# Patient Record
Sex: FEMALE | Race: WHITE | NOT HISPANIC OR LATINO | Employment: FULL TIME | URBAN - METROPOLITAN AREA
[De-identification: names, ages, dates, MRNs, and addresses within clinical notes are randomized per-mention and may not be internally consistent; named-entity substitution may affect disease eponyms.]

---

## 2017-03-03 ENCOUNTER — MYC MEDICAL ADVICE (OUTPATIENT)
Dept: FAMILY MEDICINE | Facility: CLINIC | Age: 32
End: 2017-03-03

## 2017-03-03 ENCOUNTER — VIRTUAL VISIT (OUTPATIENT)
Dept: FAMILY MEDICINE | Facility: OTHER | Age: 32
End: 2017-03-03

## 2017-03-03 LAB
DEPRECATED S PYO AG THROAT QL EIA: NORMAL
MICRO REPORT STATUS: NORMAL
SPECIMEN SOURCE: NORMAL

## 2017-03-03 NOTE — PROGRESS NOTES
Date:   Clinician: Jorge Bryant  Clinician NPI: [id number]  Patient: Ángela Zurita  Patient : 1985  Patient Address: [address] [place] [address]  Patient Phone: [phone]  Visit Protocol: Eyelash lengthening  Patient Summary:  Ángela is a 32 year old ( : 1985 ) female who initiated a Zip for Eyelash Lengthening treatment.     Ángela is seeking eyelash enhancement because of hair loss due to a medical condition (i.e. Skin condition, thyroid disorder, drug reactions).   Ángela denies the following symptoms: redness, itching, discharge, and pain. Ángela states that she does not have glaucoma (increased eye pressure). She does not have irresistible urges to pull out her hair. Ángela has not had an eye injury within the last four weeks. She also has not had eye surgery or Lasik done in the past year. She is not planning on having eye surgery or Lasik in the next three months. Ángela has not had lens surgery on her eyes. She does not wear contact lenses. She has not received chemotherapy treatment in the past.    Previous treatment  Ángela has tried bimatoprost ophthalmic solution (Latisse) in the past. She did not experience changes to eye color while using Latisse. She did not use Latisse for at least two to four months. Ángela used Latisse occasionally and found it to be somewhat effective.   She states she is not pregnant and denies breastfeeding. She is currently menstruating.    Ángela smokes or uses smokeless tobacco.  MEDICATIONS:   Patient free text response: Charan  , ALLERGIES:  NKDA   Clinician Response:  Dear Ángela,  Based on the information you have provided, you are eligible for Latisse.   I am prescribing:   Bimatoprost (Latisse) 0.03% ophthalmic solution. Place one drop on the disposable sterile applicator and apply evenly along the skin of the upper eyelid margin at the base of the eyelashes once nightly for 6 months. Blot any excess solution beyond the eyelid margin.  Dispose of the applicator after one use. Repeat for the opposite eyelid margin using a new sterile applicator. Do not apply to the lower eyelash line.   Latisse is a prescription treatment that will help grow your eyelashes. Stop using Latisse and call your clinic right away if this medication causes severe eye irritation, eye pain, or changes to your vision.   Latisse may increase brown pigmentation of the colored part of the eye, which is likely to be permanent. You can still use Latisse despite of changes to your eye color. Latisse can also cause darkening of the eyelid skin, which may be reversible. There is the potential for hair growth to occur in skin areas where Latisse solution comes in repeated contact.   Instructions for use:     Apply this medication each night and follow all directions on your prescription label.    If you wear contact lenses, remove them before applying Latisse. You may reinsert contacts 15 minutes after applying Latisse.    Do not use this medication in larger amounts than recommended. Using more of the medication will not make it more effective.    Click the following link for more information on Latisse: [email or web address].[email or web address].[email or web address]       Please contact your healthcare clinician if you want more refills and would like to continue using Latisse to maintain the effect.   Finally, as your clinician, I need you to know that becoming tobacco-free is the most important thing you can do to protect your current and future health.   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 Place one drop on the disposable sterile applicator and apply evenly along the skin of the upper eyelid margin at the base of the eyelashes once nightly for 6 months. Refills: 5, Refill as needed: no, Allow substitutions: yes  Prescription Sent At: March 03 16:10:07, 2017  Pharmacy: [facility]  [place] - [phone] - [address] [place] [id number]

## 2017-03-04 ENCOUNTER — VIRTUAL VISIT (OUTPATIENT)
Dept: LAB | Facility: CLINIC | Age: 32
End: 2017-03-04

## 2017-03-04 DIAGNOSIS — R07.0 THROAT PAIN: Primary | ICD-10-CM

## 2017-03-04 NOTE — PROGRESS NOTES
Date:   Clinician: Rebel Dickerson  Clinician NPI: [id number]  Patient: Ángela Zurita  Patient : 1985  Patient Address: [address] [place] [address]  Patient Phone: [phone]  Visit Protocol: URI  Patient Summary:  Ángela is a 32 year old ( : 1985 ) female who initiated a Zip for suspected Strep throat.      Her symptoms started suddenly 48 hours ago and consist of dysphagia, myalgias, cough, sore throat, chills, loss of appetite, fever, and malaise.   She denies dyspnea, ear pain, hoarse voice, itchy eyes, vomiting, nausea, nasal congestion, chest pain, petechial or purpuric rash, facial pain or pressure, headache, rhinitis, and post-nasal drainage. She denies a history of facial surgery.    She has a moderately painful sore throat. When Ángela swallows liquids or saliva, she experiences moderate pain. The patient denies having white spots on the tonsils similar to a sample strep throat image provided. She has been exposed via a family member or close personal contact to Strep. Their Strep diagnosis was confirmed via throat swab. When asked to feel her neck she reported enlarged lymph nodes. Ángela noted that the enlarged neck lymph nodes were first noticed when prompted to check for lymphadenopathy. She cannot tell if axillary lymphadenopathy is present.   Her mild (a few coughs/hr) non-productive cough is NOT more bothersome at night.   She has passed urine in the past 12 hours. She denies rigors.   Ángela denies having COPD or other chronic lung disease.   Pulse: Not measured beats in 10 seconds.    Weight (in lbs): 160   She states she is not pregnant and denies breastfeeding. She is currently menstruating.   Ángela does not smoke or use smokeless tobacco.   Additional information as reported by the patient (free text): My son was diagnosed today with strep, hoping to get myself checked out before the weekend    MEDICATIONS:   Patient free text response:  Charan   , ALLERGIES:   NKDA   Clinician Response:  Dear Ángela,  Your [facility] lab test results show that fortunately you DO NOT have a Strep Throat infection. This means that your condition should resolve within a few days. Try the following to help with your pain and discomfort:     Use throat lozenges    Gargle with warm salt water (1/4 teaspoon of salt per 8 ounce glass of water)    Suck on frozen items such as Popsicles or ice cubes    Take ibuprofen (such as Advil or store brand) or acetaminophen (Tylenol or store brand) for discomfort or fever     Follow up with your primary care clinician if your symptoms are not improving in 3-4 days.   Diagnosis: ZIP TICKET STREP  Diagnosis ICD: J02.9  [facility]: SSCRNT: NEGATIVE: No Group A streptococcal antigen detected by immunoassay, await  culture report.  [facility] Secondary Results: CULT: No Beta Streptococcus isolated

## 2017-03-04 NOTE — MR AVS SNAPSHOT
After Visit Summary   3/4/2017    Ángela Zurita    MRN: [id number]           Patient Information     Date Of Birth          1985        Visit Information        Provider Department      3/4/2017 2:30 PM [facility]        Today's Diagnoses     Throat pain    -  1       Follow-ups after your visit        Your next 10 appointments already scheduled     Mar 04, 2017  2:30 PM CST   My Health Lab Visit with [facility]   [facility]    [address] [place] [id number] [phone]           You must check in at the  no later than the appointment time. Please check [facility] for updates.              Who to contact     If you have questions or need follow up information about today's clinic visit or your schedule please contact [facility] directly at [phone].  Normal or non-critical lab and imaging results will be communicated to you by MyChart, letter or phone within 4 business days after the clinic has received the results. If you do not hear from us within 7 days, please contact the clinic through [employer] or phone. If you have a critical or abnormal lab result, we will notify you by phone as soon as possible.  Submit refill requests through [employer] or call your pharmacy and they will forward the refill request to us. Please allow 3 business days for your refill to be completed.          Additional Information About Your Visit        MyChart Information     [employer] gives you secure access to your electronic health record. If you see a primary care provider, you can also send messages to your care team and make appointments. If you have questions, please call your primary care clinic.  If you do not have a primary care provider, please call [phone] and they will assist you.        Care EveryWhere ID     This is your Care EveryWhere ID. This could be used  by other organizations to access your [facility] medical records  [id number]         Blood Pressure from Last 3 Encounters:   12/02/16 120/78   11/25/16 119/66   09/01/16 121/82    Weight from Last 3 Encounters:   12/02/16 158 lb (71.7 kg)   11/25/16 160 lb (72.6 kg)   09/01/16 168 lb 9.6 oz (76.5 kg)              We Performed the Following     Beta strep group A culture     Rapid strep screen        Primary Care Provider Office Phone # Fax #    KATHLEEN Coffman [facility] [phone]       [facility] [address] [facility] [address]        Thank you!     Thank you for choosing [facility]  for your care. Our goal is always to provide you with excellent care. Hearing back from our patients is one way we can continue to improve our services. Please take a few minutes to complete the written survey that you may receive in the mail after your visit with us. Thank you!             Your Updated Medication List - Protect others around you: Learn how to safely use, store and throw away your medicines at www.disposemymeds.org.          This list is accurate as of: 3/3/17  6:30 PM.  Always use your most recent med list.                   Brand Name Dispense Instructions for use    acyclovir 400 MG tablet    ZOVIRAX         * amphetamine-dextroamphetamine 10 MG per tablet    ADDERALL    150 tablet    Take 2 pills (20mg) orally in the morning and two pills (20mg) at 11am and one pill (10mg) at 2pm       * amphetamine-dextroamphetamine 10 MG per tablet    ADDERALL    150 tablet    Take 2 pills (20mg) orally in the morning and two pills (20mg) at 11am and one pill (10mg) at 2pm       * amphetamine-dextroamphetamine 10 MG per tablet    ADDERALL    150 tablet    Take 2 pills (20mg) orally in the morning and two pills (20mg) at 11am and one pill (10mg) at 3pm       * Notice:  This list has 3 medication(s) that are the same as other medications prescribed for you. Read the  directions carefully, and ask your doctor or other care provider to review them with you.

## 2017-03-05 LAB
BACTERIA SPEC CULT: NORMAL
MICRO REPORT STATUS: NORMAL
SPECIMEN SOURCE: NORMAL

## 2017-03-07 ENCOUNTER — OFFICE VISIT (OUTPATIENT)
Dept: FAMILY MEDICINE | Facility: CLINIC | Age: 32
End: 2017-03-07
Payer: COMMERCIAL

## 2017-03-07 VITALS
DIASTOLIC BLOOD PRESSURE: 78 MMHG | SYSTOLIC BLOOD PRESSURE: 123 MMHG | TEMPERATURE: 97.6 F | BODY MASS INDEX: 25.79 KG/M2 | HEIGHT: 65 IN | OXYGEN SATURATION: 98 % | HEART RATE: 83 BPM | WEIGHT: 154.8 LBS

## 2017-03-07 DIAGNOSIS — F90.0 ATTENTION DEFICIT HYPERACTIVITY DISORDER (ADHD), PREDOMINANTLY INATTENTIVE TYPE: ICD-10-CM

## 2017-03-07 DIAGNOSIS — L70.0 ACNE VULGARIS: Primary | ICD-10-CM

## 2017-03-07 PROCEDURE — 99213 OFFICE O/P EST LOW 20 MIN: CPT | Performed by: FAMILY MEDICINE

## 2017-03-07 RX ORDER — DEXTROAMPHETAMINE SACCHARATE, AMPHETAMINE ASPARTATE, DEXTROAMPHETAMINE SULFATE AND AMPHETAMINE SULFATE 2.5; 2.5; 2.5; 2.5 MG/1; MG/1; MG/1; MG/1
TABLET ORAL
Qty: 150 TABLET | Refills: 0 | Status: SHIPPED | OUTPATIENT
Start: 2017-03-07 | End: 2017-06-06

## 2017-03-07 RX ORDER — DEXTROAMPHETAMINE SACCHARATE, AMPHETAMINE ASPARTATE, DEXTROAMPHETAMINE SULFATE AND AMPHETAMINE SULFATE 2.5; 2.5; 2.5; 2.5 MG/1; MG/1; MG/1; MG/1
TABLET ORAL
Qty: 150 TABLET | Refills: 0 | Status: SHIPPED | OUTPATIENT
Start: 2017-04-06 | End: 2017-06-06

## 2017-03-07 RX ORDER — CLINDAMYCIN PHOSPHATE 10 MG/G
GEL TOPICAL 2 TIMES DAILY
Qty: 60 G | Refills: 11 | Status: SHIPPED | OUTPATIENT
Start: 2017-03-07 | End: 2018-04-10

## 2017-03-07 RX ORDER — DEXTROAMPHETAMINE SACCHARATE, AMPHETAMINE ASPARTATE, DEXTROAMPHETAMINE SULFATE AND AMPHETAMINE SULFATE 2.5; 2.5; 2.5; 2.5 MG/1; MG/1; MG/1; MG/1
TABLET ORAL
Qty: 150 TABLET | Refills: 0 | Status: SHIPPED | OUTPATIENT
Start: 2017-05-06 | End: 2017-06-06

## 2017-03-07 NOTE — PROGRESS NOTES
"  SUBJECTIVE:                                                    Ángela Zurita is a 32 year old female who presents to clinic today for the following health issues:      Medication Followup of Adderall    Taking Medication as prescribed: yes    Side Effects:  None    Medication Helping Symptoms:  yes     Since starting Adderall feels like much improved focus at work in that able to focus on one task and allow others to wait.   is noticing difference in attention with conversation focus.  Has played around with dose and if takes 2 after 2pm then unable to sleep so taking 2 in the morning 2 at 11am then 1 pill at 3pm which is working well.  Feels nearly 90% improved    History of ADHD or other psych diagnoses: Diagnosed in 2011 and started medication Adderall which worked well to help with focus at work. [place] saw therapist and did the ADHD testing 2/2012 with Uriel Min, these records were reviewed.  Then pregnancy after that and mood was very stable and then kids and life was disorganized.  Currently having struggles with relationships due to focusing on conversations and forgets things during conversations with .  At work had review last month with difficulty focusing during conversations, projects started and trouble with completing tasks.     Acne: ongoing for years, has been on clinda gel in the past with good results would like it again.    Problem list and histories reviewed & adjusted, as indicated.  Additional history: as documented    Reviewed and updated as needed this visit by clinical staff  Tobacco  Allergies  Med Hx  Surg Hx  Fam Hx  Soc Hx      Reviewed and updated as needed this visit by Provider         ROS:  Constitutional, HEENT, cardiovascular, pulmonary, gi and gu systems are negative, except as otherwise noted.    OBJECTIVE:                                                    /78  Pulse 83  Temp 97.6  F (36.4  C) (Tympanic)  Ht 5' 5\" (1.651 m)  Wt 154 lb 12.8 oz " (70.2 kg)  [facility] 02/27/2017  SpO2 98%  BMI 25.76 kg/m2  Body mass index is 25.76 kg/(m^2).  GENERAL: healthy, alert and no distress  CV: regular rate and rhythm, normal S1 S2, no S3 or S4, no murmur, click or rub, no peripheral edema and peripheral pulses strong  SKIN: non inflammatory papules on chin  NEURO: Normal strength and tone, mentation intact and speech normal  PSYCH: mentation appears normal, affect normal/bright    Diagnostic Test Results:  none      ASSESSMENT/PLAN:                                                        Ángela was seen today for recheck medication.    Diagnoses and all orders for this visit:    Acne vulgaris: non-inflammatory papules on chin  -     clindamycin (CLINDAMAX) 1 % topical gel; Apply topically 2 times daily    Attention deficit hyperactivity disorder (ADHD), predominantly inattentive type: stable  -refill for 3 months  -     amphetamine-dextroamphetamine (ADDERALL) 10 MG per tablet; Take 2 pills (20mg) orally in the morning and two pills (20mg) at 11am and one pill (10mg) at 3pm  -     amphetamine-dextroamphetamine (ADDERALL) 10 MG per tablet; Take 2 pills (20mg) orally in the morning and two pills (20mg) at 11am and one pill (10mg) at 2pm  -     amphetamine-dextroamphetamine (ADDERALL) 10 MG per tablet; Take 2 pills (20mg) orally in the morning and two pills (20mg) at 11am and one pill (10mg) at 2pm        See Patient Instructions    Randy Graf MD  [facility]

## 2017-03-07 NOTE — MR AVS SNAPSHOT
After Visit Summary   3/7/2017    Ángela Zurita    MRN: [id number]           Patient Information     Date Of Birth          1985        Visit Information        Provider Department      3/7/2017 7:20 AM Randy Graf MD [facility]        Today's Diagnoses     Attention deficit hyperactivity disorder (ADHD), predominantly inattentive type          Care Instructions    Next clinic visit in 3 months    Thank you for choosing [facility].  You may be receiving a survey in the mail from [facility] regarding your visit today.  Please take a few minutes to complete and return the survey to let us know how we are doing.      If you have questions or concerns, please contact us via [employer] or you can contact your care team at [phone].    Our Clinic hours are:  Monday 6:40 am  to 7:00 pm  Tuesday -Friday 6:40 am to 5:00 pm    The [place] outpatient lab hours are:  Monday - Friday 6:10 am to 4:45 pm  Saturdays 7:00 am to 11:00 am  Appointments are required, call [phone]    If you have clinical questions after hours or would like to schedule an appointment,  call the clinic at [phone].        Follow-ups after your visit        Who to contact     If you have questions or need follow up information about today's clinic visit or your schedule please contact [facility] directly at [phone].  Normal or non-critical lab and imaging results will be communicated to you by [employer], letter or phone within 4 business days after the clinic has received the results. If you do not hear from us within 7 days, please contact the clinic through [employer] or phone. If you have a critical or abnormal lab result, we will notify you by phone as soon as possible.  Submit refill requests through [employer] or call your pharmacy and they will forward the refill request to us. Please allow 3 business days for your refill to be completed.          Additional Information About  "Your Visit        MyChart Information     [employer] gives you secure access to your electronic health record. If you see a primary care provider, you can also send messages to your care team and make appointments. If you have questions, please call your primary care clinic.  If you do not have a primary care provider, please call [phone] and they will assist you.        Care EveryWhere ID     This is your Care EveryWhere ID. This could be used by other organizations to access your [place] medical records  [id number]        Your Vitals Were     Pulse Temperature Height Last Period Pulse Oximetry BMI (Body Mass Index)    83 97.6  F (36.4  C) (Tympanic) 5' 5\" (1.651 m) 02/27/2017 98% 25.76 kg/m2       Blood Pressure from Last 3 Encounters:   03/07/17 123/78   12/02/16 120/78   11/25/16 119/66    Weight from Last 3 Encounters:   03/07/17 154 lb 12.8 oz (70.2 kg)   12/02/16 158 lb (71.7 kg)   11/25/16 160 lb (72.6 kg)              Today, you had the following     No orders found for display         Where to get your medicines      Some of these will need a paper prescription and others can be bought over the counter.  Ask your nurse if you have questions.     Bring a paper prescription for each of these medications     amphetamine-dextroamphetamine 10 MG per tablet    amphetamine-dextroamphetamine 10 MG per tablet    amphetamine-dextroamphetamine 10 MG per tablet          Primary Care Provider Office Phone # Fax #    KATHLEEN Coffman [facility] [phone]       [facility] [address] [facility] [address]        Thank you!     Thank you for choosing [facility]  for your care. Our goal is always to provide you with excellent care. Hearing back from our patients is one way we can continue to improve our services. Please take a few minutes to complete the written survey that you may receive in the mail after your visit with us. Thank you!             Your Updated " Medication List - Protect others around you: Learn how to safely use, store and throw away your medicines at www.disposemymeds.org.          This list is accurate as of: 3/7/17  7:48 AM.  Always use your most recent med list.                   Brand Name Dispense Instructions for use    acyclovir 400 MG tablet    ZOVIRAX     Reported on 3/7/2017       * amphetamine-dextroamphetamine 10 MG per tablet    ADDERALL    150 tablet    Take 2 pills (20mg) orally in the morning and two pills (20mg) at 11am and one pill (10mg) at 2pm       * amphetamine-dextroamphetamine 10 MG per tablet   Start taking on:  4/6/2017    ADDERALL    150 tablet    Take 2 pills (20mg) orally in the morning and two pills (20mg) at 11am and one pill (10mg) at 2pm       * amphetamine-dextroamphetamine 10 MG per tablet   Start taking on:  5/6/2017    ADDERALL    150 tablet    Take 2 pills (20mg) orally in the morning and two pills (20mg) at 11am and one pill (10mg) at 3pm       * Notice:  This list has 3 medication(s) that are the same as other medications prescribed for you. Read the directions carefully, and ask your doctor or other care provider to review them with you.

## 2017-03-07 NOTE — PATIENT INSTRUCTIONS
Next clinic visit in 3 months    Thank you for choosing [facility].  You may be receiving a survey in the mail from [facility] regarding your visit today.  Please take a few minutes to complete and return the survey to let us know how we are doing.      If you have questions or concerns, please contact us via [employer] or you can contact your care team at [phone].    Our Clinic hours are:  Monday 6:40 am  to 7:00 pm  Tuesday -Friday 6:40 am to 5:00 pm    The [place] outpatient lab hours are:  Monday - Friday 6:10 am to 4:45 pm  Saturdays 7:00 am to 11:00 am  Appointments are required, call [phone]    If you have clinical questions after hours or would like to schedule an appointment,  call the clinic at [phone].

## 2017-03-07 NOTE — NURSING NOTE
"Chief Complaint   Patient presents with     Recheck Medication     Adderall       Initial /78  Pulse 83  Temp 97.6  F (36.4  C) (Tympanic)  Ht 5' 5\" (1.651 m)  Wt 154 lb 12.8 oz (70.2 kg)  LMP 02/27/2017  SpO2 98%  BMI 25.76 kg/m2 Estimated body mass index is 25.76 kg/(m^2) as calculated from the following:    Height as of this encounter: 5' 5\" (1.651 m).    Weight as of this encounter: 154 lb 12.8 oz (70.2 kg).  Medication Reconciliation: complete  "

## 2017-05-03 ENCOUNTER — E-VISIT (OUTPATIENT)
Dept: FAMILY MEDICINE | Facility: CLINIC | Age: 32
End: 2017-05-03
Payer: COMMERCIAL

## 2017-05-03 DIAGNOSIS — B00.1 COLD SORE: Primary | ICD-10-CM

## 2017-05-03 PROCEDURE — 99444 ZZC PHYSICIAN ONLINE EVALUATION & MANAGEMENT SERVICE: CPT | Performed by: FAMILY MEDICINE

## 2017-05-04 RX ORDER — VALACYCLOVIR HYDROCHLORIDE 1 G/1
2000 TABLET, FILM COATED ORAL 2 TIMES DAILY
Qty: 4 TABLET | Refills: 0 | Status: SHIPPED | OUTPATIENT
Start: 2017-05-04 | End: 2017-08-14

## 2017-06-06 ENCOUNTER — OFFICE VISIT (OUTPATIENT)
Dept: FAMILY MEDICINE | Facility: CLINIC | Age: 32
End: 2017-06-06
Payer: COMMERCIAL

## 2017-06-06 VITALS
HEIGHT: 65 IN | HEART RATE: 68 BPM | WEIGHT: 150.2 LBS | BODY MASS INDEX: 25.02 KG/M2 | SYSTOLIC BLOOD PRESSURE: 102 MMHG | TEMPERATURE: 98.2 F | DIASTOLIC BLOOD PRESSURE: 63 MMHG | OXYGEN SATURATION: 100 %

## 2017-06-06 DIAGNOSIS — F90.0 ATTENTION DEFICIT HYPERACTIVITY DISORDER (ADHD), PREDOMINANTLY INATTENTIVE TYPE: ICD-10-CM

## 2017-06-06 PROCEDURE — 99213 OFFICE O/P EST LOW 20 MIN: CPT | Performed by: FAMILY MEDICINE

## 2017-06-06 RX ORDER — DEXTROAMPHETAMINE SACCHARATE, AMPHETAMINE ASPARTATE, DEXTROAMPHETAMINE SULFATE AND AMPHETAMINE SULFATE 2.5; 2.5; 2.5; 2.5 MG/1; MG/1; MG/1; MG/1
TABLET ORAL
Qty: 150 TABLET | Refills: 0 | Status: SHIPPED | OUTPATIENT
Start: 2017-06-06 | End: 2017-08-31

## 2017-06-06 RX ORDER — DEXTROAMPHETAMINE SACCHARATE, AMPHETAMINE ASPARTATE, DEXTROAMPHETAMINE SULFATE AND AMPHETAMINE SULFATE 2.5; 2.5; 2.5; 2.5 MG/1; MG/1; MG/1; MG/1
TABLET ORAL
Qty: 150 TABLET | Refills: 0 | Status: SHIPPED | OUTPATIENT
Start: 2017-07-06 | End: 2017-08-31

## 2017-06-06 RX ORDER — DEXTROAMPHETAMINE SACCHARATE, AMPHETAMINE ASPARTATE, DEXTROAMPHETAMINE SULFATE AND AMPHETAMINE SULFATE 2.5; 2.5; 2.5; 2.5 MG/1; MG/1; MG/1; MG/1
TABLET ORAL
Qty: 150 TABLET | Refills: 0 | Status: SHIPPED | OUTPATIENT
Start: 2017-08-03 | End: 2017-08-31

## 2017-06-06 NOTE — NURSING NOTE
"Chief Complaint   Patient presents with     Refill Request     Adderall       Initial /63  Pulse 68  Temp 98.2  F (36.8  C) (Tympanic)  Ht 5' 5\" (1.651 m)  Wt 150 lb 3.2 oz (68.1 kg)  LMP 05/22/2017 (Approximate)  SpO2 100%  BMI 24.99 kg/m2 Estimated body mass index is 24.99 kg/(m^2) as calculated from the following:    Height as of this encounter: 5' 5\" (1.651 m).    Weight as of this encounter: 150 lb 3.2 oz (68.1 kg).  Medication Reconciliation: complete  "

## 2017-06-06 NOTE — PATIENT INSTRUCTIONS
Thank you for choosing [facility].  You may be receiving a survey in the mail from Tre[facility] Strickland regarding your visit today.  Please take a few minutes to complete and return the survey to let us know how we are doing.      If you have questions or concerns, please contact us via [employer] or you can contact your care team at [phone].    Our Clinic hours are:  Monday 6:40 am  to 7:00 pm  Tuesday -Friday 6:40 am to 5:00 pm    The [place] outpatient lab hours are:  Monday - Friday 6:10 am to 4:45 pm  Saturdays 7:00 am to 11:00 am  Appointments are required, call [phone]    If you have clinical questions after hours or would like to schedule an appointment,  call the clinic at [phone].

## 2017-06-06 NOTE — PROGRESS NOTES
"  SUBJECTIVE:                                                    Ángela Zurita is a 32 year old female who presents to clinic today for the following health issues:    Medication Followup of Adderall    Taking Medication as prescribed: yes    Side Effects:  None    Medication Helping Symptoms:  yes   Getting  July 1st. Might decide to start trying for pregnancy in Winter.      Problem list and histories reviewed & adjusted, as indicated.  Additional history: as documented      Reviewed and updated as needed this visit by clinical staff  Tobacco  Allergies  Med Hx  Surg Hx  Fam Hx  Soc Hx      Reviewed and updated as needed this visit by Provider         ROS:  C: NEGATIVE for fever, chills, change in weight, no decreased appetite  E/M: NEGATIVE for ear, mouth and throat problems  R: NEGATIVE for significant cough or SOB  CV: NEGATIVE for chest pain, palpitations or peripheral edema  NEURO: no tremor or tics    OBJECTIVE:                                                    /63  Pulse 68  Temp 98.2  F (36.8  C) (Tympanic)  Ht 5' 5\" (1.651 m)  Wt 150 lb 3.2 oz (68.1 kg)  LMP 05/22/2017 (Approximate)  SpO2 100%  BMI 24.99 kg/m2  Body mass index is 24.99 kg/(m^2).  GENERAL: healthy, alert and no distress  PSYCH: mentation appears normal, affect normal/bright, no tremor    Diagnostic Test Results:  none      ASSESSMENT/PLAN:                                                        Ángela was seen today for refill request.    Diagnoses and all orders for this visit:    Attention deficit hyperactivity disorder (ADHD), predominantly inattentive type: well controlled  -     amphetamine-dextroamphetamine (ADDERALL) 10 MG per tablet; Take 2 pills (20mg) orally in the morning and two pills (20mg) at 11am and one pill (10mg) at 3pm #150 per month  -     amphetamine-dextroamphetamine (ADDERALL) 10 MG per tablet; Take 2 pills (20mg) orally in the morning and two pills (20mg) at 11am and one pill (10mg) at " 2pm  -     amphetamine-dextroamphetamine (ADDERALL) 10 MG per tablet; Take 2 pills (20mg) orally in the morning and two pills (20mg) at 11am and one pill (10mg) at 2pm        See Patient Instructions    Randy Graf MD  [facility]

## 2017-06-06 NOTE — MR AVS SNAPSHOT
After Visit Summary   6/6/2017    Ángela Zurita    MRN: [id number]           Patient Information     Date Of Birth          1985        Visit Information        Provider Department      6/6/2017 6:40 AM Randy Graf MD [facility]        Today's Diagnoses     Attention deficit hyperactivity disorder (ADHD), predominantly inattentive type          Care Instructions          Thank you for choosing [facility].  You may be receiving a survey in the mail from [facility] regarding your visit today.  Please take a few minutes to complete and return the survey to let us know how we are doing.      If you have questions or concerns, please contact us via [employer] or you can contact your care team at [phone].    Our Clinic hours are:  Monday 6:40 am  to 7:00 pm  Tuesday -Friday 6:40 am to 5:00 pm    The [place] outpatient lab hours are:  Monday - Friday 6:10 am to 4:45 pm  Saturdays 7:00 am to 11:00 am  Appointments are required, call [phone]    If you have clinical questions after hours or would like to schedule an appointment,  call the clinic at [phone].          Follow-ups after your visit        Who to contact     If you have questions or need follow up information about today's clinic visit or your schedule please contact [facility] directly at [phone].  Normal or non-critical lab and imaging results will be communicated to you by MyChart, letter or phone within 4 business days after the clinic has received the results. If you do not hear from us within 7 days, please contact the clinic through [employer] or phone. If you have a critical or abnormal lab result, we will notify you by phone as soon as possible.  Submit refill requests through [employer] or call your pharmacy and they will forward the refill request to us. Please allow 3 business days for your refill to be completed.          Additional Information About Your Visit        Roberts Chapelt  "Information     [employer] gives you secure access to your electronic health record. If you see a primary care provider, you can also send messages to your care team and make appointments. If you have questions, please call your primary care clinic.  If you do not have a primary care provider, please call [phone] and they will assist you.        Care EveryWhere ID     This is your Care EveryWhere ID. This could be used by other organizations to access your [facility] medical records  [id number]        Your Vitals Were     Pulse Temperature Height Last Period Pulse Oximetry BMI (Body Mass Index)    68 98.2  F (36.8  C) (Tympanic) 5' 5\" (1.651 m) 05/22/2017 (Approximate) 100% 24.99 kg/m2       Blood Pressure from Last 3 Encounters:   06/06/17 102/63   03/07/17 123/78   12/02/16 120/78    Weight from Last 3 Encounters:   06/06/17 150 lb 3.2 oz (68.1 kg)   03/07/17 154 lb 12.8 oz (70.2 kg)   12/02/16 158 lb (71.7 kg)              Today, you had the following     No orders found for display         Today's Medication Changes          These changes are accurate as of: 6/6/17  7:36 AM.  If you have any questions, ask your nurse or doctor.               Stop taking these medicines if you haven't already. Please contact your care team if you have questions.     acyclovir 400 MG tablet   Commonly known as:  ZOVIRAX   Stopped by:  Randy Graf MD                Where to get your medicines      Some of these will need a paper prescription and others can be bought over the counter.  Ask your nurse if you have questions.     Bring a paper prescription for each of these medications     amphetamine-dextroamphetamine 10 MG per tablet    amphetamine-dextroamphetamine 10 MG per tablet    amphetamine-dextroamphetamine 10 MG per tablet                Primary Care Provider Office Phone # Fax #    KATHLEEN Coffman [phone]       [facility] [address] [address] [address]      "   Thank you!     Thank you for choosing [facility]  for your care. Our goal is always to provide you with excellent care. Hearing back from our patients is one way we can continue to improve our services. Please take a few minutes to complete the written survey that you may receive in the mail after your visit with us. Thank you!             Your Updated Medication List - Protect others around you: Learn how to safely use, store and throw away your medicines at www.disposemymeds.org.          This list is accurate as of: 6/6/17  7:36 AM.  Always use your most recent med list.                   Brand Name Dispense Instructions for use    * amphetamine-dextroamphetamine 10 MG per tablet    ADDERALL    150 tablet    Take 2 pills (20mg) orally in the morning and two pills (20mg) at 11am and one pill (10mg) at 2pm       * amphetamine-dextroamphetamine 10 MG per tablet   Start taking on:  7/6/2017    ADDERALL    150 tablet    Take 2 pills (20mg) orally in the morning and two pills (20mg) at 11am and one pill (10mg) at 2pm       * amphetamine-dextroamphetamine 10 MG per tablet   Start taking on:  8/3/2017    ADDERALL    150 tablet    Take 2 pills (20mg) orally in the morning and two pills (20mg) at 11am and one pill (10mg) at 3pm       clindamycin 1 % topical gel    CLINDAMAX    60 g    Apply topically 2 times daily       valACYclovir 1000 mg tablet    VALTREX    4 tablet    Take 2 tablets (2,000 mg) by mouth 2 times daily for 1 day       * Notice:  This list has 3 medication(s) that are the same as other medications prescribed for you. Read the directions carefully, and ask your doctor or other care provider to review them with you.

## 2017-06-26 LAB
DEPRECATED S PYO AG THROAT QL EIA: NORMAL
MICRO REPORT STATUS: NORMAL
SPECIMEN SOURCE: NORMAL

## 2017-06-27 ENCOUNTER — VIRTUAL VISIT (OUTPATIENT)
Dept: LAB | Facility: CLINIC | Age: 32
End: 2017-06-27

## 2017-06-27 DIAGNOSIS — R07.0 THROAT PAIN: Primary | ICD-10-CM

## 2017-06-27 LAB
BACTERIA SPEC CULT: NORMAL
MICRO REPORT STATUS: NORMAL
SPECIMEN SOURCE: NORMAL

## 2017-06-27 NOTE — MR AVS SNAPSHOT
After Visit Summary   6/27/2017    Ángela Zurita    MRN: [id number]           Patient Information     Date Of Birth          1985        Visit Information        Provider Department      6/27/2017 1:30 PM [facility]        Today's Diagnoses     Throat pain    -  1       Follow-ups after your visit        Your next 10 appointments already scheduled     Jun 27, 2017  1:30 PM CDT   My Health Lab Visit with [facility]   [facility]    [address] [place] [address]   [phone]           You must check in at the  no later than the appointment time. Please check [employer] for updates.              Who to contact     If you have questions or need follow up information about today's clinic visit or your schedule please contact [facility] directly at [phone].  Normal or non-critical lab and imaging results will be communicated to you by MyChart, letter or phone within 4 business days after the clinic has received the results. If you do not hear from us within 7 days, please contact the clinic through [employer] or phone. If you have a critical or abnormal lab result, we will notify you by phone as soon as possible.  Submit refill requests through [employer] or call your pharmacy and they will forward the refill request to us. Please allow 3 business days for your refill to be completed.          Additional Information About Your Visit        MyChart Information     [employer] gives you secure access to your electronic health record. If you see a primary care provider, you can also send messages to your care team and make appointments. If you have questions, please call your primary care clinic.  If you do not have a primary care provider, please call [phone] and they will assist you.        Care EveryWhere ID     This is your Care EveryWhere ID. This could be used  by other organizations to access your [place] medical records  [id number]        Your Vitals Were     Last Period                   05/22/2017 (Approximate)            Blood Pressure from Last 3 Encounters:   06/06/17 102/63   03/07/17 123/78   12/02/16 120/78    Weight from Last 3 Encounters:   06/06/17 150 lb 3.2 oz (68.1 kg)   03/07/17 154 lb 12.8 oz (70.2 kg)   12/02/16 158 lb (71.7 kg)              We Performed the Following     Beta strep group A culture     Rapid strep screen        Primary Care Provider Office Phone # Fax #    Cierra Cifuentes, APRN [facility] [phone]       [address] [address]        Equal Access to Services     SKY BLANCHARD : Hadii aad ku hadasho Soomaali, waaxda luqadaha, qaybta kaalmada adeegyada, waxpaz scherer hayrissa hoskins . So [facility] [phone].    ATENCIÓN: Si habla español, tiene a reyez disposición servicios gratuitos de asistencia lingüística. Llame al [phone].    We comply with applicable federal civil rights laws and [place] laws. We do not discriminate on the basis of race, color, national origin, age, disability sex, sexual orientation or gender identity.            Thank you!     Thank you for choosing [facility]  for your care. Our goal is always to provide you with excellent care. Hearing back from our patients is one way we can continue to improve our services. Please take a few minutes to complete the written survey that you may receive in the mail after your visit with us. Thank you!             Your Updated Medication List - Protect others around you: Learn how to safely use, store and throw away your medicines at www.disposemymeds.org.          This list is accurate as of: 6/26/17  4:23 PM.  Always use your most recent med list.                   Brand Name Dispense Instructions for use Diagnosis    * amphetamine-dextroamphetamine 10 MG per tablet    ADDERALL    150 tablet     Take 2 pills (20mg) orally in the morning and two pills (20mg) at 11am and one pill (10mg) at 2pm    Attention deficit hyperactivity disorder (ADHD), predominantly inattentive type       * amphetamine-dextroamphetamine 10 MG per tablet   Start taking on:  7/6/2017    ADDERALL    150 tablet    Take 2 pills (20mg) orally in the morning and two pills (20mg) at 11am and one pill (10mg) at 2pm    Attention deficit hyperactivity disorder (ADHD), predominantly inattentive type       * amphetamine-dextroamphetamine 10 MG per tablet   Start taking on:  8/3/2017    ADDERALL    150 tablet    Take 2 pills (20mg) orally in the morning and two pills (20mg) at 11am and one pill (10mg) at 3pm    Attention deficit hyperactivity disorder (ADHD), predominantly inattentive type       clindamycin 1 % topical gel    CLINDAMAX    60 g    Apply topically 2 times daily    Acne vulgaris       valACYclovir 1000 mg tablet    VALTREX    4 tablet    Take 2 tablets (2,000 mg) by mouth 2 times daily for 1 day    Cold sore       * Notice:  This list has 3 medication(s) that are the same as other medications prescribed for you. Read the directions carefully, and ask your doctor or other care provider to review them with you.

## 2017-08-14 ENCOUNTER — HOSPITAL ENCOUNTER (EMERGENCY)
Facility: CLINIC | Age: 32
Discharge: HOME | End: 2017-08-14
Attending: PHYSICIAN ASSISTANT | Admitting: PHYSICIAN ASSISTANT
Payer: COMMERCIAL

## 2017-08-14 VITALS
DIASTOLIC BLOOD PRESSURE: 84 MMHG | HEIGHT: 65 IN | OXYGEN SATURATION: 98 % | TEMPERATURE: 98.6 F | SYSTOLIC BLOOD PRESSURE: 129 MMHG | RESPIRATION RATE: 16 BRPM

## 2017-08-14 DIAGNOSIS — B96.89 BACTERIAL VAGINOSIS: ICD-10-CM

## 2017-08-14 DIAGNOSIS — N64.52 NIPPLE DISCHARGE IN FEMALE: ICD-10-CM

## 2017-08-14 DIAGNOSIS — N76.0 BACTERIAL VAGINOSIS: ICD-10-CM

## 2017-08-14 LAB
ALBUMIN SERPL-MCNC: 4 G/DL (ref 3.4–5)
ALBUMIN UR-MCNC: NEGATIVE MG/DL
ALP SERPL-CCNC: 75 U/L (ref 40–150)
ALT SERPL W P-5'-P-CCNC: 23 U/L (ref 0–50)
ANION GAP SERPL CALCULATED.3IONS-SCNC: 4 MMOL/L (ref 3–14)
APPEARANCE UR: CLEAR
AST SERPL W P-5'-P-CCNC: 15 U/L (ref 0–45)
BASOPHILS # BLD AUTO: 0 10E9/L (ref 0–0.2)
BASOPHILS NFR BLD AUTO: 0.3 %
BILIRUB SERPL-MCNC: 0.6 MG/DL (ref 0.2–1.3)
BILIRUB UR QL STRIP: NEGATIVE
BUN SERPL-MCNC: 9 MG/DL (ref 7–30)
CALCIUM SERPL-MCNC: 9.2 MG/DL (ref 8.5–10.1)
CHLORIDE SERPL-SCNC: 102 MMOL/L (ref 94–109)
CO2 SERPL-SCNC: 30 MMOL/L (ref 20–32)
COLOR UR AUTO: ABNORMAL
CREAT SERPL-MCNC: 0.63 MG/DL (ref 0.52–1.04)
DIFFERENTIAL METHOD BLD: NORMAL
EOSINOPHIL # BLD AUTO: 0 10E9/L (ref 0–0.7)
EOSINOPHIL NFR BLD AUTO: 0.4 %
ERYTHROCYTE [DISTWIDTH] IN BLOOD BY AUTOMATED COUNT: 11.6 % (ref 10–15)
GFR SERPL CREATININE-BSD FRML MDRD: ABNORMAL ML/MIN/1.7M2
GLUCOSE SERPL-MCNC: 136 MG/DL (ref 70–99)
GLUCOSE UR STRIP-MCNC: NEGATIVE MG/DL
HCG UR QL: NEGATIVE
HCT VFR BLD AUTO: 41.3 % (ref 35–47)
HGB BLD-MCNC: 14.5 G/DL (ref 11.7–15.7)
HGB UR QL STRIP: NEGATIVE
IMM GRANULOCYTES # BLD: 0 10E9/L (ref 0–0.4)
IMM GRANULOCYTES NFR BLD: 0.2 %
INTERNAL QC OK POCT: YES
KETONES UR STRIP-MCNC: 10 MG/DL
LEUKOCYTE ESTERASE UR QL STRIP: NEGATIVE
LYMPHOCYTES # BLD AUTO: 3.6 10E9/L (ref 0.8–5.3)
LYMPHOCYTES NFR BLD AUTO: 33.2 %
MCH RBC QN AUTO: 31.7 PG (ref 26.5–33)
MCHC RBC AUTO-ENTMCNC: 35.1 G/DL (ref 31.5–36.5)
MCV RBC AUTO: 90 FL (ref 78–100)
MICRO REPORT STATUS: ABNORMAL
MONOCYTES # BLD AUTO: 0.6 10E9/L (ref 0–1.3)
MONOCYTES NFR BLD AUTO: 6 %
NEUTROPHILS # BLD AUTO: 6.4 10E9/L (ref 1.6–8.3)
NEUTROPHILS NFR BLD AUTO: 59.9 %
NITRATE UR QL: NEGATIVE
PH UR STRIP: 7 PH (ref 5–7)
PLATELET # BLD AUTO: 412 10E9/L (ref 150–450)
POTASSIUM SERPL-SCNC: 3.8 MMOL/L (ref 3.4–5.3)
PROLACTIN SERPL-MCNC: 14 UG/L (ref 3–27)
PROT SERPL-MCNC: 7.4 G/DL (ref 6.8–8.8)
RBC # BLD AUTO: 4.58 10E12/L (ref 3.8–5.2)
S PYO AG THROAT QL IA.RAPID: NEGATIVE
SODIUM SERPL-SCNC: 136 MMOL/L (ref 133–144)
SP GR UR STRIP: 1 (ref 1–1.03)
SPECIMEN SOURCE: ABNORMAL
URN SPEC COLLECT METH UR: ABNORMAL
UROBILINOGEN UR STRIP-MCNC: NORMAL MG/DL (ref 0–2)
WBC # BLD AUTO: 10.7 10E9/L (ref 4–11)
WET PREP SPEC: ABNORMAL

## 2017-08-14 PROCEDURE — 85025 COMPLETE CBC W/AUTO DIFF WBC: CPT | Performed by: PHYSICIAN ASSISTANT

## 2017-08-14 PROCEDURE — 81025 URINE PREGNANCY TEST: CPT | Performed by: PHYSICIAN ASSISTANT

## 2017-08-14 PROCEDURE — 99214 OFFICE O/P EST MOD 30 MIN: CPT | Performed by: PHYSICIAN ASSISTANT

## 2017-08-14 PROCEDURE — 80053 COMPREHEN METABOLIC PANEL: CPT | Performed by: PHYSICIAN ASSISTANT

## 2017-08-14 PROCEDURE — 87210 SMEAR WET MOUNT SALINE/INK: CPT | Performed by: PHYSICIAN ASSISTANT

## 2017-08-14 PROCEDURE — 87081 CULTURE SCREEN ONLY: CPT | Performed by: PHYSICIAN ASSISTANT

## 2017-08-14 PROCEDURE — 87880 STREP A ASSAY W/OPTIC: CPT | Performed by: PHYSICIAN ASSISTANT

## 2017-08-14 PROCEDURE — 84146 ASSAY OF PROLACTIN: CPT | Performed by: PHYSICIAN ASSISTANT

## 2017-08-14 PROCEDURE — 99213 OFFICE O/P EST LOW 20 MIN: CPT

## 2017-08-14 PROCEDURE — 81003 URINALYSIS AUTO W/O SCOPE: CPT | Performed by: PHYSICIAN ASSISTANT

## 2017-08-14 RX ORDER — METRONIDAZOLE 500 MG/1
500 TABLET ORAL 2 TIMES DAILY
Qty: 14 TABLET | Refills: 0 | Status: SHIPPED | OUTPATIENT
Start: 2017-08-14 | End: 2017-08-21

## 2017-08-14 NOTE — ED NOTES
Patient here for possible urinary infection - having some bloating,cramps, and frequnency- symptoms started 5 days ago.  Patient presents ambulatory to the urgent care.

## 2017-08-14 NOTE — ED AVS SNAPSHOT
[facility] Emergency Department    [address] [address]    Phone:  [phone]    Fax:  [phone]                                       Ángela Zurita   MRN: [id number]    Department:  [facility] Emergency Department   Date of Visit:  8/14/2017           After Visit Summary Signature Page     I have received my discharge instructions, and my questions have been answered. I have discussed any challenges I see with this plan with the nurse or doctor.    ..........................................................................................................................................  Patient/Patient Representative Signature      ..........................................................................................................................................  Patient Representative Print Name and Relationship to Patient    ..................................................               ................................................  Date                                            Time    ..........................................................................................................................................  Reviewed by Signature/Title    ...................................................              ..............................................  Date                                                            Time

## 2017-08-14 NOTE — ED AVS SNAPSHOT
[facility] Emergency Department    [address] [address]    Phone:  [phone]    Fax:  [phone]                                       Ángela Zurita   MRN: [id number]    Department:  [facility] Emergency Department   Date of Visit:  8/14/2017           Patient Information     Date Of Birth          1985        Your diagnoses for this visit were:     Bacterial vaginosis     Nipple discharge in female        You were seen by Vibha Eller PA-C.      Follow-up Information     Follow up with Randy Graf MD In 1 week.    Specialty:  Family Practice    Contact information:    [address] [facility] [id number] [phone]          Discharge Instructions       Patient to follow up with primary care provider for recheck of symptoms and further evaluation of nipple discharge and abdominal symptoms may need further imaging and labs. Discussed with patient the importance of this; patient aware and will make an appointment.   Use medication as directed: no alcohol while on medication    Return to Emergency Room if worsening or persist abdominal pain, fevers, worsening headaches occur.   Can not rule out appendicitis at this time; but due to symptoms and duration low suspicion for acute appendicitis.     24 Hour Appointment Hotline       To make an appointment at any [facility], call [phone] ([phone]). If you don't have a family doctor or clinic, we will help you find one. [facility] clinics are conveniently located to serve the needs of you and your family.             Review of your medicines      START taking        Dose / Directions Last dose taken    metroNIDAZOLE 500 MG tablet   Commonly known as:  FLAGYL   Dose:  500 mg   Quantity:  14 tablet        Take 1 tablet (500 mg) by mouth 2 times daily for 7 days   Refills:  0          Our records show that you are taking the medicines listed below. If these are incorrect, please call your family  doctor or clinic.        Dose / Directions Last dose taken    amphetamine-dextroamphetamine 10 MG per tablet   Commonly known as:  ADDERALL   Quantity:  150 tablet        Take 2 pills (20mg) orally in the morning and two pills (20mg) at 11am and one pill (10mg) at 3pm   Refills:  0        clindamycin 1 % topical gel   Commonly known as:  CLINDAMAX   Quantity:  60 g        Apply topically 2 times daily   Refills:  11                Prescriptions were sent or printed at these locations (1 Prescription)                   [facility] [place], [place] - [address]   [address] [facility] [address]    Telephone:  [phone]   Fax:  [phone]   Hours:                  E-Prescribed (1 of 1)         metroNIDAZOLE (FLAGYL) 500 MG tablet                Procedures and tests performed during your visit     CBC with platelets differential    Comprehensive metabolic panel    HCG qualitative urine    Prolactin    Rapid strep group A screen POCT    UA reflex to Microscopic    Wet prep      Orders Needing Specimen Collection     None      Pending Results     Date and Time Order Name Status Description    8/14/2017 1905 Prolactin In process             Pending Culture Results     No orders found from 8/12/2017 to 8/15/2017.            Pending Results Instructions     If you had any lab results that were not finalized at the time of your Discharge, you can call the ED Lab Result RN at [phone]. You will be contacted by this team for any positive Lab results or changes in treatment. The nurses are available 7 days a week from 10A to 6:30P.  You can leave a message 24 hours per day and they will return your call.        Test Results From Your Hospital Stay        8/14/2017  5:57 PM      Component Results     Component Value Ref Range & Units Status    Color Urine Light Yellow  Final    Appearance Urine Clear  Final    Glucose Urine Negative NEG mg/dL Final    Bilirubin Urine Negative NEG Final    Ketones  Urine 10 (A) NEG mg/dL Final    Specific Gravity Urine 1.005 1.003 - 1.035 Final    Blood Urine Negative NEG Final    pH Urine 7.0 5.0 - 7.0 pH Final    Protein Albumin Urine Negative NEG mg/dL Final    Urobilinogen mg/dL Normal 0.0 - 2.0 mg/dL Final    Nitrite Urine Negative NEG Final    Leukocyte Esterase Urine Negative NEG Final    Source Midstream Urine  Final         8/14/2017  7:14 PM      Component Results     Component Value Ref Range & Units Status    HCG Qual Urine Negative NEG Final         8/14/2017  7:32 PM      Component Results     Component    Specimen Description    Vagina    Wet Prep (Abnormal)    Clue cells seen  No Trichomonas seen  No yeast seen      Micro Report Status    FINAL 08/14/2017 8/14/2017  7:05 PM      Component Results     Component Value Ref Range & Units Status    Rapid Strep A Screen NEGATIVE neg Final    Internal QC OK Yes  Final         8/14/2017  7:02 PM      Component Results     Component Value Ref Range & Units Status    WBC 10.7 4.0 - 11.0 10e9/L Final    RBC Count 4.58 3.8 - 5.2 10e12/L Final    Hemoglobin 14.5 11.7 - 15.7 g/dL Final    Hematocrit 41.3 35.0 - 47.0 % Final    MCV 90 78 - 100 fl Final    MCH 31.7 26.5 - 33.0 pg Final    MCHC 35.1 31.5 - 36.5 g/dL Final    RDW 11.6 10.0 - 15.0 % Final    Platelet Count 412 150 - 450 10e9/L Final    Diff Method Automated Method  Final    % Neutrophils 59.9 % Final    % Lymphocytes 33.2 % Final    % Monocytes 6.0 % Final    % Eosinophils 0.4 % Final    % Basophils 0.3 % Final    % Immature Granulocytes 0.2 % Final    Absolute Neutrophil 6.4 1.6 - 8.3 10e9/L Final    Absolute Lymphocytes 3.6 0.8 - 5.3 10e9/L Final    Absolute Monocytes 0.6 0.0 - 1.3 10e9/L Final    Absolute Eosinophils 0.0 0.0 - 0.7 10e9/L Final    Absolute Basophils 0.0 0.0 - 0.2 10e9/L Final    Abs Immature Granulocytes 0.0 0 - 0.4 10e9/L Final         8/14/2017  7:17 PM      Component Results     Component Value Ref Range & Units Status    Sodium 136  133 - 144 mmol/L Final    Potassium 3.8 3.4 - 5.3 mmol/L Final    Chloride 102 94 - 109 mmol/L Final    Carbon Dioxide 30 20 - 32 mmol/L Final    Anion Gap 4 3 - 14 mmol/L Final    Glucose 136 (H) 70 - 99 mg/dL Final    Urea Nitrogen 9 7 - 30 mg/dL Final    Creatinine 0.63 0.52 - 1.04 mg/dL Final    GFR Estimate >90  Non  GFR Calc   >60 mL/min/1.7m2 Final    GFR Estimate If Black >90   GFR Calc   >60 mL/min/1.7m2 Final    Calcium 9.2 8.5 - 10.1 mg/dL Final    Bilirubin Total 0.6 0.2 - 1.3 mg/dL Final    Albumin 4.0 3.4 - 5.0 g/dL Final    Protein Total 7.4 6.8 - 8.8 g/dL Final    Alkaline Phosphatase 75 40 - 150 U/L Final    ALT 23 0 - 50 U/L Final    AST 15 0 - 45 U/L Final         8/14/2017  7:08 PM                Thank you for choosing [facility]       Thank you for choosing [facility] for your care. Our goal is always to provide you with excellent care. Hearing back from our patients is one way we can continue to improve our services. Please take a few minutes to complete the written survey that you may receive in the mail after you visit with us. Thank you!        [employer] Information     [employer] gives you secure access to your electronic health record. If you see a primary care provider, you can also send messages to your care team and make appointments. If you have questions, please call your primary care clinic.  If you do not have a primary care provider, please call [phone] and they will assist you.        Care EveryWhere ID     This is your Care EveryWhere ID. This could be used by other organizations to access your [facility] medical records  [id number]        Equal Access to Services     SKY BLANCHARD : [place]j [place], jelly garcia, celia wells So [facility] [phone].    ATENCIÓN: Si habla español, tiene a reyez disposición servicios gratuitos de asistencia lingüística. Llame al [phone].    We  comply with applicable federal civil rights laws and [place] laws. We do not discriminate on the basis of race, color, national origin, age, disability sex, sexual orientation or gender identity.            After Visit Summary       This is your record. Keep this with you and show to your community pharmacist(s) and doctor(s) at your next visit.

## 2017-08-15 NOTE — ED PROVIDER NOTES
"  History     Chief Complaint   Patient presents with     UTI     HPI  Ángela Zurita is a 32 year old female who presents to the urgent care today with several concerns. Patient states she has been dealing with nipple discharge on and off for the past 6 months and over the past 1-1.5 months she has been dealing with urinary urgency, foul odor, increased sex drive, nausea, suprapubic pressure. Patient states she took a pregnancy test at home which was negative and she finished her menses about 1 week ago. Patient has no concerns for STDs or history of STDs at this time and is a monogamous relationship. Patient also states over the past few days she has been having a dull headache 3/10 on pain scale, scratchy throat, and aches. Patient denies tick bite, but states her son has been having strep throat frequently this summer. Patient denies back pain, chest pain, dizziness, confusion, diarrhea, breast mass, hematuria or dysuria. Patient states she has not seen a provider for these symptoms. Patient states she does have a history of fibroids in the past that presented similar with belly symptoms.       I have reviewed the Medications, Allergies, Past Medical and Surgical History, and Social History in the Epic system.    Review of Systems   All normal unless stated above     Physical Exam   BP: 129/84  Heart Rate: 97  Temp: 98.6  F (37  C)  Resp: 16  Height: 165.1 cm (5' 5\")  SpO2: 98 %  Physical Exam   Constitutional: She is oriented to person, place, and time. She appears well-developed and well-nourished. No distress.   HENT:   Right Ear: External ear normal.   Left Ear: External ear normal.   Nose: Nose normal.   Mouth/Throat: Oropharynx is clear and moist. No oropharyngeal exudate.   Neck: Normal range of motion. Neck supple.   Cardiovascular: Normal rate, normal heart sounds and intact distal pulses.    No murmur heard.  Pulmonary/Chest: Effort normal and breath sounds normal.   Abdominal: Soft. Bowel sounds " are normal. She exhibits no distension and no mass. There is tenderness (minimal with right lower quadrant and suprapubic palpation. ). There is no rebound and no guarding.   Genitourinary:   Genitourinary Comments: Patient declined pelvic exam and breast exam in office.    Musculoskeletal: Normal range of motion.   Lymphadenopathy:     She has no cervical adenopathy.   Neurological: She is alert and oriented to person, place, and time.   Skin: Skin is warm and dry. No rash noted. She is not diaphoretic. No erythema. No pallor.   Psychiatric: She has a normal mood and affect. Her behavior is normal. Judgment and thought content normal.        Offered pelvic exam, STD testing, and breast exam in office today; patient declined states she will follow up with primary care provider for this. Patient obtained her own wet prep in office today.     ED Course     ED Course     Procedures            Critical Care time:  none               Labs Ordered and Resulted from Time of ED Arrival Up to the Time of Departure from the ED   URINE MACROSCOPIC WITH REFLEX TO MICRO - Abnormal; Notable for the following:        Result Value    Ketones Urine 10 (*)     All other components within normal limits   COMPREHENSIVE METABOLIC PANEL - Abnormal; Notable for the following:     Glucose 136 (*)     All other components within normal limits   WET PREP - Abnormal; Notable for the following:     Wet Prep   (*)     Value: Clue cells seen  No Trichomonas seen  No yeast seen      All other components within normal limits   HCG QUALITATIVE URINE   CBC WITH PLATELETS DIFFERENTIAL   PROLACTIN   RAPID STREP GROUP A SCREEN POCT       Assessments & Plan (with Medical Decision Making)     I have reviewed the nursing notes.    I have reviewed the findings, diagnosis, plan and need for follow up with the patient.  Patient to follow up with primary care provider for recheck of symptoms and further evaluation of nipple discharge and abdominal symptoms  may need further imaging (US) and labs. Discussed with patient the importance of this; patient aware and will make an appointment.   Can not rule out appendicitis at this time; but due to symptoms and duration low suspicion for acute appendicitis.        Discharge Medication List as of 8/14/2017  8:07 PM      START taking these medications    Details   metroNIDAZOLE (FLAGYL) 500 MG tablet Take 1 tablet (500 mg) by mouth 2 times daily for 7 days, Disp-14 tablet, R-0, E-PrescribeEat yogurt or cottage cheese daily to prevent diarrhea that can be caused by taking this medication.             Final diagnoses:   Bacterial vaginosis   Nipple discharge in female       8/14/2017   [facility] EMERGENCY DEPARTMENT     Vibha Eller PA-C  08/14/17 [id number]

## 2017-08-15 NOTE — DISCHARGE INSTRUCTIONS
Patient to follow up with primary care provider for recheck of symptoms and further evaluation of nipple discharge and abdominal symptoms may need further imaging (US) and labs. Discussed with patient the importance of this; patient aware and will make an appointment.   Use medication as directed: no alcohol while on medication    Return to Emergency Room if worsening or persist abdominal pain, fevers, worsening headaches occur.   Can not rule out appendicitis at this time; but due to symptoms and duration low suspicion for acute appendicitis.

## 2017-08-16 LAB
BACTERIA SPEC CULT: NORMAL
SPECIMEN SOURCE: NORMAL

## 2017-08-17 ENCOUNTER — TELEPHONE (OUTPATIENT)
Dept: FAMILY MEDICINE | Facility: CLINIC | Age: 32
End: 2017-08-17

## 2017-08-17 ENCOUNTER — HOSPITAL ENCOUNTER (OUTPATIENT)
Dept: CT IMAGING | Facility: CLINIC | Age: 32
Discharge: HOME | End: 2017-08-17
Attending: NURSE PRACTITIONER | Admitting: NURSE PRACTITIONER
Payer: COMMERCIAL

## 2017-08-17 ENCOUNTER — OFFICE VISIT (OUTPATIENT)
Dept: FAMILY MEDICINE | Facility: CLINIC | Age: 32
End: 2017-08-17
Payer: COMMERCIAL

## 2017-08-17 VITALS
WEIGHT: 149.8 LBS | BODY MASS INDEX: 24.96 KG/M2 | SYSTOLIC BLOOD PRESSURE: 121 MMHG | DIASTOLIC BLOOD PRESSURE: 83 MMHG | TEMPERATURE: 98.3 F | HEIGHT: 65 IN | HEART RATE: 109 BPM

## 2017-08-17 DIAGNOSIS — R10.819 RIGHT FLANK TENDERNESS: ICD-10-CM

## 2017-08-17 DIAGNOSIS — R35.0 URINARY FREQUENCY: ICD-10-CM

## 2017-08-17 DIAGNOSIS — N76.0 BACTERIAL VAGINAL INFECTION: ICD-10-CM

## 2017-08-17 DIAGNOSIS — B96.89 BACTERIAL VAGINAL INFECTION: ICD-10-CM

## 2017-08-17 DIAGNOSIS — R35.0 URINARY FREQUENCY: Primary | ICD-10-CM

## 2017-08-17 LAB
ALBUMIN UR-MCNC: NEGATIVE MG/DL
APPEARANCE UR: NORMAL
BILIRUB UR QL STRIP: NEGATIVE
COLOR UR AUTO: YELLOW
GLUCOSE UR STRIP-MCNC: NEGATIVE MG/DL
HGB UR QL STRIP: NEGATIVE
KETONES UR STRIP-MCNC: NEGATIVE MG/DL
LEUKOCYTE ESTERASE UR QL STRIP: NEGATIVE
NITRATE UR QL: NEGATIVE
NON-SQ EPI CELLS #/AREA URNS LPF: ABNORMAL /LPF
PH UR STRIP: 6.5 PH (ref 5–7)
RBC #/AREA URNS AUTO: ABNORMAL /HPF
SOURCE: NORMAL
SP GR UR STRIP: 1.02 (ref 1–1.03)
URATE CRY #/AREA URNS HPF: ABNORMAL /HPF
UROBILINOGEN UR STRIP-ACNC: 0.2 EU/DL (ref 0.2–1)
WBC #/AREA URNS AUTO: ABNORMAL /HPF

## 2017-08-17 PROCEDURE — 81001 URINALYSIS AUTO W/SCOPE: CPT | Performed by: NURSE PRACTITIONER

## 2017-08-17 PROCEDURE — 74176 CT ABD & PELVIS W/O CONTRAST: CPT

## 2017-08-17 PROCEDURE — 99214 OFFICE O/P EST MOD 30 MIN: CPT | Performed by: NURSE PRACTITIONER

## 2017-08-17 NOTE — PATIENT INSTRUCTIONS
Thank you for choosing [facility].  You may be receiving a survey in the mail from Trevon Strickland regarding your visit today.  Please take a few minutes to complete and return the survey to let us know how we are doing.      If you have questions or concerns, please contact us via [employer] or you can contact your care team at [phone].    Our Clinic hours are:  Monday 6:40 am  to 7:00 pm  Tuesday -Friday 6:40 am to 5:00 pm    The [place] outpatient lab hours are:  Monday - Friday 6:10 am to 4:45 pm  Saturdays 7:00 am to 11:00 am  Appointments are required, call [phone]    If you have clinical questions after hours or would like to schedule an appointment,  call the clinic at [phone].

## 2017-08-17 NOTE — PROGRESS NOTES
"  SUBJECTIVE:                                                    Ángela Zurita is a 32 year old female who presents to clinic today for the following health issues:  {Provider please address medication reconciliation discrepancies--rooming staff please delete if no med/rec issues}    ED/UC Followup:    Facility:  [facility]  Date of visit: 8/14/2017  Reason for visit: Bacterial Vaginosis  Current Status: ***         {additional problems for provider to add:921339}    Problem list and histories reviewed & adjusted, as indicated.  Additional history: {NONE - AS DOCUMENTED:933232::\"as documented\"}    {HIST REVIEW/ LINKS 2:897084}    Reviewed and updated as needed this visit by clinical staff       Reviewed and updated as needed this visit by Provider         {PROVIDER CHARTING PREFERENCE:749905}  "

## 2017-08-17 NOTE — TELEPHONE ENCOUNTER
"Pain now is a 4-5 (scale 0-10)   \"just not sure what to do to start feeling better\"  Talked about sips of fluids, ice chips, ginger ale,   Not using any products with alcohol in them while she is on the metronidazole which can cause nausea also.   Warm moist packs to abd for the pain, (possible related to ovary cyst per CT today )   Tylenol, or ibuprofen might help.   (take with food)     Invited her to come back in / see Dr Graf or partner if sx persist or worse in any way.   \"ok, thank, you. \"  Alejandra Anne RNC    "

## 2017-08-17 NOTE — TELEPHONE ENCOUNTER
Reason for Call:  Other sick    Detailed comments: Patient was seen today and is not feeling any better, nauseous, pain in right side, urine frequency.    Phone Number Patient can be reached at: Home number on file [phone] (home)    Best Time: any    Can we leave a detailed message on this number? YES    Call taken on 8/17/2017 at 1:35 PM by Airam Caballero

## 2017-08-17 NOTE — NURSING NOTE
"Chief Complaint   Patient presents with     RECHECK     Urgent Care follow up       Initial There were no vitals taken for this visit. Estimated body mass index is 24.99 kg/(m^2) as calculated from the following:    Height as of 6/6/17: 5' 5\" (1.651 m).    Weight as of 6/6/17: 150 lb 3.2 oz (68.1 kg).  Medication Reconciliation: complete    "

## 2017-08-17 NOTE — MR AVS SNAPSHOT
After Visit Summary   8/17/2017    Ángela Zurita    MRN: [id number]           Patient Information     Date Of Birth          1985        Visit Information        Provider Department      8/17/2017 7:40 AM Anna Grey APRN CNP [facility]        Today's Diagnoses     Urinary frequency    -  1    Right flank tenderness          Care Instructions          Thank you for choosing [facility].  You may be receiving a survey in the mail from [facility] regarding your visit today.  Please take a few minutes to complete and return the survey to let us know how we are doing.      If you have questions or concerns, please contact us via [employer] or you can contact your care team at [phone].    Our Clinic hours are:  Monday 6:40 am  to 7:00 pm  Tuesday -Friday 6:40 am to 5:00 pm    The [place] outpatient lab hours are:  Monday - Friday 6:10 am to 4:45 pm  Saturdays 7:00 am to 11:00 am  Appointments are required, call [phone]    If you have clinical questions after hours or would like to schedule an appointment,  call the clinic at [phone].          Follow-ups after your visit        Future tests that were ordered for you today     Open Future Orders        Priority Expected Expires Ordered    CT Abdomen Pelvis w/o Contrast Routine  8/17/2018 8/17/2017            Who to contact     If you have questions or need follow up information about today's clinic visit or your schedule please contact [facility] directly at [phone].  Normal or non-critical lab and imaging results will be communicated to you by resmiohart, letter or phone within 4 business days after the clinic has received the results. If you do not hear from us within 7 days, please contact the clinic through [employer] or phone. If you have a critical or abnormal lab result, we will notify you by phone as soon as possible.  Submit refill requests through [employer] or call your pharmacy and they  "will forward the refill request to us. Please allow 3 business days for your refill to be completed.          Additional Information About Your Visit        [employer] Information     [employer] gives you secure access to your electronic health record. If you see a primary care provider, you can also send messages to your care team and make appointments. If you have questions, please call your primary care clinic.  If you do not have a primary care provider, please call [phone] and they will assist you.        Care EveryWhere ID     This is your Care EveryWhere ID. This could be used by other organizations to access your [place] medical records  [id number]        Your Vitals Were     Pulse Temperature Height Last Period BMI (Body Mass Index)       109 98.3  F (36.8  C) (Tympanic) 5' 5\" (1.651 m) 08/01/2017 24.93 kg/m2        Blood Pressure from Last 3 Encounters:   08/17/17 121/83   08/14/17 129/84   06/06/17 102/63    Weight from Last 3 Encounters:   08/17/17 149 lb 12.8 oz (67.9 kg)   06/06/17 150 lb 3.2 oz (68.1 kg)   03/07/17 154 lb 12.8 oz (70.2 kg)              We Performed the Following     UA reflex to Microscopic        Primary Care Provider Office Phone # Fax #    Randy Graf [phone] [address] [facility] [id number]        Equal Access to Services     SKY BLANCHARD AH: Hadii aad ku hadasho Soomaali, waaxda luqadaha, qaybta kaalmada adeegyada, celia elizabeth. So [facility] [phone].    ATENCIÓN: Si habla español, tiene a reyez disposición servicios gratuitos de asistencia lingüística. Llgelacio al [phone].    We comply with applicable federal civil rights laws and [place] laws. We do not discriminate on the basis of race, color, national origin, age, disability sex, sexual orientation or gender identity.            Thank you!     Thank you for choosing [facility]  for your care. Our goal is always to provide you with excellent " care. Hearing back from our patients is one way we can continue to improve our services. Please take a few minutes to complete the written survey that you may receive in the mail after your visit with us. Thank you!             Your Updated Medication List - Protect others around you: Learn how to safely use, store and throw away your medicines at www.disposemymeds.org.          This list is accurate as of: 8/17/17  8:19 AM.  Always use your most recent med list.                   Brand Name Dispense Instructions for use Diagnosis    amphetamine-dextroamphetamine 10 MG per tablet    ADDERALL    150 tablet    Take 2 pills (20mg) orally in the morning and two pills (20mg) at 11am and one pill (10mg) at 3pm    Attention deficit hyperactivity disorder (ADHD), predominantly inattentive type       clindamycin 1 % topical gel    CLINDAMAX    60 g    Apply topically 2 times daily    Acne vulgaris       metroNIDAZOLE 500 MG tablet    FLAGYL    14 tablet    Take 1 tablet (500 mg) by mouth 2 times daily for 7 days

## 2017-08-17 NOTE — PROGRESS NOTES
SUBJECTIVE:                                                    Ángela Zurita is a 32 year old female who presents to clinic today for the following health issues:      ED/UC Followup:    Facility:  [facility]  Date of visit: 8/14/2017  Reason for visit: Bacterial vaginosis  Current Status: Still having symptoms ( Right Back side and abdominal pain and sulfer smelling urine)   Patient was in ER 3 days ago- diagnosed with bacterial vaginosis. Started Flagyl- continues to have urinary frequency- now having constant aching right flank pain. + Nausea. HCG test was negative. No fever. History of uterine fibroids.         Problem list and histories reviewed & adjusted, as indicated.  Additional history: as documented    Patient Active Problem List   Diagnosis     Urgency-frequency syndrome     Fibroid uterus     Health Care Home     Rectovaginal fistula     CARDIOVASCULAR SCREENING; LDL GOAL LESS THAN 130     Oral herpes     Attention deficit hyperactivity disorder (ADHD), predominantly inattentive type     Past Surgical History:   Procedure Laterality Date     BIOPSY  Various    Had a few Leeps and numerous Colposcopys     COLONOSCOPY  2009    Normal     EXC SWEAT GLAND LESN AXILL,SIMPL Right 2009     LEEP TX, CERVICAL  2006    yearly paps     MOUTH SURGERY      wisdom teeth     SOFT TISSUE SURGERY  Various    infected sweat-gland- cyst removed arm,armpit,face     SURGICAL HISTORY OF -   3/2005    Tonsillectomy     SURGICAL HISTORY OF -       infected sweat gland under her arm       Social History   Substance Use Topics     Smoking status: Current Some Day Smoker     Packs/day: 0.50     Years: 10.00     Types: Cigarettes     Start date: 12/1/2014     Last attempt to quit: 1/8/2014     Smokeless tobacco: Never Used     Alcohol use No      Comment: 3 drinks weekly- quit with pregnancy     Family History   Problem Relation Age of Onset     Blood Disease Brother      twin brother-blood clots     Blood Disease Maternal  "Grandfather      blood clots     HEART DISEASE Maternal Grandfather      valve replacement     Hyperlipidemia Maternal Grandfather      CEREBROVASCULAR DISEASE Maternal Grandfather      Prostate Cancer Maternal Grandfather      Other Cancer Maternal Grandfather      Skin Cancer     Respiratory Maternal Grandmother      Lung Cancer Maternal Grandmother      DIABETES Maternal Grandmother      type II     Hypertension Maternal Grandmother      Hyperlipidemia Maternal Grandmother      Depression Maternal Grandmother      CANCER Paternal Grandmother      non -hodgkins lymphoma     Lung Cancer Paternal Grandmother      Hypertension Paternal Grandmother      Hyperlipidemia Paternal Grandmother      CEREBROVASCULAR DISEASE Paternal Grandmother      Hypertension Father      Hyperlipidemia Father      Hyperlipidemia Paternal Grandfather      Prostate Cancer Paternal Grandfather      Hypertension Paternal Grandfather      Breast Cancer Other      1 aunt and 2 of my great aunts/1 aunt and 2 of my great aunts     Breast Cancer Cousin      Breast Cancer             Reviewed and updated as needed this visit by clinical staffTobacco  Allergies  Med Hx  Surg Hx  Fam Hx  Soc Hx      Reviewed and updated as needed this visit by Provider         ROS:  Constitutional, HEENT, cardiovascular, pulmonary, gi and gu systems are negative, except as otherwise noted.      OBJECTIVE:   /83  Pulse 109  Temp 98.3  F (36.8  C) (Tympanic)  Ht 5' 5\" (1.651 m)  Wt 149 lb 12.8 oz (67.9 kg)  LMP 08/01/2017  BMI 24.93 kg/m2  Body mass index is 24.93 kg/(m^2).  GENERAL: healthy, alert and no distress  RESP: lungs clear to auscultation - no rales, rhonchi or wheezes  CV: regular rate and rhythm, normal S1 S2, no S3 or S4, no murmur, click or rub, no peripheral edema and peripheral pulses strong  ABDOMEN: soft, nontender, no hepatosplenomegaly, no masses and bowel sounds normal. Right flank tenderness  MS: no gross musculoskeletal defects " noted, no edema    Diagnostic Test Results:  No results found for this or any previous visit (from the past 24 hour(s)).    ASSESSMENT/PLAN:         1. Urinary frequency  - patient was in ER 3 days ago- for similar symptoms- UA was negative for UTI. HCG negative.   ? UTI vs renal stone  - UA reflex to Microscopic  - CT Abdomen Pelvis w/o Contrast; Future    2. Right flank tenderness  ? Renal stone   - UA reflex to Microscopic  - CT Abdomen Pelvis w/o Contrast; Future    3. Bacterial vaginal infection  Complete course of Flagyl X 1 week      Addendum:  Abdomen: No dilated bowel loops are seen. No mass or adenopathy are  evident.     Pelvis : A 4.2 cm left ovarian cyst is present. It has some high  density in its dependent aspect. This can relate to debris or blood  products. There is no free pelvic fluid.         IMPRESSION:    1. 4.2 cm left hemorrhagic ovarian cyst.  2. No urolithiasis or hydronephrosis.    Patient to follow up if she develops fever- ? Appendicitis - vs gallbladder. Follow up prn.       KATHLEEN Maria [facility]

## 2017-08-31 ENCOUNTER — OFFICE VISIT (OUTPATIENT)
Dept: FAMILY MEDICINE | Facility: CLINIC | Age: 32
End: 2017-08-31
Payer: COMMERCIAL

## 2017-08-31 VITALS
SYSTOLIC BLOOD PRESSURE: 121 MMHG | WEIGHT: 152.8 LBS | TEMPERATURE: 97.6 F | BODY MASS INDEX: 25.46 KG/M2 | HEIGHT: 65 IN | DIASTOLIC BLOOD PRESSURE: 81 MMHG | OXYGEN SATURATION: 99 % | HEART RATE: 75 BPM

## 2017-08-31 DIAGNOSIS — F90.0 ATTENTION DEFICIT HYPERACTIVITY DISORDER (ADHD), PREDOMINANTLY INATTENTIVE TYPE: ICD-10-CM

## 2017-08-31 PROCEDURE — 99213 OFFICE O/P EST LOW 20 MIN: CPT | Performed by: FAMILY MEDICINE

## 2017-08-31 RX ORDER — DEXTROAMPHETAMINE SACCHARATE, AMPHETAMINE ASPARTATE, DEXTROAMPHETAMINE SULFATE AND AMPHETAMINE SULFATE 2.5; 2.5; 2.5; 2.5 MG/1; MG/1; MG/1; MG/1
TABLET ORAL
Qty: 150 TABLET | Refills: 0 | Status: SHIPPED | OUTPATIENT
Start: 2017-09-29 | End: 2018-02-01

## 2017-08-31 RX ORDER — DEXTROAMPHETAMINE SACCHARATE, AMPHETAMINE ASPARTATE, DEXTROAMPHETAMINE SULFATE AND AMPHETAMINE SULFATE 2.5; 2.5; 2.5; 2.5 MG/1; MG/1; MG/1; MG/1
TABLET ORAL
Qty: 150 TABLET | Refills: 0 | Status: SHIPPED | OUTPATIENT
Start: 2017-10-30 | End: 2018-02-01

## 2017-08-31 RX ORDER — DEXTROAMPHETAMINE SACCHARATE, AMPHETAMINE ASPARTATE, DEXTROAMPHETAMINE SULFATE AND AMPHETAMINE SULFATE 2.5; 2.5; 2.5; 2.5 MG/1; MG/1; MG/1; MG/1
TABLET ORAL
Qty: 150 TABLET | Refills: 0 | Status: SHIPPED | OUTPATIENT
Start: 2017-08-31 | End: 2018-02-01

## 2017-08-31 NOTE — MR AVS SNAPSHOT
After Visit Summary   8/31/2017    Ángela Zurita    MRN: [id number]           Patient Information     Date Of Birth          1985        Visit Information        Provider Department      8/31/2017 8:20 AM Randy Graf MD [facility]        Today's Diagnoses     Attention deficit hyperactivity disorder (ADHD), predominantly inattentive type          Care Instructions      If you want to decrease amount of medication the month prior to stopping medication, that is ok.  When ready to start trying then stop medication and start prenatal.    Thank you for choosing [facility].  You may be receiving a survey in the mail from [employer] regarding your visit today.  Please take a few minutes to complete and return the survey to let us know how we are doing.      If you have questions or concerns, please contact us via Oculeve or you can contact your care team at [phone].    Our Clinic hours are:  Monday 6:40 am  to 7:00 pm  Tuesday -Friday 6:40 am to 5:00 pm    The [place] outpatient lab hours are:  Monday - Friday 6:10 am to 4:45 pm  Saturdays 7:00 am to 11:00 am  Appointments are required, call [phone]    If you have clinical questions after hours or would like to schedule an appointment,  call the clinic at [phone].          Follow-ups after your visit        Who to contact     If you have questions or need follow up information about today's clinic visit or your schedule please contact [facility] directly at [phone].  Normal or non-critical lab and imaging results will be communicated to you by iTOKhart, letter or phone within 4 business days after the clinic has received the results. If you do not hear from us within 7 days, please contact the clinic through Oculeve or phone. If you have a critical or abnormal lab result, we will notify you by phone as soon as possible.  Submit refill requests through Oculeve or call your pharmacy and  "they will forward the refill request to us. Please allow 3 business days for your refill to be completed.          Additional Information About Your Visit        Clicktreehart Information     [employer] gives you secure access to your electronic health record. If you see a primary care provider, you can also send messages to your care team and make appointments. If you have questions, please call your primary care clinic.  If you do not have a primary care provider, please call [phone] and they will assist you.        Care EveryWhere ID     This is your Care EveryWhere ID. This could be used by other organizations to access your [place] medical records  [id number]        Your Vitals Were     Pulse Temperature Height Last Period Pulse Oximetry BMI (Body Mass Index)    75 97.6  F (36.4  C) (Tympanic) 5' 5\" (1.651 m) 08/01/2017 99% 25.43 kg/m2       Blood Pressure from Last 3 Encounters:   08/31/17 121/81   08/17/17 121/83   08/14/17 129/84    Weight from Last 3 Encounters:   08/31/17 152 lb 12.8 oz (69.3 kg)   08/17/17 149 lb 12.8 oz (67.9 kg)   06/06/17 150 lb 3.2 oz (68.1 kg)              Today, you had the following     No orders found for display         Today's Medication Changes          These changes are accurate as of: 8/31/17  8:42 AM.  If you have any questions, ask your nurse or doctor.               These medicines have changed or have updated prescriptions.        Dose/Directions    * amphetamine-dextroamphetamine 10 MG per tablet   Commonly known as:  ADDERALL   This may have changed:  You were already taking a medication with the same name, and this prescription was added. Make sure you understand how and when to take each.   Used for:  Attention deficit hyperactivity disorder (ADHD), predominantly inattentive type   Changed by:  Randy Graf MD        Take 2 pills (20mg) orally in the morning and two pills (20mg) at 11am and one pill (10mg) at 2pm   Quantity:  150 tablet   Refills:  0       * " amphetamine-dextroamphetamine 10 MG per tablet   Commonly known as:  ADDERALL   This may have changed:  You were already taking a medication with the same name, and this prescription was added. Make sure you understand how and when to take each.   Used for:  Attention deficit hyperactivity disorder (ADHD), predominantly inattentive type   Changed by:  Randy Graf MD        Start taking on:  9/29/2017   Take 2 pills (20mg) orally in the morning and two pills (20mg) at 11am and one pill (10mg) at 2pm   Quantity:  150 tablet   Refills:  0       * amphetamine-dextroamphetamine 10 MG per tablet   Commonly known as:  ADDERALL   This may have changed:  Another medication with the same name was added. Make sure you understand how and when to take each.   Used for:  Attention deficit hyperactivity disorder (ADHD), predominantly inattentive type   Changed by:  Randy Graf MD        Start taking on:  10/30/2017   Take 2 pills (20mg) orally in the morning and two pills (20mg) at 11am and one pill (10mg) at 3pm   Quantity:  150 tablet   Refills:  0       * Notice:  This list has 3 medication(s) that are the same as other medications prescribed for you. Read the directions carefully, and ask your doctor or other care provider to review them with you.         Where to get your medicines      Some of these will need a paper prescription and others can be bought over the counter.  Ask your nurse if you have questions.     Bring a paper prescription for each of these medications     amphetamine-dextroamphetamine 10 MG per tablet    amphetamine-dextroamphetamine 10 MG per tablet    amphetamine-dextroamphetamine 10 MG per tablet                Primary Care Provider Office Phone # Fax #    Randy Graf [phone] [address] [facility] [address]        Equal Access to Services     SKY BLANCHARD AH: jelly Tristan qaybta kaalmada adeegyada, waxay idiin  harish valenciakirbyfrancis kingsley'aan ah. So [facility] [phone].    ATENCIÓN: Si tonela gage, tiene a reyez disposición servicios gratuitos de asistencia lingüística. Jovany al [phone].    We comply with applicable federal civil rights laws and [place] laws. We do not discriminate on the basis of race, color, national origin, age, disability sex, sexual orientation or gender identity.            Thank you!     Thank you for choosing [facility]  for your care. Our goal is always to provide you with excellent care. Hearing back from our patients is one way we can continue to improve our services. Please take a few minutes to complete the written survey that you may receive in the mail after your visit with us. Thank you!             Your Updated Medication List - Protect others around you: Learn how to safely use, store and throw away your medicines at www.disposemymeds.org.          This list is accurate as of: 8/31/17  8:42 AM.  Always use your most recent med list.                   Brand Name Dispense Instructions for use Diagnosis    * amphetamine-dextroamphetamine 10 MG per tablet    ADDERALL    150 tablet    Take 2 pills (20mg) orally in the morning and two pills (20mg) at 11am and one pill (10mg) at 2pm    Attention deficit hyperactivity disorder (ADHD), predominantly inattentive type       * amphetamine-dextroamphetamine 10 MG per tablet   Start taking on:  9/29/2017    ADDERALL    150 tablet    Take 2 pills (20mg) orally in the morning and two pills (20mg) at 11am and one pill (10mg) at 2pm    Attention deficit hyperactivity disorder (ADHD), predominantly inattentive type       * amphetamine-dextroamphetamine 10 MG per tablet   Start taking on:  10/30/2017    ADDERALL    150 tablet    Take 2 pills (20mg) orally in the morning and two pills (20mg) at 11am and one pill (10mg) at 3pm    Attention deficit hyperactivity disorder (ADHD), predominantly inattentive type       clindamycin 1 % topical gel     CLINDAMAX    60 g    Apply topically 2 times daily    Acne vulgaris       * Notice:  This list has 3 medication(s) that are the same as other medications prescribed for you. Read the directions carefully, and ask your doctor or other care provider to review them with you.

## 2017-08-31 NOTE — PROGRESS NOTES
SUBJECTIVE:   Ángela Zurita is a 32 year old female who presents to clinic today for the following health issues:      Medication Followup of Adderall    Taking Medication as prescribed: yes    Side Effects:  None    Medication Helping Symptoms:  yes     Got  July 2017 and planning to try for kids in winter  Since starting Adderall feels like much improved focus at work in that able to focus on one task and allow others to wait.   is noticing difference in attention with conversation focus.  Has played around with dose and if takes 2 after 2pm then unable to sleep so taking 2 in the morning 2 at 11am then 1 pill at 3pm which is working well.  Feels nearly 90% improved    History of ADHD or other psych diagnoses: Diagnosed in 2011 and started medication Adderall which worked well to help with focus at work. [place] saw therapist and did the ADHD testing 2/2012 with Uriel Min, these records were reviewed.  Then pregnancy after that and mood was very stable and then kids and life was disorganized.  Currently having struggles with relationships due to focusing on conversations and forgets things during conversations with .  At work had review last month with difficulty focusing during conversations, projects started and trouble with completing tasks.         Problem list and histories reviewed & adjusted, as indicated.  Additional history: as documented      Reviewed and updated as needed this visit by clinical staffTobacco  Allergies  Med Hx  Surg Hx  Fam Hx  Soc Hx      Reviewed and updated as needed this visit by Provider         ROS:  C: NEGATIVE for fever, chills, change in weight, no sleep trouble  E/M: NEGATIVE for ear, mouth and throat problems  R: NEGATIVE for significant cough or SOB  CV: NEGATIVE for chest pain, palpitations or peripheral edema  NEURO: NEGATIVE for weakness, dizziness or paresthesias, no tremor or tics    OBJECTIVE:     /81  Pulse 75  Temp 97.6  F  "(36.4  C) (Tympanic)  Ht 5' 5\" (1.651 m)  Wt 152 lb 12.8 oz (69.3 kg)  LMP 08/01/2017  SpO2 99%  BMI 25.43 kg/m2  Body mass index is 25.43 kg/(m^2).  GENERAL: healthy, alert and no distress  CV: regular rate and rhythm, normal S1 S2, no S3 or S4, no murmur, click or rub, no peripheral edema and peripheral pulses strong  NEURO: Normal strength and tone, mentation intact and speech normal  PSYCH: mentation appears normal, affect normal/bright    Diagnostic Test Results:  none     ASSESSMENT/PLAN:         Ángela was seen today for refill request.    Diagnoses and all orders for this visit:    Attention deficit hyperactivity disorder (ADHD), predominantly inattentive type: well controlled, likely last Rx prior to trying for next kid.  -     amphetamine-dextroamphetamine (ADDERALL) 10 MG per tablet; Take 2 pills (20mg) orally in the morning and two pills (20mg) at 11am and one pill (10mg) at 3pm #150  -     amphetamine-dextroamphetamine (ADDERALL) 10 MG per tablet; Take 2 pills (20mg) orally in the morning and two pills (20mg) at 11am and one pill (10mg) at 2pm  -     amphetamine-dextroamphetamine (ADDERALL) 10 MG per tablet; Take 2 pills (20mg) orally in the morning and two pills (20mg) at 11am and one pill (10mg) at 2pm        See Patient Instructions    Randy Graf MD  [facility]  "

## 2017-08-31 NOTE — PATIENT INSTRUCTIONS
If you want to decrease amount of medication the month prior to stopping medication, that is ok.  When ready to start trying then stop medication and start prenatal.    Thank you for choosing [facility]  You may be receiving a survey in the mail from [employer] regarding your visit today.  Please take a few minutes to complete and return the survey to let us know how we are doing.      If you have questions or concerns, please contact us via [employer] or you can contact your care team at [phone].    Our Clinic hours are:  Monday 6:40 am  to 7:00 pm  Tuesday -Friday 6:40 am to 5:00 pm    The [place] outpatient lab hours are:  Monday - Friday 6:10 am to 4:45 pm  Saturdays 7:00 am to 11:00 am  Appointments are required, call [phone]    If you have clinical questions after hours or would like to schedule an appointment,  call the clinic at [phone].

## 2017-08-31 NOTE — NURSING NOTE
"Chief Complaint   Patient presents with     Refill Request     Adderall       Initial /81  Pulse 75  Temp 97.6  F (36.4  C) (Tympanic)  Ht 5' 5\" (1.651 m)  Wt 152 lb 12.8 oz (69.3 kg)  LMP 08/01/2017  SpO2 99%  BMI 25.43 kg/m2 Estimated body mass index is 25.43 kg/(m^2) as calculated from the following:    Height as of this encounter: 5' 5\" (1.651 m).    Weight as of this encounter: 152 lb 12.8 oz (69.3 kg).  Medication Reconciliation: complete  "

## 2017-09-13 ENCOUNTER — E-VISIT (OUTPATIENT)
Dept: FAMILY MEDICINE | Facility: CLINIC | Age: 32
End: 2017-09-13
Payer: COMMERCIAL

## 2017-09-13 DIAGNOSIS — B96.89 BACTERIAL VAGINOSIS: Primary | ICD-10-CM

## 2017-09-13 DIAGNOSIS — N76.0 BACTERIAL VAGINOSIS: Primary | ICD-10-CM

## 2017-09-13 PROCEDURE — 99444 ZZC PHYSICIAN ONLINE EVALUATION & MANAGEMENT SERVICE: CPT | Performed by: FAMILY MEDICINE

## 2017-09-14 RX ORDER — METRONIDAZOLE 7.5 MG/G
1 GEL VAGINAL AT BEDTIME
Qty: 70 G | Refills: 0 | Status: SHIPPED | OUTPATIENT
Start: 2017-09-14 | End: 2017-09-19

## 2017-09-20 ENCOUNTER — TELEPHONE (OUTPATIENT)
Dept: FAMILY MEDICINE | Facility: CLINIC | Age: 32
End: 2017-09-20

## 2017-09-20 ENCOUNTER — OFFICE VISIT (OUTPATIENT)
Dept: FAMILY MEDICINE | Facility: CLINIC | Age: 32
End: 2017-09-20
Payer: COMMERCIAL

## 2017-09-20 VITALS
BODY MASS INDEX: 24.72 KG/M2 | RESPIRATION RATE: 16 BRPM | HEART RATE: 105 BPM | TEMPERATURE: 98.6 F | DIASTOLIC BLOOD PRESSURE: 79 MMHG | WEIGHT: 148.4 LBS | SYSTOLIC BLOOD PRESSURE: 124 MMHG | OXYGEN SATURATION: 99 % | HEIGHT: 65 IN

## 2017-09-20 DIAGNOSIS — G44.219 EPISODIC TENSION-TYPE HEADACHE, NOT INTRACTABLE: Primary | ICD-10-CM

## 2017-09-20 LAB
BASOPHILS # BLD AUTO: 0.1 10E9/L (ref 0–0.2)
BASOPHILS NFR BLD AUTO: 0.5 %
DIFFERENTIAL METHOD BLD: NORMAL
EOSINOPHIL # BLD AUTO: 0.1 10E9/L (ref 0–0.7)
EOSINOPHIL NFR BLD AUTO: 0.5 %
ERYTHROCYTE [DISTWIDTH] IN BLOOD BY AUTOMATED COUNT: 12.1 % (ref 10–15)
ERYTHROCYTE [SEDIMENTATION RATE] IN BLOOD BY WESTERGREN METHOD: 6 MM/H (ref 0–20)
HCT VFR BLD AUTO: 41 % (ref 35–47)
HGB BLD-MCNC: 14.1 G/DL (ref 11.7–15.7)
LYMPHOCYTES # BLD AUTO: 3.6 10E9/L (ref 0.8–5.3)
LYMPHOCYTES NFR BLD AUTO: 33.2 %
MCH RBC QN AUTO: 32 PG (ref 26.5–33)
MCHC RBC AUTO-ENTMCNC: 34.4 G/DL (ref 31.5–36.5)
MCV RBC AUTO: 93 FL (ref 78–100)
MONOCYTES # BLD AUTO: 0.6 10E9/L (ref 0–1.3)
MONOCYTES NFR BLD AUTO: 5.7 %
NEUTROPHILS # BLD AUTO: 6.5 10E9/L (ref 1.6–8.3)
NEUTROPHILS NFR BLD AUTO: 60.1 %
PLATELET # BLD AUTO: 364 10E9/L (ref 150–450)
RBC # BLD AUTO: 4.4 10E12/L (ref 3.8–5.2)
WBC # BLD AUTO: 10.8 10E9/L (ref 4–11)

## 2017-09-20 PROCEDURE — 36415 COLL VENOUS BLD VENIPUNCTURE: CPT | Performed by: NURSE PRACTITIONER

## 2017-09-20 PROCEDURE — 86618 LYME DISEASE ANTIBODY: CPT | Performed by: NURSE PRACTITIONER

## 2017-09-20 PROCEDURE — 85025 COMPLETE CBC W/AUTO DIFF WBC: CPT | Performed by: NURSE PRACTITIONER

## 2017-09-20 PROCEDURE — 99213 OFFICE O/P EST LOW 20 MIN: CPT | Performed by: NURSE PRACTITIONER

## 2017-09-20 PROCEDURE — 85652 RBC SED RATE AUTOMATED: CPT | Performed by: NURSE PRACTITIONER

## 2017-09-20 RX ORDER — CYCLOBENZAPRINE HCL 10 MG
5-10 TABLET ORAL 3 TIMES DAILY PRN
Qty: 30 TABLET | Refills: 1 | Status: ON HOLD | OUTPATIENT
Start: 2017-09-20 | End: 2017-09-24

## 2017-09-20 NOTE — PATIENT INSTRUCTIONS
"  * Tension Headache    Muscle Tension Headache (also called \"stress headache\") is a very common cause of head pain. Under stress, some people tense the muscles of their shoulder, neck and scalp without knowing it. If this lasts long enough, a headache can occur. These headaches can be very painful and last for hours or even days.  Home Care:    If you were given pain medicine for this headache, do not drive yourself home. Arrange for a ride, instead. When you get home, try to sleep. You should feel much better when you wake up.    Heat to the back of your neck may relieve neck spasm.    Drink only clear liquids or eat a very light diet to avoid nausea/vomiting until symptoms improve.  Preventing Future Headaches    Identify the sources of stress in your life. These may not be obvious! Learn new ways to handle your stress, such as regular exercise, biofeedback, self-hypnosis and meditation. For more information about this, consult your doctor or go to a local bookstore and review the many books and tapes on this subject.    At the first sign of a tension headache, take time out if possible. Remove yourself from the stressful situation, find a quiet comfortable place to sit or lie down and let yourself relax. Heat and deep massage of the tight areas in the neck and shoulders may help reduce muscle spasm. Medicine, such as ibuprofen (Advil or Motrin) or a prescribed muscle relaxant may be helpful at this point.  Follow Up with your doctor if the headache is not better within the next 24 hours. If you have frequent headaches you should discuss a treatment plan with your primary care doctor. Ask if you can have medicine to take at home the next time you get a bad headache. This may avoid the need for a visit to the emergency department in the future. Poorly controlled chronic headaches may require a referral to a neurologist (headache specialist).  Call your Doctor Or Get Prompt Medical Attention if any of the following " occur:    Worsening of your head pain or no improvement within 24 hours    Repeated vomiting (unable to keep liquids down)    Fever of 100.4 F (38.0 C) or higher, or as directed by your healthcare provider    Stiff neck    Extreme drowsiness, confusion or fainting    Dizziness, vertigo (dizziness with spinning sensation)    Weakness of an arm or leg or one side of the face    Difficulty with speech or vision    9834-9271 [employer] [address], [place] [address]. All rights reserved. This information is not intended as a substitute for professional medical care. Always follow your healthcare professional's instructions.        Tension Headaches     Massaging your neck muscles can help relieve tension headache pain.     Tension headaches cause a dull, steady pain on both sides of the head and in the neck and the back of the head. The eyes may also feel tired. Tension headaches can be triggered by muscle tension and clenching, lack of sleep, poor posture, eyestrain, stress,depression, anxiety, arthritis of the neck, and other factors.  To help prevent tension headaches  Take the following steps:    Make sure your work area is properly set up to help you avoid neck strain and eyestrain.    Make sure that your eyeglass prescription is current and is appropriate for the work you do.    Learn techniques for relaxing and reducing emotional stress. These include deep breathing, progressive relaxation, yoga, meditation, and biofeedback.    Maintain a regular exercise regimen under the guidance of a doctor. This can help keep your neck and back flexible, strong, and relaxed.  To relieve the pain  Take the following steps:    Use moist heat to relax the muscles. Soak in a hot bath or wrap a warm, moist towel around your neck.    Brush your scalp lightly with a soft hairbrush.    Give yourself a massage. Knead the muscles running from your shoulders up the back of your skull.    Use an ice pack. Apply this  directly to the place where you feel pain.    Rest. Sleeping often helps relieve headache pain.    Drink plenty of fluids. Dehydration is another trigger for headaches.    Use pain medicine sparingly for moderate to severe pain.   Date Last Reviewed: 10/19/2015    5036-5998 [employer]. [address] [place] [address], [place] [address]. All rights reserved. This information is not intended as a substitute for professional medical care. Always follow your healthcare professional's instructions.

## 2017-09-20 NOTE — MR AVS SNAPSHOT
"              After Visit Summary   9/20/2017    Ángela Zurita    MRN: [id number]           Patient Information     Date Of Birth          1985        Visit Information        Provider Department      9/20/2017 2:20 PM Liya Garner APRN [facility]        Today's Diagnoses     Episodic tension-type headache, not intractable    -  1      Care Instructions      * Tension Headache    Muscle Tension Headache (also called \"stress headache\") is a very common cause of head pain. Under stress, some people tense the muscles of their shoulder, neck and scalp without knowing it. If this lasts long enough, a headache can occur. These headaches can be very painful and last for hours or even days.  Home Care:    If you were given pain medicine for this headache, do not drive yourself home. Arrange for a ride, instead. When you get home, try to sleep. You should feel much better when you wake up.    Heat to the back of your neck may relieve neck spasm.    Drink only clear liquids or eat a very light diet to avoid nausea/vomiting until symptoms improve.  Preventing Future Headaches    Identify the sources of stress in your life. These may not be obvious! Learn new ways to handle your stress, such as regular exercise, biofeedback, self-hypnosis and meditation. For more information about this, consult your doctor or go to a local bookstore and review the many books and tapes on this subject.    At the first sign of a tension headache, take time out if possible. Remove yourself from the stressful situation, find a quiet comfortable place to sit or lie down and let yourself relax. Heat and deep massage of the tight areas in the neck and shoulders may help reduce muscle spasm. Medicine, such as ibuprofen (Advil or Motrin) or a prescribed muscle relaxant may be helpful at this point.  Follow Up with your doctor if the headache is not better within the next 24 hours. If you have frequent headaches you " should discuss a treatment plan with your primary care doctor. Ask if you can have medicine to take at home the next time you get a bad headache. This may avoid the need for a visit to the emergency department in the future. Poorly controlled chronic headaches may require a referral to a neurologist (headache specialist).  Call your Doctor Or Get Prompt Medical Attention if any of the following occur:    Worsening of your head pain or no improvement within 24 hours    Repeated vomiting (unable to keep liquids down)    Fever of 100.4 F (38.0 C) or higher, or as directed by your healthcare provider    Stiff neck    Extreme drowsiness, confusion or fainting    Dizziness, vertigo (dizziness with spinning sensation)    Weakness of an arm or leg or one side of the face    Difficulty with speech or vision    0481-1974 [facility], [address] [place] [address]. All rights reserved. This information is not intended as a substitute for professional medical care. Always follow your healthcare professional's instructions.        Tension Headaches     Massaging your neck muscles can help relieve tension headache pain.     Tension headaches cause a dull, steady pain on both sides of the head and in the neck and the back of the head. The eyes may also feel tired. Tension headaches can be triggered by muscle tension and clenching, lack of sleep, poor posture, eyestrain, stress,depression, anxiety, arthritis of the neck, and other factors.  To help prevent tension headaches  Take the following steps:    Make sure your work area is properly set up to help you avoid neck strain and eyestrain.    Make sure that your eyeglass prescription is current and is appropriate for the work you do.    Learn techniques for relaxing and reducing emotional stress. These include deep breathing, progressive relaxation, yoga, meditation, and biofeedback.    Maintain a regular exercise regimen under the guidance of a doctor. This can  help keep your neck and back flexible, strong, and relaxed.  To relieve the pain  Take the following steps:    Use moist heat to relax the muscles. Soak in a hot bath or wrap a warm, moist towel around your neck.    Brush your scalp lightly with a soft hairbrush.    Give yourself a massage. Knead the muscles running from your shoulders up the back of your skull.    Use an ice pack. Apply this directly to the place where you feel pain.    Rest. Sleeping often helps relieve headache pain.    Drink plenty of fluids. Dehydration is another trigger for headaches.    Use pain medicine sparingly for moderate to severe pain.   Date Last Reviewed: 10/19/2015    9869-9920 [employer] [address] [place] [address], [place] [address]. All rights reserved. This information is not intended as a substitute for professional medical care. Always follow your healthcare professional's instructions.                Follow-ups after your visit        Who to contact     If you have questions or need follow up information about today's clinic visit or your schedule please contact [facility] directly at [phone].  Normal or non-critical lab and imaging results will be communicated to you by unrivalhart, letter or phone within 4 business days after the clinic has received the results. If you do not hear from us within 7 days, please contact the clinic through Tweegeet or phone. If you have a critical or abnormal lab result, we will notify you by phone as soon as possible.  Submit refill requests through [employer] or call your pharmacy and they will forward the refill request to us. Please allow 3 business days for your refill to be completed.          Additional Information About Your Visit        Mobile [employer] Information     [employer] gives you secure access to your electronic health record. If you see a primary care provider, you can also send messages to your care team and make appointments. If you have questions, please call  "your primary care clinic.  If you do not have a primary care provider, please call [phone] and they will assist you.        Care EveryWhere ID     This is your Care EveryWhere ID. This could be used by other organizations to access your [place] medical records  [id number]        Your Vitals Were     Pulse Temperature Respirations Height Pulse Oximetry BMI (Body Mass Index)    105 98.6  F (37  C) (Tympanic) 16 5' 5\" (1.651 m) 99% 24.7 kg/m2       Blood Pressure from Last 3 Encounters:   09/20/17 124/79   08/31/17 121/81   08/17/17 121/83    Weight from Last 3 Encounters:   09/20/17 148 lb 6.4 oz (67.3 kg)   08/31/17 152 lb 12.8 oz (69.3 kg)   08/17/17 149 lb 12.8 oz (67.9 kg)              We Performed the Following     CBC with platelets and differential     ESR: Erythrocyte sedimentation rate     Lyme Disease Mariah with reflex to WB Serum          Today's Medication Changes          These changes are accurate as of: 9/20/17  3:14 PM.  If you have any questions, ask your nurse or doctor.               Start taking these medicines.        Dose/Directions    cyclobenzaprine 10 MG tablet   Commonly known as:  FLEXERIL   Used for:  Episodic tension-type headache, not intractable   Started by:  Liya Garner APRN CNP        Dose:  5-10 mg   Take 0.5-1 tablets (5-10 mg) by mouth 3 times daily as needed for muscle spasms   Quantity:  30 tablet   Refills:  1            Where to get your medicines      These medications were sent to [facility] [address] [facility]  [address] [facility] [address]     Phone:  [phone]     cyclobenzaprine 10 MG tablet                Primary Care Provider Office Phone # Fax #    Randy Graf -[phone]       [address] [facility] [address]        Equal Access to Services     SKY BLANCHARD AH: Oj Cloud, jelly lujw, kaylee willoughby, celia elizabeth. So [facility] " [phone].    ATENCIÓN: Si brando almonte, tiene a reyez disposición servicios gratuitos de asistencia lingüística. Jovany cordero [phone].    We comply with applicable federal civil rights laws and [place] laws. We do not discriminate on the basis of race, color, national origin, age, disability sex, sexual orientation or gender identity.            Thank you!     Thank you for choosing [facility]  for your care. Our goal is always to provide you with excellent care. Hearing back from our patients is one way we can continue to improve our services. Please take a few minutes to complete the written survey that you may receive in the mail after your visit with us. Thank you!             Your Updated Medication List - Protect others around you: Learn how to safely use, store and throw away your medicines at www.disposemymeds.org.          This list is accurate as of: 9/20/17  3:14 PM.  Always use your most recent med list.                   Brand Name Dispense Instructions for use Diagnosis    * amphetamine-dextroamphetamine 10 MG per tablet    ADDERALL    150 tablet    Take 2 pills (20mg) orally in the morning and two pills (20mg) at 11am and one pill (10mg) at 2pm    Attention deficit hyperactivity disorder (ADHD), predominantly inattentive type       * amphetamine-dextroamphetamine 10 MG per tablet   Start taking on:  9/29/2017    ADDERALL    150 tablet    Take 2 pills (20mg) orally in the morning and two pills (20mg) at 11am and one pill (10mg) at 2pm    Attention deficit hyperactivity disorder (ADHD), predominantly inattentive type       * amphetamine-dextroamphetamine 10 MG per tablet   Start taking on:  10/30/2017    ADDERALL    150 tablet    Take 2 pills (20mg) orally in the morning and two pills (20mg) at 11am and one pill (10mg) at 3pm    Attention deficit hyperactivity disorder (ADHD), predominantly inattentive type       clindamycin 1 % topical gel    CLINDAMAX    60 g    Apply topically 2  times daily    Acne vulgaris       cyclobenzaprine 10 MG tablet    FLEXERIL    30 tablet    Take 0.5-1 tablets (5-10 mg) by mouth 3 times daily as needed for muscle spasms    Episodic tension-type headache, not intractable       * Notice:  This list has 3 medication(s) that are the same as other medications prescribed for you. Read the directions carefully, and ask your doctor or other care provider to review them with you.

## 2017-09-20 NOTE — TELEPHONE ENCOUNTER
Patient reports:  Headache intermittently last two weeks  Body chills last two days, night sweats  Stiff neck shoulders; achy and stiff hard to move today  Fatigue   Denies fever, cough, nasal congestion  Denies injury  Denies exposure to illness  Patient would like appt today  Scheduled appt today    Routing to provider.    Serene HILL Rn

## 2017-09-20 NOTE — NURSING NOTE
"Chief Complaint   Patient presents with     Neck Pain       Initial /79 (BP Location: Right arm, Patient Position: Sitting, Cuff Size: Adult Regular)  Pulse 105  Temp 98.6  F (37  C) (Tympanic)  Resp 16  Ht 5' 5\" (1.651 m)  Wt 148 lb 6.4 oz (67.3 kg)  SpO2 99%  BMI 24.7 kg/m2 Estimated body mass index is 24.7 kg/(m^2) as calculated from the following:    Height as of this encounter: 5' 5\" (1.651 m).    Weight as of this encounter: 148 lb 6.4 oz (67.3 kg).  Medication Reconciliation: complete  "

## 2017-09-20 NOTE — PROGRESS NOTES
SUBJECTIVE:   Ángela Zurita is a 32 year old female who presents to clinic today for the following health issues:      Neck Pain  Onset: today    Description:   Location: back of neck, bilateral  Radiation: into the right neck, into the left neck and upper back stiffness    Intensity: moderate    Progression of Symptoms:  worsening and constant    Accompanying Signs & Symptoms:  Burning, prickly sensation (paresthesias) in arm(s): no   Numbness in arm(s): no   Weakness in arm(s):  no   Fever: no   Headache: YES, pressure all over, like her head is going to explode 5/10.  Nausea and/or vomiting: no     History:   Trauma: no   Previous neck pain: no   Previous surgery or injections: no   Previous Imaging (MRI,X ray): no     Precipitating factors:   Does movement increase the pain:  YES  Having fatigue and nausea and low energy for last couple weeks.     Alleviating factors:  Lying down without light, not moving    Therapies Tried and outcome: ibuprofen, sleep-no relief.    Problem list and histories reviewed & adjusted, as indicated.  Additional history: as documented    Patient Active Problem List   Diagnosis     Urgency-frequency syndrome     Fibroid uterus     Health Care Home     Rectovaginal fistula     CARDIOVASCULAR SCREENING; LDL GOAL LESS THAN 130     Oral herpes     Attention deficit hyperactivity disorder (ADHD), predominantly inattentive type     Past Surgical History:   Procedure Laterality Date     BIOPSY  Various    Had a few Leeps and numerous Colposcopys     COLONOSCOPY  2009    Normal     EXC SWEAT GLAND LESN AXILL,SIMPL Right 2009     LEEP TX, CERVICAL  2006    yearly paps     MOUTH SURGERY      wisdom teeth     SOFT TISSUE SURGERY  Various    infected sweat-gland- cyst removed arm,armpit,face     SURGICAL HISTORY OF -   3/2005    Tonsillectomy     SURGICAL HISTORY OF -       infected sweat gland under her arm       Social History   Substance Use Topics     Smoking status: Current Every Day  Smoker     Packs/day: 0.50     Years: 15.00     Types: Cigarettes     Start date: 12/1/2014     Last attempt to quit: 1/8/2014     Smokeless tobacco: Never Used     Alcohol use 0.0 oz/week      Comment: 3 drinks weekly- quit with pregnancy     Family History   Problem Relation Age of Onset     Blood Disease Brother      twin brother-blood clots     Blood Disease Maternal Grandfather      blood clots     HEART DISEASE Maternal Grandfather      valve replacement     Hyperlipidemia Maternal Grandfather      CEREBROVASCULAR DISEASE Maternal Grandfather      Prostate Cancer Maternal Grandfather      Other Cancer Maternal Grandfather      Gum/Jaw/Lymphnodes     Respiratory Maternal Grandmother      Lung Cancer Maternal Grandmother      DIABETES Maternal Grandmother      type II     Hypertension Maternal Grandmother      Hyperlipidemia Maternal Grandmother      Depression Maternal Grandmother      CANCER Paternal Grandmother      non -hodgkins lymphoma     Lung Cancer Paternal Grandmother      Hypertension Paternal Grandmother      Hyperlipidemia Paternal Grandmother      CEREBROVASCULAR DISEASE Paternal Grandmother      Hypertension Father      Hyperlipidemia Father      Hyperlipidemia Paternal Grandfather      Prostate Cancer Paternal Grandfather      Hypertension Paternal Grandfather      Colon Cancer Paternal Grandfather      Breast Cancer Mother      Breast Cancer Other      2 aunt and 3 of my great aunts     Breast Cancer Cousin      Breast Cancer             Reviewed and updated as needed this visit by clinical staffTobacco  Allergies  Med Hx  Surg Hx  Fam Hx  Soc Hx      Reviewed and updated as needed this visit by Provider         ROS:  Constitutional, HEENT, cardiovascular, pulmonary, gi and gu systems are negative, except as otherwise noted.      OBJECTIVE:   /79 (BP Location: Right arm, Patient Position: Sitting, Cuff Size: Adult Regular)  Pulse 105  Temp 98.6  F (37  C) (Tympanic)  Resp 16  Ht  "5' 5\" (1.651 m)  Wt 148 lb 6.4 oz (67.3 kg)  SpO2 99%  BMI 24.7 kg/m2  Body mass index is 24.7 kg/(m^2).  GENERAL: healthy, alert and no distress  EYES: Eyes grossly normal to inspection, PERRL and conjunctivae and sclerae normal  HENT: normal cephalic/atraumatic, ear canals and TM's normal, nose and mouth without ulcers or lesions, oropharynx clear, oral mucous membranes moist and sinuses: not tender  NECK: no adenopathy, no asymmetry, masses, or scars and thyroid normal to palpation  RESP: lungs clear to auscultation - no rales, rhonchi or wheezes  CV: regular rates and rhythm, normal S1 S2, no S3 or S4 and no murmur, click or rub  MS: no gross musculoskeletal defects noted, no edema. Neck- ROM- unable to move neck in any direction. No cervical spinal tenderness, tender to cervical paraspinal muscles.   SKIN: no suspicious lesions or rashes  NEURO: Normal strength and tone, sensory exam grossly normal, mentation intact, cranial nerves 2-12 intact, gait normal, Romberg normal and negative kernig and negative brudzinski.  PSYCH: mentation appears normal, affect normal/bright    Diagnostic Test Results:  Results for orders placed or performed in visit on 09/20/17 (from the past 24 hour(s))   CBC with platelets and differential   Result Value Ref Range    WBC 10.8 4.0 - 11.0 10e9/L    RBC Count 4.40 3.8 - 5.2 10e12/L    Hemoglobin 14.1 11.7 - 15.7 g/dL    Hematocrit 41.0 35.0 - 47.0 %    MCV 93 78 - 100 fl    MCH 32.0 26.5 - 33.0 pg    MCHC 34.4 31.5 - 36.5 g/dL    RDW 12.1 10.0 - 15.0 %    Platelet Count 364 150 - 450 10e9/L    Diff Method Automated Method     % Neutrophils 60.1 %    % Lymphocytes 33.2 %    % Monocytes 5.7 %    % Eosinophils 0.5 %    % Basophils 0.5 %    Absolute Neutrophil 6.5 1.6 - 8.3 10e9/L    Absolute Lymphocytes 3.6 0.8 - 5.3 10e9/L    Absolute Monocytes 0.6 0.0 - 1.3 10e9/L    Absolute Eosinophils 0.1 0.0 - 0.7 10e9/L    Absolute Basophils 0.1 0.0 - 0.2 10e9/L   ESR: Erythrocyte " "sedimentation rate   Result Value Ref Range    Sed Rate 6 0 - 20 mm/h       ASSESSMENT/PLAN:       ICD-10-CM    1. Episodic tension-type headache, not intractable G44.219 CBC with platelets and differential     ESR: Erythrocyte sedimentation rate     Lyme Disease Mariah with reflex to WB Serum     cyclobenzaprine (FLEXERIL) 10 MG tablet       FUTURE APPOINTMENTS:       - Follow-up visit in 2-3 days for persistent symptoms, sooner PRN new or worsening symptoms. Discussed red flag meningeal symptoms for which she should be seen in ER should fever, worsening headache, vomiting, confusion, vision changes, or any other new or worsening symptoms.     Patient Instructions       * Tension Headache    Muscle Tension Headache (also called \"stress headache\") is a very common cause of head pain. Under stress, some people tense the muscles of their shoulder, neck and scalp without knowing it. If this lasts long enough, a headache can occur. These headaches can be very painful and last for hours or even days.  Home Care:    If you were given pain medicine for this headache, do not drive yourself home. Arrange for a ride, instead. When you get home, try to sleep. You should feel much better when you wake up.    Heat to the back of your neck may relieve neck spasm.    Drink only clear liquids or eat a very light diet to avoid nausea/vomiting until symptoms improve.  Preventing Future Headaches    Identify the sources of stress in your life. These may not be obvious! Learn new ways to handle your stress, such as regular exercise, biofeedback, self-hypnosis and meditation. For more information about this, consult your doctor or go to a local bookstore and review the many books and tapes on this subject.    At the first sign of a tension headache, take time out if possible. Remove yourself from the stressful situation, find a quiet comfortable place to sit or lie down and let yourself relax. Heat and deep massage of the tight areas in " the neck and shoulders may help reduce muscle spasm. Medicine, such as ibuprofen (Advil or Motrin) or a prescribed muscle relaxant may be helpful at this point.  Follow Up with your doctor if the headache is not better within the next 24 hours. If you have frequent headaches you should discuss a treatment plan with your primary care doctor. Ask if you can have medicine to take at home the next time you get a bad headache. This may avoid the need for a visit to the emergency department in the future. Poorly controlled chronic headaches may require a referral to a neurologist (headache specialist).  Call your Doctor Or Get Prompt Medical Attention if any of the following occur:    Worsening of your head pain or no improvement within 24 hours    Repeated vomiting (unable to keep liquids down)    Fever of 100.4 F (38.0 C) or higher, or as directed by your healthcare provider    Stiff neck    Extreme drowsiness, confusion or fainting    Dizziness, vertigo (dizziness with spinning sensation)    Weakness of an arm or leg or one side of the face    Difficulty with speech or vision    2940-3704 [place] [address]. All rights reserved. This information is not intended as a substitute for professional medical care. Always follow your healthcare professional's instructions.        Tension Headaches     Massaging your neck muscles can help relieve tension headache pain.     Tension headaches cause a dull, steady pain on both sides of the head and in the neck and the back of the head. The eyes may also feel tired. Tension headaches can be triggered by muscle tension and clenching, lack of sleep, poor posture, eyestrain, stress,depression, anxiety, arthritis of the neck, and other factors.  To help prevent tension headaches  Take the following steps:    Make sure your work area is properly set up to help you avoid neck strain and eyestrain.    Make sure that your eyeglass prescription is  current and is appropriate for the work you do.    Learn techniques for relaxing and reducing emotional stress. These include deep breathing, progressive relaxation, yoga, meditation, and biofeedback.    Maintain a regular exercise regimen under the guidance of a doctor. This can help keep your neck and back flexible, strong, and relaxed.  To relieve the pain  Take the following steps:    Use moist heat to relax the muscles. Soak in a hot bath or wrap a warm, moist towel around your neck.    Brush your scalp lightly with a soft hairbrush.    Give yourself a massage. Knead the muscles running from your shoulders up the back of your skull.    Use an ice pack. Apply this directly to the place where you feel pain.    Rest. Sleeping often helps relieve headache pain.    Drink plenty of fluids. Dehydration is another trigger for headaches.    Use pain medicine sparingly for moderate to severe pain.   Date Last Reviewed: 10/19/2015    2538-2488 [employer]. [address] [place] [address], [place] [address]. All rights reserved. This information is not intended as a substitute for professional medical care. Always follow your healthcare professional's instructions.            KATHLEEN Echevarria [facility]

## 2017-09-20 NOTE — TELEPHONE ENCOUNTER
Reason for call:  Patient reporting a symptom    Symptom or request: headache/stiff neck /chills     Duration (how long have symptoms been present): today     Have you been treated for this before? No    Additional comments: patient  States she has not been feeling well for a couple of  Weeks   Patient is still  taking  metroNIDAZOLE (METROGEL) 0.75 % vaginal gel  Would that be causing her symptoms   Phone Number patient can be reached at:  Cell number on file:    Telephone Information:   Mobile [phone]       Best Time:  Any 2    Can we leave a detailed message on this number:  YES   Mone Prabhakar- [facility]      Call taken on 9/20/2017 at 1:00 PM by Mone Prabhakar

## 2017-09-21 LAB — B BURGDOR IGG+IGM SER QL: 0.16 (ref 0–0.89)

## 2017-09-21 NOTE — PROGRESS NOTES
Aamir Motta    Your Lymes screening came back within negative. Please let us know if you have any questions.     Hope you are feeling better, take care,    KATHLEEN Chi CNP

## 2017-09-22 ENCOUNTER — ANESTHESIA (OUTPATIENT)
Dept: EMERGENCY MEDICINE | Facility: CLINIC | Age: 32
End: 2017-09-22
Payer: COMMERCIAL

## 2017-09-22 ENCOUNTER — ANESTHESIA EVENT (OUTPATIENT)
Dept: EMERGENCY MEDICINE | Facility: CLINIC | Age: 32
End: 2017-09-22
Payer: COMMERCIAL

## 2017-09-22 ENCOUNTER — APPOINTMENT (OUTPATIENT)
Dept: CT IMAGING | Facility: CLINIC | Age: 32
End: 2017-09-22
Attending: EMERGENCY MEDICINE
Payer: COMMERCIAL

## 2017-09-22 ENCOUNTER — HOSPITAL ENCOUNTER (EMERGENCY)
Facility: CLINIC | Age: 32
Discharge: HOME | End: 2017-09-22
Attending: EMERGENCY MEDICINE | Admitting: EMERGENCY MEDICINE
Payer: COMMERCIAL

## 2017-09-22 VITALS
HEART RATE: 90 BPM | RESPIRATION RATE: 16 BRPM | OXYGEN SATURATION: 97 % | WEIGHT: 148 LBS | SYSTOLIC BLOOD PRESSURE: 105 MMHG | DIASTOLIC BLOOD PRESSURE: 70 MMHG | BODY MASS INDEX: 24.63 KG/M2 | TEMPERATURE: 98 F

## 2017-09-22 DIAGNOSIS — G44.219 EPISODIC TENSION-TYPE HEADACHE, NOT INTRACTABLE: ICD-10-CM

## 2017-09-22 DIAGNOSIS — M54.2 NECK PAIN: ICD-10-CM

## 2017-09-22 DIAGNOSIS — N39.0 ACUTE UTI: ICD-10-CM

## 2017-09-22 LAB
ALBUMIN UR-MCNC: NEGATIVE MG/DL
ANION GAP SERPL CALCULATED.3IONS-SCNC: 7 MMOL/L (ref 3–14)
APPEARANCE CSF: CLEAR
APPEARANCE UR: CLEAR
BASOPHILS # BLD AUTO: 0 10E9/L (ref 0–0.2)
BASOPHILS NFR BLD AUTO: 0.3 %
BILIRUB UR QL STRIP: NEGATIVE
BUN SERPL-MCNC: 11 MG/DL (ref 7–30)
CALCIUM SERPL-MCNC: 8.8 MG/DL (ref 8.5–10.1)
CHLORIDE SERPL-SCNC: 110 MMOL/L (ref 94–109)
CO2 SERPL-SCNC: 24 MMOL/L (ref 20–32)
COLOR CSF: COLORLESS
COLOR UR AUTO: YELLOW
CREAT SERPL-MCNC: 0.68 MG/DL (ref 0.52–1.04)
CRP SERPL-MCNC: <2.9 MG/L (ref 0–8)
DIFFERENTIAL METHOD BLD: NORMAL
EOSINOPHIL # BLD AUTO: 0.1 10E9/L (ref 0–0.7)
EOSINOPHIL NFR BLD AUTO: 1.4 %
ERYTHROCYTE [DISTWIDTH] IN BLOOD BY AUTOMATED COUNT: 12.3 % (ref 10–15)
GFR SERPL CREATININE-BSD FRML MDRD: >90 ML/MIN/1.7M2
GLUCOSE CSF-MCNC: 55 MG/DL (ref 40–70)
GLUCOSE SERPL-MCNC: 88 MG/DL (ref 70–99)
GLUCOSE UR STRIP-MCNC: NEGATIVE MG/DL
GRAM STN SPEC: NORMAL
HCG SERPL QL: NEGATIVE
HCT VFR BLD AUTO: 39.3 % (ref 35–47)
HGB BLD-MCNC: 13.4 G/DL (ref 11.7–15.7)
HGB UR QL STRIP: NEGATIVE
IMM GRANULOCYTES # BLD: 0 10E9/L (ref 0–0.4)
IMM GRANULOCYTES NFR BLD: 0.1 %
KETONES UR STRIP-MCNC: NEGATIVE MG/DL
LEUKOCYTE ESTERASE UR QL STRIP: ABNORMAL
LYMPHOCYTES # BLD AUTO: 3.3 10E9/L (ref 0.8–5.3)
LYMPHOCYTES NFR BLD AUTO: 37.6 %
Lab: NORMAL
MCH RBC QN AUTO: 31.6 PG (ref 26.5–33)
MCHC RBC AUTO-ENTMCNC: 34.1 G/DL (ref 31.5–36.5)
MCV RBC AUTO: 93 FL (ref 78–100)
MONOCYTES # BLD AUTO: 0.5 10E9/L (ref 0–1.3)
MONOCYTES NFR BLD AUTO: 5.8 %
MUCOUS THREADS #/AREA URNS LPF: PRESENT /LPF
NEUTROPHILS # BLD AUTO: 4.8 10E9/L (ref 1.6–8.3)
NEUTROPHILS NFR BLD AUTO: 54.8 %
NITRATE UR QL: NEGATIVE
PH UR STRIP: 6 PH (ref 5–7)
PLATELET # BLD AUTO: 342 10E9/L (ref 150–450)
POTASSIUM SERPL-SCNC: 4.1 MMOL/L (ref 3.4–5.3)
PROT CSF-MCNC: 30 MG/DL (ref 15–60)
RBC # BLD AUTO: 4.24 10E12/L (ref 3.8–5.2)
RBC # CSF MANUAL: 0 /UL (ref 0–2)
RBC #/AREA URNS AUTO: 2 /HPF (ref 0–2)
SODIUM SERPL-SCNC: 141 MMOL/L (ref 133–144)
SOURCE: ABNORMAL
SP GR UR STRIP: 1.01 (ref 1–1.03)
SPECIMEN SOURCE: NORMAL
SPECIMEN VOL CSF: 2 ML
SQUAMOUS #/AREA URNS AUTO: 1 /HPF (ref 0–1)
TUBE # CSF: 4 #
UROBILINOGEN UR STRIP-MCNC: NORMAL MG/DL (ref 0–2)
WBC # BLD AUTO: 8.8 10E9/L (ref 4–11)
WBC # CSF MANUAL: 0 /UL (ref 0–5)
WBC #/AREA URNS AUTO: 39 /HPF (ref 0–2)

## 2017-09-22 PROCEDURE — 82945 GLUCOSE OTHER FLUID: CPT | Performed by: EMERGENCY MEDICINE

## 2017-09-22 PROCEDURE — 70450 CT HEAD/BRAIN W/O DYE: CPT

## 2017-09-22 PROCEDURE — 62270 DX LMBR SPI PNXR: CPT | Performed by: NURSE ANESTHETIST, CERTIFIED REGISTERED

## 2017-09-22 PROCEDURE — 86617 LYME DISEASE ANTIBODY: CPT | Mod: 91 | Performed by: EMERGENCY MEDICINE

## 2017-09-22 PROCEDURE — 87086 URINE CULTURE/COLONY COUNT: CPT | Performed by: EMERGENCY MEDICINE

## 2017-09-22 PROCEDURE — 84703 CHORIONIC GONADOTROPIN ASSAY: CPT | Performed by: EMERGENCY MEDICINE

## 2017-09-22 PROCEDURE — 87070 CULTURE OTHR SPECIMN AEROBIC: CPT | Performed by: EMERGENCY MEDICINE

## 2017-09-22 PROCEDURE — 99285 EMERGENCY DEPT VISIT HI MDM: CPT | Mod: 25

## 2017-09-22 PROCEDURE — 96375 TX/PRO/DX INJ NEW DRUG ADDON: CPT | Mod: 59

## 2017-09-22 PROCEDURE — 87205 SMEAR GRAM STAIN: CPT | Performed by: EMERGENCY MEDICINE

## 2017-09-22 PROCEDURE — 89050 BODY FLUID CELL COUNT: CPT | Performed by: EMERGENCY MEDICINE

## 2017-09-22 PROCEDURE — 80048 BASIC METABOLIC PNL TOTAL CA: CPT | Performed by: EMERGENCY MEDICINE

## 2017-09-22 PROCEDURE — 85025 COMPLETE CBC W/AUTO DIFF WBC: CPT | Performed by: EMERGENCY MEDICINE

## 2017-09-22 PROCEDURE — 84157 ASSAY OF PROTEIN OTHER: CPT | Performed by: EMERGENCY MEDICINE

## 2017-09-22 PROCEDURE — 87529 HSV DNA AMP PROBE: CPT | Performed by: EMERGENCY MEDICINE

## 2017-09-22 PROCEDURE — 96361 HYDRATE IV INFUSION ADD-ON: CPT

## 2017-09-22 PROCEDURE — 87040 BLOOD CULTURE FOR BACTERIA: CPT | Performed by: EMERGENCY MEDICINE

## 2017-09-22 PROCEDURE — 25000128 H RX IP 250 OP 636: Performed by: EMERGENCY MEDICINE

## 2017-09-22 PROCEDURE — 87088 URINE BACTERIA CULTURE: CPT | Performed by: EMERGENCY MEDICINE

## 2017-09-22 PROCEDURE — 81001 URINALYSIS AUTO W/SCOPE: CPT | Performed by: EMERGENCY MEDICINE

## 2017-09-22 PROCEDURE — 40000671 ZZH STATISTIC ANESTHESIA CASE

## 2017-09-22 PROCEDURE — 96374 THER/PROPH/DIAG INJ IV PUSH: CPT | Mod: 59

## 2017-09-22 PROCEDURE — 86617 LYME DISEASE ANTIBODY: CPT | Performed by: EMERGENCY MEDICINE

## 2017-09-22 PROCEDURE — 25000125 ZZHC RX 250: Performed by: NURSE ANESTHETIST, CERTIFIED REGISTERED

## 2017-09-22 PROCEDURE — 86140 C-REACTIVE PROTEIN: CPT | Performed by: EMERGENCY MEDICINE

## 2017-09-22 PROCEDURE — 99285 EMERGENCY DEPT VISIT HI MDM: CPT | Performed by: EMERGENCY MEDICINE

## 2017-09-22 RX ORDER — KETOROLAC TROMETHAMINE 30 MG/ML
30 INJECTION, SOLUTION INTRAMUSCULAR; INTRAVENOUS ONCE
Status: COMPLETED | OUTPATIENT
Start: 2017-09-22 | End: 2017-09-22

## 2017-09-22 RX ORDER — METOCLOPRAMIDE HYDROCHLORIDE 5 MG/ML
10 INJECTION INTRAMUSCULAR; INTRAVENOUS ONCE
Status: COMPLETED | OUTPATIENT
Start: 2017-09-22 | End: 2017-09-22

## 2017-09-22 RX ORDER — DIPHENHYDRAMINE HYDROCHLORIDE 50 MG/ML
25 INJECTION INTRAMUSCULAR; INTRAVENOUS ONCE
Status: COMPLETED | OUTPATIENT
Start: 2017-09-22 | End: 2017-09-22

## 2017-09-22 RX ORDER — DIAZEPAM 10 MG/2ML
2.5 INJECTION, SOLUTION INTRAMUSCULAR; INTRAVENOUS ONCE
Status: COMPLETED | OUTPATIENT
Start: 2017-09-22 | End: 2017-09-22

## 2017-09-22 RX ORDER — OXYCODONE AND ACETAMINOPHEN 5; 325 MG/1; MG/1
1-2 TABLET ORAL EVERY 4 HOURS PRN
Qty: 6 TABLET | Refills: 0 | Status: ON HOLD | OUTPATIENT
Start: 2017-09-22 | End: 2017-09-24

## 2017-09-22 RX ORDER — HYDROMORPHONE HYDROCHLORIDE 1 MG/ML
0.5 INJECTION, SOLUTION INTRAMUSCULAR; INTRAVENOUS; SUBCUTANEOUS ONCE
Status: COMPLETED | OUTPATIENT
Start: 2017-09-22 | End: 2017-09-22

## 2017-09-22 RX ORDER — LIDOCAINE HYDROCHLORIDE 10 MG/ML
INJECTION, SOLUTION EPIDURAL; INFILTRATION; INTRACAUDAL; PERINEURAL PRN
Status: DISCONTINUED | OUTPATIENT
Start: 2017-09-22 | End: 2017-09-22

## 2017-09-22 RX ORDER — CIPROFLOXACIN 500 MG/1
500 TABLET, FILM COATED ORAL 2 TIMES DAILY
Qty: 14 TABLET | Refills: 0 | Status: ON HOLD | OUTPATIENT
Start: 2017-09-22 | End: 2017-09-24

## 2017-09-22 RX ADMIN — SODIUM CHLORIDE 1000 ML: 9 INJECTION, SOLUTION INTRAVENOUS at 15:29

## 2017-09-22 RX ADMIN — Medication 3 ML: at 17:48

## 2017-09-22 RX ADMIN — HYDROMORPHONE HYDROCHLORIDE 0.5 MG: 1 INJECTION, SOLUTION INTRAMUSCULAR; INTRAVENOUS; SUBCUTANEOUS at 15:32

## 2017-09-22 RX ADMIN — METOCLOPRAMIDE 10 MG: 5 INJECTION, SOLUTION INTRAMUSCULAR; INTRAVENOUS at 16:56

## 2017-09-22 RX ADMIN — DIPHENHYDRAMINE HYDROCHLORIDE 25 MG: 50 INJECTION, SOLUTION INTRAMUSCULAR; INTRAVENOUS at 16:54

## 2017-09-22 RX ADMIN — DIAZEPAM 2.5 MG: 5 INJECTION, SOLUTION INTRAMUSCULAR; INTRAVENOUS at 15:29

## 2017-09-22 RX ADMIN — KETOROLAC TROMETHAMINE 30 MG: 30 INJECTION, SOLUTION INTRAMUSCULAR at 18:13

## 2017-09-22 ASSESSMENT — ENCOUNTER SYMPTOMS
FREQUENCY: 0
SHORTNESS OF BREATH: 0
PHOTOPHOBIA: 1
COUGH: 0
DIFFICULTY URINATING: 0
DYSURIA: 0
NAUSEA: 1
NECK PAIN: 1
HEADACHES: 1
FATIGUE: 1
VOMITING: 0
CONSTIPATION: 0
HEMATURIA: 0
NECK STIFFNESS: 1
FEVER: 0
DIARRHEA: 0
CHILLS: 1

## 2017-09-22 ASSESSMENT — LIFESTYLE VARIABLES: TOBACCO_USE: 1

## 2017-09-22 NOTE — ANESTHESIA POSTPROCEDURE EVALUATION
Patient: Ángela Zurita    * No procedures listed *    Diagnosis:* No pre-op diagnosis entered *  Diagnosis Additional Information: No value filed.    Anesthesia Type:  Spinal    Note:  Anesthesia Post Evaluation    Patient location during evaluation: Bedside  Patient participation: Able to fully participate in evaluation  Level of consciousness: awake and alert  Pain management: adequate  Airway patency: patent  Cardiovascular status: acceptable  Respiratory status: acceptable  Hydration status: acceptable  PONV: none     Anesthetic complications: None          Last vitals:  Vitals:    09/22/17 1415   BP: 115/75   Resp: 16   Temp: 36.7  C (98.1  F)   SpO2: 100%         Electronically Signed By: KATHLEEN Carr CRNA  September 22, 2017  6:13 PM

## 2017-09-22 NOTE — ED NOTES
SEEN FOR HA AND NECK PAIN AND THEN CALLED TODAY AS NOT BETTER AND WAS TOLD TO COME IN TO BE CHECKED IN FOR MENINGITIS

## 2017-09-22 NOTE — ED PROVIDER NOTES
"  History     Chief Complaint   Patient presents with     Neck Pain     neck pain x 2 days    ha off and on x 1 month - seen 2 days ago for neck pain     HPI  Ángela Zurita is a 32 year old female with a history of chronic headaches who presents to the ED with neck pain and a headache. The patient states she has been feeling generally ill and fatigued for the last 2 weeks and 2 days ago she developed sudden onset of headache with associated neck pain and stiffness. She was seen in clinic 2 days ago and Lyme, SED rate, and CBC were normal. She was given flexeril at that time and advised to seek emergency department care if her symptoms worsen.    The patient states her symptoms have progressively worsened since onset on Wednesday. She describes her current headache as \"different compared to my previous headaches that I get all of the time.\" She rates her headache 7/10 and reports associated photophobia. She has nausea but no vomiting. She feels her headache has also caused the neck pain and stiffness. No recent falls, injury or trauma to the neck. Denies cough. Complains of chills but no fevers. No bowel or bladder problems. No rash. There is no additional history to report.     I have reviewed the Medications, Allergies, Past Medical and Surgical History, and Social History in the Epic system.    Patient Active Problem List   Diagnosis     Urgency-frequency syndrome     Fibroid uterus     Health Care Home     Rectovaginal fistula     CARDIOVASCULAR SCREENING; LDL GOAL LESS THAN 130     Oral herpes     Attention deficit hyperactivity disorder (ADHD), predominantly inattentive type     Current Outpatient Prescriptions   Medication Sig Dispense Refill     cyclobenzaprine (FLEXERIL) 10 MG tablet Take 0.5-1 tablets (5-10 mg) by mouth 3 times daily as needed for muscle spasms 30 tablet 1     [START ON 10/30/2017] amphetamine-dextroamphetamine (ADDERALL) 10 MG per tablet Take 2 pills (20mg) orally in the morning and " two pills (20mg) at 11am and one pill (10mg) at 3pm 150 tablet 0     [START ON 9/29/2017] amphetamine-dextroamphetamine (ADDERALL) 10 MG per tablet Take 2 pills (20mg) orally in the morning and two pills (20mg) at 11am and one pill (10mg) at 2pm 150 tablet 0     amphetamine-dextroamphetamine (ADDERALL) 10 MG per tablet Take 2 pills (20mg) orally in the morning and two pills (20mg) at 11am and one pill (10mg) at 2pm 150 tablet 0     clindamycin (CLINDAMAX) 1 % topical gel Apply topically 2 times daily 60 g 11     No Known Allergies      Review of Systems   Constitutional: Positive for chills and fatigue. Negative for fever.   Eyes: Positive for photophobia.   Respiratory: Negative for cough and shortness of breath.    Cardiovascular: Negative for chest pain.   Gastrointestinal: Positive for nausea. Negative for constipation, diarrhea and vomiting.   Genitourinary: Negative for difficulty urinating, dysuria, frequency, hematuria and urgency.   Musculoskeletal: Positive for neck pain and neck stiffness.   Skin: Negative for rash.   Neurological: Positive for headaches.       Physical Exam   BP: 115/75  Heart Rate: 92  Temp: 98.1  F (36.7  C)  Resp: 16  Weight: 67.1 kg (148 lb)  SpO2: 100 %  Physical Exam   Constitutional: She appears well-developed and well-nourished. She appears distressed.   Psychiatric: She has a normal mood and affect.   Nursing note and vitals reviewed.    HENT: Oral mucosa moist. No lesions. TM's clear bilaterally. Posterior pharynx without erythema or edema.  Neck: Stiff with positive rigidity, pain with movement  Pulmonary/Chest: Lungs are clear to auscultation bilaterally.  Cardiovascular: Heart is regular rate and rhythm. No murmur.  Abdomen: Soft, non-distended, non-tender.   Musculoskeletal: Moving all extremities well. No peripheral edema.   Neurological: Alert. No focal neurologic deficit. Cranial nerves intact  Skin: No rash.    ED Course     ED Course     Procedures             Critical  Care time:  none               Labs Ordered and Resulted from Time of ED Arrival Up to the Time of Departure from the ED   BASIC METABOLIC PANEL - Abnormal; Notable for the following:        Result Value    Chloride 110 (*)     All other components within normal limits   URINE MACROSCOPIC WITH REFLEX TO MICRO - Abnormal; Notable for the following:     Leukocyte Esterase Urine Moderate (*)     WBC Urine 39 (*)     Mucous Urine Present (*)     All other components within normal limits   CBC WITH PLATELETS DIFFERENTIAL   CRP INFLAMMATION   HCG QUALITATIVE   GLUCOSE CSF   PROTEIN TOTAL CSF   CELL COUNT WITH DIFFERENTIAL CSF   LYME IGG AND IGM CSF IMMUNOBLOT       Results for orders placed or performed during the hospital encounter of 09/22/17   Head CT w/o contrast    Narrative    CT HEAD W/O CONTRAST   9/22/2017 3:43 PM     HISTORY: headache and neck pain    TECHNIQUE:  Axial images of the head without IV contrast material.  Radiation dose for this scan was reduced using automated exposure  control, adjustment of the mA and/or kV according to patient size, or  iterative reconstruction technique.    COMPARISON: CT dated 1/15 T9/09    FINDINGS: The ventricles are normal in size, shape and configuration.  The brain parenchyma and subarachnoid spaces are normal. There is no  evidence of intracranial hemorrhage, mass, acute infarct or anomaly.  The visualized portions of the sinuses and mastoids appear normal.  There is no evidence of trauma.      Impression    IMPRESSION:  No acute pathology, no bleed, mass, or acute infarcts are  seen. No significant change.    HERMELINDO RIDDLE MD         Medications   0.9% sodium chloride BOLUS (0 mLs Intravenous Stopped 9/22/17 1810)   diazepam (VALIUM) injection 2.5 mg (2.5 mg Intravenous Given 9/22/17 1529)   HYDROmorphone (PF) (DILAUDID) injection 0.5 mg (0.5 mg Intravenous Given 9/22/17 1532)   metoclopramide (REGLAN) injection 10 mg (10 mg Intravenous Given 9/22/17 1656)    diphenhydrAMINE (BENADRYL) injection 25 mg (25 mg Intravenous Given 9/22/17 9695)   ketorolac (TORADOL) injection 30 mg (30 mg Intravenous Given 9/22/17 0203)       3:00 PM Patient Assessed      Assessments & Plan (with Medical Decision Making) CT scan of the head was obtained.  Patient was given Dilaudid and Valium for pain.  Labs were obtained.  White count was not elevated and there was not a left shift.  Basic metabolic panel was unremarkable.  CRP was less than 2.9.  Pregnancy test was negative.  CT scan of the head was unremarkable.  Patient continued to have pain and therefore I discussed with the patient possible LP due to a atypical headache and significant neck pain.  The patient understood the risks and benefits of this procedure and anesthesia was consulted and they performed an LP on the patient.  Opening pressure was slightly elevated.  UA came back with moderate leukocyte esterase and 39 WBCs.  Urine culture was sent.  Glucose CSF was 55 protein 30 Gram stain with no organisms seen cell count white cells and 0 red cells 0.  Patient's headache improved somewhat with medication.  I discussed possible admission or MRI scan for further evaluation.  Patient with feeling a bit better wanted to go home and see if symptoms improve.  She understands and cannot rule out sources of headache without further evaluation but she feels comfortable going home at this time.  She will be given a few pain pills and is advised to return if symptoms worsen or new symptoms develop.       I have reviewed the nursing notes.    I have reviewed the findings, diagnosis, plan and need for follow up with the patient.       Discharge Medication List as of 9/22/2017  8:47 PM      START taking these medications    Details   ciprofloxacin (CIPRO) 500 MG tablet Take 1 tablet (500 mg) by mouth 2 times daily, Disp-14 tablet, R-0, Local Print      oxyCODONE-acetaminophen (PERCOCET) 5-325 MG per tablet Take 1-2 tablets by mouth every 4  hours as needed for pain, Disp-6 tablet, R-0, Local Print             Final diagnoses:   Episodic tension-type headache, not intractable   Neck pain   Acute UTI       This document serves as a record of the services and decisions personally performed and made by Kushal Pa MD. It was created on HIS/HER behalf by Lance Phan, a trained medical scribe. The creation of this document is based the provider's statements to the medical scribe.  Lance Phan 3:00 PM 9/22/2017    Provider:   The information in this document, created by the medical scribe for me, accurately reflects the services I personally performed and the decisions made by me. I have reviewed and approved this document for accuracy prior to leaving the patient care area.  Kushal Pa MD 3:00 PM 9/22/2017 9/22/2017   [facility] EMERGENCY DEPARTMENT     Kushal Pa MD  09/24/17 0954

## 2017-09-22 NOTE — ANESTHESIA PROCEDURE NOTES
Peripheral nerve/Neuraxial procedure note : lumbar puncture  Pre-Procedure  Performed by  NEY JOHNS   Location: ED      Pre-Anesthestic Checklist: patient identified, IV checked, risks and benefits discussed, informed consent, monitors and equipment checked, pre-op evaluation and at physician/surgeon's request    Timeout  Correct Patient: Yes   Correct Procedure: Yes   Correct Site: Yes   Correct Laterality: N/A   Correct Position: Yes   Site Marked: N/A   .   Procedure Documentation  ASA 2  Diagnosis:headache, stiff neck, nausea.    Procedure:    Lumbar puncture.  Insertion Site:L3-4  (midline approach)      Patient Prep;mask, sterile gloves, povidone-iodine 7.5% surgical scrub, patient draped.  .  Needle: Siri tip Spinal Needle (gauge): 22  Spinal/LP Needle Length (inches): 3.5 # of attempts: 1 and # of redirects:  No introducer used .       Assessment/Narrative  Paresthesias: No.  .  .  8 mL of clear CSF fluid removed while lateral   . Comments:  Pt tolerated procedure well.

## 2017-09-22 NOTE — TELEPHONE ENCOUNTER
Patient states the flexeril that was prescribed 9-20-17 is not relieving her HA.  She states it is not better, it is worse but not horribly worse.  RN reviewed Liya's OV notes 9-20-17:  FUTURE APPOINTMENTS:       - Follow-up visit in 2-3 days for persistent symptoms, sooner PRN new or worsening symptoms. Discussed red flag meningeal symptoms for which she should be seen in ER should fever, worsening headache, vomiting, confusion, vision changes, or any other new or worsening symptoms.    Patient states she will go to the ED for further evaluation.  Ashwini HILL RN

## 2017-09-22 NOTE — ANESTHESIA CARE TRANSFER NOTE
Patient: Ángela Zurita    * No procedures listed *    Diagnosis: * No pre-op diagnosis entered *  Diagnosis Additional Information: No value filed.    Anesthesia Type:   Spinal     Note:  Airway :Room Air  Patient transferred to:Emergency Department        Vitals: (Last set prior to Anesthesia Care Transfer)              Electronically Signed By: KATHLEEN Carr CRNA  September 22, 2017  6:13 PM

## 2017-09-22 NOTE — ED AVS SNAPSHOT
[facility] Emergency Department    [address] [address]    Phone:  [phone]    Fax:  [phone]                                       Ángela Zurita   MRN: [id number]    Department:  [facility] Emergency Department   Date of Visit:  9/22/2017           After Visit Summary Signature Page     I have received my discharge instructions, and my questions have been answered. I have discussed any challenges I see with this plan with the nurse or doctor.    ..........................................................................................................................................  Patient/Patient Representative Signature      ..........................................................................................................................................  Patient Representative Print Name and Relationship to Patient    ..................................................               ................................................  Date                                            Time    ..........................................................................................................................................  Reviewed by Signature/Title    ...................................................              ..............................................  Date                                                            Time

## 2017-09-22 NOTE — ED AVS SNAPSHOT
" [facility]    [address] [facility] [address]    Phone:  [phone]    Fax:  [phone]                                       Ángela Zurita   MRN: [id number]    Department:  [facility]   Date of Visit:  9/22/2017           Patient Information     Date Of Birth          1985        Your diagnoses for this visit were:     Episodic tension-type headache, not intractable     Neck pain     Acute UTI        You were seen by Kushal Pa MD.      Follow-up Information     Follow up with Randy Graf MD.    Specialty:  Family Practice    Why:  As needed    Contact information:    [address] [place] [address] [id number] [phone]          Follow up with [facility].    Specialty:  EMERGENCY MEDICINE    Why:  If symptoms worsen    Contact information:    [address] [place] [id number] [phone]    Additional information:    The medical center is located at   [address] [place] [address] (between [address]   [address] in [place], [place] north   of [place]).        Discharge Instructions         Return if symptoms worsen or new symptoms develop.  Follow-up with primary care next week for recheck.  Take medications as directed.  We discussed possible MRI MRA of the head and neck but he wanted to hold off and see if your symptoms improved.  If worsening headache neck pain vomiting fevers or other symptoms present please return for recheck.  * Tension Headache    Muscle Tension Headache (also called \"stress headache\") is a very common cause of head pain. Under stress, some people tense the muscles of their shoulder, neck and scalp without knowing it. If this lasts long enough, a headache can occur. These headaches can be very painful and last for hours or even days.  Home Care:    If you were given pain medicine for this headache, do not drive yourself home. Arrange for a ride, instead. When " you get home, try to sleep. You should feel much better when you wake up.    Heat to the back of your neck may relieve neck spasm.    Drink only clear liquids or eat a very light diet to avoid nausea/vomiting until symptoms improve.  Preventing Future Headaches    Identify the sources of stress in your life. These may not be obvious! Learn new ways to handle your stress, such as regular exercise, biofeedback, self-hypnosis and meditation. For more information about this, consult your doctor or go to a local bookstore and review the many books and tapes on this subject.    At the first sign of a tension headache, take time out if possible. Remove yourself from the stressful situation, find a quiet comfortable place to sit or lie down and let yourself relax. Heat and deep massage of the tight areas in the neck and shoulders may help reduce muscle spasm. Medicine, such as ibuprofen (Advil or Motrin) or a prescribed muscle relaxant may be helpful at this point.  Follow Up with your doctor if the headache is not better within the next 24 hours. If you have frequent headaches you should discuss a treatment plan with your primary care doctor. Ask if you can have medicine to take at home the next time you get a bad headache. This may avoid the need for a visit to the emergency department in the future. Poorly controlled chronic headaches may require a referral to a neurologist (headache specialist).  Call your Doctor Or Get Prompt Medical Attention if any of the following occur:    Worsening of your head pain or no improvement within 24 hours    Repeated vomiting (unable to keep liquids down)    Fever of 100.4 F (38.0 C) or higher, or as directed by your healthcare provider    Stiff neck    Extreme drowsiness, confusion or fainting    Dizziness, vertigo (dizziness with spinning sensation)    Weakness of an arm or leg or one side of the face    Difficulty with speech or vision    8660-4377 [facility], [address] [place] [address]  Road, [place] [address]. All rights reserved. This information is not intended as a substitute for professional medical care. Always follow your healthcare professional's instructions.          Neck Pain    There are several possible causes of neck pain when there is no injury:    You can get a minor ligament sprain or muscle strain from a sudden minor neck movement. Sleeping with your neck in an awkward position can also cause this.    Some people respond to emotional stress by tensing the muscles of their neck, shoulders, and upper back. Chronic spasm in these muscles can cause neck pain and sometimes headaches.    Gradual wear and tear of the joints in the spine can cause degenerative arthritis. This can be a source of occasional or chronic neck pain.    The spinal disks may bulge and put pressure on a nearby spinal nerve. This can happen as a natural result of aging or repeated small injuries to the neck. The spinal disks are the cushions between each spinal bone. This causes tingling, pain, or numbness that spreads from the neck to the shoulder, arm, or hand on one side.  Acute neck pain usually gets better in 1 to 2 weeks. Neck pain related to disk disease, arthritis in the spinal joints, or spinal stenosis can become chronic and last for months or years. Spinal stenosis is narrowing of the spinal canal.  X-rays are usually not ordered for the initial evaluation of neck pain. However, X-rays may be done if you had a forceful physical injury, such as a car accident or fall. If pain continues and doesn t respond to medical treatment, X-rays and other tests may be done at a later time.  Home care    Rest and relax the muscles. Use a comfortable pillow that supports the head. It should also help keep the spine in a neutral position. The position of the head should not be tilted forward or backward. A rolled up towel may help for a custom fit.    Some people find relief with heat. Heat can be applied with either a  warm shower or bath or a moist towel heated in the microwave and massage. Others prefer cold packs. You can make an ice pack by filling a plastic bag that seals at the top with ice cubes or crushed ice and then wrapping it with a thin towel. Try both and use the method that feels best for 15 to 20 minutes, several times a day.    Whether using ice or heat, be careful that you do not injure your skin. Never put ice directly on the skin. Always wrap the ice in a towel or other type of cloth.This is very important, especially in people with poor skin sensations.     Try to reduce your stress level. Emotional stress can lead to neck muscle tension and get in the way of or delay the healing process.    You may use over-the-counter pain medicine to control pain, unless another medicine was prescribed. If you have chronic liver or kidney disease or ever had a stomach ulcer or GI bleeding, talk with your healthcare provider before using these medicines.  Follow-up care  Follow up with your healthcare provider if your symptoms do not show signs of improvement after one week. Physical therapy or further tests may be needed.  If X-rays, CT scans, or MRI scans were taken, you will be told of any new findings that may affect your care.  Call 911  Call 911 if you have:    Sudden weakness or numbness in one or both arms    Neck swelling, difficulty or painful swallowing    Difficulty breathing    Chest pain  When to seek medical advice  Call your healthcare provider right away if any of these occur:    Pain becomes worse or spreads into one or both arm    Increasing headache    Fever of 100.4 F (38 C) or above lasting for 24 to 48 hours  Date Last Reviewed: 7/1/2016 2000-2017 [employer]. [address] [place] [address], [place] [address]. All rights reserved. This information is not intended as a substitute for professional medical care. Always follow your healthcare professional's instructions.          Neck  Pain    There are several possible causes of neck pain when there is no injury:    You can get a minor ligament sprain or muscle strain from a sudden minor neck movement. Sleeping with your neck in an awkward position can also cause this.    Some people respond to emotional stress by tensing the muscles of their neck, shoulders, and upper back. Chronic spasm in these muscles can cause neck pain and sometimes headaches.    Gradual wear and tear of the joints in the spine can cause degenerative arthritis. This can be a source of occasional or chronic neck pain.    The spinal disks may bulge and put pressure on a nearby spinal nerve. This can happen as a natural result of aging or repeated small injuries to the neck. The spinal disks are the cushions between each spinal bone. This causes tingling, pain, or numbness that spreads from the neck to the shoulder, arm, or hand on one side.  Acute neck pain usually gets better in 1 to 2 weeks. Neck pain related to disk disease, arthritis in the spinal joints, or spinal stenosis can become chronic and last for months or years. Spinal stenosis is narrowing of the spinal canal.  X-rays are usually not ordered for the initial evaluation of neck pain. However, X-rays may be done if you had a forceful physical injury, such as a car accident or fall. If pain continues and doesn t respond to medical treatment, X-rays and other tests may be done at a later time.  Home care    Rest and relax the muscles. Use a comfortable pillow that supports the head. It should also help keep the spine in a neutral position. The position of the head should not be tilted forward or backward. A rolled up towel may help for a custom fit.    Some people find relief with heat. Heat can be applied with either a warm shower or bath or a moist towel heated in the microwave and massage. Others prefer cold packs. You can make an ice pack by filling a plastic bag that seals at the top with ice cubes or crushed  "ice and then wrapping it with a thin towel. Try both and use the method that feels best for 15 to 20 minutes, several times a day.    Whether using ice or heat, be careful that you do not injure your skin. Never put ice directly on the skin. Always wrap the ice in a towel or other type of cloth.This is very important, especially in people with poor skin sensations.     Try to reduce your stress level. Emotional stress can lead to neck muscle tension and get in the way of or delay the healing process.    You may use over-the-counter pain medicine to control pain, unless another medicine was prescribed. If you have chronic liver or kidney disease or ever had a stomach ulcer or GI bleeding, talk with your healthcare provider before using these medicines.  Follow-up care  Follow up with your healthcare provider if your symptoms do not show signs of improvement after one week. Physical therapy or further tests may be needed.  If X-rays, CT scans, or MRI scans were taken, you will be told of any new findings that may affect your care.  Call 911  Call 911 if you have:    Sudden weakness or numbness in one or both arms    Neck swelling, difficulty or painful swallowing    Difficulty breathing    Chest pain  When to seek medical advice  Call your healthcare provider right away if any of these occur:    Pain becomes worse or spreads into one or both arm    Increasing headache    Fever of 100.4 F (38 C) or above lasting for 24 to 48 hours  Date Last Reviewed: 7/1/2016 2000-2017 [employer]. [address] [place] [address]. All rights reserved. This information is not intended as a substitute for professional medical care. Always follow your healthcare professional's instructions.             * BLADDER INFECTION,Female (Adult)    A bladder infection (\"cystitis\" or \"UTI\") usually causes a constant urge to urinate and a burning when passing urine. Urine may be cloudy, smelly or dark. There may be pain " in the lower abdomen. A bladder infection occurs when bacteria from the vaginal area enter the bladder opening (urethra). This can occur from sexual intercourse, wearing tight clothing, dehydration and other factors.  HOME CARE:  1. Drink lots of fluids (at least 6-8 glasses a day, unless you must restrict fluids for other medical reasons). This will force the medicine into your urinary system and flush the bacteria out of your body. Cranberry juice has been shown to help clear out the bacteria.  2. Avoid sexual intercourse until your symptoms are gone.  3. A bladder infection is treated with antibiotics. You may also be given Pyridium (generic = phenazopyridine) to reduce the burning sensation. This medicine will cause your urine to become a bright orange color. The orange urine may stain clothing. You may wear a pad or panty-liner to protect clothing.  PREVENTING FUTURE INFECTIONS:  1. Always wipe from front to back after a bowel movement.  2. Keep the genital area clean and dry.  3. Drink plenty of fluids each day to avoid dehydration.  4. Urinate right after intercourse to flush out the bladder.  5. Wear cotton underwear and cotton-lined panty hose; avoid tight-fitting pants.  6. If you are on birth control pills and are having frequent bladder infections, discuss with your doctor.  FOLLOW UP: Return to this facility or see your doctor if ALL symptoms are not gone after three days of treatment.  GET PROMPT MEDICAL ATTENTION if any of the following occur:    Fever over 101 F (38.3 C)    No improvement by the third day of treatment    Increasing back or abdominal pain    Repeated vomiting; unable to keep medicine down    Weakness, dizziness or fainting    Vaginal discharge    Pain, redness or swelling in the labia (outer vaginal area)    8368-7802 [employer], [address] [place] [address], [place] [address]. All rights reserved. This information is not intended as a substitute for professional medical care.  Always follow your healthcare professional's instructions.      24 Hour Appointment Hotline       To make an appointment at any [facility], call [phone] ([phone]). If you don't have a family doctor or clinic, we will help you find one. [facility] are conveniently located to serve the needs of you and your family.             Review of your medicines      START taking        Dose / Directions Last dose taken    ciprofloxacin 500 MG tablet   Commonly known as:  CIPRO   Dose:  500 mg   Quantity:  14 tablet        Take 1 tablet (500 mg) by mouth 2 times daily   Refills:  0        oxyCODONE-acetaminophen 5-325 MG per tablet   Commonly known as:  PERCOCET   Dose:  1-2 tablet   Quantity:  6 tablet        Take 1-2 tablets by mouth every 4 hours as needed for pain   Refills:  0          Our records show that you are taking the medicines listed below. If these are incorrect, please call your family doctor or clinic.        Dose / Directions Last dose taken    * amphetamine-dextroamphetamine 10 MG per tablet   Commonly known as:  ADDERALL   Quantity:  150 tablet        Take 2 pills (20mg) orally in the morning and two pills (20mg) at 11am and one pill (10mg) at 2pm   Refills:  0        * amphetamine-dextroamphetamine 10 MG per tablet   Commonly known as:  ADDERALL   Quantity:  150 tablet   Start taking on:  9/29/2017        Take 2 pills (20mg) orally in the morning and two pills (20mg) at 11am and one pill (10mg) at 2pm   Refills:  0        * amphetamine-dextroamphetamine 10 MG per tablet   Commonly known as:  ADDERALL   Quantity:  150 tablet   Start taking on:  10/30/2017        Take 2 pills (20mg) orally in the morning and two pills (20mg) at 11am and one pill (10mg) at 3pm   Refills:  0        clindamycin 1 % topical gel   Commonly known as:  CLINDAMAX   Quantity:  60 g        Apply topically 2 times daily   Refills:  11        cyclobenzaprine 10 MG tablet   Commonly known as:  FLEXERIL   Dose:   5-10 mg   Quantity:  30 tablet        Take 0.5-1 tablets (5-10 mg) by mouth 3 times daily as needed for muscle spasms   Refills:  1        * Notice:  This list has 3 medication(s) that are the same as other medications prescribed for you. Read the directions carefully, and ask your doctor or other care provider to review them with you.            Prescriptions were sent or printed at these locations (2 Prescriptions)                   Other Prescriptions                Printed at Department/Unit printer (2 of 2)         ciprofloxacin (CIPRO) 500 MG tablet               oxyCODONE-acetaminophen (PERCOCET) 5-325 MG per tablet                Procedures and tests performed during your visit     Basic metabolic panel    Blood culture (one site)    CBC with platelets, differential    CRP Inflammation    CSF Culture Aerobic Bacterial    Cell count with differential CSF: Tube 4    Glucose CSF: Tube 2    Gram stain    HCG qualitative pregnancy (blood)    HSV Types 1 and 2 Qualitative PCR CSF: Tube 3    Head CT w/o contrast    Lyme IgG and IgM CSF Immunoblot: Tube 3    Protein total CSF: Tube 2    UA reflex to Microscopic    Urine Culture Aerobic Bacterial      Orders Needing Specimen Collection     None      Pending Results     Date and Time Order Name Status Description    9/22/2017 1821 Urine Culture Aerobic Bacterial In process     9/22/2017 1658 Lyme IgG and IgM CSF Immunoblot: Tube 3 In process     9/22/2017 1658 HSV Types 1 and 2 Qualitative PCR CSF: Tube 3 In process     9/22/2017 1658 CSF Culture Aerobic Bacterial In process     9/22/2017 1658 Gram stain Preliminary             Pending Culture Results     Date and Time Order Name Status Description    9/22/2017 1821 Urine Culture Aerobic Bacterial In process     9/22/2017 1658 Lyme IgG and IgM CSF Immunoblot: Tube 3 In process     9/22/2017 1658 HSV Types 1 and 2 Qualitative PCR CSF: Tube 3 In process     9/22/2017 1658 CSF Culture Aerobic Bacterial In process      9/22/2017 1658 Gram stain Preliminary             Pending Results Instructions     If you had any lab results that were not finalized at the time of your Discharge, you can call the ED Lab Result RN at [phone]. You will be contacted by this team for any positive Lab results or changes in treatment. The nurses are available 7 days a week from 10A to 6:30P.  You can leave a message 24 hours per day and they will return your call.        Test Results From Your Hospital Stay        9/22/2017  3:48 PM      Component Results     Component Value Ref Range & Units Status    WBC 8.8 4.0 - 11.0 10e9/L Final    RBC Count 4.24 3.8 - 5.2 10e12/L Final    Hemoglobin 13.4 11.7 - 15.7 g/dL Final    Hematocrit 39.3 35.0 - 47.0 % Final    MCV 93 78 - 100 fl Final    MCH 31.6 26.5 - 33.0 pg Final    MCHC 34.1 31.5 - 36.5 g/dL Final    RDW 12.3 10.0 - 15.0 % Final    Platelet Count 342 150 - 450 10e9/L Final    Diff Method Automated Method  Final    % Neutrophils 54.8 % Final    % Lymphocytes 37.6 % Final    % Monocytes 5.8 % Final    % Eosinophils 1.4 % Final    % Basophils 0.3 % Final    % Immature Granulocytes 0.1 % Final    Absolute Neutrophil 4.8 1.6 - 8.3 10e9/L Final    Absolute Lymphocytes 3.3 0.8 - 5.3 10e9/L Final    Absolute Monocytes 0.5 0.0 - 1.3 10e9/L Final    Absolute Eosinophils 0.1 0.0 - 0.7 10e9/L Final    Absolute Basophils 0.0 0.0 - 0.2 10e9/L Final    Abs Immature Granulocytes 0.0 0 - 0.4 10e9/L Final         9/22/2017  4:06 PM      Component Results     Component Value Ref Range & Units Status    Sodium 141 133 - 144 mmol/L Final    Potassium 4.1 3.4 - 5.3 mmol/L Final    Chloride 110 (H) 94 - 109 mmol/L Final    Carbon Dioxide 24 20 - 32 mmol/L Final    Anion Gap 7 3 - 14 mmol/L Final    Glucose 88 70 - 99 mg/dL Final    Urea Nitrogen 11 7 - 30 mg/dL Final    Creatinine 0.68 0.52 - 1.04 mg/dL Final    GFR Estimate >90 >60 mL/min/1.7m2 Final    Non  GFR Calc    GFR Estimate If Black >90 >60  mL/min/1.7m2 Final     GFR Calc    Calcium 8.8 8.5 - 10.1 mg/dL Final         9/22/2017  4:06 PM      Component Results     Component Value Ref Range & Units Status    CRP Inflammation <2.9 0.0 - 8.0 mg/L Final         9/22/2017  5:49 PM      Component Results     Component Value Ref Range & Units Status    Color Urine Yellow  Final    Appearance Urine Clear  Final    Glucose Urine Negative NEG^Negative mg/dL Final    Bilirubin Urine Negative NEG^Negative Final    Ketones Urine Negative NEG^Negative mg/dL Final    Specific Gravity Urine 1.010 1.003 - 1.035 Final    Blood Urine Negative NEG^Negative Final    pH Urine 6.0 5.0 - 7.0 pH Final    Protein Albumin Urine Negative NEG^Negative mg/dL Final    Urobilinogen mg/dL Normal 0.0 - 2.0 mg/dL Final    Nitrite Urine Negative NEG^Negative Final    Leukocyte Esterase Urine Moderate (A) NEG^Negative Final    Source Midstream Urine  Final    RBC Urine 2 0 - 2 /HPF Final    WBC Urine 39 (H) 0 - 2 /HPF Final    Squamous Epithelial /HPF Urine 1 0 - 1 /HPF Final    Mucous Urine Present (A) NEG^Negative /LPF Final         9/22/2017  3:46 PM      Narrative     CT HEAD W/O CONTRAST   9/22/2017 3:43 PM     HISTORY: headache and neck pain    TECHNIQUE:  Axial images of the head without IV contrast material.  Radiation dose for this scan was reduced using automated exposure  control, adjustment of the mA and/or kV according to patient size, or  iterative reconstruction technique.    COMPARISON: CT dated 1/15 T9/09    FINDINGS: The ventricles are normal in size, shape and configuration.  The brain parenchyma and subarachnoid spaces are normal. There is no  evidence of intracranial hemorrhage, mass, acute infarct or anomaly.  The visualized portions of the sinuses and mastoids appear normal.  There is no evidence of trauma.        Impression     IMPRESSION:  No acute pathology, no bleed, mass, or acute infarcts are  seen. No significant change.    HERMELINDO RIDDLE MD          9/22/2017  4:06 PM      Component Results     Component Value Ref Range & Units Status    HCG Qualitative Serum Negative NEG^Negative Final    This test is for screening purposes.  Results should be interpreted along with   the clinical picture.  Confirmation testing is available if warranted by   ordering [id number], HCG Quantitative Pregnancy.           9/22/2017  6:55 PM      Component Results     Component Value Ref Range & Units Status    Glucose CSF 55 40 - 70 mg/dL Final    CSF glucose concentrations are about 60 percent of normal plasma glucose.         9/22/2017  6:55 PM      Component Results     Component Value Ref Range & Units Status    Protein Total CSF 30 15 - 60 mg/dL Final         9/22/2017  8:14 PM      Component Results     Component    Specimen Description    Cerebrospinal fluid    Special Requests    Direct smear only. Specimen not concentrated    Gram Stain    No organisms seen    Gram Stain    Gram stain result is preliminary and awaits review of Microbiology Staff.         9/22/2017  6:23 PM         9/22/2017  7:40 PM      Component Results     Component Value Ref Range & Units Status    WBC CSF 0 0 - 5 /uL Final    RBC CSF 0 0 - 2 /uL Final    Tube Number 4 # Final    Volume 2 mL Final    Color CSF Colorless CLRL^Colorless Final    Appearance CSF Clear CLER^Clear Final         9/22/2017  6:22 PM         9/22/2017  6:22 PM         9/22/2017  8:31 PM                Thank you for choosing [facility]       Thank you for choosing [facility] for your care. Our goal is always to provide you with excellent care. Hearing back from our patients is one way we can continue to improve our services. Please take a few minutes to complete the written survey that you may receive in the mail after you visit with us. Thank you!        [employer] Information     [employer] gives you secure access to your electronic health record. If you see a primary care provider, you can also send messages to your care team and make  appointments. If you have questions, please call your primary care clinic.  If you do not have a primary care provider, please call [phone] and they will assist you.        Care EveryWhere ID     This is your Care EveryWhere ID. This could be used by other organizations to access your [facility] medical records  [id number]        Equal Access to Services     SKY BLANCHARD : [place], jelly garcia, celia wells. So [facility] [phone].    ATENCIÓN: Si habla español, tiene a reyez disposición servicios gratuitos de asistencia lingüística. Llame al [phone].    We comply with applicable federal civil rights laws and [place] laws. We do not discriminate on the basis of race, color, national origin, age, disability sex, sexual orientation or gender identity.            After Visit Summary       This is your record. Keep this with you and show to your community pharmacist(s) and doctor(s) at your next visit.

## 2017-09-22 NOTE — ANESTHESIA PREPROCEDURE EVALUATION
Anesthesia Evaluation     . Pt has had prior anesthetic. Type: Regional and MAC    No history of anesthetic complications          ROS/MED HX    ENT/Pulmonary:     (+)tobacco use, Current use asthma , . .    Neurologic: Comment: Current headache, stiff neck, nausea      Cardiovascular:         METS/Exercise Tolerance:     Hematologic:         Musculoskeletal:         GI/Hepatic:         Renal/Genitourinary:         Endo:         Psychiatric:         Infectious Disease:         Malignancy:         Other:                                    Anesthesia Plan  Procedure only, no anesthetic delivered    History & Physical Review  History and physical reviewed and following examination; no interval change.    ASA Status:  .        Plan for Spinal          Postoperative Care      Consents  Anesthetic plan, risks, benefits and alternatives discussed with:  Patient..                          .

## 2017-09-22 NOTE — TELEPHONE ENCOUNTER
Reason for Call:  Other Headache    Detailed comments: Pt calling again with the same symptoms. She said her headache is getting worse.    Phone Number Patient can be reached at: Home number on file [phone] (home)    Best Time: any    Can we leave a detailed message on this number? YES    Call taken on 9/22/2017 at 1:21 PM by Charlene Mcallister

## 2017-09-23 ENCOUNTER — HOSPITAL ENCOUNTER (OUTPATIENT)
Facility: CLINIC | Age: 32
Setting detail: OBSERVATION
Discharge: HOME | End: 2017-09-24
Attending: EMERGENCY MEDICINE | Admitting: INTERNAL MEDICINE
Payer: COMMERCIAL

## 2017-09-23 ENCOUNTER — APPOINTMENT (OUTPATIENT)
Dept: CT IMAGING | Facility: CLINIC | Age: 32
End: 2017-09-23
Attending: EMERGENCY MEDICINE
Payer: COMMERCIAL

## 2017-09-23 DIAGNOSIS — N30.00 ACUTE CYSTITIS WITHOUT HEMATURIA: Primary | ICD-10-CM

## 2017-09-23 DIAGNOSIS — G95.89 OTHER SPECIFIED DISEASES OF SPINAL CORD (H): ICD-10-CM

## 2017-09-23 DIAGNOSIS — R51.9 ACUTE INTRACTABLE HEADACHE, UNSPECIFIED HEADACHE TYPE: ICD-10-CM

## 2017-09-23 DIAGNOSIS — M54.2 POSTERIOR NECK PAIN: ICD-10-CM

## 2017-09-23 DIAGNOSIS — M40.292 OTHER KYPHOSIS, CERVICAL REGION: ICD-10-CM

## 2017-09-23 LAB
ALBUMIN SERPL-MCNC: 3.3 G/DL (ref 3.4–5)
ALP SERPL-CCNC: 51 U/L (ref 40–150)
ALT SERPL W P-5'-P-CCNC: 22 U/L (ref 0–50)
ANION GAP SERPL CALCULATED.3IONS-SCNC: 5 MMOL/L (ref 3–14)
AST SERPL W P-5'-P-CCNC: 8 U/L (ref 0–45)
BACTERIA SPEC CULT: ABNORMAL
BACTERIA SPEC CULT: ABNORMAL
BASOPHILS # BLD AUTO: 0 10E9/L (ref 0–0.2)
BASOPHILS NFR BLD AUTO: 0.3 %
BILIRUB SERPL-MCNC: 0.2 MG/DL (ref 0.2–1.3)
BUN SERPL-MCNC: 16 MG/DL (ref 7–30)
CALCIUM SERPL-MCNC: 8.4 MG/DL (ref 8.5–10.1)
CHLORIDE SERPL-SCNC: 110 MMOL/L (ref 94–109)
CO2 SERPL-SCNC: 28 MMOL/L (ref 20–32)
CREAT SERPL-MCNC: 0.87 MG/DL (ref 0.52–1.04)
CRP SERPL-MCNC: <2.9 MG/L (ref 0–8)
DIFFERENTIAL METHOD BLD: NORMAL
EOSINOPHIL # BLD AUTO: 0.2 10E9/L (ref 0–0.7)
EOSINOPHIL NFR BLD AUTO: 1.8 %
ERYTHROCYTE [DISTWIDTH] IN BLOOD BY AUTOMATED COUNT: 12.1 % (ref 10–15)
ERYTHROCYTE [SEDIMENTATION RATE] IN BLOOD BY WESTERGREN METHOD: 6 MM/H (ref 0–20)
GFR SERPL CREATININE-BSD FRML MDRD: 75 ML/MIN/1.7M2
GLUCOSE SERPL-MCNC: 72 MG/DL (ref 70–99)
HCT VFR BLD AUTO: 39.5 % (ref 35–47)
HGB BLD-MCNC: 13.2 G/DL (ref 11.7–15.7)
HSV1 DNA CSF QL NAA+PROBE: NOT DETECTED
HSV2 DNA CSF QL NAA+PROBE: NOT DETECTED
IMM GRANULOCYTES # BLD: 0 10E9/L (ref 0–0.4)
IMM GRANULOCYTES NFR BLD: 0.2 %
LYMPHOCYTES # BLD AUTO: 3.6 10E9/L (ref 0.8–5.3)
LYMPHOCYTES NFR BLD AUTO: 40.8 %
Lab: ABNORMAL
MCH RBC QN AUTO: 31.2 PG (ref 26.5–33)
MCHC RBC AUTO-ENTMCNC: 33.4 G/DL (ref 31.5–36.5)
MCV RBC AUTO: 93 FL (ref 78–100)
MICROBIOLOGIST REVIEW: NORMAL
MONOCYTES # BLD AUTO: 0.5 10E9/L (ref 0–1.3)
MONOCYTES NFR BLD AUTO: 5.6 %
NEUTROPHILS # BLD AUTO: 4.6 10E9/L (ref 1.6–8.3)
NEUTROPHILS NFR BLD AUTO: 51.3 %
PLATELET # BLD AUTO: 326 10E9/L (ref 150–450)
POTASSIUM SERPL-SCNC: 3.3 MMOL/L (ref 3.4–5.3)
PROT SERPL-MCNC: 6.2 G/DL (ref 6.8–8.8)
RBC # BLD AUTO: 4.23 10E12/L (ref 3.8–5.2)
SODIUM SERPL-SCNC: 143 MMOL/L (ref 133–144)
SPECIMEN SOURCE: ABNORMAL
WBC # BLD AUTO: 8.9 10E9/L (ref 4–11)

## 2017-09-23 PROCEDURE — 84484 ASSAY OF TROPONIN QUANT: CPT | Performed by: PHYSICIAN ASSISTANT

## 2017-09-23 PROCEDURE — 86140 C-REACTIVE PROTEIN: CPT | Performed by: EMERGENCY MEDICINE

## 2017-09-23 PROCEDURE — 96375 TX/PRO/DX INJ NEW DRUG ADDON: CPT

## 2017-09-23 PROCEDURE — 72125 CT NECK SPINE W/O DYE: CPT

## 2017-09-23 PROCEDURE — 85652 RBC SED RATE AUTOMATED: CPT | Performed by: EMERGENCY MEDICINE

## 2017-09-23 PROCEDURE — 80053 COMPREHEN METABOLIC PANEL: CPT | Performed by: EMERGENCY MEDICINE

## 2017-09-23 PROCEDURE — 96374 THER/PROPH/DIAG INJ IV PUSH: CPT

## 2017-09-23 PROCEDURE — 99285 EMERGENCY DEPT VISIT HI MDM: CPT | Performed by: EMERGENCY MEDICINE

## 2017-09-23 PROCEDURE — 86618 LYME DISEASE ANTIBODY: CPT | Performed by: EMERGENCY MEDICINE

## 2017-09-23 PROCEDURE — 96361 HYDRATE IV INFUSION ADD-ON: CPT

## 2017-09-23 PROCEDURE — 85025 COMPLETE CBC W/AUTO DIFF WBC: CPT | Performed by: EMERGENCY MEDICINE

## 2017-09-23 PROCEDURE — 99285 EMERGENCY DEPT VISIT HI MDM: CPT | Mod: 25

## 2017-09-23 PROCEDURE — 25000128 H RX IP 250 OP 636: Performed by: EMERGENCY MEDICINE

## 2017-09-23 PROCEDURE — G0378 HOSPITAL OBSERVATION PER HR: HCPCS

## 2017-09-23 RX ORDER — HYDROMORPHONE HYDROCHLORIDE 1 MG/ML
0.5 INJECTION, SOLUTION INTRAMUSCULAR; INTRAVENOUS; SUBCUTANEOUS EVERY 30 MIN PRN
Status: DISCONTINUED | OUTPATIENT
Start: 2017-09-23 | End: 2017-09-24

## 2017-09-23 RX ORDER — DIPHENHYDRAMINE HYDROCHLORIDE 50 MG/ML
25 INJECTION INTRAMUSCULAR; INTRAVENOUS ONCE
Status: COMPLETED | OUTPATIENT
Start: 2017-09-23 | End: 2017-09-23

## 2017-09-23 RX ORDER — KETOROLAC TROMETHAMINE 30 MG/ML
15 INJECTION, SOLUTION INTRAMUSCULAR; INTRAVENOUS ONCE
Status: COMPLETED | OUTPATIENT
Start: 2017-09-23 | End: 2017-09-23

## 2017-09-23 RX ORDER — SODIUM CHLORIDE 9 MG/ML
1000 INJECTION, SOLUTION INTRAVENOUS CONTINUOUS
Status: DISCONTINUED | OUTPATIENT
Start: 2017-09-23 | End: 2017-09-24 | Stop reason: HOSPADM

## 2017-09-23 RX ORDER — ONDANSETRON 4 MG/1
4 TABLET, ORALLY DISINTEGRATING ORAL EVERY 6 HOURS PRN
Status: DISCONTINUED | OUTPATIENT
Start: 2017-09-23 | End: 2017-09-24 | Stop reason: HOSPADM

## 2017-09-23 RX ORDER — ACETAMINOPHEN 325 MG/1
650 TABLET ORAL EVERY 4 HOURS PRN
Status: DISCONTINUED | OUTPATIENT
Start: 2017-09-23 | End: 2017-09-24 | Stop reason: HOSPADM

## 2017-09-23 RX ORDER — HYDROMORPHONE HYDROCHLORIDE 1 MG/ML
0.5 INJECTION, SOLUTION INTRAMUSCULAR; INTRAVENOUS; SUBCUTANEOUS
Status: DISCONTINUED | OUTPATIENT
Start: 2017-09-23 | End: 2017-09-24

## 2017-09-23 RX ORDER — ONDANSETRON 2 MG/ML
4 INJECTION INTRAMUSCULAR; INTRAVENOUS EVERY 6 HOURS PRN
Status: DISCONTINUED | OUTPATIENT
Start: 2017-09-23 | End: 2017-09-24 | Stop reason: HOSPADM

## 2017-09-23 RX ORDER — NALOXONE HYDROCHLORIDE 0.4 MG/ML
.1-.4 INJECTION, SOLUTION INTRAMUSCULAR; INTRAVENOUS; SUBCUTANEOUS
Status: DISCONTINUED | OUTPATIENT
Start: 2017-09-23 | End: 2017-09-24 | Stop reason: HOSPADM

## 2017-09-23 RX ADMIN — KETOROLAC TROMETHAMINE 15 MG: 30 INJECTION, SOLUTION INTRAMUSCULAR at 20:19

## 2017-09-23 RX ADMIN — SODIUM CHLORIDE 1000 ML: 9 INJECTION, SOLUTION INTRAVENOUS at 23:58

## 2017-09-23 RX ADMIN — HYDROMORPHONE HYDROCHLORIDE 0.5 MG: 1 INJECTION, SOLUTION INTRAMUSCULAR; INTRAVENOUS; SUBCUTANEOUS at 23:57

## 2017-09-23 RX ADMIN — SODIUM CHLORIDE 1000 ML: 9 INJECTION, SOLUTION INTRAVENOUS at 21:42

## 2017-09-23 RX ADMIN — HYDROMORPHONE HYDROCHLORIDE 0.5 MG: 1 INJECTION, SOLUTION INTRAMUSCULAR; INTRAVENOUS; SUBCUTANEOUS at 21:50

## 2017-09-23 RX ADMIN — SODIUM CHLORIDE 1000 ML: 9 INJECTION, SOLUTION INTRAVENOUS at 20:19

## 2017-09-23 RX ADMIN — DIPHENHYDRAMINE HYDROCHLORIDE 25 MG: 50 INJECTION, SOLUTION INTRAMUSCULAR; INTRAVENOUS at 20:20

## 2017-09-23 ASSESSMENT — ENCOUNTER SYMPTOMS
CONSTITUTIONAL NEGATIVE: 1
CARDIOVASCULAR NEGATIVE: 1
EYES NEGATIVE: 1
HEADACHES: 1
HEMATOLOGIC/LYMPHATIC NEGATIVE: 1
PSYCHIATRIC NEGATIVE: 1
ALLERGIC/IMMUNOLOGIC NEGATIVE: 1
ENDOCRINE NEGATIVE: 1
RESPIRATORY NEGATIVE: 1
NECK PAIN: 1
GASTROINTESTINAL NEGATIVE: 1

## 2017-09-23 NOTE — IP AVS SNAPSHOT
MRN:[id number]                      After Visit Summary   9/23/2017    Ángela Zurita    MRN: [id number]           Thank you!     Thank you for choosing [facility] for your care. Our goal is always to provide you with excellent care. Hearing back from our patients is one way we can continue to improve our services. Please take a few minutes to complete the written survey that you may receive in the mail after you visit with us. Thank you!        Patient Information     Date Of Birth          1985        Designated Caregiver       Most Recent Value    Caregiver    Will someone help with your care after discharge? no      About your hospital stay     You were admitted on:  September 23, 2017 You last received care in the:  [facility]    You were discharged on:  September 24, 2017       Who to Call     For medical emergencies, please call 911.  For non-urgent questions about your medical care, please call your primary care provider or clinic, [phone]          Attending Provider     Provider Specialty    Brent Ardon MD Emergency Medicine    Jennifer Tobias MD [facility], Ramu Noel MD Internal Medicine       Primary Care Provider Office Phone # Fax #    Randy Graf -[phone]      After Care Instructions     Activity       Your activity upon discharge: activity as tolerated            Diet       Follow this diet upon discharge: Regular Diet Adult                  Follow-up Appointments     Follow-up and recommended labs and tests        Follow up with primary care provider, Randy Graf, within 7 days for hospital follow- up.  No follow up labs or test are needed.                  Further instructions from your care team       Follow-up and recommended labs and tests    Follow up with primary care provider, Randy Graf, within 7 days for hospital follow- up.  No follow up labs or test are needed.  "     Activity   Your activity upon discharge: activity as tolerated      Diet   Follow this diet upon discharge: Regular Diet Adult         Pending Results     Date and Time Order Name Status Description    9/24/2017 0728 EKG 12-LEAD, TRACING ONLY In process     9/23/2017 2014 Lyme Disease Mariah with reflex to WB Serum In process     9/22/2017 1821 Blood culture (one site) Preliminary     9/22/2017 1658 Lyme IgG and IgM CSF Immunoblot: Tube 3 In process     9/22/2017 1658 CSF Culture Aerobic Bacterial Preliminary             Admission Information     Date & Time Provider Department Dept. Phone    9/23/2017 Ramu Naylor MD [facility] Surgical [phone]      Your Vitals Were     Blood Pressure Pulse Temperature Respirations Height Weight    114/60 (BP Location: Right arm) 67 97.2  F (36.2  C) (Oral) 16 1.651 m (5' 5\") 67.1 kg (148 lb)    Pulse Oximetry BMI (Body Mass Index)                99% 24.63 kg/m2          [employer] Information     [employer] gives you secure access to your electronic health record. If you see a primary care provider, you can also send messages to your care team and make appointments. If you have questions, please call your primary care clinic.  If you do not have a primary care provider, please call [phone] and they will assist you.        Care EveryWhere ID     This is your Care EveryWhere ID. This could be used by other organizations to access your [facility] medical records  [id number]        Equal Access to Services     SKY BLANCHARD : Hadii jordan manriquezo Sodidierali, waaxda luqadaha, qaybta kaalmada adeegyada, celia elizabeth. So [facility] [phone].    ATENCIÓN: Si habla español, tiene a reyez disposición servicios gratuitos de asistencia lingüística. Llame al [phone].    We comply with applicable federal civil rights laws and [place] laws. We do not discriminate on the basis of race, color, national origin, age, disability sex, sexual " orientation or gender identity.               Review of your medicines      START taking        Dose / Directions    amoxicillin 500 MG capsule   Commonly known as:  AMOXIL   Used for:  Acute cystitis without hematuria        Dose:  500 mg   Take 1 capsule (500 mg) by mouth 3 times daily for 5 days   Quantity:  15 capsule   Refills:  0       ibuprofen 400 MG tablet   Commonly known as:  ADVIL/MOTRIN   Notes to Patient:  You had a dose of IV toradol in place of ibuprofen at 2:50 PM        Dose:  400-800 mg   Take 1-2 tablets (400-800 mg) by mouth every 6 hours as needed for moderate pain   Refills:  0         CONTINUE these medicines which have NOT CHANGED        Dose / Directions    * amphetamine-dextroamphetamine 10 MG per tablet   Commonly known as:  ADDERALL   Used for:  Attention deficit hyperactivity disorder (ADHD), predominantly inattentive type   Notes to Patient:  Not given, resume        Take 2 pills (20mg) orally in the morning and two pills (20mg) at 11am and one pill (10mg) at 2pm   Quantity:  150 tablet   Refills:  0       * amphetamine-dextroamphetamine 10 MG per tablet   Commonly known as:  ADDERALL   Used for:  Attention deficit hyperactivity disorder (ADHD), predominantly inattentive type   Notes to Patient:  DUPLICATE        Start taking on:  9/29/2017   Take 2 pills (20mg) orally in the morning and two pills (20mg) at 11am and one pill (10mg) at 2pm   Quantity:  150 tablet   Refills:  0       * amphetamine-dextroamphetamine 10 MG per tablet   Commonly known as:  ADDERALL   Used for:  Attention deficit hyperactivity disorder (ADHD), predominantly inattentive type   Notes to Patient:  DUPLICATE        Start taking on:  10/30/2017   Take 2 pills (20mg) orally in the morning and two pills (20mg) at 11am and one pill (10mg) at 3pm   Quantity:  150 tablet   Refills:  0       clindamycin 1 % topical gel   Commonly known as:  CLINDAMAX   Used for:  Acne vulgaris   Notes to Patient:  Not given, resume         Apply topically 2 times daily   Quantity:  60 g   Refills:  11       * Notice:  This list has 3 medication(s) that are the same as other medications prescribed for you. Read the directions carefully, and ask your doctor or other care provider to review them with you.      STOP taking     ciprofloxacin 500 MG tablet   Commonly known as:  CIPRO           cyclobenzaprine 10 MG tablet   Commonly known as:  FLEXERIL           oxyCODONE-acetaminophen 5-325 MG per tablet   Commonly known as:  PERCOCET                Where to get your medicines      These medications were sent to [facility] [place], [place] - [address]  [address] [address]     Phone:  [phone]     amoxicillin 500 MG capsule         Some of these will need a paper prescription and others can be bought over the counter. Ask your nurse if you have questions.     You don't need a prescription for these medications     ibuprofen 400 MG tablet               ANTIBIOTIC INSTRUCTION     You've Been Prescribed an Antibiotic - Now What?  Your healthcare team thinks that you or your loved one might have an infection. Some infections can be treated with antibiotics, which are powerful, life-saving drugs. Like all medications, antibiotics have side effects and should only be used when necessary. There are some important things you should know about your antibiotic treatment.      Your healthcare team may run tests before you start taking an antibiotic.    Your team may take samples (e.g., from your blood, urine or other areas) to run tests to look for bacteria. These test can be important to determine if you need an antibiotic at all and, if you do, which antibiotic will work best.      Within a few days, your healthcare team might change or even stop your antibiotic.    Your team may start you on an antibiotic while they are working to find out what is making you sick.    Your team might change your antibiotic because test  results show that a different antibiotic would be better to treat your infection.    In some cases, once your team has more information, they learn that you do not need an antibiotic at all. They may find out that you don't have an infection, or that the antibiotic you're taking won't work against your infection. For example, an infection caused by a virus can't be treated with antibiotics. Staying on an antibiotic when you don't need it is more likely to be harmful than helpful.      You may experience side effects from your antibiotic.    Like all medications, antibiotics have side effects. Some of these can be serious.    Let you healthcare team know if you have any known allergies when you are admitted to the hospital.    One significant side effect of nearly all antibiotics is the risk of severe and sometimes deadly diarrhea caused by Clostridium difficile (C. Difficile). This occurs when a person takes antibiotics because some good germs are destroyed. Antibiotic use allows C. diificile to take over, putting patients at high risk for this serious infection.    As a patient or caregiver, it is important to understand your or your loved one's antibiotic treatment. It is especially important for caregivers to speak up when patients can't speak for themselves. Here are some important questions to ask your healthcare team.    What infection is this antibiotic treating and how do you know I have that infection?    What side effects might occur from this antibiotic?    How long will I need to take this antibiotic?    Is it safe to take this antibiotic with other medications or supplements (e.g., vitamins) that I am taking?     Are there any special directions I need to know about taking this antibiotic? For example, should I take it with food?    How will I be monitored to know whether my infection is responding to the antibiotic?    What tests may help to make sure the right antibiotic is prescribed for  me?      Information provided by:  www.cdc.gov/getsmart  U.S. Department of Health and Human Services  Centers for disease Control and Prevention  National Center for Emerging and Zoonotic Infectious Diseases  Division of Healthcare Quality Promotion         Protect others around you: Learn how to safely use, store and throw away your medicines at www.disposemymeds.org.             Medication List: This is a list of all your medications and when to take them. Check marks below indicate your daily home schedule. Keep this list as a reference.      Medications           Morning Afternoon Evening Bedtime As Needed    amoxicillin 500 MG capsule   Commonly known as:  AMOXIL   Take 1 capsule (500 mg) by mouth 3 times daily for 5 days   Next Dose Due:  Take first dose tonight then 3 X's daily starting tomorrow morning                                         * amphetamine-dextroamphetamine 10 MG per tablet   Commonly known as:  ADDERALL   Take 2 pills (20mg) orally in the morning and two pills (20mg) at 11am and one pill (10mg) at 2pm   Notes to Patient:  Not given, resume                                      * amphetamine-dextroamphetamine 10 MG per tablet   Commonly known as:  ADDERALL   Take 2 pills (20mg) orally in the morning and two pills (20mg) at 11am and one pill (10mg) at 2pm   Start taking on:  9/29/2017   Notes to Patient:  DUPLICATE                                * amphetamine-dextroamphetamine 10 MG per tablet   Commonly known as:  ADDERALL   Take 2 pills (20mg) orally in the morning and two pills (20mg) at 11am and one pill (10mg) at 3pm   Start taking on:  10/30/2017   Notes to Patient:  DUPLICATE                                clindamycin 1 % topical gel   Commonly known as:  CLINDAMAX   Apply topically 2 times daily   Notes to Patient:  Not given, resume                                ibuprofen 400 MG tablet   Commonly known as:  ADVIL/MOTRIN   Take 1-2 tablets (400-800 mg) by mouth every 6 hours as needed  for moderate pain   Last time this was given:  400 mg on 9/24/2017 10:45 AM   Next Dose Due:  8:50 PM   Notes to Patient:  You had a dose of IV toradol in place of ibuprofen at 2:50 PM                                   * Notice:  This list has 3 medication(s) that are the same as other medications prescribed for you. Read the directions carefully, and ask your doctor or other care provider to review them with you.

## 2017-09-23 NOTE — ED NOTES
Pt states she is ready to go home. She is a smoker and hasn't had a cigarette in a while. MD notified

## 2017-09-23 NOTE — IP AVS SNAPSHOT
[facility]    [address] [address]    Phone:  [phone]    Fax:  [phone]                                       After Visit Summary   9/23/2017    Ángela Zurita    MRN: [id number]           After Visit Summary Signature Page     I have received my discharge instructions, and my questions have been answered. I have discussed any challenges I see with this plan with the nurse or doctor.    ..........................................................................................................................................  Patient/Patient Representative Signature      ..........................................................................................................................................  Patient Representative Print Name and Relationship to Patient    ..................................................               ................................................  Date                                            Time    ..........................................................................................................................................  Reviewed by Signature/Title    ...................................................              ..............................................  Date                                                            Time

## 2017-09-23 NOTE — DISCHARGE INSTRUCTIONS
"  Return if symptoms worsen or new symptoms develop.  Follow-up with primary care next week for recheck.  Take medications as directed.  We discussed possible MRI MRA of the head and neck but he wanted to hold off and see if your symptoms improved.  If worsening headache neck pain vomiting fevers or other symptoms present please return for recheck.  * Tension Headache    Muscle Tension Headache (also called \"stress headache\") is a very common cause of head pain. Under stress, some people tense the muscles of their shoulder, neck and scalp without knowing it. If this lasts long enough, a headache can occur. These headaches can be very painful and last for hours or even days.  Home Care:    If you were given pain medicine for this headache, do not drive yourself home. Arrange for a ride, instead. When you get home, try to sleep. You should feel much better when you wake up.    Heat to the back of your neck may relieve neck spasm.    Drink only clear liquids or eat a very light diet to avoid nausea/vomiting until symptoms improve.  Preventing Future Headaches    Identify the sources of stress in your life. These may not be obvious! Learn new ways to handle your stress, such as regular exercise, biofeedback, self-hypnosis and meditation. For more information about this, consult your doctor or go to a local bookstore and review the many books and tapes on this subject.    At the first sign of a tension headache, take time out if possible. Remove yourself from the stressful situation, find a quiet comfortable place to sit or lie down and let yourself relax. Heat and deep massage of the tight areas in the neck and shoulders may help reduce muscle spasm. Medicine, such as ibuprofen (Advil or Motrin) or a prescribed muscle relaxant may be helpful at this point.  Follow Up with your doctor if the headache is not better within the next 24 hours. If you have frequent headaches you should discuss a treatment plan with your " primary care doctor. Ask if you can have medicine to take at home the next time you get a bad headache. This may avoid the need for a visit to the emergency department in the future. Poorly controlled chronic headaches may require a referral to a neurologist (headache specialist).  Call your Doctor Or Get Prompt Medical Attention if any of the following occur:    Worsening of your head pain or no improvement within 24 hours    Repeated vomiting (unable to keep liquids down)    Fever of 100.4 F (38.0 C) or higher, or as directed by your healthcare provider    Stiff neck    Extreme drowsiness, confusion or fainting    Dizziness, vertigo (dizziness with spinning sensation)    Weakness of an arm or leg or one side of the face    Difficulty with speech or vision    0870-9062 [facility], [address] [place] [address], [place] [address]. All rights reserved. This information is not intended as a substitute for professional medical care. Always follow your healthcare professional's instructions.          Neck Pain    There are several possible causes of neck pain when there is no injury:    You can get a minor ligament sprain or muscle strain from a sudden minor neck movement. Sleeping with your neck in an awkward position can also cause this.    Some people respond to emotional stress by tensing the muscles of their neck, shoulders, and upper back. Chronic spasm in these muscles can cause neck pain and sometimes headaches.    Gradual wear and tear of the joints in the spine can cause degenerative arthritis. This can be a source of occasional or chronic neck pain.    The spinal disks may bulge and put pressure on a nearby spinal nerve. This can happen as a natural result of aging or repeated small injuries to the neck. The spinal disks are the cushions between each spinal bone. This causes tingling, pain, or numbness that spreads from the neck to the shoulder, arm, or hand on one side.  Acute neck pain usually gets better in  1 to 2 weeks. Neck pain related to disk disease, arthritis in the spinal joints, or spinal stenosis can become chronic and last for months or years. Spinal stenosis is narrowing of the spinal canal.  X-rays are usually not ordered for the initial evaluation of neck pain. However, X-rays may be done if you had a forceful physical injury, such as a car accident or fall. If pain continues and doesn t respond to medical treatment, X-rays and other tests may be done at a later time.  Home care    Rest and relax the muscles. Use a comfortable pillow that supports the head. It should also help keep the spine in a neutral position. The position of the head should not be tilted forward or backward. A rolled up towel may help for a custom fit.    Some people find relief with heat. Heat can be applied with either a warm shower or bath or a moist towel heated in the microwave and massage. Others prefer cold packs. You can make an ice pack by filling a plastic bag that seals at the top with ice cubes or crushed ice and then wrapping it with a thin towel. Try both and use the method that feels best for 15 to 20 minutes, several times a day.    Whether using ice or heat, be careful that you do not injure your skin. Never put ice directly on the skin. Always wrap the ice in a towel or other type of cloth.This is very important, especially in people with poor skin sensations.     Try to reduce your stress level. Emotional stress can lead to neck muscle tension and get in the way of or delay the healing process.    You may use over-the-counter pain medicine to control pain, unless another medicine was prescribed. If you have chronic liver or kidney disease or ever had a stomach ulcer or GI bleeding, talk with your healthcare provider before using these medicines.  Follow-up care  Follow up with your healthcare provider if your symptoms do not show signs of improvement after one week. Physical therapy or further tests may be  needed.  If X-rays, CT scans, or MRI scans were taken, you will be told of any new findings that may affect your care.  Call 911  Call 911 if you have:    Sudden weakness or numbness in one or both arms    Neck swelling, difficulty or painful swallowing    Difficulty breathing    Chest pain  When to seek medical advice  Call your healthcare provider right away if any of these occur:    Pain becomes worse or spreads into one or both arm    Increasing headache    Fever of 100.4 F (38 C) or above lasting for 24 to 48 hours  Date Last Reviewed: 7/1/2016 2000-2017 [employer]. [address] [place] [id number]. All rights reserved. This information is not intended as a substitute for professional medical care. Always follow your healthcare professional's instructions.          Neck Pain    There are several possible causes of neck pain when there is no injury:    You can get a minor ligament sprain or muscle strain from a sudden minor neck movement. Sleeping with your neck in an awkward position can also cause this.    Some people respond to emotional stress by tensing the muscles of their neck, shoulders, and upper back. Chronic spasm in these muscles can cause neck pain and sometimes headaches.    Gradual wear and tear of the joints in the spine can cause degenerative arthritis. This can be a source of occasional or chronic neck pain.    The spinal disks may bulge and put pressure on a nearby spinal nerve. This can happen as a natural result of aging or repeated small injuries to the neck. The spinal disks are the cushions between each spinal bone. This causes tingling, pain, or numbness that spreads from the neck to the shoulder, arm, or hand on one side.  Acute neck pain usually gets better in 1 to 2 weeks. Neck pain related to disk disease, arthritis in the spinal joints, or spinal stenosis can become chronic and last for months or years. Spinal stenosis is narrowing of the spinal  canal.  X-rays are usually not ordered for the initial evaluation of neck pain. However, X-rays may be done if you had a forceful physical injury, such as a car accident or fall. If pain continues and doesn t respond to medical treatment, X-rays and other tests may be done at a later time.  Home care    Rest and relax the muscles. Use a comfortable pillow that supports the head. It should also help keep the spine in a neutral position. The position of the head should not be tilted forward or backward. A rolled up towel may help for a custom fit.    Some people find relief with heat. Heat can be applied with either a warm shower or bath or a moist towel heated in the microwave and massage. Others prefer cold packs. You can make an ice pack by filling a plastic bag that seals at the top with ice cubes or crushed ice and then wrapping it with a thin towel. Try both and use the method that feels best for 15 to 20 minutes, several times a day.    Whether using ice or heat, be careful that you do not injure your skin. Never put ice directly on the skin. Always wrap the ice in a towel or other type of cloth.This is very important, especially in people with poor skin sensations.     Try to reduce your stress level. Emotional stress can lead to neck muscle tension and get in the way of or delay the healing process.    You may use over-the-counter pain medicine to control pain, unless another medicine was prescribed. If you have chronic liver or kidney disease or ever had a stomach ulcer or GI bleeding, talk with your healthcare provider before using these medicines.  Follow-up care  Follow up with your healthcare provider if your symptoms do not show signs of improvement after one week. Physical therapy or further tests may be needed.  If X-rays, CT scans, or MRI scans were taken, you will be told of any new findings that may affect your care.  Call 911  Call 911 if you have:    Sudden weakness or numbness in one or both  "arms    Neck swelling, difficulty or painful swallowing    Difficulty breathing    Chest pain  When to seek medical advice  Call your healthcare provider right away if any of these occur:    Pain becomes worse or spreads into one or both arm    Increasing headache    Fever of 100.4 F (38 C) or above lasting for 24 to 48 hours  Date Last Reviewed: 7/1/2016 2000-2017 [employer]. [address] [place] [address]. All rights reserved. This information is not intended as a substitute for professional medical care. Always follow your healthcare professional's instructions.             * BLADDER INFECTION,Female (Adult)    A bladder infection (\"cystitis\" or \"UTI\") usually causes a constant urge to urinate and a burning when passing urine. Urine may be cloudy, smelly or dark. There may be pain in the lower abdomen. A bladder infection occurs when bacteria from the vaginal area enter the bladder opening (urethra). This can occur from sexual intercourse, wearing tight clothing, dehydration and other factors.  HOME CARE:  1. Drink lots of fluids (at least 6-8 glasses a day, unless you must restrict fluids for other medical reasons). This will force the medicine into your urinary system and flush the bacteria out of your body. Cranberry juice has been shown to help clear out the bacteria.  2. Avoid sexual intercourse until your symptoms are gone.  3. A bladder infection is treated with antibiotics. You may also be given Pyridium (generic = phenazopyridine) to reduce the burning sensation. This medicine will cause your urine to become a bright orange color. The orange urine may stain clothing. You may wear a pad or panty-liner to protect clothing.  PREVENTING FUTURE INFECTIONS:  1. Always wipe from front to back after a bowel movement.  2. Keep the genital area clean and dry.  3. Drink plenty of fluids each day to avoid dehydration.  4. Urinate right after intercourse to flush out the bladder.  5. Wear " cotton underwear and cotton-lined panty hose; avoid tight-fitting pants.  6. If you are on birth control pills and are having frequent bladder infections, discuss with your doctor.  FOLLOW UP: Return to this facility or see your doctor if ALL symptoms are not gone after three days of treatment.  GET PROMPT MEDICAL ATTENTION if any of the following occur:    Fever over 101 F (38.3 C)    No improvement by the third day of treatment    Increasing back or abdominal pain    Repeated vomiting; unable to keep medicine down    Weakness, dizziness or fainting    Vaginal discharge    Pain, redness or swelling in the labia (outer vaginal area)    [id number] [employer], [address] [place] [address], [place] [address]. All rights reserved. This information is not intended as a substitute for professional medical care. Always follow your healthcare professional's instructions.

## 2017-09-24 ENCOUNTER — APPOINTMENT (OUTPATIENT)
Dept: MRI IMAGING | Facility: CLINIC | Age: 32
End: 2017-09-24
Attending: INTERNAL MEDICINE
Payer: COMMERCIAL

## 2017-09-24 VITALS
HEART RATE: 67 BPM | SYSTOLIC BLOOD PRESSURE: 114 MMHG | RESPIRATION RATE: 16 BRPM | BODY MASS INDEX: 24.66 KG/M2 | WEIGHT: 148 LBS | OXYGEN SATURATION: 99 % | TEMPERATURE: 97.2 F | HEIGHT: 65 IN | DIASTOLIC BLOOD PRESSURE: 60 MMHG

## 2017-09-24 LAB
ALBUMIN UR-MCNC: NEGATIVE MG/DL
APPEARANCE UR: CLEAR
BILIRUB UR QL STRIP: NEGATIVE
COLOR UR AUTO: NORMAL
GLUCOSE UR STRIP-MCNC: NEGATIVE MG/DL
HGB UR QL STRIP: NEGATIVE
KETONES UR STRIP-MCNC: NEGATIVE MG/DL
LEUKOCYTE ESTERASE UR QL STRIP: NEGATIVE
NITRATE UR QL: NEGATIVE
PH UR STRIP: 7 PH (ref 5–7)
RBC #/AREA URNS AUTO: <1 /HPF (ref 0–2)
SOURCE: NORMAL
SP GR UR STRIP: 1 (ref 1–1.03)
SQUAMOUS #/AREA URNS AUTO: <1 /HPF (ref 0–1)
TROPONIN I SERPL-MCNC: <0.015 UG/L (ref 0–0.04)
UROBILINOGEN UR STRIP-MCNC: NORMAL MG/DL (ref 0–2)
WBC #/AREA URNS AUTO: <1 /HPF (ref 0–2)

## 2017-09-24 PROCEDURE — 25000128 H RX IP 250 OP 636: Performed by: EMERGENCY MEDICINE

## 2017-09-24 PROCEDURE — 25000128 H RX IP 250 OP 636: Performed by: INTERNAL MEDICINE

## 2017-09-24 PROCEDURE — 93005 ELECTROCARDIOGRAM TRACING: CPT

## 2017-09-24 PROCEDURE — 96376 TX/PRO/DX INJ SAME DRUG ADON: CPT

## 2017-09-24 PROCEDURE — 99236 HOSP IP/OBS SAME DATE HI 85: CPT | Performed by: INTERNAL MEDICINE

## 2017-09-24 PROCEDURE — 25000132 ZZH RX MED GY IP 250 OP 250 PS 637: Performed by: EMERGENCY MEDICINE

## 2017-09-24 PROCEDURE — 25000132 ZZH RX MED GY IP 250 OP 250 PS 637: Performed by: INTERNAL MEDICINE

## 2017-09-24 PROCEDURE — 25000132 ZZH RX MED GY IP 250 OP 250 PS 637: Performed by: PHYSICIAN ASSISTANT

## 2017-09-24 PROCEDURE — 72156 MRI NECK SPINE W/O & W/DYE: CPT

## 2017-09-24 PROCEDURE — A9585 GADOBUTROL INJECTION: HCPCS | Performed by: INTERNAL MEDICINE

## 2017-09-24 PROCEDURE — 96375 TX/PRO/DX INJ NEW DRUG ADDON: CPT

## 2017-09-24 PROCEDURE — 25000128 H RX IP 250 OP 636: Performed by: PHYSICIAN ASSISTANT

## 2017-09-24 PROCEDURE — G0378 HOSPITAL OBSERVATION PER HR: HCPCS

## 2017-09-24 PROCEDURE — 81001 URINALYSIS AUTO W/SCOPE: CPT | Performed by: INTERNAL MEDICINE

## 2017-09-24 RX ORDER — IBUPROFEN 400 MG/1
400 TABLET, FILM COATED ORAL EVERY 6 HOURS PRN
Status: DISCONTINUED | OUTPATIENT
Start: 2017-09-24 | End: 2017-09-24

## 2017-09-24 RX ORDER — OXYCODONE HYDROCHLORIDE 5 MG/1
5 TABLET ORAL EVERY 4 HOURS PRN
Status: DISCONTINUED | OUTPATIENT
Start: 2017-09-24 | End: 2017-09-24

## 2017-09-24 RX ORDER — AMPICILLIN 2 G/1
2 INJECTION, POWDER, FOR SOLUTION INTRAVENOUS EVERY 6 HOURS
Status: DISCONTINUED | OUTPATIENT
Start: 2017-09-24 | End: 2017-09-24 | Stop reason: HOSPADM

## 2017-09-24 RX ORDER — IBUPROFEN 400 MG/1
400-800 TABLET, FILM COATED ORAL EVERY 6 HOURS PRN
Status: DISCONTINUED | OUTPATIENT
Start: 2017-09-24 | End: 2017-09-24 | Stop reason: HOSPADM

## 2017-09-24 RX ORDER — HYDROMORPHONE HYDROCHLORIDE 1 MG/ML
.3-.5 INJECTION, SOLUTION INTRAMUSCULAR; INTRAVENOUS; SUBCUTANEOUS
Status: COMPLETED | OUTPATIENT
Start: 2017-09-24 | End: 2017-09-24

## 2017-09-24 RX ORDER — KETOROLAC TROMETHAMINE 30 MG/ML
30 INJECTION, SOLUTION INTRAMUSCULAR; INTRAVENOUS ONCE
Status: COMPLETED | OUTPATIENT
Start: 2017-09-24 | End: 2017-09-24

## 2017-09-24 RX ORDER — OXYCODONE HYDROCHLORIDE 5 MG/1
5-10 TABLET ORAL EVERY 4 HOURS PRN
Status: DISCONTINUED | OUTPATIENT
Start: 2017-09-24 | End: 2017-09-24 | Stop reason: HOSPADM

## 2017-09-24 RX ORDER — IBUPROFEN 400 MG/1
400-800 TABLET, FILM COATED ORAL EVERY 6 HOURS PRN
COMMUNITY
Start: 2017-09-24 | End: 2017-11-30

## 2017-09-24 RX ORDER — GADOBUTROL 604.72 MG/ML
10 INJECTION INTRAVENOUS ONCE
Status: COMPLETED | OUTPATIENT
Start: 2017-09-24 | End: 2017-09-24

## 2017-09-24 RX ORDER — AMOXICILLIN 500 MG/1
500 CAPSULE ORAL 3 TIMES DAILY
Qty: 15 CAPSULE | Refills: 0 | Status: SHIPPED | OUTPATIENT
Start: 2017-09-24 | End: 2017-09-29

## 2017-09-24 RX ADMIN — OXYCODONE HYDROCHLORIDE 5 MG: 5 TABLET ORAL at 08:17

## 2017-09-24 RX ADMIN — KETOROLAC TROMETHAMINE 30 MG: 30 INJECTION, SOLUTION INTRAMUSCULAR at 14:50

## 2017-09-24 RX ADMIN — HYDROMORPHONE HYDROCHLORIDE 0.5 MG: 1 INJECTION, SOLUTION INTRAMUSCULAR; INTRAVENOUS; SUBCUTANEOUS at 11:27

## 2017-09-24 RX ADMIN — OXYCODONE HYDROCHLORIDE 5 MG: 5 TABLET ORAL at 04:28

## 2017-09-24 RX ADMIN — ACETAMINOPHEN 650 MG: 325 TABLET, FILM COATED ORAL at 08:17

## 2017-09-24 RX ADMIN — SODIUM CHLORIDE 1000 ML: 9 INJECTION, SOLUTION INTRAVENOUS at 07:20

## 2017-09-24 RX ADMIN — GADOBUTROL 6 ML: 604.72 INJECTION INTRAVENOUS at 15:55

## 2017-09-24 RX ADMIN — OXYCODONE HYDROCHLORIDE 5 MG: 5 TABLET ORAL at 15:13

## 2017-09-24 RX ADMIN — ACETAMINOPHEN 650 MG: 325 TABLET, FILM COATED ORAL at 13:34

## 2017-09-24 RX ADMIN — ONDANSETRON 4 MG: 2 INJECTION INTRAMUSCULAR; INTRAVENOUS at 14:24

## 2017-09-24 RX ADMIN — OXYCODONE HYDROCHLORIDE 5 MG: 5 TABLET ORAL at 13:34

## 2017-09-24 RX ADMIN — HYDROMORPHONE HYDROCHLORIDE 0.5 MG: 1 INJECTION, SOLUTION INTRAMUSCULAR; INTRAVENOUS; SUBCUTANEOUS at 02:25

## 2017-09-24 RX ADMIN — IBUPROFEN 400 MG: 400 TABLET ORAL at 04:28

## 2017-09-24 RX ADMIN — IBUPROFEN 400 MG: 400 TABLET ORAL at 10:45

## 2017-09-24 RX ADMIN — HYDROMORPHONE HYDROCHLORIDE 0.5 MG: 1 INJECTION, SOLUTION INTRAMUSCULAR; INTRAVENOUS; SUBCUTANEOUS at 08:31

## 2017-09-24 RX ADMIN — AMPICILLIN SODIUM 2 G: 2 INJECTION, POWDER, FOR SOLUTION INTRAMUSCULAR; INTRAVENOUS at 13:27

## 2017-09-24 RX ADMIN — ACETAMINOPHEN 650 MG: 325 TABLET, FILM COATED ORAL at 17:56

## 2017-09-24 ASSESSMENT — ACTIVITIES OF DAILY LIVING (ADL)
AMBULATION: 0-->INDEPENDENT
BATHING: 0-->INDEPENDENT
RETIRED_EATING: 0-->INDEPENDENT
TOILETING: 0-->INDEPENDENT
COMMUNICATION: 0 - UNDERSTANDS/COMMUNICATES WITHOUT DIFFICULTY
TRANSFERRING: 0-->INDEPENDENT
DRESS: 0-->INDEPENDENT
BATHING: 0 - INDEPENDENT
COGNITION: 0 - NO COGNITION ISSUES REPORTED
EATING: 0 - INDEPENDENT
FALL_HISTORY_WITHIN_LAST_SIX_MONTHS: NO
SWALLOWING: 0 - SWALLOWS FOODS/LIQUIDS WITHOUT DIFFICULTY
AMBULATION: 0 - INDEPENDENT
RETIRED_COMMUNICATION: 0-->UNDERSTANDS/COMMUNICATES WITHOUT DIFFICULTY
TRANSFERRING: 0 - INDEPENDENT
SWALLOWING: 0-->SWALLOWS FOODS/LIQUIDS WITHOUT DIFFICULTY
TOILETING: 0 - INDEPENDENT
DRESS: 0 - INDEPENDENT

## 2017-09-24 NOTE — PLAN OF CARE
Problem: Pain, Acute (Adult)  Goal: Acceptable Pain Control/Comfort Level  Patient will demonstrate the desired outcomes by discharge/transition of care.   Outcome: No Change  Pt tearful reporting increase in headache and nausea with emesis. Zofran given. Will update MD regarding pain control.

## 2017-09-24 NOTE — ED PROVIDER NOTES
History     Chief Complaint   Patient presents with     Headache     was seen here last night and told to come back if not better. still having a NATHAN     HPI  Ángela Zurita is a 32 year old female who presents for concern for ongoing headache and neck discomfort.  Patient was seen in the emergency department yesterday.  She had an extensive workup including blood work, she also had CT imaging and had a lumbar puncture.  Lumbar puncture showed negative CSF analysis including negative HSV normal Gram stain negative culture negative CRP.  Blood cultures obtained and no growth on preliminary results.  UA showed moderate leukocyte esterase with 39 white blood cells.  She had CT imaging which is also unremarkable she was asked to return for reevaluation and assessment if she had ongoing symptoms.  Patient has a history of oral herpes, ADHD, and urgency-frequency syndrome.  Patient tells me since her ED visit last night she had taken a couple doses of Percocet which has not helped her pain.  She has posterior midline neck pain as well as headache.  She tells me no prior history of neck problems.  No neck injury or fall.  And no recent neck manipulation.  Prior to presenting to the D 24 hours earlier in September 22 she did develop neck pain and headache over the last 3 days.  [place]      Social history: Lives in [place], [place]. Here by private car with spouse. Non-smoker. Works in [employer].    Past medical history:  Patient Active Problem List   Diagnosis     Urgency-frequency syndrome     Fibroid uterus     Health Care Home     Rectovaginal fistula     CARDIOVASCULAR SCREENING; LDL GOAL LESS THAN 130     Oral herpes     Attention deficit hyperactivity disorder (ADHD), predominantly inattentive type     Medications:  Current Facility-Administered Medications   Medication     0.9% sodium chloride infusion     HYDROmorphone (PF) (DILAUDID) injection 0.5 mg  "    Current Outpatient Prescriptions   Medication     ciprofloxacin (CIPRO) 500 MG tablet     oxyCODONE-acetaminophen (PERCOCET) 5-325 MG per tablet     cyclobenzaprine (FLEXERIL) 10 MG tablet     [START ON 10/30/2017] amphetamine-dextroamphetamine (ADDERALL) 10 MG per tablet     [START ON 9/29/2017] amphetamine-dextroamphetamine (ADDERALL) 10 MG per tablet     amphetamine-dextroamphetamine (ADDERALL) 10 MG per tablet     clindamycin (CLINDAMAX) 1 % topical gel     Allergies:   No Known Allergies     I have reviewed the Medications, Allergies, Past Medical and Surgical History, and Social History in the Epic system.         Review of Systems   Constitutional: Negative.    HENT: Negative.    Eyes: Negative.    Respiratory: Negative.    Cardiovascular: Negative.    Gastrointestinal: Negative.    Endocrine: Negative.    Genitourinary: Negative.    Musculoskeletal: Positive for neck pain.   Skin: Negative.    Allergic/Immunologic: Negative.    Neurological: Positive for headaches.   Hematological: Negative.    Psychiatric/Behavioral: Negative.        Physical Exam   BP: 110/73  Pulse: 79  Temp: 97.9  F (36.6  C)  Resp: 16  Height: 165.1 cm (5' 5\")  Weight: 67.1 kg (148 lb)  SpO2: 99 %  Physical Exam   Constitutional: She is oriented to person, place, and time. She appears well-developed and well-nourished. No distress.   HENT:   Head: Normocephalic and atraumatic.   Eyes: Conjunctivae and EOM are normal. Pupils are equal, round, and reactive to light. Right eye exhibits no discharge. Left eye exhibits no discharge. No scleral icterus.   Neck: Neck supple. No JVD present. Spinous process tenderness and muscular tenderness present. Decreased range of motion present. No tracheal deviation, no edema and no erythema present.       Cardiovascular: Normal rate and regular rhythm.    Pulmonary/Chest: No stridor.   Lymphadenopathy:     She has no cervical adenopathy.   Neurological: She is alert and oriented to person, place, " and time.   Skin: She is not diaphoretic.   Psychiatric: She has a normal mood and affect. Her behavior is normal. Judgment and thought content normal.       ED Course     ED Course     Procedures             Critical Care time:  none                 ED medications:  Medications   0.9% sodium chloride infusion (1,000 mLs Intravenous New Bag 9/23/17 2142)   HYDROmorphone (PF) (DILAUDID) injection 0.5 mg (0.5 mg Intravenous Given 9/23/17 2150)   0.9% sodium chloride BOLUS (0 mLs Intravenous Stopped 9/23/17 2142)   ketorolac (TORADOL) injection 15 mg (15 mg Intravenous Given 9/23/17 2019)   diphenhydrAMINE (BENADRYL) injection 25 mg (25 mg Intravenous Given 9/23/17 2020)       ED labs and imaging:  Results for orders placed or performed during the hospital encounter of 09/23/17   Cervical spine CT w/o contrast    Narrative    CT CERVICAL SPINE WITHOUT CONTRAST   9/23/2017 9:10 PM     HISTORY: Headache, neck pain. Evaluate for acute bony process or  hematoma.     TECHNIQUE: Axial images of the cervical spine were obtained without  intravenous contrast. Multiplanar reformations were performed.  Radiation dose for this scan was reduced using automated exposure  control, adjustment of the mA and/or kV according to patient size, or  iterative reconstruction technique.     COMPARISON: None.    FINDINGS: Sagittal images demonstrate gentle lower cervical kyphosis,  otherwise normal posterior alignment. There is no evidence for  fracture. Disc spaces are within normal limits. There is no evidence  for any stenosis. Paraspinal soft tissues are unremarkable as  visualized. There is a small 0.6 cm area of sclerosis in the right  half of the C4 vertebral body.      Impression    IMPRESSION:  1. 0.6 cm area of sclerosis in the right half of the C4 vertebral  body. This most likely is benign. If the patient has nighttime bone  pain, an osteoid osteoma could give a similar appearance. If  indicated, a bone scan might be helpful in  further evaluation.  2. Mild gentle cervical kyphosis which may just be positional.  3. No evidence for fracture, hematoma, or stenosis.    GARLAND SOMERS MD   CBC with platelets differential   Result Value Ref Range    WBC 8.9 4.0 - 11.0 10e9/L    RBC Count 4.23 3.8 - 5.2 10e12/L    Hemoglobin 13.2 11.7 - 15.7 g/dL    Hematocrit 39.5 35.0 - 47.0 %    MCV 93 78 - 100 fl    MCH 31.2 26.5 - 33.0 pg    MCHC 33.4 31.5 - 36.5 g/dL    RDW 12.1 10.0 - 15.0 %    Platelet Count 326 150 - 450 10e9/L    Diff Method Automated Method     % Neutrophils 51.3 %    % Lymphocytes 40.8 %    % Monocytes 5.6 %    % Eosinophils 1.8 %    % Basophils 0.3 %    % Immature Granulocytes 0.2 %    Absolute Neutrophil 4.6 1.6 - 8.3 10e9/L    Absolute Lymphocytes 3.6 0.8 - 5.3 10e9/L    Absolute Monocytes 0.5 0.0 - 1.3 10e9/L    Absolute Eosinophils 0.2 0.0 - 0.7 10e9/L    Absolute Basophils 0.0 0.0 - 0.2 10e9/L    Abs Immature Granulocytes 0.0 0 - 0.4 10e9/L   Comprehensive metabolic panel   Result Value Ref Range    Sodium 143 133 - 144 mmol/L    Potassium 3.3 (L) 3.4 - 5.3 mmol/L    Chloride 110 (H) 94 - 109 mmol/L    Carbon Dioxide 28 20 - 32 mmol/L    Anion Gap 5 3 - 14 mmol/L    Glucose 72 70 - 99 mg/dL    Urea Nitrogen 16 7 - 30 mg/dL    Creatinine 0.87 0.52 - 1.04 mg/dL    GFR Estimate 75 >60 mL/min/1.7m2    GFR Estimate If Black >90 >60 mL/min/1.7m2    Calcium 8.4 (L) 8.5 - 10.1 mg/dL    Bilirubin Total 0.2 0.2 - 1.3 mg/dL    Albumin 3.3 (L) 3.4 - 5.0 g/dL    Protein Total 6.2 (L) 6.8 - 8.8 g/dL    Alkaline Phosphatase 51 40 - 150 U/L    ALT 22 0 - 50 U/L    AST 8 0 - 45 U/L   Erythrocyte sedimentation rate auto   Result Value Ref Range    Sed Rate 6 0 - 20 mm/h   CRP Inflammation   Result Value Ref Range    CRP Inflammation <2.9 0.0 - 8.0 mg/L     ED Vitals:  Vitals:    09/23/17 2030 09/23/17 2045 09/23/17 2140 09/23/17 2200   BP: 113/65 110/62 110/60    Pulse:       Resp:   18    Temp:       TempSrc:       SpO2: 97% 96% 96% 96%   Weight:        Height:         Prior diagnostic imaging and work-up:  CT HEAD W/O CONTRAST   9/22/2017 3:43 PM      HISTORY: headache and neck pain     TECHNIQUE:  Axial images of the head without IV contrast material.  Radiation dose for this scan was reduced using automated exposure  control, adjustment of the mA and/or kV according to patient size, or  iterative reconstruction technique.     COMPARISON: CT dated 1/15 T9/09     FINDINGS: The ventricles are normal in size, shape and configuration.  The brain parenchyma and subarachnoid spaces are normal. There is no  evidence of intracranial hemorrhage, mass, acute infarct or anomaly.  The visualized portions of the sinuses and mastoids appear normal.  There is no evidence of trauma.         IMPRESSION:  No acute pathology, no bleed, mass, or acute infarcts are  seen. No significant change.     HERMELINDO RIDDLE MD  Assessments & Plan (with Medical Decision Making)   Clinical impression: Pleasant 32-year-old female who presented for re-evaluation for headache and posterior neck pain and discomfort.  Patient was seen yesterday in the emergency department for headache and neck pain which she has had over the last 3 days.She had an extensive evaluation and care.  She had a lumbar puncture with negative CSF analysis.  She was discharged home with with Percocet. She reports no relief with percocet..  No prior history of neck problems.  No history of neck manipulation.  She has no extremity weakness or numbness.  She has also had no rashes and no fever.  She reports frontal headache as well as midline cervical neck pain and discomfort.  She takes Adderall.  She is a nonsmoker.  No recent URI.  No sinus pressure or congestion.  No blurry vision but photophobia.  On my exam she has the lights in the room again.  She appears very uncomfortable but is in no acute distress and is hemodynamically normal and afebrile.  She is reproducible midline cervical spine process tenderness.  Pupils are  equal react to light very sensitive to light.  TMs with soft old dried cerumen TMs are clear without perforation.  Neck is supple.  Airway is patent.  No posterior pharyngeal erythema or exudate.  Lungs are clear to auscultation.  Symmetric upper and lower shoulder strength.      ED Course and Plan:  I reviewed her ED visit yesterday from September 22.  I also reviewed neuro imaging - CT head.- see detailed report above. I reviewed her urinalysis.  Urine culture shows no growth to date.  I reviewed her CSF analysis- normal protein and negative Gram stain.  Lyme screen is pending.  Limited blood culture shows no growth to date.  We discussed given her posterior neck pain CT cervical spine was obtained to evaluate for discitis or subacute process.  No indication for repeat CT head and she has no new trauma no fever.  She was given fluids and given IV Benadryl and Toradol.  she reports no relief with Percocet she was discharged home with yesterday and no significant relief with IV medications including Valium given yesterday which she reports mostly made her sleepy. At this time because she has ongoing posterior neck pain despite an exhaustive and negative workup and patient appears uncomfortable and felt it was appropriate to admit her for additional care including consideration for additional advanced imaging including MRI/MRA.    Patient has a normal CRP today.  She also has normal sed rate.  She has normal white count.  Her CT of the cervical spine shows a 0.6 cm area of sclerosis overlying the vertebral body of C4.  No other acute processes noted.  Please see radiologist interpretation in detailed report above.     I spoke with Angelina Mcnamara PA-C-admitting hospitalist at 10PM who agreed to assume care on admission after reviewing rationale for admission, clinical history and presentation, ED course, work-up, prior ED visit on 9/22/17 and imaging results (head and neck).  At the time of transfer to the floor  patient reported some improvement in her neck pain and headache and was requesting to sleep.        Disclaimer: This note consists of symbols derived from keyboarding, dictation and/or voice recognition software. As a result, there may be errors in the script that have gone undetected. Please consider this when interpreting information found in this chart.  I have reviewed the nursing notes.    I have reviewed the findings, diagnosis, plan and need for follow up with the patient.       New Prescriptions    No medications on file       Final diagnoses:   Posterior neck pain   Acute intractable headache, unspecified headache type       9/23/2017   [facility] EMERGENCY DEPARTMENT     Brent Ardon MD  09/23/17 [id number]

## 2017-09-24 NOTE — PLAN OF CARE
"Problem: Patient Care Overview  Goal: Plan of Care/Patient Progress Review  Outcome: No Change  Pt given PRN 0.5 mg IV Dilaudid x2 this night, pt stated the medication \"helped some\" with her neck pain, last had it at 0255. Given PRN 5 mg Oxycodone and PRN Advil at 0425 for pain. Pt is also using an ice pack to the back of her neck. Denies any SOB, N/V, or lightheadness.   Neuro are intact, pt stated that she is sensitive to bright lights at time. Vital signs are stable, pt is afebrile. Pt stated to writer that she is a smoker and was inquiring about going outside to smoke. Pt was informed of tobacco policy, writer offered to get an order for a nicotine patch, pt declines any interventions at this time.   IV fluids running at 125 ml/hr; pt has been voiding without difficulty. Uses call light appropriately, able to make her needs be known.       "

## 2017-09-24 NOTE — PROGRESS NOTES
Pain level improved after one time dose of toradol and increased dose of oxycodone.  Pt up ad layla in room and ambulated in hallway.

## 2017-09-24 NOTE — ED NOTES
"Patient has  [place] to Observation  order. Patient has been given the Observation brochure -  What does Observation mean to me.\"  Patient has been given the opportunity to ask questions about observation status and their plan of care.      JERRY MOREIRA    "

## 2017-09-24 NOTE — H&P
[facility]    History and Physical  Hospital Medicine       Date of Admission:  9/23/2017  Date of Service: 9/24/2017     Assessment & Plan   Ángela Zurita is a 32 year old female who presents with intractable neck pain and headache.      Posterior neck pain   Develops after prodrome of myalgias, not feeling well x 2 weeks. Previous work up included LP that was negative, CT head and neck negative for acute findings, negative Lyme titer. Failed outpatient Percocet, Flexeril and OTC ibuprofen. Inflammatory markers all negative. There is point tenderness over the cervical spine at about C3-C4 that reproduces the pain. DDx includes pyelonephritis as there can be neck pain and headache with this. The history is not suggestive of a vascular etiology.  Angina can present as referred neck pain and an ECG was checked and is normal.   - MRI C-spine to eval for musculoskeletal cause and rule out disc space disease.    - Pain control      UTI due to Group B Strep   Patient diagnosed with UTI (UA positive WBC) on 9/22 and started on ciprofloxacin. H/o recurrent UTI and pyelonephritis most recently mid August treated outside of [place]. No symptoms with this UTI, however. UC results now show beta-hemolytic Group B strep. Cipro is +/- on coverage for Strep.    - empiric change of antibiotic to ampicillin IV   - Recheck UA    DVT Prophylaxis: Low Risk/Ambulatory with no VTE prophylaxis indicated  Code Status: Full Code    Disposition: Anticipate discharge possibly later today pending test results and symptom control. Appropriate for observation    Ramu Naylor MD  Hospital Medicine        Primary Care Physician   Randy Graf [phone]    History is obtained from the patient and her  who are good historians and review of old records via the EMR.    Past Medical History    Past Medical History:   Diagnosis Date     Cervical dysplasia     s/p LEEP      Chickenpox      Exercise-induced  asthma     adolescent     IBS (irritable bowel syndrome)      Tonsillitis 2/13/2014       Attention deficit hyperactivity disorder (ADHD), predominantly inattentive type 06/10/2016     Priority: Medium     Patient is followed by WILMAR WHEAT for ongoing prescription of stimulants.  All refills should be approved by this provider, or covering partner.    Medication(s): Adderall 10mg immediate release   Maximum quantity per month: 150  Clinic visit frequency required: Q 3 month     Controlled substance agreement on file: Yes 6/30/16  Neuropsych evaluation for ADD completed:  Yes, completed 2/2012 at [facility], on file and diagnosis confirmed    Last [facility] website verification:  done on 6/19/2016   [email or web address]           Oral herpes 03/21/2016     Priority: Medium     Rectovaginal fistula 01/26/2015     Priority: Medium     Has had consult with colorectal surgeon; opting to defer treatment until done with childbearing  Amenable to c/section with next pregnancy       Fibroid uterus 07/02/2014     Priority: Medium     Anterior intramural lower.  Right by bladder       Urgency-frequency syndrome 03/24/2014     Priority: Medium        Past Surgical History   Past Surgical History:   Procedure Laterality Date     BIOPSY  Various    Had a few Leeps and numerous Colposcopys     COLONOSCOPY  2009    Normal     EXC SWEAT GLAND LESN AXILL,SIMPL Right 2009     LEEP TX, CERVICAL  2006    yearly paps     MOUTH SURGERY      wisdom teeth     SOFT TISSUE SURGERY  Various    infected sweat-gland- cyst removed arm,armpit,face     SURGICAL HISTORY OF -   3/2005    Tonsillectomy     SURGICAL HISTORY OF -       infected sweat gland under her arm        History of Present Illness   Ángela Zurita is a 32 year old female who presents with intractable posterior neck pain and headache.  Patient has had about 2 weeks of not feeling well with some nasal congestion early on and recurrent nausea without emesis.  Her   and son have had a cold during this time some similar symptoms.  Patient developed neck pain and headache and was seen in clinic 9/20, and diagnosed with tension headache.  Lyme titer was sent which returned negative.  The patient presented to the ED on 9/22 and had a head CT that was negative.  Patient also underwent lumbar puncture which was normal. UA was positive for leukocytosis although the patient had no symptoms of UTI. Ciprofloxacin was started.  Patient was discharged with Percocet and Flexeril but has continued to have intractable neck pain radiating into the back of her head.  No neurologic changes such as numbness, weakness, incoordination, vision changes.  She does have some photophobia and phonophobia related to the headache.  She has history of recurrent headaches but not like this one.  No fevers or chills.  No trauma.  She cannot recall the moment this started.  Her neck does feel stiff.  Pain is in the middle of the posterior neck radiating to the back of her head.     Patient has history of recurrent UTI and kidney infection with most recent being 5 weeks ago treated outside of [place].  Subsequently patient was treated for bacterial vaginosis with metronidazole.   The present UTI was asymptomatic which was unusual.  No back pain, frequency or dysuria.      Prior to Admission Medications   Prior to Admission Medications   Prescriptions Last Dose Informant Patient Reported? Taking?   amphetamine-dextroamphetamine (ADDERALL) 10 MG per tablet 9/22/2017 at Unknown time  No Yes   Sig: Take 2 pills (20mg) orally in the morning and two pills (20mg) at 11am and one pill (10mg) at 3pm   amphetamine-dextroamphetamine (ADDERALL) 10 MG per tablet   No No   Sig: Take 2 pills (20mg) orally in the morning and two pills (20mg) at 11am and one pill (10mg) at 2pm   amphetamine-dextroamphetamine (ADDERALL) 10 MG per tablet   No No   Sig: Take 2 pills (20mg) orally in the morning and two pills (20mg) at 11am and  one pill (10mg) at 2pm   ciprofloxacin (CIPRO) 500 MG tablet 9/23/2017 at 1800  No No   Sig: Take 1 tablet (500 mg) by mouth 2 times daily   clindamycin (CLINDAMAX) 1 % topical gel Unknown at Unknown time  No No   Sig: Apply topically 2 times daily   cyclobenzaprine (FLEXERIL) 10 MG tablet 9/23/2017 at Unknown time  No Yes   Sig: Take 0.5-1 tablets (5-10 mg) by mouth 3 times daily as needed for muscle spasms   oxyCODONE-acetaminophen (PERCOCET) 5-325 MG per tablet 9/23/2017 at 1600  No No   Sig: Take 1-2 tablets by mouth every 4 hours as needed for pain      Facility-Administered Medications: None     Allergies   No Known Allergies    Family History    Family History   Problem Relation Age of Onset     Blood Disease Brother      twin brother-blood clots     Blood Disease Maternal Grandfather      blood clots     HEART DISEASE Maternal Grandfather      valve replacement     Hyperlipidemia Maternal Grandfather      CEREBROVASCULAR DISEASE Maternal Grandfather      Prostate Cancer Maternal Grandfather      Other Cancer Maternal Grandfather      Gum/Jaw/Lymphnodes     Respiratory Maternal Grandmother      Lung Cancer Maternal Grandmother      DIABETES Maternal Grandmother      type II     Hypertension Maternal Grandmother      Hyperlipidemia Maternal Grandmother      Depression Maternal Grandmother      CANCER Paternal Grandmother      non -hodgkins lymphoma     Lung Cancer Paternal Grandmother      Hypertension Paternal Grandmother      Hyperlipidemia Paternal Grandmother      CEREBROVASCULAR DISEASE Paternal Grandmother      Hypertension Father      Hyperlipidemia Father      Hyperlipidemia Paternal Grandfather      Prostate Cancer Paternal Grandfather      Hypertension Paternal Grandfather      Colon Cancer Paternal Grandfather      Breast Cancer Mother      Breast Cancer Other      2 aunt and 3 of my great aunts     Breast Cancer Cousin      Breast Cancer         Social History   Social History     Social History  "    Marital status: Single     Spouse name: N/A     Number of children: N/A     Years of education: N/A     Occupational History      [employer]     accounting     Social History Main Topics     Smoking status: Current Every Day Smoker     Packs/day: 0.50     Years: 15.00     Types: Cigarettes     Start date: 12/1/2014     Last attempt to quit: 1/8/2014     Smokeless tobacco: Never Used     Alcohol use 0.0 oz/week      Comment: 3 drinks weekly- quit with pregnancy     Drug use: No     Sexual activity: Yes     Partners: Male     Birth control/ protection: None      Comment: We're okay if we get pregnant again ;)     Other Topics Concern     Parent/Sibling W/ Cabg, Mi Or Angioplasty Before 65f 55m? No     My brother has had heart issues, but not a heart attack     Social History Narrative    Lives with  and 3-year-old son.  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    Review of Systems   The 10 point Review of Systems is negative other than noted in the HPI or here. No other pertinent findings    Physical Exam   /67 (BP Location: Right arm)  Pulse 66  Temp 98.2  F (36.8  C) (Oral)  Resp 18  Ht 1.651 m (5' 5\")  Wt 67.1 kg (148 lb)  SpO2 97%  BMI 24.63 kg/m2     Weight: 148 lbs 0 oz Body mass index is 24.63 kg/(m^2).     Constitutional: Alert, oriented, cooperative, appears very uncomfortable and mildly diaphoretic but otherwise nontoxic.  Eyes: Eyes are clear, pupils are reactive.  HEENT: Oropharynx is clear and moist. No evidence of cranial trauma.  Lymph/Hematologic: No epitrochlear, axillary, anterior or posterior cervical, or supraclavicular lymphadenopathy is appreciated.  Cardiovascular: Regular rate and rhythm, normal S1 and S2, and no murmur noted. JVP is normal. Good peripheral pulses in wrists bilaterally. No lower extremity edema.  Respiratory: Clear to auscultation bilaterally.   GI: Soft, non-tender, normal bowel sounds, no hepatosplenomegaly. No CVA tenderness to " percussion.  Genitourinary: Deferred  Musculoskeletal: Normal muscle bulk and tone. There is point tenderness over the cervical spine at about C3-C4 that reproduces her neck pain including radiation to her head.  No palpable deformity. Discomfort with forward flexion of head but does not impress me as meningismus.  No evidence of local trauma to neck.   Skin: Warm and dry, no rashes.   Neurologic: Cranial nerves are intact.  Normal limb movement and coordination.     Data   Data reviewed today:     Recent Labs  Lab 09/23/17  1915 09/22/17  1530 09/20/17  1454   WBC 8.9 8.8 10.8   HGB 13.2 13.4 14.1   MCV 93 93 93    342 364    141  --    POTASSIUM 3.3* 4.1  --    CHLORIDE 110* 110*  --    CO2 28 24  --    BUN 16 11  --    CR 0.87 0.68  --    ANIONGAP 5 7  --    ADRI 8.4* 8.8  --    GLC 72 88  --    ALBUMIN 3.3*  --   --    PROTTOTAL 6.2*  --   --    BILITOTAL 0.2  --   --    ALKPHOS 51  --   --    ALT 22  --   --    AST 8  --   --    TROPI <0.015  --   --        Recent Results (from the past 24 hour(s))   Cervical spine CT w/o contrast    Narrative    CT CERVICAL SPINE WITHOUT CONTRAST   9/23/2017 9:10 PM     HISTORY: Headache, neck pain. Evaluate for acute bony process or  hematoma.     TECHNIQUE: Axial images of the cervical spine were obtained without  intravenous contrast. Multiplanar reformations were performed.  Radiation dose for this scan was reduced using automated exposure  control, adjustment of the mA and/or kV according to patient size, or  iterative reconstruction technique.     COMPARISON: None.    FINDINGS: Sagittal images demonstrate gentle lower cervical kyphosis,  otherwise normal posterior alignment. There is no evidence for  fracture. Disc spaces are within normal limits. There is no evidence  for any stenosis. Paraspinal soft tissues are unremarkable as  visualized. There is a small 0.6 cm area of sclerosis in the right  half of the C4 vertebral body.      Impression     IMPRESSION:  1. 0.6 cm area of sclerosis in the right half of the C4 vertebral  body. This most likely is benign. If the patient has nighttime bone  pain, an osteoid osteoma could give a similar appearance. If  indicated, a bone scan might be helpful in further evaluation.  2. Mild gentle cervical kyphosis which may just be positional.  3. No evidence for fracture, hematoma, or stenosis.    GARLAND SOMERS MD       I personally reviewed the EKG tracing showing Normal sinus rhythm, no ischemic changes and the Neck CT image(s) showing No acute findings.    Chart documentation with keystrokes and/or Dragon voice recognition software. Although reviewed after completion, some word and grammatical error may remain.  Ramu Naylor MD  [facility]

## 2017-09-24 NOTE — PROGRESS NOTES
"[facility] [facility] [place]    Patient admitted to room 2314 at approximately 2330 via cart from emergency room. Patient was accompanied by transport tech.     Verbal SBAR report received from DAVID Barrera prior to patient arrival.     Patient ambulated to bed independently. Patient alert and oriented X 3. Pain is controlled with current analgesics.  Medication(s) being used: narcotic analgesics including hydromorphone (Dilaudid). 0-10 Pain Scale: 7. Admission vital signs: Blood pressure 97/55, pulse 57, temperature 97  F (36.1  C), temperature source Oral, resp. rate 16, height 1.651 m (5' 5\"), weight 33.4 kg (73 lb 11.2 oz), SpO2 98 %, not currently breastfeeding. Patient was oriented to plan of care, call light, bed controls, tv, telephone, bathroom and visiting hours.     The following safety risks were identified during admission: none. Yellow risk band applied: NO.     Alfreda Abraham    "

## 2017-09-24 NOTE — DISCHARGE SUMMARY
[facility]    Discharge Summary  Hospital Medicine    Date of Admission:  9/23/2017  Date of Discharge:  9/24/2017   Discharging Provider: Ramu Naylor MD  Date of Service: 9/24/2017     Primary Care     Randy Graf  [id number] [facility] [address]      Identification and Chief Compaint: Ángela Zurita is a 32 year old female who presented on 9/23/2017 with complaint of intractable posterior neck pain and headache.    Discharge Diagnoses       Posterior neck pain, suspect tension headache    UTI due to Group B Strep    Discharge Disposition   Discharged to home    Discharge Orders     Follow-up and recommended labs and tests    Follow up with primary care provider, Randy Graf, within 7 days for hospital follow- up.  No follow up labs or test are needed.     Activity   Your activity upon discharge: activity as tolerated     Diet   Follow this diet upon discharge: Regular Diet Adult          Discharge Medications   Current Discharge Medication List      START taking these medications    Details   ibuprofen (ADVIL/MOTRIN) 400 MG tablet Take 1-2 tablets (400-800 mg) by mouth every 6 hours as needed for moderate pain    Associated Diagnoses: Posterior neck pain      amoxicillin (AMOXIL) 500 MG capsule Take 1 capsule (500 mg) by mouth 3 times daily for 5 days  Qty: 15 capsule, Refills: 0    Associated Diagnoses: Acute cystitis without hematuria         CONTINUE these medications which have NOT CHANGED    Details   !! amphetamine-dextroamphetamine (ADDERALL) 10 MG per tablet Take 2 pills (20mg) orally in the morning and two pills (20mg) at 11am and one pill (10mg) at 3pm  Qty: 150 tablet, Refills: 0    Associated Diagnoses: Attention deficit hyperactivity disorder (ADHD), predominantly inattentive type      !! amphetamine-dextroamphetamine (ADDERALL) 10 MG per tablet Take 2 pills (20mg) orally in the morning and two pills (20mg) at 11am and one pill (10mg) at  2pm  Qty: 150 tablet, Refills: 0    Associated Diagnoses: Attention deficit hyperactivity disorder (ADHD), predominantly inattentive type      !! amphetamine-dextroamphetamine (ADDERALL) 10 MG per tablet Take 2 pills (20mg) orally in the morning and two pills (20mg) at 11am and one pill (10mg) at 2pm  Qty: 150 tablet, Refills: 0    Associated Diagnoses: Attention deficit hyperactivity disorder (ADHD), predominantly inattentive type      clindamycin (CLINDAMAX) 1 % topical gel Apply topically 2 times daily  Qty: 60 g, Refills: 11    Associated Diagnoses: Acne vulgaris       !! - Potential duplicate medications found. Please discuss with provider.      STOP taking these medications       ciprofloxacin (CIPRO) 500 MG tablet Comments:   Reason for Stopping:         oxyCODONE-acetaminophen (PERCOCET) 5-325 MG per tablet Comments:   Reason for Stopping:         cyclobenzaprine (FLEXERIL) 10 MG tablet Comments:   Reason for Stopping:             Allergies   No Known Allergies    Consultations This Hospital Stay   No consultations were requested during this admission    Significant Results and Procedures   Procedures    None    Data   Results for orders placed or performed during the hospital encounter of 09/23/17   Cervical spine CT w/o contrast    Narrative    CT CERVICAL SPINE WITHOUT CONTRAST   9/23/2017 9:10 PM     HISTORY: Headache, neck pain. Evaluate for acute bony process or  hematoma.     TECHNIQUE: Axial images of the cervical spine were obtained without  intravenous contrast. Multiplanar reformations were performed.  Radiation dose for this scan was reduced using automated exposure  control, adjustment of the mA and/or kV according to patient size, or  iterative reconstruction technique.     COMPARISON: None.    FINDINGS: Sagittal images demonstrate gentle lower cervical kyphosis,  otherwise normal posterior alignment. There is no evidence for  fracture. Disc spaces are within normal limits. There is no  evidence  for any stenosis. Paraspinal soft tissues are unremarkable as  visualized. There is a small 0.6 cm area of sclerosis in the right  half of the C4 vertebral body.      Impression    IMPRESSION:  1. 0.6 cm area of sclerosis in the right half of the C4 vertebral  body. This most likely is benign. If the patient has nighttime bone  pain, an osteoid osteoma could give a similar appearance. If  indicated, a bone scan might be helpful in further evaluation.  2. Mild gentle cervical kyphosis which may just be positional.  3. No evidence for fracture, hematoma, or stenosis.    GARALND SOMERS MD   MR Cervical Spine w/o & w Contrast    Narrative    MR CERVICAL SPINE WITHOUT AND WITH CONTRAST 9/24/2017 4:02 PM     HISTORY: Intractable neck pain with point tenderness at about C3-C4,  no radicular symptoms.    TECHNIQUE: Multiplanar multisequence images were obtained through the  cervical spine without and with contrast. 6 mL of Gadavist given. CT  dated 9/23/2017.    FINDINGS: Sagittal images demonstrate mild lower cervical kyphosis  centered on C5-6 and C6-7. Posterior alignment is otherwise normal.  There is no evidence for craniovertebral or cervical medullary  junction abnormality. There is a focal low signal intensity lesion in  the right half of the C4 vertebral body corresponding to the CT  abnormality. This is decreased signal intensity on T1 and T2-weighted  images and is not associated with any adjacent edema or enhancement.  This is compatible with a benign bone island. Disc space narrowing is  present at C5-6 and C6-7. Bone marrow signal intensity is otherwise  entirely normal. Postcontrast images do not show any abnormal areas of  enhancement within the intradural space or cervical cord. The cervical  cord is normal in morphology and signal characteristics.    C2-C3: Normal.    C3-C4: Normal.    C4-C5: Normal.    C5-C6: Left-sided uncinate spurring and minimal disc bulging posterior  osteophyte formation is  present along with mild facet hypertrophy.  There is mild central and mild to moderate left-sided foraminal  stenosis.    C6-C7: Left-sided uncinate spurring and minimal left paramedian disc  protrusion is present causing mild central and mild left-sided  foraminal stenosis.    C7-T1: Normal.      Impression    IMPRESSION:  1. Small benign-appearing bone island in right C4 vertebral body.  2. C5-6 and C6-7 degenerative disc disease with mild central canal  stenoses at both levels, mild to moderate left-sided foraminal  stenosis at C5-6 and mild left-sided foraminal stenosis at C6-7.    GARLAND SOMERS MD       History of Present Illness    See H&P from earlier today    Hospital Course   Ángela Zurita was admitted on 9/23/2017.  The following problems were addressed during her hospitalization:      Posterior neck pain, suspect tension headache   MRI of C-spine was negative for any acute changes or inflammation.  There is some cervical disc bulge with mild foraminal stenosis at the levels of C5-6 and C6-7  which are below the level of pain and unlikely the source.  A bone island is seen in the C4 vertebra and also is unlikely the cause of her pain.    Patient was given Toradol and is feeling much improved, and she would like to go home.  Appears stable for discharge.  Patient will use over-the-counter ibuprofen at 800 mg every 6 hours as needed and also acetaminophen over-the-counter.       UTI due to Group B Strep   Repeat UA is negative.  Will treat with 5 days of amoxicillin just to be sure.     Pending Results   Unresulted Labs Ordered in the Past 30 Days of this Admission     Date and Time Order Name Status Description    9/23/2017 2014 Lyme Disease Mariah with reflex to WB Serum In process     9/22/2017 1821 Blood culture (one site) Preliminary     9/22/2017 1658 Lyme IgG and IgM CSF Immunoblot: Tube 3 In process     9/22/2017 1658 CSF Culture Aerobic Bacterial Preliminary           Physical Exam   Temp:  [97  F  (36.1  C)-98.4  F (36.9  C)] 97.2  F (36.2  C)  Pulse:  [57-79] 67  Heart Rate:  [71-76] 71  Resp:  [16-18] 16  BP: ()/(54-73) 114/60  SpO2:  [96 %-99 %] 99 %  Vitals:    09/23/17 1902   Weight: 67.1 kg (148 lb)       Constitutional: NAD, appears much more comfortable  Musculoskeletal: more comfortable movement of neck    The discharge plan was discussed with the patient and she expressed understanding.    Ramu Naylor MD  [facility]

## 2017-09-24 NOTE — PLAN OF CARE
Problem: Patient Care Overview  Goal: Plan of Care/Patient Progress Review  Outcome: No Change  Pt alert and oriented. Up in room and wood with steady gait.  Reports headache and posterior neck pain continue, rates at 6/10 on number scale stating it it tolerable. Neck stiff, pain increases with attempts to turn head from side to side. Medicated with oxycodone and tylenol this morning which pt took at home with no relief, no change here. Dilaudid IV given with some decrease in pain. Has used lavender and mint essential oils at home but declined when offered. Ate well for breakfast. Voiding without difficulty.   Pt questioning plan of care stating she was told she may have an MRI.  Also need home medications for ADHD and cipro. Will update Dr Naylor.

## 2017-09-25 ENCOUNTER — TELEPHONE (OUTPATIENT)
Dept: FAMILY MEDICINE | Facility: CLINIC | Age: 32
End: 2017-09-25

## 2017-09-25 LAB
B BURGDOR IGG CSF QL IB: NEGATIVE
B BURGDOR IGG+IGM SER QL: 0.15 (ref 0–0.89)
B BURGDOR IGM CSF QL IB: NEGATIVE

## 2017-09-25 NOTE — TELEPHONE ENCOUNTER
S-(situation): Pt is calling to report new symptoms that developed this morning. She report waking up with lightheadedness and left sided facial and neck 'tingling'. She states that she went back to bed to see if it would resolve and woke at 12:00 with continued, but mostly resolved symptoms.     B-(background): Pt was seen in the ED and admitted to the hospital 9/23/17-9/24/17 for complaint of intractable posterior neck pain and headache.    A-(assessment): Pt reports that the headache pain is much better. She denies any left sided weakness of extremities and reports facial symmetry when smiling in the mirror.     R-(recommendations): I will huddle with a provider to review and advise.     Marifer Marc RN

## 2017-09-25 NOTE — PLAN OF CARE
Problem: Patient Care Overview  Goal: Discharge Needs Assessment  Outcome: Adequate for Discharge Date Met:  09/24/17  [facility] DISCHARGE NOTE     Patient discharged to home at 7:25 PM via ambulation. Accompanied by spouse and staff. Discharge instructions reviewed with patient and spouse, opportunity offered to ask questions. Prescriptions sent to patients preferred pharmacy. All belongings sent with patient.     Mone Marsh

## 2017-09-25 NOTE — TELEPHONE ENCOUNTER
Patient discussed with DAVID Caicedo.  Given her recent extensive work-up and the improvement in symptoms, will plan to just observe for now.  If symptoms worsen again, recommend being seen in ER.      Avinash Alcantar MD

## 2017-09-25 NOTE — TELEPHONE ENCOUNTER
ED/UC/IP follow up phone call:  Posterior neck pain, acute cystitis with hematuria    RN please call to follow up.    Number of ED visits in past 12 months = 0/2  Darlene Ruvalcaba  [facility]

## 2017-09-25 NOTE — TELEPHONE ENCOUNTER
Reason for call:  Patient reporting a symptom    Symptom or request: Light headed and numbness right side    Duration (how long have symptoms been present): just started    Have you been treated for this before? Yes    Additional comments: She was in ER over the weekend.  She took a nap and woke up light headed and numbness on her right side.  Please advise.    Phone Number patient can be reached at:  Home number on file [phone] (home)    Best Time:  any    Can we leave a detailed message on this number:  YES    Call taken on 9/25/2017 at 11:54 AM by Judith Barroso

## 2017-09-25 NOTE — TELEPHONE ENCOUNTER
Pt has been given the below information. She is able to verbalize understanding.     Marifer Marc RN

## 2017-09-25 NOTE — DISCHARGE INSTRUCTIONS
Follow-up and recommended labs and tests    Follow up with primary care provider, Randy Graf, within 7 days for hospital follow- up.  No follow up labs or test are needed.      Activity   Your activity upon discharge: activity as tolerated      Diet   Follow this diet upon discharge: Regular Diet Adult     Pt instructed she may take tylenol as directed, do not exceed 4000 mg in 24 hours.

## 2017-09-25 NOTE — TELEPHONE ENCOUNTER
"ED/Discharge Protocol    \"Hi, my name is Ashwini York, a registered nurse, and I am calling on behalf of Dr. Graf's office at [place].  I am calling to follow up and see how things are going for you after your recent visit.\"    \"I see that you were in the (ER/UC/IP) on 9-23-17.    How are you doing now that you are home?\" \"Head still hurts pretty bad but it is better than Friday.\"    Is patient experiencing symptoms that may require a hospital visit?  No    Discharge Instructions    \"Let's review your discharge instructions.  What is/are the follow-up recommendations?  Pt. Response: f/u with PCP in 7 days    \"Were you instructed to make a follow-up appointment?\"  Pt. Response: Yes.  Has appointment been made?   No.  \"Can I help you schedule that appointment?\" Yes      \"When you see the provider, I would recommend that you bring your discharge instructions with you.    Medications    \"How many new medications are you on since your hospitalization/ED visit?\"    0-1  \"How many of your current medicines changed (dose, timing, name, etc.) while you were in the hospital/ED visit?\"   0-1  \"Do you have questions about your medications?\"   No  \"Were you newly diagnosed with heart failure, COPD, diabetes or did you have a heart attack?\"   No  For patients on insulin: \"Did you start on insulin in the hospital or did you have your insulin dose changed?\"   No    Medication reconciliation completed? Yes    Was MTM referral placed (*Make sure to put transitions as reason for referral)?   No    Call Summary    \"Do you have any questions or concerns about your condition or care plan at the moment?\"    No  Triage nurse advice given: Call with questions.    Patient was in ER 0 in the past year (assess appropriateness of ER visits.)      \"If you have questions or things don't continue to improve, we encourage you contact us through the main clinic number,  [phone].  Even if the clinic is not open, triage nurses are available " "24/7 to help you.     We would like you to know that our clinic has extended hours (provide information).  We also have urgent care (provide details on closest location and hours/contact info)\"      \"Thank you for your time and take care!\"      Ashwini HILL RN    "

## 2017-09-27 ENCOUNTER — CARE COORDINATION (OUTPATIENT)
Dept: CARE COORDINATION | Facility: CLINIC | Age: 32
End: 2017-09-27

## 2017-09-27 LAB
BACTERIA SPEC CULT: NO GROWTH
SPECIMEN SOURCE: NORMAL

## 2017-09-27 NOTE — PROGRESS NOTES
Clinic Care Coordination Contact    Situation: Patient chart reviewed by care coordinator.    Background: Patient with 2 recent ED visits and a short hospital stay. Having headache/neck pain and cystitis. This RN CC did not receive notification of hospital DC but patient did show up on ED report. When chart was reviewed, noted that clinic RN had an encounter in progress. She has now reached patient and a full assessment was done. No CC needs identified at this time and a follow up PCP appointment has been scheduled for tomorrow.     Assessment: Assessment has been done and post ED/Hospital needs met through clinic outreach.    Plan/Recommendations: RN CC will not open to active CC at this time as no needs identified.    Chavo TRAOREN,RN- BC  Clinic Care Coordinator  [facility]  Phone: [phone]

## 2017-09-28 LAB
BACTERIA SPEC CULT: NORMAL
SPECIMEN SOURCE: NORMAL

## 2017-11-03 ENCOUNTER — TELEPHONE (OUTPATIENT)
Dept: FAMILY MEDICINE | Facility: CLINIC | Age: 32
End: 2017-11-03

## 2017-11-03 NOTE — TELEPHONE ENCOUNTER
S-(situation): The pt is calling with symptoms x3 days.     B-(background): She reports neck, back and headache pain that she states is her normal.      A-(assessment): The pt is calling to report that her temperature is 96.6 and lower. She states that she has taken her temperature mult times with an ear thermometer and believes that this temperature is accurate.  The pt also reports feeling very fatigued     R-(recommendations): I do not see any pt history that would cause the pt to have a low temperature. I was able to review the pt's records here and do not see a history of low temperatures. I have advised the pt that I believe that the ear thermometer is not accurate and that she should try a oral thermometer. The pt became very upset with this information. She states that oral thermometers never work for her and that she believes that ear temperatures are correct. She then stated that she would call someone else and hung up on me.     Marifer Marc RN

## 2017-11-03 NOTE — TELEPHONE ENCOUNTER
"Reason for call:  Patient reporting a symptom    Symptom or request: flu like symptoms    Duration (how long have symptoms been present): 3-4 days    Have you been treated for this before? No    Additional comments: pt calling stating she feels like \"crap\". She said her temperature has been 96.6 and below. She has the body aches, neck is stiff, back is stiff and she has an all over fatigue feeling. She is wondering if she should be seen.    Phone Number patient can be reached at:  Home number on file [phone] (home)    Best Time:  any    Can we leave a detailed message on this number:  YES    Call taken on 11/3/2017 at 8:15 AM by Charlene Mcallister    "

## 2017-11-29 ENCOUNTER — ALLIED HEALTH/NURSE VISIT (OUTPATIENT)
Dept: FAMILY MEDICINE | Facility: CLINIC | Age: 32
End: 2017-11-29
Payer: COMMERCIAL

## 2017-11-29 DIAGNOSIS — Z23 NEED FOR PROPHYLACTIC VACCINATION AND INOCULATION AGAINST INFLUENZA: Primary | ICD-10-CM

## 2017-11-29 PROCEDURE — 99207 ZZC NO CHARGE NURSE ONLY: CPT

## 2017-11-29 PROCEDURE — 90686 IIV4 VACC NO PRSV 0.5 ML IM: CPT

## 2017-11-29 PROCEDURE — 90471 IMMUNIZATION ADMIN: CPT

## 2017-11-29 NOTE — PROGRESS NOTES

## 2017-11-29 NOTE — MR AVS SNAPSHOT
After Visit Summary   11/29/2017    Ángela Zurita    MRN: [id number]           Patient Information     Date Of Birth          1985        Visit Information        Provider Department      11/29/2017 3:00 PM [facility]        Today's Diagnoses     Need for prophylactic vaccination and inoculation against influenza    -  1       Follow-ups after your visit        Your next 10 appointments already scheduled     Dec 05, 2017  6:40 AM CST   Radha Garcia with Randy Graf MD   [facility]    [address] [address]   [phone]            Feb 23, 2018  6:40 AM CST   Radha Garcia with Randy Graf MD   [facility]    [address] [address]   [phone]              Who to contact     If you have questions or need follow up information about today's clinic visit or your schedule please contact [facility] directly at [phone].  Normal or non-critical lab and imaging results will be communicated to you by [employer], letter or phone within 4 business days after the clinic has received the results. If you do not hear from us within 7 days, please contact the clinic through [employer] or phone. If you have a critical or abnormal lab result, we will notify you by phone as soon as possible.  Submit refill requests through [employer] or call your pharmacy and they will forward the refill request to us. Please allow 3 business days for your refill to be completed.          Additional Information About Your Visit        W[employer] Information     [employer] gives you secure access to your electronic health record. If you see a primary care provider, you can also send messages to your care team and make appointments. If you have questions, please call your primary care clinic.  If you do not have a primary care provider, please  call [phone] and they will assist you.        Care EveryWhere ID     This is your Care EveryWhere ID. This could be used by other organizations to access your [facility] medical records  [id number]         Blood Pressure from Last 3 Encounters:   09/24/17 114/60   09/22/17 105/70   09/20/17 124/79    Weight from Last 3 Encounters:   09/23/17 148 lb (67.1 kg)   09/22/17 148 lb (67.1 kg)   09/20/17 148 lb 6.4 oz (67.3 kg)              We Performed the Following     FLU VAC, SPLIT VIRUS IM > 3 YO (QUADRIVALENT) [39281]     Vaccine Administration, Initial [09500]        Primary Care Provider Office Phone # Fax #    Randy Graf [phone] [address] [address]        Equal Access to Services     SKY BLANCHARD : Hadii aad ku hadasho Sodidierali, waaxda luqadaha, qaybta kaalmada adeegyada, waxay michellein hayjimn deborah hoskins . So [facility] [phone].    ATENCIÓN: Si habla español, tiene a reyez disposición servicios gratuitos de asistencia lingüística. Jovany al [phone].    We comply with applicable federal civil rights laws and [place] laws. We do not discriminate on the basis of race, color, national origin, age, disability, sex, sexual orientation, or gender identity.            Thank you!     Thank you for choosing [facility]  for your care. Our goal is always to provide you with excellent care. Hearing back from our patients is one way we can continue to improve our services. Please take a few minutes to complete the written survey that you may receive in the mail after your visit with us. Thank you!             Your Updated Medication List - Protect others around you: Learn how to safely use, store and throw away your medicines at www.disposemymeds.org.          This list is accurate as of: 11/29/17  3:13 PM.  Always use your most recent med list.                   Brand Name Dispense Instructions for use Diagnosis    * amphetamine-dextroamphetamine  10 MG per tablet    ADDERALL    150 tablet    Take 2 pills (20mg) orally in the morning and two pills (20mg) at 11am and one pill (10mg) at 2pm    Attention deficit hyperactivity disorder (ADHD), predominantly inattentive type       * amphetamine-dextroamphetamine 10 MG per tablet    ADDERALL    150 tablet    Take 2 pills (20mg) orally in the morning and two pills (20mg) at 11am and one pill (10mg) at 2pm    Attention deficit hyperactivity disorder (ADHD), predominantly inattentive type       * amphetamine-dextroamphetamine 10 MG per tablet    ADDERALL    150 tablet    Take 2 pills (20mg) orally in the morning and two pills (20mg) at 11am and one pill (10mg) at 3pm    Attention deficit hyperactivity disorder (ADHD), predominantly inattentive type       clindamycin 1 % topical gel    CLINDAMAX    60 g    Apply topically 2 times daily    Acne vulgaris       ibuprofen 400 MG tablet    ADVIL/MOTRIN     Take 1-2 tablets (400-800 mg) by mouth every 6 hours as needed for moderate pain    Posterior neck pain       * Notice:  This list has 3 medication(s) that are the same as other medications prescribed for you. Read the directions carefully, and ask your doctor or other care provider to review them with you.

## 2017-11-30 ENCOUNTER — OFFICE VISIT (OUTPATIENT)
Dept: FAMILY MEDICINE | Facility: CLINIC | Age: 32
End: 2017-11-30
Payer: COMMERCIAL

## 2017-11-30 ENCOUNTER — TELEPHONE (OUTPATIENT)
Dept: FAMILY MEDICINE | Facility: CLINIC | Age: 32
End: 2017-11-30

## 2017-11-30 VITALS
SYSTOLIC BLOOD PRESSURE: 123 MMHG | BODY MASS INDEX: 25.16 KG/M2 | OXYGEN SATURATION: 96 % | HEART RATE: 75 BPM | TEMPERATURE: 98.1 F | WEIGHT: 151 LBS | HEIGHT: 65 IN | DIASTOLIC BLOOD PRESSURE: 77 MMHG

## 2017-11-30 DIAGNOSIS — F90.0 ATTENTION DEFICIT HYPERACTIVITY DISORDER (ADHD), PREDOMINANTLY INATTENTIVE TYPE: Primary | ICD-10-CM

## 2017-11-30 DIAGNOSIS — H60.392 INFECTIVE OTITIS EXTERNA, LEFT: ICD-10-CM

## 2017-11-30 PROCEDURE — 99214 OFFICE O/P EST MOD 30 MIN: CPT | Performed by: NURSE PRACTITIONER

## 2017-11-30 RX ORDER — DEXTROAMPHETAMINE SACCHARATE, AMPHETAMINE ASPARTATE, DEXTROAMPHETAMINE SULFATE AND AMPHETAMINE SULFATE 2.5; 2.5; 2.5; 2.5 MG/1; MG/1; MG/1; MG/1
10 TABLET ORAL DAILY
Qty: 150 TABLET | Refills: 0 | Status: ON HOLD | OUTPATIENT
Start: 2017-12-30 | End: 2019-01-29

## 2017-11-30 RX ORDER — NEOMYCIN SULFATE, POLYMYXIN B SULFATE AND HYDROCORTISONE 10; 3.5; 1 MG/ML; MG/ML; [USP'U]/ML
4 SUSPENSION/ DROPS AURICULAR (OTIC) 4 TIMES DAILY
Qty: 6 ML | Refills: 0 | Status: SHIPPED | OUTPATIENT
Start: 2017-11-30 | End: 2017-12-07

## 2017-11-30 RX ORDER — DEXTROAMPHETAMINE SACCHARATE, AMPHETAMINE ASPARTATE, DEXTROAMPHETAMINE SULFATE AND AMPHETAMINE SULFATE 2.5; 2.5; 2.5; 2.5 MG/1; MG/1; MG/1; MG/1
10 TABLET ORAL DAILY
Qty: 150 TABLET | Refills: 0 | Status: ON HOLD | OUTPATIENT
Start: 2018-01-29 | End: 2019-01-29

## 2017-11-30 RX ORDER — DEXTROAMPHETAMINE SACCHARATE, AMPHETAMINE ASPARTATE, DEXTROAMPHETAMINE SULFATE AND AMPHETAMINE SULFATE 2.5; 2.5; 2.5; 2.5 MG/1; MG/1; MG/1; MG/1
10 TABLET ORAL DAILY
Qty: 150 TABLET | Refills: 0 | Status: SHIPPED | OUTPATIENT
Start: 2017-11-30 | End: 2017-12-30

## 2017-11-30 NOTE — PROGRESS NOTES
SUBJECTIVE:   Ángela Zurita is a 32 year old female who presents to clinic today for the following health issues:      Medication Followup of Adderall- PCP out ill, rescheduled.     Taking Medication as prescribed: yes    Side Effects:  None    Medication Helping Symptoms:  yes         Concern - pain in left ear  Onset: 1 week    Description:   After waking patient has sinus drainage in left ear, feels plugged, itches. Was itchy and she was itching it and then noticed some wetness in there and soreness.    Intensity: mild    Progression of Symptoms:  same    Accompanying Signs & Symptoms:  none    Previous history of similar problem:   yes    Precipitating factors:   Worsened by: none    Alleviating factors:  Improved by: none  Therapies Tried and outcome: OTC allergy medication      Problem list and histories reviewed & adjusted, as indicated.  Additional history: as documented    Patient Active Problem List   Diagnosis     Urgency-frequency syndrome     Fibroid uterus     Health Care Home     Rectovaginal fistula     CARDIOVASCULAR SCREENING; LDL GOAL LESS THAN 130     Oral herpes     Attention deficit hyperactivity disorder (ADHD), predominantly inattentive type     Posterior neck pain     Past Surgical History:   Procedure Laterality Date     BIOPSY  Various    Had a few Leeps and numerous Colposcopys     COLONOSCOPY  2009    Normal     EXC SWEAT GLAND LESN AXILL,SIMPL Right 2009     LEEP TX, CERVICAL  2006    yearly paps     MOUTH SURGERY      wisdom teeth     SOFT TISSUE SURGERY  Various    infected sweat-gland- cyst removed arm,armpit,face     SURGICAL HISTORY OF -   3/2005    Tonsillectomy     SURGICAL HISTORY OF -       infected sweat gland under her arm       Social History   Substance Use Topics     Smoking status: Current Every Day Smoker     Packs/day: 0.50     Years: 15.00     Types: Cigarettes     Start date: 12/1/2014     Last attempt to quit: 1/8/2014     Smokeless tobacco: Never Used      "Alcohol use 0.0 oz/week      Comment: 3 drinks weekly- quit with pregnancy     Family History   Problem Relation Age of Onset     Blood Disease Brother      twin brother-blood clots     Blood Disease Maternal Grandfather      blood clots     HEART DISEASE Maternal Grandfather      valve replacement     Hyperlipidemia Maternal Grandfather      CEREBROVASCULAR DISEASE Maternal Grandfather      Prostate Cancer Maternal Grandfather      Other Cancer Maternal Grandfather      Gum/Jaw/Lymphnodes     Colon Cancer Maternal Grandfather      MENTAL ILLNESS Maternal Grandfather      Alzheimer's     Respiratory Maternal Grandmother      Lung Cancer Maternal Grandmother      DIABETES Maternal Grandmother      type II     Hypertension Maternal Grandmother      Hyperlipidemia Maternal Grandmother      Depression Maternal Grandmother      CANCER Paternal Grandmother      non -hodgkins lymphoma     Lung Cancer Paternal Grandmother      Hypertension Paternal Grandmother      Hyperlipidemia Paternal Grandmother      CEREBROVASCULAR DISEASE Paternal Grandmother      Hypertension Father      Hyperlipidemia Father      Hyperlipidemia Paternal Grandfather      Prostate Cancer Paternal Grandfather      Hypertension Paternal Grandfather      Colon Cancer Paternal Grandfather      Coronary Artery Disease Paternal Grandfather      Breast Cancer Mother      Breast Cancer Other      2 aunt and 3 of my great aunts     Breast Cancer Cousin      Breast Cancer             Reviewed and updated as needed this visit by clinical staffTobacco  Allergies  Meds  Med Hx  Surg Hx  Fam Hx  Soc Hx      Reviewed and updated as needed this visit by Provider         ROS:  Constitutional, HEENT, cardiovascular, pulmonary, gi and gu systems are negative, except as otherwise noted.      OBJECTIVE:   /77  Pulse 75  Temp 98.1  F (36.7  C) (Tympanic)  Ht 5' 5\" (1.651 m)  Wt 151 lb (68.5 kg)  LMP 11/20/2017 (Exact Date)  SpO2 96%  BMI 25.13 " kg/m2  Body mass index is 25.13 kg/(m^2).  GENERAL: healthy, alert and no distress  EYES: Eyes grossly normal to inspection, PERRL and conjunctivae and sclerae normal  HENT: normal cephalic/atraumatic, right ear: normal: no effusions, no erythema, normal landmarks, left ear: purulent drainage in canal and red and boggy canal, nose and mouth without ulcers or lesions, oropharynx clear and oral mucous membranes moist  NECK: no adenopathy, no asymmetry, masses, or scars and thyroid normal to palpation  RESP: lungs clear to auscultation - no rales, rhonchi or wheezes  CV: regular rates and rhythm, normal S1 S2, no S3 or S4 and no murmur, click or rub  MS: no gross musculoskeletal defects noted, no edema  NEURO: Normal strength and tone, mentation intact and speech normal    Diagnostic Test Results:  none     ASSESSMENT/PLAN:       ICD-10-CM    1. Attention deficit hyperactivity disorder (ADHD), predominantly inattentive type F90.0 amphetamine-dextroamphetamine (ADDERALL) 10 MG per tablet     amphetamine-dextroamphetamine (ADDERALL) 10 MG per tablet     amphetamine-dextroamphetamine (ADDERALL) 10 MG per tablet   2. Infective otitis externa, left H60.392 neomycin-polymyxin-hydrocortisone (CORTISPORIN) 3.5-71443-3 otic suspension       FUTURE APPOINTMENTS:       - Follow-up for annual visit or as needed.In 1 week for persistent ear symptoms or sooner PRN.    Patient Instructions         Thank you for choosing [facility]  You may be receiving a survey in the mail from [employer] regarding your visit today.  Please take a few minutes to complete and return the survey to let us know how we are doing.      If you have questions or concerns, please contact us via [employer] or you can contact your care team at [phone].    Our Clinic hours are:  Monday 6:40 am  to 7:00 pm  Tuesday -Friday 6:40 am to 5:00 pm    The [place] outpatient lab hours are:  Monday - Friday 6:10 am to 4:45 pm  Saturdays 7:00 am to 11:00  am  Appointments are required, call [phone]    If you have clinical questions after hours or would like to schedule an appointment,  call the clinic at [phone].  External Ear Infection (Adult)    External otitis (also called  swimmer s ear ) is an infection in the ear canal. It is often caused by bacteria or fungus. It can occur a few days after water gets trapped in the ear canal (from swimming or bathing). It can also occur after cleaning too deeply in the ear canal with a cotton swab or other object. Sometimes, hair care products get into the ear canal and cause this problem.  Symptoms can include pain, fever, itching, redness, drainage, or swelling of the ear canal. Temporary hearing loss may also occur.  Home care    Do not try to clean the ear canal. This can push pus and bacteria deeper into the canal.    Use prescribed ear drops as directed. These help reduce swelling and fight the infection. If an ear wick was placed in the ear canal, apply drops right onto the end of the wick. The wick will draw the medication into the ear canal even if it is swollen closed.    A cotton ball may be loosely placed in the outer ear to absorb any drainage.    You may use acetaminophen or ibuprofen to control pain, unless another medication was prescribed. Note: If you have chronic liver or kidney disease or ever had a stomach ulcer or GI bleeding, talk to your health care provider before taking any of these medications.    Do not allow water to get into your ear when bathing. Also, avoid swimming until the infection has cleared.  Prevention    Keep your ears dry. This helps lower the risk of infection. Dry your ears with a towel or hair dryer after getting wet. Also, use ear plugs when swimming.    Do not stick any objects in the ear to remove wax.    If you feel water trapped in your ear, use ear drops right away. You can get these drops over the counter at most drugstores. They work by removing water from the ear  canal.  Follow-up care  Follow up with your health care provider in one week, or as advised.  When to seek medical advice  Call your health care provider right away if any of these occur:    Ear pain becomes worse or doesn t improve after 3 days of treatment    Redness or swelling of the outer ear occurs or gets worse    Headache    Painful or stiff neck    Drowsiness or confusion    Fever of 100.4 F (38 C) or higher, or as directed by your health care provider    Seizure  Date Last Reviewed: 3/22/2015    7627-8553 [employer]. [address] [place], [place] [address]. All rights reserved. This information is not intended as a substitute for professional medical care. Always follow your healthcare professional's instructions.            KATHLEEN Echevarria [facility]

## 2017-11-30 NOTE — MR AVS SNAPSHOT
After Visit Summary   11/30/2017    Ángela Zurita    MRN: [id number]           Patient Information     Date Of Birth          1985        Visit Information        Provider Department      11/30/2017 1:40 PM Liya Garner APRN CNP [facility]        Today's Diagnoses     Attention deficit hyperactivity disorder (ADHD), predominantly inattentive type    -  1    Infective otitis externa, left          Care Instructions          Thank you for choosing [facility] (formerly [facility]).  You may be receiving a survey in the mail from Trevon Strickland regarding your visit today.  Please take a few minutes to complete and return the survey to let us know how we are doing.      If you have questions or concerns, please contact us via ShieldEffect or you can contact your care team at [phone].    Our Clinic hours are:  Monday 6:40 am  to 7:00 pm  Tuesday -Friday 6:40 am to 5:00 pm    The [place] outpatient lab hours are:  Monday - Friday 6:10 am to 4:45 pm  Saturdays 7:00 am to 11:00 am  Appointments are required, call [phone]    If you have clinical questions after hours or would like to schedule an appointment,  call the clinic at [phone].  External Ear Infection (Adult)    External otitis (also called  swimmer s ear ) is an infection in the ear canal. It is often caused by bacteria or fungus. It can occur a few days after water gets trapped in the ear canal (from swimming or bathing). It can also occur after cleaning too deeply in the ear canal with a cotton swab or other object. Sometimes, hair care products get into the ear canal and cause this problem.  Symptoms can include pain, fever, itching, redness, drainage, or swelling of the ear canal. Temporary hearing loss may also occur.  Home care    Do not try to clean the ear canal. This can push pus and bacteria deeper into the canal.    Use prescribed ear drops as directed. These help reduce swelling and fight the infection. If an ear wick  was placed in the ear canal, apply drops right onto the end of the wick. The wick will draw the medication into the ear canal even if it is swollen closed.    A cotton ball may be loosely placed in the outer ear to absorb any drainage.    You may use acetaminophen or ibuprofen to control pain, unless another medication was prescribed. Note: If you have chronic liver or kidney disease or ever had a stomach ulcer or GI bleeding, talk to your health care provider before taking any of these medications.    Do not allow water to get into your ear when bathing. Also, avoid swimming until the infection has cleared.  Prevention    Keep your ears dry. This helps lower the risk of infection. Dry your ears with a towel or hair dryer after getting wet. Also, use ear plugs when swimming.    Do not stick any objects in the ear to remove wax.    If you feel water trapped in your ear, use ear drops right away. You can get these drops over the counter at most drugstores. They work by removing water from the ear canal.  Follow-up care  Follow up with your health care provider in one week, or as advised.  When to seek medical advice  Call your health care provider right away if any of these occur:    Ear pain becomes worse or doesn t improve after 3 days of treatment    Redness or swelling of the outer ear occurs or gets worse    Headache    Painful or stiff neck    Drowsiness or confusion    Fever of 100.4 F (38 C) or higher, or as directed by your health care provider    Seizure  Date Last Reviewed: 3/22/2015    1402-4458 [employer]. [address] [place] [address]. All rights reserved. This information is not intended as a substitute for professional medical care. Always follow your healthcare professional's instructions.                Follow-ups after your visit        Your next 10 appointments already scheduled     Dec 05, 2017  6:40 AM CST   MyChart Long with Randy rGaf MD   [facility]  "[place] ([facility])    [address]  [facility] [address]   [phone]            Feb 23, 2018  6:40 AM CST   Radha Garcia with Randy Graf MD   [facility]    [address]  [facility] [address]   [phone]              Who to contact     If you have questions or need follow up information about today's clinic visit or your schedule please contact [facility] directly at [phone].  Normal or non-critical lab and imaging results will be communicated to you by MyChart, letter or phone within 4 business days after the clinic has received the results. If you do not hear from us within 7 days, please contact the clinic through Net[employer] or phone. If you have a critical or abnormal lab result, we will notify you by phone as soon as possible.  Submit refill requests through [employer] or call your pharmacy and they will forward the refill request to us. Please allow 3 business days for your refill to be completed.          Additional Information About Your Visit        MyChart Information     [facility] gives you secure access to your electronic health record. If you see a primary care provider, you can also send messages to your care team and make appointments. If you have questions, please call your primary care clinic.  If you do not have a primary care provider, please call [phone] and they will assist you.        Care EveryWhere ID     This is your Care EveryWhere ID. This could be used by other organizations to access your [place] medical records  [id number]        Your Vitals Were     Pulse Temperature Height Last Period Pulse Oximetry BMI (Body Mass Index)    75 98.1  F (36.7  C) (Tympanic) 5' 5\" (1.651 m) 11/20/2017 (Exact Date) 96% 25.13 kg/m2       Blood Pressure from Last 3 Encounters:   11/30/17 123/77   09/24/17 114/60   09/22/17 105/70    Weight from Last 3 Encounters:   11/30/17 151 lb (68.5 kg) "   09/23/17 148 lb (67.1 kg)   09/22/17 148 lb (67.1 kg)              Today, you had the following     No orders found for display         Today's Medication Changes          These changes are accurate as of: 11/30/17  2:13 PM.  If you have any questions, ask your nurse or doctor.               Start taking these medicines.        Dose/Directions    neomycin-polymyxin-hydrocortisone 3.5-01138-7 otic suspension   Commonly known as:  CORTISPORIN   Used for:  Infective otitis externa, left   Started by:  Liya Garner APRN CNP        Dose:  4 drop   Place 4 drops in ear(s) 4 times daily for 7 days   Quantity:  6 mL   Refills:  0         These medicines have changed or have updated prescriptions.        Dose/Directions    * amphetamine-dextroamphetamine 10 MG per tablet   Commonly known as:  ADDERALL   This may have changed:  Another medication with the same name was added. Make sure you understand how and when to take each.   Used for:  Attention deficit hyperactivity disorder (ADHD), predominantly inattentive type   Changed by:  Randy Graf MD        Take 2 pills (20mg) orally in the morning and two pills (20mg) at 11am and one pill (10mg) at 2pm   Quantity:  150 tablet   Refills:  0       * amphetamine-dextroamphetamine 10 MG per tablet   Commonly known as:  ADDERALL   This may have changed:  Another medication with the same name was added. Make sure you understand how and when to take each.   Used for:  Attention deficit hyperactivity disorder (ADHD), predominantly inattentive type   Changed by:  Randy Graf MD        Take 2 pills (20mg) orally in the morning and two pills (20mg) at 11am and one pill (10mg) at 2pm   Quantity:  150 tablet   Refills:  0       * amphetamine-dextroamphetamine 10 MG per tablet   Commonly known as:  ADDERALL   This may have changed:  Another medication with the same name was added. Make sure you understand how and when to take each.   Used for:  Attention deficit  hyperactivity disorder (ADHD), predominantly inattentive type   Changed by:  Randy Graf MD        Take 2 pills (20mg) orally in the morning and two pills (20mg) at 11am and one pill (10mg) at 3pm   Quantity:  150 tablet   Refills:  0       * amphetamine-dextroamphetamine 10 MG per tablet   Commonly known as:  ADDERALL   This may have changed:  You were already taking a medication with the same name, and this prescription was added. Make sure you understand how and when to take each.   Used for:  Attention deficit hyperactivity disorder (ADHD), predominantly inattentive type   Changed by:  Liya Garner APRN CNP        Dose:  10 mg   Take 1 tablet (10 mg) by mouth daily   Quantity:  150 tablet   Refills:  0       * amphetamine-dextroamphetamine 10 MG per tablet   Commonly known as:  ADDERALL   This may have changed:  You were already taking a medication with the same name, and this prescription was added. Make sure you understand how and when to take each.   Used for:  Attention deficit hyperactivity disorder (ADHD), predominantly inattentive type   Changed by:  Liya Garner APRN CNP        Dose:  10 mg   Start taking on:  12/30/2017   Take 1 tablet (10 mg) by mouth daily   Quantity:  150 tablet   Refills:  0       * amphetamine-dextroamphetamine 10 MG per tablet   Commonly known as:  ADDERALL   This may have changed:  You were already taking a medication with the same name, and this prescription was added. Make sure you understand how and when to take each.   Used for:  Attention deficit hyperactivity disorder (ADHD), predominantly inattentive type   Changed by:  Liya Garner APRN CNP        Dose:  10 mg   Start taking on:  1/29/2018   Take 1 tablet (10 mg) by mouth daily   Quantity:  150 tablet   Refills:  0       * Notice:  This list has 6 medication(s) that are the same as other medications prescribed for you. Read the directions carefully, and ask your doctor or other care  provider to review them with you.         Where to get your medicines      These medications were sent to [facility] [place], [place] - [address]  [address] [facility] [address]     Phone:  [phone]     neomycin-polymyxin-hydrocortisone 3.5-11895-7 otic suspension         Some of these will need a paper prescription and others can be bought over the counter.  Ask your nurse if you have questions.     Bring a paper prescription for each of these medications     amphetamine-dextroamphetamine 10 MG per tablet    amphetamine-dextroamphetamine 10 MG per tablet    amphetamine-dextroamphetamine 10 MG per tablet                Primary Care Provider Office Phone # Fax #    Randy Graf -[phone]       [address] [facility] [address]        Equal Access to Services     SKY BLANCHARD : Hadii aad ku hadasho Soalex, waaxda luqadaha, qaybta kaalmada adeegyada, celia hoskins . So [facility] [phone].    ATENCIÓN: Si habla español, tiene a reyez disposición servicios gratuitos de asistencia lingüística. Llame al [phone].    We comply with applicable federal civil rights laws and [place] laws. We do not discriminate on the basis of race, color, national origin, age, disability, sex, sexual orientation, or gender identity.            Thank you!     Thank you for choosing [facility]  for your care. Our goal is always to provide you with excellent care. Hearing back from our patients is one way we can continue to improve our services. Please take a few minutes to complete the written survey that you may receive in the mail after your visit with us. Thank you!             Your Updated Medication List - Protect others around you: Learn how to safely use, store and throw away your medicines at www.disposemymeds.org.          This list is accurate as of: 11/30/17  2:13 PM.  Always use your most recent med list.                   Brand  Name Dispense Instructions for use Diagnosis    * amphetamine-dextroamphetamine 10 MG per tablet    ADDERALL    150 tablet    Take 2 pills (20mg) orally in the morning and two pills (20mg) at 11am and one pill (10mg) at 2pm    Attention deficit hyperactivity disorder (ADHD), predominantly inattentive type       * amphetamine-dextroamphetamine 10 MG per tablet    ADDERALL    150 tablet    Take 2 pills (20mg) orally in the morning and two pills (20mg) at 11am and one pill (10mg) at 2pm    Attention deficit hyperactivity disorder (ADHD), predominantly inattentive type       * amphetamine-dextroamphetamine 10 MG per tablet    ADDERALL    150 tablet    Take 2 pills (20mg) orally in the morning and two pills (20mg) at 11am and one pill (10mg) at 3pm    Attention deficit hyperactivity disorder (ADHD), predominantly inattentive type       * amphetamine-dextroamphetamine 10 MG per tablet    ADDERALL    150 tablet    Take 1 tablet (10 mg) by mouth daily    Attention deficit hyperactivity disorder (ADHD), predominantly inattentive type       * amphetamine-dextroamphetamine 10 MG per tablet   Start taking on:  12/30/2017    ADDERALL    150 tablet    Take 1 tablet (10 mg) by mouth daily    Attention deficit hyperactivity disorder (ADHD), predominantly inattentive type       * amphetamine-dextroamphetamine 10 MG per tablet   Start taking on:  1/29/2018    ADDERALL    150 tablet    Take 1 tablet (10 mg) by mouth daily    Attention deficit hyperactivity disorder (ADHD), predominantly inattentive type       clindamycin 1 % topical gel    CLINDAMAX    60 g    Apply topically 2 times daily    Acne vulgaris       neomycin-polymyxin-hydrocortisone 3.5-67361-7 otic suspension    CORTISPORIN    6 mL    Place 4 drops in ear(s) 4 times daily for 7 days    Infective otitis externa, left       * Notice:  This list has 6 medication(s) that are the same as other medications prescribed for you. Read the directions carefully, and ask your doctor  or other care provider to review them with you.

## 2017-11-30 NOTE — NURSING NOTE
"Chief Complaint   Patient presents with     Recheck Medication     Adderall     Ear Problem     left ear - possible sinus drainage; x 1 week       Initial /77  Pulse 75  Temp 98.1  F (36.7  C) (Tympanic)  Ht 5' 5\" (1.651 m)  Wt 151 lb (68.5 kg)  LMP 11/20/2017 (Exact Date)  SpO2 96%  BMI 25.13 kg/m2 Estimated body mass index is 25.13 kg/(m^2) as calculated from the following:    Height as of this encounter: 5' 5\" (1.651 m).    Weight as of this encounter: 151 lb (68.5 kg).  Medication Reconciliation: complete  "

## 2017-11-30 NOTE — TELEPHONE ENCOUNTER
Patient is calling for an appointment request for today or tomorrow for medication refill. Please follow up with patient.

## 2017-11-30 NOTE — PATIENT INSTRUCTIONS
Thank you for choosing [facility].  You may be receiving a survey in the mail from Trevon Strickland regarding your visit today.  Please take a few minutes to complete and return the survey to let us know how we are doing.      If you have questions or concerns, please contact us via Bloc or you can contact your care team at [phone].    Our Clinic hours are:  Monday 6:40 am  to 7:00 pm  Tuesday -Friday 6:40 am to 5:00 pm    The [place] outpatient lab hours are:  Monday - Friday 6:10 am to 4:45 pm  Saturdays 7:00 am to 11:00 am  Appointments are required, call [phone]    If you have clinical questions after hours or would like to schedule an appointment,  call the clinic at [phone].  External Ear Infection (Adult)    External otitis (also called  swimmer s ear ) is an infection in the ear canal. It is often caused by bacteria or fungus. It can occur a few days after water gets trapped in the ear canal (from swimming or bathing). It can also occur after cleaning too deeply in the ear canal with a cotton swab or other object. Sometimes, hair care products get into the ear canal and cause this problem.  Symptoms can include pain, fever, itching, redness, drainage, or swelling of the ear canal. Temporary hearing loss may also occur.  Home care    Do not try to clean the ear canal. This can push pus and bacteria deeper into the canal.    Use prescribed ear drops as directed. These help reduce swelling and fight the infection. If an ear wick was placed in the ear canal, apply drops right onto the end of the wick. The wick will draw the medication into the ear canal even if it is swollen closed.    A cotton ball may be loosely placed in the outer ear to absorb any drainage.    You may use acetaminophen or ibuprofen to control pain, unless another medication was prescribed. Note: If you have chronic liver or kidney disease or ever had a stomach ulcer or GI bleeding, talk to your health care provider  before taking any of these medications.    Do not allow water to get into your ear when bathing. Also, avoid swimming until the infection has cleared.  Prevention    Keep your ears dry. This helps lower the risk of infection. Dry your ears with a towel or hair dryer after getting wet. Also, use ear plugs when swimming.    Do not stick any objects in the ear to remove wax.    If you feel water trapped in your ear, use ear drops right away. You can get these drops over the counter at most drugstores. They work by removing water from the ear canal.  Follow-up care  Follow up with your health care provider in one week, or as advised.  When to seek medical advice  Call your health care provider right away if any of these occur:    Ear pain becomes worse or doesn t improve after 3 days of treatment    Redness or swelling of the outer ear occurs or gets worse    Headache    Painful or stiff neck    Drowsiness or confusion    Fever of 100.4 F (38 C) or higher, or as directed by your health care provider    Seizure  Date Last Reviewed: 3/22/2015    4604-3690 [employer]. [address] [place] [address], [place] [address]. All rights reserved. This information is not intended as a substitute for professional medical care. Always follow your healthcare professional's instructions.

## 2017-12-15 ENCOUNTER — OFFICE VISIT (OUTPATIENT)
Dept: URGENT CARE | Facility: URGENT CARE | Age: 32
End: 2017-12-15
Payer: COMMERCIAL

## 2017-12-15 VITALS
RESPIRATION RATE: 16 BRPM | HEART RATE: 81 BPM | TEMPERATURE: 98.3 F | DIASTOLIC BLOOD PRESSURE: 80 MMHG | SYSTOLIC BLOOD PRESSURE: 126 MMHG | WEIGHT: 151 LBS | BODY MASS INDEX: 25.13 KG/M2

## 2017-12-15 DIAGNOSIS — R07.0 THROAT PAIN: Primary | ICD-10-CM

## 2017-12-15 DIAGNOSIS — H65.93 BILATERAL NON-SUPPURATIVE OTITIS MEDIA: ICD-10-CM

## 2017-12-15 LAB
DEPRECATED S PYO AG THROAT QL EIA: NORMAL
SPECIMEN SOURCE: NORMAL

## 2017-12-15 PROCEDURE — 87081 CULTURE SCREEN ONLY: CPT | Performed by: NURSE PRACTITIONER

## 2017-12-15 PROCEDURE — 99213 OFFICE O/P EST LOW 20 MIN: CPT | Performed by: NURSE PRACTITIONER

## 2017-12-15 PROCEDURE — 87880 STREP A ASSAY W/OPTIC: CPT | Performed by: NURSE PRACTITIONER

## 2017-12-15 RX ORDER — AMOXICILLIN 875 MG
875 TABLET ORAL 2 TIMES DAILY
Qty: 20 TABLET | Refills: 0 | Status: SHIPPED | OUTPATIENT
Start: 2017-12-15 | End: 2017-12-25

## 2017-12-15 NOTE — MR AVS SNAPSHOT
After Visit Summary   12/15/2017    Ángela Zurita    MRN: [id number]           Patient Information     Date Of Birth          1985        Visit Information        Provider Department      12/15/2017 6:05 PM Maria Antonia Saldana APRN [facility]        Today's Diagnoses     Throat pain    -  1    Bilateral non-suppurative otitis media          Care Instructions    Increase rest and fluids. Tylenol and/or Ibuprofen for comfort. Cool mist vaporizer. If your symptoms worsen or do not resolve follow up with your primary care provider in 1-2 weeks and sooner if needed.      Mucinex 600 mg 12 hour formula for ear, head and chest congestion.  It can also thin post nasal drip which can cause a cough and sore throat.    Indications for emergent return to emergency department discussed with patient, who verbalized good understanding and agreement.  Patient understands the limitations of today's evaluation.           Middle Ear Infection (Adult)  You have an infection of the middle ear, the space behind the eardrum. This is also called acute otitis media (AOM). Sometimes it is caused by the common cold. This is because congestion can block the internal passage (eustachian tube) that drains fluid from the middle ear. When the middle ear fills with fluid, bacteria can grow there and cause an infection. Oral antibiotics are used to treat this illness, not ear drops. Symptoms usually start to improve within 1 to 2 days of treatment.    Home care  The following are general care guidelines:    Finish all of the antibiotic medicine given, even though you may feel better after the first few days.    You may use over-the-counter medicine, such as acetaminophen or ibuprofen, to control pain and fever, unless something else was prescribed. If you have chronic liver or kidney disease or have ever had a stomach ulcer or gastrointestinal bleeding, talk with your healthcare provider  before using these medicines. Do not give aspirin to anyone under 18 years of age who has a fever. It may cause severe illness or death.  Follow-up care  Follow up with your healthcare provider, or as advised, in 2 weeks if all symptoms have not gotten better, or if hearing doesn't go back to normal within 1 month.  When to seek medical advice  Call your healthcare provider right away if any of these occur:    Ear pain gets worse or does not improve after 3 days of treatment    Unusual drowsiness or confusion    Neck pain, stiff neck, or headache    Fluid or blood draining from the ear canal    Fever of 100.4 F (38 C) or as advised     Seizure  Date Last Reviewed: 6/1/2016 2000-2017 [employer]. [address] [place] [address], [place] [address]. All rights reserved. This information is not intended as a substitute for professional medical care. Always follow your healthcare professional's instructions.                Follow-ups after your visit        Follow-up notes from your care team     See patient instructions section of the AVS Return if symptoms worsen or fail to improve, for Follow up with your primary care provider.      Your next 10 appointments already scheduled     Feb 23, 2018  6:40 AM CST   Radha Garcia with Randy Graf MD   [facility]    [address] [address] [phone]              Who to contact     If you have questions or need follow up information about today's clinic visit or your schedule please contact [facility] directly at [phone].  Normal or non-critical lab and imaging results will be communicated to you by MyChart, letter or phone within 4 business days after the clinic has received the results. If you do not hear from us within 7 days, please contact the clinic through MyChart or phone. If you have a critical or abnormal lab result, we will notify you by phone as  soon as possible.  Submit refill requests through [employer] or call your pharmacy and they will forward the refill request to us. Please allow 3 business days for your refill to be completed.          Additional Information About Your Visit        [employer] Information     [employer] gives you secure access to your electronic health record. If you see a primary care provider, you can also send messages to your care team and make appointments. If you have questions, please call your primary care clinic.  If you do not have a primary care provider, please call [phone] and they will assist you.        Care EveryWhere ID     This is your Care EveryWhere ID. This could be used by other organizations to access your [facility] medical records  [id number]        Your Vitals Were     Pulse Temperature Respirations Last Period BMI (Body Mass Index)       81 98.3  F (36.8  C) (Tympanic) 16 11/20/2017 (Exact Date) 25.13 kg/m2        Blood Pressure from Last 3 Encounters:   12/15/17 126/80   11/30/17 123/77   09/24/17 114/60    Weight from Last 3 Encounters:   12/15/17 151 lb (68.5 kg)   11/30/17 151 lb (68.5 kg)   09/23/17 148 lb (67.1 kg)              We Performed the Following     Beta strep group A culture     Strep, Rapid Screen          Today's Medication Changes          These changes are accurate as of: 12/15/17  7:53 PM.  If you have any questions, ask your nurse or doctor.               Start taking these medicines.        Dose/Directions    amoxicillin 875 MG tablet   Commonly known as:  AMOXIL   Used for:  Bilateral non-suppurative otitis media   Started by:  Maria Antonia Saldana APRN CNP        Dose:  875 mg   Take 1 tablet (875 mg) by mouth 2 times daily for 10 days   Quantity:  20 tablet   Refills:  0            Where to get your medicines      These medications were sent to [id number] - [place], [place] - [address]  [address] [address]    Hours:  Closed 10-16-08 business to  [facility] Phone:  [phone]     amoxicillin 875 MG tablet                Primary Care Provider Office Phone # Fax #    Randy Graf [phone] [address] [address]        Equal Access to Services     SKY BLANCHARD : Hadii jordan duran hadbetho Soomaali, waaxda luqadaha, qaybta kaalmada adeegyada, celia valenciakirbyfrancis elizabeth. So [facility] [phone].    ATENCIÓN: Si habla español, tiene a reyez disposición servicios gratuitos de asistencia lingüística. Jovany al [phone].    We comply with applicable federal civil rights laws and [place] laws. We do not discriminate on the basis of race, color, national origin, age, disability, sex, sexual orientation, or gender identity.            Thank you!     Thank you for choosing [facility]  for your care. Our goal is always to provide you with excellent care. Hearing back from our patients is one way we can continue to improve our services. Please take a few minutes to complete the written survey that you may receive in the mail after your visit with us. Thank you!             Your Updated Medication List - Protect others around you: Learn how to safely use, store and throw away your medicines at www.disposemymeds.org.          This list is accurate as of: 12/15/17  7:53 PM.  Always use your most recent med list.                   Brand Name Dispense Instructions for use Diagnosis    amoxicillin 875 MG tablet    AMOXIL    20 tablet    Take 1 tablet (875 mg) by mouth 2 times daily for 10 days    Bilateral non-suppurative otitis media       * amphetamine-dextroamphetamine 10 MG per tablet    ADDERALL    150 tablet    Take 2 pills (20mg) orally in the morning and two pills (20mg) at 11am and one pill (10mg) at 2pm    Attention deficit hyperactivity disorder (ADHD), predominantly inattentive type       * amphetamine-dextroamphetamine 10 MG per tablet    ADDERALL    150 tablet    Take  2 pills (20mg) orally in the morning and two pills (20mg) at 11am and one pill (10mg) at 2pm    Attention deficit hyperactivity disorder (ADHD), predominantly inattentive type       * amphetamine-dextroamphetamine 10 MG per tablet    ADDERALL    150 tablet    Take 2 pills (20mg) orally in the morning and two pills (20mg) at 11am and one pill (10mg) at 3pm    Attention deficit hyperactivity disorder (ADHD), predominantly inattentive type       * amphetamine-dextroamphetamine 10 MG per tablet    ADDERALL    150 tablet    Take 1 tablet (10 mg) by mouth daily    Attention deficit hyperactivity disorder (ADHD), predominantly inattentive type       * amphetamine-dextroamphetamine 10 MG per tablet   Start taking on:  12/30/2017    ADDERALL    150 tablet    Take 1 tablet (10 mg) by mouth daily    Attention deficit hyperactivity disorder (ADHD), predominantly inattentive type       * amphetamine-dextroamphetamine 10 MG per tablet   Start taking on:  1/29/2018    ADDERALL    150 tablet    Take 1 tablet (10 mg) by mouth daily    Attention deficit hyperactivity disorder (ADHD), predominantly inattentive type       clindamycin 1 % topical gel    CLINDAMAX    60 g    Apply topically 2 times daily    Acne vulgaris       * Notice:  This list has 6 medication(s) that are the same as other medications prescribed for you. Read the directions carefully, and ask your doctor or other care provider to review them with you.

## 2017-12-16 LAB
BACTERIA SPEC CULT: NORMAL
SPECIMEN SOURCE: NORMAL

## 2017-12-16 NOTE — PROGRESS NOTES
SUBJECTIVE:   Ángela Zurita is a 32 year old female who presents to clinic today for the following health issues:    ENT Symptoms             Symptoms: cc Present Absent Comment   Fever/Chills  x  Chills    Fatigue  x     Muscle Aches       Eye Irritation       Sneezing       Nasal Jw/Drg  x  Stuffy, but no drainage    Sinus Pressure/Pain       Loss of smell       Dental pain       Sore Throat  x     Swollen Glands  x     Ear Pain/Fullness       Cough       Wheeze       Chest Pain       Shortness of breath       Rash       Other         Symptom duration:  Tuesday    Symptom severity:  same    Treatments tried:  Dayquil eq     Contacts:  Son just tested positive              Problem list and histories reviewed & adjusted, as indicated.  Additional history: as documented    Patient Active Problem List   Diagnosis     Urgency-frequency syndrome     Fibroid uterus     Health Care Home     Rectovaginal fistula     CARDIOVASCULAR SCREENING; LDL GOAL LESS THAN 130     Oral herpes     Attention deficit hyperactivity disorder (ADHD), predominantly inattentive type     Posterior neck pain     Past Surgical History:   Procedure Laterality Date     BIOPSY  Various    Had a few Leeps and numerous Colposcopys     COLONOSCOPY  2009    Normal     EXC SWEAT GLAND LESN AXILL,SIMPL Right 2009     LEEP TX, CERVICAL  2006    yearly paps     MOUTH SURGERY      wisdom teeth     SOFT TISSUE SURGERY  Various    infected sweat-gland- cyst removed arm,armpit,face     SURGICAL HISTORY OF -   3/2005    Tonsillectomy     SURGICAL HISTORY OF -       infected sweat gland under her arm       Social History   Substance Use Topics     Smoking status: Current Every Day Smoker     Packs/day: 0.50     Years: 15.00     Types: Cigarettes     Start date: 12/1/2014     Last attempt to quit: 1/8/2014     Smokeless tobacco: Never Used     Alcohol use 0.0 oz/week      Comment: 3 drinks weekly- quit with pregnancy     Family History   Problem Relation  Age of Onset     Blood Disease Brother      twin brother-blood clots     Blood Disease Maternal Grandfather      blood clots     HEART DISEASE Maternal Grandfather      valve replacement     Hyperlipidemia Maternal Grandfather      CEREBROVASCULAR DISEASE Maternal Grandfather      Prostate Cancer Maternal Grandfather      Other Cancer Maternal Grandfather      Gum/Jaw/Lymphnodes     Colon Cancer Maternal Grandfather      MENTAL ILLNESS Maternal Grandfather      Alzheimer's     Respiratory Maternal Grandmother      Lung Cancer Maternal Grandmother      DIABETES Maternal Grandmother      type II     Hypertension Maternal Grandmother      Hyperlipidemia Maternal Grandmother      Depression Maternal Grandmother      CANCER Paternal Grandmother      non -hodgkins lymphoma     Lung Cancer Paternal Grandmother      Hypertension Paternal Grandmother      Hyperlipidemia Paternal Grandmother      CEREBROVASCULAR DISEASE Paternal Grandmother      Hypertension Father      Hyperlipidemia Father      Hyperlipidemia Paternal Grandfather      Prostate Cancer Paternal Grandfather      Hypertension Paternal Grandfather      Colon Cancer Paternal Grandfather      Coronary Artery Disease Paternal Grandfather      Breast Cancer Mother      Breast Cancer Other      2 aunt and 3 of my great aunts     Breast Cancer Cousin      Breast Cancer         Current Outpatient Prescriptions   Medication Sig Dispense Refill     amoxicillin (AMOXIL) 875 MG tablet Take 1 tablet (875 mg) by mouth 2 times daily for 10 days 20 tablet 0     amphetamine-dextroamphetamine (ADDERALL) 10 MG per tablet Take 1 tablet (10 mg) by mouth daily 150 tablet 0     [START ON 12/30/2017] amphetamine-dextroamphetamine (ADDERALL) 10 MG per tablet Take 1 tablet (10 mg) by mouth daily 150 tablet 0     [START ON 1/29/2018] amphetamine-dextroamphetamine (ADDERALL) 10 MG per tablet Take 1 tablet (10 mg) by mouth daily 150 tablet 0     amphetamine-dextroamphetamine (ADDERALL) 10  MG per tablet Take 2 pills (20mg) orally in the morning and two pills (20mg) at 11am and one pill (10mg) at 3pm 150 tablet 0     amphetamine-dextroamphetamine (ADDERALL) 10 MG per tablet Take 2 pills (20mg) orally in the morning and two pills (20mg) at 11am and one pill (10mg) at 2pm 150 tablet 0     amphetamine-dextroamphetamine (ADDERALL) 10 MG per tablet Take 2 pills (20mg) orally in the morning and two pills (20mg) at 11am and one pill (10mg) at 2pm 150 tablet 0     clindamycin (CLINDAMAX) 1 % topical gel Apply topically 2 times daily 60 g 11     No Known Allergies  Labs reviewed in EPIC      Reviewed and updated as needed this visit by clinical staffTobacco  Allergies  Meds  Problems  Med Hx  Surg Hx  Fam Hx  Soc Hx        Reviewed and updated as needed this visit by Provider  Allergies  Meds  Problems         ROS:  Constitutional, HEENT, cardiovascular, pulmonary, GI, , musculoskeletal, neuro, skin, endocrine and psych systems are negative, except as otherwise noted.      OBJECTIVE:   /80 (BP Location: Right arm, Cuff Size: Adult Regular)  Pulse 81  Temp 98.3  F (36.8  C) (Tympanic)  Resp 16  Wt 151 lb (68.5 kg)  LMP 11/20/2017 (Exact Date)  BMI 25.13 kg/m2  Body mass index is 25.13 kg/(m^2).   GENERAL: healthy, alert and no distress, nontoxic in appearance  EYES: Eyes grossly normal to inspection, PERRL and conjunctivae and sclerae normal  HENT: ear canals and TM's intact bilaterally and both yellowish red, left> right, nose and mouth without ulcers or lesions  NECK: no adenopathy, supple with full ROM  RESP: lungs clear to auscultation - no rales, rhonchi or wheezes  CV: regular rate and rhythm, normal S1 S2, no S3 or S4, no murmur, click or rub, no peripheral edema   ABDOMEN: soft, nontender, no hepatosplenomegaly, no masses   MS: no gross musculoskeletal defects noted, no edema  No rash    Diagnostic Test Results:  Results for orders placed or performed in visit on 12/15/17 (from  the past 24 hour(s))   Strep, Rapid Screen   Result Value Ref Range    Specimen Description Throat     Rapid Strep A Screen       NEGATIVE: No Group A streptococcal antigen detected by immunoassay, await culture report.       ASSESSMENT/PLAN:     Problem List Items Addressed This Visit     None      Visit Diagnoses     Throat pain    -  Primary    Relevant Orders    Strep, Rapid Screen (Completed)    Beta strep group A culture (Completed)    Bilateral non-suppurative otitis media        Relevant Medications    amoxicillin (AMOXIL) 875 MG tablet               Patient Instructions   Increase rest and fluids. Tylenol and/or Ibuprofen for comfort. Cool mist vaporizer. If your symptoms worsen or do not resolve follow up with your primary care provider in 1-2 weeks and sooner if needed.      Mucinex 600 mg 12 hour formula for ear, head and chest congestion.  It can also thin post nasal drip which can cause a cough and sore throat.    Indications for emergent return to emergency department discussed with patient, who verbalized good understanding and agreement.  Patient understands the limitations of today's evaluation.           Middle Ear Infection (Adult)  You have an infection of the middle ear, the space behind the eardrum. This is also called acute otitis media (AOM). Sometimes it is caused by the common cold. This is because congestion can block the internal passage (eustachian tube) that drains fluid from the middle ear. When the middle ear fills with fluid, bacteria can grow there and cause an infection. Oral antibiotics are used to treat this illness, not ear drops. Symptoms usually start to improve within 1 to 2 days of treatment.    Home care  The following are general care guidelines:    Finish all of the antibiotic medicine given, even though you may feel better after the first few days.    You may use over-the-counter medicine, such as acetaminophen or ibuprofen, to control pain and fever, unless something  else was prescribed. If you have chronic liver or kidney disease or have ever had a stomach ulcer or gastrointestinal bleeding, talk with your healthcare provider before using these medicines. Do not give aspirin to anyone under 18 years of age who has a fever. It may cause severe illness or death.  Follow-up care  Follow up with your healthcare provider, or as advised, in 2 weeks if all symptoms have not gotten better, or if hearing doesn't go back to normal within 1 month.  When to seek medical advice  Call your healthcare provider right away if any of these occur:    Ear pain gets worse or does not improve after 3 days of treatment    Unusual drowsiness or confusion    Neck pain, stiff neck, or headache    Fluid or blood draining from the ear canal    Fever of 100.4 F (38 C) or as advised     Seizure  Date Last Reviewed: 6/1/2016 2000-2017 [employer]. [address] [place] [address]. All rights reserved. This information is not intended as a substitute for professional medical care. Always follow your healthcare professional's instructions.            KATHLEEN Magana [facility]

## 2017-12-16 NOTE — PATIENT INSTRUCTIONS
Increase rest and fluids. Tylenol and/or Ibuprofen for comfort. Cool mist vaporizer. If your symptoms worsen or do not resolve follow up with your primary care provider in 1-2 weeks and sooner if needed.      Mucinex 600 mg 12 hour formula for ear, head and chest congestion.  It can also thin post nasal drip which can cause a cough and sore throat.    Indications for emergent return to emergency department discussed with patient, who verbalized good understanding and agreement.  Patient understands the limitations of today's evaluation.           Middle Ear Infection (Adult)  You have an infection of the middle ear, the space behind the eardrum. This is also called acute otitis media (AOM). Sometimes it is caused by the common cold. This is because congestion can block the internal passage (eustachian tube) that drains fluid from the middle ear. When the middle ear fills with fluid, bacteria can grow there and cause an infection. Oral antibiotics are used to treat this illness, not ear drops. Symptoms usually start to improve within 1 to 2 days of treatment.    Home care  The following are general care guidelines:    Finish all of the antibiotic medicine given, even though you may feel better after the first few days.    You may use over-the-counter medicine, such as acetaminophen or ibuprofen, to control pain and fever, unless something else was prescribed. If you have chronic liver or kidney disease or have ever had a stomach ulcer or gastrointestinal bleeding, talk with your healthcare provider before using these medicines. Do not give aspirin to anyone under 18 years of age who has a fever. It may cause severe illness or death.  Follow-up care  Follow up with your healthcare provider, or as advised, in 2 weeks if all symptoms have not gotten better, or if hearing doesn't go back to normal within 1 month.  When to seek medical advice  Call your healthcare provider right away if any of these occur:    Ear pain  gets worse or does not improve after 3 days of treatment    Unusual drowsiness or confusion    Neck pain, stiff neck, or headache    Fluid or blood draining from the ear canal    Fever of 100.4 F (38 C) or as advised     Seizure  Date Last Reviewed: 6/1/2016 2000-2017 [employer]. [address] [place] [id number]. All rights reserved. This information is not intended as a substitute for professional medical care. Always follow your healthcare professional's instructions.

## 2017-12-16 NOTE — NURSING NOTE
"Chief Complaint   Patient presents with     Pharyngitis       Initial /80 (BP Location: Right arm, Cuff Size: Adult Regular)  Pulse 81  Temp 98.3  F (36.8  C) (Tympanic)  Resp 16  Wt 151 lb (68.5 kg)  LMP 11/20/2017 (Exact Date)  BMI 25.13 kg/m2 Estimated body mass index is 25.13 kg/(m^2) as calculated from the following:    Height as of 11/30/17: 5' 5\" (1.651 m).    Weight as of this encounter: 151 lb (68.5 kg).  Medication Reconciliation: complete    Health Maintenance that is potentially due pending provider review:  NONE    n/a    Is there anyone who you would like to be able to receive your results? Not Applicable  If yes have patient fill out ARAM Casey M.A.      "

## 2018-01-29 ENCOUNTER — TELEPHONE (OUTPATIENT)
Dept: FAMILY MEDICINE | Facility: CLINIC | Age: 33
End: 2018-01-29

## 2018-01-29 NOTE — TELEPHONE ENCOUNTER
Reason for Call:  Other med    Detailed comments: Patient states she had her Rx for Adderall on the counter and her son used it for an art project.  She is asking for a replacement.    Phone Number Patient can be reached at: Home number on file [phone] (home)    Best Time: any    Can we leave a detailed message on this number? YES    Call taken on 1/29/2018 at 2:18 PM by Airam Caballero

## 2018-01-29 NOTE — TELEPHONE ENCOUNTER
Patient states she is aware that [facility] policy does not replace lost or stolen RXs.  She will schedule an appointment.  Ashwini HILL RN

## 2018-02-01 ENCOUNTER — TELEPHONE (OUTPATIENT)
Dept: FAMILY MEDICINE | Facility: CLINIC | Age: 33
End: 2018-02-01

## 2018-02-01 DIAGNOSIS — F90.0 ATTENTION DEFICIT HYPERACTIVITY DISORDER (ADHD), PREDOMINANTLY INATTENTIVE TYPE: ICD-10-CM

## 2018-02-01 RX ORDER — DEXTROAMPHETAMINE SACCHARATE, AMPHETAMINE ASPARTATE, DEXTROAMPHETAMINE SULFATE AND AMPHETAMINE SULFATE 2.5; 2.5; 2.5; 2.5 MG/1; MG/1; MG/1; MG/1
TABLET ORAL
Qty: 150 TABLET | Refills: 0 | Status: SHIPPED | OUTPATIENT
Start: 2018-04-02 | End: 2018-05-01

## 2018-02-01 RX ORDER — DEXTROAMPHETAMINE SACCHARATE, AMPHETAMINE ASPARTATE, DEXTROAMPHETAMINE SULFATE AND AMPHETAMINE SULFATE 2.5; 2.5; 2.5; 2.5 MG/1; MG/1; MG/1; MG/1
TABLET ORAL
Qty: 150 TABLET | Refills: 0 | Status: SHIPPED | OUTPATIENT
Start: 2018-03-03 | End: 2018-05-01

## 2018-02-01 RX ORDER — DEXTROAMPHETAMINE SACCHARATE, AMPHETAMINE ASPARTATE, DEXTROAMPHETAMINE SULFATE AND AMPHETAMINE SULFATE 2.5; 2.5; 2.5; 2.5 MG/1; MG/1; MG/1; MG/1
TABLET ORAL
Qty: 150 TABLET | Refills: 0 | Status: SHIPPED | OUTPATIENT
Start: 2018-02-01 | End: 2018-05-01

## 2018-02-01 NOTE — TELEPHONE ENCOUNTER
Please notify patient that I will print the 3 months of the adderall refills that she can .  Please return to clinic for refills after that. Sorry to have to cancel on her again.    Thanks,  Randy Graf MD

## 2018-02-06 ENCOUNTER — HOSPITAL ENCOUNTER (EMERGENCY)
Facility: CLINIC | Age: 33
Discharge: HOME | End: 2018-02-06
Attending: PHYSICIAN ASSISTANT | Admitting: PHYSICIAN ASSISTANT
Payer: COMMERCIAL

## 2018-02-06 ENCOUNTER — APPOINTMENT (OUTPATIENT)
Dept: GENERAL RADIOLOGY | Facility: CLINIC | Age: 33
End: 2018-02-06
Attending: PHYSICIAN ASSISTANT
Payer: COMMERCIAL

## 2018-02-06 ENCOUNTER — TELEPHONE (OUTPATIENT)
Dept: FAMILY MEDICINE | Facility: CLINIC | Age: 33
End: 2018-02-06

## 2018-02-06 VITALS
WEIGHT: 140 LBS | SYSTOLIC BLOOD PRESSURE: 137 MMHG | DIASTOLIC BLOOD PRESSURE: 91 MMHG | BODY MASS INDEX: 23.32 KG/M2 | OXYGEN SATURATION: 98 % | HEIGHT: 65 IN | TEMPERATURE: 97.9 F | RESPIRATION RATE: 16 BRPM

## 2018-02-06 DIAGNOSIS — J11.1 INFLUENZA-LIKE ILLNESS: Primary | ICD-10-CM

## 2018-02-06 LAB
INTERNAL QC OK POCT: YES
S PYO AG THROAT QL IA.RAPID: NEGATIVE

## 2018-02-06 PROCEDURE — 87081 CULTURE SCREEN ONLY: CPT | Performed by: PHYSICIAN ASSISTANT

## 2018-02-06 PROCEDURE — 71046 X-RAY EXAM CHEST 2 VIEWS: CPT

## 2018-02-06 PROCEDURE — 99213 OFFICE O/P EST LOW 20 MIN: CPT | Performed by: PHYSICIAN ASSISTANT

## 2018-02-06 PROCEDURE — G0463 HOSPITAL OUTPT CLINIC VISIT: HCPCS | Mod: 25

## 2018-02-06 PROCEDURE — 87880 STREP A ASSAY W/OPTIC: CPT | Performed by: PHYSICIAN ASSISTANT

## 2018-02-06 ASSESSMENT — ENCOUNTER SYMPTOMS
HEADACHES: 1
ARTHRALGIAS: 1
CHILLS: 1
COUGH: 1
SORE THROAT: 1
CARDIOVASCULAR NEGATIVE: 1
FEVER: 1
FATIGUE: 1
GASTROINTESTINAL NEGATIVE: 1

## 2018-02-06 NOTE — ED AVS SNAPSHOT
[facility] Emergency Department    [address] [facility] [address]    Phone:  [phone]    Fax:  [phone]                                       Ángela Zurita   MRN: [id number]    Department:  [facility] Emergency Department   Date of Visit:  2/6/2018           Patient Information     Date Of Birth          1985        Your diagnoses for this visit were:     Influenza-like illness        You were seen by Elizabeth Campbell PA-C.      Follow-up Information     Follow up with Randy Graf MD. Call in 5 days.    Specialty:  Family Practice    Why:  As needed, For persistent symptoms    Contact information:    [address] [place] [address]  [phone]          Follow up with [facility] Emergency [facility].    Specialty:  EMERGENCY MEDICINE    Why:  As needed, If symptoms worsen    Contact information:    [address] [place] [id number] [phone]    Additional information:    The medical center is located at   [address] [place] [address] (between [facility] and   [place] [address] in [place], [place] north   of [place]).      Discharge References/Attachments     (ADULT), INFLUENZA (ENGLISH)      Future Appointments        Provider Department Dept Phone Center    2/23/2018 6:40 AM Randy Graf MD [facility] [phone] [facility]      24 Hour Appointment Hotline       To make an appointment at any [facility], call [phone] ([phone]). If you don't have a family doctor or clinic, we will help you find one. [facility] are conveniently located to serve the needs of you and your family.             Review of your medicines      Our records show that you are taking the medicines listed below. If these are incorrect, please call your family doctor or clinic.        Dose / Directions Last dose taken    * amphetamine-dextroamphetamine 10 MG per tablet   Commonly known as:  ADDERALL   Dose:  10 mg   Quantity:  150 tablet         Take 1 tablet (10 mg) by mouth daily   Refills:  0        * amphetamine-dextroamphetamine 10 MG per tablet   Commonly known as:  ADDERALL   Quantity:  150 tablet        Take 2 pills (20mg) orally in the morning and two pills (20mg) at 11am and one pill (10mg) at 2pm   Refills:  0        * amphetamine-dextroamphetamine 10 MG per tablet   Commonly known as:  ADDERALL   Quantity:  150 tablet   Start taking on:  3/3/2018        Take 2 pills (20mg) orally in the morning and two pills (20mg) at 11am and one pill (10mg) at 2pm   Refills:  0        * amphetamine-dextroamphetamine 10 MG per tablet   Commonly known as:  ADDERALL   Quantity:  150 tablet   Start taking on:  4/2/2018        Take 2 pills (20mg) orally in the morning and two pills (20mg) at 11am and one pill (10mg) at 2pm   Refills:  0        clindamycin 1 % topical gel   Commonly known as:  CLINDAMAX   Quantity:  60 g        Apply topically 2 times daily   Refills:  11        * Notice:  This list has 4 medication(s) that are the same as other medications prescribed for you. Read the directions carefully, and ask your doctor or other care provider to review them with you.            Procedures and tests performed during your visit     Beta strep group A r/o culture    Rapid strep group A screen POCT    XR Chest 2 Views      Orders Needing Specimen Collection     None      Pending Results     No orders found from 2/4/2018 to 2/7/2018.            Pending Culture Results     No orders found from 2/4/2018 to 2/7/2018.            Pending Results Instructions     If you had any lab results that were not finalized at the time of your Discharge, you can call the ED Lab Result RN at [phone]. You will be contacted by this team for any positive Lab results or changes in treatment. The nurses are available 7 days a week from 10A to 6:30P.  You can leave a message 24 hours per day and they will return your call.        Test Results From Your Hospital Stay        2/6/2018   2:28 PM      Narrative     XR CHEST 2 VW 2/6/2018 2:12 PM    HISTORY: Cough and fever.    COMPARISON: 6/16/2016    FINDINGS: No airspace consolidation, pleural effusion or pneumothorax.  Normal heart size.        Impression     IMPRESSION: No acute cardiopulmonary abnormality.    VON PENDLETON MD         2/6/2018  1:57 PM      Component Results     Component Value Ref Range & Units Status    Rapid Strep A Screen NEGATIVE neg Final    Internal QC OK Yes  Final                Thank you for choosing [facility]       Thank you for choosing [facility] for your care. Our goal is always to provide you with excellent care. Hearing back from our patients is one way we can continue to improve our services. Please take a few minutes to complete the written survey that you may receive in the mail after you visit with us. Thank you!        [employer] Information     [employer] gives you secure access to your electronic health record. If you see a primary care provider, you can also send messages to your care team and make appointments. If you have questions, please call your primary care clinic.  If you do not have a primary care provider, please call [phone] and they will assist you.        Care EveryWhere ID     This is your Care EveryWhere ID. This could be used by other organizations to access your [facility] medical records  [id number]        Equal Access to Services     SKY BLANCHARD : Hadii jordan Cloud, waaxda lujw, qaybta kaalmada case, celia elizabeth. So [facility] [phone].    ATENCIÓN: Si habla español, tiene a reyez disposición servicios gratuitos de asistencia lingüística. Llame al [phone].    We comply with applicable federal civil rights laws and [place] laws. We do not discriminate on the basis of race, color, national origin, age, disability, sex, sexual orientation, or gender identity.            After Visit Summary       This is your record. Keep this with you and  show to your community pharmacist(s) and doctor(s) at your next visit.

## 2018-02-06 NOTE — ED AVS SNAPSHOT
[facility] Emergency Department    [address] [facility] [address]    Phone:  [phone]    Fax:  [phone]                                       Ángela Zurita   MRN: [id number]    Department:  [facility] Emergency Department   Date of Visit:  2/6/2018           After Visit Summary Signature Page     I have received my discharge instructions, and my questions have been answered. I have discussed any challenges I see with this plan with the nurse or doctor.    ..........................................................................................................................................  Patient/Patient Representative Signature      ..........................................................................................................................................  Patient Representative Print Name and Relationship to Patient    ..................................................               ................................................  Date                                            Time    ..........................................................................................................................................  Reviewed by Signature/Title    ...................................................              ..............................................  Date                                                            Time

## 2018-02-06 NOTE — TELEPHONE ENCOUNTER
Reason for call:  Patient reporting a symptom    Symptom or request: flu symptoms    Duration (how long have symptoms been present): Friday    Have you been treated for this before? No    Additional comments: pt calling stating she started having flu symptoms on Friday. Body aches, fever, chills and cough. She is wondering if she needs to be seen. Pt's son diagnosed with Influenza A on Friday. This is the 3 rd time she's been sick since Jan 19th.     Phone Number patient can be reached at:  Home number on file [phone] (home)    Best Time:  any    Can we leave a detailed message on this number:  YES    Call taken on 2/6/2018 at 11:20 AM by Charlene Mcallister

## 2018-02-06 NOTE — ED PROVIDER NOTES
History     Chief Complaint   Patient presents with     Influenza     going on for 3 weeks, son DX on Friday      HPI  Ángela Zurita is a 33 year old female who presents with complaints of fevers, chills, fatigue, headaches, body aches, congestion, sore throat, and cough for the past 4 days.  Fevers have been up to 102-103*F.  Pt's son is ill with Influenza A currently.  Pt states that she has experienced influenza like illnesses three times over the past 3 weeks.  She reports that she gets better and then catches it again.  Denies rash, chest pain, or shortness of breath.  Patient received the influenza vaccination this flu season.      Problem List:    Patient Active Problem List    Diagnosis Date Noted     Posterior neck pain 09/23/2017     Priority: Medium     Attention deficit hyperactivity disorder (ADHD), predominantly inattentive type 06/10/2016     Priority: Medium     Patient is followed by WILMAR WHEAT for ongoing prescription of stimulants.  All refills should be approved by this provider, or covering partner.    Medication(s): Adderall 10mg immediate release   Maximum quantity per month: 150  Clinic visit frequency required: Q 3 month     Controlled substance agreement on file: Yes 6/30/16  Neuropsych evaluation for ADD completed:  Yes, completed 2/2012 at [facility], on file and diagnosis confirmed    Last [facility] website verification:  done on 6/19/2016   [email or web address]           Oral herpes 03/21/2016     Priority: Medium     CARDIOVASCULAR SCREENING; LDL GOAL LESS THAN 130 08/12/2015     Priority: Medium     Rectovaginal fistula 01/26/2015     Priority: Medium     Has had consult with colorectal surgeon; opting to defer treatment until done with childbearing  Amenable to c/section with next pregnancy       Health Care Home 12/18/2014     Priority: Medium     Status:  Unable to reach  Care Coordinator:  Maira Starr    See Letters for HC Care Plan  Date:  December 18, 2014          Fibroid uterus 07/02/2014     Priority: Medium     Anterior intramural lower.  Right by bladder       Urgency-frequency syndrome 03/24/2014     Priority: Medium        Past Medical History:    Past Medical History:   Diagnosis Date     Cervical dysplasia      Chickenpox      Exercise-induced asthma      IBS (irritable bowel syndrome)      Tonsillitis 2/13/2014       Past Surgical History:    Past Surgical History:   Procedure Laterality Date     BIOPSY  Various    Had a few Leeps and numerous Colposcopys     COLONOSCOPY  2009    Normal     EXC SWEAT GLAND LESN AXILL,SIMPL Right 2009     LEEP TX, CERVICAL  2006    yearly paps     MOUTH SURGERY      wisdom teeth     SOFT TISSUE SURGERY  Various    infected sweat-gland- cyst removed arm,armpit,face     SURGICAL HISTORY OF -   3/2005    Tonsillectomy     SURGICAL HISTORY OF -       infected sweat gland under her arm       Family History:    Family History   Problem Relation Age of Onset     Blood Disease Brother      twin brother-blood clots     Blood Disease Maternal Grandfather      blood clots     HEART DISEASE Maternal Grandfather      valve replacement     Hyperlipidemia Maternal Grandfather      CEREBROVASCULAR DISEASE Maternal Grandfather      Prostate Cancer Maternal Grandfather      Other Cancer Maternal Grandfather      Gum/Jaw/Lymphnodes     Colon Cancer Maternal Grandfather      MENTAL ILLNESS Maternal Grandfather      Alzheimer's     Respiratory Maternal Grandmother      Lung Cancer Maternal Grandmother      DIABETES Maternal Grandmother      type II     Hypertension Maternal Grandmother      Hyperlipidemia Maternal Grandmother      Depression Maternal Grandmother      CANCER Paternal Grandmother      non -hodgkins lymphoma     Lung Cancer Paternal Grandmother      Hypertension Paternal Grandmother      Hyperlipidemia Paternal Grandmother      CEREBROVASCULAR DISEASE Paternal Grandmother      Hypertension Father      Hyperlipidemia Father       "Hyperlipidemia Paternal Grandfather      Prostate Cancer Paternal Grandfather      Hypertension Paternal Grandfather      Colon Cancer Paternal Grandfather      Coronary Artery Disease Paternal Grandfather      Breast Cancer Mother      Breast Cancer Other      2 aunt and 3 of my great aunts     Breast Cancer Cousin      Breast Cancer       Social History:  Marital Status:  Single [1]  Social History   Substance Use Topics     Smoking status: Current Every Day Smoker     Packs/day: 0.50     Years: 15.00     Types: Cigarettes     Start date: 12/1/2014     Last attempt to quit: 1/8/2014     Smokeless tobacco: Never Used     Alcohol use 0.0 oz/week      Comment: 3 drinks weekly- quit with pregnancy        Medications:      [START ON 4/2/2018] amphetamine-dextroamphetamine (ADDERALL) 10 MG per tablet   [START ON 3/3/2018] amphetamine-dextroamphetamine (ADDERALL) 10 MG per tablet   amphetamine-dextroamphetamine (ADDERALL) 10 MG per tablet   amphetamine-dextroamphetamine (ADDERALL) 10 MG per tablet   clindamycin (CLINDAMAX) 1 % topical gel         Review of Systems   Constitutional: Positive for chills, fatigue and fever.   HENT: Positive for congestion and sore throat.    Respiratory: Positive for cough.    Cardiovascular: Negative.    Gastrointestinal: Negative.    Musculoskeletal: Positive for arthralgias.   Skin: Negative.  Negative for rash.   Neurological: Positive for headaches.   All other systems reviewed and are negative.      Physical Exam   BP: (!) 137/91  Heart Rate: 85  Temp: 97.9  F (36.6  C)  Resp: 16  Height: 165.1 cm (5' 5\")  Weight: 63.5 kg (140 lb)  SpO2: 98 %      Physical Exam   Constitutional: She is oriented to person, place, and time. She appears well-developed and well-nourished.  Non-toxic appearance. She does not have a sickly appearance. She appears ill. No distress.   HENT:   Head: Normocephalic and atraumatic.   Right Ear: Tympanic membrane, external ear and ear canal normal.   Left Ear: " Tympanic membrane, external ear and ear canal normal.   Nose: Mucosal edema and rhinorrhea present.   Mouth/Throat: Mucous membranes are normal. No uvula swelling. Posterior oropharyngeal erythema present. No oropharyngeal exudate, posterior oropharyngeal edema or tonsillar abscesses.   Eyes: Conjunctivae and EOM are normal. Pupils are equal, round, and reactive to light.   Neck: Normal range of motion and full passive range of motion without pain. Neck supple. No rigidity. Normal range of motion present.   Cardiovascular: Normal rate, regular rhythm and normal heart sounds.    Pulmonary/Chest: Effort normal and breath sounds normal. No respiratory distress. She has no wheezes. She has no rales.   Lymphadenopathy:     She has no cervical adenopathy.   Neurological: She is alert and oriented to person, place, and time.   Skin: Skin is warm and dry. No rash noted.       ED Course     ED Course     Procedures    Results for orders placed or performed during the hospital encounter of 02/06/18   XR Chest 2 Views    Narrative    XR CHEST 2 VW 2/6/2018 2:12 PM    HISTORY: Cough and fever.    COMPARISON: 6/16/2016    FINDINGS: No airspace consolidation, pleural effusion or pneumothorax.  Normal heart size.      Impression    IMPRESSION: No acute cardiopulmonary abnormality.    VON PENDLETON MD   Rapid strep group A screen POCT   Result Value Ref Range    Rapid Strep A Screen NEGATIVE neg    Internal QC OK Yes        Assessments & Plan (with Medical Decision Making)     Pt is a 33 year old female who presents with complaints of fevers, chills, fatigue, headaches, body aches, congestion, sore throat, and cough for the past 4 days.  Fevers have been up to 102-103*F.  Pt's son is ill with Influenza A currently.  Pt states that she has experienced influenza like illnesses three times over the past 3 weeks.  She reports that she gets better and then catches it again.  Pt is afebrile on arrival.  Exam as above.  Rapid strep was  negative.  Culture is pending.  CXR was negative for pneumonia or acute pathology.  Discussed results with patient.  Suspect patient has influenza.  Influenza is very prevalent in the community.  Discussed treatment options with patient.  Will not treat with Tamiflu as patient is otherwise healthy (no underlying lung or chronic disease) and is out of the window for treatment (symptoms have been present for at least 4 days).  Return precautions reviewed.  Hand-outs provided.    Patient was instructed to follow-up with PCP if no improvement in 5-7 days for continued care and management or sooner if new or worsening symptoms.  She is to return to the ED for persistent and/or worsening symptoms.  Patient expressed understanding of the diagnosis and plan and was discharged home in good condition.    I have reviewed the nursing notes.    I have reviewed the findings, diagnosis, plan and need for follow up with the patient.    Discharge Medication List as of 2/6/2018  2:38 PM          Final diagnoses:   Influenza-like illness       2/6/2018   [facility] EMERGENCY DEPARTMENT     Elizabeth Campbell PA-C  02/06/18 1458       Elizabeth Campbell PA-C  02/06/18 1458

## 2018-02-06 NOTE — TELEPHONE ENCOUNTER
Influenza-Like Illness (HEIDI) Protocol    Ángela Zurita      Age: 33 year old     YOB: 1985    Are you currently sick or have you had close contact with someone who is currently sick?   Yes, this patient is currently sick.     Adult Clinical Evaluation    Is this patient experiencing ANY of the following?  Unconsciousness or unresponsiveness No   Difficulty breathing or swallowing No   Blue or dusky lips, skin, or nail beds No   Chest pain No   Severe confusion or delirium No   Seizure activity: ongoing or stopped No   Severe dehydration or signs of shock No   Patient sounds very sick on the phone No     Is this patient experiencing ANY of the following?  Fever > 104 or shaking chills No   Wheezing with minimal response to usual wheezing medications or new wheezing No   Repeated vomiting or diarrhea with signs of dehydration (no urination within last 12 hours) No   Flu-like symptoms that initially improved but returned with fever and a worse cough Yes.  Patient advised to be taken to the ED now.   Stiff or painful neck No   Severe headache No     Does the patient have any of the following?  Measured fever > 100 degrees Yes   Chills or feels very warm to the touch Yes   Cough Yes   Sore throat Yes   Muscle/ body aches Yes   Headaches Yes   Fatigue (tiredness) Yes     Nursing Plan  Advised to go to ED. She states she understands and agrees with plan    Provided home care instructions    General home care instruction:      Avoid contact with people in your household who are at increased risk for more severe complications of influenza (such as pregnant women or people who have a chronic health condition, for example diabetes, heart disease, asthma, or emphysema).    Stay home from work, school, childcare or other public places until your fever (37.8 degrees Celsius [100 degrees Fahrenheit]) has been gone for at least 24 hours, except to seek medical care. (Fever should be gone without the use of  fever-reducing medications.) Use a surgical mask if available, or cover your mouth and nose with a tissue if possible if you need to seek medical care. Contact your work place, school, or  as they may have longer exclusion times.    You may continue to shed virus after your fever is gone. Limit your contact with high-risk individuals for 10 days after your symptoms started and be especially careful to cover your coughs/sneezes and wash your hands.    Cover your cough and wash your hands often, and especially after coughing, sneezing, blowing your nose.    Drink plenty of fluids (such as water, broth, sports drinks, electrolyte beverages for children) to prevent dehydration.    Avoid tobacco and second hand smoke.    Get plenty of rest.    Use over-the-counter pain relievers as needed per  instructions.    Do not give aspirin (acetylsalicylic acid) or products that contain aspirin (e.g. bismuth subsalicylate - Pepto Bismol) to children or teenagers 18 years or younger.    A small number of people with influenza do not have fever. If you have respiratory symptoms and are at increased risk for complications of influenza, contact your health care provider to discuss these symptoms.    For parents of infants:    If possible, only family members who are not sick should care for infants.    Wash your hands with soap and water, or an alcohol-based hand rub (if your hands are not visibly soiled) before caring for your infant.    Cover your mouth and nose with a tissue when coughing or sneezing, and clean your hands.    Contact a health care provider to discuss your illness within 1-2 days if you are    Pregnant    Immunocompromised    Call 911 if you experience:    Difficulty breathing or shortness of breath    Pain or pressure in the chest    Confusion or less responsive than normal    Seizure activity: ongoing or stopped    Severe dehydration or signs of shock     Blue or dusky lips, skin, or nail  beds    If further questions/concerns or if new symptoms develop, call your PCP or [facility] as soon as possible.    When to seek medical attention    Contact your health care provider right away if you experience:    A painful sore throat accompanied by fever persists for more than 48 hours    Ear pain, sinus pain, persistent vomiting and/or diarrhea    Oral temperature greater than 104  Fahrenheit (40  Celsius)    Dehydration (e.g., mouth feeling dry, dizzy when sitting/standing, decreased urine output)    Severe or persistent vomiting; unable to keep fluids down    Improvement in flu-like symptoms (fever and cough or sore throat) but then return of fever and worse cough or sore throat    Not drinking enough fluid    Any other concerns not stated above      Additional educational resources include:    http://www.[employer].com    http://www.cdc.gov/flu/  Angela Cabral

## 2018-02-08 LAB
BACTERIA SPEC CULT: NORMAL
SPECIMEN SOURCE: NORMAL

## 2018-03-16 ENCOUNTER — OFFICE VISIT (OUTPATIENT)
Dept: FAMILY MEDICINE | Facility: CLINIC | Age: 33
End: 2018-03-16
Payer: COMMERCIAL

## 2018-03-16 VITALS
SYSTOLIC BLOOD PRESSURE: 114 MMHG | DIASTOLIC BLOOD PRESSURE: 73 MMHG | HEART RATE: 86 BPM | HEIGHT: 65 IN | BODY MASS INDEX: 23.66 KG/M2 | TEMPERATURE: 98.7 F | RESPIRATION RATE: 16 BRPM | WEIGHT: 142 LBS

## 2018-03-16 DIAGNOSIS — Z87.42 HISTORY OF OVARIAN CYST: ICD-10-CM

## 2018-03-16 DIAGNOSIS — R30.0 DYSURIA: Primary | ICD-10-CM

## 2018-03-16 DIAGNOSIS — R10.84 ABDOMINAL PAIN, GENERALIZED: ICD-10-CM

## 2018-03-16 DIAGNOSIS — N76.0 VAGINITIS AND VULVOVAGINITIS: ICD-10-CM

## 2018-03-16 DIAGNOSIS — N82.3 RECTOVAGINAL FISTULA: ICD-10-CM

## 2018-03-16 LAB
ALBUMIN UR-MCNC: ABNORMAL MG/DL
APPEARANCE UR: ABNORMAL
BACTERIA #/AREA URNS HPF: ABNORMAL /HPF
BILIRUB UR QL STRIP: ABNORMAL
COLOR UR AUTO: ABNORMAL
GLUCOSE UR STRIP-MCNC: NEGATIVE MG/DL
HGB UR QL STRIP: NEGATIVE
KETONES UR STRIP-MCNC: 15 MG/DL
LEUKOCYTE ESTERASE UR QL STRIP: NEGATIVE
MUCOUS THREADS #/AREA URNS LPF: PRESENT /LPF
NITRATE UR QL: NEGATIVE
NON-SQ EPI CELLS #/AREA URNS LPF: ABNORMAL /LPF
PH UR STRIP: 5.5 PH (ref 5–7)
RBC #/AREA URNS AUTO: ABNORMAL /HPF
SOURCE: ABNORMAL
SP GR UR STRIP: >1.03 (ref 1–1.03)
SPECIMEN SOURCE: ABNORMAL
UROBILINOGEN UR STRIP-ACNC: 0.2 EU/DL (ref 0.2–1)
WBC #/AREA URNS AUTO: ABNORMAL /HPF
WET PREP SPEC: ABNORMAL

## 2018-03-16 PROCEDURE — 81001 URINALYSIS AUTO W/SCOPE: CPT | Performed by: FAMILY MEDICINE

## 2018-03-16 PROCEDURE — 87210 SMEAR WET MOUNT SALINE/INK: CPT | Performed by: FAMILY MEDICINE

## 2018-03-16 PROCEDURE — 87086 URINE CULTURE/COLONY COUNT: CPT | Performed by: FAMILY MEDICINE

## 2018-03-16 PROCEDURE — 99214 OFFICE O/P EST MOD 30 MIN: CPT | Performed by: FAMILY MEDICINE

## 2018-03-16 RX ORDER — METRONIDAZOLE 500 MG/1
500 TABLET ORAL 2 TIMES DAILY
Qty: 20 TABLET | Refills: 0 | Status: ON HOLD | OUTPATIENT
Start: 2018-03-16 | End: 2019-01-29

## 2018-03-16 NOTE — PROGRESS NOTES
SUBJECTIVE:   Ángela Zurita is a 33 year old female who presents to clinic today for the following health issues:      URINARY TRACT SYMPTOMS      Duration: 3-4 DAYS    Description  frequency and urgency    Intensity:  moderate    Accompanying signs and symptoms:  Fever/chills: YES  Flank pain YES  Nausea and vomiting: YES  Vaginal symptoms: discharge, odor and dyspareunia (pain in labia/pelvis with intercourse)  Abdominal/Pelvic Pain: YES    History  History of frequent UTI's: YES- PAST  History of kidney stones: no   Sexually Active: YES  Possibility of pregnancy: No    Precipitating or alleviating factors: she is having nipple discharge every other month. She has a history of ovarian cyst and this feels like that.    Therapies tried and outcome: ibuprofen   Outcome: not helping          Problem list and histories reviewed & adjusted, as indicated.  Additional history: 4th degree tear with delivery 2 and 1/2 years ago, has not gotten fixed, has intermittent accidents.    Patient Active Problem List   Diagnosis     Urgency-frequency syndrome     Fibroid uterus     Health Care Home     Rectovaginal fistula     CARDIOVASCULAR SCREENING; LDL GOAL LESS THAN 130     Oral herpes     Attention deficit hyperactivity disorder (ADHD), predominantly inattentive type     Posterior neck pain     Past Surgical History:   Procedure Laterality Date     BIOPSY  Various    Had a few Leeps and numerous Colposcopys     COLONOSCOPY  2009    Normal     EXC SWEAT GLAND LESN AXILL,SIMPL Right 2009     LEEP TX, CERVICAL  2006    yearly paps     MOUTH SURGERY      wisdom teeth     SOFT TISSUE SURGERY  Various    infected sweat-gland- cyst removed arm,armpit,face     SURGICAL HISTORY OF -   3/2005    Tonsillectomy     SURGICAL HISTORY OF -       infected sweat gland under her arm       Social History   Substance Use Topics     Smoking status: Current Every Day Smoker     Packs/day: 0.50     Years: 15.00     Types: Cigarettes     Start  date: 12/1/2014     Last attempt to quit: 1/8/2014     Smokeless tobacco: Never Used     Alcohol use 0.0 oz/week      Comment: 3 drinks weekly- quit with pregnancy     Family History   Problem Relation Age of Onset     Blood Disease Brother      twin brother-blood clots     Blood Disease Maternal Grandfather      blood clots     HEART DISEASE Maternal Grandfather      valve replacement     Hyperlipidemia Maternal Grandfather      CEREBROVASCULAR DISEASE Maternal Grandfather      Prostate Cancer Maternal Grandfather      Other Cancer Maternal Grandfather      Gum/Jaw/Lymphnodes     Colon Cancer Maternal Grandfather      MENTAL ILLNESS Maternal Grandfather      Alzheimer's     Respiratory Maternal Grandmother      Lung Cancer Maternal Grandmother      DIABETES Maternal Grandmother      type II     Hypertension Maternal Grandmother      Hyperlipidemia Maternal Grandmother      Depression Maternal Grandmother      CANCER Paternal Grandmother      non -hodgkins lymphoma     Lung Cancer Paternal Grandmother      Hypertension Paternal Grandmother      Hyperlipidemia Paternal Grandmother      CEREBROVASCULAR DISEASE Paternal Grandmother      Hypertension Father      Hyperlipidemia Father      Hyperlipidemia Paternal Grandfather      Prostate Cancer Paternal Grandfather      Hypertension Paternal Grandfather      Colon Cancer Paternal Grandfather      Coronary Artery Disease Paternal Grandfather      Breast Cancer Mother      Breast Cancer Other      2 aunt and 3 of my great aunts     Breast Cancer Cousin      Breast Cancer         Current Outpatient Prescriptions   Medication Sig Dispense Refill     metroNIDAZOLE (FLAGYL) 500 MG tablet Take 1 tablet (500 mg) by mouth 2 times daily for 10 days 20 tablet 0     amphetamine-dextroamphetamine (ADDERALL) 10 MG per tablet Take 2 pills (20mg) orally in the morning and two pills (20mg) at 11am and one pill (10mg) at 2pm 150 tablet 0     clindamycin (CLINDAMAX) 1 % topical gel Apply  "topically 2 times daily 60 g 11     [START ON 4/2/2018] amphetamine-dextroamphetamine (ADDERALL) 10 MG per tablet Take 2 pills (20mg) orally in the morning and two pills (20mg) at 11am and one pill (10mg) at 2pm 150 tablet 0     No Known Allergies  Recent Labs   Lab Test  09/23/17   1915  09/22/17   1530  08/14/17   1835  06/16/16   1305   05/29/14   1152   ALT  22   --   23  30   < >   --    CR  0.87  0.68  0.63  0.63   < >  0.64   GFRESTIMATED  75  >90  >90  Non African American GFR Calc    >90  Non  GFR Calc     < >  >90   GFRESTBLACK  >90  >90  >90  African American GFR Calc    >90   GFR Calc     < >  >90   POTASSIUM  3.3*  4.1  3.8  3.8   < >  3.8   TSH   --    --    --    --    --   1.25    < > = values in this interval not displayed.      BP Readings from Last 3 Encounters:   03/16/18 114/73   02/06/18 (!) 137/91   12/15/17 126/80    Wt Readings from Last 3 Encounters:   03/16/18 142 lb (64.4 kg)   02/06/18 140 lb (63.5 kg)   12/15/17 151 lb (68.5 kg)         ROS:  Constitutional, HEENT, cardiovascular, pulmonary, gi and gu systems are negative, except as otherwise noted.    OBJECTIVE:                                                    /73 (BP Location: Left arm, Patient Position: Chair, Cuff Size: Adult Regular)  Pulse 86  Temp 98.7  F (37.1  C) (Tympanic)  Resp 16  Ht 5' 5\" (1.651 m)  Wt 142 lb (64.4 kg)  BMI 23.63 kg/m2  GENERAL APPEARANCE ADULT: Alert, no acute distress   (female): external genitalia abnormal bright pink vulva and vagina, vaginal discharge grey, anus appears normal  PSYCH: mentation appears normal., affect and mood normal  Diagnostic Test Results:  Results for orders placed or performed in visit on 03/16/18   UA reflex to Microscopic and Culture   Result Value Ref Range    Color Urine Alix     Appearance Urine Slightly Cloudy     Glucose Urine Negative NEG^Negative mg/dL    Bilirubin Urine Small (A) NEG^Negative    Ketones Urine 15 (A) " NEG^Negative mg/dL    Specific Gravity Urine >1.030 1.003 - 1.035    Blood Urine Negative NEG^Negative    pH Urine 5.5 5.0 - 7.0 pH    Protein Albumin Urine Trace (A) NEG^Negative mg/dL    Urobilinogen Urine 0.2 0.2 - 1.0 EU/dL    Nitrite Urine Negative NEG^Negative    Leukocyte Esterase Urine Negative NEG^Negative    Source Midstream Urine    Urine Microscopic   Result Value Ref Range    WBC Urine 5-10 (A) OTO5^0 - 5 /HPF    RBC Urine O - 2 OTO2^O - 2 /HPF    Squamous Epithelial /LPF Urine Many (A) FEW^Few /LPF    Bacteria Urine Few (A) NEG^Negative /HPF    Mucous Urine Present (A) NEG^Negative /LPF   Wet prep   Result Value Ref Range    Specimen Description Vagina     Wet Prep No yeast seen     Wet Prep Clue cells seen (A)     Wet Prep No Trichomonas seen         ASSESSMENT/PLAN:                                                    1. Dysuria  Await urine culture before treating.  - UA reflex to Microscopic and Culture  - Urine Microscopic  - Urine Culture Aerobic Bacterial    2. Vaginitis and vulvovaginitis  Bacterial vaginosis, treated with flagly  - Wet prep    5. Rectovaginal fistula  Recommend seeing Dr. Poe for repair.    3. Abdominal pain, generalized  - US Pelvic Complete with Transvaginal; Future    4. History of ovarian cyst  - US Pelvic Complete with Transvaginal; Future    Maddie Wiley MD  [facility]

## 2018-03-16 NOTE — NURSING NOTE
"Chief Complaint   Patient presents with     Urinary Problem       Initial /73 (BP Location: Left arm, Patient Position: Chair, Cuff Size: Adult Regular)  Pulse 86  Temp 98.7  F (37.1  C) (Tympanic)  Resp 16  Ht 5' 5\" (1.651 m)  Wt 142 lb (64.4 kg)  BMI 23.63 kg/m2 Estimated body mass index is 23.63 kg/(m^2) as calculated from the following:    Height as of this encounter: 5' 5\" (1.651 m).    Weight as of this encounter: 142 lb (64.4 kg).  Medication Reconciliation: complete  "

## 2018-03-16 NOTE — MR AVS SNAPSHOT
After Visit Summary   3/16/2018    Ángela Zurita    MRN: [id number]           Patient Information     Date Of Birth          1985        Visit Information        Provider Department      3/16/2018 2:20 PM Maddie Wiley MD [facility]        Today's Diagnoses     Dysuria    -  1    Vaginitis and vulvovaginitis        Abdominal pain, generalized        History of ovarian cyst           Follow-ups after your visit        Future tests that were ordered for you today     Open Future Orders        Priority Expected Expires Ordered    US Pelvic Complete with Transvaginal Routine  3/16/2019 3/16/2018            Who to contact     If you have questions or need follow up information about today's clinic visit or your schedule please contact [facility] directly at [phone].  Normal or non-critical lab and imaging results will be communicated to you by MyChart, letter or phone within 4 business days after the clinic has received the results. If you do not hear from us within 7 days, please contact the clinic through MyChart or phone. If you have a critical or abnormal lab result, we will notify you by phone as soon as possible.  Submit refill requests through [employer] or call your pharmacy and they will forward the refill request to us. Please allow 3 business days for your refill to be completed.          Additional Information About Your Visit        MyChart Information     [employer] gives you secure access to your electronic health record. If you see a primary care provider, you can also send messages to your care team and make appointments. If you have questions, please call your primary care clinic.  If you do not have a primary care provider, please call [phone] and they will assist you.        Care EveryWhere ID     This is your Care EveryWhere ID. This could be used by other organizations to access your [facility] medical records  [id number]        Your  "Vitals Were     Pulse Temperature Respirations Height BMI (Body Mass Index)       86 98.7  F (37.1  C) (Tympanic) 16 5' 5\" (1.651 m) 23.63 kg/m2        Blood Pressure from Last 3 Encounters:   03/16/18 114/73   02/06/18 (!) 137/91   12/15/17 126/80    Weight from Last 3 Encounters:   03/16/18 142 lb (64.4 kg)   02/06/18 140 lb (63.5 kg)   12/15/17 151 lb (68.5 kg)              We Performed the Following     UA reflex to Microscopic and Culture     Urine Culture Aerobic Bacterial     Urine Microscopic     Wet prep          Today's Medication Changes          These changes are accurate as of 3/16/18  3:11 PM.  If you have any questions, ask your nurse or doctor.               Start taking these medicines.        Dose/Directions    metroNIDAZOLE 500 MG tablet   Commonly known as:  FLAGYL   Used for:  Vaginitis and vulvovaginitis   Started by:  Maddie Wiley MD        Dose:  500 mg   Take 1 tablet (500 mg) by mouth 2 times daily for 10 days   Quantity:  20 tablet   Refills:  0            Where to get your medicines      These medications were sent to [facility] [address]  [address] [address]     Phone:  [phone]     metroNIDAZOLE 500 MG tablet                Primary Care Provider Office Phone # Fax #    Randy Shellie Graf [phone]       [address] [address]        Equal Access to Services     [facility] : Hadii jordan duran hadasho Sodidierali, waaxda luqadaha, qaybta kaalmada adeegyamoses, waxay niesha elizabeth. So [facility] [phone].    ATENCIÓN: Si habla español, tiene a reyez disposición servicios gratuitos de asistencia lingüística. Llame al [phone].    We comply with applicable federal civil rights laws and [place] laws. We do not discriminate on the basis of race, color, national origin, age, disability, sex, sexual orientation, or gender identity.            Thank you!     Thank you for choosing " [facility]  for your care. Our goal is always to provide you with excellent care. Hearing back from our patients is one way we can continue to improve our services. Please take a few minutes to complete the written survey that you may receive in the mail after your visit with us. Thank you!             Your Updated Medication List - Protect others around you: Learn how to safely use, store and throw away your medicines at www.disposemymeds.org.          This list is accurate as of 3/16/18  3:11 PM.  Always use your most recent med list.                   Brand Name Dispense Instructions for use Diagnosis    * amphetamine-dextroamphetamine 10 MG per tablet    ADDERALL    150 tablet    Take 2 pills (20mg) orally in the morning and two pills (20mg) at 11am and one pill (10mg) at 2pm    Attention deficit hyperactivity disorder (ADHD), predominantly inattentive type       * amphetamine-dextroamphetamine 10 MG per tablet   Start taking on:  4/2/2018    ADDERALL    150 tablet    Take 2 pills (20mg) orally in the morning and two pills (20mg) at 11am and one pill (10mg) at 2pm    Attention deficit hyperactivity disorder (ADHD), predominantly inattentive type       clindamycin 1 % topical gel    CLINDAMAX    60 g    Apply topically 2 times daily    Acne vulgaris       metroNIDAZOLE 500 MG tablet    FLAGYL    20 tablet    Take 1 tablet (500 mg) by mouth 2 times daily for 10 days    Vaginitis and vulvovaginitis       * Notice:  This list has 2 medication(s) that are the same as other medications prescribed for you. Read the directions carefully, and ask your doctor or other care provider to review them with you.

## 2018-03-17 ENCOUNTER — HOSPITAL ENCOUNTER (OUTPATIENT)
Dept: ULTRASOUND IMAGING | Facility: CLINIC | Age: 33
Discharge: HOME | End: 2018-03-17
Attending: FAMILY MEDICINE | Admitting: FAMILY MEDICINE
Payer: COMMERCIAL

## 2018-03-17 DIAGNOSIS — Z87.42 HISTORY OF OVARIAN CYST: ICD-10-CM

## 2018-03-17 DIAGNOSIS — R10.84 ABDOMINAL PAIN, GENERALIZED: ICD-10-CM

## 2018-03-17 LAB
BACTERIA SPEC CULT: NO GROWTH
SPECIMEN SOURCE: NORMAL

## 2018-03-17 PROCEDURE — 76856 US EXAM PELVIC COMPLETE: CPT

## 2018-03-19 ENCOUNTER — OFFICE VISIT (OUTPATIENT)
Dept: FAMILY MEDICINE | Facility: CLINIC | Age: 33
End: 2018-03-19
Payer: COMMERCIAL

## 2018-03-19 VITALS
TEMPERATURE: 98.8 F | DIASTOLIC BLOOD PRESSURE: 68 MMHG | WEIGHT: 140 LBS | HEART RATE: 81 BPM | HEIGHT: 65 IN | SYSTOLIC BLOOD PRESSURE: 113 MMHG | BODY MASS INDEX: 23.32 KG/M2

## 2018-03-19 DIAGNOSIS — N82.3 RECTOVAGINAL FISTULA: Primary | ICD-10-CM

## 2018-03-19 DIAGNOSIS — N83.202 LEFT OVARIAN CYST: ICD-10-CM

## 2018-03-19 PROCEDURE — 99213 OFFICE O/P EST LOW 20 MIN: CPT | Performed by: FAMILY MEDICINE

## 2018-03-19 ASSESSMENT — PAIN SCALES - GENERAL: PAINLEVEL: MODERATE PAIN (4)

## 2018-03-19 NOTE — NURSING NOTE
"Chief Complaint   Patient presents with     Follow Up For     appiontment on 03/16/2018       Initial /68 (BP Location: Left arm, Patient Position: Sitting, Cuff Size: Adult Regular)  Pulse 81  Temp 98.8  F (37.1  C) (Tympanic)  Ht 5' 5\" (1.651 m)  Wt 140 lb (63.5 kg)  LMP 03/02/2018 (Exact Date)  BMI 23.3 kg/m2 Estimated body mass index is 23.3 kg/(m^2) as calculated from the following:    Height as of this encounter: 5' 5\" (1.651 m).    Weight as of this encounter: 140 lb (63.5 kg).  Medication Reconciliation: complete   Vibha Merchant CMA    "

## 2018-03-19 NOTE — PROGRESS NOTES
SUBJECTIVE:   Ángela Zurita is a 33 year old female who presents to clinic today for the following health issues:      Chief Complaint   Patient presents with     Follow Up For     appiontment on 03/16/2018     **Here following up from her appointment last Friday. She is still not feeling any better and she has been feeling very nauseous. She believes that it is the medication making her feel this way.    **She is also wanting to discuss her uranalysis results.     **She said her last menstrual cycle was march 2nd, and that was a week early.     Problem list and histories reviewed & adjusted, as indicated.  Additional history: can tolerate the nausea     Patient Active Problem List   Diagnosis     Urgency-frequency syndrome     Fibroid uterus     Health Care Home     Rectovaginal fistula     CARDIOVASCULAR SCREENING; LDL GOAL LESS THAN 130     Oral herpes     Attention deficit hyperactivity disorder (ADHD), predominantly inattentive type     Posterior neck pain     Past Surgical History:   Procedure Laterality Date     BIOPSY  Various    Had a few Leeps and numerous Colposcopys     COLONOSCOPY  2009    Normal     EXC SWEAT GLAND LESN AXILL,SIMPL Right 2009     LEEP TX, CERVICAL  2006    yearly paps     MOUTH SURGERY      wisdom teeth     SOFT TISSUE SURGERY  Various    infected sweat-gland- cyst removed arm,armpit,face     SURGICAL HISTORY OF -   3/2005    Tonsillectomy     SURGICAL HISTORY OF -       infected sweat gland under her arm       Social History   Substance Use Topics     Smoking status: Current Every Day Smoker     Packs/day: 0.50     Years: 15.00     Types: Cigarettes     Start date: 12/1/2014     Last attempt to quit: 1/8/2014     Smokeless tobacco: Never Used     Alcohol use 0.0 oz/week      Comment: 3 drinks weekly- quit with pregnancy     Family History   Problem Relation Age of Onset     Blood Disease Brother      twin brother-blood clots     Blood Disease Maternal Grandfather      blood  clots     HEART DISEASE Maternal Grandfather      valve replacement     Hyperlipidemia Maternal Grandfather      CEREBROVASCULAR DISEASE Maternal Grandfather      Prostate Cancer Maternal Grandfather      Other Cancer Maternal Grandfather      Gum/Jaw/Lymphnodes     Colon Cancer Maternal Grandfather      MENTAL ILLNESS Maternal Grandfather      Alzheimer's     Respiratory Maternal Grandmother      Lung Cancer Maternal Grandmother      DIABETES Maternal Grandmother      type II     Hypertension Maternal Grandmother      Hyperlipidemia Maternal Grandmother      Depression Maternal Grandmother      CANCER Paternal Grandmother      non -hodgkins lymphoma     Lung Cancer Paternal Grandmother      Hypertension Paternal Grandmother      Hyperlipidemia Paternal Grandmother      CEREBROVASCULAR DISEASE Paternal Grandmother      Hypertension Father      Hyperlipidemia Father      Hyperlipidemia Paternal Grandfather      Prostate Cancer Paternal Grandfather      Hypertension Paternal Grandfather      Colon Cancer Paternal Grandfather      Coronary Artery Disease Paternal Grandfather      Breast Cancer Other      2 aunt and 3 of my great aunts     Breast Cancer Cousin      Breast Cancer         Current Outpatient Prescriptions   Medication Sig Dispense Refill     metroNIDAZOLE (FLAGYL) 500 MG tablet Take 1 tablet (500 mg) by mouth 2 times daily for 10 days 20 tablet 0     [START ON 4/2/2018] amphetamine-dextroamphetamine (ADDERALL) 10 MG per tablet Take 2 pills (20mg) orally in the morning and two pills (20mg) at 11am and one pill (10mg) at 2pm 150 tablet 0     amphetamine-dextroamphetamine (ADDERALL) 10 MG per tablet Take 2 pills (20mg) orally in the morning and two pills (20mg) at 11am and one pill (10mg) at 2pm 150 tablet 0     clindamycin (CLINDAMAX) 1 % topical gel Apply topically 2 times daily 60 g 11     No Known Allergies  Recent Labs   Lab Test  09/23/17   1915  09/22/17   1530  08/14/17   1835  06/16/16   1305    "05/29/14   1152   ALT  22   --   23  30   < >   --    CR  0.87  0.68  0.63  0.63   < >  0.64   GFRESTIMATED  75  >90  >90  Non African American GFR Calc    >90  Non  GFR Calc     < >  >90   GFRESTBLACK  >90  >90  >90  African American GFR Calc    >90   GFR Calc     < >  >90   POTASSIUM  3.3*  4.1  3.8  3.8   < >  3.8   TSH   --    --    --    --    --   1.25    < > = values in this interval not displayed.      BP Readings from Last 3 Encounters:   03/19/18 113/68   03/16/18 114/73   02/06/18 (!) 137/91    Wt Readings from Last 3 Encounters:   03/19/18 140 lb (63.5 kg)   03/16/18 142 lb (64.4 kg)   02/06/18 140 lb (63.5 kg)         ROS:  Constitutional, HEENT, cardiovascular, pulmonary, gi and gu systems are negative, except as otherwise noted.    OBJECTIVE:                                                    /68 (BP Location: Left arm, Patient Position: Sitting, Cuff Size: Adult Regular)  Pulse 81  Temp 98.8  F (37.1  C) (Tympanic)  Ht 5' 5\" (1.651 m)  Wt 140 lb (63.5 kg)  LMP 03/02/2018 (Exact Date)  BMI 23.3 kg/m2  GENERAL APPEARANCE ADULT: Alert, no acute distress  PSYCH: mentation appears normal., affect and mood normal  Diagnostic Test Results:  Results for orders placed or performed during the hospital encounter of 03/17/18   US Pelvic Complete with Transvaginal    Narrative    US PELVIC COMPLETE WITH TRANSVAGINAL 3/17/2018 3:11 PM    HISTORY:  ; Abdominal pain, generalized; History of ovarian cyst     TECHNIQUE: Transabdominal imaging. Transvaginal exam performed to  better evaluate the adnexa.    FINDINGS:   Uterus:1.1 cm endometrial stripe. 1 cm fundal uterine fibroid.    Right Ovary: Negative.    Left Ovary: 2.7 cm mildly complex septated cyst. A 4 cm cystic lesion  was seen arising from the left ovary on the previous CT scan from  8/17/2017.    Small amount of free fluid.      ALMITA CANTRELL MD          ASSESSMENT/PLAN:                                             "        1. Rectovaginal fistula  2. Left ovarian cyst  Has referral to general surgery will have see GYN too.  If repair can be done here she is more likely to get it fixed.  Discussed with Dr. Avery today and he said depends on how high the fistula is and could tag team with general surgery to repair it.  Would need  for future deliveries.  - OB/GYN REFERRAL      Maddie Wiley MD  [facility]

## 2018-03-19 NOTE — MR AVS SNAPSHOT
After Visit Summary   3/19/2018    Ángela Zurita    MRN: [id number]           Patient Information     Date Of Birth          1985        Visit Information        Provider Department      3/19/2018 1:40 PM Maddie Wiley MD [facility]        Today's Diagnoses     Rectovaginal fistula    -  1       Follow-ups after your visit        Additional Services     OB/GYN REFERRAL       Your provider has referred you to:  FM[facility]: [facility] [phone]   [email or web address]    Please be aware that coverage of these services is subject to the terms and limitations of your health insurance plan.  Call member services at your health plan with any benefit or coverage questions.      Please bring the following with you to your appointment:    (1) Any X-Rays, CTs or MRIs which have been performed.  Contact the facility where they were done to arrange for  prior to your scheduled appointment.   (2) List of current medications   (3) This referral request   (4) Any documents/labs given to you for this referral                  Who to contact     If you have questions or need follow up information about today's clinic visit or your schedule please contact [facility] directly at [phone].  Normal or non-critical lab and imaging results will be communicated to you by MyChart, letter or phone within 4 business days after the clinic has received the results. If you do not hear from us within 7 days, please contact the clinic through MyChart or phone. If you have a critical or abnormal lab result, we will notify you by phone as soon as possible.  Submit refill requests through [employer] or call your pharmacy and they will forward the refill request to us. Please allow 3 business days for your refill to be completed.          Additional Information About Your Visit        MyChart Information     [employer] gives you secure access to your  "electronic health record. If you see a primary care provider, you can also send messages to your care team and make appointments. If you have questions, please call your primary care clinic.  If you do not have a primary care provider, please call [phone] and they will assist you.        Care EveryWhere ID     This is your Care EveryWhere ID. This could be used by other organizations to access your [place] medical records  [id number]        Your Vitals Were     Pulse Temperature Height Last Period BMI (Body Mass Index)       81 98.8  F (37.1  C) (Tympanic) 5' 5\" (1.651 m) 03/02/2018 (Exact Date) 23.3 kg/m2        Blood Pressure from Last 3 Encounters:   03/19/18 113/68   03/16/18 114/73   02/06/18 (!) 137/91    Weight from Last 3 Encounters:   03/19/18 140 lb (63.5 kg)   03/16/18 142 lb (64.4 kg)   02/06/18 140 lb (63.5 kg)              We Performed the Following     OB/GYN REFERRAL        Primary Care Provider Office Phone # Fax #    Randy Graf [phone] [address] [facility] [address]        Equal Access to Services     SKY BLANCHARD : Hadii jordan duran hadasho Soomaali, waaxda luqadaha, qaybta kaalmada adeegyada, celia hoskins . So [place] [phone].    ATENCIÓN: Si habla español, tiene a reyez disposición servicios gratuitos de asistencia lingüística. Llame al [phone].    We comply with applicable federal civil rights laws and [place] laws. We do not discriminate on the basis of race, color, national origin, age, disability, sex, sexual orientation, or gender identity.            Thank you!     Thank you for choosing [facility]  for your care. Our goal is always to provide you with excellent care. Hearing back from our patients is one way we can continue to improve our services. Please take a few minutes to complete the written survey that you may receive in the mail after your visit with us. Thank you!             Your Updated " Medication List - Protect others around you: Learn how to safely use, store and throw away your medicines at www.disposemymeds.org.          This list is accurate as of 3/19/18  2:14 PM.  Always use your most recent med list.                   Brand Name Dispense Instructions for use Diagnosis    * amphetamine-dextroamphetamine 10 MG per tablet    ADDERALL    150 tablet    Take 2 pills (20mg) orally in the morning and two pills (20mg) at 11am and one pill (10mg) at 2pm    Attention deficit hyperactivity disorder (ADHD), predominantly inattentive type       * amphetamine-dextroamphetamine 10 MG per tablet   Start taking on:  4/2/2018    ADDERALL    150 tablet    Take 2 pills (20mg) orally in the morning and two pills (20mg) at 11am and one pill (10mg) at 2pm    Attention deficit hyperactivity disorder (ADHD), predominantly inattentive type       clindamycin 1 % topical gel    CLINDAMAX    60 g    Apply topically 2 times daily    Acne vulgaris       metroNIDAZOLE 500 MG tablet    FLAGYL    20 tablet    Take 1 tablet (500 mg) by mouth 2 times daily for 10 days    Vaginitis and vulvovaginitis       * Notice:  This list has 2 medication(s) that are the same as other medications prescribed for you. Read the directions carefully, and ask your doctor or other care provider to review them with you.

## 2018-03-20 ENCOUNTER — OFFICE VISIT (OUTPATIENT)
Dept: OBGYN | Facility: CLINIC | Age: 33
End: 2018-03-20
Payer: COMMERCIAL

## 2018-03-20 VITALS
TEMPERATURE: 98.6 F | SYSTOLIC BLOOD PRESSURE: 122 MMHG | HEART RATE: 80 BPM | RESPIRATION RATE: 16 BRPM | BODY MASS INDEX: 23.56 KG/M2 | DIASTOLIC BLOOD PRESSURE: 82 MMHG | WEIGHT: 141.4 LBS | HEIGHT: 65 IN

## 2018-03-20 DIAGNOSIS — N82.3 RECTOVAGINAL FISTULA: Primary | ICD-10-CM

## 2018-03-20 PROCEDURE — 99213 OFFICE O/P EST LOW 20 MIN: CPT | Performed by: OBSTETRICS & GYNECOLOGY

## 2018-03-20 NOTE — MR AVS SNAPSHOT
After Visit Summary   3/20/2018    Ángela Zurita    MRN: [id number]           Patient Information     Date Of Birth          1985        Visit Information        Provider Department      3/20/2018 9:00 AM Alek Avery MD [facility]        Today's Diagnoses     Rectovaginal fistula    -  1       Follow-ups after your visit        Your next 10 appointments already scheduled     Apr 11, 2018  7:30 AM CDT   [facility]    [address] [id number] [phone]              Who to contact     If you have questions or need follow up information about today's clinic visit or your schedule please contact [facility] directly at [phone].  Normal or non-critical lab and imaging results will be communicated to you by MyChart, letter or phone within 4 business days after the clinic has received the results. If you do not hear from us within 7 days, please contact the clinic through MyChart or phone. If you have a critical or abnormal lab result, we will notify you by phone as soon as possible.  Submit refill requests through [employer] or call your pharmacy and they will forward the refill request to us. Please allow 3 business days for your refill to be completed.          Additional Information About Your Visit        MyChart Information     [employer] gives you secure access to your electronic health record. If you see a primary care provider, you can also send messages to your care team and make appointments. If you have questions, please call your primary care clinic.  If you do not have a primary care provider, please call [phone] and they will assist you.        Care EveryWhere ID     This is your Care EveryWhere ID. This could be used by other organizations to access your [place] medical records  [id number]        Your Vitals Were     Pulse Temperature  "Respirations Height Last Period BMI (Body Mass Index)    80 98.6  F (37  C) 16 5' 5\" (1.651 m) 03/02/2018 (Exact Date) 23.53 kg/m2       Blood Pressure from Last 3 Encounters:   03/20/18 122/82   03/19/18 113/68   03/16/18 114/73    Weight from Last 3 Encounters:   03/20/18 141 lb 6.4 oz (64.1 kg)   03/19/18 140 lb (63.5 kg)   03/16/18 142 lb (64.4 kg)              Today, you had the following     No orders found for display       Primary Care Provider Office Phone # Fax #    Randy Graf [phone] [address] [facility] [address]        Equal Access to Services     [facility] : Hadii jordan bolton Soalex, waaxda luqadaha, qaybta kaalmada adelonyamoses, celia hoskins . So [facility] [phone].    ATENCIÓN: Si habla español, tiene a reyez disposición servicios gratuitos de asistencia lingüística. Jovany al [phone].    We comply with applicable federal civil rights laws and [place] laws. We do not discriminate on the basis of race, color, national origin, age, disability, sex, sexual orientation, or gender identity.            Thank you!     Thank you for choosing [facility]  for your care. Our goal is always to provide you with excellent care. Hearing back from our patients is one way we can continue to improve our services. Please take a few minutes to complete the written survey that you may receive in the mail after your visit with us. Thank you!             Your Updated Medication List - Protect others around you: Learn how to safely use, store and throw away your medicines at www.disposemymeds.org.          This list is accurate as of 3/20/18  9:58 AM.  Always use your most recent med list.                   Brand Name Dispense Instructions for use Diagnosis    * amphetamine-dextroamphetamine 10 MG per tablet    ADDERALL    150 tablet    Take 2 pills (20mg) orally in the morning and two pills (20mg) at 11am and one pill (10mg) at " 2pm    Attention deficit hyperactivity disorder (ADHD), predominantly inattentive type       * amphetamine-dextroamphetamine 10 MG per tablet   Start taking on:  4/2/2018    ADDERALL    150 tablet    Take 2 pills (20mg) orally in the morning and two pills (20mg) at 11am and one pill (10mg) at 2pm    Attention deficit hyperactivity disorder (ADHD), predominantly inattentive type       clindamycin 1 % topical gel    CLINDAMAX    60 g    Apply topically 2 times daily    Acne vulgaris       metroNIDAZOLE 500 MG tablet    FLAGYL    20 tablet    Take 1 tablet (500 mg) by mouth 2 times daily for 10 days    Vaginitis and vulvovaginitis       * Notice:  This list has 2 medication(s) that are the same as other medications prescribed for you. Read the directions carefully, and ask your doctor or other care provider to review them with you.

## 2018-03-20 NOTE — NURSING NOTE
"Initial /82  Pulse 80  Temp 98.6  F (37  C)  Resp 16  Ht 5' 5\" (1.651 m)  Wt 141 lb 6.4 oz (64.1 kg)  LMP 03/02/2018 (Exact Date)  BMI 23.53 kg/m2 Estimated body mass index is 23.53 kg/(m^2) as calculated from the following:    Height as of this encounter: 5' 5\" (1.651 m).    Weight as of this encounter: 141 lb 6.4 oz (64.1 kg). .      "

## 2018-03-20 NOTE — PROGRESS NOTES
CC:  Consult from Dr Wiley for rectovaginal fistula  HPI:  Ángela Zurita is a 33 year old female is a   .  Patient's last menstrual period was 03/02/2018 (exact date).  Menses are regular q 28-30 days, lasting 5 days. She uses 2 tampons at a time and changes q 1-2 hours.  She had VAVD of an 8# 1 oz male on 9/9/2014  There was a 4th degree laceration that was repaired and later became infected.  She experienced vaginal passage of flatus and stool .  She was referred to [place]-rectal surgery and was offered a work-up, that she declined until recently.  She is interested in another pregnancy and is planning a caesarean delivery of any more pregnancies.  She was referred by Dr wiley as she has had recurrent BV vaginitis. And a desire to pursue a repair of the fistula    Patients records are available and reviewed at today's visit.    Past GYN history:  No STD history       Last PAP smear:  Normal      Past Medical History:   Diagnosis Date     Cervical dysplasia     s/p LEEP      Chickenpox      Exercise-induced asthma     adolescent     IBS (irritable bowel syndrome)      Tonsillitis 2/13/2014       Past Surgical History:   Procedure Laterality Date     BIOPSY  Various    Had a few Leeps and numerous Colposcopys     COLONOSCOPY  2009    Normal     EXC SWEAT GLAND LESN AXILL,SIMPL Right 2009     LEEP TX, CERVICAL  2006    yearly paps     MOUTH SURGERY      wisdom teeth     SOFT TISSUE SURGERY  Various    infected sweat-gland- cyst removed arm,armpit,face     SURGICAL HISTORY OF -   3/2005    Tonsillectomy     SURGICAL HISTORY OF -       infected sweat gland under her arm       Family History   Problem Relation Age of Onset     Blood Disease Brother      twin brother-blood clots     Blood Disease Maternal Grandfather      blood clots     HEART DISEASE Maternal Grandfather      valve replacement     Hyperlipidemia Maternal Grandfather      CEREBROVASCULAR DISEASE Maternal Grandfather      Prostate Cancer Maternal  Grandfather      Other Cancer Maternal Grandfather      Gum/Jaw/Lymphnodes     Colon Cancer Maternal Grandfather      MENTAL ILLNESS Maternal Grandfather      Alzheimer's     Respiratory Maternal Grandmother      Lung Cancer Maternal Grandmother      DIABETES Maternal Grandmother      type II     Hypertension Maternal Grandmother      Hyperlipidemia Maternal Grandmother      Depression Maternal Grandmother      CANCER Paternal Grandmother      non -hodgkins lymphoma     Lung Cancer Paternal Grandmother      Hypertension Paternal Grandmother      Hyperlipidemia Paternal Grandmother      CEREBROVASCULAR DISEASE Paternal Grandmother      Hypertension Father      Hyperlipidemia Father      Hyperlipidemia Paternal Grandfather      Prostate Cancer Paternal Grandfather      Hypertension Paternal Grandfather      Colon Cancer Paternal Grandfather      Coronary Artery Disease Paternal Grandfather      Breast Cancer Other      2 aunt and 3 of my great aunts     Breast Cancer Cousin      Breast Cancer       Allergies: Review of patient's allergies indicates no known allergies.    Current Outpatient Prescriptions   Medication Sig Dispense Refill     metroNIDAZOLE (FLAGYL) 500 MG tablet Take 1 tablet (500 mg) by mouth 2 times daily for 10 days 20 tablet 0     [START ON 4/2/2018] amphetamine-dextroamphetamine (ADDERALL) 10 MG per tablet Take 2 pills (20mg) orally in the morning and two pills (20mg) at 11am and one pill (10mg) at 2pm 150 tablet 0     clindamycin (CLINDAMAX) 1 % topical gel Apply topically 2 times daily 60 g 11     amphetamine-dextroamphetamine (ADDERALL) 10 MG per tablet Take 2 pills (20mg) orally in the morning and two pills (20mg) at 11am and one pill (10mg) at 2pm 150 tablet 0       ROS:  C: NEGATIVE for fever, chills, change in weight  I: NEGATIVE for worrisome rashes, moles or lesions  E: NEGATIVE for vision changes or irritation  E/M: NEGATIVE for ear, mouth and throat problems  R: NEGATIVE for significant  "cough or SOB  CV: NEGATIVE for chest pain, palpitations or peripheral edema  GI: NEGATIVE for nausea, abdominal pain, heartburn, or change in bowel habits  : NEGATIVE for frequency, dysuria, hematuria, vaginal discharge  M: NEGATIVE for significant arthralgias or myalgia  N: NEGATIVE for weakness, dizziness or paresthesias  E: NEGATIVE for temperature intolerance, skin/hair changes  P: NEGATIVE for changes in mood or affect    EXAM:  Blood pressure 122/82, pulse 80, temperature 98.6  F (37  C), resp. rate 16, height 5' 5\" (1.651 m), weight 141 lb 6.4 oz (64.1 kg), last menstrual period 2018, not currently breastfeeding.   BMI= Body mass index is 23.53 kg/(m^2).  General - pleasant female in no acute distress.  Abdomen - soft, nontender, nondistended, no hepatosplenomegaly.  Pelvic - EG: normal adult female,   Perineal body is attenuated, the sphincter tone is low  BUS: within normal limits,   Vagina: well rugated, small white  discharge,   Cervix: no lesions or CMT,   Uterus: firm, normal sized and nontender, RV   Adnexae: no masses or tenderness.  Rectovaginal - deferred.  Musculoskeletal - no gross deformities.  Neurological - normal strength, sensation, and mental status.      ASSESSMENT/PLAN:  (N82.3) Rectovaginal fistula  (primary encounter diagnosis)  Perineal insufficiency with chronic sphincter disruption  Comment: hx of 4th degree lac with repair and subsequent infection  Plan: refer back to [place] for completion of work-up  Plan  section for future delivery route        Letter will be sent to the referring provider.    Alek Avery    "

## 2018-03-22 ENCOUNTER — TELEPHONE (OUTPATIENT)
Dept: FAMILY MEDICINE | Facility: CLINIC | Age: 33
End: 2018-03-22

## 2018-03-22 NOTE — TELEPHONE ENCOUNTER
Reason for Call:  Other call back    Detailed comments: Pt talked to FP about the results of US and would like to know what Dr. Avery thinks.  Pt said that she is still having symptoms of a bladder infection-bloated and nauseas, should she be seen again for this?    Phone Number Patient can be reached at: Cell number on file:    Telephone Information:   Mobile [phone]       Best Time: today if possible    Can we leave a detailed message on this number? NO    Call taken on 3/22/2018 at 3:33 PM by Jennifer Kelly

## 2018-03-22 NOTE — TELEPHONE ENCOUNTER
Had recommended asking Dr. Chahal about this but see it was not addressed at her appointment.  Cyst on ovary has been there for 7 months, this can be normal or not.  Recommend seeing GYN for recommendations.   FINDINGS:   Uterus:1.1 cm endometrial stripe. 1 cm fundal uterine fibroid.     Right Ovary: Negative.     Left Ovary: 2.7 cm mildly complex septated cyst. A 4 cm cystic lesion  was seen arising from the left ovary on the previous CT scan from  8/17/2017.

## 2018-03-22 NOTE — TELEPHONE ENCOUNTER
"Patient notified of need to be seen by Dr Avery and she stated \"I have been seen by Family Practice and they keep telling me I have to see Dr. Avery for follow up, but I can't get in to see him for 2 weeks..\"     Is there a work in appointment available with Dr. Avery any time sooner?... (She is scheduled 3-27-18 with Dr Garay for same)    Please advise. Grace Jefferson RN    "

## 2018-03-22 NOTE — TELEPHONE ENCOUNTER
Routed to provider.  I see that this has been signed off but I don't see further instructions.  Thank you.  Deb Sexton RN

## 2018-03-22 NOTE — TELEPHONE ENCOUNTER
Reason for Call:  Other results    Detailed comments: Patient would like a nurse to explain her US results to her please.    Phone Number Patient can be reached at: Home number on file [phone] (home)    Best Time: any    Can we leave a detailed message on this number? YES    Call taken on 3/22/2018 at 12:28 PM by Airam Caballero

## 2018-03-26 NOTE — TELEPHONE ENCOUNTER
Return call to patient.  Left message for patient to return call to clinic.  Appointment available with Dr. Avery in  on Thursday at 3:45 pm if patient desires.  Appointment on hold for patient.    Radha Holliday   Ob/Gyn Clinic  RN

## 2018-03-27 ENCOUNTER — OFFICE VISIT (OUTPATIENT)
Dept: OBGYN | Facility: CLINIC | Age: 33
End: 2018-03-27
Payer: COMMERCIAL

## 2018-03-27 VITALS
WEIGHT: 140.4 LBS | DIASTOLIC BLOOD PRESSURE: 84 MMHG | TEMPERATURE: 98.3 F | RESPIRATION RATE: 18 BRPM | SYSTOLIC BLOOD PRESSURE: 136 MMHG | BODY MASS INDEX: 23.39 KG/M2 | HEIGHT: 65 IN | HEART RATE: 77 BPM

## 2018-03-27 DIAGNOSIS — R10.2 PELVIC PAIN IN FEMALE: ICD-10-CM

## 2018-03-27 DIAGNOSIS — R35.0 URINARY FREQUENCY: Primary | ICD-10-CM

## 2018-03-27 DIAGNOSIS — N64.3 BILATERAL GALACTORRHEA: ICD-10-CM

## 2018-03-27 DIAGNOSIS — N89.8 VAGINAL DISCHARGE: ICD-10-CM

## 2018-03-27 DIAGNOSIS — Z80.3 FAMILY HISTORY OF MALIGNANT NEOPLASM OF BREAST: ICD-10-CM

## 2018-03-27 DIAGNOSIS — N83.202 LEFT OVARIAN CYST: ICD-10-CM

## 2018-03-27 LAB
ALBUMIN UR-MCNC: NEGATIVE MG/DL
APPEARANCE UR: CLEAR
BILIRUB UR QL STRIP: NEGATIVE
COLOR UR AUTO: YELLOW
GLUCOSE UR STRIP-MCNC: NEGATIVE MG/DL
HGB BLD-MCNC: 14.5 G/DL (ref 11.7–15.7)
HGB UR QL STRIP: NEGATIVE
KETONES UR STRIP-MCNC: NEGATIVE MG/DL
LEUKOCYTE ESTERASE UR QL STRIP: NEGATIVE
NITRATE UR QL: NEGATIVE
PH UR STRIP: 7 PH (ref 5–7)
SOURCE: NORMAL
SP GR UR STRIP: 1.01 (ref 1–1.03)
SPECIMEN SOURCE: NORMAL
TSH SERPL DL<=0.005 MIU/L-ACNC: 1.64 MU/L (ref 0.4–4)
UROBILINOGEN UR STRIP-ACNC: 0.2 EU/DL (ref 0.2–1)
WET PREP SPEC: NORMAL

## 2018-03-27 PROCEDURE — 84443 ASSAY THYROID STIM HORMONE: CPT | Performed by: OBSTETRICS & GYNECOLOGY

## 2018-03-27 PROCEDURE — 87491 CHLMYD TRACH DNA AMP PROBE: CPT | Performed by: OBSTETRICS & GYNECOLOGY

## 2018-03-27 PROCEDURE — 36415 COLL VENOUS BLD VENIPUNCTURE: CPT | Performed by: OBSTETRICS & GYNECOLOGY

## 2018-03-27 PROCEDURE — 81003 URINALYSIS AUTO W/O SCOPE: CPT | Performed by: OBSTETRICS & GYNECOLOGY

## 2018-03-27 PROCEDURE — 84146 ASSAY OF PROLACTIN: CPT | Performed by: OBSTETRICS & GYNECOLOGY

## 2018-03-27 PROCEDURE — 87210 SMEAR WET MOUNT SALINE/INK: CPT | Performed by: OBSTETRICS & GYNECOLOGY

## 2018-03-27 PROCEDURE — 87591 N.GONORRHOEAE DNA AMP PROB: CPT | Performed by: OBSTETRICS & GYNECOLOGY

## 2018-03-27 PROCEDURE — 87086 URINE CULTURE/COLONY COUNT: CPT | Performed by: OBSTETRICS & GYNECOLOGY

## 2018-03-27 PROCEDURE — 99214 OFFICE O/P EST MOD 30 MIN: CPT | Performed by: OBSTETRICS & GYNECOLOGY

## 2018-03-27 PROCEDURE — 85018 HEMOGLOBIN: CPT | Performed by: OBSTETRICS & GYNECOLOGY

## 2018-03-27 PROCEDURE — 87088 URINE BACTERIA CULTURE: CPT | Performed by: OBSTETRICS & GYNECOLOGY

## 2018-03-27 NOTE — PROGRESS NOTES
33 year old  here for chronic abdominal pain for the last 3 months.  She also has had milky breast discharge since she has had the pain.  She had pain similar in 2017 but it was on the right side.  Now she has it on the left for 2 weeks.  It starts 1 week after her menstrual cycle but then lasts for 2 1/2 weeks.  It is throbbing.  Sometimes better than worse.  Her cycles have been regular but they are so heavy, sometimes 2 tampons in an hour and she has accidents and leaks at night.  They last 5-6 days.  The clots are worse than they have been.  She is nervous her cyst is cancer as her mom was recently diagnosed with breast cancer and she has a lot of family cancers.  She gets bad cramps.  She is pending having her rectovaginal fistula repaired and has stool incontinence at times.  She wants other children but doesn't want to have another vaginal delivery.  She also has the feeling like she needs to urinate when she gets the pain.  She gets frequent bladder infections.  She also gets a lot of vaginal infections as well.  No fever/chills.      Lab Results   Component Value Date    PAP NIL 2016     Past Medical History:   Diagnosis Date     Cervical dysplasia     s/p LEEP      Chickenpox      Exercise-induced asthma     adolescent     IBS (irritable bowel syndrome)      Tonsillitis 2014     Past Surgical History:   Procedure Laterality Date     BIOPSY  Various    Had a few Leeps and numerous Colposcopys     COLONOSCOPY      Normal     EXC SWEAT GLAND LESN AXILL,SIMPL Right 2009     LEEP TX, CERVICAL  2006    yearly paps     MOUTH SURGERY      wisdom teeth     SOFT TISSUE SURGERY  Various    infected sweat-gland- cyst removed arm,armpit,face     SURGICAL HISTORY OF -   3/2005    Tonsillectomy     SURGICAL HISTORY OF -       infected sweat gland under her arm     meds-reviewed  FH-mom with breast cancer, male cousin with breast cancer, aunts with breast cancer and grandaunt with breast cancer    "ROS: 10 point ROS neg other than the symptoms noted above in the HPI.  /84 (BP Location: Right arm, Patient Position: Chair, Cuff Size: Adult Regular)  Pulse 77  Temp 98.3  F (36.8  C) (Tympanic)  Resp 18  Ht 5' 5\" (1.651 m)  Wt 140 lb 6.4 oz (63.7 kg)  LMP 03/02/2018 (Exact Date)  Breastfeeding? No  BMI 23.36 kg/m2  Alert and orientedx3, in NAD  Back-nontender  Breast:  Benign exam, no masses palpated.  No skin changes, no axillary lymphadenopathy, both nipples with clearish/white discharge.  Axilla feel completely normal, no lymph node enlargement and non-tender.    Abdomen-soft,nontender, NABS, no masses    Pelvic exam: normal vagina and vulva, normal cervix without lesions or tenderness, uterus normal size anteverted, adenxa normal in size with tenderness and fullness on the left, right adnexa no pain or tenderness    FINDINGS:   Uterus:1.1 cm endometrial stripe. 1 cm fundal uterine fibroid.     Right Ovary: Negative.     Left Ovary: 2.7 cm mildly complex septated cyst. A 4 cm cystic lesion  was seen arising from the left ovary on the previous CT scan from  8/17/2017.     Small amount of free fluid.     ASSESSMENT / PLAN:  (R35.0) Urinary frequency  (primary encounter diagnosis)  Comment: UA  Plan: Probiotic Product (SOLUBLE FIBER/PROBIOTICS         PO), Wet prep, Urine Culture Aerobic Bacterial,        *UA reflex to Microscopic, Chlamydia         trachomatis PCR, Neisseria gonorrhoeae PCR,         CANCER RISK MGMT/CANCER GENETIC COUNSELING         REFERRAL, US Pelvic Complete w Transvaginal,         Hemoglobin, TSH with free T4 reflex, Prolactin,        MA Diagnostic Digital Bilateral            (N89.8) Vaginal discharge  Comment: normal wet prep  Plan: Probiotic Product (SOLUBLE FIBER/PROBIOTICS         PO), Wet prep, Urine Culture Aerobic Bacterial,        *UA reflex to Microscopic, Chlamydia         trachomatis PCR, Neisseria gonorrhoeae PCR,         CANCER RISK MGMT/CANCER GENETIC COUNSELING "         REFERRAL, US Pelvic Complete w Transvaginal,         Hemoglobin, TSH with free T4 reflex, Prolactin,        MA Diagnostic Digital Bilateral            (R10.2) Pelvic pain in female  Comment: left sided pain, US with ovarian cyst.    Plan: Probiotic Product (SOLUBLE FIBER/PROBIOTICS         PO), Wet prep, Urine Culture Aerobic Bacterial,        *UA reflex to Microscopic, Chlamydia         trachomatis PCR, Neisseria gonorrhoeae PCR,         CANCER RISK MGMT/CANCER GENETIC COUNSELING         REFERRAL, US Pelvic Complete w Transvaginal,         Hemoglobin, TSH with free T4 reflex, Prolactin,        MA Diagnostic Digital Bilateral    (Z80.3) Family history of malignant neoplasm of breast  Comment: reviewed  Plan: Probiotic Product (SOLUBLE FIBER/PROBIOTICS         PO), Wet prep, Urine Culture Aerobic Bacterial,        *UA reflex to Microscopic, Chlamydia         trachomatis PCR, Neisseria gonorrhoeae PCR,         CANCER RISK MGMT/CANCER GENETIC COUNSELING         REFERRAL, US Pelvic Complete w Transvaginal,         Hemoglobin, TSH with free T4 reflex, Prolactin,        MA Diagnostic Digital Bilateral            (N83.202) Left ovarian cyst  Comment: probably not the same cyst  Plan: Probiotic Product (SOLUBLE FIBER/PROBIOTICS         PO), Wet prep, Urine Culture Aerobic Bacterial,        *UA reflex to Microscopic, Chlamydia         trachomatis PCR, Neisseria gonorrhoeae PCR,         CANCER RISK MGMT/CANCER GENETIC COUNSELING         REFERRAL, US Pelvic Complete w Transvaginal,         Hemoglobin, TSH with free T4 reflex, Prolactin,        MA Diagnostic Digital Bilateral            (N64.3) Bilateral galactorrhea  Comment: on adderall  Plan: Probiotic Product (SOLUBLE FIBER/PROBIOTICS         PO), Wet prep, Urine Culture Aerobic Bacterial,        *UA reflex to Microscopic, Chlamydia         trachomatis PCR, Neisseria gonorrhoeae PCR,         CANCER RISK MGMT/CANCER GENETIC COUNSELING         REFERRAL, US Pelvic  Complete w Transvaginal,         Hemoglobin, TSH with free T4 reflex, Prolactin,        MA Diagnostic Digital Bilateral          Sherin Frost MD

## 2018-03-27 NOTE — PATIENT INSTRUCTIONS
1.  Labs today  2.  Ordered the US-6 weeks after the prior  See if the cyst is the same or gone  3.  Genetic referral

## 2018-03-27 NOTE — MR AVS SNAPSHOT
After Visit Summary   3/27/2018    Ángela Zurita    MRN: [id number]           Patient Information     Date Of Birth          1985        Visit Information        Provider Department      3/27/2018 1:30 PM Sherin Frost MD [facility]        Today's Diagnoses     Urinary frequency    -  1    Vaginal discharge        Pelvic pain in female        Family history of malignant neoplasm of breast        Left ovarian cyst          Care Instructions    1.  Labs today  2.  Ordered the US-6 weeks after the prior  See if the cyst is the same or gone  3.  Genetic referral          Follow-ups after your visit        Additional Services     CANCER RISK MGMT/CANCER GENETIC COUNSELING REFERRAL       Your provider has referred you to the Cancer Risk Management Program - Cancer Genetic Counseling.    Reason for Referral: multiple family members with breast cancer, including a male cousin, also colon cancer history    We have a sent a notice to a staff member of the Cancer Risk Management Program to give you a call to assist with scheduling your appointment.  You may also call  [phone] ([phone]) to initiate scheduling.    Please be aware that coverage of these services is subject to the terms and limitations of your health insurance plan.  Call member services at your health plan with any benefit or coverage questions.      Please bring the completed family history sheet to your appointment in addition to any available outside medical records documenting your cancer diagnosis.                  Your next 10 appointments already scheduled     Apr 03, 2018  2:00 PM CDT   (Arrive by 1:45 PM)   New Patient Visit with Efren Olvera MD   [facility] Colon [facility] Rectal [facility] ([facility])    [address] [place] [id number] [phone]              Future tests that were ordered for you today     Open Future Orders      "   Priority Expected Expires Ordered    US Pelvic Complete w Transvaginal Routine  3/27/2019 3/27/2018            Who to contact     If you have questions or need follow up information about today's clinic visit or your schedule please contact [facility] directly at [phone].  Normal or non-critical lab and imaging results will be communicated to you by MyChart, letter or phone within 4 business days after the clinic has received the results. If you do not hear from us within 7 days, please contact the clinic through [employer] or phone. If you have a critical or abnormal lab result, we will notify you by phone as soon as possible.  Submit refill requests through [email or web address] or call your pharmacy and they will forward the refill request to us. Please allow 3 business days for your refill to be completed.          Additional Information About Your Visit        MyChart Information     [email or web address] gives you secure access to your electronic health record. If you see a primary care provider, you can also send messages to your care team and make appointments. If you have questions, please call your primary care clinic.  If you do not have a primary care provider, please call [phone] and they will assist you.        Care EveryWhere ID     This is your Care EveryWhere ID. This could be used by other organizations to access your [place] medical records  [id number]        Your Vitals Were     Pulse Temperature Respirations Height Last Period Breastfeeding?    77 98.3  F (36.8  C) (Tympanic) 18 5' 5\" (1.651 m) 03/02/2018 (Exact Date) No    BMI (Body Mass Index)                   23.36 kg/m2            Blood Pressure from Last 3 Encounters:   03/27/18 136/84   03/20/18 122/82   03/19/18 113/68    Weight from Last 3 Encounters:   03/27/18 140 lb 6.4 oz (63.7 kg)   03/20/18 141 lb 6.4 oz (64.1 kg)   03/19/18 140 lb (63.5 kg)              We Performed the Following     *UA reflex to Microscopic     CANCER RISK " MGMT/CANCER GENETIC COUNSELING REFERRAL     Chlamydia trachomatis PCR     Hemoglobin     Neisseria gonorrhoeae PCR     TSH with free T4 reflex     Urine Culture Aerobic Bacterial     Wet prep        Primary Care Provider Office Phone # Fax #    Randy Graf [phone] [address] [facility] [address]        Equal Access to Services     SKY BLANCHARD : Hadii aad ku hadasho Soomaali, waaxda luqadaha, qaybta kaalmada adeegyada, celia scherer hayrissa valenciakirbyfrancis hoskins . So [facility] [phone].    ATENCIÓN: Si habla español, tiene a reyez disposición servicios gratuitos de asistencia lingüística. Meenakshiame al [phone].    We comply with applicable federal civil rights laws and [place] laws. We do not discriminate on the basis of race, color, national origin, age, disability, sex, sexual orientation, or gender identity.            Thank you!     Thank you for choosing [facility]  for your care. Our goal is always to provide you with excellent care. Hearing back from our patients is one way we can continue to improve our services. Please take a few minutes to complete the written survey that you may receive in the mail after your visit with us. Thank you!             Your Updated Medication List - Protect others around you: Learn how to safely use, store and throw away your medicines at www.disposemymeds.org.          This list is accurate as of 3/27/18  2:34 PM.  Always use your most recent med list.                   Brand Name Dispense Instructions for use Diagnosis    * amphetamine-dextroamphetamine 10 MG per tablet    ADDERALL    150 tablet    Take 2 pills (20mg) orally in the morning and two pills (20mg) at 11am and one pill (10mg) at 2pm    Attention deficit hyperactivity disorder (ADHD), predominantly inattentive type       * amphetamine-dextroamphetamine 10 MG per tablet   Start taking on:  4/2/2018    ADDERALL    150 tablet    Take 2 pills (20mg) orally in the morning  and two pills (20mg) at 11am and one pill (10mg) at 2pm    Attention deficit hyperactivity disorder (ADHD), predominantly inattentive type       clindamycin 1 % topical gel    CLINDAMAX    60 g    Apply topically 2 times daily    Acne vulgaris       SOLUBLE FIBER/PROBIOTICS PO       Urinary frequency, Vaginal discharge, Pelvic pain in female, Family history of malignant neoplasm of breast, Left ovarian cyst       * Notice:  This list has 2 medication(s) that are the same as other medications prescribed for you. Read the directions carefully, and ask your doctor or other care provider to review them with you.

## 2018-03-27 NOTE — PROGRESS NOTES
Ángela  Your results are normal.  If you have any other questions or concerns, please followup in the office or contact us on mychart or evisit.    Sherin Frost

## 2018-03-28 ENCOUNTER — TELEPHONE (OUTPATIENT)
Dept: OBGYN | Facility: CLINIC | Age: 33
End: 2018-03-28

## 2018-03-28 ENCOUNTER — MYC MEDICAL ADVICE (OUTPATIENT)
Dept: OBGYN | Facility: CLINIC | Age: 33
End: 2018-03-28

## 2018-03-28 DIAGNOSIS — R10.32 ABDOMINAL PAIN, LEFT LOWER QUADRANT: ICD-10-CM

## 2018-03-28 DIAGNOSIS — N83.202 LEFT OVARIAN CYST: Primary | ICD-10-CM

## 2018-03-28 DIAGNOSIS — N92.0 MENORRHAGIA WITH REGULAR CYCLE: ICD-10-CM

## 2018-03-28 LAB
C TRACH DNA SPEC QL NAA+PROBE: NEGATIVE
N GONORRHOEA DNA SPEC QL NAA+PROBE: NEGATIVE
PROLACTIN SERPL-MCNC: 9 UG/L (ref 3–27)
SPECIMEN SOURCE: NORMAL
SPECIMEN SOURCE: NORMAL

## 2018-03-28 NOTE — TELEPHONE ENCOUNTER
"  You are now scheduled for surgery at [facility].  Below are the details for your surgery.  Please read the \"Preparing for Your Surgery\" instructions and let us know if you have any questions.    Type of surgery: diagnostic hysteroscopy with dilation and curettage, laparoscopy with left ovarian cystectomy and other procedures as needed  Surgeon:  Sherin Frost MD  Location of surgery: [facility] OR    Date of surgery: 4-3-18    Time: 11:45am   Arrival Time: 10:45am    Time can change, to be confirmed a couple of days prior by pre-op surgery nurse.    Pre-Op Appt Date: Patient to schedule with a PCP or Family Practice Provider within 30 days to the surgery.  Post-Op Appt Date: To be determined by provider     Packet sent out: Yes  Pre-cert/Authorization completed:  TBD by Financial Securing Office.   MA Sterilization/Hysterectomy Acknowledgment Consent signed: Not Applicable    [facility]  [phone]    Fax: [phone]  Same Day Surgery [phone]  Fax: [phone]    "

## 2018-03-28 NOTE — TELEPHONE ENCOUNTER
Please let her know the only option at this time is to see the cause of the pain and heavy menstrual cycles.  Recommend we just proceed with a look into her uterus and scraping and the laparoscopy as we discussed and removing the cyst and look for other causes of the pain.  She is welcome to come back and see me concerning this as well   Sherin Frost MD   (Routing comment)     Patient notified through [employer].    Radha Holliday   [facility]Gyn Clinic  RN

## 2018-03-28 NOTE — TELEPHONE ENCOUNTER
"Return call to patient.  Spoke with patient on the phone.    Patient would like Dr. Garay to know about the abdominal pain she was having last night.  Patient reports it was a combination of menstrual cramping and labor contractions.  Patient reports pain would come and go.   Patient reports with pain she was doubled over crying.   Patient reports last episodes of severe pain was about 1 hour ago.  Patient thinks she \" is fine now\" but wants Dr. Garay to know to see if she has any other concerns or recommendations based on office exam yesterday.  Patient reports + nausea, denies vomiting. Patient denies fever.     Please review and advise.  Thank you.  "

## 2018-03-28 NOTE — TELEPHONE ENCOUNTER
"Reason for Call:  Other call back    Detailed comments: Pt was just seen yesterday by Dr. Garay.  Last night \"literally keeled over in tears in pain\"  Reminded her of having contractions \"pretty horrible\"  Happens here and there - not constant - ranks the pain at an 8 and when it goes away at like a 4.    Phone Number Patient can be reached at: Home number on file [phone] (home)    Best Time: any    Can we leave a detailed message on this number? YES    Call taken on 3/28/2018 at 8:19 AM by Merary Garcia      "

## 2018-03-30 ENCOUNTER — OFFICE VISIT (OUTPATIENT)
Dept: FAMILY MEDICINE | Facility: CLINIC | Age: 33
End: 2018-03-30
Payer: COMMERCIAL

## 2018-03-30 VITALS
BODY MASS INDEX: 23.69 KG/M2 | WEIGHT: 142.2 LBS | RESPIRATION RATE: 16 BRPM | HEART RATE: 64 BPM | SYSTOLIC BLOOD PRESSURE: 116 MMHG | HEIGHT: 65 IN | DIASTOLIC BLOOD PRESSURE: 76 MMHG | TEMPERATURE: 96.9 F

## 2018-03-30 DIAGNOSIS — N92.0 MENORRHAGIA WITH REGULAR CYCLE: ICD-10-CM

## 2018-03-30 DIAGNOSIS — Z01.818 PREOP GENERAL PHYSICAL EXAM: Primary | ICD-10-CM

## 2018-03-30 DIAGNOSIS — N83.202 LEFT OVARIAN CYST: ICD-10-CM

## 2018-03-30 LAB
BACTERIA SPEC CULT: ABNORMAL
Lab: ABNORMAL
SPECIMEN SOURCE: ABNORMAL

## 2018-03-30 PROCEDURE — 99214 OFFICE O/P EST MOD 30 MIN: CPT | Performed by: FAMILY MEDICINE

## 2018-03-30 NOTE — PROGRESS NOTES
Pt notified of below.  Pt reports understanding.  Pt does not have further questions or concerns.    Radha Holliday   [facility]yn Clinic  RN

## 2018-03-30 NOTE — PATIENT INSTRUCTIONS
On the morning of surgery no medications.      Before Your Surgery      Call your surgeon if there is any change in your health. This includes signs of a cold or flu (such as a sore throat, runny nose, cough, rash or fever).    Do not smoke, drink alcohol or take over the counter medicine (unless your surgeon or primary care doctor tells you to) for the 24 hours before and after surgery.    If you take prescribed drugs: Follow your doctor s orders about which medicines to take and which to stop until after surgery.    Eating and drinking prior to surgery: follow the instructions from your surgeon    Take a shower or bath the night before surgery. Use the soap your surgeon gave you to gently clean your skin. If you do not have soap from your surgeon, use your regular soap. Do not shave or scrub the surgery site.  Wear clean pajamas and have clean sheets on your bed.

## 2018-03-30 NOTE — PROGRESS NOTES
[facility]  [address] [address]  [phone]  Dept: [phone]    PRE-OP EVALUATION:  Today's date: 3/30/2018    Ángela Zurita (: 1985) presents for pre-operative evaluation assessment as requested by Dr. Frost, Sherin Finnegan MD.  She requires evaluation and anesthesia risk assessment prior to undergoing surgery/procedure for treatment of Hysteroscopy.    Proposed Surgery/ Procedure: COMBINED DILATION AND CURETTAGE, HYSTEROSCOPY DIAGNOSTIC and laparoscopy with ovarian cyst removal.  Date of Surgery/ Procedure: 4/3/2018  Time of Surgery/ Procedure: 11:45am  Hospital/Surgical Facility: [facility]  Primary Physician: Randy Graf  Type of Anesthesia Anticipated: General    Patient has a Health Care Directive or Living Will:  NO    1. NO - Do you have a history of heart attack, stroke, stent, bypass or surgery on an artery in the head, neck, heart or legs?  2. NO - Do you ever have any pain or discomfort in your chest?  3. NO - Do you have a history of  Heart Failure?  4. NO - Are you troubled by shortness of breath when: walking on the level, up a slight hill or at night?  5. NO - Do you currently have a cold, bronchitis or other respiratory infection?  6. NO - Do you have a cough, shortness of breath or wheezing?  7. NO - Do you sometimes get pains in the calves of your legs when you walk?  8. YES - DO YOU OR ANYONE IN YOUR FAMILY HAVE PREVIOUS HISTORY OF BLOOD CLOTS? Brother  9. NO - Do you or does anyone in your family have a serious bleeding problem such as prolonged bleeding following surgeries or cuts?  10. NO - Have you ever had problems with anemia or been told to take iron pills?  11. NO - Have you had any abnormal blood loss such as black, tarry or bloody stools, or abnormal vaginal bleeding?  12. NO - Have you ever had a blood transfusion?  13. YES - HAVE YOU OR ANY OF YOUR RELATIVES EVER HAD PROBLEMS WITH ANESTHESIA? Patient  states she has woken up during a surgery.  14. NO - Do you have sleep apnea, excessive snoring or daytime drowsiness?  15. NO - Do you have any prosthetic heart valves?  16. NO - Do you have prosthetic joints?  17. NO - Is there any chance that you may be pregnant?      HPI:     HPI related to upcoming procedure: Cycles are very heavy lasting 5-6 days with increasing clots. She has also had chronic pelvic pain and ovarian complex cysts that have been present on most recent US 3/2018 and on CT 8/2017.  Planning a laparoscopy to look for endometriosis and potential cyst removal.  Has tried many OTC and NSAIDs for PMS and cramping without much relief.    Last period one month ago due for period today.      ADHD: stable on adderal    MEDICAL HISTORY:     Patient Active Problem List    Diagnosis Date Noted     Left ovarian cyst 03/19/2018     Priority: Medium     Posterior neck pain 09/23/2017     Priority: Medium     Attention deficit hyperactivity disorder (ADHD), predominantly inattentive type 06/10/2016     Priority: Medium     Patient is followed by WILMAR WHEAT for ongoing prescription of stimulants.  All refills should be approved by this provider, or covering partner.    Medication(s): Adderall 10mg immediate release   Maximum quantity per month: 150  Clinic visit frequency required: Q 3 month     Controlled substance agreement on file: Yes 6/30/16  Neuropsych evaluation for ADD completed:  Yes, completed 2/2012 at [facility], on file and diagnosis confirmed    Last [facility] website verification:  done on 6/19/2016   [email or web address]           Oral herpes 03/21/2016     Priority: Medium     CARDIOVASCULAR SCREENING; LDL GOAL LESS THAN 130 08/12/2015     Priority: Medium     Rectovaginal fistula 01/26/2015     Priority: Medium     Has had consult with colorectal surgeon; opting to defer treatment until done with childbearing  Amenable to c/section with next pregnancy       Health Care Home 12/18/2014      Priority: Medium     Status:  Unable to reach  Care Coordinator:  Maira Starr    See Letters for [facility] Care Plan  Date:  December 18, 2014         Fibroid uterus 07/02/2014     Priority: Medium     Anterior intramural lower.  Right by bladder       Urgency-frequency syndrome 03/24/2014     Priority: Medium      Past Medical History:   Diagnosis Date     Cervical dysplasia     s/p LEEP      Chickenpox      Exercise-induced asthma     adolescent     IBS (irritable bowel syndrome)      Tonsillitis 2/13/2014     Past Surgical History:   Procedure Laterality Date     BIOPSY  Various    Had a few Leeps and numerous Colposcopys     COLONOSCOPY  2009    Normal     EXC SWEAT GLAND LESN AXILL,SIMPL Right 2009     LEEP TX, CERVICAL  2006    yearly paps     MOUTH SURGERY      wisdom teeth     SOFT TISSUE SURGERY  Various    infected sweat-gland- cyst removed arm,armpit,face     SURGICAL HISTORY OF -   3/2005    Tonsillectomy     SURGICAL HISTORY OF -       infected sweat gland under her arm     Current Outpatient Prescriptions   Medication Sig Dispense Refill     Probiotic Product (SOLUBLE FIBER/PROBIOTICS PO)        [START ON 4/2/2018] amphetamine-dextroamphetamine (ADDERALL) 10 MG per tablet Take 2 pills (20mg) orally in the morning and two pills (20mg) at 11am and one pill (10mg) at 2pm 150 tablet 0     amphetamine-dextroamphetamine (ADDERALL) 10 MG per tablet Take 2 pills (20mg) orally in the morning and two pills (20mg) at 11am and one pill (10mg) at 2pm 150 tablet 0     clindamycin (CLINDAMAX) 1 % topical gel Apply topically 2 times daily 60 g 11     OTC products: no recent use of OTC ASA, NSAIDS or Steroids    No Known Allergies   Latex Allergy: NO    Social History   Substance Use Topics     Smoking status: Current Every Day Smoker     Packs/day: 0.50     Years: 15.00     Types: Cigarettes     Start date: 12/1/2014     Last attempt to quit: 1/8/2014     Smokeless tobacco: Never Used     Alcohol use 0.0 oz/week       "Comment: 3 drinks weekly- quit with pregnancy     History   Drug Use No       REVIEW OF SYSTEMS:   CONSTITUTIONAL: NEGATIVE for fever, chills, change in weight  INTEGUMENTARY/SKIN: POSITIVE for right armpit intermittent sebaceous cyst flares up may be starting.  EYES: NEGATIVE for vision changes or irritation  ENT/MOUTH: NEGATIVE for ear, mouth and throat problems  RESP: NEGATIVE for significant cough or SOB  BREAST: discharge intermittent bilateral (had normal prolactin and TSH labs)  CV: NEGATIVE for chest pain, palpitations or peripheral edema  GI: POSITIVE for nausea and abdominal pain; no heartburn, no bowel habit changes.  : Positive frequency, NEGATIVE for dysuria, or hematuria  MUSCULOSKELETAL: NEGATIVE for significant arthralgias or myalgia  NEURO: NEGATIVE for weakness, dizziness or paresthesias  ENDOCRINE: NEGATIVE for temperature intolerance, skin/hair changes  HEME: NEGATIVE for bleeding problems  PSYCHIATRIC: NEGATIVE for changes in mood or affect    EXAM:   /76  Pulse 64  Temp 96.9  F (36.1  C) (Tympanic)  Resp 16  Ht 5' 5\" (1.651 m)  Wt 142 lb 3.2 oz (64.5 kg)  LMP 03/02/2018 (Exact Date)  BMI 23.66 kg/m2    GENERAL APPEARANCE: healthy, alert and no distress     EYES: EOMI, PERRL     HENT: ear canals and TM's normal and nose and mouth without ulcers or lesions     NECK: no adenopathy, no asymmetry, masses, or scars and thyroid normal to palpation     RESP: lungs clear to auscultation - no rales, rhonchi or wheezes     CV: regular rates and rhythm, normal S1 S2, no S3 or S4 and no murmur, click or rub     ABDOMEN:  soft, nontender, no HSM or masses and bowel sounds normal     MS: extremities normal- no gross deformities noted, no evidence of inflammation in joints, FROM in all extremities.     SKIN:  Right axilla 1cm sebaceous cyst, not red or hot. no suspicious lesions or rashes     NEURO: Normal strength and tone, sensory exam grossly normal, mentation intact and speech normal     " PSYCH: mentation appears normal. and affect normal/bright     LYMPHATICS: No cervical adenopathy    DIAGNOSTICS:   EKG: Not indicated due to non-vascular surgery and low risk of event (age <65 and without cardiac risk factors)    Recent Labs   Lab Test  03/27/18   1449  09/23/17   1915  09/22/17   1530   HGB  14.5  13.2  13.4   PLT   --   326  342   NA   --   143  141   POTASSIUM   --   3.3*  4.1   CR   --   0.87  0.68        IMPRESSION:   Reason for surgery/procedure: Chronic abdominal pain, heavy periods, ovarian cyst  Diagnosis/reason for consult: Pre-Op    The proposed surgical procedure is considered INTERMEDIATE risk.    REVISED CARDIAC RISK INDEX  The patient has the following serious cardiovascular risks for perioperative complications such as (MI, PE, VFib and 3  AV Block):  No serious cardiac risks  INTERPRETATION: 0 risks: Class I (very low risk - 0.4% complication rate)    The patient has the following additional risks for perioperative complications:  No identified additional risks      ICD-10-CM    1. Preop general physical exam Z01.818    2. Left ovarian cyst N83.202    3. Menorrhagia with regular cycle N92.0        RECOMMENDATIONS:       --Patient will not take daily adderal or any medication on day of surgery    APPROVAL GIVEN to proceed with proposed procedure, without further diagnostic evaluation       Signed Electronically by: Randy Graf MD    Copy of this evaluation report is provided to requesting physician.    [facility] Preop Guidelines

## 2018-03-30 NOTE — MR AVS SNAPSHOT
After Visit Summary   3/30/2018    Ángela Zurita    MRN: [id number]           Patient Information     Date Of Birth          1985        Visit Information        Provider Department      3/30/2018 7:00 AM Randy Graf MD [facility]        Today's Diagnoses     Preop general physical exam    -  1    Left ovarian cyst        Menorrhagia with regular cycle          Care Instructions    On the morning of surgery no medications.      Before Your Surgery      Call your surgeon if there is any change in your health. This includes signs of a cold or flu (such as a sore throat, runny nose, cough, rash or fever).    Do not smoke, drink alcohol or take over the counter medicine (unless your surgeon or primary care doctor tells you to) for the 24 hours before and after surgery.    If you take prescribed drugs: Follow your doctor s orders about which medicines to take and which to stop until after surgery.    Eating and drinking prior to surgery: follow the instructions from your surgeon    Take a shower or bath the night before surgery. Use the soap your surgeon gave you to gently clean your skin. If you do not have soap from your surgeon, use your regular soap. Do not shave or scrub the surgery site.  Wear clean pajamas and have clean sheets on your bed.           Follow-ups after your visit        Your next 10 appointments already scheduled     Apr 03, 2018   Procedure with Sherin Frost MD   [facility] (--)    [address] [place] [address] [phone]           The medical center is located at [address] [place] [address]. (between [address] in [place], [place] north of [place]).            Apr 10, 2018  3:00 PM CDT   (Arrive by 2:45 PM)   New Patient Visit with Efren Olvera MD   [facility] ([facility])    [address]  [address] [facility] [address]  "  [phone]              Who to contact     If you have questions or need follow up information about today's clinic visit or your schedule please contact [facility] directly at [phone].  Normal or non-critical lab and imaging results will be communicated to you by MyChart, letter or phone within 4 business days after the clinic has received the results. If you do not hear from us within 7 days, please contact the clinic through MyChart or phone. If you have a critical or abnormal lab result, we will notify you by phone as soon as possible.  Submit refill requests through [employer] or call your pharmacy and they will forward the refill request to us. Please allow 3 business days for your refill to be completed.          Additional Information About Your Visit        [employer] Information     [employer] gives you secure access to your electronic health record. If you see a primary care provider, you can also send messages to your care team and make appointments. If you have questions, please call your primary care clinic.  If you do not have a primary care provider, please call [phone] and they will assist you.        Care EveryWhere ID     This is your Care EveryWhere ID. This could be used by other organizations to access your [facility] medical records  [id number]        Your Vitals Were     Pulse Temperature Respirations Height Last Period BMI (Body Mass Index)    64 96.9  F (36.1  C) (Tympanic) 16 5' 5\" (1.651 m) 03/02/2018 (Exact Date) 23.66 kg/m2       Blood Pressure from Last 3 Encounters:   03/30/18 116/76   03/27/18 136/84   03/20/18 122/82    Weight from Last 3 Encounters:   03/30/18 142 lb 3.2 oz (64.5 kg)   03/27/18 140 lb 6.4 oz (63.7 kg)   03/20/18 141 lb 6.4 oz (64.1 kg)              Today, you had the following     No orders found for display       Primary Care Provider Office Phone # Fax #    Randy Graf [phone] [address] " [phone]        Equal Access to Services     [facility] campusERIKA : Hadii aad ku hadbethadna Gageali, waanthonyda lujoselitoelhamha, qadonnamariano guardadoabhimoses willoughby, celia elizabeth. So [facility] [phone].    ATENCIÓN: Si habla español, tiene a reyez disposición servicios gratuitos de asistencia lingüística. Meenakshiame al [phone].    We comply with applicable federal civil rights laws and [place] laws. We do not discriminate on the basis of race, color, national origin, age, disability, sex, sexual orientation, or gender identity.            Thank you!     Thank you for choosing [facility]  for your care. Our goal is always to provide you with excellent care. Hearing back from our patients is one way we can continue to improve our services. Please take a few minutes to complete the written survey that you may receive in the mail after your visit with us. Thank you!             Your Updated Medication List - Protect others around you: Learn how to safely use, store and throw away your medicines at www.disposemymeds.org.          This list is accurate as of 3/30/18  7:41 AM.  Always use your most recent med list.                   Brand Name Dispense Instructions for use Diagnosis    * amphetamine-dextroamphetamine 10 MG per tablet    ADDERALL    150 tablet    Take 2 pills (20mg) orally in the morning and two pills (20mg) at 11am and one pill (10mg) at 2pm    Attention deficit hyperactivity disorder (ADHD), predominantly inattentive type       * amphetamine-dextroamphetamine 10 MG per tablet   Start taking on:  4/2/2018    ADDERALL    150 tablet    Take 2 pills (20mg) orally in the morning and two pills (20mg) at 11am and one pill (10mg) at 2pm    Attention deficit hyperactivity disorder (ADHD), predominantly inattentive type       clindamycin 1 % topical gel    CLINDAMAX    60 g    Apply topically 2 times daily    Acne vulgaris       SOLUBLE FIBER/PROBIOTICS PO       Urinary frequency, Vaginal discharge, Pelvic  pain in female, Family history of malignant neoplasm of breast, Left ovarian cyst, Bilateral galactorrhea       * Notice:  This list has 2 medication(s) that are the same as other medications prescribed for you. Read the directions carefully, and ask your doctor or other care provider to review them with you.

## 2018-03-30 NOTE — NURSING NOTE
"Chief Complaint   Patient presents with     Pre-Op Exam       Initial /76  Pulse 64  Temp 96.9  F (36.1  C) (Tympanic)  Resp 16  Ht 5' 5\" (1.651 m)  Wt 142 lb 3.2 oz (64.5 kg)  LMP 03/02/2018 (Exact Date)  BMI 23.66 kg/m2 Estimated body mass index is 23.66 kg/(m^2) as calculated from the following:    Height as of this encounter: 5' 5\" (1.651 m).    Weight as of this encounter: 142 lb 3.2 oz (64.5 kg).  Medication Reconciliation: complete    "

## 2018-04-02 ENCOUNTER — ANESTHESIA EVENT (OUTPATIENT)
Dept: SURGERY | Facility: CLINIC | Age: 33
End: 2018-04-02
Payer: COMMERCIAL

## 2018-04-03 ENCOUNTER — ANESTHESIA (OUTPATIENT)
Dept: SURGERY | Facility: CLINIC | Age: 33
End: 2018-04-03
Payer: COMMERCIAL

## 2018-04-03 ENCOUNTER — HOSPITAL ENCOUNTER (OUTPATIENT)
Facility: CLINIC | Age: 33
Discharge: HOME | End: 2018-04-03
Attending: OBSTETRICS & GYNECOLOGY | Admitting: OBSTETRICS & GYNECOLOGY
Payer: COMMERCIAL

## 2018-04-03 VITALS
SYSTOLIC BLOOD PRESSURE: 114 MMHG | RESPIRATION RATE: 16 BRPM | TEMPERATURE: 97.6 F | HEART RATE: 73 BPM | DIASTOLIC BLOOD PRESSURE: 82 MMHG | OXYGEN SATURATION: 99 %

## 2018-04-03 DIAGNOSIS — Z98.890 S/P LAPAROSCOPY: Primary | ICD-10-CM

## 2018-04-03 LAB
B-HCG SERPL-ACNC: <1 IU/L (ref 0–5)
HCG UR QL: NEGATIVE
HGB BLD-MCNC: 7.4 G/DL (ref 11.7–15.7)

## 2018-04-03 PROCEDURE — 86900 BLOOD TYPING SEROLOGIC ABO: CPT | Performed by: OBSTETRICS & GYNECOLOGY

## 2018-04-03 PROCEDURE — 58558 HYSTEROSCOPY BIOPSY: CPT | Mod: 51 | Performed by: OBSTETRICS & GYNECOLOGY

## 2018-04-03 PROCEDURE — 88341 IMHCHEM/IMCYTCHM EA ADD ANTB: CPT | Performed by: OBSTETRICS & GYNECOLOGY

## 2018-04-03 PROCEDURE — 85018 HEMOGLOBIN: CPT | Performed by: OBSTETRICS & GYNECOLOGY

## 2018-04-03 PROCEDURE — 88305 TISSUE EXAM BY PATHOLOGIST: CPT | Mod: 26,59 | Performed by: OBSTETRICS & GYNECOLOGY

## 2018-04-03 PROCEDURE — 36415 COLL VENOUS BLD VENIPUNCTURE: CPT | Performed by: OBSTETRICS & GYNECOLOGY

## 2018-04-03 PROCEDURE — 71000027 ZZH RECOVERY PHASE 2 EACH 15 MINS: Performed by: OBSTETRICS & GYNECOLOGY

## 2018-04-03 PROCEDURE — 88305 TISSUE EXAM BY PATHOLOGIST: CPT | Performed by: OBSTETRICS & GYNECOLOGY

## 2018-04-03 PROCEDURE — 25000128 H RX IP 250 OP 636: Performed by: NURSE ANESTHETIST, CERTIFIED REGISTERED

## 2018-04-03 PROCEDURE — 25000566 ZZH SEVOFLURANE, EA 15 MIN: Performed by: OBSTETRICS & GYNECOLOGY

## 2018-04-03 PROCEDURE — 37000008 ZZH ANESTHESIA TECHNICAL FEE, 1ST 30 MIN: Performed by: OBSTETRICS & GYNECOLOGY

## 2018-04-03 PROCEDURE — 36000056 ZZH SURGERY LEVEL 3 1ST 30 MIN: Performed by: OBSTETRICS & GYNECOLOGY

## 2018-04-03 PROCEDURE — 25000132 ZZH RX MED GY IP 250 OP 250 PS 637: Performed by: OBSTETRICS & GYNECOLOGY

## 2018-04-03 PROCEDURE — 81025 URINE PREGNANCY TEST: CPT | Performed by: NURSE ANESTHETIST, CERTIFIED REGISTERED

## 2018-04-03 PROCEDURE — 37000009 ZZH ANESTHESIA TECHNICAL FEE, EACH ADDTL 15 MIN: Performed by: OBSTETRICS & GYNECOLOGY

## 2018-04-03 PROCEDURE — 58662 LAPAROSCOPY EXCISE LESIONS: CPT | Mod: AS | Performed by: PHYSICIAN ASSISTANT

## 2018-04-03 PROCEDURE — 25000128 H RX IP 250 OP 636: Performed by: OBSTETRICS & GYNECOLOGY

## 2018-04-03 PROCEDURE — 86901 BLOOD TYPING SEROLOGIC RH(D): CPT | Performed by: OBSTETRICS & GYNECOLOGY

## 2018-04-03 PROCEDURE — 40000306 ZZH STATISTIC PRE PROC ASSESS II: Performed by: OBSTETRICS & GYNECOLOGY

## 2018-04-03 PROCEDURE — 25000125 ZZHC RX 250: Performed by: OBSTETRICS & GYNECOLOGY

## 2018-04-03 PROCEDURE — 25000125 ZZHC RX 250: Performed by: NURSE ANESTHETIST, CERTIFIED REGISTERED

## 2018-04-03 PROCEDURE — 27210794 ZZH OR GENERAL SUPPLY STERILE: Performed by: OBSTETRICS & GYNECOLOGY

## 2018-04-03 PROCEDURE — 84702 CHORIONIC GONADOTROPIN TEST: CPT | Performed by: OBSTETRICS & GYNECOLOGY

## 2018-04-03 PROCEDURE — 58662 LAPAROSCOPY EXCISE LESIONS: CPT | Performed by: OBSTETRICS & GYNECOLOGY

## 2018-04-03 PROCEDURE — 88341 IMHCHEM/IMCYTCHM EA ADD ANTB: CPT | Mod: 26 | Performed by: OBSTETRICS & GYNECOLOGY

## 2018-04-03 PROCEDURE — 36000058 ZZH SURGERY LEVEL 3 EA 15 ADDTL MIN: Performed by: OBSTETRICS & GYNECOLOGY

## 2018-04-03 PROCEDURE — 86850 RBC ANTIBODY SCREEN: CPT | Performed by: OBSTETRICS & GYNECOLOGY

## 2018-04-03 PROCEDURE — 88342 IMHCHEM/IMCYTCHM 1ST ANTB: CPT | Mod: 26 | Performed by: OBSTETRICS & GYNECOLOGY

## 2018-04-03 PROCEDURE — 71000012 ZZH RECOVERY PHASE 1 LEVEL 1 FIRST HR: Performed by: OBSTETRICS & GYNECOLOGY

## 2018-04-03 PROCEDURE — 25800025 ZZH RX 258: Performed by: OBSTETRICS & GYNECOLOGY

## 2018-04-03 PROCEDURE — 88342 IMHCHEM/IMCYTCHM 1ST ANTB: CPT | Performed by: OBSTETRICS & GYNECOLOGY

## 2018-04-03 RX ORDER — SODIUM CHLORIDE, SODIUM LACTATE, POTASSIUM CHLORIDE, CALCIUM CHLORIDE 600; 310; 30; 20 MG/100ML; MG/100ML; MG/100ML; MG/100ML
INJECTION, SOLUTION INTRAVENOUS CONTINUOUS
Status: DISCONTINUED | OUTPATIENT
Start: 2018-04-03 | End: 2018-04-03 | Stop reason: HOSPADM

## 2018-04-03 RX ORDER — OXYCODONE HCL 10 MG/1
10 TABLET, FILM COATED, EXTENDED RELEASE ORAL
Status: COMPLETED | OUTPATIENT
Start: 2018-04-03 | End: 2018-04-03

## 2018-04-03 RX ORDER — FENTANYL CITRATE 50 UG/ML
INJECTION, SOLUTION INTRAMUSCULAR; INTRAVENOUS PRN
Status: DISCONTINUED | OUTPATIENT
Start: 2018-04-03 | End: 2018-04-03

## 2018-04-03 RX ORDER — LIDOCAINE HYDROCHLORIDE 10 MG/ML
INJECTION, SOLUTION INFILTRATION; PERINEURAL PRN
Status: DISCONTINUED | OUTPATIENT
Start: 2018-04-03 | End: 2018-04-03

## 2018-04-03 RX ORDER — NALOXONE HYDROCHLORIDE 0.4 MG/ML
.1-.4 INJECTION, SOLUTION INTRAMUSCULAR; INTRAVENOUS; SUBCUTANEOUS
Status: DISCONTINUED | OUTPATIENT
Start: 2018-04-03 | End: 2018-04-03 | Stop reason: HOSPADM

## 2018-04-03 RX ORDER — ONDANSETRON 2 MG/ML
INJECTION INTRAMUSCULAR; INTRAVENOUS PRN
Status: DISCONTINUED | OUTPATIENT
Start: 2018-04-03 | End: 2018-04-03

## 2018-04-03 RX ORDER — HYDROMORPHONE HYDROCHLORIDE 1 MG/ML
.3-.5 INJECTION, SOLUTION INTRAMUSCULAR; INTRAVENOUS; SUBCUTANEOUS EVERY 10 MIN PRN
Status: DISCONTINUED | OUTPATIENT
Start: 2018-04-03 | End: 2018-04-03 | Stop reason: HOSPADM

## 2018-04-03 RX ORDER — BUPIVACAINE HYDROCHLORIDE AND EPINEPHRINE 5; 5 MG/ML; UG/ML
INJECTION, SOLUTION PERINEURAL PRN
Status: DISCONTINUED | OUTPATIENT
Start: 2018-04-03 | End: 2018-04-03 | Stop reason: HOSPADM

## 2018-04-03 RX ORDER — OXYCODONE HYDROCHLORIDE 5 MG/1
10 TABLET ORAL
Status: DISCONTINUED | OUTPATIENT
Start: 2018-04-03 | End: 2018-04-03 | Stop reason: HOSPADM

## 2018-04-03 RX ORDER — DEXAMETHASONE SODIUM PHOSPHATE 4 MG/ML
INJECTION, SOLUTION INTRA-ARTICULAR; INTRALESIONAL; INTRAMUSCULAR; INTRAVENOUS; SOFT TISSUE PRN
Status: DISCONTINUED | OUTPATIENT
Start: 2018-04-03 | End: 2018-04-03

## 2018-04-03 RX ORDER — ONDANSETRON 4 MG/1
4-8 TABLET, ORALLY DISINTEGRATING ORAL EVERY 8 HOURS PRN
Qty: 4 TABLET | Refills: 0 | Status: SHIPPED | OUTPATIENT
Start: 2018-04-03 | End: 2018-04-10

## 2018-04-03 RX ORDER — LIDOCAINE 40 MG/G
CREAM TOPICAL
Status: DISCONTINUED | OUTPATIENT
Start: 2018-04-03 | End: 2018-04-03 | Stop reason: HOSPADM

## 2018-04-03 RX ORDER — MEPERIDINE HYDROCHLORIDE 25 MG/ML
12.5 INJECTION INTRAMUSCULAR; INTRAVENOUS; SUBCUTANEOUS
Status: DISCONTINUED | OUTPATIENT
Start: 2018-04-03 | End: 2018-04-03 | Stop reason: HOSPADM

## 2018-04-03 RX ORDER — ACETAMINOPHEN 325 MG/1
650 TABLET ORAL EVERY 4 HOURS PRN
Qty: 100 TABLET | Refills: 0 | Status: SHIPPED | OUTPATIENT
Start: 2018-04-03 | End: 2018-05-01

## 2018-04-03 RX ORDER — KETOROLAC TROMETHAMINE 30 MG/ML
30 INJECTION, SOLUTION INTRAMUSCULAR; INTRAVENOUS EVERY 6 HOURS PRN
Status: DISCONTINUED | OUTPATIENT
Start: 2018-04-03 | End: 2018-04-03 | Stop reason: HOSPADM

## 2018-04-03 RX ORDER — FENTANYL CITRATE 50 UG/ML
25-50 INJECTION, SOLUTION INTRAMUSCULAR; INTRAVENOUS
Status: DISCONTINUED | OUTPATIENT
Start: 2018-04-03 | End: 2018-04-03 | Stop reason: HOSPADM

## 2018-04-03 RX ORDER — OXYCODONE HYDROCHLORIDE 5 MG/1
5-10 TABLET ORAL
Qty: 30 TABLET | Refills: 0 | Status: SHIPPED | OUTPATIENT
Start: 2018-04-03 | End: 2018-04-10

## 2018-04-03 RX ORDER — ONDANSETRON 4 MG/1
4 TABLET, ORALLY DISINTEGRATING ORAL
Status: DISCONTINUED | OUTPATIENT
Start: 2018-04-03 | End: 2018-04-03 | Stop reason: HOSPADM

## 2018-04-03 RX ORDER — ONDANSETRON 4 MG/1
4 TABLET, ORALLY DISINTEGRATING ORAL EVERY 30 MIN PRN
Status: DISCONTINUED | OUTPATIENT
Start: 2018-04-03 | End: 2018-04-03 | Stop reason: HOSPADM

## 2018-04-03 RX ORDER — LIDOCAINE HYDROCHLORIDE AND EPINEPHRINE 10; 10 MG/ML; UG/ML
INJECTION, SOLUTION INFILTRATION; PERINEURAL PRN
Status: DISCONTINUED | OUTPATIENT
Start: 2018-04-03 | End: 2018-04-03 | Stop reason: HOSPADM

## 2018-04-03 RX ORDER — IBUPROFEN 600 MG/1
600 TABLET, FILM COATED ORAL EVERY 6 HOURS PRN
Qty: 30 TABLET | Refills: 0 | Status: SHIPPED | OUTPATIENT
Start: 2018-04-03 | End: 2018-05-01

## 2018-04-03 RX ORDER — KETOROLAC TROMETHAMINE 30 MG/ML
30 INJECTION, SOLUTION INTRAMUSCULAR; INTRAVENOUS ONCE
Status: COMPLETED | OUTPATIENT
Start: 2018-04-03 | End: 2018-04-03

## 2018-04-03 RX ORDER — HYDROXYZINE HYDROCHLORIDE 25 MG/1
25 TABLET, FILM COATED ORAL
Status: COMPLETED | OUTPATIENT
Start: 2018-04-03 | End: 2018-04-03

## 2018-04-03 RX ORDER — PROPOFOL 10 MG/ML
INJECTION, EMULSION INTRAVENOUS PRN
Status: DISCONTINUED | OUTPATIENT
Start: 2018-04-03 | End: 2018-04-03

## 2018-04-03 RX ORDER — CEFAZOLIN SODIUM 2 G/100ML
2 INJECTION, SOLUTION INTRAVENOUS
Status: COMPLETED | OUTPATIENT
Start: 2018-04-03 | End: 2018-04-03

## 2018-04-03 RX ORDER — ONDANSETRON 2 MG/ML
4 INJECTION INTRAMUSCULAR; INTRAVENOUS EVERY 30 MIN PRN
Status: DISCONTINUED | OUTPATIENT
Start: 2018-04-03 | End: 2018-04-03 | Stop reason: HOSPADM

## 2018-04-03 RX ORDER — ALBUTEROL SULFATE 0.83 MG/ML
2.5 SOLUTION RESPIRATORY (INHALATION) EVERY 4 HOURS PRN
Status: DISCONTINUED | OUTPATIENT
Start: 2018-04-03 | End: 2018-04-03 | Stop reason: HOSPADM

## 2018-04-03 RX ADMIN — ROCURONIUM BROMIDE 20 MG: 10 INJECTION INTRAVENOUS at 12:01

## 2018-04-03 RX ADMIN — SODIUM CHLORIDE, POTASSIUM CHLORIDE, SODIUM LACTATE AND CALCIUM CHLORIDE: 600; 310; 30; 20 INJECTION, SOLUTION INTRAVENOUS at 11:19

## 2018-04-03 RX ADMIN — LIDOCAINE HYDROCHLORIDE 50 MG: 10 INJECTION, SOLUTION INFILTRATION; PERINEURAL at 12:01

## 2018-04-03 RX ADMIN — LIDOCAINE HYDROCHLORIDE 1 ML: 10 INJECTION, SOLUTION EPIDURAL; INFILTRATION; INTRACAUDAL; PERINEURAL at 11:19

## 2018-04-03 RX ADMIN — ONDANSETRON 4 MG: 2 INJECTION INTRAMUSCULAR; INTRAVENOUS at 12:01

## 2018-04-03 RX ADMIN — HYDROMORPHONE HYDROCHLORIDE 0.5 MG: 1 INJECTION, SOLUTION INTRAMUSCULAR; INTRAVENOUS; SUBCUTANEOUS at 13:46

## 2018-04-03 RX ADMIN — FENTANYL CITRATE 50 MCG: 50 INJECTION, SOLUTION INTRAMUSCULAR; INTRAVENOUS at 13:26

## 2018-04-03 RX ADMIN — FENTANYL CITRATE 150 MCG: 50 INJECTION, SOLUTION INTRAMUSCULAR; INTRAVENOUS at 12:01

## 2018-04-03 RX ADMIN — DEXAMETHASONE SODIUM PHOSPHATE 4 MG: 4 INJECTION, SOLUTION INTRA-ARTICULAR; INTRALESIONAL; INTRAMUSCULAR; INTRAVENOUS; SOFT TISSUE at 12:01

## 2018-04-03 RX ADMIN — FENTANYL CITRATE 100 MCG: 50 INJECTION, SOLUTION INTRAMUSCULAR; INTRAVENOUS at 11:58

## 2018-04-03 RX ADMIN — MIDAZOLAM 2 MG: 1 INJECTION INTRAMUSCULAR; INTRAVENOUS at 11:58

## 2018-04-03 RX ADMIN — HYDROMORPHONE HYDROCHLORIDE 0.5 MG: 1 INJECTION, SOLUTION INTRAMUSCULAR; INTRAVENOUS; SUBCUTANEOUS at 14:08

## 2018-04-03 RX ADMIN — CEFAZOLIN SODIUM 2 G: 2 INJECTION, SOLUTION INTRAVENOUS at 12:05

## 2018-04-03 RX ADMIN — PROPOFOL 200 MG: 10 INJECTION, EMULSION INTRAVENOUS at 12:01

## 2018-04-03 RX ADMIN — FENTANYL CITRATE 50 MCG: 50 INJECTION, SOLUTION INTRAMUSCULAR; INTRAVENOUS at 13:39

## 2018-04-03 RX ADMIN — KETOROLAC TROMETHAMINE 30 MG: 30 INJECTION, SOLUTION INTRAMUSCULAR at 11:26

## 2018-04-03 RX ADMIN — HYDROXYZINE HYDROCHLORIDE 25 MG: 25 TABLET ORAL at 13:53

## 2018-04-03 RX ADMIN — OXYCODONE HYDROCHLORIDE 10 MG: 10 TABLET, FILM COATED, EXTENDED RELEASE ORAL at 11:27

## 2018-04-03 ASSESSMENT — LIFESTYLE VARIABLES: TOBACCO_USE: 1

## 2018-04-03 NOTE — OP NOTE
Procedure Date: 04/03/2018      PREOPERATIVE DIAGNOSIS:  Left ovarian cyst, complex menorrhagia with regular cycles, abdominal pain, left lower quadrant.      POSTOPERATIVE DIAGNOSIS:  Left ovarian cyst, complex menorrhagia with regular cycles, abdominal pain, left lower quadrant.  Left adnexal adhesion and suspected endometriosis.      PROCEDURE:  Dilation and curettage, diagnostic hysteroscopy, diagnostic laparoscopy with left ovarian cystectomies x2, lysis of adhesions and peritoneal biopsy.      SURGEON:  Sherin Frost MD      ASSISTANT:  ABDI Persaud.  A surgical assistant was required for this surgery for his experience with retraction, achieving hemostasis and wound closure.      ANESTHESIA:  General endotracheal.      ESTIMATED BLOOD LOSS: 25ml     IV FLUIDS:  See anesthesia.      BLOOD TRANSFUSION:  None given.      URINE OUTPUT:  See anesthesia record.      DRAINS:  None.      SPECIMENS:  Endometrial curettings, left ovarian cyst tissue, peritoneal biopsy from the right uterosacral.        FINDINGS:  A normal intrauterine cavity, a left adnexa with 2 benign-appearing cysts.  Cystectomy was performed with clear yellowish fluid.  Normal adnexa otherwise.  Lesions consistent with endometriosis, particularly on the right uterosacral.  One biopsied, the others were fulgurated with the ligasure.      COMPLICATIONS:  None.      CONDITION:  Stable.      DETAILS OF PROCEDURE:  Risks, benefits and alternatives were discussed with the patient.  She had chronic intermittent left lower quadrant pain and an ovarian cyst, complex, seen on the ultrasound and heavy menstrual bleeding.  She did desire pregnancy in the future and desired ovarian retention.  Risks, benefits, alternatives discussed.  She understood and was taken back to the operating room where she was placed under general anesthesia.  She was placed in low lithotomy position.  She was prepped and draped and a Washington was placed.  A speculum  was placed, cervix grasped with single-tooth tenaculum.  Paracervical block was performed with 1% lidocaine with epinephrine and dilated to Hegar dilators to 6 and then saline hysteroscopy was performed.  Bilateral ostia identified and a normal intrauterine cavity. Sharp curettage was performed. At this point in time, a HUMI uterine manipulator was placed.  Speculum was removed, gloves were changed, attention was then paid to her abdomen where a local was placed with 0.25% Marcaine 10 mL into her umbilicus. An incision was made and entered with the Visiport and pneumoperitoneum was created.  The same procedure was performed on the left and right lower quadrants clear of the vessels under direct visualization.  The abdomen was evaluated and noted to have a normal appendix and liver edge and grossly normal intestine.  She had normal physiologic adhesions of her descending left lower intestines.  Adnexa was evaluated; her left adnexa was adhered into the obturator foramen.  Proceeded to bluntly and hydrodissected off the ovary and tube.   Her fallopian tubes noted to be normal bilaterally.  The left adnexa had 2 corpus luteum appearing cysts which were ruptured with the LigaSure and dissected off the cystic wall.  The lower cyst was opened with the LigaSure.  Good hemostasis was noted.        At this point in time, evaluated the endometriosis type lesion, taken and excised off with the LigaSure.  The other ones were fulgurated with the LigaSure.  The ureters were noted to be clear of this area.  At this point in time, all the ports were removed, pneumoperitoneum was released and the incision was closed with 4-0 Vicryl subcuticular and then SecureSeal.  The vaginal instruments were removed and the Washington.  The patient was taken out of lithotomy position and taken to the recovery room in stable condition.  Sponge and instrument counts correct x3, no complications.         ROGERIO WISEMAN MD             D:  2018   T: 2018   MT: MD      Name:     SALENA CLEMENTE   MRN:      [id number]        Account:        [id number]   :      1985           Procedure Date: 2018      Document: [id number]

## 2018-04-03 NOTE — H&P (VIEW-ONLY)
[facility]  [address] [address]  [phone]  Dept: [phone]    PRE-OP EVALUATION:  Today's date: 3/30/2018    Ángela Zurita (: 1985) presents for pre-operative evaluation assessment as requested by Dr. Frost, Sherin Finnegan MD.  She requires evaluation and anesthesia risk assessment prior to undergoing surgery/procedure for treatment of Hysteroscopy.    Proposed Surgery/ Procedure: COMBINED DILATION AND CURETTAGE, HYSTEROSCOPY DIAGNOSTIC and laparoscopy with ovarian cyst removal.  Date of Surgery/ Procedure: 4/3/2018  Time of Surgery/ Procedure: 11:45am  Hospital/Surgical Facility: [facility]  Primary Physician: Randy Graf  Type of Anesthesia Anticipated: General    Patient has a Health Care Directive or Living Will:  NO    1. NO - Do you have a history of heart attack, stroke, stent, bypass or surgery on an artery in the head, neck, heart or legs?  2. NO - Do you ever have any pain or discomfort in your chest?  3. NO - Do you have a history of  Heart Failure?  4. NO - Are you troubled by shortness of breath when: walking on the level, up a slight hill or at night?  5. NO - Do you currently have a cold, bronchitis or other respiratory infection?  6. NO - Do you have a cough, shortness of breath or wheezing?  7. NO - Do you sometimes get pains in the calves of your legs when you walk?  8. YES - DO YOU OR ANYONE IN YOUR FAMILY HAVE PREVIOUS HISTORY OF BLOOD CLOTS? Brother  9. NO - Do you or does anyone in your family have a serious bleeding problem such as prolonged bleeding following surgeries or cuts?  10. NO - Have you ever had problems with anemia or been told to take iron pills?  11. NO - Have you had any abnormal blood loss such as black, tarry or bloody stools, or abnormal vaginal bleeding?  12. NO - Have you ever had a blood transfusion?  13. YES - HAVE YOU OR ANY OF YOUR RELATIVES EVER HAD PROBLEMS WITH ANESTHESIA? Patient  states she has woken up during a surgery.  14. NO - Do you have sleep apnea, excessive snoring or daytime drowsiness?  15. NO - Do you have any prosthetic heart valves?  16. NO - Do you have prosthetic joints?  17. NO - Is there any chance that you may be pregnant?      HPI:     HPI related to upcoming procedure: Cycles are very heavy lasting 5-6 days with increasing clots. She has also had chronic pelvic pain and ovarian complex cysts that have been present on most recent US 3/2018 and on CT 8/2017.  Planning a laparoscopy to look for endometriosis and potential cyst removal.  Has tried many OTC and NSAIDs for PMS and cramping without much relief.    Last period one month ago due for period today.      ADHD: stable on adderal    MEDICAL HISTORY:     Patient Active Problem List    Diagnosis Date Noted     Left ovarian cyst 03/19/2018     Priority: Medium     Posterior neck pain 09/23/2017     Priority: Medium     Attention deficit hyperactivity disorder (ADHD), predominantly inattentive type 06/10/2016     Priority: Medium     Patient is followed by WILMAR WHEAT for ongoing prescription of stimulants.  All refills should be approved by this provider, or covering partner.    Medication(s): Adderall 10mg immediate release   Maximum quantity per month: 150  Clinic visit frequency required: Q 3 month     Controlled substance agreement on file: Yes 6/30/16  Neuropsych evaluation for ADD completed:  Yes, completed 2/2012 at [facility], on file and diagnosis confirmed    Last [facility] website verification:  done on 6/19/2016   [email or web address]           Oral herpes 03/21/2016     Priority: Medium     CARDIOVASCULAR SCREENING; LDL GOAL LESS THAN 130 08/12/2015     Priority: Medium     Rectovaginal fistula 01/26/2015     Priority: Medium     Has had consult with colorectal surgeon; opting to defer treatment until done with childbearing  Amenable to c/section with next pregnancy       Health Care Home 12/18/2014      Priority: Medium     Status:  Unable to reach  Care Coordinator:  Maira Starr    See Letters for [facility] Care Plan  Date:  December 18, 2014         Fibroid uterus 07/02/2014     Priority: Medium     Anterior intramural lower.  Right by bladder       Urgency-frequency syndrome 03/24/2014     Priority: Medium      Past Medical History:   Diagnosis Date     Cervical dysplasia     s/p LEEP      Chickenpox      Exercise-induced asthma     adolescent     IBS (irritable bowel syndrome)      Tonsillitis 2/13/2014     Past Surgical History:   Procedure Laterality Date     BIOPSY  Various    Had a few Leeps and numerous Colposcopys     COLONOSCOPY  2009    Normal     EXC SWEAT GLAND LESN AXILL,SIMPL Right 2009     LEEP TX, CERVICAL  2006    yearly paps     MOUTH SURGERY      wisdom teeth     SOFT TISSUE SURGERY  Various    infected sweat-gland- cyst removed arm,armpit,face     SURGICAL HISTORY OF -   3/2005    Tonsillectomy     SURGICAL HISTORY OF -       infected sweat gland under her arm     Current Outpatient Prescriptions   Medication Sig Dispense Refill     Probiotic Product (SOLUBLE FIBER/PROBIOTICS PO)        [START ON 4/2/2018] amphetamine-dextroamphetamine (ADDERALL) 10 MG per tablet Take 2 pills (20mg) orally in the morning and two pills (20mg) at 11am and one pill (10mg) at 2pm 150 tablet 0     amphetamine-dextroamphetamine (ADDERALL) 10 MG per tablet Take 2 pills (20mg) orally in the morning and two pills (20mg) at 11am and one pill (10mg) at 2pm 150 tablet 0     clindamycin (CLINDAMAX) 1 % topical gel Apply topically 2 times daily 60 g 11     OTC products: no recent use of OTC ASA, NSAIDS or Steroids    No Known Allergies   Latex Allergy: NO    Social History   Substance Use Topics     Smoking status: Current Every Day Smoker     Packs/day: 0.50     Years: 15.00     Types: Cigarettes     Start date: 12/1/2014     Last attempt to quit: 1/8/2014     Smokeless tobacco: Never Used     Alcohol use 0.0 oz/week       "Comment: 3 drinks weekly- quit with pregnancy     History   Drug Use No       REVIEW OF SYSTEMS:   CONSTITUTIONAL: NEGATIVE for fever, chills, change in weight  INTEGUMENTARY/SKIN: POSITIVE for right armpit intermittent sebaceous cyst flares up may be starting.  EYES: NEGATIVE for vision changes or irritation  ENT/MOUTH: NEGATIVE for ear, mouth and throat problems  RESP: NEGATIVE for significant cough or SOB  BREAST: discharge intermittent bilateral (had normal prolactin and TSH labs)  CV: NEGATIVE for chest pain, palpitations or peripheral edema  GI: POSITIVE for nausea and abdominal pain; no heartburn, no bowel habit changes.  : Positive frequency, NEGATIVE for dysuria, or hematuria  MUSCULOSKELETAL: NEGATIVE for significant arthralgias or myalgia  NEURO: NEGATIVE for weakness, dizziness or paresthesias  ENDOCRINE: NEGATIVE for temperature intolerance, skin/hair changes  HEME: NEGATIVE for bleeding problems  PSYCHIATRIC: NEGATIVE for changes in mood or affect    EXAM:   /76  Pulse 64  Temp 96.9  F (36.1  C) (Tympanic)  Resp 16  Ht 5' 5\" (1.651 m)  Wt 142 lb 3.2 oz (64.5 kg)  LMP 03/02/2018 (Exact Date)  BMI 23.66 kg/m2    GENERAL APPEARANCE: healthy, alert and no distress     EYES: EOMI, PERRL     HENT: ear canals and TM's normal and nose and mouth without ulcers or lesions     NECK: no adenopathy, no asymmetry, masses, or scars and thyroid normal to palpation     RESP: lungs clear to auscultation - no rales, rhonchi or wheezes     CV: regular rates and rhythm, normal S1 S2, no S3 or S4 and no murmur, click or rub     ABDOMEN:  soft, nontender, no HSM or masses and bowel sounds normal     MS: extremities normal- no gross deformities noted, no evidence of inflammation in joints, FROM in all extremities.     SKIN:  Right axilla 1cm sebaceous cyst, not red or hot. no suspicious lesions or rashes     NEURO: Normal strength and tone, sensory exam grossly normal, mentation intact and speech normal     " PSYCH: mentation appears normal. and affect normal/bright     LYMPHATICS: No cervical adenopathy    DIAGNOSTICS:   EKG: Not indicated due to non-vascular surgery and low risk of event (age <65 and without cardiac risk factors)    Recent Labs   Lab Test  03/27/18   1449  09/23/17   1915  09/22/17   1530   HGB  14.5  13.2  13.4   PLT   --   326  342   NA   --   143  141   POTASSIUM   --   3.3*  4.1   CR   --   0.87  0.68        IMPRESSION:   Reason for surgery/procedure: Chronic abdominal pain, heavy periods, ovarian cyst  Diagnosis/reason for consult: Pre-Op    The proposed surgical procedure is considered INTERMEDIATE risk.    REVISED CARDIAC RISK INDEX  The patient has the following serious cardiovascular risks for perioperative complications such as (MI, PE, VFib and 3  AV Block):  No serious cardiac risks  INTERPRETATION: 0 risks: Class I (very low risk - 0.4% complication rate)    The patient has the following additional risks for perioperative complications:  No identified additional risks      ICD-10-CM    1. Preop general physical exam Z01.818    2. Left ovarian cyst N83.202    3. Menorrhagia with regular cycle N92.0        RECOMMENDATIONS:       --Patient will not take daily adderal or any medication on day of surgery    APPROVAL GIVEN to proceed with proposed procedure, without further diagnostic evaluation       Signed Electronically by: Randy Graf MD    Copy of this evaluation report is provided to requesting physician.    [facility] Preop Guidelines

## 2018-04-03 NOTE — DISCHARGE INSTRUCTIONS
Same Day Surgery Discharge Instructions  Special Precautions After Surgery - Adult    1. It is not unusual to feel lightheaded or faint, up to 24 hours after surgery or while taking pain medication.  If you have these symptoms; sit for a few minutes before standing and have someone assist you when getting up.  2. You should rest and relax for the next 24 hours and must have someone stay with you for at least 24 hours after your discharge.  3. DO NOT DRIVE any vehicle or operate mechanical equipment for 24 hours following the end of your surgery.  DO NOT DRIVE while taking narcotic pain medications that have been prescribed by your physician.  If you had a limb operated on, you must be able to use it fully to drive.  4. DO NOT drink alcoholic beverages for 24 hours following surgery or while taking prescription pain medication.  5. Drink clear liquids (apple juice, ginger ale, broth, 7-Up, etc.).  Progress to your regular diet as you feel able.  6. Any questions call your physician and do not make important decisions for 24 hours.  __________________________________________________________________________________________________________________________________  IMPORTANT NUMBERS:    [facility] Main Number:  [phone], [phone]  Pharmacy:  [phone]  Same Day Surgery:  [phone], Monday - Friday until 8:30 p.m.  Urgent Care:  [phone]  Emergency Room:  [phone]      [facility]:  [phone]                                                                             [facility]:  [phone] option 1  [facility]:  [phone]     [facility]:  [phone]   [facility]:  [phone]   Home Medical Equipment: [phone]  [facility]:  [phone]

## 2018-04-03 NOTE — ANESTHESIA POSTPROCEDURE EVALUATION
Patient: Ángela Zurita    Procedure(s):  Diagnostic Hysteroscopy with Dilation and Curettage,Laparoscopy with Left Ovarian Cystectomy, Right Uteral Sacral Biopsy - Wound Class: II-Clean Contaminated   - Wound Class: II-Clean Contaminated    Diagnosis:left ovarian cyst,menorrhagia with regular cycle,abdominal pain left lower quadrant  Diagnosis Additional Information: No value filed.    Anesthesia Type:  General, ETT    Note:  Anesthesia Post Evaluation    Patient location during evaluation: Bedside  Patient participation: Able to fully participate in evaluation  Level of consciousness: awake and alert  Pain management: adequate  Airway patency: patent  Cardiovascular status: acceptable  Respiratory status: acceptable  Hydration status: acceptable  PONV: none     Anesthetic complications: None          Last vitals:  Vitals:    04/03/18 1102   BP: 107/61   Pulse: 73   Resp: 16   Temp: 36.6  C (97.9  F)   SpO2: 100%         Electronically Signed By: Caty Alejandra CRNA, APRN CRNA  April 3, 2018  1:16 PM

## 2018-04-03 NOTE — ANESTHESIA PREPROCEDURE EVALUATION
Anesthesia Evaluation     . Pt has had prior anesthetic.            ROS/MED HX    ENT/Pulmonary:     (+)tobacco use, Current use Intermittent asthma , . .    Neurologic:       Cardiovascular:         METS/Exercise Tolerance:     Hematologic:         Musculoskeletal:         GI/Hepatic:     (+) Other GI/Hepatic       Renal/Genitourinary:         Endo:         Psychiatric:     (+) psychiatric history other (comment) (ADHD)      Infectious Disease:         Malignancy:         Other:                     Physical Exam  Normal systems: cardiovascular, pulmonary and dental    Airway   Mallampati: II  TM distance: >3 FB  Neck ROM: full    Dental     Cardiovascular       Pulmonary                     Anesthesia Plan      History & Physical Review  History and physical reviewed and following examination; no interval change.    ASA Status:  2 .    NPO Status:  > 6 hours    Plan for General and ETT with Intravenous induction. Maintenance will be Balanced.    PONV prophylaxis:  Ondansetron (or other 5HT-3) and Dexamethasone or Solumedrol  Additional equipment: Videolaryngoscope      Postoperative Care  Postoperative pain management:  IV analgesics and Oral pain medications.      Consents  Anesthetic plan, risks, benefits and alternatives discussed with:  Patient..                          .

## 2018-04-03 NOTE — PROGRESS NOTES
Pt voids states she still bleeding from her period from 6 days ago. hcg sent and preop labs obtained . Preop meds given as per dr order.  at bedside preop and dr in to talk to pt and her  .

## 2018-04-03 NOTE — IP AVS SNAPSHOT
[facility] PreOP/Phase II    [address] [address]    Phone:  [phone]    Fax:  [phone]                                       After Visit Summary   4/3/2018    Ángela Zurita    MRN: [id number]           After Visit Summary Signature Page     I have received my discharge instructions, and my questions have been answered. I have discussed any challenges I see with this plan with the nurse or doctor.    ..........................................................................................................................................  Patient/Patient Representative Signature      ..........................................................................................................................................  Patient Representative Print Name and Relationship to Patient    ..................................................               ................................................  Date                                            Time    ..........................................................................................................................................  Reviewed by Signature/Title    ...................................................              ..............................................  Date                                                            Time

## 2018-04-03 NOTE — ANESTHESIA CARE TRANSFER NOTE
Patient: Ángela Zurita    Procedure(s):  Diagnostic Hysteroscopy with Dilation and Curettage,Laparoscopy with Left Ovarian Cystectomy, Right Uteral Sacral Biopsy - Wound Class: II-Clean Contaminated   - Wound Class: II-Clean Contaminated    Diagnosis: left ovarian cyst,menorrhagia with regular cycle,abdominal pain left lower quadrant  Diagnosis Additional Information: No value filed.    Anesthesia Type:   General, ETT     Note:  Airway :Nasal Cannula  Patient transferred to:PACU  Handoff Report: Identifed the Patient, Identified the Reponsible Provider, Reviewed the pertinent medical history, Discussed the surgical course, Reviewed Intra-OP anesthesia mangement and issues during anesthesia, Set expectations for post-procedure period and Allowed opportunity for questions and acknowledgement of understanding      Vitals: (Last set prior to Anesthesia Care Transfer)    CRNA VITALS  4/3/2018 1242 - 4/3/2018 1316      4/3/2018             Pulse: 102    SpO2: 98 %    Resp Rate (observed): 9                Electronically Signed By: Caty Alejandra CRNA, APRN CRNA  April 3, 2018  1:16 PM

## 2018-04-03 NOTE — INTERVAL H&P NOTE
This H&P has been reviewed, patient examined and assessed,  and there are no clinically significant changes in the patient s condition.  The Patient is approved for surgery.

## 2018-04-03 NOTE — IP AVS SNAPSHOT
MRN:[id number]                      After Visit Summary   4/3/2018    Ángela Zurita    MRN: [id number]           Thank you!     Thank you for choosing [facility] for your care. Our goal is always to provide you with excellent care. Hearing back from our patients is one way we can continue to improve our services. Please take a few minutes to complete the written survey that you may receive in the mail after you visit with us. Thank you!        Patient Information     Date Of Birth          1985        About your hospital stay     You were admitted on:  April 3, 2018 You last received care in the:  [facility] PreOP/Phase II    You were discharged on:  April 3, 2018       Who to Call     For medical emergencies, please call 911.  For non-urgent questions about your medical care, please call your primary care provider or clinic, [phone]  For questions related to your surgery, please call your surgery clinic        Attending Provider     Provider Specialty    Sherin Frost MD OB/Gyn       Primary Care Provider Office Phone # Fax #    Randy Shellie Graf [phone]      Your next 10 appointments already scheduled     Apr 10, 2018  3:00 PM CDT   (Arrive by 2:45 PM)   New Patient Visit with Efren Olvera MD   [facility] Colon [facility] Rectal Surgery ([facility])    [address] [place] [id number] [phone]              Further instructions from your care team                         Same Day Surgery Discharge Instructions  Special Precautions After Surgery - Adult    1. It is not unusual to feel lightheaded or faint, up to 24 hours after surgery or while taking pain medication.  If you have these symptoms; sit for a few minutes before standing and have someone assist you when getting up.  2. You should rest and relax for the next 24 hours and must have someone stay with you for at least 24 hours after  your discharge.  3. DO NOT DRIVE any vehicle or operate mechanical equipment for 24 hours following the end of your surgery.  DO NOT DRIVE while taking narcotic pain medications that have been prescribed by your physician.  If you had a limb operated on, you must be able to use it fully to drive.  4. DO NOT drink alcoholic beverages for 24 hours following surgery or while taking prescription pain medication.  5. Drink clear liquids (apple juice, ginger ale, broth, 7-Up, etc.).  Progress to your regular diet as you feel able.  6. Any questions call your physician and do not make important decisions for 24 hours.  __________________________________________________________________________________________________________________________________  IMPORTANT NUMBERS:    [facility] [place]:  [phone], [phone]  Pharmacy:  [phone]  Same Day Surgery:  [phone], Monday - Friday until 8:30 p.m.  Urgent Care:  [phone]  Emergency Room:  [phone]      [facility]:  [phone]                                                                             [facility]:  [phone] option 1  [facility]:  [phone]     OB Clinic:  [phone]   Surgery Specialty Clinic:  [phone]   Home Medical Equipment: [phone]  [facility]:  [phone]        Pending Results     Date and Time Order Name Status Description    4/3/2018 1234 Surgical pathology exam In process             Admission Information     Date & Time Provider Department Dept. Phone    4/3/2018 Sherin Frost MD [facility] PreOP/Phase [phone]      Your Vitals Were     Blood Pressure Pulse Temperature Respirations Last Period Pulse Oximetry    113/73 (Cuff Size: Adult Regular) 73 97.6  F (36.4  C) (Oral) 16 03/28/2018 98%      [employer] Information     [facility] gives you secure access to your electronic health record. If you see a primary care provider, you  can also send messages to your care team and make appointments. If you have questions, please call your primary care clinic.  If you do not have a primary care provider, please call [phone] and they will assist you.        Care EveryWhere ID     This is your Care EveryWhere ID. This could be used by other organizations to access your [facility] medical records  [id number]        Equal Access to Services     SKY BLANCHARD : Hadii jordan duran hadasho Sodidierali, waaxda luqadaha, qaybta kaalmada aderohanda, celia valenciakirbyfrancis elizabeth. So [facility] [phone].    ATENCIÓN: Si brando almonte, tiene a reyez disposición servicios gratuitos de asistencia lingüística. Jovany al [phone].    We comply with applicable federal civil rights laws and [place] laws. We do not discriminate on the basis of race, color, national origin, age, disability, sex, sexual orientation, or gender identity.               Review of your medicines      UNREVIEWED medicines. Ask your doctor about these medicines        Dose / Directions    * amphetamine-dextroamphetamine 10 MG per tablet   Commonly known as:  ADDERALL   Used for:  Attention deficit hyperactivity disorder (ADHD), predominantly inattentive type   Ask about: Should I take this medication?        Take 2 pills (20mg) orally in the morning and two pills (20mg) at 11am and one pill (10mg) at 2pm   Quantity:  150 tablet   Refills:  0       * Notice:  This list has 1 medication(s) that are the same as other medications prescribed for you. Read the directions carefully, and ask your doctor or other care provider to review them with you.      START taking        Dose / Directions    acetaminophen 325 MG tablet   Commonly known as:  TYLENOL   Used for:  S/P laparoscopy        Dose:  650 mg   Take 2 tablets (650 mg) by mouth every 4 hours as needed for other (mild pain)   Quantity:  100 tablet   Refills:  0       ibuprofen 600 MG tablet   Commonly known as:  ADVIL/MOTRIN   Used for:   S/P laparoscopy        Dose:  600 mg   Take 1 tablet (600 mg) by mouth every 6 hours as needed for pain (mild)   Quantity:  30 tablet   Refills:  0       ondansetron 4 MG ODT tab   Commonly known as:  ZOFRAN-ODT   Used for:  S/P laparoscopy        Dose:  4-8 mg   Take 1-2 tablets (4-8 mg) by mouth every 8 hours as needed for nausea Dissolve ON the tongue.   Quantity:  4 tablet   Refills:  0       oxyCODONE IR 5 MG tablet   Commonly known as:  ROXICODONE   Used for:  S/P laparoscopy        Dose:  5-10 mg   Take 1-2 tablets (5-10 mg) by mouth every 3 hours as needed for pain or other (Moderate to Severe)   Quantity:  30 tablet   Refills:  0         CONTINUE these medicines which have NOT CHANGED        Dose / Directions    * amphetamine-dextroamphetamine 10 MG per tablet   Commonly known as:  ADDERALL   Used for:  Attention deficit hyperactivity disorder (ADHD), predominantly inattentive type        Take 2 pills (20mg) orally in the morning and two pills (20mg) at 11am and one pill (10mg) at 2pm   Quantity:  150 tablet   Refills:  0       clindamycin 1 % topical gel   Commonly known as:  CLINDAMAX   Used for:  Acne vulgaris        Apply topically 2 times daily   Quantity:  60 g   Refills:  11       SOLUBLE FIBER/PROBIOTICS PO   Used for:  Urinary frequency, Vaginal discharge, Pelvic pain in female, Family history of malignant neoplasm of breast, Left ovarian cyst, Bilateral galactorrhea        Refills:  0       * Notice:  This list has 1 medication(s) that are the same as other medications prescribed for you. Read the directions carefully, and ask your doctor or other care provider to review them with you.         Where to get your medicines      These medications were sent to [facility] [place], [place] - [address]  [address] [address]     Phone:  [phone]     acetaminophen 325 MG tablet    ibuprofen 600 MG tablet    ondansetron 4 MG ODT tab         Some of these will need a  paper prescription and others can be bought over the counter. Ask your nurse if you have questions.     Bring a paper prescription for each of these medications     oxyCODONE IR 5 MG tablet                Protect others around you: Learn how to safely use, store and throw away your medicines at www.disposemymeds.org.        Information about OPIOIDS     PRESCRIPTION OPIOIDS: WHAT YOU NEED TO KNOW    Prescription opioids can be used to help relieve moderate to severe pain and are often prescribed following a surgery or injury, or for certain health conditions. These medications can be an important part of treatment but also come with serious risks. It is important to work with your health care provider to make sure you are getting the safest, most effective care.    WHAT ARE THE RISKS AND SIDE EFFECTS OF OPIOID USE?  Prescription opioids carry serious risks of addiction and overdose, especially with prolonged use. An opioid overdose, often marked by slowed breathing can cause sudden death. The use of prescription opioids can have a number of side effects as well, even when taken as directed:      Tolerance - meaning you might need to take more of a medication for the same pain relief    Physical dependence - meaning you have symptoms of withdrawal when a medication is stopped    Increased sensitivity to pain    Constipation    Nausea, vomiting, and dry mouth    Sleepiness and dizziness    Confusion    Depression    Low levels of testosterone that can result in lower sex drive, energy, and strength    Itching and sweating    RISKS ARE GREATER WITH:    History of drug misuse, substance use disorder, or overdose    Mental health conditions (such as depression or anxiety)    Sleep apnea    Older age (65 years or older)    Pregnancy    Avoid alcohol while taking prescription opioids.   Also, unless specifically advised by your health care provider, medications to avoid include:    Benzodiazepines (such as Xanax or  Valium)    Muscle relaxants (such as Soma or Flexeril)    Hypnotics (such as Ambien or Lunesta)    Other prescription opioids    KNOW YOUR OPTIONS:  Talk to your health care provider about ways to manage your pain that do not involve prescription opioids. Some of these options may actually work better and have fewer risks and side effects:    Pain relievers such as acetaminophen, ibuprofen, and naproxen    Some medications that are also used for depression or seizures    Physical therapy and exercise    Cognitive behavioral therapy, a psychological, goal-directed approach, in which patients learn how to modify physical, behavioral, and emotional triggers of pain and stress    IF YOU ARE PRESCRIBED OPIOIDS FOR PAIN:    Never take opioids in greater amounts or more often than prescribed    Follow up with your primary health care provider and work together to create a plan on how to manage your pain.    Talk about ways to help manage your pain that do not involve prescription opioids    Talk about all concerns and side effects    Help prevent misuse and abuse    Never sell or share prescription opioids    Never use another person's prescription opioids    Store prescription opioids in a secure place and out of reach of others (this may include visitors, children, friends, and family)    Visit www.cdc.gov/drugoverdose to learn about risks of opioid abuse and overdose    If you believe you may be struggling with addiction, tell your health care provider and ask for guidance or call [facility]'s National Helpline at 1-274-703-HELP    LEARN MORE / www.cdc.gov/drugoverdose/prescribing/guideline.html    Safely dispose of unused prescription opioids: Find your local drug take-back programs and more information about the importance of safe disposal at www.doseofreality.[place].gov             Medication List: This is a list of all your medications and when to take them. Check marks below indicate your daily home schedule. Keep this  list as a reference.      Medications           Morning Afternoon Evening Bedtime As Needed    acetaminophen 325 MG tablet   Commonly known as:  TYLENOL   Take 2 tablets (650 mg) by mouth every 4 hours as needed for other (mild pain)                                * amphetamine-dextroamphetamine 10 MG per tablet   Commonly known as:  ADDERALL   Take 2 pills (20mg) orally in the morning and two pills (20mg) at 11am and one pill (10mg) at 2pm                                clindamycin 1 % topical gel   Commonly known as:  CLINDAMAX   Apply topically 2 times daily                                ibuprofen 600 MG tablet   Commonly known as:  ADVIL/MOTRIN   Take 1 tablet (600 mg) by mouth every 6 hours as needed for pain (mild)                                ondansetron 4 MG ODT tab   Commonly known as:  ZOFRAN-ODT   Take 1-2 tablets (4-8 mg) by mouth every 8 hours as needed for nausea Dissolve ON the tongue.                                oxyCODONE IR 5 MG tablet   Commonly known as:  ROXICODONE   Take 1-2 tablets (5-10 mg) by mouth every 3 hours as needed for pain or other (Moderate to Severe)                                SOLUBLE FIBER/PROBIOTICS PO                                * Notice:  This list has 1 medication(s) that are the same as other medications prescribed for you. Read the directions carefully, and ask your doctor or other care provider to review them with you.      ASK your doctor about these medications           Morning Afternoon Evening Bedtime As Needed    * amphetamine-dextroamphetamine 10 MG per tablet   Commonly known as:  ADDERALL   Take 2 pills (20mg) orally in the morning and two pills (20mg) at 11am and one pill (10mg) at 2pm   Ask about: Should I take this medication?                                * Notice:  This list has 1 medication(s) that are the same as other medications prescribed for you. Read the directions carefully, and ask your doctor or other care provider to review them with  you.              More Information        Discharge Instructions for Laparoscopic Treatment of Pain, left ovarian cyst and biopsy of the pelvic sidewall, hysteroscopy to look into the uterus.  You have been diagnosed with ovarian cysts, and possibly endometriosis, a disease that affects your reproductive organs and your monthly menstrual cycle. It can cause cramps and pain during your periods or pelvic pain throughout the month. Some cases cause infertility (the inability to become pregnant).  There is no cure for endometriosis, but you can be treated. You and your doctor decided on laparoscopic treatment for you. During your procedure, the doctor made small incisions in your abdomen and used surgical instruments to remove or treat the diseased tissue. Your incisions and the area around them may be sore or tender. You may also feel pain in your upper back or shoulders. This is from the gas used to enlarge your abdomen to allow your doctor to see and treat the endometriosis. This pain usually goes away within a day or two.  Here's what you can do at home to help with your recovery.  Activity    Plan to rest for a week after your surgery, although you may feel OK within a few days.    While you recover, have friends or family help you with chores and errands.    Walk as often as you feel able.    Don t lift anything heavier than 10 pounds to avoid straining your incisions.    Don t push a vacuum or do other strenuous housework until the doctor says it s OK.    Climb stairs slowly and pause after every few steps.    Don t drive for a few days after the surgery. You may drive as soon as you are able to move comfortably from side to side as long as you aren't taking any narcotics.  Other home care    Take your medication exactly as directed. Don t skip doses.    Continue with the coughing and deep breathing exercises that you learned in the hospital.    Avoid constipation.    Eat fruits, vegetables, and whole  grains.    Drink 6 to 8 glasses of water every day unless directed otherwise.    Use a laxative or a mild stool softener if your doctor says it s OK.    Shower as usual.    Wash your incision with mild soap and water. Pat it dry. Don t use oils, powders, or lotions on your incision.    Don t have sexual intercourse or use tampons or douches until your doctor says it s safe to do so.    Report hot flashes, mood swings, and irritability to your doctor. There may be medications that can help you.  Follow-up  Make a follow-up appointment as directed by our staff.     When to call your doctor  Call your doctor right away if you have any of the following:    Redness, swelling, or drainage at your incision site    Fever of 100.4 F (38 C) or higher    Pain that is not relieved by your medication    Any unusual bleeding    Dizziness or fainting    Abdominal pain and swelling that get worse    Nausea and vomiting   Date Last Reviewed: 5/19/2015 2000-2017 [employer]. [address] [place] [address], [place] [address]. All rights reserved. This information is not intended as a substitute for professional medical care. Always follow your healthcare professional's instructions.

## 2018-04-03 NOTE — BRIEF OP NOTE
[facility] Gynecology Brief Operative Note    Pre-operative diagnosis: left ovarian cyst comples  ,menorrhagia with regular cycle,  abdominal pain left lower quadrant   Post-operative diagnosis: Same  left adnexal adhesion  endometriosis   Procedure: Procedure(s):  COMBINED DILATION AND CURETTAGE, HYSTEROSCOPY DIAGNOSTIC  LAPAROSCOPY DIAGNOSTIC (GYN) with left ovarian cystectomies, lysis of adhesion, peritoneal biopsy   Surgeon: Sherin Frost MD   Assistant(s): Murray JENKINS surgical assistant was required for this surgery for his experience with retraction, achievement of hemostasis, and wound closure   Anesthesia: General Endotracheal Anesthesia   Estimated blood loss: less than 50ml   Total IV fluids: (See anesthesia record)   Blood transfusion: No transfusion was given during surgery   Total urine output: (See anesthesia record)   Drains: None   Specimens: Endometrial curettings, left ovarian cyst tissue, peritoneal biopsy   Findings: Normal intrauterine cavity, left adnexal with 2 benign appearing cysts, cystectomies performed with clear yellowish fluid, normal adnexa, lesions consistent with endometriosis, one biopsied on right uterosacral ligament, others ablated   Complications: None   Condition: Stable   Comments: See dictated operative report for full [id number]

## 2018-04-05 ENCOUNTER — HOSPITAL ENCOUNTER (OUTPATIENT)
Dept: ULTRASOUND IMAGING | Facility: CLINIC | Age: 33
End: 2018-04-05
Attending: OBSTETRICS & GYNECOLOGY
Payer: COMMERCIAL

## 2018-04-05 ENCOUNTER — HOSPITAL ENCOUNTER (OUTPATIENT)
Dept: MAMMOGRAPHY | Facility: CLINIC | Age: 33
Discharge: HOME | End: 2018-04-05
Attending: OBSTETRICS & GYNECOLOGY | Admitting: OBSTETRICS & GYNECOLOGY
Payer: COMMERCIAL

## 2018-04-05 ENCOUNTER — MYC MEDICAL ADVICE (OUTPATIENT)
Dept: OBGYN | Facility: CLINIC | Age: 33
End: 2018-04-05

## 2018-04-05 DIAGNOSIS — R35.0 URINARY FREQUENCY: ICD-10-CM

## 2018-04-05 DIAGNOSIS — N64.3 BILATERAL GALACTORRHEA: ICD-10-CM

## 2018-04-05 DIAGNOSIS — N83.202 LEFT OVARIAN CYST: ICD-10-CM

## 2018-04-05 DIAGNOSIS — N89.8 VAGINAL DISCHARGE: ICD-10-CM

## 2018-04-05 DIAGNOSIS — Z80.3 FAMILY HISTORY OF MALIGNANT NEOPLASM OF BREAST: ICD-10-CM

## 2018-04-05 DIAGNOSIS — R10.2 PELVIC PAIN IN FEMALE: ICD-10-CM

## 2018-04-05 DIAGNOSIS — D50.0 IRON DEFICIENCY ANEMIA DUE TO CHRONIC BLOOD LOSS: Primary | ICD-10-CM

## 2018-04-05 LAB
ABO + RH BLD: NORMAL
ABO + RH BLD: NORMAL
BLD GP AB SCN SERPL QL: NORMAL
BLOOD BANK CMNT PATIENT-IMP: NORMAL
SPECIMEN EXP DATE BLD: NORMAL

## 2018-04-05 PROCEDURE — 77066 DX MAMMO INCL CAD BI: CPT

## 2018-04-05 PROCEDURE — 76642 ULTRASOUND BREAST LIMITED: CPT | Mod: 50

## 2018-04-05 RX ORDER — FERROUS GLUCONATE 324(38)MG
324 TABLET ORAL 2 TIMES DAILY
Qty: 100 TABLET | Refills: 1 | Status: SHIPPED | OUTPATIENT
Start: 2018-04-05 | End: 2018-04-10

## 2018-04-05 NOTE — TELEPHONE ENCOUNTER
Reviewed MyChart message with patient from Dr. Reddy. Reviewed prescription and taking iron with orange juice to enhance absorption. Patient will have follow up with Dr. Garay in 1-2 weeks. Can discuss re-check on Hgb then. It can take some time for it to rise     Pt in agreement and reports understanding.    Radha Holliday   Ob/Gyn Clinic  RN

## 2018-04-05 NOTE — TELEPHONE ENCOUNTER
Pt would like to talk to someone after reading this note from Dr. Reddy. She states the blood test was done before her surgery and is on her way to the clinic now for another appt at 1:15 and wondering if she should have her hgb checked again?  - Please advise    Please send response via MyChart Denise Behrendt  Specialty CSS

## 2018-04-06 ENCOUNTER — HOSPITAL ENCOUNTER (EMERGENCY)
Facility: CLINIC | Age: 33
Discharge: HOME | End: 2018-04-06
Attending: EMERGENCY MEDICINE | Admitting: EMERGENCY MEDICINE
Payer: COMMERCIAL

## 2018-04-06 ENCOUNTER — MYC MEDICAL ADVICE (OUTPATIENT)
Dept: OBGYN | Facility: CLINIC | Age: 33
End: 2018-04-06

## 2018-04-06 ENCOUNTER — APPOINTMENT (OUTPATIENT)
Dept: ULTRASOUND IMAGING | Facility: CLINIC | Age: 33
End: 2018-04-06
Attending: EMERGENCY MEDICINE
Payer: COMMERCIAL

## 2018-04-06 ENCOUNTER — APPOINTMENT (OUTPATIENT)
Dept: CT IMAGING | Facility: CLINIC | Age: 33
End: 2018-04-06
Attending: EMERGENCY MEDICINE
Payer: COMMERCIAL

## 2018-04-06 VITALS
WEIGHT: 145 LBS | TEMPERATURE: 99.3 F | SYSTOLIC BLOOD PRESSURE: 116 MMHG | RESPIRATION RATE: 18 BRPM | HEART RATE: 103 BPM | BODY MASS INDEX: 23.3 KG/M2 | DIASTOLIC BLOOD PRESSURE: 67 MMHG | OXYGEN SATURATION: 99 % | HEIGHT: 66 IN

## 2018-04-06 DIAGNOSIS — R10.9 ACUTE POSTOPERATIVE PAIN OF ABDOMEN: ICD-10-CM

## 2018-04-06 DIAGNOSIS — Z98.890 S/P LAPAROSCOPY: ICD-10-CM

## 2018-04-06 DIAGNOSIS — G89.18 ACUTE POSTOPERATIVE PAIN OF ABDOMEN: ICD-10-CM

## 2018-04-06 LAB
ALBUMIN SERPL-MCNC: 3.6 G/DL (ref 3.4–5)
ALBUMIN UR-MCNC: NEGATIVE MG/DL
ALP SERPL-CCNC: 53 U/L (ref 40–150)
ALT SERPL W P-5'-P-CCNC: 19 U/L (ref 0–50)
ANION GAP SERPL CALCULATED.3IONS-SCNC: 3 MMOL/L (ref 3–14)
APPEARANCE UR: CLEAR
AST SERPL W P-5'-P-CCNC: 15 U/L (ref 0–45)
BASOPHILS # BLD AUTO: 0 10E9/L (ref 0–0.2)
BASOPHILS NFR BLD AUTO: 0.2 %
BILIRUB SERPL-MCNC: 0.4 MG/DL (ref 0.2–1.3)
BILIRUB UR QL STRIP: NEGATIVE
BUN SERPL-MCNC: 12 MG/DL (ref 7–30)
CALCIUM SERPL-MCNC: 8.6 MG/DL (ref 8.5–10.1)
CHLORIDE SERPL-SCNC: 104 MMOL/L (ref 94–109)
CO2 SERPL-SCNC: 32 MMOL/L (ref 20–32)
COLOR UR AUTO: YELLOW
CREAT SERPL-MCNC: 0.83 MG/DL (ref 0.52–1.04)
DIFFERENTIAL METHOD BLD: NORMAL
EOSINOPHIL # BLD AUTO: 0.1 10E9/L (ref 0–0.7)
EOSINOPHIL NFR BLD AUTO: 0.6 %
ERYTHROCYTE [DISTWIDTH] IN BLOOD BY AUTOMATED COUNT: 12 % (ref 10–15)
GFR SERPL CREATININE-BSD FRML MDRD: 79 ML/MIN/1.7M2
GLUCOSE SERPL-MCNC: 96 MG/DL (ref 70–99)
GLUCOSE UR STRIP-MCNC: NEGATIVE MG/DL
HCT VFR BLD AUTO: 39.4 % (ref 35–47)
HGB BLD-MCNC: 13.8 G/DL (ref 11.7–15.7)
HGB UR QL STRIP: ABNORMAL
IMM GRANULOCYTES # BLD: 0 10E9/L (ref 0–0.4)
IMM GRANULOCYTES NFR BLD: 0.1 %
KETONES UR STRIP-MCNC: NEGATIVE MG/DL
LEUKOCYTE ESTERASE UR QL STRIP: NEGATIVE
LYMPHOCYTES # BLD AUTO: 1.3 10E9/L (ref 0.8–5.3)
LYMPHOCYTES NFR BLD AUTO: 14.4 %
MCH RBC QN AUTO: 31.5 PG (ref 26.5–33)
MCHC RBC AUTO-ENTMCNC: 35 G/DL (ref 31.5–36.5)
MCV RBC AUTO: 90 FL (ref 78–100)
MONOCYTES # BLD AUTO: 0.2 10E9/L (ref 0–1.3)
MONOCYTES NFR BLD AUTO: 2.8 %
MUCOUS THREADS #/AREA URNS LPF: PRESENT /LPF
NEUTROPHILS # BLD AUTO: 7.1 10E9/L (ref 1.6–8.3)
NEUTROPHILS NFR BLD AUTO: 81.9 %
NITRATE UR QL: NEGATIVE
PH UR STRIP: 5 PH (ref 5–7)
PLATELET # BLD AUTO: 305 10E9/L (ref 150–450)
POTASSIUM SERPL-SCNC: 3.7 MMOL/L (ref 3.4–5.3)
PROT SERPL-MCNC: 6.3 G/DL (ref 6.8–8.8)
RBC # BLD AUTO: 4.38 10E12/L (ref 3.8–5.2)
RBC #/AREA URNS AUTO: 2 /HPF (ref 0–2)
SODIUM SERPL-SCNC: 139 MMOL/L (ref 133–144)
SOURCE: ABNORMAL
SP GR UR STRIP: 1.01 (ref 1–1.03)
SQUAMOUS #/AREA URNS AUTO: 3 /HPF (ref 0–1)
UROBILINOGEN UR STRIP-MCNC: 0 MG/DL (ref 0–2)
WBC # BLD AUTO: 8.7 10E9/L (ref 4–11)
WBC #/AREA URNS AUTO: 8 /HPF (ref 0–5)

## 2018-04-06 PROCEDURE — 96375 TX/PRO/DX INJ NEW DRUG ADDON: CPT

## 2018-04-06 PROCEDURE — 85025 COMPLETE CBC W/AUTO DIFF WBC: CPT | Performed by: EMERGENCY MEDICINE

## 2018-04-06 PROCEDURE — 25000132 ZZH RX MED GY IP 250 OP 250 PS 637: Performed by: EMERGENCY MEDICINE

## 2018-04-06 PROCEDURE — 25000125 ZZHC RX 250: Performed by: EMERGENCY MEDICINE

## 2018-04-06 PROCEDURE — 99285 EMERGENCY DEPT VISIT HI MDM: CPT | Mod: 25

## 2018-04-06 PROCEDURE — 74177 CT ABD & PELVIS W/CONTRAST: CPT

## 2018-04-06 PROCEDURE — 99285 EMERGENCY DEPT VISIT HI MDM: CPT | Mod: Z6 | Performed by: EMERGENCY MEDICINE

## 2018-04-06 PROCEDURE — 25000128 H RX IP 250 OP 636: Performed by: EMERGENCY MEDICINE

## 2018-04-06 PROCEDURE — 96376 TX/PRO/DX INJ SAME DRUG ADON: CPT

## 2018-04-06 PROCEDURE — 96374 THER/PROPH/DIAG INJ IV PUSH: CPT

## 2018-04-06 PROCEDURE — 81001 URINALYSIS AUTO W/SCOPE: CPT | Performed by: EMERGENCY MEDICINE

## 2018-04-06 PROCEDURE — 80053 COMPREHEN METABOLIC PANEL: CPT | Performed by: EMERGENCY MEDICINE

## 2018-04-06 PROCEDURE — 96361 HYDRATE IV INFUSION ADD-ON: CPT

## 2018-04-06 PROCEDURE — 93976 VASCULAR STUDY: CPT

## 2018-04-06 RX ORDER — HYDROMORPHONE HYDROCHLORIDE 1 MG/ML
0.5 INJECTION, SOLUTION INTRAMUSCULAR; INTRAVENOUS; SUBCUTANEOUS EVERY 30 MIN PRN
Status: DISCONTINUED | OUTPATIENT
Start: 2018-04-06 | End: 2018-04-06 | Stop reason: HOSPADM

## 2018-04-06 RX ORDER — IOPAMIDOL 755 MG/ML
70 INJECTION, SOLUTION INTRAVASCULAR ONCE
Status: COMPLETED | OUTPATIENT
Start: 2018-04-06 | End: 2018-04-06

## 2018-04-06 RX ORDER — ONDANSETRON 2 MG/ML
4 INJECTION INTRAMUSCULAR; INTRAVENOUS
Status: COMPLETED | OUTPATIENT
Start: 2018-04-06 | End: 2018-04-06

## 2018-04-06 RX ORDER — ACETAMINOPHEN 500 MG
1000 TABLET ORAL ONCE
Status: COMPLETED | OUTPATIENT
Start: 2018-04-06 | End: 2018-04-06

## 2018-04-06 RX ORDER — IOPAMIDOL 755 MG/ML
70 INJECTION, SOLUTION INTRAVASCULAR ONCE
Status: DISCONTINUED | OUTPATIENT
Start: 2018-04-06 | End: 2018-04-06

## 2018-04-06 RX ORDER — ONDANSETRON 2 MG/ML
4 INJECTION INTRAMUSCULAR; INTRAVENOUS ONCE
Status: COMPLETED | OUTPATIENT
Start: 2018-04-06 | End: 2018-04-06

## 2018-04-06 RX ADMIN — HYDROMORPHONE HYDROCHLORIDE 0.5 MG: 1 INJECTION, SOLUTION INTRAMUSCULAR; INTRAVENOUS; SUBCUTANEOUS at 12:30

## 2018-04-06 RX ADMIN — ACETAMINOPHEN 1000 MG: 500 TABLET ORAL at 16:38

## 2018-04-06 RX ADMIN — SODIUM CHLORIDE 58 ML: 9 INJECTION, SOLUTION INTRAVENOUS at 13:39

## 2018-04-06 RX ADMIN — ONDANSETRON 4 MG: 2 INJECTION INTRAMUSCULAR; INTRAVENOUS at 12:28

## 2018-04-06 RX ADMIN — SODIUM CHLORIDE 1000 ML: 9 INJECTION, SOLUTION INTRAVENOUS at 12:28

## 2018-04-06 RX ADMIN — IOPAMIDOL 70 ML: 755 INJECTION, SOLUTION INTRAVENOUS at 13:39

## 2018-04-06 RX ADMIN — IBUPROFEN 600 MG: 400 TABLET ORAL at 14:52

## 2018-04-06 RX ADMIN — ONDANSETRON 4 MG: 2 INJECTION INTRAMUSCULAR; INTRAVENOUS at 13:22

## 2018-04-06 RX ADMIN — HYDROMORPHONE HYDROCHLORIDE 0.5 MG: 1 INJECTION, SOLUTION INTRAMUSCULAR; INTRAVENOUS; SUBCUTANEOUS at 13:58

## 2018-04-06 ASSESSMENT — ENCOUNTER SYMPTOMS
RESPIRATORY NEGATIVE: 1
NAUSEA: 1
CONSTITUTIONAL NEGATIVE: 1
ABDOMINAL PAIN: 1
MUSCULOSKELETAL NEGATIVE: 1
CARDIOVASCULAR NEGATIVE: 1
EYES NEGATIVE: 1
PSYCHIATRIC NEGATIVE: 1
HEMATOLOGIC/LYMPHATIC NEGATIVE: 1
ENDOCRINE NEGATIVE: 1
ALLERGIC/IMMUNOLOGIC NEGATIVE: 1
DIARRHEA: 1
NEUROLOGICAL NEGATIVE: 1

## 2018-04-06 NOTE — ED NOTES
"Pt was prepped for CT, but \" had to go to the bathroom right this second\" Unable to hold stool. Enteric precautions started.   "

## 2018-04-06 NOTE — ED AVS SNAPSHOT
[facility] Emergency Department    [address] [facility] [address]    Phone:  [phone]    Fax:  [phone]                                       Ángela Zurita   MRN: [id number]    Department:  [facility] Emergency Department   Date of Visit:  4/6/2018           Patient Information     Date Of Birth          1985        Your diagnoses for this visit were:     Acute postoperative pain of abdomen     S/P laparoscopy Status post lap hysteroscopy with D&C       You were seen by Brent Ardon MD.      Follow-up Information     Follow up with Sherin Frost MD. Call in 3 days.    Specialty:  OB/Gyn    Why:  Call Dr. Garay on Monday for follow-up about your symptoms and your ED visit.    Contact information:    [address] [place] [address]  [phone]          Follow up with [facility] Emergency Department.    Specialty:  EMERGENCY MEDICINE    Why:  As needed, If symptoms worsen    Contact information:    [address] [place] [id number] [phone]    Additional information:    The medical center is located at   [address] [place] [address] (between [address] and   [facility] in [place], [place] miles north   of [place]).      Your next 10 appointments already scheduled     Apr 10, 2018  3:00 PM CDT   (Arrive by 2:45 PM)   New Patient Visit with Efren Olvera MD   [facility] Colon and Rectal Surgery ([facility])    [address] [place] [id number]   [phone]            Apr 13, 2018  8:00 AM CDT   SHORT with Sherin Frost MD   [facility]    [address] [facility] [phone]              24 Hour Appointment Hotline       To make an appointment at any [facility], call [phone] ([phone]). If you don't have a family doctor or clinic, we will help you find one. [facility] are  conveniently located to serve the needs of you and your family.             Review of your medicines      Our records show that you are taking the medicines listed below. If these are incorrect, please call your family doctor or clinic.        Dose / Directions Last dose taken    acetaminophen 325 MG tablet   Commonly known as:  TYLENOL   Dose:  650 mg   Quantity:  100 tablet        Take 2 tablets (650 mg) by mouth every 4 hours as needed for other (mild pain)   Refills:  0        amphetamine-dextroamphetamine 10 MG per tablet   Commonly known as:  ADDERALL   Quantity:  150 tablet        Take 2 pills (20mg) orally in the morning and two pills (20mg) at 11am and one pill (10mg) at 2pm   Refills:  0        clindamycin 1 % topical gel   Commonly known as:  CLINDAMAX   Quantity:  60 g        Apply topically 2 times daily   Refills:  11        ferrous gluconate 324 (38 FE) MG tablet   Commonly known as:  FERGON   Dose:  324 mg   Quantity:  100 tablet        Take 1 tablet (324 mg) by mouth 2 times daily   Refills:  1        ibuprofen 600 MG tablet   Commonly known as:  ADVIL/MOTRIN   Dose:  600 mg   Quantity:  30 tablet        Take 1 tablet (600 mg) by mouth every 6 hours as needed for pain (mild)   Refills:  0        ondansetron 4 MG ODT tab   Commonly known as:  ZOFRAN-ODT   Dose:  4-8 mg   Quantity:  4 tablet        Take 1-2 tablets (4-8 mg) by mouth every 8 hours as needed for nausea Dissolve ON the tongue.   Refills:  0        oxyCODONE IR 5 MG tablet   Commonly known as:  ROXICODONE   Dose:  5-10 mg   Quantity:  30 tablet        Take 1-2 tablets (5-10 mg) by mouth every 3 hours as needed for pain or other (Moderate to Severe)   Refills:  0        SOLUBLE FIBER/PROBIOTICS PO        Refills:  0                Procedures and tests performed during your visit     CBC with platelets differential    CT Abdomen Pelvis w Contrast    Comprehensive metabolic panel    Pelvic Ultrasound (US) with doppler imaging (r/o ovarian  torsion)    UA reflex to Microscopic      Orders Needing Specimen Collection     None      Pending Results     Date and Time Order Name Status Description    4/6/2018 1500 Pelvic Ultrasound (US) with doppler imaging (r/o ovarian torsion) Preliminary             Pending Culture Results     No orders found from 4/4/2018 to 4/7/2018.            Pending Results Instructions     If you had any lab results that were not finalized at the time of your Discharge, you can call the ED Lab Result RN at [phone]. You will be contacted by this team for any positive Lab results or changes in treatment. The nurses are available 7 days a week from 10A to 6:30P.  You can leave a message 24 hours per day and they will return your call.        Test Results From Your Hospital Stay        4/6/2018 12:09 PM      Component Results     Component Value Ref Range & Units Status    WBC 8.7 4.0 - 11.0 10e9/L Final    RBC Count 4.38 3.8 - 5.2 10e12/L Final    Hemoglobin 13.8 11.7 - 15.7 g/dL Final    Hematocrit 39.4 35.0 - 47.0 % Final    MCV 90 78 - 100 fl Final    MCH 31.5 26.5 - 33.0 pg Final    MCHC 35.0 31.5 - 36.5 g/dL Final    RDW 12.0 10.0 - 15.0 % Final    Platelet Count 305 150 - 450 10e9/L Final    Diff Method Automated Method  Final    % Neutrophils 81.9 % Final    % Lymphocytes 14.4 % Final    % Monocytes 2.8 % Final    % Eosinophils 0.6 % Final    % Basophils 0.2 % Final    % Immature Granulocytes 0.1 % Final    Absolute Neutrophil 7.1 1.6 - 8.3 10e9/L Final    Absolute Lymphocytes 1.3 0.8 - 5.3 10e9/L Final    Absolute Monocytes 0.2 0.0 - 1.3 10e9/L Final    Absolute Eosinophils 0.1 0.0 - 0.7 10e9/L Final    Absolute Basophils 0.0 0.0 - 0.2 10e9/L Final    Abs Immature Granulocytes 0.0 0 - 0.4 10e9/L Final         4/6/2018 12:24 PM      Component Results     Component Value Ref Range & Units Status    Sodium 139 133 - 144 mmol/L Final    Potassium 3.7 3.4 - 5.3 mmol/L Final    Chloride 104 94 - 109 mmol/L Final    Carbon  Dioxide 32 20 - 32 mmol/L Final    Anion Gap 3 3 - 14 mmol/L Final    Glucose 96 70 - 99 mg/dL Final    Urea Nitrogen 12 7 - 30 mg/dL Final    Creatinine 0.83 0.52 - 1.04 mg/dL Final    GFR Estimate 79 >60 mL/min/1.7m2 Final    Non  GFR Calc    GFR Estimate If Black >90 >60 mL/min/1.7m2 Final    African American GFR Calc    Calcium 8.6 8.5 - 10.1 mg/dL Final    Bilirubin Total 0.4 0.2 - 1.3 mg/dL Final    Albumin 3.6 3.4 - 5.0 g/dL Final    Protein Total 6.3 (L) 6.8 - 8.8 g/dL Final    Alkaline Phosphatase 53 40 - 150 U/L Final    ALT 19 0 - 50 U/L Final    AST 15 0 - 45 U/L Final         4/6/2018  1:37 PM      Component Results     Component Value Ref Range & Units Status    Color Urine Yellow  Final    Appearance Urine Clear  Final    Glucose Urine Negative NEG^Negative mg/dL Final    Bilirubin Urine Negative NEG^Negative Final    Ketones Urine Negative NEG^Negative mg/dL Final    Specific Gravity Urine 1.014 1.003 - 1.035 Final    Blood Urine Small (A) NEG^Negative Final    pH Urine 5.0 5.0 - 7.0 pH Final    Protein Albumin Urine Negative NEG^Negative mg/dL Final    Urobilinogen mg/dL 0.0 0.0 - 2.0 mg/dL Final    Nitrite Urine Negative NEG^Negative Final    Leukocyte Esterase Urine Negative NEG^Negative Final    Source Midstream Urine  Final    RBC Urine 2 0 - 2 /HPF Final    WBC Urine 8 (H) 0 - 5 /HPF Final    Squamous Epithelial /HPF Urine 3 (H) 0 - 1 /HPF Final    Mucous Urine Present (A) NEG^Negative /LPF Final         4/6/2018  2:26 PM      Narrative     CT ABDOMEN AND PELVIS WITH CONTRAST  4/6/2018 1:47 PM    HISTORY: Status post lap hysteroscopy with D and C on 4/3/2018,  progressive abdominal pain. Evaluate for hematoma vs perforation vs  other acute process.     COMPARISON: A CT without contrast on 8/17/2017.    TECHNIQUE: Routine transverse CT imaging of the abdomen and pelvis was  performed following the uneventful administration of 70 mL Isovue 370  intravenous contrast. Radiation dose  for this scan was reduced using  automated exposure control, adjustment of the mA and/or kV according  to patient size, or iterative reconstruction technique.    FINDINGS: The visualized lung bases are clear. The liver, spleen,  pancreas, gallbladder, adrenal glands, kidneys, and bladder are  normal. No enlarged lymph node is seen. There is a 1.7 cm cyst in the  left adnexa, likely an ovarian cyst. This is significantly smaller  than seen on the previous CT. There appear to be a few small follicles  in the right ovary. No other abnormal mass is seen. There is a small  amount of free fluid within the posterior cul-de-sac of the pelvis. No  other abnormal fluid collection is seen. There appears to be a small  amount of high density within the endometrial canal of the uterus. No  free intraperitoneal gas is identified. The gastrointestinal tract is  unremarkable. The appendix is not identified. There is no additional  evidence of appendicitis. No vascular abnormality is seen. The osseous  structures are unremarkable. No abdominal or pelvic wall pathology is  demonstrated.         Impression     IMPRESSION:   1. There is a 1.7 cm left ovarian cyst.  2. There is a small amount of increased density within the endometrial  canal of the uterus. I cannot exclude a small amount of blood.  3. Small amount of free fluid in the posterior cul-de-sac of the  pelvis. There is no additional evidence of intra-abdominal hemorrhage  or perforation.     ALEXANDRA PRINCE MD         4/6/2018  4:51 PM      Narrative     PELVIC ULTRASOUND WITH ENDOVAGINAL TRANSDUCER    4/6/2018 4:40 PM     HISTORY: Status post hysteroscopy on 4/3/18. Abdominal pain.    TECHNIQUE:  Endovaginal sonography was added to the transabdominal  exam to better evaluate the uterus and ovaries.    COMPARISON: None.    FINDINGS: The uterus is normal in size and position measuring 8.0 x  3.2 x 4.9 cm. The endometrium is normal in thickness at 0.8 cm. There  is  hyperechoic material in the endometrial cavity which may be air  from recent hysteroscopy. The ovaries appear normal bilaterally  containing several small follicles. Color Doppler and Doppler waveform  analysis of the ovaries shows arterial and venous blood flow in the  left ovary and arterial blood flow in the right ovary. Venous blood  flow cannot be definitely documented in the left ovary. The ovary is  normal in size and torsion is felt to be unlikely. There is echogenic  fluid in the adnexa bilaterally which is likely blood.        Impression     IMPRESSION:  1. Probable air in the endometrial cavity from recent hysteroscopy.  The uterus otherwise appears normal.  2. Normal appearance of the ovaries.  3. Small amount of echogenic free fluid in the pelvis may be blood.                Thank you for choosing [facility]       Thank you for choosing [facility] for your care. Our goal is always to provide you with excellent care. Hearing back from our patients is one way we can continue to improve our services. Please take a few minutes to complete the written survey that you may receive in the mail after you visit with us. Thank you!        [facility] Information     [facility] gives you secure access to your electronic health record. If you see a primary care provider, you can also send messages to your care team and make appointments. If you have questions, please call your primary care clinic.  If you do not have a primary care provider, please call [phone] and they will assist you.        Care EveryWhere ID     This is your Care EveryWhere ID. This could be used by other organizations to access your [facility] medical records  [id number]        Equal Access to Services     SKY BLANCHARD : Hadii jordan Cloud, jelly gacria, celia wells. [facility] [phone].    ATENCIÓN: Si habla español, tiene a reyez disposición servicios gratuitos de asistencia  neeta Arrietagelacio al [phone].    We comply with applicable federal civil rights laws and [place] laws. We do not discriminate on the basis of race, color, national origin, age, disability, sex, sexual orientation, or gender identity.            After Visit Summary       This is your record. Keep this with you and show to your community pharmacist(s) and doctor(s) at your next visit.

## 2018-04-06 NOTE — TELEPHONE ENCOUNTER
"S-(situation): Vomiting and diarrhea since last night.  Has had 15 episodes of diarrhea this am.  \"I can't get off the toilet it is just running out of me\".    B-(background): S/p diagnostic hysteroscopy with D&C.    A-(assessment): Pt woke up in the middle of the night with abdominal pain.  She reports she had the shakes with hot and cold flashes, no fever.  Cannot get off the toilet, has had about 15 loose watery stools with sm amt of blood.  She has vomited x 3 this am.  She does not know when she last urinated.  She is having dizziness, lgt headedness and feels weak.    R-(recommendations): Advised pt that she will need to be seen in ER for IV fluid hydration and further evaluation.      Pt agrees with this plan and will have her mother transport her to the ER.    Daniella Nguyen  [facility] RN    "

## 2018-04-06 NOTE — ED PROVIDER NOTES
History     Chief Complaint   Patient presents with     Post-op Problem     s/p diagnostic lap. hysteroscopy and D and C 4/3/18 presents with n/v/d and incr abd pain     HPI  Ángela Zurita is a 33 year old female with a history of ovarian cyst, ADHD on Adderall who presents for postop abdominal pain, nausea vomiting diarrhea.  Patient is status post laparoscopic hysteroscopy, with a D&C by Dr. Johnson-April 3, 2018.  Patient reports she was doing well after her procedure and has not had to take oxycodone much.  She has been tolerating and managing her pain with Tylenol ibuprofen.  She has developed intermittent pain and discomfort which is progressively worsened over the last 24 hours.  She reports smokes about half a pack per day.  She has been passing gas and having formed soft stool.  She developed diarrhea in the last 24 hours.  She denies any fever.  She reports no prior history of abdominal surgeries until her procedure on April 3.  Because of progressive pain which is worsened than when she had her procedure done and why she had a procedure she is here in the emergency department for further care    Problem List:    Patient Active Problem List    Diagnosis Date Noted     Left ovarian cyst 03/19/2018     Priority: Medium     Posterior neck pain 09/23/2017     Priority: Medium     Attention deficit hyperactivity disorder (ADHD), predominantly inattentive type 06/10/2016     Priority: Medium     Patient is followed by WILMAR WHEAT for ongoing prescription of stimulants.  All refills should be approved by this provider, or covering partner.    Medication(s): Adderall 10mg immediate release   Maximum quantity per month: 150  Clinic visit frequency required: Q 3 month     Controlled substance agreement on file: Yes 6/30/16  Neuropsych evaluation for ADD completed:  Yes, completed 2/2012 at [facility], on file and diagnosis confirmed    Last [facility] website verification:  done on 6/19/2016    [email or web address]           Oral herpes 03/21/2016     Priority: Medium     CARDIOVASCULAR SCREENING; LDL GOAL LESS THAN 130 08/12/2015     Priority: Medium     Rectovaginal fistula 01/26/2015     Priority: Medium     Has had consult with colorectal surgeon; opting to defer treatment until done with childbearing  Amenable to c/section with next pregnancy       Health Care Home 12/18/2014     Priority: Medium     Status:  Unable to reach  Care Coordinator:  Maira Starr    See Letters for [facility] Care Plan  Date:  December 18, 2014         Fibroid uterus 07/02/2014     Priority: Medium     Anterior intramural lower.  Right by bladder       Urgency-frequency syndrome 03/24/2014     Priority: Medium        Past Medical History:    Past Medical History:   Diagnosis Date     Cervical dysplasia      Chickenpox      Exercise-induced asthma      IBS (irritable bowel syndrome)      Tonsillitis 2/13/2014       Past Surgical History:    Past Surgical History:   Procedure Laterality Date     BIOPSY  Various    Had a few Leeps and numerous Colposcopys     COLONOSCOPY  2009    Normal     DILATION AND CURETTAGE, HYSTEROSCOPY DIAGNOSTIC, COMBINED N/A 4/3/2018    Procedure: COMBINED DILATION AND CURETTAGE, HYSTEROSCOPY DIAGNOSTIC;  Diagnostic Hysteroscopy with Dilation and Curettage,Laparoscopy with Left Ovarian Cystectomy, Right Uteral Sacral Biopsy;  Surgeon: Sherin Frost MD;  Location: [facility] OR     EXC SWEAT GLAND LESN AXILL,SIMPL Right 2009     LAPAROSCOPY DIAGNOSTIC (GYN) N/A 4/3/2018    Procedure: LAPAROSCOPY DIAGNOSTIC (GYN);;  Surgeon: Sherin Frost MD;  Location: [facility] OR     LEEP TX, CERVICAL  2006    yearly paps     MOUTH SURGERY      wisdom teeth     SOFT TISSUE SURGERY  Various    infected sweat-gland- cyst removed arm,armpit,face     SURGICAL HISTORY OF -   3/2005    Tonsillectomy     SURGICAL HISTORY OF -       infected sweat gland under her arm       Family History:    Family  History   Problem Relation Age of Onset     Blood Disease Brother      twin brother-blood clots     Blood Disease Maternal Grandfather      blood clots     HEART DISEASE Maternal Grandfather      valve replacement     Hyperlipidemia Maternal Grandfather      CEREBROVASCULAR DISEASE Maternal Grandfather      Prostate Cancer Maternal Grandfather      Other Cancer Maternal Grandfather      Gum/Jaw/Lymphnodes     Colon Cancer Maternal Grandfather      MENTAL ILLNESS Maternal Grandfather      Alzheimer's     Respiratory Maternal Grandmother      Lung Cancer Maternal Grandmother      DIABETES Maternal Grandmother      type II     Hypertension Maternal Grandmother      Hyperlipidemia Maternal Grandmother      Depression Maternal Grandmother      CANCER Paternal Grandmother      non -hodgkins lymphoma     Lung Cancer Paternal Grandmother      Hypertension Paternal Grandmother      Hyperlipidemia Paternal Grandmother      CEREBROVASCULAR DISEASE Paternal Grandmother      Hypertension Father      Hyperlipidemia Father      Hyperlipidemia Paternal Grandfather      Prostate Cancer Paternal Grandfather      Hypertension Paternal Grandfather      Colon Cancer Paternal Grandfather      Coronary Artery Disease Paternal Grandfather      Breast Cancer Mother      Stage 0, small duct carcinoma     Breast Cancer Other      2 aunt and 3 of my great aunts     Breast Cancer Cousin      Breast Cancer       Social History:  Marital Status:  Single [1]  Social History   Substance Use Topics     Smoking status: Current Every Day Smoker     Packs/day: 0.50     Years: 15.00     Types: Cigarettes     Start date: 12/1/2014     Smokeless tobacco: Never Used     Alcohol use 0.0 oz/week      Comment: 3 drinks weekly- quit with pregnancy        Medications:      ferrous gluconate (FERGON) 324 (38 FE) MG tablet   ibuprofen (ADVIL/MOTRIN) 600 MG tablet   oxyCODONE IR (ROXICODONE) 5 MG tablet   acetaminophen (TYLENOL) 325 MG tablet   ondansetron  "(ZOFRAN-ODT) 4 MG ODT tab   Probiotic Product (SOLUBLE FIBER/PROBIOTICS PO)   amphetamine-dextroamphetamine (ADDERALL) 10 MG per tablet   clindamycin (CLINDAMAX) 1 % topical gel         Review of Systems   Constitutional: Negative.    HENT: Negative.    Eyes: Negative.    Respiratory: Negative.    Cardiovascular: Negative.    Gastrointestinal: Positive for abdominal pain, diarrhea and nausea.   Endocrine: Negative.    Genitourinary: Negative.    Musculoskeletal: Negative.    Skin: Negative.    Allergic/Immunologic: Negative.    Neurological: Negative.    Hematological: Negative.    Psychiatric/Behavioral: Negative.    All other systems reviewed and are negative.      Physical Exam   BP: 118/75 (Simultaneous filing. User may not have seen previous data.)  Pulse: 103  Temp: 97.9  F (36.6  C)  Resp: 18  Height: 166.4 cm (5' 5.5\")  Weight: 65.8 kg (145 lb)  SpO2: 100 % (Simultaneous filing. User may not have seen previous data.)      Physical Exam   Constitutional: She is oriented to person, place, and time. She appears well-developed and well-nourished. No distress.   HENT:   Head: Normocephalic and atraumatic.   Eyes: Conjunctivae and EOM are normal. Pupils are equal, round, and reactive to light. Right eye exhibits no discharge. Left eye exhibits no discharge. No scleral icterus.   Neck: Normal range of motion. Neck supple. No JVD present. No tracheal deviation present. No thyromegaly present.   Cardiovascular: Normal rate and regular rhythm.    Pulmonary/Chest: Effort normal and breath sounds normal. No stridor. No respiratory distress. She has no wheezes. She has no rales. She exhibits no tenderness.   Abdominal: Soft. Bowel sounds are decreased. There is generalized tenderness.       Lymphadenopathy:     She has no cervical adenopathy.   Neurological: She is alert and oriented to person, place, and time. She displays normal reflexes. No cranial nerve deficit. She exhibits normal muscle tone. Coordination normal. "   Skin: No rash noted. She is not diaphoretic. No erythema. No pallor.   Psychiatric: She has a normal mood and affect. Her behavior is normal. Judgment and thought content normal.       ED Course     ED Course     Procedures               Critical Care time:  none               Results for orders placed or performed during the hospital encounter of 04/06/18 (from the past 24 hour(s))   CBC with platelets differential   Result Value Ref Range    WBC 8.7 4.0 - 11.0 10e9/L    RBC Count 4.38 3.8 - 5.2 10e12/L    Hemoglobin 13.8 11.7 - 15.7 g/dL    Hematocrit 39.4 35.0 - 47.0 %    MCV 90 78 - 100 fl    MCH 31.5 26.5 - 33.0 pg    MCHC 35.0 31.5 - 36.5 g/dL    RDW 12.0 10.0 - 15.0 %    Platelet Count 305 150 - 450 10e9/L    Diff Method Automated Method     % Neutrophils 81.9 %    % Lymphocytes 14.4 %    % Monocytes 2.8 %    % Eosinophils 0.6 %    % Basophils 0.2 %    % Immature Granulocytes 0.1 %    Absolute Neutrophil 7.1 1.6 - 8.3 10e9/L    Absolute Lymphocytes 1.3 0.8 - 5.3 10e9/L    Absolute Monocytes 0.2 0.0 - 1.3 10e9/L    Absolute Eosinophils 0.1 0.0 - 0.7 10e9/L    Absolute Basophils 0.0 0.0 - 0.2 10e9/L    Abs Immature Granulocytes 0.0 0 - 0.4 10e9/L   Comprehensive metabolic panel   Result Value Ref Range    Sodium 139 133 - 144 mmol/L    Potassium 3.7 3.4 - 5.3 mmol/L    Chloride 104 94 - 109 mmol/L    Carbon Dioxide 32 20 - 32 mmol/L    Anion Gap 3 3 - 14 mmol/L    Glucose 96 70 - 99 mg/dL    Urea Nitrogen 12 7 - 30 mg/dL    Creatinine 0.83 0.52 - 1.04 mg/dL    GFR Estimate 79 >60 mL/min/1.7m2    GFR Estimate If Black >90 >60 mL/min/1.7m2    Calcium 8.6 8.5 - 10.1 mg/dL    Bilirubin Total 0.4 0.2 - 1.3 mg/dL    Albumin 3.6 3.4 - 5.0 g/dL    Protein Total 6.3 (L) 6.8 - 8.8 g/dL    Alkaline Phosphatase 53 40 - 150 U/L    ALT 19 0 - 50 U/L    AST 15 0 - 45 U/L   UA reflex to Microscopic   Result Value Ref Range    Color Urine Yellow     Appearance Urine Clear     Glucose Urine Negative NEG^Negative mg/dL     Bilirubin Urine Negative NEG^Negative    Ketones Urine Negative NEG^Negative mg/dL    Specific Gravity Urine 1.014 1.003 - 1.035    Blood Urine Small (A) NEG^Negative    pH Urine 5.0 5.0 - 7.0 pH    Protein Albumin Urine Negative NEG^Negative mg/dL    Urobilinogen mg/dL 0.0 0.0 - 2.0 mg/dL    Nitrite Urine Negative NEG^Negative    Leukocyte Esterase Urine Negative NEG^Negative    Source Midstream Urine     RBC Urine 2 0 - 2 /HPF    WBC Urine 8 (H) 0 - 5 /HPF    Squamous Epithelial /HPF Urine 3 (H) 0 - 1 /HPF    Mucous Urine Present (A) NEG^Negative /LPF   CT Abdomen Pelvis w Contrast    Narrative    CT ABDOMEN AND PELVIS WITH CONTRAST  4/6/2018 1:47 PM    HISTORY: Status post lap hysteroscopy with D and C on 4/3/2018,  progressive abdominal pain. Evaluate for hematoma vs perforation vs  other acute process.     COMPARISON: A CT without contrast on 8/17/2017.    TECHNIQUE: Routine transverse CT imaging of the abdomen and pelvis was  performed following the uneventful administration of 70 mL Isovue 370  intravenous contrast. Radiation dose for this scan was reduced using  automated exposure control, adjustment of the mA and/or kV according  to patient size, or iterative reconstruction technique.    FINDINGS: The visualized lung bases are clear. The liver, spleen,  pancreas, gallbladder, adrenal glands, kidneys, and bladder are  normal. No enlarged lymph node is seen. There is a 1.7 cm cyst in the  left adnexa, likely an ovarian cyst. This is significantly smaller  than seen on the previous CT. There appear to be a few small follicles  in the right ovary. No other abnormal mass is seen. There is a small  amount of free fluid within the posterior cul-de-sac of the pelvis. No  other abnormal fluid collection is seen. There appears to be a small  amount of high density within the endometrial canal of the uterus. No  free intraperitoneal gas is identified. The gastrointestinal tract is  unremarkable. The appendix is not  identified. There is no additional  evidence of appendicitis. No vascular abnormality is seen. The osseous  structures are unremarkable. No abdominal or pelvic wall pathology is  demonstrated.       Impression    IMPRESSION:   1. There is a 1.7 cm left ovarian cyst.  2. There is a small amount of increased density within the endometrial  canal of the uterus. I cannot exclude a small amount of blood.  3. Small amount of free fluid in the posterior cul-de-sac of the  pelvis. There is no additional evidence of intra-abdominal hemorrhage  or perforation.     ALEXANDRA PRINCE MD       Medications   HYDROmorphone (PF) (DILAUDID) injection 0.5 mg (0.5 mg Intravenous Given 4/6/18 1358)   ondansetron (ZOFRAN) injection 4 mg (4 mg Intravenous Given 4/6/18 1228)   0.9% sodium chloride BOLUS (0 mLs Intravenous Stopped 4/6/18 1451)   sodium chloride 0.9 % bag 500mL for CT scan flush use (58 mLs Intravenous Given 4/6/18 1339)   iopamidol (ISOVUE-370) solution 70 mL (70 mLs Intravenous Given 4/6/18 1339)   ondansetron (ZOFRAN) injection 4 mg (4 mg Intravenous Given 4/6/18 1322)   ibuprofen (ADVIL/MOTRIN) tablet 600 mg (600 mg Oral Given 4/6/18 1452)        Recent or prior diagnostic procedure and work-up:  Procedure Date: 04/03/2018       PREOPERATIVE DIAGNOSIS:  Left ovarian cyst, complex menorrhagia with regular cycles, abdominal pain, left lower quadrant.       POSTOPERATIVE DIAGNOSIS:  Left ovarian cyst, complex menorrhagia with regular cycles, abdominal pain, left lower quadrant.  Left adnexal adhesion and suspected endometriosis.       PROCEDURE:  Dilation and curettage, diagnostic hysteroscopy, diagnostic laparoscopy with left ovarian cystectomies x2, lysis of adhesions and peritoneal biopsy.       SURGEON:  Sherin Frost MD       ASSISTANT:  ABDI Persaud.  A surgical assistant was required for this surgery for his experience with retraction, achieving hemostasis and wound closure.       ANESTHESIA:   General endotracheal.       ESTIMATED BLOOD LOSS: 25ml      IV FLUIDS:  See anesthesia.       BLOOD TRANSFUSION:  None given.       URINE OUTPUT:  See anesthesia record.       DRAINS:  None.       SPECIMENS:  Endometrial curettings, left ovarian cyst tissue, peritoneal biopsy from the right uterosacral.         FINDINGS:  A normal intrauterine cavity, a left adnexa with 2 benign-appearing cysts.  Cystectomy was performed with clear yellowish fluid.  Normal adnexa otherwise.  Lesions consistent with endometriosis, particularly on the right uterosacral.  One biopsied, the others were fulgurated with the ligasure.       COMPLICATIONS:  None.       CONDITION:  Stable.     Assessments & Plan (with Medical Decision Making)   Clinical impression: 33-year-old who presented for evaluation for concern for nausea, vomiting diarrhea and increased abdominal pain after a diagnostic laparoscopic hysteroscopy , D &C on 4/3/18 for evaluation for left ovarian cyst and complex menorrhagia and left lower quadrant abdominal discomfort who presented by private care for generalized postop abdominal pain and discomfort with nausea vomiting diarrhea.   Patient reports she is on oxycodone for pain which she has not been taking due to nausea and feeling sick on pain medications. She reports she was doing well, immediately post- procedure. Over the last 2 days she has had intermittent pain and discomfort that is now worse. She reports no fever, no back pain, no urinary symptoms. She is passing flatus. She denies any abdominal distention.  The pain is been well managed with Tylenol.  On my exam she appears uncomfortable but is in no acute distress.  She is a normal blood pressure and is afebrile she was tachycardic in triage.  She has decreased bowel sounds, she has generalized tenderness to palpation but no rebound with generalized voluntary guarding.  No abdominal distention.  No warmth or redness about the abdomen per      ED course and  Plan:  I reviewed patient's operative record as described in detail by Dr. Johnson.  See summary above. We discussed differential diagnosis including including postop ileus, we also discussed serious complications such as bowel hematoma or infection or perforation.  She was offered IV Dilaudid for pain and discomfort and a CT of her abdomen and pelvis was obtained to exclude postop complication versus intra-abdominal catastrophe.  CT imaging today shows a 1.7 cm right ovarian cyst.  There is a small amount of increased density within the endometrial canal of the uterus.  There is no active intra-abdominal hemorrhage there is small free fluid in the posterior cul-de-sac of the pelvis.  Given no significant findings on CT imaging please see the interpreting radiologist report above I reviewed patient's presentation exam and imaging with OB/GYN on call.   I spoke with Dr. Alek Avery at 2.30pm who did not have any specific recommendation.  He suggested a pelvic ultrasound for interval follow-up.  A pelvic ultrasound was obtained to exclude torsion versus other acute process that would explain patient's abdominal pain and discomfort.    She has signed out to Dr. Eddie Cifuentes  At shift change at 3.50PM. Plan of care is to discharge the patient home if pelvic ultrasound shows no significant findings specifically no ovarian torsion no significant pelvic hematoma.  Plan of care and handoff at shift change was reviewed with the patient.  She is eager to go home if there is no acute pathology that is found on her workup today.        Disclaimer: This note consists of symbols derived from keyboarding, dictation and/or voice recognition software. As a result, there may be errors in the script that have gone undetected. Please consider this when interpreting information found in this chart.  I have reviewed the nursing notes.    I have reviewed the findings, diagnosis, plan and need for follow up with the patient.       New  Prescriptions    No medications on file       Final diagnoses:   Acute postoperative pain of abdomen   S/P laparoscopy - Status post lap hysteroscopy with D&C       4/6/2018   [facility] EMERGENCY DEPARTMENT     Brent Ardon MD  04/06/18 2400

## 2018-04-06 NOTE — ED AVS SNAPSHOT
[facility] Emergency Department    [address] [address]    Phone:  [phone]    Fax:  [phone]                                       Ángela Zurita   MRN: [id number]    Department:  [facility] Emergency Department   Date of Visit:  4/6/2018           After Visit Summary Signature Page     I have received my discharge instructions, and my questions have been answered. I have discussed any challenges I see with this plan with the nurse or doctor.    ..........................................................................................................................................  Patient/Patient Representative Signature      ..........................................................................................................................................  Patient Representative Print Name and Relationship to Patient    ..................................................               ................................................  Date                                            Time    ..........................................................................................................................................  Reviewed by Signature/Title    ...................................................              ..............................................  Date                                                            Time

## 2018-04-09 ENCOUNTER — PATIENT OUTREACH (OUTPATIENT)
Dept: CARE COORDINATION | Facility: CLINIC | Age: 33
End: 2018-04-09

## 2018-04-09 LAB — COPATH REPORT: NORMAL

## 2018-04-09 NOTE — LETTER
[place] CARE COORDINATION  [address] [place] [address]        April 9, 2018      Ángela Zurita  [address] [place] [address]    Dear Ángela,    I am a clinic care coordinator who works with Dr. Graf at the [facility]. I recently tried to call and was unable to reach you. I wanted to introduce myself and provide you with my contact information so that you can call me with questions or concerns about your health care. Below is a description of clinic care coordination and how I can further assist you.     The clinic care coordinator is a registered nurse and/or  who understand the health care system. The goal of clinic care coordination is to help you manage your health and improve access to the [facility] in the most efficient manner. The registered nurse can assist you in meeting your health care goals by providing education, coordinating services, and strengthening the communication among your providers. The  can assist you with financial, behavioral, psychosocial, chemical dependency, counseling, and/or psychiatric resources.    Please feel free to contact me at [phone], with any questions or concerns. We at [facility] are focused on providing you with the highest-quality healthcare experience possible and that all starts with you.     Sincerely,     Chavo PADRON,RN- BC  Clinic Care Coordinator  [facility]  Phone: [phone]      Enclosed: I have enclosed a copy of a 24 Hour Access Plan. This has helpful phone numbers for you to call when needed. Please keep this in an easy to access place to use as needed. and I have enclosed helpful educational material. Please review and call me with any questions.

## 2018-04-09 NOTE — LETTER
Health Care Home - Access Care Plan  About Me  Patient Name:  Ángela Zurita  YOB: 1985  Age:                             33 year old   [place] MRN:            [id number] Telephone Information:     Home Phone [phone]   Mobile [phone]       Address:    [address] [place] [address]  [place] [address] Email address:  [email or web address]      Emergency Contact(s)  Name Relationship Lgl Grd Work Phone Home Phone Mobile Phone   1. COLT MONAE Significant ot*   [phone]    2. BRYN,AL* Mother  none [phone] none      Health Maintenance: Routine Health maintenance Reviewed: Up to date  My Access Plan  Medical Emergency 911   Questions or concerns during clinic hours Primary Clinic Line, I will call the clinic directly: [facility] - [phone]   24 Hour Appointment Line [phone] or  [phone]) (toll free)   24 Hour Nurse Line [phone] (toll free)   Questions or concerns outside clinic hours 24 Hour Appointment Line, I will call the after-hours on-call line:   [facility] [phone] or [phone]) (toll-free)   Preferred Urgent Care [facility], [phone]   Preferred Hospital [place] [place]  [phone]   Preferred Pharmacy [facility] [address] [facility]     Behavioral Health Crisis Line The National Suicide Prevention Lifeline at 1-419.964.9299 or 911                 My Care Team Members  Patient Care Team       Relationship Specialty Notifications Start End    Randy Graf MD PCP - General Family Practice  8/14/17     Phone: [phone] Fax: [phone]         [address] [address]    Chavo Real, RN Clinic Care Coordinator Primary Care - [facility]  4/9/18     Phone: [phone]                My Medical and Care Information  Problem List   Patient Active Problem List   Diagnosis     Urgency-frequency  syndrome     Fibroid uterus     Health Care Home     Rectovaginal fistula     CARDIOVASCULAR SCREENING; LDL GOAL LESS THAN 130     Oral herpes     Attention deficit hyperactivity disorder (ADHD), predominantly inattentive type     Posterior neck pain     Left ovarian cyst      Current Medications and Allergies:  See printed Medication Report

## 2018-04-09 NOTE — PROGRESS NOTES
Clinic Care Coordination Contact  [facility]/Voicemail    Referral Source: ED Follow-Up  Clinical Data: Care Coordinator Outreach  Outreach attempted x 1.  Left message on voicemail with call back information and requested return call.  Plan: Care Coordinator will mail out care coordination introduction letter with care coordinator contact information and explanation of care coordination services. Care Coordinator will try to reach patient again in 1-2 business days.  Chavo PADRON,RN- BC  Clinic Care Coordinator  [facility]  Phone: [phone]

## 2018-04-10 ENCOUNTER — OFFICE VISIT (OUTPATIENT)
Dept: SURGERY | Facility: CLINIC | Age: 33
End: 2018-04-10
Payer: COMMERCIAL

## 2018-04-10 VITALS
DIASTOLIC BLOOD PRESSURE: 90 MMHG | TEMPERATURE: 98.3 F | OXYGEN SATURATION: 96 % | HEIGHT: 66 IN | SYSTOLIC BLOOD PRESSURE: 126 MMHG | BODY MASS INDEX: 22.32 KG/M2 | HEART RATE: 104 BPM | WEIGHT: 138.9 LBS

## 2018-04-10 DIAGNOSIS — R15.9 INCONTINENCE OF FECES, UNSPECIFIED FECAL INCONTINENCE TYPE: Primary | ICD-10-CM

## 2018-04-10 ASSESSMENT — ENCOUNTER SYMPTOMS
WEIGHT GAIN: 0
BREAST MASS: 0
PANIC: 0
POLYDIPSIA: 0
FLANK PAIN: 1
BREAST PAIN: 0
JAUNDICE: 0
SKIN CHANGES: 0
DEPRESSION: 0
STIFFNESS: 0
CONSTIPATION: 0
DIARRHEA: 1
HALLUCINATIONS: 0
DIZZINESS: 0
BLOOD IN STOOL: 0
POOR WOUND HEALING: 0
ARTHRALGIAS: 0
DIFFICULTY URINATING: 0
MUSCLE CRAMPS: 1
DECREASED LIBIDO: 0
DECREASED APPETITE: 1
NERVOUS/ANXIOUS: 0
MUSCLE WEAKNESS: 0
TREMORS: 0
MEMORY LOSS: 0
BACK PAIN: 1
DISTURBANCES IN COORDINATION: 0
INCREASED ENERGY: 1
NIGHT SWEATS: 1
VOMITING: 1
HEMATURIA: 1
TINGLING: 1
DYSURIA: 1
FEVER: 0
HOT FLASHES: 1
NECK PAIN: 1
NAIL CHANGES: 0
WEIGHT LOSS: 1
BLOATING: 1
RECTAL PAIN: 0
LOSS OF CONSCIOUSNESS: 0
WEAKNESS: 0
SEIZURES: 0
DECREASED CONCENTRATION: 0
NUMBNESS: 1
HEADACHES: 1
FATIGUE: 1
MYALGIAS: 1
BOWEL INCONTINENCE: 1
HEARTBURN: 0
PARALYSIS: 0
ABDOMINAL PAIN: 1
ALTERED TEMPERATURE REGULATION: 1
CHILLS: 1
SPEECH CHANGE: 0
INSOMNIA: 1
JOINT SWELLING: 0
NAUSEA: 1
POLYPHAGIA: 0

## 2018-04-10 ASSESSMENT — PAIN SCALES - GENERAL: PAINLEVEL: NO PAIN (0)

## 2018-04-10 NOTE — MR AVS SNAPSHOT
After Visit Summary   4/10/2018    Ángela Zurita    MRN: [id number]           Patient Information     Date Of Birth          1985        Visit Information        Provider Department      4/10/2018 3:00 PM Efren Olvera MD [facility] Colon and Rectal Surgery        Today's Diagnoses     Incontinence of feces, unspecified fecal incontinence type    -  1       Follow-ups after your visit        Additional Services     GASTROENTEROLOGY ADULT REF PROCEDURE ONLY       Patient has a possible rectovaginal fistula  Patient also has an obstructive injury            OB/GYN REFERRAL       Your provider has referred you to:  [facility] [phone]   [email or web address]    Please be aware that coverage of these services is subject to the terms and limitations of your health insurance plan.  Call member services at your health plan with any benefit or coverage questions.      Please bring the following with you to your appointment:    Patient has a possible rectovaginal fistula  Patient also has an obstructive injury                  Your next 10 appointments already scheduled     Apr 20, 2018  2:30 PM CDT   Colonoscopy with Yandel Chicas MD   [facility] ([facility])    [address] [place] [id number] [phone]              Who to contact     Please call your clinic at [phone] to:    Ask questions about your health    Make or cancel appointments    Discuss your medicines    Learn about your test results    Speak to your doctor            Additional Information About Your Visit        MyChart Information     [employer] gives you secure access to your electronic health record. If you see a primary care provider, you can also send messages to your care team and make appointments. If you have questions, please call your primary care clinic.  If you do not have a primary care provider, please call  "[phone] and they will assist you.      [employer] is an electronic gateway that provides easy, online access to your medical records. With [employer], you can request a clinic appointment, read your test results, renew a prescription or communicate with your care team.     To access your existing account, please contact your [facility] or call [phone] for assistance.        Care EveryWhere ID     This is your Care EveryWhere ID. This could be used by other organizations to access your [facility] medical records  [id number]        Your Vitals Were     Pulse Temperature Height Last Period Pulse Oximetry BMI (Body Mass Index)    104 98.3  F (36.8  C) (Oral) 1.664 m (5' 5.5\") 03/28/2018 96% 22.76 kg/m2       Blood Pressure from Last 3 Encounters:   04/13/18 124/82   04/10/18 126/90   04/06/18 116/67    Weight from Last 3 Encounters:   04/13/18 63.3 kg (139 lb 9.6 oz)   04/10/18 63 kg (138 lb 14.4 oz)   04/06/18 65.8 kg (145 lb)              We Performed the Following     GASTROENTEROLOGY ADULT REF PROCEDURE ONLY     OB/GYN REFERRAL        Primary Care Provider Office Phone # Fax #    Randy Graf [phone] [address] [address]        Equal Access to Services     SKY BLANCHARD : Hadii jordan Cloud, waaxda luqadaha, qaybta kaaljohnny willoughby, celia hoskins . So [facility] [phone].    ATENCIÓN: Si habla español, tiene a reyez disposición servicios gratuitos de asistencia lingüística. Llame al [phone].    We comply with applicable federal civil rights laws and [place] laws. We do not discriminate on the basis of race, color, national origin, age, disability, sex, sexual orientation, or gender identity.            Thank you!     Thank you for choosing [facility] COLON AND RECTAL SURGERY  for your care. Our goal is always to provide you with excellent care. Hearing back from our patients is one way we can " continue to improve our services. Please take a few minutes to complete the written survey that you may receive in the mail after your visit with us. Thank you!             Your Updated Medication List - Protect others around you: Learn how to safely use, store and throw away your medicines at www.disposemymeds.org.          This list is accurate as of 4/10/18 11:59 PM.  Always use your most recent med list.                   Brand Name Dispense Instructions for use Diagnosis    acetaminophen 325 MG tablet    TYLENOL    100 tablet    Take 2 tablets (650 mg) by mouth every 4 hours as needed for other (mild pain)    S/P laparoscopy       amphetamine-dextroamphetamine 10 MG per tablet    ADDERALL    150 tablet    Take 2 pills (20mg) orally in the morning and two pills (20mg) at 11am and one pill (10mg) at 2pm    Attention deficit hyperactivity disorder (ADHD), predominantly inattentive type       ibuprofen 600 MG tablet    ADVIL/MOTRIN    30 tablet    Take 1 tablet (600 mg) by mouth every 6 hours as needed for pain (mild)    S/P laparoscopy       SOLUBLE FIBER/PROBIOTICS PO       Urinary frequency, Vaginal discharge, Pelvic pain in female, Family history of malignant neoplasm of breast, Left ovarian cyst, Bilateral galactorrhea

## 2018-04-10 NOTE — PROGRESS NOTES
Clinic Care Coordination Contact  [facility]/Voicemail    Referral Source: ED Follow-Up  Clinical Data: Care Coordinator Outreach  Outreach attempted x 2.  Left message on voicemail with call back information and requested return call.  Plan: Care Coordinator mailed out care coordination introduction letter on 4/9/18. Care Coordinator will try to reach patient again in 3-5 business days.  Chavo PADRON,RN- BC  Clinic Care Coordinator  [facility]  Phone: [phone]

## 2018-04-10 NOTE — NURSING NOTE
"Chief Complaint   Patient presents with     Rectal Problem     New pt consult, rectovaginal fistula.        Vitals:    04/10/18 1539   BP: 126/90   Pulse: 104   Temp: 98.3  F (36.8  C)   TempSrc: Oral   SpO2: 96%   Weight: 138 lb 14.4 oz   Height: 5' 5.5\"       Body mass index is 22.76 kg/(m^2).  Zachary YU LPN                        "

## 2018-04-10 NOTE — PROGRESS NOTES
Colon and Rectal Surgery Follow-up Clinic Note    Referring provider:  Referred Self, MD  No address on file       RE: Ángela Zurita  : 1985  TRISH: 4/10/2018      Ángela Zurita is a very pleasant 33 year old female here for follow-up of documented obstetrical birth injury during the birth of her 8-pound 1-ounce son in 2014. That was an asynclitic vertex delivery that required use of vacuum extraction.  A fourth-degree laceration was noted and primary sphincter, rectal and vaginal repairs were performed.  Ángela reported that she was passing flatus and feculent matter through her vagina post repairs. She does have a previous history of irritable bowel syndrome.  I initially saw her in clinic on 2014, at which time I advised conservative therapy.  We discussed possible pelvic floor evaluation at that time, but this was not pursued and her clinical status initially improved.  And does have a previous diagnosis of irritable bowel syndrome.  Her last colonoscopy was approximately 10 years ago.    Interval history: Over the past 4 years Ángela complains of recurrent urinary tract infections and bacterial vaginosis. She also complains of gradual worsening of faecal urgency and incontinence, she believes she has less than 30 seconds to about 1 minute from when she has the urge to have a bowel movement to get to a toilet. She wears a pad prophylactically.  Her number of incontinent episodes is variable but she now has roughly one accident every other week.  However, on bad days she can have up to 10 accidents daily.  There is history of abdominal pain, on and off, predominantly left lower side since the past 1-2 years.She complains of altered bowel habits( constipation- diarrhea), more of diarrhea than constipation. She also complains of painful intercourse. She is unable to tell if she passes flatus/ stools per vagina.   In view of left lower abdominal pain and menorrhagia she underwent dilation  "and curettage, diagnostic hysteroscopy, diagnostic laparoscopy with left ovarian cystectomies, lysis of adhesions and peritoneal biopsy.       Physical Examination: was chaparoned by Zachary Mckeon LPN and Robinson Palumbo MS3.    /90  Pulse 104  Temp 98.3  F (36.8  C) (Oral)  Ht 5' 5.5\"  Wt 138 lb 14.4 oz  LMP 03/28/2018  SpO2 96%  BMI 22.76 kg/m2  General: comfortable appearing  Abdomen:Soft, non tender.  Perianal external examination:  Perianal skin: intact.  The perineal body is grossly thinned and appears to have little muscle.  Lesions: No.  Eversion of buttocks: There was no evidence of an anal fissure.   Skin tags or external hemorrhoids: No.    Digital rectal examination: Was performed.   Sphincter tone: Poor.   Minimal augmentation at the level of the puborectalis with squeeze effort.  Palpable lesions: None.  No palpable fistula track.      Anoscopy: Was performed.   Hemorrhoids: No significant internal hemorrhoids.  No visiblefistula track  Lesions: No.      Vaginal examination:   Minimal white non-foul smelling discharge present, no other obvious lesions seen.  No feculent material present.    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 In view of altered bowel habits Ángela was counselled regarding the use of metamucil and imodium to regularize bowel movements and fecal consistency.  Given her diarrhea predominance, I do think it would be wise to perform colonoscopy with biopsies to be absolutely certain there is no microscopic colitis present, though she does have an obvious obstetrical sphincter injury that is almost certainly the main cause of her problem.  This [facility] be [facility] at [facility], and we will help make arrangements.  I briefly discussed the options of operative sphincter repair versus sacral neuromodulation, and reviewed the anticipated outcomes of each, including the significant risk of long-term failure with " sphincteroplasty.  Nonetheless, this may well have a role to play if there is extensive anatomic damage to the sphincter.  I will plan to meet with Ángela again after her testing is to complete review the results and formulate a treatment plan.    I answered all of the patient's questions to her stated satisfaction.  She expressed understanding and agreement with the proposed plan.    Total time 30 minutes.  Greater than half the time was spent counseling.      PLEASE SEE NOTE BELOW FOR PHYSICAL EXAMINATION, REVIEW OF SYSTEMS, AND OTHER HISTORY.      Dr. Efren Olvera MD  Colon and Rectal Surgery Attending  Department of Surgery  [facility]    -------------------------------------------------------------------------------------------------------------------    Medical history:  Past Medical History:   Diagnosis Date     Cervical dysplasia     s/p LEEP      Chickenpox      Exercise-induced asthma     adolescent     IBS (irritable bowel syndrome)      Tonsillitis 2/13/2014       Surgical history:  Past Surgical History:   Procedure Laterality Date     BIOPSY  Various    Had a few Leeps and numerous Colposcopys     COLONOSCOPY  2009    Normal     DILATION AND CURETTAGE, HYSTEROSCOPY DIAGNOSTIC, COMBINED N/A 4/3/2018    Procedure: COMBINED DILATION AND CURETTAGE, HYSTEROSCOPY DIAGNOSTIC;  Diagnostic Hysteroscopy with Dilation and Curettage,Laparoscopy with Left Ovarian Cystectomy, Right Uteral Sacral Biopsy;  Surgeon: Sherin Frost MD;  Location: [facility] OR     EXC SWEAT GLAND LESN AXILL,SIMPL Right 2009     LAPAROSCOPY DIAGNOSTIC (GYN) N/A 4/3/2018    Procedure: LAPAROSCOPY DIAGNOSTIC (GYN);;  Surgeon: Sherin Frost MD;  Location: [facility] OR     LEEP TX, CERVICAL  2006    yearly paps     MOUTH SURGERY      wisdom teeth     SOFT TISSUE SURGERY  Various    infected sweat-gland- cyst removed arm,armpit,face     SURGICAL HISTORY OF -   3/2005    Tonsillectomy      SURGICAL HISTORY OF -       infected sweat gland under her arm       Problem list:  Patient Active Problem List    Diagnosis Date Noted     Left ovarian cyst 03/19/2018     Priority: Medium     Posterior neck pain 09/23/2017     Priority: Medium     Attention deficit hyperactivity disorder (ADHD), predominantly inattentive type 06/10/2016     Priority: Medium     Patient is followed by WILMAR WHEAT for ongoing prescription of stimulants.  All refills should be approved by this provider, or covering partner.    Medication(s): Adderall 10mg immediate release   Maximum quantity per month: 150  Clinic visit frequency required: Q 3 month     Controlled substance agreement on file: Yes 6/30/16  Neuropsych evaluation for ADD completed:  Yes, completed 2/2012 at [facility], on file and diagnosis confirmed    Last [facility] website verification:  done on 6/19/2016   [email or web address]           Oral herpes 03/21/2016     Priority: Medium     CARDIOVASCULAR SCREENING; LDL GOAL LESS THAN 130 08/12/2015     Priority: Medium     Rectovaginal fistula 01/26/2015     Priority: Medium     Has had consult with colorectal surgeon; opting to defer treatment until done with childbearing  Amenable to c/section with next pregnancy       Health Care Home 12/18/2014     Priority: Medium     Status:  Unable to reach  Care Coordinator:  Maira Starr    See Letters for [facility] Care Plan  Date:  December 18, 2014         Fibroid uterus 07/02/2014     Priority: Medium     Anterior intramural lower.  Right by bladder       Urgency-frequency syndrome 03/24/2014     Priority: Medium       Medications:  Current Outpatient Prescriptions   Medication Sig Dispense Refill     ibuprofen (ADVIL/MOTRIN) 600 MG tablet Take 1 tablet (600 mg) by mouth every 6 hours as needed for pain (mild) 30 tablet 0     acetaminophen (TYLENOL) 325 MG tablet Take 2 tablets (650 mg) by mouth every 4 hours as needed for other (mild pain) 100 tablet 0     Probiotic  Product (SOLUBLE FIBER/PROBIOTICS PO)        amphetamine-dextroamphetamine (ADDERALL) 10 MG per tablet Take 2 pills (20mg) orally in the morning and two pills (20mg) at 11am and one pill (10mg) at 2pm 150 tablet 0       Allergies:  No Known Allergies    Family history:  Family History   Problem Relation Age of Onset     Blood Disease Brother      twin brother-blood clots     Blood Disease Maternal Grandfather      blood clots     HEART DISEASE Maternal Grandfather      valve replacement     Hyperlipidemia Maternal Grandfather      CEREBROVASCULAR DISEASE Maternal Grandfather      Prostate Cancer Maternal Grandfather      Other Cancer Maternal Grandfather      Gum/Jaw/Lymphnodes     Colon Cancer Maternal Grandfather      MENTAL ILLNESS Maternal Grandfather      Alzheimer's     Respiratory Maternal Grandmother      Lung Cancer Maternal Grandmother      DIABETES Maternal Grandmother      type II     Hypertension Maternal Grandmother      Hyperlipidemia Maternal Grandmother      Depression Maternal Grandmother      CANCER Paternal Grandmother      non -hodgkins lymphoma     Lung Cancer Paternal Grandmother      Hypertension Paternal Grandmother      Hyperlipidemia Paternal Grandmother      CEREBROVASCULAR DISEASE Paternal Grandmother      Hypertension Father      Hyperlipidemia Father      Hyperlipidemia Paternal Grandfather      Prostate Cancer Paternal Grandfather      Hypertension Paternal Grandfather      Colon Cancer Paternal Grandfather      Coronary Artery Disease Paternal Grandfather      Breast Cancer Mother      Stage 0, small duct carcinoma     Breast Cancer Other      2 aunt and 3 of my great aunts     Breast Cancer Cousin      Breast Cancer       Social history:  Social History     Social History     Marital status: Single     Spouse name: N/A     Number of children: N/A     Years of education: N/A     Occupational History      [employer]     accounting     Social History Main Topics     Smoking  "status: Current Every Day Smoker     Packs/day: 0.50     Years: 15.00     Types: Cigarettes     Start date: 12/1/2014     Smokeless tobacco: Never Used     Alcohol use 0.0 oz/week      Comment: 3 drinks weekly- quit with pregnancy     Drug use: No     Sexual activity: Yes     Partners: Male     Birth control/ protection: None      Comment: We're okay if we get pregnant again ;)     Other Topics Concern     Parent/Sibling W/ Cabg, Mi Or Angioplasty Before 65f 55m? No     Social History Narrative         Nursing Notes:   Tatiana Mckeon LPN  4/10/2018  3:41 PM  Signed  Chief Complaint   Patient presents with     Rectal Problem     New pt consult, rectovaginal fistula.        Vitals:    04/10/18 1539   BP: 126/90   Pulse: 104   Temp: 98.3  F (36.8  C)   TempSrc: Oral   SpO2: 96%   Weight: 138 lb 14.4 oz   Height: 5' 5.5\"       Body mass index is 22.76 kg/(m^2).  Zachary YU LPN                                 Answers for HPI/ROS submitted by the patient on 4/10/2018   General Symptoms: Yes  Skin Symptoms: Yes  HENT Symptoms: No  EYE SYMPTOMS: No  HEART SYMPTOMS: No  LUNG SYMPTOMS: No  INTESTINAL SYMPTOMS: Yes  URINARY SYMPTOMS: Yes  GYNECOLOGIC SYMPTOMS: Yes  BREAST SYMPTOMS: Yes  SKELETAL SYMPTOMS: Yes  BLOOD SYMPTOMS: No  NERVOUS SYSTEM SYMPTOMS: Yes  MENTAL HEALTH SYMPTOMS: Yes  Fever: No  Loss of appetite: Yes  Weight loss: Yes  Weight gain: No  Fatigue: Yes  Night sweats: Yes  Chills: Yes  Increased stress: No  Excessive hunger: No  Excessive thirst: No  Feeling hot or cold when others believe the temperature is normal: Yes  Loss of height: No  Post-operative complications: No  Surgical site pain: Yes  Hallucinations: No  Change in or Loss of Energy: Yes  Hyperactivity: No  Confusion: No  Changes in hair: No  Changes in moles/birth marks: No  Itching: Yes  Rashes: No  Changes in nails: No  Acne: No  Hair in places you don't want it: No  Change in facial hair: No  Warts: No  Non-healing sores: No  Scarring: No  Flaking " of skin: No  Color changes of hands/feet in cold : No  Sun sensitivity: No  Skin thickening: No  Heart burn or indigestion: No  Nausea: Yes  Vomiting: Yes  Abdominal pain: Yes  Bloating: Yes  Constipation: No  Diarrhea: Yes  Blood in stool: No  Black stools: No  Rectal or Anal pain: No  Fecal incontinence: Yes  Yellowing of skin or eyes: No  Vomit with blood: No  Change in stools: No  Trouble holding urine or incontinence: Yes  Pain or burning: Yes  Trouble starting or stopping: No  Increased frequency of urination: Yes  Blood in urine: Yes  Decreased frequency of urination: No  Frequent nighttime urination: Yes  Flank pain: Yes  Difficulty emptying bladder: No  Back pain: Yes  Muscle aches: Yes  Neck pain: Yes  Swollen joints: No  Joint pain: No  Bone pain: No  Muscle cramps: Yes  Muscle weakness: No  Joint stiffness: No  Bone fracture: No  Trouble with coordination: No  Dizziness or trouble with balance: No  Fainting or black-out spells: No  Memory loss: No  Headache: Yes  Seizures: No  Speech problems: No  Tingling: Yes  Tremor: No  Weakness: No  Difficulty walking: No  Paralysis: No  Numbness: Yes  Bleeding or spotting between periods: No  Heavy or painful periods: Yes  Irregular periods: Yes  Vaginal discharge: Yes  Hot flashes: Yes  Vaginal dryness: No  Genital ulcers: No  Reduced libido: No  Painful intercourse: Yes  Difficulty with sexual arousal: No  Post-menopausal bleeding: No  Discharge: Yes  Lumps: No  Pain: No  Nipple retraction: No  Nervous or Anxious: No  Depression: No  Trouble sleeping: Yes  Trouble thinking or concentrating: No  Mood changes: No  Panic attacks: No

## 2018-04-11 ENCOUNTER — TELEPHONE (OUTPATIENT)
Dept: GASTROENTEROLOGY | Facility: CLINIC | Age: 33
End: 2018-04-11

## 2018-04-12 ENCOUNTER — TELEPHONE (OUTPATIENT)
Dept: GASTROENTEROLOGY | Facility: OUTPATIENT CENTER | Age: 33
End: 2018-04-12

## 2018-04-12 NOTE — PROGRESS NOTES
Clinic Care Coordination Contact  [facility]/Voicemail    Referral Source: ED Follow-Up  Clinical Data: Care Coordinator Outreach  Outreach attempted x 3.  Left message on voicemail with call back information and requested return call.  Plan: Care Coordinator mailed out care coordination introduction letter on 4/9/18. Care Coordinator will do no further outreaches at this time.  Chavo PADRON,RN- BC  Clinic Care Coordinator  [facility]  Phone: [phone]

## 2018-04-13 ENCOUNTER — OFFICE VISIT (OUTPATIENT)
Dept: OBGYN | Facility: CLINIC | Age: 33
End: 2018-04-13
Payer: COMMERCIAL

## 2018-04-13 VITALS
WEIGHT: 139.6 LBS | BODY MASS INDEX: 22.43 KG/M2 | DIASTOLIC BLOOD PRESSURE: 82 MMHG | TEMPERATURE: 97.5 F | SYSTOLIC BLOOD PRESSURE: 124 MMHG | RESPIRATION RATE: 16 BRPM | HEART RATE: 89 BPM | HEIGHT: 66 IN

## 2018-04-13 DIAGNOSIS — Z48.816 SURGICAL AFTERCARE, GENITOURINARY SYSTEM: Primary | ICD-10-CM

## 2018-04-13 DIAGNOSIS — F17.200 SMOKING: ICD-10-CM

## 2018-04-13 DIAGNOSIS — N89.8 VAGINAL DISCHARGE: ICD-10-CM

## 2018-04-13 DIAGNOSIS — Z30.011 ENCOUNTER FOR INITIAL PRESCRIPTION OF CONTRACEPTIVE PILLS: ICD-10-CM

## 2018-04-13 PROCEDURE — 99024 POSTOP FOLLOW-UP VISIT: CPT | Performed by: OBSTETRICS & GYNECOLOGY

## 2018-04-13 RX ORDER — ACETAMINOPHEN AND CODEINE PHOSPHATE 120; 12 MG/5ML; MG/5ML
1 SOLUTION ORAL DAILY
Qty: 84 TABLET | Refills: 3 | Status: SHIPPED | OUTPATIENT
Start: 2018-04-13 | End: 2018-08-22

## 2018-04-13 RX ORDER — FLUCONAZOLE 150 MG/1
150 TABLET ORAL ONCE
Qty: 1 TABLET | Refills: 0 | Status: ON HOLD | OUTPATIENT
Start: 2018-04-13 | End: 2019-01-29

## 2018-04-13 ASSESSMENT — PAIN SCALES - GENERAL: PAINLEVEL: NO PAIN (0)

## 2018-04-13 NOTE — MR AVS SNAPSHOT
After Visit Summary   4/13/2018    Ángela Zurita    MRN: [id number]           Patient Information     Date Of Birth          1985        Visit Information        Provider Department      4/13/2018 8:00 AM Sherin Frost MD [facility]        Today's Diagnoses     Surgical aftercare, genitourinary system    -  1    Vaginal discharge           Follow-ups after your visit        Your next 10 appointments already scheduled     Apr 20, 2018  2:30 PM CDT   Colonoscopy with Yandel Chicas MD   [facility] ([email or web address])    [address] [place] [id number] [phone]              Who to contact     If you have questions or need follow up information about today's clinic visit or your schedule please contact [facility] directly at [phone].  Normal or non-critical lab and imaging results will be communicated to you by MyChart, letter or phone within 4 business days after the clinic has received the results. If you do not hear from us within 7 days, please contact the clinic through Consulting Serviceshart or phone. If you have a critical or abnormal lab result, we will notify you by phone as soon as possible.  Submit refill requests through BoxFox or call your pharmacy and they will forward the refill request to us. Please allow 3 business days for your refill to be completed.          Additional Information About Your Visit        MyChart Information     BoxFox gives you secure access to your electronic health record. If you see a primary care provider, you can also send messages to your care team and make appointments. If you have questions, please call your primary care clinic.  If you do not have a primary care provider, please call [phone] and they will assist you.        Care EveryWhere ID     This is your Care EveryWhere ID. This could be used by other organizations to access your [place]  "medical records  [id number]        Your Vitals Were     Pulse Temperature Respirations Height Last Period Breastfeeding?    89 97.5  F (36.4  C) (Tympanic) 16 5' 5.5\" (1.664 m) 03/28/2018 No    BMI (Body Mass Index)                   22.88 kg/m2            Blood Pressure from Last 3 Encounters:   04/13/18 124/82   04/10/18 126/90   04/06/18 116/67    Weight from Last 3 Encounters:   04/13/18 139 lb 9.6 oz (63.3 kg)   04/10/18 138 lb 14.4 oz (63 kg)   04/06/18 145 lb (65.8 kg)              Today, you had the following     No orders found for display         Today's Medication Changes          These changes are accurate as of 4/13/18  8:34 AM.  If you have any questions, ask your nurse or doctor.               Start taking these medicines.        Dose/Directions    fluconazole 150 MG tablet   Commonly known as:  DIFLUCAN   Used for:  Vaginal discharge   Started by:  Sherin Frost MD        Dose:  150 mg   Take 1 tablet (150 mg) by mouth once for 1 dose   Quantity:  1 tablet   Refills:  0            Where to get your medicines      These medications were sent to [facility] [address] [place] [address] AT [address]  [address] [facility] [address]     Phone:  [phone]     fluconazole 150 MG tablet                Primary Care Provider Office Phone # Fax #    Randy Graf [phone] [address] [facility] [address]        Equal Access to Services     [facility]: Hadii aad ku hadasho Soalex, waaxda luqadaha, qaybta kaalmada adeegabdi, celia elizabeth. So [place] [phone].    ATENCIÓN: Si habla español, tiene a reyez disposición servicios gratuitos de asistencia lingüística. Llame al [phone].    We comply with applicable federal civil rights laws and [place] laws. We do not discriminate on the basis of race, color, national origin, age, disability, sex, sexual orientation, or " gender identity.            Thank you!     Thank you for choosing [facility]  for your care. Our goal is always to provide you with excellent care. Hearing back from our patients is one way we can continue to improve our services. Please take a few minutes to complete the written survey that you may receive in the mail after your visit with us. Thank you!             Your Updated Medication List - Protect others around you: Learn how to safely use, store and throw away your medicines at www.disposemymeds.org.          This list is accurate as of 4/13/18  8:34 AM.  Always use your most recent med list.                   Brand Name Dispense Instructions for use Diagnosis    acetaminophen 325 MG tablet    TYLENOL    100 tablet    Take 2 tablets (650 mg) by mouth every 4 hours as needed for other (mild pain)    S/P laparoscopy       amphetamine-dextroamphetamine 10 MG per tablet    ADDERALL    150 tablet    Take 2 pills (20mg) orally in the morning and two pills (20mg) at 11am and one pill (10mg) at 2pm    Attention deficit hyperactivity disorder (ADHD), predominantly inattentive type       fluconazole 150 MG tablet    DIFLUCAN    1 tablet    Take 1 tablet (150 mg) by mouth once for 1 dose    Vaginal discharge       ibuprofen 600 MG tablet    ADVIL/MOTRIN    30 tablet    Take 1 tablet (600 mg) by mouth every 6 hours as needed for pain (mild)    S/P laparoscopy       SOLUBLE FIBER/PROBIOTICS PO       Urinary frequency, Vaginal discharge, Pelvic pain in female, Family history of malignant neoplasm of breast, Left ovarian cyst, Bilateral galactorrhea

## 2018-04-13 NOTE — PROGRESS NOTES
"Ángela is a 33 year old  2 weeks s/p  PROCEDURE:  Dilation and curettage, diagnostic hysteroscopy, diagnostic laparoscopy with left ovarian cystectomies x2, lysis of adhesions and peritoneal biopsy complicated by no problems reported.  She is currently requiring nothing for pain management.  - vaginal bleeding, - hot flashes.  + GI/ complaints she has some whitish discharge but no itching.  It is somewhat clumpy.  Energy level is good.  Denies fever.  Went to the ER on 2018 for severe diarrhea.  Had a normal CT scan and labs.  Symptoms improved.      The pathology report showed:   benign    PE: /82 (BP Location: Right arm, Patient Position: Chair, Cuff Size: Adult Regular)  Pulse 89  Temp 97.5  F (36.4  C) (Tympanic)  Resp 16  Ht 5' 5.5\" (1.664 m)  Wt 139 lb 9.6 oz (63.3 kg)  LMP 2018  Breastfeeding? No  BMI 22.88 kg/m2  Abd: soft, non tender, no masses  Incision: intact, no erythema, induration or discharge  NEFG  A/P 2 weeks s/p PROCEDURE:  Dilation and curettage, diagnostic hysteroscopy, diagnostic laparoscopy with left ovarian cystectomies x2, lysis of adhesions and peritoneal biopsy    1. Doing well, surgery and findings discussed. Normal exam.   Has seen the colorectal surgeon.  Planning on physical therapy/tests, also considering maybe a stimulator implant as well.  Sherin Frost MD    "

## 2018-04-17 ENCOUNTER — TELEPHONE (OUTPATIENT)
Dept: GASTROENTEROLOGY | Facility: OUTPATIENT CENTER | Age: 33
End: 2018-04-17

## 2018-04-17 NOTE — TELEPHONE ENCOUNTER
FYI: Patient scheduled for a colonoscopy for fecal incontinence. 4-3 she had a diagnostic hysteroscopy, laparoscopy with left ovarian cystectomy and lysis of adhesions. She stated that her provider said it was ok for her to have colonoscopy 4-20.

## 2018-04-17 NOTE — TELEPHONE ENCOUNTER
Patient taking any blood thinners ? no    Heart disease ? denies    Lung disease ? denies      Sleep apnea ? denies    Diabetic ? denies    Kidney disease ? denies    Dialysis ? n/a    Electronic implanted medical devices ? denies    Are you taking any narcotic pain medication ? no  What is your daily dosage ?    PTSD ? n/a    Prep instructions reviewed with patient ? Patient declined review.  policy, MAC sedation plan reviewed. Advised patient to have someone stay with her post exam    Pharmacy : n/a    Indication for procedure : Incontinence of feces, unspecified fecal incontinence type [R15.9    Referring provider :Efren Olvera MD     Arrival Time : Patient will arrive at 1:30 PM

## 2018-04-20 ENCOUNTER — TRANSFERRED RECORDS (OUTPATIENT)
Dept: HEALTH INFORMATION MANAGEMENT | Facility: CLINIC | Age: 33
End: 2018-04-20

## 2018-04-20 ENCOUNTER — DOCUMENTATION ONLY (OUTPATIENT)
Dept: GASTROENTEROLOGY | Facility: OUTPATIENT CENTER | Age: 33
End: 2018-04-20
Payer: COMMERCIAL

## 2018-04-24 LAB — COPATH REPORT: NORMAL

## 2018-05-01 ENCOUNTER — OFFICE VISIT (OUTPATIENT)
Dept: FAMILY MEDICINE | Facility: CLINIC | Age: 33
End: 2018-05-01
Payer: COMMERCIAL

## 2018-05-01 VITALS
HEART RATE: 96 BPM | HEIGHT: 66 IN | TEMPERATURE: 98.7 F | SYSTOLIC BLOOD PRESSURE: 110 MMHG | RESPIRATION RATE: 16 BRPM | BODY MASS INDEX: 22.79 KG/M2 | DIASTOLIC BLOOD PRESSURE: 67 MMHG | OXYGEN SATURATION: 98 % | WEIGHT: 141.8 LBS

## 2018-05-01 DIAGNOSIS — F90.0 ATTENTION DEFICIT HYPERACTIVITY DISORDER (ADHD), PREDOMINANTLY INATTENTIVE TYPE: ICD-10-CM

## 2018-05-01 PROCEDURE — 99213 OFFICE O/P EST LOW 20 MIN: CPT | Performed by: FAMILY MEDICINE

## 2018-05-01 RX ORDER — DEXTROAMPHETAMINE SACCHARATE, AMPHETAMINE ASPARTATE, DEXTROAMPHETAMINE SULFATE AND AMPHETAMINE SULFATE 2.5; 2.5; 2.5; 2.5 MG/1; MG/1; MG/1; MG/1
TABLET ORAL
Qty: 150 TABLET | Refills: 0 | Status: SHIPPED | OUTPATIENT
Start: 2018-06-01 | End: 2018-07-27

## 2018-05-01 RX ORDER — DEXTROAMPHETAMINE SACCHARATE, AMPHETAMINE ASPARTATE, DEXTROAMPHETAMINE SULFATE AND AMPHETAMINE SULFATE 2.5; 2.5; 2.5; 2.5 MG/1; MG/1; MG/1; MG/1
TABLET ORAL
Qty: 150 TABLET | Refills: 0 | Status: SHIPPED | OUTPATIENT
Start: 2018-05-01 | End: 2018-07-27

## 2018-05-01 RX ORDER — DEXTROAMPHETAMINE SACCHARATE, AMPHETAMINE ASPARTATE, DEXTROAMPHETAMINE SULFATE AND AMPHETAMINE SULFATE 2.5; 2.5; 2.5; 2.5 MG/1; MG/1; MG/1; MG/1
TABLET ORAL
Qty: 150 TABLET | Refills: 0 | Status: SHIPPED | OUTPATIENT
Start: 2018-07-02 | End: 2018-07-27

## 2018-05-01 NOTE — NURSING NOTE
"Chief Complaint   Patient presents with     Refill Request     Adderall       Initial /67  Pulse 96  Temp 98.7  F (37.1  C) (Tympanic)  Resp 16  Ht 5' 5.5\" (1.664 m)  Wt 141 lb 12.8 oz (64.3 kg)  LMP 04/22/2018 (Exact Date)  SpO2 98%  BMI 23.24 kg/m2 Estimated body mass index is 23.24 kg/(m^2) as calculated from the following:    Height as of this encounter: 5' 5.5\" (1.664 m).    Weight as of this encounter: 141 lb 12.8 oz (64.3 kg).  Medication Reconciliation: complete  "

## 2018-05-01 NOTE — PROGRESS NOTES
SUBJECTIVE:   Ángela Zurita is a 33 year old female who presents to clinic today for the following health issues:      Medication Followup of Adderall    Taking Medication as prescribed: yes    Side Effects:  None    Medication Helping Symptoms:  yes       Since starting Adderall feels like much improved focus at work in that able to focus on one task and allow others to wait.   is noticing difference in attention with conversation focus.  Has played around with dose and if takes 2 after 2pm then unable to sleep so taking 2 in the morning 2 at 11am then 1 pill at 3pm which is working well.  Feels nearly 90% improved    History of ADHD or other psych diagnoses: Diagnosed in 2011 and started medication Adderall which worked well to help with focus at work. [place] saw therapist and did the ADHD testing 2/2012 with Uriel Min, these records were reviewed.  Then pregnancy after that and mood was very stable and then kids and life was disorganized.  In the past struggles with relationships due to focusing on conversations and forgets things during conversations with .  At work had review last month with difficulty focusing during conversations, projects started and trouble with completing tasks.     Not planning kids currently due to issues with incontinence.      Problem list and histories reviewed & adjusted, as indicated.  Additional history: as documented    BP Readings from Last 3 Encounters:   05/01/18 110/67   04/13/18 124/82   04/10/18 126/90    Wt Readings from Last 3 Encounters:   05/01/18 141 lb 12.8 oz (64.3 kg)   04/13/18 139 lb 9.6 oz (63.3 kg)   04/10/18 138 lb 14.4 oz (63 kg)             Reviewed and updated as needed this visit by clinical staff  Tobacco  Allergies  Meds  Med Hx  Surg Hx  Fam Hx  Soc Hx      Reviewed and updated as needed this visit by Provider                  Review of Systems:   EYES: no acute vision problems or changes  CV: no chest pain, no palpitations,  "no new or worsening peripheral edema  GI: no nausea, no vomiting, no constipation, no diarrhea  NEURO: no weakness, no dizziness, no syncope, no headaches, no tremors, no tics  PSYCHIATRIC: NEGATIVE for changes in mood or affect  CONSTITUTIONAL: no fever, no chills, no fatigue, no unexpected change in weight, no appetite changes.  SKIN: no worrisome rashes, no worrisome lesions       Current Outpatient Prescriptions   Medication Sig Dispense Refill     amphetamine-dextroamphetamine (ADDERALL) 10 MG per tablet Take 2 pills (20mg) orally in the morning and two pills (20mg) at 11am and one pill (10mg) at 2pm 150 tablet 0     norethindrone (MICRONOR) 0.35 MG per tablet Take 1 tablet (0.35 mg) by mouth daily 84 tablet 3     Probiotic Product (SOLUBLE FIBER/PROBIOTICS PO)         No Known Allergies       Physical Exam:     Vitals:    05/01/18 0655   BP: 110/67   Pulse: 96   Resp: 16   Temp: 98.7  F (37.1  C)   TempSrc: Tympanic   SpO2: 98%   Weight: 141 lb 12.8 oz (64.3 kg)   Height: 5' 5.5\" (1.664 m)     Body mass index is 23.24 kg/(m^2).  GENERAL:: healthy, alert and no distress  CV: regular rates and rhythm, normal S1 S2, no S3 or S4 and no murmur, no click or rub   NEURO: strength and tone- normal, sensory exam- grossly normal, no tremor.  PSYCH: Alert and oriented times 3; speech- coherent , normal rate and volume; able to articulate logical thoughts, able to abstract reason, no tangential thoughts, no hallucinations or delusions, affect- normal  MENTAL STATUS EXAM:  Appearance/Behavior: No apparent distress, Casually groomed and Appears stated age  Speech: Normal  Mood/Affect: normal affect  Insight: Adequate  Assessment and Plan   Ángela was seen today for refill request and consult.    Diagnoses and all orders for this visit:    Attention deficit hyperactivity disorder (ADHD), predominantly inattentive type: well controlled, refill 3 months  -     amphetamine-dextroamphetamine (ADDERALL) 10 MG per tablet; Take 2 " pills (20mg) orally in the morning and two pills (20mg) at 11am and one pill (10mg) at 2pm  -     amphetamine-dextroamphetamine (ADDERALL) 10 MG per tablet; Take 2 pills (20mg) orally in the morning and two pills (20mg) at 11am and one pill (10mg) at 2pm  -     amphetamine-dextroamphetamine (ADDERALL) 10 MG per tablet; Take 2 pills (20mg) orally in the morning and two pills (20mg) at 11am and one pill (10mg) at 2pm      Options for treatment and follow-up care were reviewed with the patient and/or guardian. Ángela Zurita and/or guardian engaged in the decision making process and verbalized understanding of the options discussed and agreed with the final plan.    Randy Graf MD  [facility]

## 2018-05-01 NOTE — MR AVS SNAPSHOT
After Visit Summary   5/1/2018    Ángela Zurita    MRN: [id number]           Patient Information     Date Of Birth          1985        Visit Information        Provider Department      5/1/2018 6:40 AM Randy Graf MD [facility]        Today's Diagnoses     Attention deficit hyperactivity disorder (ADHD), predominantly inattentive type          Care Instructions    Next clinic visit in 3 months when needing refills      Thank you for choosing [facility].  You may be receiving a survey in the mail from Trevon EstradaBactest regarding your visit today.  Please take a few minutes to complete and return the survey to let us know how we are doing.      If you have questions or concerns, please contact us via INMAN or you can contact your care team at [phone].    Our Clinic hours are:  Monday 6:40 am  to 7:00 pm  Tuesday -Friday 6:40 am to 5:00 pm    The [place] outpatient lab hours are:  Monday - Friday 6:10 am to 4:45 pm  Saturdays 7:00 am to 11:00 am  Appointments are required, call [phone]    If you have clinical questions after hours or would like to schedule an appointment,  call the clinic at [phone].          Follow-ups after your visit        Who to contact     If you have questions or need follow up information about today's clinic visit or your schedule please contact [facility] directly at [phone].  Normal or non-critical lab and imaging results will be communicated to you by Cuikerhart, letter or phone within 4 business days after the clinic has received the results. If you do not hear from us within 7 days, please contact the clinic through Guest of a Guestt or phone. If you have a critical or abnormal lab result, we will notify you by phone as soon as possible.  Submit refill requests through INMAN or call your pharmacy and they will forward the refill request to us. Please allow 3 business days for your refill to be completed.           "Additional Information About Your Visit        [email or web address] Information     [employer] gives you secure access to your electronic health record. If you see a primary care provider, you can also send messages to your care team and make appointments. If you have questions, please call your primary care clinic.  If you do not have a primary care provider, please call [phone] and they will assist you.        Care EveryWhere ID     This is your Care EveryWhere ID. This could be used by other organizations to access your [place] medical records  [id number]        Your Vitals Were     Pulse Temperature Respirations Height Last Period Pulse Oximetry    96 98.7  F (37.1  C) (Tympanic) 16 5' 5.5\" (1.664 m) 04/22/2018 (Exact Date) 98%    BMI (Body Mass Index)                   23.24 kg/m2            Blood Pressure from Last 3 Encounters:   05/01/18 110/67   04/13/18 124/82   04/10/18 126/90    Weight from Last 3 Encounters:   05/01/18 141 lb 12.8 oz (64.3 kg)   04/13/18 139 lb 9.6 oz (63.3 kg)   04/10/18 138 lb 14.4 oz (63 kg)              Today, you had the following     No orders found for display         Today's Medication Changes          These changes are accurate as of 5/1/18  7:13 AM.  If you have any questions, ask your nurse or doctor.               These medicines have changed or have updated prescriptions.        Dose/Directions    * amphetamine-dextroamphetamine 10 MG per tablet   Commonly known as:  ADDERALL   This may have changed:  You were already taking a medication with the same name, and this prescription was added. Make sure you understand how and when to take each.   Used for:  Attention deficit hyperactivity disorder (ADHD), predominantly inattentive type   Changed by:  Randy Graf MD        Take 2 pills (20mg) orally in the morning and two pills (20mg) at 11am and one pill (10mg) at 2pm   Quantity:  150 tablet   Refills:  0       * amphetamine-dextroamphetamine 10 MG per tablet   Commonly known " as:  ADDERALL   This may have changed:  You were already taking a medication with the same name, and this prescription was added. Make sure you understand how and when to take each.   Used for:  Attention deficit hyperactivity disorder (ADHD), predominantly inattentive type   Changed by:  Randy Graf MD        Start taking on:  6/1/2018   Take 2 pills (20mg) orally in the morning and two pills (20mg) at 11am and one pill (10mg) at 2pm   Quantity:  150 tablet   Refills:  0       * amphetamine-dextroamphetamine 10 MG per tablet   Commonly known as:  ADDERALL   This may have changed:  Another medication with the same name was added. Make sure you understand how and when to take each.   Used for:  Attention deficit hyperactivity disorder (ADHD), predominantly inattentive type   Changed by:  Randy Graf MD        Start taking on:  7/2/2018   Take 2 pills (20mg) orally in the morning and two pills (20mg) at 11am and one pill (10mg) at 2pm   Quantity:  150 tablet   Refills:  0       * Notice:  This list has 3 medication(s) that are the same as other medications prescribed for you. Read the directions carefully, and ask your doctor or other care provider to review them with you.         Where to get your medicines      Some of these will need a paper prescription and others can be bought over the counter.  Ask your nurse if you have questions.     Bring a paper prescription for each of these medications     amphetamine-dextroamphetamine 10 MG per tablet    amphetamine-dextroamphetamine 10 MG per tablet    amphetamine-dextroamphetamine 10 MG per tablet                Primary Care Provider Office Phone # Fax #    Randy Graf -[phone] [address] [facility] [address]        Equal Access to Services     [facility] AH: [place], jelly garcia, celia wells. So [facility] [phone].    ATENCIÓN: Si  brando almonte, tiene a reyez disposición servicios gratuitos de asistencia lingüística. Jovany cordero [phone].    We comply with applicable federal civil rights laws and [place] laws. We do not discriminate on the basis of race, color, national origin, age, disability, sex, sexual orientation, or gender identity.            Thank you!     Thank you for choosing [facility]  for your care. Our goal is always to provide you with excellent care. Hearing back from our patients is one way we can continue to improve our services. Please take a few minutes to complete the written survey that you may receive in the mail after your visit with us. Thank you!             Your Updated Medication List - Protect others around you: Learn how to safely use, store and throw away your medicines at www.disposemymeds.org.          This list is accurate as of 5/1/18  7:13 AM.  Always use your most recent med list.                   Brand Name Dispense Instructions for use Diagnosis    * amphetamine-dextroamphetamine 10 MG per tablet    ADDERALL    150 tablet    Take 2 pills (20mg) orally in the morning and two pills (20mg) at 11am and one pill (10mg) at 2pm    Attention deficit hyperactivity disorder (ADHD), predominantly inattentive type       * amphetamine-dextroamphetamine 10 MG per tablet   Start taking on:  6/1/2018    ADDERALL    150 tablet    Take 2 pills (20mg) orally in the morning and two pills (20mg) at 11am and one pill (10mg) at 2pm    Attention deficit hyperactivity disorder (ADHD), predominantly inattentive type       * amphetamine-dextroamphetamine 10 MG per tablet   Start taking on:  7/2/2018    ADDERALL    150 tablet    Take 2 pills (20mg) orally in the morning and two pills (20mg) at 11am and one pill (10mg) at 2pm    Attention deficit hyperactivity disorder (ADHD), predominantly inattentive type       norethindrone 0.35 MG per tablet    MICRONOR    84 tablet    Take 1 tablet (0.35 mg) by mouth daily     Smoking, Encounter for initial prescription of contraceptive pills, Vaginal discharge, Surgical aftercare, genitourinary system       SOLUBLE FIBER/PROBIOTICS PO       Urinary frequency, Vaginal discharge, Pelvic pain in female, Family history of malignant neoplasm of breast, Left ovarian cyst, Bilateral galactorrhea       * Notice:  This list has 3 medication(s) that are the same as other medications prescribed for you. Read the directions carefully, and ask your doctor or other care provider to review them with you.

## 2018-05-01 NOTE — PATIENT INSTRUCTIONS
Next clinic visit in 3 months when needing refills      Thank you for choosing [facility].  You may be receiving a survey in the mail from Trevon Strickland regarding your visit today.  Please take a few minutes to complete and return the survey to let us know how we are doing.      If you have questions or concerns, please contact us via Verold or you can contact your care team at [phone].    Our Clinic hours are:  Monday 6:40 am  to 7:00 pm  Tuesday -Friday 6:40 am to 5:00 pm    The [place] outpatient lab hours are:  Monday - Friday 6:10 am to 4:45 pm  Saturdays 7:00 am to 11:00 am  Appointments are required, call [phone]    If you have clinical questions after hours or would like to schedule an appointment,  call the clinic at [phone].

## 2018-05-04 ENCOUNTER — TELEPHONE (OUTPATIENT)
Dept: SURGERY | Facility: CLINIC | Age: 33
End: 2018-05-04
Payer: COMMERCIAL

## 2018-05-04 NOTE — TELEPHONE ENCOUNTER
Health Call Center    Phone Message    May a detailed message be left on voicemail: yes    Reason for Call: Other: [facility], and is needing colonoscopy results faxed to Dr. Jorge Dennis ASAP, so he can review them. Phone: [phone] Fax: [phone]      Action Taken: Message routed to:  [facility]AL

## 2018-05-07 NOTE — TELEPHONE ENCOUNTER
Health Call Center    Phone Message    May a detailed message be left on voicemail: yes    Reason for Call: Other: [facility]/2018, and is needing colonoscopy results faxed to Dr. Jorge Dennis ASAP, so he can review them. Phone: [phone] Fax: [phone]      Action Taken: Message routed to:  [facility] Rectal

## 2018-05-14 ENCOUNTER — OFFICE VISIT (OUTPATIENT)
Dept: FAMILY MEDICINE | Facility: CLINIC | Age: 33
End: 2018-05-14
Payer: COMMERCIAL

## 2018-05-14 VITALS
SYSTOLIC BLOOD PRESSURE: 120 MMHG | DIASTOLIC BLOOD PRESSURE: 84 MMHG | TEMPERATURE: 99.2 F | HEART RATE: 86 BPM | BODY MASS INDEX: 22.82 KG/M2 | WEIGHT: 142 LBS | OXYGEN SATURATION: 99 % | HEIGHT: 66 IN | RESPIRATION RATE: 12 BRPM

## 2018-05-14 DIAGNOSIS — Z01.818 PREOP GENERAL PHYSICAL EXAM: Primary | ICD-10-CM

## 2018-05-14 DIAGNOSIS — N32.81 URGENCY-FREQUENCY SYNDROME: ICD-10-CM

## 2018-05-14 PROCEDURE — 99214 OFFICE O/P EST MOD 30 MIN: CPT | Performed by: FAMILY MEDICINE

## 2018-05-14 NOTE — PROGRESS NOTES
[facility]  [address] [address]  [phone]  Dept: [phone]    PRE-OP EVALUATION:  Today's date: 2018    Ángela Zurita (: 1985) presents for pre-operative evaluation assessment as requested by Dr. Jorge Dennis.  She requires evaluation and anesthesia risk assessment prior to undergoing surgery/procedure for treatment of total incontinence /Sphinktor obstruction.     Proposed Surgery/ Procedure: Sphincteroplasty  Date of Surgery/ Procedure: 18  Time of Surgery/ Procedure: 1pm  Hospital/Surgical Facility: [facility] (Colon and Rectal)  Fax number for surgical facility: [phone]  Primary Physician: Randy Graf  Type of Anesthesia Anticipated: Unknown    Patient has a Health Care Directive or Living Will:  NO    1. NO - Do you have a history of heart attack, stroke, stent, bypass or surgery on an artery in the head, neck, heart or legs?  2. NO - Do you ever have any pain or discomfort in your chest?  3. NO - Do you have a history of  Heart Failure?  4. NO - Are you troubled by shortness of breath when: walking on the level, up a slight hill or at night?  5. NO - Do you currently have a cold, bronchitis or other respiratory infection?  6. NO - Do you have a cough, shortness of breath or wheezing?  7. NO - Do you sometimes get pains in the calves of your legs when you walk?  8. YES, twin brother - Do you or anyone in your family have previous history of blood clots?  9. NO - Do you or does anyone in your family have a serious bleeding problem such as prolonged bleeding following surgeries or cuts?  10. YES, once - Have you ever had problems with anemia or been told to take iron pills?  11. NO - Have you had any abnormal blood loss such as black, tarry or bloody stools, or abnormal vaginal bleeding?  12. NO - Have you ever had a blood transfusion?  13. YES, Pt woke up during a procedure. - Have you or any of your relatives ever had  problems with anesthesia?  14. NO - Do you have sleep apnea, excessive snoring or daytime drowsiness?  15. NO - Do you have any prosthetic heart valves?  16. NO - Do you have prosthetic joints?  17. NO - Is there any chance that you may be pregnant?      HPI:     HPI related to upcoming procedure: Patient is a 33 yr old female here for a pre op exam. She is having surgery to correct urinary incontinence. She denies any acute symptoms today. She denies any heart disease. Has had surgeries in the past and did not react to anesthesia. Takes medication for ADHD.       See problem list for active medical problems.  Problems all longstanding and stable, except as noted/documented.  See ROS for pertinent symptoms related to these conditions.                                                                                                                                                          .    MEDICAL HISTORY:     Patient Active Problem List    Diagnosis Date Noted     Left ovarian cyst 03/19/2018     Priority: Medium     Posterior neck pain 09/23/2017     Priority: Medium     Attention deficit hyperactivity disorder (ADHD), predominantly inattentive type 06/10/2016     Priority: Medium     Patient is followed by WILMAR WHEAT for ongoing prescription of stimulants.  All refills should be approved by this provider, or covering partner.    Medication(s): Adderall 10mg immediate release   Maximum quantity per month: 150  Clinic visit frequency required: Q 3 month     Controlled substance agreement on file: Yes 6/30/16  Neuropsych evaluation for ADD completed:  Yes, completed 2/2012 at [facility], on file and diagnosis confirmed    Last [facility] website verification:  done on 6/19/2016   [email or web address]           Oral herpes 03/21/2016     Priority: Medium     CARDIOVASCULAR SCREENING; LDL GOAL LESS THAN 130 08/12/2015     Priority: Medium     Rectovaginal fistula 01/26/2015     Priority: Medium     Has had  consult with colorectal surgeon; opting to defer treatment until done with childbearing  Amenable to c/section with next pregnancy       Health Care Home 12/18/2014     Priority: Medium     Status:  Unable to reach  Care Coordinator:  Maira Starr    See Letters for [facility]  Date:  December 18, 2014         Fibroid uterus 07/02/2014     Priority: Medium     Anterior intramural lower.  Right by bladder       Urgency-frequency syndrome 03/24/2014     Priority: Medium      Past Medical History:   Diagnosis Date     Cervical dysplasia     s/p LEEP      Chickenpox      Exercise-induced asthma     adolescent     IBS (irritable bowel syndrome)      Tonsillitis 2/13/2014     Past Surgical History:   Procedure Laterality Date     BIOPSY  Various    Had a few Leeps and numerous Colposcopys     COLONOSCOPY  2009    Normal     DILATION AND CURETTAGE, HYSTEROSCOPY DIAGNOSTIC, COMBINED N/A 4/3/2018    Procedure: COMBINED DILATION AND CURETTAGE, HYSTEROSCOPY DIAGNOSTIC;  Diagnostic Hysteroscopy with Dilation and Curettage,Laparoscopy with Left Ovarian Cystectomy, Right Uteral Sacral Biopsy;  Surgeon: Sherin Frost MD;  Location: [facility] OR     EXC SWEAT GLAND LESN AXILL,SIMPL Right 2009     LAPAROSCOPY DIAGNOSTIC (GYN) N/A 4/3/2018    Procedure: LAPAROSCOPY DIAGNOSTIC (GYN);;  Surgeon: Sherin Frost MD;  Location: [facility] OR     LEEP TX, CERVICAL  2006    yearly paps     MOUTH SURGERY      wisdom teeth     SOFT TISSUE SURGERY  Various    infected sweat-gland- cyst removed arm,armpit,face     SURGICAL HISTORY OF -   3/2005    Tonsillectomy     SURGICAL HISTORY OF -       infected sweat gland under her arm     Current Outpatient Prescriptions   Medication Sig Dispense Refill     [START ON 7/2/2018] amphetamine-dextroamphetamine (ADDERALL) 10 MG per tablet Take 2 pills (20mg) orally in the morning and two pills (20mg) at 11am and one pill (10mg) at 2pm 150 tablet 0     [START ON 6/1/2018]  "amphetamine-dextroamphetamine (ADDERALL) 10 MG per tablet Take 2 pills (20mg) orally in the morning and two pills (20mg) at 11am and one pill (10mg) at 2pm 150 tablet 0     amphetamine-dextroamphetamine (ADDERALL) 10 MG per tablet Take 2 pills (20mg) orally in the morning and two pills (20mg) at 11am and one pill (10mg) at 2pm 150 tablet 0     Probiotic Product (SOLUBLE FIBER/PROBIOTICS PO)        Psyllium (METAMUCIL FIBER PO) Take by mouth 2 times daily       norethindrone (MICRONOR) 0.35 MG per tablet Take 1 tablet (0.35 mg) by mouth daily 84 tablet 3     OTC products: Tylenol for headaches    No Known Allergies   Latex Allergy: NO    Social History   Substance Use Topics     Smoking status: Current Every Day Smoker     Packs/day: 0.50     Years: 15.00     Types: Cigarettes     Start date: 12/1/2014     Last attempt to quit: 1/8/2014     Smokeless tobacco: Never Used     Alcohol use 0.0 oz/week      Comment: 3 drinks weekly- quit with pregnancy     History   Drug Use No       REVIEW OF SYSTEMS:   CONSTITUTIONAL: NEGATIVE for fever, chills, change in weight  INTEGUMENTARY/SKIN: NEGATIVE for worrisome rashes, moles or lesions  EYES: NEGATIVE for vision changes or irritation  ENT/MOUTH: NEGATIVE for ear, mouth and throat problems  RESP: NEGATIVE for significant cough or SOB  BREAST: NEGATIVE for masses, tenderness or discharge  CV: NEGATIVE for chest pain, palpitations or peripheral edema  GI: NEGATIVE for nausea, abdominal pain, heartburn, or change in bowel habits   female: Hx urinary tract infection  MUSCULOSKELETAL: NEGATIVE for significant arthralgias or myalgia  NEURO: NEGATIVE for weakness, dizziness or paresthesias  ENDOCRINE: NEGATIVE for temperature intolerance, skin/hair changes  HEME: NEGATIVE for bleeding problems  PSYCHIATRIC: NEGATIVE for changes in mood or affect    EXAM:   /84  Pulse 86  Temp 99.2  F (37.3  C) (Tympanic)  Resp 12  Ht 5' 5.5\" (1.664 m)  Wt 142 lb (64.4 kg)  LMP " 04/22/2018 (Exact Date)  SpO2 99%  BMI 23.27 kg/m2    GENERAL APPEARANCE: healthy, alert and no distress     EYES: EOMI, PERRL     HENT: ear canals and TM's normal and nose and mouth without ulcers or lesions     NECK: no adenopathy, no asymmetry, masses, or scars and thyroid normal to palpation     RESP: lungs clear to auscultation - no rales, rhonchi or wheezes     CV: regular rates and rhythm, normal S1 S2, no S3 or S4 and no murmur, click or rub     ABDOMEN:  soft, nontender, no HSM or masses and bowel sounds normal     MS: extremities normal- no gross deformities noted, no evidence of inflammation in joints, FROM in all extremities.     SKIN: no suspicious lesions or rashes     PSYCH: mentation appears normal. and affect normal/bright    DIAGNOSTICS:   No labs or EKG required for low risk surgery (cataract, skin procedure, breast biopsy, etc)    Recent Labs   Lab Test  04/06/18   1150  04/03/18   1126   09/23/17   1915   HGB  13.8  7.4*   < >  13.2   PLT  305   --    --   326   NA  139   --    --   143   POTASSIUM  3.7   --    --   3.3*   CR  0.83   --    --   0.87    < > = values in this interval not displayed.        IMPRESSION:   Reason for surgery/procedure: Recurrent UTIS, Urinary -frequency syndrome  Diagnosis/reason for consult: Pre op evaluation     The proposed surgical procedure is considered LOW risk.    REVISED CARDIAC RISK INDEX  The patient has the following serious cardiovascular risks for perioperative complications such as (MI, PE, VFib and 3  AV Block):  No serious cardiac risks  INTERPRETATION: 0 risks: Class I (very low risk - 0.4% complication rate)    The patient has the following additional risks for perioperative complications:  No identified additional risks      ICD-10-CM    1. Preop general physical exam Z01.818    2. Urgency-frequency syndrome N32.81        RECOMMENDATIONS:       Cardiovascular Risk  Performs 4 METs exercise without symptoms (Light housework (dusting, washing  dishes) and Climb a flight of stairs) .       Pulmonary Risk  Incentive spirometry post op  Respiratory Therapy (Respiratory Care IP Consult)  post op  NG tube decompression if abdominal distension or significant vomiting       --Patient is to take all scheduled medications on the day of surgery EXCEPT for modifications listed below.    Anticoagulant or Antiplatelet Medication Use  ASPIRIN: Discontinue ASA 7-10 days prior to procedure to reduce bleeding risk.  It should be resumed post-operatively.  NSAIDS: Ibuprofen (Motrin):         Stop 5-7  days prior to surgery        APPROVAL GIVEN to proceed with proposed procedure, without further diagnostic evaluation       Signed Electronically by: Carolann Merida MD    Copy of this evaluation report is provided to requesting physician.    [facility] Preop Guidelines    Revised Cardiac Risk Index

## 2018-05-14 NOTE — MR AVS SNAPSHOT
After Visit Summary   5/14/2018    Ángela Zurita    MRN: [id number]           Patient Information     Date Of Birth          1985        Visit Information        Provider Department      5/14/2018 6:40 AM Carolann Merida MD [facility]        Today's Diagnoses     Preop general physical exam    -  1      Care Instructions      Before Your Surgery      Call your surgeon if there is any change in your health. This includes signs of a cold or flu (such as a sore throat, runny nose, cough, rash or fever).    Do not smoke, drink alcohol or take over the counter medicine (unless your surgeon or primary care doctor tells you to) for the 24 hours before and after surgery.    If you take prescribed drugs: Follow your doctor s orders about which medicines to take and which to stop until after surgery.    Eating and drinking prior to surgery: follow the instructions from your surgeon  Take a shower or bath the night before surgery. Use the soap your surgeon gave you to gently clean your skin. If you do not have soap from your surgeon, use your regular soap. Do not shave or scrub the surgery site.  Wear clean pajamas and have clean sheets on your bed.   Presurgery Checklist  You are scheduled to have surgery. The healthcare staff will try to make your stay comfortable. Use the guidelines below to remind yourself what to do before surgery. Be sure to follow any specific pre-op instructions from your surgeon or nurse.  Preparing for Surgery  Ask your surgeon if you ll need a blood transfusion during surgery and if so, how to prepare for it. In some cases, you can donate blood before surgery. If needed, this blood can be given back (transfused) to you during or after surgery.  If you are having abdominal surgery, ask what you need to do to clear your bowel.  Tell your surgeon if you have allergies to any medications or foods.  Arrange for an adult family member or friend to drive you  home after surgery. If possible, have someone ready to help you at home as you recover.  Call the surgeon if you get a cold, fever, sore throat, diarrhea, or other health problem just before surgery. Your surgeon can decide whether or not to postpone the surgery.  Medications  Tell your surgeon about all medications you take, including prescription and over-the-counter products such as herbal remedies and vitamins. Ask if you should continue taking them.  If you take ibuprofen, naproxen, or  blood thinners  such as aspirin, clopidogrel (Plavix), or warfarin (Coumadin), ask your surgeon whether you should stop taking them and how long before surgery you should stop.  You may be told to take antibiotics just before surgery to prevent infection. If so, follow instructions carefully on how to take them.  If you are told to take medications called anticoagulants to prevent blood clots after surgery, be sure to follow the instructions on how to take them.  Stop Smoking  If you smoke, healing may take longer. So at least 2 week(s) before surgery, stop smoking.  Bathing or Showering Before Surgery  If instructed, wash with antibacterial soap. Afterward, do not use lotions or powders.  If you are having surgery on the head, you may be asked to shampoo with antibacterial soap. Follow instructions for doing so.  Do Not Remove Hair from the Surgery Site  Do not shave hair from the incision site, unless you are given specific instructions to do so. Usually, if hair needs to be removed, it will be done at the hospital right before surgery.  Don t Eat or Drink  Your doctor will tell you when to stop eating and drinking. If you do not follow your doctor's instructions, your procedure may be postponed or rescheduled for another day.  If your surgeon tells you to continue any medications, take them with small sips of water.  You can brush your teeth and rinse your mouth, but don t swallow any water.  Day of Surgery  Do not wear  makeup. Do not use perfume, deodorant, or hairspray. Remove nail polish and artificial nails.  Leave jewelry (including rings), watches, and other valuables at home.  Be sure to bring health insurance cards or forms and a photo ID.  Bring a list of your medications (include the name, dose, how often you take them, and the time last dose was taken).  Arrive on time at the hospital or surgery facility.    5562-5318 [employer]. [address] [place] [address]. All rights reserved. This information is not intended as a substitute for professional medical care. Always follow your healthcare professional's instructions.  This information has been modified by your health care provider with permission from the publisher.              Follow-ups after your visit        Who to contact     If you have questions or need follow up information about today's clinic visit or your schedule please contact [facility] directly at [phone].  Normal or non-critical lab and imaging results will be communicated to you by [employer], letter or phone within 4 business days after the clinic has received the results. If you do not hear from us within 7 days, please contact the clinic through [employer] or phone. If you have a critical or abnormal lab result, we will notify you by phone as soon as possible.  Submit refill requests through [employer] or call your pharmacy and they will forward the refill request to us. Please allow 3 business days for your refill to be completed.          Additional Information About Your Visit        [employer] Information     [employer] gives you secure access to your electronic health record. If you see a primary care provider, you can also send messages to your care team and make appointments. If you have questions, please call your primary care clinic.  If you do not have a primary care provider, please call [phone] and they will assist you.        Care EveryWhere ID      "This is your Care EveryWhere ID. This could be used by other organizations to access your [place] medical records  [id number]        Your Vitals Were     Pulse Temperature Respirations Height Last Period Pulse Oximetry    86 99.2  F (37.3  C) (Tympanic) 12 5' 5.5\" (1.664 m) 04/22/2018 (Exact Date) 99%    BMI (Body Mass Index)                   23.27 kg/m2            Blood Pressure from Last 3 Encounters:   05/14/18 120/84   05/01/18 110/67   04/13/18 124/82    Weight from Last 3 Encounters:   05/14/18 142 lb (64.4 kg)   05/01/18 141 lb 12.8 oz (64.3 kg)   04/13/18 139 lb 9.6 oz (63.3 kg)              Today, you had the following     No orders found for display       Primary Care Provider Office Phone # Fax #    Randy Graf [phone] [address] [facility] [facility] [address]        Equal Access to Services     SKY BLANCHARD : Hadii jordan ku hadasho Soomaali, waaxda luqadaha, qaybta kaalmada adeegyamoses, celia hoskins . So [facility] [place] [phone].    ATENCIÓN: Si habla español, tiene a reyez disposición servicios gratuitos de asistencia lingüística. Llame al [phone].    We comply with applicable federal civil rights laws and [place] laws. We do not discriminate on the basis of race, color, national origin, age, disability, sex, sexual orientation, or gender identity.            Thank you!     Thank you for choosing [facility]  for your care. Our goal is always to provide you with excellent care. Hearing back from our patients is one way we can continue to improve our services. Please take a few minutes to complete the written survey that you may receive in the mail after your visit with us. Thank you!             Your Updated Medication List - Protect others around you: Learn how to safely use, store and throw away your medicines at www.disposemymeds.org.          This list is accurate as of 5/14/18  7:08 AM.  Always use your most recent med list.       "             Brand Name Dispense Instructions for use Diagnosis    * amphetamine-dextroamphetamine 10 MG per tablet    ADDERALL    150 tablet    Take 2 pills (20mg) orally in the morning and two pills (20mg) at 11am and one pill (10mg) at 2pm    Attention deficit hyperactivity disorder (ADHD), predominantly inattentive type       * amphetamine-dextroamphetamine 10 MG per tablet   Start taking on:  6/1/2018    ADDERALL    150 tablet    Take 2 pills (20mg) orally in the morning and two pills (20mg) at 11am and one pill (10mg) at 2pm    Attention deficit hyperactivity disorder (ADHD), predominantly inattentive type       * amphetamine-dextroamphetamine 10 MG per tablet   Start taking on:  7/2/2018    ADDERALL    150 tablet    Take 2 pills (20mg) orally in the morning and two pills (20mg) at 11am and one pill (10mg) at 2pm    Attention deficit hyperactivity disorder (ADHD), predominantly inattentive type       METAMUCIL FIBER PO      Take by mouth 2 times daily        norethindrone 0.35 MG per tablet    MICRONOR    84 tablet    Take 1 tablet (0.35 mg) by mouth daily    Smoking, Encounter for initial prescription of contraceptive pills, Vaginal discharge, Surgical aftercare, genitourinary system       SOLUBLE FIBER/PROBIOTICS PO       Urinary frequency, Vaginal discharge, Pelvic pain in female, Family history of malignant neoplasm of breast, Left ovarian cyst, Bilateral galactorrhea       * Notice:  This list has 3 medication(s) that are the same as other medications prescribed for you. Read the directions carefully, and ask your doctor or other care provider to review them with you.

## 2018-05-14 NOTE — PATIENT INSTRUCTIONS
Before Your Surgery      Call your surgeon if there is any change in your health. This includes signs of a cold or flu (such as a sore throat, runny nose, cough, rash or fever).    Do not smoke, drink alcohol or take over the counter medicine (unless your surgeon or primary care doctor tells you to) for the 24 hours before and after surgery.    If you take prescribed drugs: Follow your doctor s orders about which medicines to take and which to stop until after surgery.    Eating and drinking prior to surgery: follow the instructions from your surgeon  Take a shower or bath the night before surgery. Use the soap your surgeon gave you to gently clean your skin. If you do not have soap from your surgeon, use your regular soap. Do not shave or scrub the surgery site.  Wear clean pajamas and have clean sheets on your bed.   Presurgery Checklist  You are scheduled to have surgery. The healthcare staff will try to make your stay comfortable. Use the guidelines below to remind yourself what to do before surgery. Be sure to follow any specific pre-op instructions from your surgeon or nurse.  Preparing for Surgery  Ask your surgeon if you ll need a blood transfusion during surgery and if so, how to prepare for it. In some cases, you can donate blood before surgery. If needed, this blood can be given back (transfused) to you during or after surgery.  If you are having abdominal surgery, ask what you need to do to clear your bowel.  Tell your surgeon if you have allergies to any medications or foods.  Arrange for an adult family member or friend to drive you home after surgery. If possible, have someone ready to help you at home as you recover.  Call the surgeon if you get a cold, fever, sore throat, diarrhea, or other health problem just before surgery. Your surgeon can decide whether or not to postpone the surgery.  Medications  Tell your surgeon about all medications you take, including prescription and over-the-counter  products such as herbal remedies and vitamins. Ask if you should continue taking them.  If you take ibuprofen, naproxen, or  blood thinners  such as aspirin, clopidogrel (Plavix), or warfarin (Coumadin), ask your surgeon whether you should stop taking them and how long before surgery you should stop.  You may be told to take antibiotics just before surgery to prevent infection. If so, follow instructions carefully on how to take them.  If you are told to take medications called anticoagulants to prevent blood clots after surgery, be sure to follow the instructions on how to take them.  Stop Smoking  If you smoke, healing may take longer. So at least 2 week(s) before surgery, stop smoking.  Bathing or Showering Before Surgery  If instructed, wash with antibacterial soap. Afterward, do not use lotions or powders.  If you are having surgery on the head, you may be asked to shampoo with antibacterial soap. Follow instructions for doing so.  Do Not Remove Hair from the Surgery Site  Do not shave hair from the incision site, unless you are given specific instructions to do so. Usually, if hair needs to be removed, it will be done at the hospital right before surgery.  Don t Eat or Drink  Your doctor will tell you when to stop eating and drinking. If you do not follow your doctor's instructions, your procedure may be postponed or rescheduled for another day.  If your surgeon tells you to continue any medications, take them with small sips of water.  You can brush your teeth and rinse your mouth, but don t swallow any water.  Day of Surgery  Do not wear makeup. Do not use perfume, deodorant, or hairspray. Remove nail polish and artificial nails.  Leave jewelry (including rings), watches, and other valuables at home.  Be sure to bring health insurance cards or forms and a photo ID.  Bring a list of your medications (include the name, dose, how often you take them, and the time last dose was taken).  Arrive on time at the  hospital or surgery facility.    [id number] [employer]. [address] [place] [address], [place] [address]. All rights reserved. This information is not intended as a substitute for professional medical care. Always follow your healthcare professional's instructions.  This information has been modified by your health care provider with permission from the publisher.

## 2018-05-17 ASSESSMENT — MIFFLIN-ST. JEOR: SCORE: 1337.92

## 2018-05-18 ENCOUNTER — ANESTHESIA - HEALTHEAST (OUTPATIENT)
Dept: SURGERY | Facility: HOSPITAL | Age: 33
End: 2018-05-18

## 2018-05-18 ENCOUNTER — SURGERY - HEALTHEAST (OUTPATIENT)
Dept: SURGERY | Facility: HOSPITAL | Age: 33
End: 2018-05-18

## 2018-05-18 ENCOUNTER — TRANSFERRED RECORDS (OUTPATIENT)
Dept: HEALTH INFORMATION MANAGEMENT | Facility: CLINIC | Age: 33
End: 2018-05-18

## 2018-05-18 ASSESSMENT — MIFFLIN-ST. JEOR: SCORE: 1350.62

## 2018-05-19 ENCOUNTER — SURGERY - HEALTHEAST (OUTPATIENT)
Dept: SURGERY | Facility: CLINIC | Age: 33
End: 2018-05-19

## 2018-06-02 ENCOUNTER — HOSPITAL ENCOUNTER (EMERGENCY)
Facility: CLINIC | Age: 33
Discharge: HOME | End: 2018-06-02
Attending: PHYSICIAN ASSISTANT | Admitting: PHYSICIAN ASSISTANT
Payer: COMMERCIAL

## 2018-06-02 VITALS
WEIGHT: 140 LBS | BODY MASS INDEX: 23.32 KG/M2 | SYSTOLIC BLOOD PRESSURE: 126 MMHG | DIASTOLIC BLOOD PRESSURE: 81 MMHG | OXYGEN SATURATION: 100 % | HEIGHT: 65 IN | RESPIRATION RATE: 16 BRPM | TEMPERATURE: 98.1 F

## 2018-06-02 DIAGNOSIS — R39.15 URGENCY OF URINATION: ICD-10-CM

## 2018-06-02 DIAGNOSIS — G89.18 POST-OP PAIN: ICD-10-CM

## 2018-06-02 DIAGNOSIS — R82.79 ABNORMAL FINDINGS ON MICROBIOLOGICAL EXAMINATION OF URINE: ICD-10-CM

## 2018-06-02 LAB
ALBUMIN UR-MCNC: NEGATIVE MG/DL
APPEARANCE UR: CLEAR
BACTERIA #/AREA URNS HPF: ABNORMAL /HPF
BILIRUB UR QL STRIP: NEGATIVE
COLOR UR AUTO: YELLOW
GLUCOSE UR STRIP-MCNC: NEGATIVE MG/DL
HCG UR QL: NEGATIVE
HGB UR QL STRIP: NEGATIVE
KETONES UR STRIP-MCNC: NEGATIVE MG/DL
LEUKOCYTE ESTERASE UR QL STRIP: NEGATIVE
NITRATE UR QL: NEGATIVE
PH UR STRIP: 6 PH (ref 5–7)
RBC #/AREA URNS AUTO: 1 /HPF (ref 0–2)
SOURCE: ABNORMAL
SP GR UR STRIP: 1.02 (ref 1–1.03)
SQUAMOUS #/AREA URNS AUTO: 1 /HPF (ref 0–1)
UROBILINOGEN UR STRIP-MCNC: 0 MG/DL (ref 0–2)
WBC #/AREA URNS AUTO: 2 /HPF (ref 0–5)

## 2018-06-02 PROCEDURE — 81001 URINALYSIS AUTO W/SCOPE: CPT | Performed by: PHYSICIAN ASSISTANT

## 2018-06-02 PROCEDURE — 81025 URINE PREGNANCY TEST: CPT | Performed by: PHYSICIAN ASSISTANT

## 2018-06-02 PROCEDURE — 99213 OFFICE O/P EST LOW 20 MIN: CPT | Mod: Z6 | Performed by: PHYSICIAN ASSISTANT

## 2018-06-02 PROCEDURE — G0463 HOSPITAL OUTPT CLINIC VISIT: HCPCS | Performed by: PHYSICIAN ASSISTANT

## 2018-06-02 PROCEDURE — 87088 URINE BACTERIA CULTURE: CPT | Performed by: PHYSICIAN ASSISTANT

## 2018-06-02 PROCEDURE — 87086 URINE CULTURE/COLONY COUNT: CPT | Performed by: PHYSICIAN ASSISTANT

## 2018-06-02 RX ORDER — CIPROFLOXACIN 500 MG/1
500 TABLET, FILM COATED ORAL 2 TIMES DAILY
Qty: 14 TABLET | Refills: 0 | Status: ON HOLD | OUTPATIENT
Start: 2018-06-02 | End: 2019-01-29

## 2018-06-02 ASSESSMENT — ENCOUNTER SYMPTOMS
ANAL BLEEDING: 0
BACK PAIN: 1
ABDOMINAL PAIN: 1
FEVER: 1
FLANK PAIN: 0
HEADACHES: 0
FREQUENCY: 0
VOMITING: 0
COUGH: 0
HEMATURIA: 0
DIZZINESS: 0
CHEST TIGHTNESS: 0
CHILLS: 0
NAUSEA: 0
DIAPHORESIS: 0
LIGHT-HEADEDNESS: 0
ACTIVITY CHANGE: 1
RECTAL PAIN: 1
DYSURIA: 0
PALPITATIONS: 0
WHEEZING: 0
SHORTNESS OF BREATH: 0

## 2018-06-02 NOTE — ED PROVIDER NOTES
History     Chief Complaint   Patient presents with     Urgency     rule out UTI; had rectal surgery 2 weeks ago Friday     HPI  Ángela Zurita is a 33 year old female who presents to the urgent care 2 weeks post rectal sphincteroplasty with urgency symptoms that started 2 days ago.  Patient states she is here to rule out a urinary tract infection after talking to her surgeon.  Patient states she has a history of UTIs.  Patient currently with abdominal pain, urgency, slight lower back pain, low-grade fevers for the past 2 weeks, nausea/vomiting, and rectal discharge and drainage from wound site.  Patient denies hematuria, dysuria, flank pain, and no fevers currently today.  Patient aware of all of these symptoms being very concerning at this time and is going to contact her surgeon today to see if she needs to be seen in the emergency room for further evaluation and treatment.  Patient states if she does need to be seen in the emergency department she would prefer to go to [facility] where she had the surgery done.  Patient today currently declining lab workup, pelvic exam, evaluation of the surgical site, or imaging at this time.     Problem List:    Patient Active Problem List    Diagnosis Date Noted     Left ovarian cyst 03/19/2018     Priority: Medium     Posterior neck pain 09/23/2017     Priority: Medium     Attention deficit hyperactivity disorder (ADHD), predominantly inattentive type 06/10/2016     Priority: Medium     Patient is followed by WILMAR WHEAT for ongoing prescription of stimulants.  All refills should be approved by this provider, or covering partner.    Medication(s): Adderall 10mg immediate release   Maximum quantity per month: 150  Clinic visit frequency required: Q 3 month     Controlled substance agreement on file: Yes 6/30/16  Neuropsych evaluation for ADD completed:  Yes, completed 2/2012 at [facility], on file and diagnosis confirmed    Last [facility] website verification:   done on 6/19/2016   [email or web address]           Oral herpes 03/21/2016     Priority: Medium     CARDIOVASCULAR SCREENING; LDL GOAL LESS THAN 130 08/12/2015     Priority: Medium     Rectovaginal fistula 01/26/2015     Priority: Medium     Has had consult with colorectal surgeon; opting to defer treatment until done with childbearing  Amenable to c/section with next pregnancy       Health Care Home 12/18/2014     Priority: Medium     Status:  Unable to reach  Care Coordinator:  Maira Starr    See Letters for [facility] Care Plan  Date:  December 18, 2014         Fibroid uterus 07/02/2014     Priority: Medium     Anterior intramural lower.  Right by bladder       Urgency-frequency syndrome 03/24/2014     Priority: Medium        Past Medical History:    Past Medical History:   Diagnosis Date     Cervical dysplasia      Chickenpox      Exercise-induced asthma      IBS (irritable bowel syndrome)      Tonsillitis 2/13/2014       Past Surgical History:    Past Surgical History:   Procedure Laterality Date     BIOPSY  Various    Had a few Leeps and numerous Colposcopys     COLONOSCOPY  2009    Normal     DILATION AND CURETTAGE, HYSTEROSCOPY DIAGNOSTIC, COMBINED N/A 4/3/2018    Procedure: COMBINED DILATION AND CURETTAGE, HYSTEROSCOPY DIAGNOSTIC;  Diagnostic Hysteroscopy with Dilation and Curettage,Laparoscopy with Left Ovarian Cystectomy, Right Uteral Sacral Biopsy;  Surgeon: Sherin Frost MD;  Location: [facility] OR     EXC SWEAT GLAND LESN AXILL,SIMPL Right 2009     LAPAROSCOPY DIAGNOSTIC (GYN) N/A 4/3/2018    Procedure: LAPAROSCOPY DIAGNOSTIC (GYN);;  Surgeon: Sherin Frost MD;  Location: [facility] OR     LEEP TX, CERVICAL  2006    yearly paps     MOUTH SURGERY      wisdom teeth     SOFT TISSUE SURGERY  Various    infected sweat-gland- cyst removed arm,armpit,face     SURGICAL HISTORY OF -   3/2005    Tonsillectomy     SURGICAL HISTORY OF -       infected sweat gland under her arm       Family  History:    Family History   Problem Relation Age of Onset     Blood Disease Brother      twin brother-blood clots     Blood Disease Maternal Grandfather      blood clots     HEART DISEASE Maternal Grandfather      valve replacement     Hyperlipidemia Maternal Grandfather      CEREBROVASCULAR DISEASE Maternal Grandfather      Prostate Cancer Maternal Grandfather      Other Cancer Maternal Grandfather      Gum/Jaw/Lymphnodes     Colon Cancer Maternal Grandfather      MENTAL ILLNESS Maternal Grandfather      Alzheimer's     Respiratory Maternal Grandmother      Lung Cancer Maternal Grandmother      DIABETES Maternal Grandmother      type II     Hypertension Maternal Grandmother      Hyperlipidemia Maternal Grandmother      Depression Maternal Grandmother      CANCER Paternal Grandmother      non -hodgkins lymphoma     Lung Cancer Paternal Grandmother      Hypertension Paternal Grandmother      Hyperlipidemia Paternal Grandmother      CEREBROVASCULAR DISEASE Paternal Grandmother      DIABETES Paternal Grandmother      Hypertension Father      Hyperlipidemia Father      Hyperlipidemia Paternal Grandfather      Prostate Cancer Paternal Grandfather      Hypertension Paternal Grandfather      Colon Cancer Paternal Grandfather      Coronary Artery Disease Paternal Grandfather      Breast Cancer Mother      Stage 0, small duct carcinoma     Breast Cancer Other      2 aunt and 3 of my great aunts     Breast Cancer Cousin      Breast Cancer       Social History:  Marital Status:  Single [1]  Social History   Substance Use Topics     Smoking status: Current Every Day Smoker     Packs/day: 0.50     Years: 15.00     Types: Cigarettes     Start date: 12/1/2014     Last attempt to quit: 1/8/2014     Smokeless tobacco: Never Used     Alcohol use 0.0 oz/week      Comment: 3 drinks weekly- quit with pregnancy        Medications:      ciprofloxacin (CIPRO) 500 MG tablet   [START ON 7/2/2018] amphetamine-dextroamphetamine (ADDERALL) 10  "MG per tablet   amphetamine-dextroamphetamine (ADDERALL) 10 MG per tablet   norethindrone (MICRONOR) 0.35 MG per tablet   Probiotic Product (SOLUBLE FIBER/PROBIOTICS PO)   Psyllium (METAMUCIL FIBER PO)         Review of Systems   Constitutional: Positive for activity change and fever (low grade for the past two weeks; nothing today (Tmax 99.9F) ). Negative for chills and diaphoresis.   Respiratory: Negative for cough, chest tightness, shortness of breath and wheezing.    Cardiovascular: Negative for chest pain and palpitations.   Gastrointestinal: Positive for abdominal pain and rectal pain (2 week status post rectal surgery. ). Negative for anal bleeding, nausea and vomiting.        Patient states she has been having bowel movements, but very sore and painful. Positive thick drainage from surgical wound per patient.    Genitourinary: Positive for urgency. Negative for decreased urine volume, dysuria, flank pain, frequency, hematuria, pelvic pain, vaginal bleeding, vaginal discharge and vaginal pain.   Musculoskeletal: Positive for back pain.   Neurological: Negative for dizziness, light-headedness and headaches.   All other systems reviewed and are negative.      Physical Exam   BP: 126/81  Heart Rate: 92  Temp: 98.1  F (36.7  C)  Resp: 16  Height: 165.1 cm (5' 5\")  Weight: 63.5 kg (140 lb)  SpO2: 100 %      Physical Exam   Constitutional: She is oriented to person, place, and time. She appears well-developed and well-nourished. No distress.   Patient teary and feels uncomfortable.    Cardiovascular: Normal rate, regular rhythm and normal heart sounds.    Pulmonary/Chest: Effort normal and breath sounds normal.   Abdominal: Soft. Bowel sounds are normal. She exhibits no distension and no mass. There is tenderness (throughout abdomen. ). There is guarding (slight guarding throughout exam. ). There is no rebound.   Genitourinary:   Genitourinary Comments: Patient declined pelvic and post surgical exam/visualization.  "   Musculoskeletal: Normal range of motion.   No CVA tenderness bilaterally.    Neurological: She is alert and oriented to person, place, and time.   Skin: Skin is warm and dry. She is not diaphoretic.   Psychiatric: She has a normal mood and affect. Her behavior is normal. Judgment and thought content normal.       ED Course     ED Course     Procedures              Critical Care time:  none               Results for orders placed or performed during the hospital encounter of 06/02/18 (from the past 24 hour(s))   UA with Microscopic   Result Value Ref Range    Color Urine Yellow     Appearance Urine Clear     Glucose Urine Negative NEG^Negative mg/dL    Bilirubin Urine Negative NEG^Negative    Ketones Urine Negative NEG^Negative mg/dL    Specific Gravity Urine 1.019 1.003 - 1.035    Blood Urine Negative NEG^Negative    pH Urine 6.0 5.0 - 7.0 pH    Protein Albumin Urine Negative NEG^Negative mg/dL    Urobilinogen mg/dL 0.0 0.0 - 2.0 mg/dL    Nitrite Urine Negative NEG^Negative    Leukocyte Esterase Urine Negative NEG^Negative    Source Midstream Urine     WBC Urine 2 0 - 5 /HPF    RBC Urine 1 0 - 2 /HPF    Bacteria Urine Few (A) NEG^Negative /HPF    Squamous Epithelial /HPF Urine 1 0 - 1 /HPF   HCG qualitative urine (UPT)   Result Value Ref Range    HCG Qual Urine Negative NEG^Negative       Medications - No data to display    Assessments & Plan (with Medical Decision Making)     I have reviewed the nursing notes.    I have reviewed the findings, diagnosis, plan and need for follow up with the patient.   Patient informed of the urine results in office today.  Patient informed that this does not appear to be a UTI at this point but will send for urine culture due to recent surgery.  Patient informed that I am very concerned for abdominal issues/postop infection.  Patient aware and states she will be contacting her surgeon today.  I informed patient that I recommend emergency evaluation today.  Patient states she will  go to [facility] where she had her seat procedure done for further evaluation.  Urine culture was sent today.  Patient to follow-up today in the ED at [facility].  Patient to contact her surgeon as soon as possible.  Discussed that I will treat urine symptoms with Cipro at this time due to few bacteria in the urine while urine culture is pending.    Discharge Medication List as of 6/2/2018  3:34 PM      START taking these medications    Details   ciprofloxacin (CIPRO) 500 MG tablet Take 1 tablet (500 mg) by mouth 2 times daily for 7 days, Disp-14 tablet, R-0, E-Prescribe             Final diagnoses:   Urgency of urination   Abnormal findings on microbiological examination of urine   Post-op pain       6/2/2018   [facility] EMERGENCY DEPARTMENT     Vibha Eller PA-C  06/02/18 [id number]

## 2018-06-02 NOTE — ED AVS SNAPSHOT
[facility] Emergency Department    [address] [facility] [address]    Phone:  [phone]    Fax:  [phone]                                       Ángela Zurita   MRN: [id number]    Department:  [facility] Emergency Department   Date of Visit:  6/2/2018           Patient Information     Date Of Birth          1985        Your diagnoses for this visit were:     Urgency of urination     Abnormal findings on microbiological examination of urine     Post-op pain        You were seen by Vibha Eller PA-C.      Follow-up Information     Follow up with Randy Graf MD In 1 day.    Specialty:  Family Practice    Contact information:    [address] [facility] [address]  [phone]          Follow up with [facility] Emergency Department.    Specialty:  EMERGENCY MEDICINE    Why:  If symptoms worsen    Contact information:    [facility] [id number] [phone]    Additional information:    The medical center is located at   [address] [place] [address] (between [facility] and   [facility] in [place], [place] miles north   of [place]).        Discharge Instructions       Patient to call surgeon today to discuss post op issues she is currently having. Patient to be seen in Emergency Room if abdominal pain persists, fevers occur, worsening symptoms occur, or vomiting.     Use medication as directed; urine culture pending.     Patient to increase fluids, rest, bland diet. Follow up with Specialist as soon as possible.     Patient declined further evaluation (labs, imagining, evaluation of surgical site, pelvic exam) today in office and was informed of all risks.    24 Hour Appointment Hotline       To make an appointment at any [facility], call [phone] ([phone]). If you don't have a family doctor or clinic, we will help you find one. [facility] clinics are conveniently located to serve the needs of you and your family.             Review of  your medicines      START taking        Dose / Directions Last dose taken    ciprofloxacin 500 MG tablet   Commonly known as:  CIPRO   Dose:  500 mg   Quantity:  14 tablet        Take 1 tablet (500 mg) by mouth 2 times daily for 7 days   Refills:  0          Our records show that you are taking the medicines listed below. If these are incorrect, please call your family doctor or clinic.        Dose / Directions Last dose taken    * amphetamine-dextroamphetamine 10 MG per tablet   Commonly known as:  ADDERALL   Quantity:  150 tablet        Take 2 pills (20mg) orally in the morning and two pills (20mg) at 11am and one pill (10mg) at 2pm   Refills:  0        * amphetamine-dextroamphetamine 10 MG per tablet   Commonly known as:  ADDERALL   Quantity:  150 tablet   Start taking on:  7/2/2018        Take 2 pills (20mg) orally in the morning and two pills (20mg) at 11am and one pill (10mg) at 2pm   Refills:  0        METAMUCIL FIBER PO        Take by mouth 2 times daily   Refills:  0        norethindrone 0.35 MG per tablet   Commonly known as:  MICRONOR   Dose:  1 tablet   Quantity:  84 tablet        Take 1 tablet (0.35 mg) by mouth daily   Refills:  3        SOLUBLE FIBER/PROBIOTICS PO        Refills:  0        * Notice:  This list has 2 medication(s) that are the same as other medications prescribed for you. Read the directions carefully, and ask your doctor or other care provider to review them with you.            Prescriptions were sent or printed at these locations (1 Prescription)                   [facility] [address] [place] [address] [address] AT [facility] OF [address] [place] [address]   [address] [facility] [address]    Telephone:  [phone]   Fax:  [phone]   Hours:                  E-Prescribed (1 of 1)         ciprofloxacin (CIPRO) 500 MG tablet                Procedures and tests performed during your visit     HCG qualitative urine (UPT)    UA with Microscopic      Orders Needing  Specimen Collection     None      Pending Results     No orders found from 5/31/2018 to 6/3/2018.            Pending Culture Results     No orders found from 5/31/2018 to 6/3/2018.            Pending Results Instructions     If you had any lab results that were not finalized at the time of your Discharge, you can call the ED Lab Result RN at [phone]. You will be contacted by this team for any positive Lab results or changes in treatment. The nurses are available 7 days a week from 10A to 6:30P.  You can leave a message 24 hours per day and they will return your call.        Test Results From Your Hospital Stay        6/2/2018  2:24 PM      Component Results     Component Value Ref Range & Units Status    Color Urine Yellow  Final    Appearance Urine Clear  Final    Glucose Urine Negative NEG^Negative mg/dL Final    Bilirubin Urine Negative NEG^Negative Final    Ketones Urine Negative NEG^Negative mg/dL Final    Specific Gravity Urine 1.019 1.003 - 1.035 Final    Blood Urine Negative NEG^Negative Final    pH Urine 6.0 5.0 - 7.0 pH Final    Protein Albumin Urine Negative NEG^Negative mg/dL Final    Urobilinogen mg/dL 0.0 0.0 - 2.0 mg/dL Final    Nitrite Urine Negative NEG^Negative Final    Leukocyte Esterase Urine Negative NEG^Negative Final    Source Midstream Urine  Final    WBC Urine 2 0 - 5 /HPF Final    RBC Urine 1 0 - 2 /HPF Final    Bacteria Urine Few (A) NEG^Negative /HPF Final    Squamous Epithelial /HPF Urine 1 0 - 1 /HPF Final         6/2/2018  2:35 PM      Component Results     Component Value Ref Range & Units Status    HCG Qual Urine Negative NEG^Negative Final    This test is for screening purposes.  Results should be interpreted along with   the clinical picture.  Confirmation testing is available if warranted by   ordering QCX063, HCG Quantitative Pregnancy.                  Thank you for choosing [facility]       Thank you for choosing [facility] for your care. Our goal is always to provide you with  excellent care. Hearing back from our patients is one way we can continue to improve our services. Please take a few minutes to complete the written survey that you may receive in the mail after you visit with us. Thank you!        [employer] Information     [employer] gives you secure access to your electronic health record. If you see a primary care provider, you can also send messages to your care team and make appointments. If you have questions, please call your primary care clinic.  If you do not have a primary care provider, please call [phone] and they will assist you.        Care EveryWhere ID     This is your Care EveryWhere ID. This could be used by other organizations to access your [place] [place] medical records  [id number]        Equal Access to Services     SKY BLANCHARD : Oj Cloud, jelly garcia, kaylee willoughby, celia elizabeth. So [facility] [phone].    ATENCIÓN: Si habla español, tiene a reyez disposición servicios gratuitos de asistencia lingüística. Llame al [phone].    We comply with applicable federal civil rights laws and [place] laws. We do not discriminate on the basis of race, color, national origin, age, disability, sex, sexual orientation, or gender identity.            After Visit Summary       This is your record. Keep this with you and show to your community pharmacist(s) and doctor(s) at your next visit.

## 2018-06-02 NOTE — ED AVS SNAPSHOT
[facility] Emergency Department    [address] [address]    Phone:  [phone]    Fax:  [phone]                                       Ángela Zurita   MRN: [id number]    Department:  [facility] Emergency Department   Date of Visit:  6/2/2018           After Visit Summary Signature Page     I have received my discharge instructions, and my questions have been answered. I have discussed any challenges I see with this plan with the nurse or doctor.    ..........................................................................................................................................  Patient/Patient Representative Signature      ..........................................................................................................................................  Patient Representative Print Name and Relationship to Patient    ..................................................               ................................................  Date                                            Time    ..........................................................................................................................................  Reviewed by Signature/Title    ...................................................              ..............................................  Date                                                            Time

## 2018-06-04 LAB
BACTERIA SPEC CULT: ABNORMAL
BACTERIA SPEC CULT: ABNORMAL
Lab: ABNORMAL
SPECIMEN SOURCE: ABNORMAL

## 2018-06-14 ENCOUNTER — TELEPHONE (OUTPATIENT)
Dept: FAMILY MEDICINE | Facility: CLINIC | Age: 33
End: 2018-06-14

## 2018-06-14 NOTE — TELEPHONE ENCOUNTER
Reason for call:  Patient reporting a symptom    Symptom or request:  toenail fungus left great toe    Duration (how long have symptoms been present): ongoing for a while    Have you been treated for this before? No    Additional comments: pt calling stating she has had toenail fungus on her left great toenail for a while. She has tried otc products and it doesn't seem to be clearing up. She is wondering if she should see FP or a podiatrist? She also mentioned having a fever for the last 4 weeks s/p surgery. She states she has a call into the surgeons office but I told her I would mention it to you,.    Phone Number patient can be reached at:  Home number on file [phone] (home)    Best Time:  any    Can we leave a detailed message on this number:  YES    Call taken on 6/14/2018 at 3:20 PM by Charlene Mcallister

## 2018-06-14 NOTE — TELEPHONE ENCOUNTER
Left non-detailed message for patient to return a call to the clinic RN.     1.  For the toe nail fungus, she can see PCP for initial treatment.  If this does not work, then referral to podiatry can be considered.  2.  ?  Fever/infection s/p surgery 1 month ago?  Need more information.  Informed can call back and speak with Nurse Advisors if it is during non-clinic hours also.  GUERLINE Sexton RN

## 2018-06-15 NOTE — TELEPHONE ENCOUNTER
I spoke with pt.  Pt is 4 weeks post-op sphincteroplasty with Dr Dennis on 5/18/18.  Pt underwent procedure to correct a 4th degree tear that occurred during vaginal delivery 3 years ago.    Pt complains that she has been experiencing low grade temps since surgery of 99.5 to 99.9.  However, temps increased to 100.3 yesterday.  She talked to her surgeon yesterday and he asks that she see PCP today because surgeon is not convinced this is surgically related.  Surgeon wonders if something else is going on.  Additionally, pt reports that she is very fatigued.    Offered appt today but pt declines.  She explains that she has missed 3 weeks of work and needs to attend work today.   Pt agrees to be seen on Monday, 6/18/18.  She agrees to present to urgent care/ED if fever of 100 persists longer than 3 days; SOONER if fever increases or new or worsening symptoms develop.  Pt cautioned not to delay care.     Pt agrees with plan.  Deb Sexton RN

## 2018-07-23 NOTE — PROGRESS NOTES
"Date:   Clinician: Murray Schreiber  Clinician NPI: [id number]  Patient: Ángela Zurita  Patient : 1985  Patient Address: [address] [place] [address]  Patient Phone: [phone]  Visit Protocol: URI  Patient Summary:  Ángela is a 32 year old ( : 1985 ) female who initiated a Visit for suspected Strep throat. When asked the question \"Please sign me up to receive news, health information and promotions. \", Ángela responded \"No\".     Her symptoms started gradually 3-6 days ago and consist of fever, malaise, dysphagia, myalgias, ear pain, and sore throat.   She denies rhinitis, hoarse voice, nasal congestion, post-nasal drainage, chills, facial pain or pressure, loss of appetite, itchy eyes, cough, chest pain, dyspnea, nausea, petechial or purpuric rash, and vomiting. She denies a history of facial surgery.   Ángela has a mild headache. The headache started before her other symptoms and is located on both sides of her head.   She has a moderately painful sore throat. When Ángela swallows liquids or saliva, she experiences moderate pain. The patient indicates having white spots on the tonsils similar to a sample strep throat image provided. She has been exposed via a family member or close personal contact to Strep. Their Strep diagnosis was not confirmed via throat swab When asked to feel her neck she reported enlarged lymph nodes. Ángela noted that the enlarged neck lymph nodes developed AFTER the onset of upper respiratory symptoms. She denies axillary lymphadenopathy.   Regarding the ear pain, the patient denies pain if the mouth is fully open or teeth are clenched, tinnitus, recent injury to the area around the ear, and experiencing pain when gently pulling on the earlobe.   She reports having mild ear pain on the external surface of both ears for 2-4 days. The patient hears normally despite the ear pain.   Ángela denies having a feeling of fullness in the ear as if it is clogged, swelling, " tenderness, and redness on her ear.   Additionally, she does not experience pain when bending the chin to the chest.   She has never had tympanostomy tube placement.   Within the past two (2) years she has had one episode of otitis media. Antibiotics were effective in treating the previous episode(s) of otitis media.   She has passed urine in the past 12 hours.   Ángela denies having COPD or other chronic lung disease.   Pulse: self-reported pulse rate as: 15 beats in 10 seconds.    Weight (in lbs): 145   She states she is not pregnant and denies breastfeeding. She has menstruated in the past month.   Ángela smokes or uses smokeless tobacco.    MEDICATIONS: Adderoll, ALLERGIES: NKDA  Clinician Response:      Spur Strep Test    Dear Ángela,  Your [facility] lab test results show that fortunately you DO NOT have a Strep Throat infection. This means that your condition should resolve within a few days. Try the following to help with your pain and discomfort:     Use throat lozenges    Gargle with warm salt water (1/4 teaspoon of salt per 8 ounce glass of water)    Suck on frozen items such as Popsicles or ice cubes    Take ibuprofen (such as Advil or store brand) or acetaminophen (Tylenol or store brand) for discomfort or fever     Follow up with your primary care provider if your symptoms are not improving in 3-4 days.   Finally, as your provider, I need you to know that becoming tobacco-free is the most important thing you can do to protect your current and future health.   Diagnosis: ZIP TICKET STREP  Diagnosis ICD: J02.9  [facility] Results: SSCRNT: NEGATIVE: No Group A streptococcal antigen detected by immunoassay, await  culture report.  ZipTicket Secondary Results: CULT: No beta hemolytic Streptococcus Group A isolated

## 2018-07-27 ENCOUNTER — OFFICE VISIT (OUTPATIENT)
Dept: FAMILY MEDICINE | Facility: CLINIC | Age: 33
End: 2018-07-27
Payer: COMMERCIAL

## 2018-07-27 VITALS
HEART RATE: 68 BPM | DIASTOLIC BLOOD PRESSURE: 62 MMHG | BODY MASS INDEX: 22.99 KG/M2 | SYSTOLIC BLOOD PRESSURE: 106 MMHG | HEIGHT: 65 IN | WEIGHT: 138 LBS

## 2018-07-27 DIAGNOSIS — F90.0 ATTENTION DEFICIT HYPERACTIVITY DISORDER (ADHD), PREDOMINANTLY INATTENTIVE TYPE: ICD-10-CM

## 2018-07-27 PROCEDURE — 99213 OFFICE O/P EST LOW 20 MIN: CPT | Performed by: FAMILY MEDICINE

## 2018-07-27 RX ORDER — DEXTROAMPHETAMINE SACCHARATE, AMPHETAMINE ASPARTATE, DEXTROAMPHETAMINE SULFATE AND AMPHETAMINE SULFATE 2.5; 2.5; 2.5; 2.5 MG/1; MG/1; MG/1; MG/1
TABLET ORAL
Qty: 150 TABLET | Refills: 0 | Status: ON HOLD | OUTPATIENT
Start: 2018-08-27 | End: 2019-01-29

## 2018-07-27 RX ORDER — DEXTROAMPHETAMINE SACCHARATE, AMPHETAMINE ASPARTATE, DEXTROAMPHETAMINE SULFATE AND AMPHETAMINE SULFATE 2.5; 2.5; 2.5; 2.5 MG/1; MG/1; MG/1; MG/1
TABLET ORAL
Qty: 150 TABLET | Refills: 0 | Status: ON HOLD | OUTPATIENT
Start: 2018-07-27 | End: 2019-01-29

## 2018-07-27 RX ORDER — DEXTROAMPHETAMINE SACCHARATE, AMPHETAMINE ASPARTATE, DEXTROAMPHETAMINE SULFATE AND AMPHETAMINE SULFATE 2.5; 2.5; 2.5; 2.5 MG/1; MG/1; MG/1; MG/1
TABLET ORAL
Qty: 150 TABLET | Refills: 0 | Status: SHIPPED | OUTPATIENT
Start: 2018-09-27 | End: 2019-07-09

## 2018-07-27 NOTE — PROGRESS NOTES
SUBJECTIVE:   Ángela Zurita is a 33 year old female who presents to clinic today for the following health issues:      Medication Followup of Adderall    Taking Medication as prescribed: yes    Side Effects:  None    Medication Helping Symptoms:  yes     Since starting Adderall feels like much improved focus at work in that able to focus on one task and allow others to wait.   is noticing difference in attention with conversation focus.  Has played around with dose and if takes 2 after 2pm then unable to sleep so taking 2 in the morning 2 at 11am then 1 pill at 3pm which is working well.  Feels nearly 90% improved    History of ADHD or other psych diagnoses: Diagnosed in 2011 and started medication Adderall which worked well to help with focus at work. Jersonina saw therapist and did the ADHD testing 2/2012 with Uriel Min, these records were reviewed.  Then pregnancy after that and mood was very stable and then kids and life was disorganized.  In the past struggles with relationships due to focusing on conversations and forgets things during conversations with .  At work had review last month with difficulty focusing during conversations, projects started and trouble with completing tasks.     Surgery went well.      Problem list and histories reviewed & adjusted, as indicated.  Additional history: as documented    BP Readings from Last 3 Encounters:   07/27/18 106/62   06/02/18 126/81   05/14/18 120/84    Wt Readings from Last 3 Encounters:   07/27/18 138 lb (62.6 kg)   06/02/18 140 lb (63.5 kg)   05/14/18 142 lb (64.4 kg)                    Reviewed and updated as needed this visit by clinical staff       Reviewed and updated as needed this visit by Provider         ROS:  EYES: no acute vision problems or changes  CV: no chest pain, no palpitations, no new or worsening peripheral edema  GI: no nausea, no vomiting, no constipation, no diarrhea  NEURO: no weakness, no dizziness, no syncope, no  "headaches, no tremors, no tics  PSYCHIATRIC: NEGATIVE for changes in mood or affect  CONSTITUTIONAL: no fever, no chills, no fatigue, no unexpected change in weight, no appetite changes.  SKIN: no worrisome rashes, no worrisome lesions    OBJECTIVE:     /62  Pulse 68  Ht 5' 5\" (1.651 m)  Wt 138 lb (62.6 kg)  BMI 22.96 kg/m2  Body mass index is 22.96 kg/(m^2).  GENERAL: healthy, alert and no distress  CV: regular rate and rhythm, normal S1 S2, no S3 or S4, no murmur, click or rub, no peripheral edema and peripheral pulses strong  PSYCH: mentation appears normal, affect normal/bright    Diagnostic Test Results:  none     ASSESSMENT/PLAN:       Ángela was seen today for refill request.    Diagnoses and all orders for this visit:    Attention deficit hyperactivity disorder (ADHD), predominantly inattentive type: well controlled, refill for 3 months  -     amphetamine-dextroamphetamine (ADDERALL) 10 MG per tablet; Take 2 pills (20mg) orally in the morning and two pills (20mg) at 11am and one pill (10mg) at 2pm  -     amphetamine-dextroamphetamine (ADDERALL) 10 MG per tablet; Take 2 pills (20mg) orally in the morning and two pills (20mg) at 11am and one pill (10mg) at 2pm  -     amphetamine-dextroamphetamine (ADDERALL) 10 MG per tablet; Take 2 pills (20mg) orally in the morning and two pills (20mg) at 11am and one pill (10mg) at 2pm        Patient Instructions   Continue Adderall.  Refills printed for 3 months.        Randy Graf MD  [facility]  "

## 2018-07-27 NOTE — PATIENT INSTRUCTIONS
Continue Adderall.  Refills printed for 3 months.      Thank you for choosing [facility].  You may be receiving a survey in the mail from [employer] regarding your visit today.  Please take a few minutes to complete and return the survey to let us know how we are doing.      If you have questions or concerns, please contact us via Gram[employer] or you can contact your care team at [phone].    Our Clinic hours are:  Monday 6:40 am  to 7:00 pm  Tuesday -Friday 6:40 am to 5:00 pm    The [place] outpatient lab hours are:  Monday - Friday 6:10 am to 4:45 pm  Saturdays 7:00 am to 11:00 am  Appointments are required, call [phone]    If you have clinical questions after hours or would like to schedule an appointment,  call the clinic at [phone].

## 2018-07-27 NOTE — MR AVS SNAPSHOT
After Visit Summary   7/27/2018    Ángela Zurita    MRN: [id number]           Patient Information     Date Of Birth          1985        Visit Information        Provider Department      7/27/2018 7:00 AM Randy Graf MD [facility]        Today's Diagnoses     Attention deficit hyperactivity disorder (ADHD), predominantly inattentive type          Care Instructions    Continue Adderall.  Refills printed for 3 months.      Thank you for choosing [facility].  You may be receiving a survey in the mail from Trevon Strickland regarding your visit today.  Please take a few minutes to complete and return the survey to let us know how we are doing.      If you have questions or concerns, please contact us via [employer] or you can contact your care team at [phone].    Our Clinic hours are:  Monday 6:40 am  to 7:00 pm  Tuesday -Friday 6:40 am to 5:00 pm    The [place] outpatient lab hours are:  Monday - Friday 6:10 am to 4:45 pm  Saturdays 7:00 am to 11:00 am  Appointments are required, call [phone]    If you have clinical questions after hours or would like to schedule an appointment,  call the clinic at [phone].            Follow-ups after your visit        Who to contact     If you have questions or need follow up information about today's clinic visit or your schedule please contact [facility] directly at [phone].  Normal or non-critical lab and imaging results will be communicated to you by fotobabblehart, letter or phone within 4 business days after the clinic has received the results. If you do not hear from us within 7 days, please contact the clinic through [employer] or phone. If you have a critical or abnormal lab result, we will notify you by phone as soon as possible.  Submit refill requests through [employer] or call your pharmacy and they will forward the refill request to us. Please allow 3 business days for your refill to be completed.           "Additional Information About Your Visit        MyChart Information     [employer] gives you secure access to your electronic health record. If you see a primary care provider, you can also send messages to your care team and make appointments. If you have questions, please call your primary care clinic.  If you do not have a primary care provider, please call [phone] and they will assist you.        Care EveryWhere ID     This is your Care EveryWhere ID. This could be used by other organizations to access your [facility] medical records  [id number]        Your Vitals Were     Pulse Height BMI (Body Mass Index)             68 5' 5\" (1.651 m) 22.96 kg/m2          Blood Pressure from Last 3 Encounters:   07/27/18 106/62   06/02/18 126/81   05/14/18 120/84    Weight from Last 3 Encounters:   07/27/18 138 lb (62.6 kg)   06/02/18 140 lb (63.5 kg)   05/14/18 142 lb (64.4 kg)              Today, you had the following     No orders found for display         Today's Medication Changes          These changes are accurate as of 7/27/18  7:24 AM.  If you have any questions, ask your nurse or doctor.               These medicines have changed or have updated prescriptions.        Dose/Directions    * amphetamine-dextroamphetamine 10 MG per tablet   Commonly known as:  ADDERALL   This may have changed:  You were already taking a medication with the same name, and this prescription was added. Make sure you understand how and when to take each.   Used for:  Attention deficit hyperactivity disorder (ADHD), predominantly inattentive type   Changed by:  Randy Graf MD        Take 2 pills (20mg) orally in the morning and two pills (20mg) at 11am and one pill (10mg) at 2pm   Quantity:  150 tablet   Refills:  0       * amphetamine-dextroamphetamine 10 MG per tablet   Commonly known as:  ADDERALL   This may have changed:  You were already taking a medication with the same name, and this prescription was added. Make sure you " understand how and when to take each.   Used for:  Attention deficit hyperactivity disorder (ADHD), predominantly inattentive type   Changed by:  Randy Graf MD        Start taking on:  8/27/2018   Take 2 pills (20mg) orally in the morning and two pills (20mg) at 11am and one pill (10mg) at 2pm   Quantity:  150 tablet   Refills:  0       * amphetamine-dextroamphetamine 10 MG per tablet   Commonly known as:  ADDERALL   This may have changed:  These instructions start on 9/27/2018. If you are unsure what to do until then, ask your doctor or other care provider.   Used for:  Attention deficit hyperactivity disorder (ADHD), predominantly inattentive type   Changed by:  Randy Graf MD        Start taking on:  9/27/2018   Take 2 pills (20mg) orally in the morning and two pills (20mg) at 11am and one pill (10mg) at 2pm   Quantity:  150 tablet   Refills:  0       * Notice:  This list has 3 medication(s) that are the same as other medications prescribed for you. Read the directions carefully, and ask your doctor or other care provider to review them with you.         Where to get your medicines      Some of these will need a paper prescription and others can be bought over the counter.  Ask your nurse if you have questions.     Bring a paper prescription for each of these medications     amphetamine-dextroamphetamine 10 MG per tablet    amphetamine-dextroamphetamine 10 MG per tablet    amphetamine-dextroamphetamine 10 MG per tablet                Primary Care Provider Office Phone # Fax #    Rnady Graf [phone] [phone]        Equal Access to Services     [facility] LO AH: Hadii jordan manriquezo Soalex, waaxda luqadaha, qaybta kaalmada adeegyada, celia elizabeth. So [facility] [phone].    ATENCIÓN: Si habla español, tiene a reyez disposición servicios gratuitos de asistencia lingüística. Llame al [phone].    We comply  with applicable federal civil rights laws and [place] laws. We do not discriminate on the basis of race, color, national origin, age, disability, sex, sexual orientation, or gender identity.            Thank you!     Thank you for choosing [facility]  for your care. Our goal is always to provide you with excellent care. Hearing back from our patients is one way we can continue to improve our services. Please take a few minutes to complete the written survey that you may receive in the mail after your visit with us. Thank you!             Your Updated Medication List - Protect others around you: Learn how to safely use, store and throw away your medicines at www.disposemymeds.org.          This list is accurate as of 7/27/18  7:24 AM.  Always use your most recent med list.                   Brand Name Dispense Instructions for use Diagnosis    * amphetamine-dextroamphetamine 10 MG per tablet    ADDERALL    150 tablet    Take 2 pills (20mg) orally in the morning and two pills (20mg) at 11am and one pill (10mg) at 2pm    Attention deficit hyperactivity disorder (ADHD), predominantly inattentive type       * amphetamine-dextroamphetamine 10 MG per tablet   Start taking on:  8/27/2018    ADDERALL    150 tablet    Take 2 pills (20mg) orally in the morning and two pills (20mg) at 11am and one pill (10mg) at 2pm    Attention deficit hyperactivity disorder (ADHD), predominantly inattentive type       * amphetamine-dextroamphetamine 10 MG per tablet   Start taking on:  9/27/2018    ADDERALL    150 tablet    Take 2 pills (20mg) orally in the morning and two pills (20mg) at 11am and one pill (10mg) at 2pm    Attention deficit hyperactivity disorder (ADHD), predominantly inattentive type       METAMUCIL FIBER PO      Take by mouth 2 times daily        norethindrone 0.35 MG per tablet    MICRONOR    84 tablet    Take 1 tablet (0.35 mg) by mouth daily    Smoking, Encounter for initial prescription of contraceptive  pills, Vaginal discharge, Surgical aftercare, genitourinary system       SOLUBLE FIBER/PROBIOTICS PO       Urinary frequency, Vaginal discharge, Pelvic pain in female, Family history of malignant neoplasm of breast, Left ovarian cyst, Bilateral galactorrhea       * Notice:  This list has 3 medication(s) that are the same as other medications prescribed for you. Read the directions carefully, and ask your doctor or other care provider to review them with you.

## 2018-08-01 ENCOUNTER — OFFICE VISIT (OUTPATIENT)
Dept: FAMILY MEDICINE | Facility: CLINIC | Age: 33
End: 2018-08-01
Payer: COMMERCIAL

## 2018-08-01 VITALS
DIASTOLIC BLOOD PRESSURE: 78 MMHG | BODY MASS INDEX: 22.66 KG/M2 | SYSTOLIC BLOOD PRESSURE: 110 MMHG | OXYGEN SATURATION: 95 % | WEIGHT: 136 LBS | HEIGHT: 65 IN | HEART RATE: 85 BPM | TEMPERATURE: 98.7 F

## 2018-08-01 DIAGNOSIS — N32.81 URGENCY-FREQUENCY SYNDROME: ICD-10-CM

## 2018-08-01 DIAGNOSIS — N89.8 VAGINAL DISCHARGE: ICD-10-CM

## 2018-08-01 DIAGNOSIS — R82.90 NONSPECIFIC FINDING ON EXAMINATION OF URINE: ICD-10-CM

## 2018-08-01 DIAGNOSIS — R30.0 DYSURIA: Primary | ICD-10-CM

## 2018-08-01 LAB
ALBUMIN UR-MCNC: NEGATIVE MG/DL
APPEARANCE UR: CLEAR
BACTERIA #/AREA URNS HPF: ABNORMAL /HPF
BILIRUB UR QL STRIP: NEGATIVE
COLOR UR AUTO: YELLOW
GLUCOSE UR STRIP-MCNC: NEGATIVE MG/DL
HGB UR QL STRIP: ABNORMAL
KETONES UR STRIP-MCNC: NEGATIVE MG/DL
LEUKOCYTE ESTERASE UR QL STRIP: ABNORMAL
NITRATE UR QL: NEGATIVE
NON-SQ EPI CELLS #/AREA URNS LPF: ABNORMAL /LPF
PH UR STRIP: 6 PH (ref 5–7)
RBC #/AREA URNS AUTO: ABNORMAL /HPF
SOURCE: ABNORMAL
SP GR UR STRIP: 1.02 (ref 1–1.03)
SPECIMEN SOURCE: NORMAL
UROBILINOGEN UR STRIP-ACNC: 0.2 EU/DL (ref 0.2–1)
WBC #/AREA URNS AUTO: ABNORMAL /HPF
WET PREP SPEC: NORMAL

## 2018-08-01 PROCEDURE — 99213 OFFICE O/P EST LOW 20 MIN: CPT | Performed by: NURSE PRACTITIONER

## 2018-08-01 PROCEDURE — 87186 SC STD MICRODIL/AGAR DIL: CPT | Performed by: NURSE PRACTITIONER

## 2018-08-01 PROCEDURE — 87210 SMEAR WET MOUNT SALINE/INK: CPT | Performed by: NURSE PRACTITIONER

## 2018-08-01 PROCEDURE — 87088 URINE BACTERIA CULTURE: CPT | Performed by: NURSE PRACTITIONER

## 2018-08-01 PROCEDURE — 87086 URINE CULTURE/COLONY COUNT: CPT | Performed by: NURSE PRACTITIONER

## 2018-08-01 PROCEDURE — 81001 URINALYSIS AUTO W/SCOPE: CPT | Performed by: NURSE PRACTITIONER

## 2018-08-01 RX ORDER — CIPROFLOXACIN 500 MG/1
500 TABLET, FILM COATED ORAL 2 TIMES DAILY
Qty: 14 TABLET | Refills: 0 | Status: SHIPPED | OUTPATIENT
Start: 2018-08-01 | End: 2018-08-22

## 2018-08-01 RX ORDER — FLUCONAZOLE 150 MG/1
150 TABLET ORAL ONCE
Qty: 1 TABLET | Refills: 0 | Status: ON HOLD | OUTPATIENT
Start: 2018-08-01 | End: 2019-01-29

## 2018-08-01 NOTE — PROGRESS NOTES
SUBJECTIVE:   Ángela Zurita is a 33 year old female who presents to clinic today for the following health issues:    URINARY TRACT SYMPTOMS - 3 months ago she had urethral surgery. Since then she has been getting more UTIs and vaginal infections.     Onset:  Symptoms started Sunday.     Description:   Painful urination (Dysuria): YES  Blood in urine (Hematuria): no   Delay in urine (Hesitency): no     Intensity: moderate    Progression of Symptoms:  worsening    Accompanying Signs & Symptoms:  Fever/chills: no   Flank pain: YES   Nausea and vomiting: YES- nausea the past 2 days.   Any vaginal symptoms: vaginal discharge and vaginal itching  Abdominal/Pelvic Pain: YES    History:   History of frequent UTI's: YES  History of kidney stones: no   Sexually Active: YES  Possibility of pregnancy: No    Precipitating factors: None     Therapies Tried and outcome: AZO yesterday. Increased fluids.     Had recent sphincteroplasty and dilation and curettage in the last 3-6 months causing pelvic pain.  Surgery was due to previous pregnancy, chronic urinary tract infection, 5th degree tear with last pregnancy.    No recent fever/chills   No vomiting - some nausea.  Was on antibiotics due to post op fevers several  Months ago.    Problem list and histories reviewed & adjusted, as indicated.  Additional history: as documented    Patient Active Problem List   Diagnosis     Urgency-frequency syndrome     Fibroid uterus     Health Care Home     Rectovaginal fistula     CARDIOVASCULAR SCREENING; LDL GOAL LESS THAN 130     Oral herpes     Attention deficit hyperactivity disorder (ADHD), predominantly inattentive type     Posterior neck pain     Left ovarian cyst     Past Surgical History:   Procedure Laterality Date     BIOPSY  Various    Had a few Leeps and numerous Colposcopys     COLONOSCOPY  2009    Normal     DILATION AND CURETTAGE, HYSTEROSCOPY DIAGNOSTIC, COMBINED N/A 4/3/2018    Procedure: COMBINED DILATION AND CURETTAGE,  HYSTEROSCOPY DIAGNOSTIC;  Diagnostic Hysteroscopy with Dilation and Curettage,Laparoscopy with Left Ovarian Cystectomy, Right Uteral Sacral Biopsy;  Surgeon: Sherin Frost MD;  Location: [facility] OR     EXC SWEAT GLAND LESN AXILL,SIMPL Right 2009     LAPAROSCOPY DIAGNOSTIC (GYN) N/A 4/3/2018    Procedure: LAPAROSCOPY DIAGNOSTIC (GYN);;  Surgeon: Sherin Frost MD;  Location: [facility] OR     LEEP TX, CERVICAL  2006    yearly paps     MOUTH SURGERY      wisdom teeth     SOFT TISSUE SURGERY  Various    infected sweat-gland- cyst removed arm,armpit,face     SURGICAL HISTORY OF -   3/2005    Tonsillectomy     SURGICAL HISTORY OF -       infected sweat gland under her arm       Social History   Substance Use Topics     Smoking status: Current Every Day Smoker     Packs/day: 0.50     Years: 15.00     Types: Cigarettes     Start date: 12/1/2014     Last attempt to quit: 1/8/2014     Smokeless tobacco: Never Used     Alcohol use 0.0 oz/week      Comment: Special occasions     Family History   Problem Relation Age of Onset     Blood Disease Brother      twin brother-blood clots     Blood Disease Maternal Grandfather      blood clots     HEART DISEASE Maternal Grandfather      valve replacement     Hyperlipidemia Maternal Grandfather      Cerebrovascular Disease Maternal Grandfather      Prostate Cancer Maternal Grandfather      Other Cancer Maternal Grandfather      Gum/Jaw/Lymphnodes     Colon Cancer Maternal Grandfather      Mental Illness Maternal Grandfather      Alzheimer's     Respiratory Maternal Grandmother      Lung Cancer Maternal Grandmother      Diabetes Maternal Grandmother      type II     Hypertension Maternal Grandmother      Hyperlipidemia Maternal Grandmother      Depression Maternal Grandmother      Cancer Paternal Grandmother      non -hodgkins lymphoma     Lung Cancer Paternal Grandmother      Hypertension Paternal Grandmother      Hyperlipidemia Paternal Grandmother       Cerebrovascular Disease Paternal Grandmother      Diabetes Paternal Grandmother      Hypertension Father      Hyperlipidemia Father      Hyperlipidemia Paternal Grandfather      Prostate Cancer Paternal Grandfather      Hypertension Paternal Grandfather      Colon Cancer Paternal Grandfather      Coronary Artery Disease Paternal Grandfather      Breast Cancer Mother      Stage 0, small duct carcinoma     Breast Cancer Other      2 aunt and 3 of my great aunts     Breast Cancer Cousin      Breast Cancer         Current Outpatient Prescriptions   Medication Sig Dispense Refill     [START ON 9/27/2018] amphetamine-dextroamphetamine (ADDERALL) 10 MG per tablet Take 2 pills (20mg) orally in the morning and two pills (20mg) at 11am and one pill (10mg) at 2pm 150 tablet 0     [START ON 8/27/2018] amphetamine-dextroamphetamine (ADDERALL) 10 MG per tablet Take 2 pills (20mg) orally in the morning and two pills (20mg) at 11am and one pill (10mg) at 2pm 150 tablet 0     amphetamine-dextroamphetamine (ADDERALL) 10 MG per tablet Take 2 pills (20mg) orally in the morning and two pills (20mg) at 11am and one pill (10mg) at 2pm 150 tablet 0     Probiotic Product (SOLUBLE FIBER/PROBIOTICS PO)        Psyllium (METAMUCIL FIBER PO) Take by mouth 2 times daily       norethindrone (MICRONOR) 0.35 MG per tablet Take 1 tablet (0.35 mg) by mouth daily 84 tablet 3     No Known Allergies  Recent Labs   Lab Test  04/06/18   1150  03/27/18   1449  09/23/17   1915   08/14/17   1835   05/29/14   1152   ALT  19   --   22   --   23   < >   --    CR  0.83   --   0.87   < >  0.63   < >  0.64   GFRESTIMATED  79   --   75   < >  >90  Non  GFR Calc     < >  >90   GFRESTBLACK  >90   --   >90   < >  >90   GFR Calc     < >  >90   POTASSIUM  3.7   --   3.3*   < >  3.8   < >  3.8   TSH   --   1.64   --    --    --    --   1.25    < > = values in this interval not displayed.      BP Readings from Last 3 Encounters:   08/01/18  "110/78   07/27/18 106/62   06/02/18 126/81    Wt Readings from Last 3 Encounters:   08/01/18 136 lb (61.7 kg)   07/27/18 138 lb (62.6 kg)   06/02/18 140 lb (63.5 kg)                  Labs reviewed in EPIC    Reviewed and updated as needed this visit by clinical staff       Reviewed and updated as needed this visit by Provider         ROS:  Constitutional, HEENT, cardiovascular, pulmonary, GI, , musculoskeletal, neuro, skin, endocrine and psych systems are negative, except as otherwise noted.    OBJECTIVE:     /78  Pulse 85  Temp 98.7  F (37.1  C) (Tympanic)  Ht 5' 5\" (1.651 m)  Wt 136 lb (61.7 kg)  SpO2 95%  Breastfeeding? No  BMI 22.63 kg/m2  Body mass index is 22.63 kg/(m^2).  GENERAL: healthy, alert and no distress  RESP: lungs clear to auscultation - no rales, rhonchi or wheezes  CV: regular rate and rhythm, normal S1 S2, no S3 or S4, no murmur, click or rub, no peripheral edema and peripheral pulses strong  ABDOMEN: soft, nontender, no hepatosplenomegaly, no masses and bowel sounds normal   (female): deferred  MS: no gross musculoskeletal defects noted, no edema    Diagnostic Test Results:  Results for orders placed or performed in visit on 08/01/18 (from the past 24 hour(s))   *UA reflex to Microscopic and Culture ([place] and [facility] (except [place] and [place])   Result Value Ref Range    Color Urine Yellow     Appearance Urine Clear     Glucose Urine Negative NEG^Negative mg/dL    Bilirubin Urine Negative NEG^Negative    Ketones Urine Negative NEG^Negative mg/dL    Specific Gravity Urine 1.025 1.003 - 1.035    Blood Urine Trace (A) NEG^Negative    pH Urine 6.0 5.0 - 7.0 pH    Protein Albumin Urine Negative NEG^Negative mg/dL    Urobilinogen Urine 0.2 0.2 - 1.0 EU/dL    Nitrite Urine Negative NEG^Negative    Leukocyte Esterase Urine Small (A) NEG^Negative    Source Midstream Urine    Urine Microscopic   Result Value Ref Range    WBC Urine 10-25 (A) OTO5^0 - 5 /HPF    RBC Urine 2-5 " (A) OTO2^O - 2 /HPF    Squamous Epithelial /LPF Urine Few FEW^Few /LPF    Bacteria Urine Few (A) NEG^Negative /HPF   Wet prep   Result Value Ref Range    Specimen Description Vagina     Wet Prep No Trichomonas seen     Wet Prep No clue cells seen     Wet Prep No yeast seen        ASSESSMENT/PLAN:     1. Nonspecific finding on examination of urine     - Urine Culture Aerobic Bacterial    2. Dysuria   The risks, benefits and treatment options of prescribed medications or other treatments have been discussed with the patient. The patient verbalized their understanding and should call or follow up if no improvement or if they develop further problems.    - *UA reflex to Microscopic and Culture ([facility] and [facility] (except [place] and [place])  - Urine Microscopic  - ciprofloxacin (CIPRO) 500 MG tablet; Take 1 tablet (500 mg) by mouth 2 times daily  Dispense: 14 tablet; Refill: 0  - fluconazole (DIFLUCAN) 150 MG tablet; Take 1 tablet (150 mg) by mouth once for 1 dose  Dispense: 1 tablet; Refill: 0    3. Urgency-frequency syndrome     - ciprofloxacin (CIPRO) 500 MG tablet; Take 1 tablet (500 mg) by mouth 2 times daily  Dispense: 14 tablet; Refill: 0  - fluconazole (DIFLUCAN) 150 MG tablet; Take 1 tablet (150 mg) by mouth once for 1 dose  Dispense: 1 tablet; Refill: 0    4. Vaginal discharge   Added Diflucan - patient reports increased vaginal irritation.  Advised to start if symptoms of yeast infection start.  See AVS.    - Wet prep  - fluconazole (DIFLUCAN) 150 MG tablet; Take 1 tablet (150 mg) by mouth once for 1 dose  Dispense: 1 tablet; Refill: 0      Patient Instructions           Prescription written for antibiotics to be taken twice daily for 7 days.  Advised to take entire course of antibiotic despite improvement in symptoms.    Advised to contact surgery regarding vaginal irritation and inflammation to advise on topical treatment for this.  May benefit from sitz baths.    Follow up if not  improving.    Estella Olivares, FNP            Urinary Tract Infection         What is a urinary tract infection?   A urinary tract infection (UTI) is an infection in the urinary tract. The urinary tract includes the:   kidneys   ureters (the tubes draining urine from the kidneys to the bladder)   bladder   urethra (the tube that drains urine from the bladder).   Any or all of these parts of the urinary tract can get infected.   How does it occur?   Urinary tract infection is usually caused by bacteria. Normally the urinary tract does not have any bacteria or other organisms in it. Bacteria that cause UTI often spread from the rectum or vagina to the urethra and then to the bladder or kidneys. Urinary tract infection is more common in women than men because the urethra is shorter in women. This makes it easier for bacteria to move up to the bladder. Sometimes bacteria spread from another part of the body through the bloodstream to the urinary tract.   Some of the things that can lead to an infection are:   a blockage in the urinary tract, such as a kidney stone   sexual activity   getting older, when it may get harder to empty and flush out the bladder completely.   Women are more likely to have an infection if they:   are newly sexually active or have a new sex partner   are past menopause   are pregnant   have a history of diabetes, a problem with the immune system, sickle-cell anemia, stroke, kidney stones, or any illness that makes it hard to empty the bladder completely.   What are the symptoms?   The symptoms of UTI may include:   urinating more often   feeling an urgent need to urinate   pain or discomfort (burning) when you urinate   urine that smells bad   pain in the lower pelvis, stomach, lower back, or side   urine that looks cloudy or reddish   fever or chills   sweats   nausea and vomiting   leaking of urine   change in amount of urine, either more or less   pain during sex.   How is it diagnosed?    Your healthcare provider will ask about your symptoms and medical history. Your provider will examine you. The exam may include a pelvic exam. Your provider will check for tenderness of the bladder or kidney. A sample of your urine may be tested for bacteria and pus. If you are having fever and are feeling very ill, you may have a blood test to look for signs of more serious infection.   If you keep having infections or symptoms after treatment, your provider may suggest these tests:   An intravenous pyelogram (IVP). An IVP is a special type of X-ray of the kidneys, ureters, and bladder.   An ultrasound scan to look at the urinary tract.   A cystoscopy. This is an exam of the inside of the urethra and bladder with a small lighted instrument. It is usually done by a specialist called a urologist.   How is it treated?   UTIs are usually treated with antibiotics. Your provider can also prescribe a medicine called Pyridium to relieve burning and discomfort. (Pyridium turns your urine a dark orange color.)   If the infection is causing fever, pain, or vomiting or you have a severe kidney infection, you may need to stay at the hospital for treatment.   How long will the effects last?   With antibiotic treatment, the symptoms of a bacterial infection stop in 1 to 3 days. Take all of the antibiotic your healthcare provider prescribes, even after the symptoms go away. If you stop taking your medicine before the scheduled end of treatment, the infection may come back   Without treatment, the infection can last a long time. If it is not treated, the infection can permanently damage the bladder and kidneys, or it may spread to the blood. If the infection spreads to the blood, it can be fatal.   How can I take care of myself?   Follow your healthcare provider's treatment. Take all of the antibiotic that your healthcare provider prescribes, even when you feel better. Do not take medicine left over from previous prescriptions.    Drink more fluids, especially water, to help flush bacteria from your system.   If you have a fever:   Take aspirin or acetaminophen to control the fever. Check with your healthcare provider before you give any medicine that contains aspirin or salicylates to a child or teen. This includes medicines like baby aspirin, some cold medicines, and Pepto Bismol. Children and teens who take aspirin are at risk for a serious illness called Reye's syndrome.   Keep a daily record of your temperature.   A hot water bottle or an electric heating pad on a low setting can help relieve cramps or lower abdominal or back pain. Keep a cloth between your skin and the hot water bottle or heating pad so that you don't burn your skin.   Soaking in a tub for 20 to 30 minutes may help relieve any back or abdominal pain.   Follow your healthcare provider's directions for a follow-up urine test. Your provider may want to test your urine soon after you finish taking the antibiotic.   Call your healthcare provider right away if:   You keep having symptoms after taking an antibiotic for 2 days.   Your symptoms get worse.   You have a fever of 101.5? F (38.6? C) or higher.   You have new vomiting.   You have new pain in your side, back, or belly.   You have any symptoms that worry you.   How can I help prevent urinary tract infection?   You can help prevent UTIs if you:   Drink lots of fluids every day.   Don't wait to go to the bathroom when you feel the need to urinate.   Empty your bladder completely when you urinate.   Use good hygiene when you use the toilet. For example, wipe from front to back to keep rectal bacteria from getting into the vagina and urethra.   Avoid using irritating cosmetics or chemicals in the area of the vagina and urethra (such as strong soaps, feminine hygiene sprays or douches, or scented napkins or panty liners).   Practice safe sex. Always use latex or polyurethane condoms.   Urinate soon after sex.   Keep your  genital area clean.   Wear underwear that is all cotton or has a cotton crotch. Pantyhose should also have a cotton crotch. Cotton allows better air circulation than nylon. Change underwear and pantyhose every day.     Published by [employer].  This content is reviewed periodically and is subject to change as new health information becomes available. The information is intended to inform and educate and is not a replacement for medical evaluation, advice, diagnosis or treatment by a healthcare professional.   Developed by Wilma Grace RN, [place], and [facility].   ? 2010 [facility] and/or its affiliates. All Rights Reserved.   Copyright   [employer] 2011              Estella Olivares NP  [facility]

## 2018-08-01 NOTE — NURSING NOTE
"Initial /78  Pulse 85  Temp 98.7  F (37.1  C) (Tympanic)  Ht 5' 5\" (1.651 m)  Wt 136 lb (61.7 kg)  SpO2 95%  Breastfeeding? No  BMI 22.63 kg/m2 Estimated body mass index is 22.63 kg/(m^2) as calculated from the following:    Height as of this encounter: 5' 5\" (1.651 m).    Weight as of this encounter: 136 lb (61.7 kg). .    Lenka Hernandez, DILCIA ([facility])  "

## 2018-08-01 NOTE — PATIENT INSTRUCTIONS
Prescription written for antibiotics to be taken twice daily for 7 days.  Advised to take entire course of antibiotic despite improvement in symptoms.    Advised to contact surgery regarding vaginal irritation and inflammation to advise on topical treatment for this.  May benefit from sitz baths.    Follow up if not improving.    Estella lOivares, MARKUS            Urinary Tract Infection         What is a urinary tract infection?   A urinary tract infection (UTI) is an infection in the urinary tract. The urinary tract includes the:   kidneys   ureters (the tubes draining urine from the kidneys to the bladder)   bladder   urethra (the tube that drains urine from the bladder).   Any or all of these parts of the urinary tract can get infected.   How does it occur?   Urinary tract infection is usually caused by bacteria. Normally the urinary tract does not have any bacteria or other organisms in it. Bacteria that cause UTI often spread from the rectum or vagina to the urethra and then to the bladder or kidneys. Urinary tract infection is more common in women than men because the urethra is shorter in women. This makes it easier for bacteria to move up to the bladder. Sometimes bacteria spread from another part of the body through the bloodstream to the urinary tract.   Some of the things that can lead to an infection are:   a blockage in the urinary tract, such as a kidney stone   sexual activity   getting older, when it may get harder to empty and flush out the bladder completely.   Women are more likely to have an infection if they:   are newly sexually active or have a new sex partner   are past menopause   are pregnant   have a history of diabetes, a problem with the immune system, sickle-cell anemia, stroke, kidney stones, or any illness that makes it hard to empty the bladder completely.   What are the symptoms?   The symptoms of UTI may include:   urinating more often   feeling an urgent need to urinate    pain or discomfort (burning) when you urinate   urine that smells bad   pain in the lower pelvis, stomach, lower back, or side   urine that looks cloudy or reddish   fever or chills   sweats   nausea and vomiting   leaking of urine   change in amount of urine, either more or less   pain during sex.   How is it diagnosed?   Your healthcare provider will ask about your symptoms and medical history. Your provider will examine you. The exam may include a pelvic exam. Your provider will check for tenderness of the bladder or kidney. A sample of your urine may be tested for bacteria and pus. If you are having fever and are feeling very ill, you may have a blood test to look for signs of more serious infection.   If you keep having infections or symptoms after treatment, your provider may suggest these tests:   An intravenous pyelogram (IVP). An IVP is a special type of X-ray of the kidneys, ureters, and bladder.   An ultrasound scan to look at the urinary tract.   A cystoscopy. This is an exam of the inside of the urethra and bladder with a small lighted instrument. It is usually done by a specialist called a urologist.   How is it treated?   UTIs are usually treated with antibiotics. Your provider can also prescribe a medicine called Pyridium to relieve burning and discomfort. (Pyridium turns your urine a dark orange color.)   If the infection is causing fever, pain, or vomiting or you have a severe kidney infection, you may need to stay at the hospital for treatment.   How long will the effects last?   With antibiotic treatment, the symptoms of a bacterial infection stop in 1 to 3 days. Take all of the antibiotic your healthcare provider prescribes, even after the symptoms go away. If you stop taking your medicine before the scheduled end of treatment, the infection may come back   Without treatment, the infection can last a long time. If it is not treated, the infection can permanently damage the bladder and kidneys,  or it may spread to the blood. If the infection spreads to the blood, it can be fatal.   How can I take care of myself?   Follow your healthcare provider's treatment. Take all of the antibiotic that your healthcare provider prescribes, even when you feel better. Do not take medicine left over from previous prescriptions.   Drink more fluids, especially water, to help flush bacteria from your system.   If you have a fever:   Take aspirin or acetaminophen to control the fever. Check with your healthcare provider before you give any medicine that contains aspirin or salicylates to a child or teen. This includes medicines like baby aspirin, some cold medicines, and Pepto Bismol. Children and teens who take aspirin are at risk for a serious illness called Reye's syndrome.   Keep a daily record of your temperature.   A hot water bottle or an electric heating pad on a low setting can help relieve cramps or lower abdominal or back pain. Keep a cloth between your skin and the hot water bottle or heating pad so that you don't burn your skin.   Soaking in a tub for 20 to 30 minutes may help relieve any back or abdominal pain.   Follow your healthcare provider's directions for a follow-up urine test. Your provider may want to test your urine soon after you finish taking the antibiotic.   Call your healthcare provider right away if:   You keep having symptoms after taking an antibiotic for 2 days.   Your symptoms get worse.   You have a fever of 101.5? F (38.6? C) or higher.   You have new vomiting.   You have new pain in your side, back, or belly.   You have any symptoms that worry you.   How can I help prevent urinary tract infection?   You can help prevent UTIs if you:   Drink lots of fluids every day.   Don't wait to go to the bathroom when you feel the need to urinate.   Empty your bladder completely when you urinate.   Use good hygiene when you use the toilet. For example, wipe from front to back to keep rectal bacteria  from getting into the vagina and urethra.   Avoid using irritating cosmetics or chemicals in the area of the vagina and urethra (such as strong soaps, feminine hygiene sprays or douches, or scented napkins or panty liners).   Practice safe sex. Always use latex or polyurethane condoms.   Urinate soon after sex.   Keep your genital area clean.   Wear underwear that is all cotton or has a cotton crotch. Pantyhose should also have a cotton crotch. Cotton allows better air circulation than nylon. Change underwear and pantyhose every day.     Published by [employer].  This content is reviewed periodically and is subject to change as new health information becomes available. The information is intended to inform and educate and is not a replacement for medical evaluation, advice, diagnosis or treatment by a healthcare professional.   Developed by Wilma Grace RN, [place], and [employer].   ? 2010 [facility] and/or its affiliates. All Rights Reserved.   Copyright   [employer] 2011

## 2018-08-01 NOTE — MR AVS SNAPSHOT
After Visit Summary   8/1/2018    Ángela Zurita    MRN: [id number]           Patient Information     Date Of Birth          1985        Visit Information        Provider Department      8/1/2018 8:20 AM Estella Olivares NP [facility]        Today's Diagnoses     Dysuria    -  1    Nonspecific finding on examination of urine        Urgency-frequency syndrome        Vaginal discharge          Care Instructions            Prescription written for antibiotics to be taken twice daily for 7 days.  Advised to take entire course of antibiotic despite improvement in symptoms.    Advised to contact surgery regarding vaginal irritation and inflammation to advise on topical treatment for this.  May benefit from sitz baths.    Follow up if not improving.    MARKUS Grant            Urinary Tract Infection         What is a urinary tract infection?   A urinary tract infection (UTI) is an infection in the urinary tract. The urinary tract includes the:   kidneys   ureters (the tubes draining urine from the kidneys to the bladder)   bladder   urethra (the tube that drains urine from the bladder).   Any or all of these parts of the urinary tract can get infected.   How does it occur?   Urinary tract infection is usually caused by bacteria. Normally the urinary tract does not have any bacteria or other organisms in it. Bacteria that cause UTI often spread from the rectum or vagina to the urethra and then to the bladder or kidneys. Urinary tract infection is more common in women than men because the urethra is shorter in women. This makes it easier for bacteria to move up to the bladder. Sometimes bacteria spread from another part of the body through the bloodstream to the urinary tract.   Some of the things that can lead to an infection are:   a blockage in the urinary tract, such as a kidney stone   sexual activity   getting older, when it may get harder to empty and flush out the  bladder completely.   Women are more likely to have an infection if they:   are newly sexually active or have a new sex partner   are past menopause   are pregnant   have a history of diabetes, a problem with the immune system, sickle-cell anemia, stroke, kidney stones, or any illness that makes it hard to empty the bladder completely.   What are the symptoms?   The symptoms of UTI may include:   urinating more often   feeling an urgent need to urinate   pain or discomfort (burning) when you urinate   urine that smells bad   pain in the lower pelvis, stomach, lower back, or side   urine that looks cloudy or reddish   fever or chills   sweats   nausea and vomiting   leaking of urine   change in amount of urine, either more or less   pain during sex.   How is it diagnosed?   Your healthcare provider will ask about your symptoms and medical history. Your provider will examine you. The exam may include a pelvic exam. Your provider will check for tenderness of the bladder or kidney. A sample of your urine may be tested for bacteria and pus. If you are having fever and are feeling very ill, you may have a blood test to look for signs of more serious infection.   If you keep having infections or symptoms after treatment, your provider may suggest these tests:   An intravenous pyelogram (IVP). An IVP is a special type of X-ray of the kidneys, ureters, and bladder.   An ultrasound scan to look at the urinary tract.   A cystoscopy. This is an exam of the inside of the urethra and bladder with a small lighted instrument. It is usually done by a specialist called a urologist.   How is it treated?   UTIs are usually treated with antibiotics. Your provider can also prescribe a medicine called Pyridium to relieve burning and discomfort. (Pyridium turns your urine a dark orange color.)   If the infection is causing fever, pain, or vomiting or you have a severe kidney infection, you may need to stay at the hospital for treatment.    How long will the effects last?   With antibiotic treatment, the symptoms of a bacterial infection stop in 1 to 3 days. Take all of the antibiotic your healthcare provider prescribes, even after the symptoms go away. If you stop taking your medicine before the scheduled end of treatment, the infection may come back   Without treatment, the infection can last a long time. If it is not treated, the infection can permanently damage the bladder and kidneys, or it may spread to the blood. If the infection spreads to the blood, it can be fatal.   How can I take care of myself?   Follow your healthcare provider's treatment. Take all of the antibiotic that your healthcare provider prescribes, even when you feel better. Do not take medicine left over from previous prescriptions.   Drink more fluids, especially water, to help flush bacteria from your system.   If you have a fever:   Take aspirin or acetaminophen to control the fever. Check with your healthcare provider before you give any medicine that contains aspirin or salicylates to a child or teen. This includes medicines like baby aspirin, some cold medicines, and Pepto Bismol. Children and teens who take aspirin are at risk for a serious illness called Reye's syndrome.   Keep a daily record of your temperature.   A hot water bottle or an electric heating pad on a low setting can help relieve cramps or lower abdominal or back pain. Keep a cloth between your skin and the hot water bottle or heating pad so that you don't burn your skin.   Soaking in a tub for 20 to 30 minutes may help relieve any back or abdominal pain.   Follow your healthcare provider's directions for a follow-up urine test. Your provider may want to test your urine soon after you finish taking the antibiotic.   Call your healthcare provider right away if:   You keep having symptoms after taking an antibiotic for 2 days.   Your symptoms get worse.   You have a fever of 101.5? F (38.6? C) or higher.    You have new vomiting.   You have new pain in your side, back, or belly.   You have any symptoms that worry you.   How can I help prevent urinary tract infection?   You can help prevent UTIs if you:   Drink lots of fluids every day.   Don't wait to go to the bathroom when you feel the need to urinate.   Empty your bladder completely when you urinate.   Use good hygiene when you use the toilet. For example, wipe from front to back to keep rectal bacteria from getting into the vagina and urethra.   Avoid using irritating cosmetics or chemicals in the area of the vagina and urethra (such as strong soaps, feminine hygiene sprays or douches, or scented napkins or panty liners).   Practice safe sex. Always use latex or polyurethane condoms.   Urinate soon after sex.   Keep your genital area clean.   Wear underwear that is all cotton or has a cotton crotch. Pantyhose should also have a cotton crotch. Cotton allows better air circulation than nylon. Change underwear and pantyhose every day.     Published by [employer].  This content is reviewed periodically and is subject to change as new health information becomes available. The information is intended to inform and educate and is not a replacement for medical evaluation, advice, diagnosis or treatment by a healthcare professional.   Developed by Wilma Grace RN, [place], and [employer].   ? 2010 [employer] and/or its affiliates. All Rights Reserved.   Copyright   [employer] 2011                  Follow-ups after your visit        Who to contact     If you have questions or need follow up information about today's clinic visit or your schedule please contact [facility] directly at [phone].  Normal or non-critical lab and imaging results will be communicated to you by MyChart, letter or phone within 4 business days after the clinic has received the results. If you do not hear from us within 7 days, please contact the clinic through  "MyChart or phone. If you have a critical or abnormal lab result, we will notify you by phone as soon as possible.  Submit refill requests through [employer] or call your pharmacy and they will forward the refill request to us. Please allow 3 business days for your refill to be completed.          Additional Information About Your Visit        [employer] Information     [employer] gives you secure access to your electronic health record. If you see a primary care provider, you can also send messages to your care team and make appointments. If you have questions, please call your primary care clinic.  If you do not have a primary care provider, please call [phone] and they will assist you.        Care EveryWhere ID     This is your Care EveryWhere ID. This could be used by other organizations to access your [facility] medical records  [id number]        Your Vitals Were     Pulse Temperature Height Pulse Oximetry Breastfeeding? BMI (Body Mass Index)    85 98.7  F (37.1  C) (Tympanic) 5' 5\" (1.651 m) 95% No 22.63 kg/m2       Blood Pressure from Last 3 Encounters:   08/01/18 110/78   07/27/18 106/62   06/02/18 126/81    Weight from Last 3 Encounters:   08/01/18 136 lb (61.7 kg)   07/27/18 138 lb (62.6 kg)   06/02/18 140 lb (63.5 kg)              We Performed the Following     *UA reflex to Microscopic and Culture ([place] and [facility] (except [place] and [place])     Urine Culture Aerobic Bacterial     Urine Microscopic     Wet prep          Today's Medication Changes          These changes are accurate as of 8/1/18  9:06 AM.  If you have any questions, ask your nurse or doctor.               Start taking these medicines.        Dose/Directions    ciprofloxacin 500 MG tablet   Commonly known as:  CIPRO   Used for:  Dysuria, Urgency-frequency syndrome   Started by:  Estella Olivares NP        Dose:  500 mg   Take 1 tablet (500 mg) by mouth 2 times daily   Quantity:  14 tablet   Refills:  0       fluconazole " 150 MG tablet   Commonly known as:  DIFLUCAN   Used for:  Urgency-frequency syndrome, Vaginal discharge, Dysuria   Started by:  Estella Olivares NP        Dose:  150 mg   Take 1 tablet (150 mg) by mouth once for 1 dose   Quantity:  1 tablet   Refills:  0            Where to get your medicines      These medications were [place] [facility] [place] [address]  [address] [address]     Phone:  [phone]     ciprofloxacin 500 MG tablet    fluconazole 150 MG tablet                Primary Care Provider Office Phone # Fax #    Randy Graf [phone]       [address] [address]        Equal Access to Services     SKY BLANCHARD : Oj bolton Soalex, waaxda lujoselitoadaha, qaybta kaalmada aderip, celia elizabeth. So [facility] [phone].    ATENCIÓN: Si habla español, tiene a reyez disposición servicios gratuitos de asistencia lingüística. [facility] [phone].    We comply with applicable federal civil rights laws and [place] laws. We do not discriminate on the basis of race, color, national origin, age, disability, sex, sexual orientation, or gender identity.            Thank you!     Thank you for choosing [facility]  for your care. Our goal is always to provide you with excellent care. Hearing back from our patients is one way we can continue to improve our services. Please take a few minutes to complete the written survey that you may receive in the mail after your visit with us. Thank you!             Your Updated Medication List - Protect others around you: Learn how to safely use, store and throw away your medicines at www.disposemymeds.org.          This list is accurate as of 8/1/18  9:06 AM.  Always use your most recent med list.                   Brand Name Dispense Instructions for use Diagnosis    * amphetamine-dextroamphetamine 10 MG per tablet    ADDERALL    150 tablet     Take 2 pills (20mg) orally in the morning and two pills (20mg) at 11am and one pill (10mg) at 2pm    Attention deficit hyperactivity disorder (ADHD), predominantly inattentive type       * amphetamine-dextroamphetamine 10 MG per tablet   Start taking on:  8/27/2018    ADDERALL    150 tablet    Take 2 pills (20mg) orally in the morning and two pills (20mg) at 11am and one pill (10mg) at 2pm    Attention deficit hyperactivity disorder (ADHD), predominantly inattentive type       * amphetamine-dextroamphetamine 10 MG per tablet   Start taking on:  9/27/2018    ADDERALL    150 tablet    Take 2 pills (20mg) orally in the morning and two pills (20mg) at 11am and one pill (10mg) at 2pm    Attention deficit hyperactivity disorder (ADHD), predominantly inattentive type       ciprofloxacin 500 MG tablet    CIPRO    14 tablet    Take 1 tablet (500 mg) by mouth 2 times daily    Dysuria, Urgency-frequency syndrome       fluconazole 150 MG tablet    DIFLUCAN    1 tablet    Take 1 tablet (150 mg) by mouth once for 1 dose    Urgency-frequency syndrome, Vaginal discharge, Dysuria       METAMUCIL FIBER PO      Take by mouth 2 times daily        norethindrone 0.35 MG per tablet    MICRONOR    84 tablet    Take 1 tablet (0.35 mg) by mouth daily    Smoking, Encounter for initial prescription of contraceptive pills, Vaginal discharge, Surgical aftercare, genitourinary system       SOLUBLE FIBER/PROBIOTICS PO       Urinary frequency, Vaginal discharge, Pelvic pain in female, Family history of malignant neoplasm of breast, Left ovarian cyst, Bilateral galactorrhea       * Notice:  This list has 3 medication(s) that are the same as other medications prescribed for you. Read the directions carefully, and ask your doctor or other care provider to review them with you.

## 2018-08-03 ENCOUNTER — TELEPHONE (OUTPATIENT)
Dept: FAMILY MEDICINE | Facility: CLINIC | Age: 33
End: 2018-08-03

## 2018-08-03 LAB
BACTERIA SPEC CULT: ABNORMAL
Lab: ABNORMAL
SPECIMEN SOURCE: ABNORMAL

## 2018-08-03 NOTE — TELEPHONE ENCOUNTER
Reason for Call:  Other UTI     Detailed comments: pt calling stating despite being on the antibiotic since the 1st she is still having symptoms, that are going into her back and is wondering if she needs to give it more time or if she needs to come in. She is going out of town this weekend.    Phone Number Patient can be reached at: Home number on file [phone] (home)    Best Time: any    Can we leave a detailed message on this number? YES    Call taken on 8/3/2018 at 1:41 PM by Charlene Mcallister

## 2018-08-03 NOTE — TELEPHONE ENCOUNTER
Patient seen 8-1-18 for UTI.  Culture came back positive and cipro med appropriate for bacteria identified.  Patient denies new or worsening symptoms.  She states she has only taken #5 of the #14 tablets and has not noticed much difference in symptoms.  Patient was concerned if right med.  Reviewed culture results with patient.  She denies worsening symptoms.  RN advised ED precautions for the weekend.  Patient agrees with plan and verbalized understanding.    Ashwini HILL RN

## 2018-08-07 ENCOUNTER — TELEPHONE (OUTPATIENT)
Dept: OBGYN | Facility: CLINIC | Age: 33
End: 2018-08-07

## 2018-08-07 NOTE — TELEPHONE ENCOUNTER
"Return call to patient.  Spoke with patient on the phone.    Patient concerned with recent surgical procedures that she is now pregnant.  Patient concerned as perineum and sphincter are not completely healed.  Patient would like to keep pregnancy \" if it is safe for me to do so.\"  Scheduled patient for early OB appointment with Dr. Garay for further discussion.  Pt in agreement and reports understanding.    Radha Holliday   [facility]  RN    "

## 2018-08-07 NOTE — TELEPHONE ENCOUNTER
Reason for call:  Patient reporting a symptom    Symptom or request: Pt states she had laparoscopic surgery and D&C back in March.  Also after this states she went in to have a sphincteroplasty - repair for 4th degree laceration.  Has been having UTI's  Just took positive pregnancy test. Concerned.    Duration (how long have symptoms been present):     Have you been treated for this before? No    Additional comments:     Phone Number patient can be reached at:  Home number on file [phone] (home)    Best Time:  any    Can we leave a detailed message on this number:  YES    Call taken on 8/7/2018 at 9:15 AM by Merary Garcia

## 2018-08-13 ENCOUNTER — OFFICE VISIT (OUTPATIENT)
Dept: FAMILY MEDICINE | Facility: CLINIC | Age: 33
End: 2018-08-13
Payer: COMMERCIAL

## 2018-08-13 VITALS
OXYGEN SATURATION: 98 % | DIASTOLIC BLOOD PRESSURE: 78 MMHG | TEMPERATURE: 98 F | BODY MASS INDEX: 23.49 KG/M2 | SYSTOLIC BLOOD PRESSURE: 122 MMHG | HEART RATE: 100 BPM | HEIGHT: 65 IN | WEIGHT: 141 LBS

## 2018-08-13 DIAGNOSIS — R30.0 DYSURIA: Primary | ICD-10-CM

## 2018-08-13 LAB
ALBUMIN UR-MCNC: NEGATIVE MG/DL
APPEARANCE UR: CLEAR
BILIRUB UR QL STRIP: NEGATIVE
COLOR UR AUTO: YELLOW
GLUCOSE UR STRIP-MCNC: NEGATIVE MG/DL
HGB UR QL STRIP: NEGATIVE
KETONES UR STRIP-MCNC: NEGATIVE MG/DL
LEUKOCYTE ESTERASE UR QL STRIP: NEGATIVE
NITRATE UR QL: NEGATIVE
PH UR STRIP: 6 PH (ref 5–7)
SOURCE: NORMAL
SP GR UR STRIP: 1.01 (ref 1–1.03)
UROBILINOGEN UR STRIP-ACNC: 0.2 EU/DL (ref 0.2–1)

## 2018-08-13 PROCEDURE — 81003 URINALYSIS AUTO W/O SCOPE: CPT | Performed by: FAMILY MEDICINE

## 2018-08-13 PROCEDURE — 99213 OFFICE O/P EST LOW 20 MIN: CPT | Performed by: FAMILY MEDICINE

## 2018-08-13 NOTE — PROGRESS NOTES
SUBJECTIVE:   Ágnela Zurita is a 33 year old female who presents to clinic today for the following health issues:    URINARY TRACT Follow Up       Duration: 2 weeks     Description  dysuria, frequency, urgency and vaginal discharge, waking several time s per night to urinate    Intensity:  moderate    Accompanying signs and symptoms:  Fever/chills: no   Flank pain no   Nausea and vomiting: no   Vaginal symptoms: none  Abdominal/Pelvic Pain: YES- crampy    History  History of frequent UTI's: YES  History of kidney stones: no   Sexually Active: YES  Possibility of pregnancy: No    Precipitating or alleviating factors: None    Therapies tried and outcome: course of antibiotics - Ciprofloxacin, for 5 1/2 days then found out she was pregnant.  Outcome: still having symptoms and wants to be able to take something safe with the pregnancy.     Patient here for recheck of UTI. Was diagnosed in UC a few weeks ago. She is also pregnant . Not having as much symptoms. Wants to be sure infection is cleared.     Problem list and histories reviewed & adjusted, as indicated.  Additional history: as documented    Patient Active Problem List   Diagnosis     Urgency-frequency syndrome     Fibroid uterus     Health Care Home     Rectovaginal fistula     CARDIOVASCULAR SCREENING; LDL GOAL LESS THAN 130     Oral herpes     Attention deficit hyperactivity disorder (ADHD), predominantly inattentive type     Posterior neck pain     Left ovarian cyst     Past Surgical History:   Procedure Laterality Date     BIOPSY  Various    Had a few Leeps and numerous Colposcopys     COLONOSCOPY  2009    Normal     DILATION AND CURETTAGE, HYSTEROSCOPY DIAGNOSTIC, COMBINED N/A 4/3/2018    Procedure: COMBINED DILATION AND CURETTAGE, HYSTEROSCOPY DIAGNOSTIC;  Diagnostic Hysteroscopy with Dilation and Curettage,Laparoscopy with Left Ovarian Cystectomy, Right Uteral Sacral Biopsy;  Surgeon: Sherin Frost MD;  Location: [facility] OR     [facility]  SWEAT GLAND LESN AXILL,SIMPL Right 2009     LAPAROSCOPY DIAGNOSTIC (GYN) N/A 4/3/2018    Procedure: LAPAROSCOPY DIAGNOSTIC (GYN);;  Surgeon: Sherin Frost MD;  Location: [facility] OR     LEEP TX, CERVICAL  2006    yearly paps     MOUTH SURGERY      wisdom teeth     SOFT TISSUE SURGERY  Various    infected sweat-gland- cyst removed arm,armpit,face     SURGICAL HISTORY OF -   3/2005    Tonsillectomy     SURGICAL HISTORY OF -       infected sweat gland under her arm       Social History   Substance Use Topics     Smoking status: Current Every Day Smoker     Packs/day: 0.50     Years: 15.00     Types: Cigarettes     Start date: 12/1/2014     Last attempt to quit: 1/8/2014     Smokeless tobacco: Never Used     Alcohol use 0.0 oz/week      Comment: Special occasions     Family History   Problem Relation Age of Onset     Blood Disease Brother      twin brother-blood clots     Blood Disease Maternal Grandfather      blood clots     HEART DISEASE Maternal Grandfather      valve replacement     Hyperlipidemia Maternal Grandfather      Cerebrovascular Disease Maternal Grandfather      Prostate Cancer Maternal Grandfather      Other Cancer Maternal Grandfather      Gum/Jaw/Lymphnodes     Colon Cancer Maternal Grandfather      Mental Illness Maternal Grandfather      Alzheimer's     Respiratory Maternal Grandmother      Lung Cancer Maternal Grandmother      Diabetes Maternal Grandmother      type II     Hypertension Maternal Grandmother      Hyperlipidemia Maternal Grandmother      Depression Maternal Grandmother      Cancer Paternal Grandmother      non -hodgkins lymphoma     Lung Cancer Paternal Grandmother      Hypertension Paternal Grandmother      Hyperlipidemia Paternal Grandmother      Cerebrovascular Disease Paternal Grandmother      Diabetes Paternal Grandmother      Hypertension Father      Hyperlipidemia Father      Hyperlipidemia Paternal Grandfather      Prostate Cancer Paternal Grandfather       Hypertension Paternal Grandfather      Colon Cancer Paternal Grandfather      Coronary Artery Disease Paternal Grandfather      Breast Cancer Mother      Stage 0, small duct carcinoma     Breast Cancer Other      2 aunt and 3 of my great aunts     Breast Cancer Cousin      Breast Cancer         Current Outpatient Prescriptions   Medication Sig Dispense Refill     [START ON 9/27/2018] amphetamine-dextroamphetamine (ADDERALL) 10 MG per tablet Take 2 pills (20mg) orally in the morning and two pills (20mg) at 11am and one pill (10mg) at 2pm (Patient not taking: Reported on 8/13/2018) 150 tablet 0     [START ON 8/27/2018] amphetamine-dextroamphetamine (ADDERALL) 10 MG per tablet Take 2 pills (20mg) orally in the morning and two pills (20mg) at 11am and one pill (10mg) at 2pm (Patient not taking: Reported on 8/13/2018) 150 tablet 0     amphetamine-dextroamphetamine (ADDERALL) 10 MG per tablet Take 2 pills (20mg) orally in the morning and two pills (20mg) at 11am and one pill (10mg) at 2pm (Patient not taking: Reported on 8/13/2018) 150 tablet 0     ciprofloxacin (CIPRO) 500 MG tablet Take 1 tablet (500 mg) by mouth 2 times daily (Patient not taking: Reported on 8/13/2018) 14 tablet 0     norethindrone (MICRONOR) 0.35 MG per tablet Take 1 tablet (0.35 mg) by mouth daily (Patient not taking: Reported on 8/13/2018) 84 tablet 3     Probiotic Product (SOLUBLE FIBER/PROBIOTICS PO)        Psyllium (METAMUCIL FIBER PO) Take by mouth 2 times daily       No Known Allergies  BP Readings from Last 3 Encounters:   08/13/18 122/78   08/01/18 110/78   07/27/18 106/62    Wt Readings from Last 3 Encounters:   08/13/18 141 lb (64 kg)   08/01/18 136 lb (61.7 kg)   07/27/18 138 lb (62.6 kg)                  Labs reviewed in EPIC    Reviewed and updated as needed this visit by clinical staff       Reviewed and updated as needed this visit by Provider         ROS:  Constitutional, HEENT, cardiovascular, pulmonary, gi and gu systems are  "negative, except as otherwise noted.    OBJECTIVE:     /78  Pulse 100  Temp 98  F (36.7  C) (Tympanic)  Ht 5' 5\" (1.651 m)  Wt 141 lb (64 kg)  LMP 04/22/2018 (Exact Date)  SpO2 98%  BMI 23.46 kg/m2  Body mass index is 23.46 kg/(m^2).  GENERAL: healthy, alert and no distress  EYES: Eyes grossly normal to inspection, PERRL and conjunctivae and sclerae normal  HENT: ear canals and TM's normal, nose and mouth without ulcers or lesions  NECK: no adenopathy, no asymmetry, masses, or scars and thyroid normal to palpation  RESP: lungs clear to auscultation - no rales, rhonchi or wheezes  CV: regular rate and rhythm, normal S1 S2, no S3 or S4, no murmur, click or rub, no peripheral edema and peripheral pulses strong  ABDOMEN: soft, nontender, without hepatosplenomegaly or masses and bowel sounds normal  MS: no gross musculoskeletal defects noted, no edema    Diagnostic Test Results:  Results for orders placed or performed in visit on 08/13/18   UA reflex to Microscopic and Culture   Result Value Ref Range    Color Urine Yellow     Appearance Urine Clear     Glucose Urine Negative NEG^Negative mg/dL    Bilirubin Urine Negative NEG^Negative    Ketones Urine Negative NEG^Negative mg/dL    Specific Gravity Urine 1.015 1.003 - 1.035    Blood Urine Negative NEG^Negative    pH Urine 6.0 5.0 - 7.0 pH    Protein Albumin Urine Negative NEG^Negative mg/dL    Urobilinogen Urine 0.2 0.2 - 1.0 EU/dL    Nitrite Urine Negative NEG^Negative    Leukocyte Esterase Urine Negative NEG^Negative    Source Midstream Urine        ASSESSMENT/PLAN:       1. Dysuria  UA is negative for infection   - UA reflex to Microscopic and Culture    FUTURE APPOINTMENTS:       - Follow-up visit as needed.    Carolann Merida MD  [facility]  "

## 2018-08-13 NOTE — MR AVS SNAPSHOT
After Visit Summary   8/13/2018    Ángela Zurita    MRN: [id number]           Patient Information     Date Of Birth          1985        Visit Information        Provider Department      8/13/2018 2:20 PM Carolann Merida MD [facility]        Today's Diagnoses     Dysuria    -  1       Follow-ups after your visit        Your next 10 appointments already scheduled     Aug 23, 2018  9:30 AM CDT   Office Visit with Sherin Frost MD, [facility]   [facility]    [address] [address] [phone]           Bring a current list of meds and any records pertaining to this visit. For Physicals, please bring immunization records and any forms needing to be filled out. Please arrive 10 minutes early to complete paperwork.            Sep 05, 2018  3:30 PM CDT   Office Visit with Sherin Frost MD   [facility]    [address] [address] [phone]           Bring a current list of meds and any records pertaining to this visit. For Physicals, please bring immunization records and any forms needing to be filled out. Please arrive 10 minutes early to complete paperwork.              Who to contact     If you have questions or need follow up information about today's clinic visit or your schedule please contact [facility] directly at [phone].  Normal or non-critical lab and imaging results will be communicated to you by MyChart, letter or phone within 4 business days after the clinic has received the results. If you do not hear from us within 7 days, please contact the clinic through [employer] or phone. If you have a critical or abnormal lab result, we will notify you by phone as soon as possible.  Submit refill requests through [employer] or call your pharmacy and they will forward the refill request  "to us. Please allow 3 business days for your refill to be completed.          Additional Information About Your Visit        MyChart Information     [email or web address] gives you secure access to your electronic health record. If you see a primary care provider, you can also send messages to your care team and make appointments. If you have questions, please call your primary care clinic.  If you do not have a primary care provider, please call [phone] and they will assist you.        Care EveryWhere ID     This is your Care EveryWhere ID. This could be used by other organizations to access your [facility] medical records  [id number]        Your Vitals Were     Pulse Temperature Height Last Period Pulse Oximetry BMI (Body Mass Index)    100 98  F (36.7  C) (Tympanic) 5' 5\" (1.651 m) 04/22/2018 (Exact Date) 98% 23.46 kg/m2       Blood Pressure from Last 3 Encounters:   08/13/18 122/78   08/01/18 110/78   07/27/18 106/62    Weight from Last 3 Encounters:   08/13/18 141 lb (64 kg)   08/01/18 136 lb (61.7 kg)   07/27/18 138 lb (62.6 kg)              We Performed the Following     UA reflex to Microscopic and Culture        Primary Care Provider Office Phone # Fax #    Randy Graf [phone] [address] [address]        Equal Access to Services     SKY BLANCHARD : Hadii jordan manriquezo Soalex, waaxda luqadaha, qaybta kaalmada case, celia hoskins . So [facility] [phone].    ATENCIÓN: Si habla español, tiene a reyez disposición servicios gratuitos de asistencia lingüística. Jovany al [phone].    We comply with applicable federal civil rights laws and [place] laws. We do not discriminate on the basis of race, color, national origin, age, disability, sex, sexual orientation, or gender identity.            Thank you!     Thank you for choosing [facility]  for your care. Our goal is always to provide you with excellent care. Hearing back " from our patients is one way we can continue to improve our services. Please take a few minutes to complete the written survey that you may receive in the mail after your visit with us. Thank you!             Your Updated Medication List - Protect others around you: Learn how to safely use, store and throw away your medicines at www.disposemymeds.org.          This list is accurate as of 8/13/18  2:52 PM.  Always use your most recent med list.                   Brand Name Dispense Instructions for use Diagnosis    * amphetamine-dextroamphetamine 10 MG per tablet    ADDERALL    150 tablet    Take 2 pills (20mg) orally in the morning and two pills (20mg) at 11am and one pill (10mg) at 2pm    Attention deficit hyperactivity disorder (ADHD), predominantly inattentive type       * amphetamine-dextroamphetamine 10 MG per tablet   Start taking on:  8/27/2018    ADDERALL    150 tablet    Take 2 pills (20mg) orally in the morning and two pills (20mg) at 11am and one pill (10mg) at 2pm    Attention deficit hyperactivity disorder (ADHD), predominantly inattentive type       * amphetamine-dextroamphetamine 10 MG per tablet   Start taking on:  9/27/2018    ADDERALL    150 tablet    Take 2 pills (20mg) orally in the morning and two pills (20mg) at 11am and one pill (10mg) at 2pm    Attention deficit hyperactivity disorder (ADHD), predominantly inattentive type       ciprofloxacin 500 MG tablet    CIPRO    14 tablet    Take 1 tablet (500 mg) by mouth 2 times daily    Dysuria, Urgency-frequency syndrome       METAMUCIL FIBER PO      Take by mouth 2 times daily        norethindrone 0.35 MG per tablet    MICRONOR    84 tablet    Take 1 tablet (0.35 mg) by mouth daily    Smoking, Encounter for initial prescription of contraceptive pills, Vaginal discharge, Surgical aftercare, genitourinary system       SOLUBLE FIBER/PROBIOTICS PO       Urinary frequency, Vaginal discharge, Pelvic pain in female, Family history of malignant neoplasm of  breast, Left ovarian cyst, Bilateral galactorrhea       * Notice:  This list has 3 medication(s) that are the same as other medications prescribed for you. Read the directions carefully, and ask your doctor or other care provider to review them with you.

## 2018-08-14 NOTE — PROGRESS NOTES
Please inform patient that test result was within normal parameters.   Thank you.     Carolann Merida M.D.

## 2018-08-22 ENCOUNTER — APPOINTMENT (OUTPATIENT)
Dept: OBGYN | Facility: CLINIC | Age: 33
End: 2018-08-22
Payer: COMMERCIAL

## 2018-08-22 ENCOUNTER — PRENATAL OFFICE VISIT (OUTPATIENT)
Dept: OBGYN | Facility: CLINIC | Age: 33
End: 2018-08-22

## 2018-08-22 DIAGNOSIS — Z34.80 PRENATAL CARE, SUBSEQUENT PREGNANCY: Primary | ICD-10-CM

## 2018-08-22 PROCEDURE — 99207 ZZC NO CHARGE NURSE ONLY: CPT | Performed by: OBSTETRICS & GYNECOLOGY

## 2018-08-22 RX ORDER — PRENATAL VIT/IRON FUM/FOLIC AC 27MG-0.8MG
1 TABLET ORAL DAILY
COMMUNITY
Start: 2018-05-29 | End: 2019-12-03

## 2018-08-22 NOTE — MR AVS SNAPSHOT
After Visit Summary   8/22/2018    Ángela Zurita    MRN: [id number]           Patient Information     Date Of Birth          1985        Visit Information        Provider Department      8/22/2018 6:44 PM Sherin Frost MD [facility]        Today's Diagnoses     Prenatal care, subsequent pregnancy    -  1       Follow-ups after your visit        Your next 10 appointments already scheduled     Sep 05, 2018  2:00 PM CDT   New Prenatal with Sherin Frost MD, [facility]   [facility]    [address] [address]   [phone]              Who to contact     If you have questions or need follow up information about today's clinic visit or your schedule please contact [facility] directly at [phone].  Normal or non-critical lab and imaging results will be communicated to you by MyChart, letter or phone within 4 business days after the clinic has received the results. If you do not hear from us within 7 days, please contact the clinic through [employer] or phone. If you have a critical or abnormal lab result, we will notify you by phone as soon as possible.  Submit refill requests through [employer] or call your pharmacy and they will forward the refill request to us. Please allow 3 business days for your refill to be completed.          Additional Information About Your Visit        MyChart Information     [employer] gives you secure access to your electronic health record. If you see a primary care provider, you can also send messages to your care team and make appointments. If you have questions, please call your primary care clinic.  If you do not have a primary care provider, please call [phone] and they will assist you.        Care EveryWhere ID     This is your Care EveryWhere ID. This could be used by other organizations to access your [facility]  records  [id number]        Your Vitals Were     Last Period                   07/02/2018            Blood Pressure from Last 3 Encounters:   08/13/18 122/78   08/01/18 110/78   07/27/18 106/62    Weight from Last 3 Encounters:   08/13/18 64 kg (141 lb)   08/01/18 61.7 kg (136 lb)   07/27/18 62.6 kg (138 lb)              Today, you had the following     No orders found for display       Primary Care Provider Office Phone # Fax #    Randy Graf [phone] [address] [address]        Equal Access to Services     SKY BLANCHARD : Hadii jordan manriquezo Soalex, waaxda luqadaha, qaybta kaalmada adelonyada, celia hoskins . So [facility] [phone].    ATENCIÓN: Si habla español, tiene a reyez disposición servicios gratuitos de asistencia lingüística. Llame al [phone].    We comply with applicable federal civil rights laws and [place] laws. We do not discriminate on the basis of race, color, national origin, age, disability, sex, sexual orientation, or gender identity.            Thank you!     Thank you for choosing [facility]  for your care. Our goal is always to provide you with excellent care. Hearing back from our patients is one way we can continue to improve our services. Please take a few minutes to complete the written survey that you may receive in the mail after your visit with us. Thank you!             Your Updated Medication List - Protect others around you: Learn how to safely use, store and throw away your medicines at www.disposemymeds.org.          This list is accurate as of 8/22/18  6:59 PM.  Always use your most recent med list.                   Brand Name Dispense Instructions for use Diagnosis    * amphetamine-dextroamphetamine 10 MG per tablet    ADDERALL    150 tablet    Take 2 pills (20mg) orally in the morning and two pills (20mg) at 11am and one pill (10mg) at 2pm    Attention deficit hyperactivity disorder  (ADHD), predominantly inattentive type       * amphetamine-dextroamphetamine 10 MG per tablet   Start taking on:  8/27/2018    ADDERALL    150 tablet    Take 2 pills (20mg) orally in the morning and two pills (20mg) at 11am and one pill (10mg) at 2pm    Attention deficit hyperactivity disorder (ADHD), predominantly inattentive type       * amphetamine-dextroamphetamine 10 MG per tablet   Start taking on:  9/27/2018    ADDERALL    150 tablet    Take 2 pills (20mg) orally in the morning and two pills (20mg) at 11am and one pill (10mg) at 2pm    Attention deficit hyperactivity disorder (ADHD), predominantly inattentive type       FIBER CHOICE 1.5 g Chew   Generic drug:  Inulin      Take 1 chew tab by mouth daily        prenatal multivitamin plus iron 27-0.8 MG Tabs per tablet      Take 1 tablet by mouth daily        * Notice:  This list has 3 medication(s) that are the same as other medications prescribed for you. Read the directions carefully, and ask your doctor or other care provider to review them with you.

## 2018-08-24 ENCOUNTER — HOSPITAL ENCOUNTER (EMERGENCY)
Facility: CLINIC | Age: 33
Discharge: HOME | End: 2018-08-24
Attending: EMERGENCY MEDICINE | Admitting: EMERGENCY MEDICINE
Payer: COMMERCIAL

## 2018-08-24 ENCOUNTER — NURSE TRIAGE (OUTPATIENT)
Dept: NURSING | Facility: CLINIC | Age: 33
End: 2018-08-24

## 2018-08-24 VITALS
HEIGHT: 65 IN | HEART RATE: 79 BPM | WEIGHT: 145 LBS | TEMPERATURE: 99.1 F | BODY MASS INDEX: 24.16 KG/M2 | OXYGEN SATURATION: 99 % | RESPIRATION RATE: 16 BRPM | DIASTOLIC BLOOD PRESSURE: 83 MMHG | SYSTOLIC BLOOD PRESSURE: 129 MMHG

## 2018-08-24 DIAGNOSIS — S05.02XA: ICD-10-CM

## 2018-08-24 PROCEDURE — 99283 EMERGENCY DEPT VISIT LOW MDM: CPT | Performed by: EMERGENCY MEDICINE

## 2018-08-24 PROCEDURE — 99284 EMERGENCY DEPT VISIT MOD MDM: CPT | Mod: Z6 | Performed by: EMERGENCY MEDICINE

## 2018-08-24 RX ORDER — TETRACAINE HYDROCHLORIDE 5 MG/ML
1-2 SOLUTION OPHTHALMIC ONCE
Status: DISCONTINUED | OUTPATIENT
Start: 2018-08-24 | End: 2018-08-24 | Stop reason: HOSPADM

## 2018-08-24 RX ORDER — TOBRAMYCIN 3 MG/ML
1-2 SOLUTION/ DROPS OPHTHALMIC
Qty: 1 BOTTLE | Refills: 0 | Status: SHIPPED | OUTPATIENT
Start: 2018-08-24 | End: 2018-09-18

## 2018-08-24 NOTE — TELEPHONE ENCOUNTER
Branch scratched her left eye yesterday this AM it was crusted shut/no fever/ white of the eye is red and it feels tender/ she will have someone drive her into the er   Arturo Jane RN -[phone]  Reason for Disposition    [1] Looks infected (spreading redness, red streak, pus) AND [2] fever    Additional Information    Negative: [1] Major bleeding (e.g., actively dripping or spurting) AND [2] can't be stopped    Negative: Knocked out (unconscious) > 1 minute    Negative: Difficult to awaken or acting confused  (e.g., disoriented, slurred speech)    Negative: Severe neck pain    Negative: Sounds like a life-threatening emergency to the triager    Wound looks infected    Negative: [1] Widespread rash AND [2] bright red, sunburn-like AND [3] too weak to stand    Negative: Sounds like a life-threatening emergency to the triager    Negative: Stitches (or staples) and  not  infected    Negative: Skin glue used to close wound and not infected    Negative:  surgical wound infection suspected    Negative: Surgical wound infection suspected (post-op)    Negative: Animal bite wound infection suspected    Negative: [1] Widespread rash AND [2] bright red, sunburn-like    Negative: Severe pain in the wound    Negative: Black (necrotic) or blisters develop in wound    Negative: Patient sounds very sick or weak to the triager    Protocols used: WOUND INFECTION-ADULT-, EYE INJURY-ADULT-

## 2018-08-24 NOTE — ED PROVIDER NOTES
History     Chief Complaint   Patient presents with     Eye Injury     Pt c/o left eye injury yesterday - today with discharge     HPI  Ángela Zurita is a 33 year old female who presents with left eye irritation and conjunctival redness since she was poked in the eye by some brush that she was moving yesterday.  Mild to moderate discomfort with some mattering this morning.  Increased pain with blinking.  No visual change.  Denies any sinus congestion or facial pain over the sinuses.  No fever or chills.  Treatment prior to arrival.  Patient is 8 weeks pregnant by dates and denies any complications related to this.  No treatment for her pain prior to arrival.    Problem List:    Patient Active Problem List    Diagnosis Date Noted     Prenatal care, subsequent pregnancy 08/22/2018     Priority: Medium     Left ovarian cyst 03/19/2018     Priority: Medium     Posterior neck pain 09/23/2017     Priority: Medium     Attention deficit hyperactivity disorder (ADHD), predominantly inattentive type 06/10/2016     Priority: Medium     Patient is followed by WILMAR WHEAT for ongoing prescription of stimulants.  All refills should be approved by this provider, or covering partner.    Medication(s): Adderall 10mg immediate release   Maximum quantity per month: 150  Clinic visit frequency required: Q 3 month     Controlled substance agreement on file: Yes 6/30/16  Neuropsych evaluation for ADD completed:  Yes, completed 2/2012 at [facility], on file and diagnosis confirmed    Last [facility] website verification:  done on 6/19/2016   [email or web address]           Oral herpes 03/21/2016     Priority: Medium     CARDIOVASCULAR SCREENING; LDL GOAL LESS THAN 130 08/12/2015     Priority: Medium     Rectovaginal fistula 01/26/2015     Priority: Medium     Has had consult with colorectal surgeon; opting to defer treatment until done with childbearing  Amenable to c/section with next pregnancy       Health Care Home  12/18/2014     Priority: Medium     Status:  Unable to reach  Care Coordinator:  Maira Starr    See Letters for [facility] Care Plan  Date:  December 18, 2014         Fibroid uterus 07/02/2014     Priority: Medium     Anterior intramural lower.  Right by bladder       Urgency-frequency syndrome 03/24/2014     Priority: Medium        Past Medical History:    Past Medical History:   Diagnosis Date     Cervical dysplasia      Chickenpox      Exercise-induced asthma      IBS (irritable bowel syndrome)      Tonsillitis 2/13/2014       Past Surgical History:    Past Surgical History:   Procedure Laterality Date     BIOPSY  Various    Had a few Leeps and numerous Colposcopys     COLONOSCOPY  2009    Normal     DILATION AND CURETTAGE, HYSTEROSCOPY DIAGNOSTIC, COMBINED N/A 4/3/2018    Procedure: COMBINED DILATION AND CURETTAGE, HYSTEROSCOPY DIAGNOSTIC;  Diagnostic Hysteroscopy with Dilation and Curettage,Laparoscopy with Left Ovarian Cystectomy, Right Uteral Sacral Biopsy;  Surgeon: Sherin Frost MD;  Location: [facility] OR     EXC SWEAT GLAND LESN AXILL,SIMPL Right 2009     LAPAROSCOPY DIAGNOSTIC (GYN) N/A 4/3/2018    Procedure: LAPAROSCOPY DIAGNOSTIC (GYN);;  Surgeon: Sherin Frost MD;  Location: [facility] OR     LEEP TX, CERVICAL  2006    yearly paps     MOUTH SURGERY      wisdom teeth     SOFT TISSUE SURGERY  Various    infected sweat-gland- cyst removed arm,armpit,face     SPHINCTEROPLASTY RECTUM  05/2018    with pernineal rebuild     SURGICAL HISTORY OF -   3/2005    Tonsillectomy     SURGICAL HISTORY OF -       infected sweat gland under her arm       Family History:    Family History   Problem Relation Age of Onset     Blood Disease Brother      twin brother-blood clots     Blood Disease Maternal Grandfather      blood clots     HEART DISEASE Maternal Grandfather      valve replacement     Hyperlipidemia Maternal Grandfather      Cerebrovascular Disease Maternal Grandfather      Prostate Cancer  Maternal Grandfather      Other Cancer Maternal Grandfather      Gum/Jaw/Lymphnodes     Colon Cancer Maternal Grandfather      Mental Illness Maternal Grandfather      Alzheimer's     Respiratory Maternal Grandmother      Lung Cancer Maternal Grandmother      Diabetes Maternal Grandmother      type II     Hypertension Maternal Grandmother      Hyperlipidemia Maternal Grandmother      Depression Maternal Grandmother      Cancer Paternal Grandmother      non -hodgkins lymphoma     Lung Cancer Paternal Grandmother      Hypertension Paternal Grandmother      Hyperlipidemia Paternal Grandmother      Cerebrovascular Disease Paternal Grandmother      Diabetes Paternal Grandmother      Hypertension Father      Hyperlipidemia Father      Liver Disease Father      Hyperlipidemia Paternal Grandfather      Prostate Cancer Paternal Grandfather      Hypertension Paternal Grandfather      Colon Cancer Paternal Grandfather      Coronary Artery Disease Paternal Grandfather      Breast Cancer Mother      Stage 0, small duct carcinoma     Breast Cancer Other      2 aunt and 3 of my great aunts     Breast Cancer Cousin      Breast Cancer       Social History:  Marital Status:   [2]  Social History   Substance Use Topics     Smoking status: Current Every Day Smoker     Packs/day: 0.50     Years: 15.00     Types: Cigarettes     Start date: 12/1/2014     Smokeless tobacco: Never Used      Comment: quit with pregnancy     Alcohol use 0.0 oz/week      Comment: occas-quit with pregnancy        Medications:      Inulin (FIBER CHOICE) 1.5 g CHEW   Prenatal Vit-Fe Fumarate-FA (PRENATAL MULTIVITAMIN PLUS IRON) 27-0.8 MG TABS per tablet   tobramycin (TOBREX) 0.3 % ophthalmic solution   [START ON 9/27/2018] amphetamine-dextroamphetamine (ADDERALL) 10 MG per tablet   [START ON 8/27/2018] amphetamine-dextroamphetamine (ADDERALL) 10 MG per tablet   amphetamine-dextroamphetamine (ADDERALL) 10 MG per tablet         Review of Systems all other  "systems were reviewed and are negative.    Physical Exam   BP: 129/83  Pulse: 79  Temp: 99.1  F (37.3  C)  Resp: 16  Height: 165.1 cm (5' 5\")  Weight: 65.8 kg (145 lb)  SpO2: 99 %      Physical Exam alert cooperative female in mild to moderate distress.  HEENT shows no facial swelling or asymmetry patient's left eye shows no eyelid edema.  Pupils are equal and reactive to light accommodation.  Extraocular motions are intact.  There is no hyphema.  Globe was intact.  She has scleral erythema and injection in the inferior aspect where she was poked by the branch.  No foreign body is appreciated.  No sinus congestion or tenderness over the sinuses.  Orally no lesion.    ED Course     ED Course     Procedures        Tetracaine was instilled for anesthetic.  Fluorescein dye was then instilled.  With direct visual examination and Woods lamp showed conjunctival dye uptake.  No corneal irregularity or dye uptake.  Anterior chamber was clear.  Lid was flipped and swept and no foreign body was appreciated.  Eye was irrigated.       Critical Care time:  none               No results found for this or any previous visit (from the past 24 hour(s)).    Medications   tetracaine (PONTOCAINE) 0.5 % ophthalmic solution 1-2 drop (not administered)       Assessments & Plan (with Medical Decision Making)   Ángela Zurita is a 33 year old female who presents with left eye irritation and conjunctival redness since she was poked in the eye by some brush that she was moving yesterday.  Mild to moderate discomfort with some mattering this morning.  Increased pain with blinking.  No visual change.  Denies any sinus congestion or facial pain over the sinuses.  No fever or chills.  Treatment prior to arrival.  Patient is 8 weeks pregnant by dates and denies any complications related to this.  No treatment for her pain prior to arrival.  On exam she had conjunctival erythema and injection inferior to the cornea.  The cornea itself appeared " regular with no dye uptake with fluorescein.  Upper lid was flipped and swept and no foreign body was present.  She had equal and reactive pupils.  Extraocular motions were conjugate.  She is globe was intact.  Eye was irrigated.  Handout on conjunctival injury is provided.  Antibiotic drops for secondary infection.  Reasons to return for reassessment discussed per  I have reviewed the nursing notes.    I have reviewed the findings, diagnosis, plan and need for follow up with the patient.       Discharge Medication List as of 8/24/2018  2:39 PM      START taking these medications    Details   tobramycin (TOBREX) 0.3 % ophthalmic solution Place 1-2 drops Into the left eye every 2 hours, Disp-1 Bottle, R-0, E-Prescribe             Final diagnoses:   Injury of left conjunctiva, initial encounter       8/24/2018   [facility] EMERGENCY DEPARTMENT     Tom Allen MD  08/24/18 [id number]

## 2018-08-24 NOTE — ED AVS SNAPSHOT
[facility] Emergency Department    [address] [address]    Phone:  [phone]    Fax:  [phone]                                       Ángela Zurita   MRN: [id number]    Department:  [facility] Emergency Department   Date of Visit:  8/24/2018           Patient Information     Date Of Birth          1985        Your diagnoses for this visit were:     Injury of left conjunctiva, initial encounter        You were seen by Tom Allen MD.      Follow-up Information     Follow up with Randy Graf MD.    Specialty:  Family Practice    Why:  As needed    Contact information:    [address] [address]  [phone]        Discharge References/Attachments     CONJUNCTIVAL FOREIGN BODY, RESOLVED (ENGLISH)      Your next 10 appointments already scheduled     Sep 05, 2018  2:00 PM CDT   New Prenatal with Sherin Frost MD, [facility] 1   [facility]    [address] [address] [phone]              24 Hour Appointment Hotline       To make an appointment at any [facility] call [phone] ([phone]). If you don't have a family doctor or clinic, we will help you find one. [facility] are conveniently located to serve the needs of you and your family.             Review of your medicines      START taking        Dose / Directions Last dose taken    tobramycin 0.3 % ophthalmic solution   Commonly known as:  TOBREX   Dose:  1-2 drop   Quantity:  1 Bottle        Place 1-2 drops Into the left eye every 2 hours   Refills:  0          Our records show that you are taking the medicines listed below. If these are incorrect, please call your family doctor or clinic.        Dose / Directions Last dose taken    * amphetamine-dextroamphetamine 10 MG per tablet   Commonly known as:  ADDERALL   Quantity:  150 tablet        Take 2 pills (20mg) orally in the morning and  two pills (20mg) at 11am and one pill (10mg) at 2pm   Refills:  0        * amphetamine-dextroamphetamine 10 MG per tablet   Commonly known as:  ADDERALL   Quantity:  150 tablet   Start taking on:  8/27/2018        Take 2 pills (20mg) orally in the morning and two pills (20mg) at 11am and one pill (10mg) at 2pm   Refills:  0        * amphetamine-dextroamphetamine 10 MG per tablet   Commonly known as:  ADDERALL   Quantity:  150 tablet   Start taking on:  9/27/2018        Take 2 pills (20mg) orally in the morning and two pills (20mg) at 11am and one pill (10mg) at 2pm   Refills:  0        FIBER CHOICE 1.5 g Chew   Dose:  1 chew tab   Generic drug:  Inulin        Take 1 chew tab by mouth daily   Refills:  0        prenatal multivitamin plus iron 27-0.8 MG Tabs per tablet   Dose:  1 tablet        Take 1 tablet by mouth daily   Refills:  0        * Notice:  This list has 3 medication(s) that are the same as other medications prescribed for you. Read the directions carefully, and ask your doctor or other care provider to review them with you.            Prescriptions were sent or printed at these locations (1 Prescription)                   [facility] [place], [place] - [address]   [address] [address]    Telephone:  [phone]   Fax:  [phone]   Hours:                  E-Prescribed (1 of 1)         tobramycin (TOBREX) 0.3 % ophthalmic solution                Orders Needing Specimen Collection     None      Pending Results     No orders found from 8/22/2018 to 8/25/2018.            Pending Culture Results     No orders found from 8/22/2018 to 8/25/2018.            Pending Results Instructions     If you had any lab results that were not finalized at the time of your Discharge, you can call the ED Lab Result RN at [phone]. You will be contacted by this team for any positive Lab results or changes in treatment. The nurses are available 7 days a week from 10A to 6:30P.  You can  leave a message 24 hours per day and they will return your call.        Test Results From Your Hospital Stay               Thank you for choosing [facility]       Thank you for choosing [facility] for your care. Our goal is always to provide you with excellent care. Hearing back from our patients is one way we can continue to improve our services. Please take a few minutes to complete the written survey that you may receive in the mail after you visit with us. Thank you!        [employer] Information     [employer] gives you secure access to your electronic health record. If you see a primary care provider, you can also send messages to your care team and make appointments. If you have questions, please call your primary care clinic.  If you do not have a primary care provider, please call [phone] and they will assist you.        Care EveryWhere ID     This is your Care EveryWhere ID. This could be used by other organizations to access your [facility] medical records  [id number]        Equal Access to Services     SKY BLANCHARD : [place], jelly garcia, celia wells . So [facility] [phone].    ATENCIÓN: Si habla español, tiene a reyez disposición servicios gratuitos de asistencia lingüística. Llame al [phone].    We comply with applicable federal civil rights laws and [place] laws. We do not discriminate on the basis of race, color, national origin, age, disability, sex, sexual orientation, or gender identity.            After Visit Summary       This is your record. Keep this with you and show to your community pharmacist(s) and doctor(s) at your next visit.

## 2018-08-24 NOTE — ED AVS SNAPSHOT
[facility] Emergency Department    [address] [address]    Phone:  [phone]    Fax:  [phone]                                       Ángela Zurita   MRN: [id number]    Department:  [facility] Emergency Department   Date of Visit:  8/24/2018           After Visit Summary Signature Page     I have received my discharge instructions, and my questions have been answered. I have discussed any challenges I see with this plan with the nurse or doctor.    ..........................................................................................................................................  Patient/Patient Representative Signature      ..........................................................................................................................................  Patient Representative Print Name and Relationship to Patient    ..................................................               ................................................  Date                                            Time    ..........................................................................................................................................  Reviewed by Signature/Title    ...................................................              ..............................................  Date                                                            Time          22EPIC Rev 08/18

## 2018-08-24 NOTE — ED NOTES
Pt presents to ED with complaints of left eye discomfort. States she was moving brush yesterday when some hit her eye. Pt reports discomfort in that eye since then. Pt reports pain with blinking and some crusting upon waking this am. Pt notes she's 8 weeks pregnant.

## 2018-09-03 ENCOUNTER — VIRTUAL VISIT (OUTPATIENT)
Dept: FAMILY MEDICINE | Facility: OTHER | Age: 33
End: 2018-09-03

## 2018-09-03 NOTE — PROGRESS NOTES
"Date:   Clinician: Jennifer Ackerman  Clinician NPI: [id number]  Patient: Ángela Zurita  Patient : 1985  Patient Address: [address] [place] [address]  Patient Phone: [phone]  Visit Protocol: Oral mouth sores  Patient Summary:  Ángela is a 33 year old ( : 1985 ) female who initiated a Visit to prevent future outbreaks of cold sores, fever blisters or oral herpes. When asked the question \"Please sign me up to receive news, health information and promotions. \", Ángela responded \"No\".    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   She has had 1 occurrences in the past three months.   Ángela last had an outbreak over 2 months ago and has tried Valacyclovir (Valtrex) to treat the sores in the past. Valacyclovir (Valtrex) was effective in treating the sores.   She denies: pus, warmth around the affected skin area, that the rash is spreading, and having a similar rash on other body parts. The rash is not located near the eyes.   The patient does not smoke or use smokeless tobacco.   She denies pregnancy and denies breastfeeding. She has menstruated in the past month.   MEDICATIONS: No current medications, ALLERGIES: NKDA  Clinician Response:  Dear Ángela,  Based on the information provided, the lesions or sores you have are most likely cold sores, also known as fever blisters.  The technical term is 'herpes simplex virus type 1'. Common symptoms include a tingling sensation on the mouth or lips, fluid filled blisters, or crusting on the skin.   I am prescribing:   Valacyclovir 1 gram oral tablet. Take 2 tablets by mouth every 12 hours for 1 day. If you think your oral sores are returning, refill the medication and use it according to the instructions. Your prescription includes 1 refill.  If you become pregnant during this course of treatment with medication, stop taking the medication and contact your primary care provider.   To avoid spreading cold sores to " other people, or to other parts of your body, try not to itch or scratch the sores.  Scratching or itching the sores may also cause the development of a secondary infection. Be sure to wash your hands with soap and water after touching the sores.  If cold sores do not heal within 2 weeks, please see your healthcare provider  for further evaluation.   Diagnosis: Herpes Simplex (Cold Sores)  Diagnosis ICD: B00.9  Prescription: valacyclovir (Valtrex) 1 gram oral tablet 4 tablet, 1 days supply. Take 2 tablets by mouth every 12 hours for 1 day. Refills: 1, Refill as needed: no, Allow substitutions: yes  Pharmacy: [facility] - [place], [place] - [phone] - [address] [place] [address]

## 2018-09-05 ENCOUNTER — PRENATAL OFFICE VISIT (OUTPATIENT)
Dept: OBGYN | Facility: CLINIC | Age: 33
End: 2018-09-05
Payer: COMMERCIAL

## 2018-09-05 VITALS
RESPIRATION RATE: 16 BRPM | HEIGHT: 68 IN | BODY MASS INDEX: 22.37 KG/M2 | HEART RATE: 76 BPM | SYSTOLIC BLOOD PRESSURE: 120 MMHG | WEIGHT: 147.6 LBS | TEMPERATURE: 97.9 F | DIASTOLIC BLOOD PRESSURE: 81 MMHG

## 2018-09-05 DIAGNOSIS — D25.9 UTERINE LEIOMYOMA, UNSPECIFIED LOCATION: ICD-10-CM

## 2018-09-05 DIAGNOSIS — O09.521 ELDERLY MULTIGRAVIDA IN FIRST TRIMESTER: ICD-10-CM

## 2018-09-05 DIAGNOSIS — N82.3 RECTOVAGINAL FISTULA: ICD-10-CM

## 2018-09-05 DIAGNOSIS — Z34.81 ENCOUNTER FOR SUPERVISION OF OTHER NORMAL PREGNANCY IN FIRST TRIMESTER: Primary | ICD-10-CM

## 2018-09-05 LAB
ALBUMIN UR-MCNC: NEGATIVE MG/DL
APPEARANCE UR: CLEAR
BILIRUB UR QL STRIP: NEGATIVE
COLOR UR AUTO: YELLOW
GLUCOSE UR STRIP-MCNC: NEGATIVE MG/DL
HGB UR QL STRIP: NEGATIVE
KETONES UR STRIP-MCNC: ABNORMAL MG/DL
LEUKOCYTE ESTERASE UR QL STRIP: NEGATIVE
NITRATE UR QL: NEGATIVE
PH UR STRIP: 6 PH (ref 5–7)
SOURCE: ABNORMAL
SP GR UR STRIP: 1.02 (ref 1–1.03)
UROBILINOGEN UR STRIP-ACNC: 0.2 EU/DL (ref 0.2–1)

## 2018-09-05 PROCEDURE — 87086 URINE CULTURE/COLONY COUNT: CPT | Performed by: OBSTETRICS & GYNECOLOGY

## 2018-09-05 PROCEDURE — 87591 N.GONORRHOEAE DNA AMP PROB: CPT | Performed by: OBSTETRICS & GYNECOLOGY

## 2018-09-05 PROCEDURE — 76817 TRANSVAGINAL US OBSTETRIC: CPT | Performed by: OBSTETRICS & GYNECOLOGY

## 2018-09-05 PROCEDURE — 99207 ZZC FIRST OB VISIT: CPT | Performed by: OBSTETRICS & GYNECOLOGY

## 2018-09-05 PROCEDURE — 87491 CHLMYD TRACH DNA AMP PROBE: CPT | Performed by: OBSTETRICS & GYNECOLOGY

## 2018-09-05 PROCEDURE — 81003 URINALYSIS AUTO W/O SCOPE: CPT | Performed by: OBSTETRICS & GYNECOLOGY

## 2018-09-05 RX ORDER — FOLIC ACID 1 MG/1
1 TABLET ORAL DAILY
Qty: 100 TABLET | Refills: 3 | Status: SHIPPED | OUTPATIENT
Start: 2018-09-05 | End: 2019-07-09

## 2018-09-05 NOTE — NURSING NOTE
"Initial /81 (BP Location: Left arm, Patient Position: Chair, Cuff Size: Adult Regular)  Pulse 76  Temp 97.9  F (36.6  C) (Tympanic)  Resp 16  Ht 5' 7.75\" (1.721 m)  Wt 147 lb 9.6 oz (67 kg)  LMP 07/02/2018  BMI 22.61 kg/m2 Estimated body mass index is 22.61 kg/(m^2) as calculated from the following:    Height as of this encounter: 5' 7.75\" (1.721 m).    Weight as of this encounter: 147 lb 9.6 oz (67 kg). .      "

## 2018-09-05 NOTE — PROGRESS NOTES
HPI:  Ángela is a 33 year old yo    at 8w0d   weeks by LMP Patient's last menstrual period was 2018 (within weeks). who presents today for her initial OB visit.    Issues: had a sphincteroplasty this year from a fistula as a complication from a 4th degree laceration from a vacuum delivery in     Past OB Hx: see above     Father of baby: No health issues       Past Medical History:   Diagnosis Date     Cervical dysplasia     s/p LEEP      Chickenpox      Exercise-induced asthma     adolescent     IBS (irritable bowel syndrome)      Tonsillitis 2014             Past Surgical History:   Procedure Laterality Date     BIOPSY  Various    Had a few Leeps and numerous Colposcopys     COLONOSCOPY      Normal     DILATION AND CURETTAGE, HYSTEROSCOPY DIAGNOSTIC, COMBINED N/A 4/3/2018    Procedure: COMBINED DILATION AND CURETTAGE, HYSTEROSCOPY DIAGNOSTIC;  Diagnostic Hysteroscopy with Dilation and Curettage,Laparoscopy with Left Ovarian Cystectomy, Right Uteral Sacral Biopsy;  Surgeon: Sherin Frost MD;  Location: [facility] OR     EXC SWEAT GLAND LESN AXILL,SIMPL Right      LAPAROSCOPY DIAGNOSTIC (GYN) N/A 4/3/2018    Procedure: LAPAROSCOPY DIAGNOSTIC (GYN);;  Surgeon: Sherin Frost MD;  Location: [facility] OR     LEEP TX, CERVICAL      yearly paps     MOUTH SURGERY      wisdom teeth     SOFT TISSUE SURGERY  Various    infected sweat-gland- cyst removed arm,armpit,face     SPHINCTEROPLASTY RECTUM  2018    with pernineal rebuild     SURGICAL HISTORY OF -   3/2005    Tonsillectomy     SURGICAL HISTORY OF -       infected sweat gland under her arm        Family History   Problem Relation Age of Onset     Blood Disease Brother      twin brother-blood clots     Blood Disease Maternal Grandfather      blood clots     HEART DISEASE Maternal Grandfather      valve replacement     Hyperlipidemia Maternal Grandfather      Cerebrovascular Disease Maternal Grandfather      Prostate  "Cancer Maternal Grandfather      Other Cancer Maternal Grandfather      Gum/Jaw/Lymphnodes     Colon Cancer Maternal Grandfather      Mental Illness Maternal Grandfather      Alzheimer's     Respiratory Maternal Grandmother      Lung Cancer Maternal Grandmother      Diabetes Maternal Grandmother      type II     Hypertension Maternal Grandmother      Hyperlipidemia Maternal Grandmother      Depression Maternal Grandmother      Cancer Paternal Grandmother      non -hodgkins lymphoma     Lung Cancer Paternal Grandmother      Hypertension Paternal Grandmother      Hyperlipidemia Paternal Grandmother      Cerebrovascular Disease Paternal Grandmother      Diabetes Paternal Grandmother      Hypertension Father      Hyperlipidemia Father      Liver Disease Father      Hyperlipidemia Paternal Grandfather      Prostate Cancer Paternal Grandfather      Hypertension Paternal Grandfather      Colon Cancer Paternal Grandfather      Coronary Artery Disease Paternal Grandfather      Breast Cancer Mother      Stage 0, small duct carcinoma     Breast Cancer Other      2 aunt and 3 of my great aunts     Breast Cancer Cousin      Breast Cancer     Other Cancer Cousin      1\" tumor in lung, breast cancer relapse        Social History     Social History     Marital status:      Spouse name: N/A     Number of children: N/A     Years of education: N/A     Occupational History      [employer]     accounting     Social History Main Topics     Smoking status: Former Smoker     Packs/day: 0.50     Years: 15.00     Types: Cigarettes     Start date: 12/1/2014     Smokeless tobacco: Never Used      Comment: quit with pregnancy     Alcohol use 0.0 oz/week      Comment: occas-quit with pregnancy     Drug use: No     Sexual activity: Yes     Partners: Male     Birth control/ protection: Abstinence, None      Comment: We're okay if we get pregnant again ;)     Other Topics Concern     Parent/Sibling W/ Cabg, Mi Or Angioplasty Before 65f " 55m? No     Social History Narrative         Current Outpatient Prescriptions:      folic acid (FOLVITE) 1 MG tablet, Take 1 tablet (1 mg) by mouth daily, Disp: 100 tablet, Rfl: 3     Inulin (FIBER CHOICE) 1.5 g CHEW, Take 1 chew tab by mouth daily, Disp: , Rfl:      Prenatal Vit-Fe Fumarate-FA (PRENATAL MULTIVITAMIN PLUS IRON) 27-0.8 MG TABS per tablet, Take 1 tablet by mouth daily, Disp: , Rfl:      [START ON 9/27/2018] amphetamine-dextroamphetamine (ADDERALL) 10 MG per tablet, Take 2 pills (20mg) orally in the morning and two pills (20mg) at 11am and one pill (10mg) at 2pm (Patient not taking: Reported on 8/13/2018), Disp: 150 tablet, Rfl: 0     amphetamine-dextroamphetamine (ADDERALL) 10 MG per tablet, Take 2 pills (20mg) orally in the morning and two pills (20mg) at 11am and one pill (10mg) at 2pm (Patient not taking: Reported on 8/13/2018), Disp: 150 tablet, Rfl: 0     tobramycin (TOBREX) 0.3 % ophthalmic solution, Place 1-2 drops Into the left eye every 2 hours (Patient not taking: Reported on 9/5/2018), Disp: 1 Bottle, Rfl: 0      Physical Exam:   Gen: alert, well-appearing pleasant female in no apparent distress  CV: RRR  Lungs: CTA bilaterally  Abd: soft, NT, ND  Pelvic:  External genitalia normal, bartholin and skene glands normal, urethra normal.  Speculum inserted and cervix visualized and normal in appearance.  Pap and GC/Chlam collected.  On bimanual exam, uterus is 8 week size, non-tender.  No adnexal masses palpated.    Transvaginal OB US done and living locke IUP visualized with CRL = 1.58 and CA = 155.  Normal appearing anatomy and fluid.    A:  IUP at 8w0d   weeks here for her initial OB visit.    P:  1.  PNV  2.  NOB Labs   3.  Discussed routine prenatal care, quad screen, GCT, anatomy scan at ~19 weeks, frequency of visits.  4.  Discussed first trimester screen and she desires a 1st trimester screen and cell free DNA and comprehensive US  5.  Delivery hospital: [facility]  discussed  6.  RTC 4 weeks.  7.  4th degree laceration s/p fistula repaired-planned for a primary   8.  AMA-desires genetic referral   9.  Stopped the adderall-doing well off of it  10. Smoking history-smoking quit with positive pregnancy test.  Discussed risks and offered if she needs help  11. History of a LEEP    Sherin Frost MD

## 2018-09-05 NOTE — MR AVS SNAPSHOT
After Visit Summary   9/5/2018    Ángela Zurita    MRN: [id number]           Patient Information     Date Of Birth          1985        Visit Information        Provider Department      9/5/2018 2:00 PM Sherin Frost MD; [facility]        Today's Diagnoses     Encounter for supervision of other normal pregnancy in first trimester    -  1    Uterine leiomyoma, unspecified location        Rectovaginal fistula        Elderly multigravida in first trimester           Follow-ups after your visit        Additional Services     MAT FETAL MED CTR REFERRAL-PREGNANCY       Body mass index is 22.61 kg/(m^2).    >> Patient may proceed with recommendations for further testing as directed by the Maternal Fetal Medicine Specialist >>    >> If requesting Fetal Echo: MFM will determine appropriate location for exam due to indication.    >> If requesting Lung Maturity Amnio:  If results indicate fetal lung maturity, induction or C/S is recommended within 36 hours.  Please schedule accordingly.     Dear Patient:   Please be aware that coverage of these services is subject to the terms and limitations of your health insurance plan.  Call member services at your health plan with any benefit or coverage questions.      Please bring the following to your appointment:    >>  Any x-rays, CTs or MRIs which have been performed.  Contact the facility where they were done to arrange for  prior to your scheduled appointment.  Any new CT, MRI or other procedures ordered by your specialist must be performed at a [place] facility or coordinated by your clinic's referral office.  >>  List of current medications   >>  This referral request   >>  Any documents/labs given to you for this referral                  Your next 10 appointments already scheduled     Oct 01, 2018 11:00 AM CDT   Genetic Counseling with UR GEN COUNSELOR 1   [facility] Maternal Fetal Medicine -  [place] ([facility])    [address]  [facility] [address]   [phone]            Oct 01, 2018 11:45 AM CDT   MFM NUCHAL TRANSLUCENCY with [id number]   [facility] Maternal Fetal Medicine Ultrasound - [place] ([facility])    [address]  [facility] [address]   [phone]            Oct 01, 2018 12:15 PM CDT   Radiology MD with UR REN BHANDARI   [facility] Maternal Fetal Medicine - [facility])    [address]  [facility] [address]   [phone]           Please arrive at the time given for your first appointment. This visit is used internally to schedule the physician's time during your ultrasound.            Nov 16, 2018  8:00 AM CST   M US COMP with [id number]   [facility] Maternal Fetal Medicine Ultrasound - [facility])    [address]  [facility] [address]   [phone]           Wear comfortable clothes and leave your valuables at home.            Nov 16, 2018  8:30 AM CST   Radiology MD with UR REN BHANDARI   [facility] - [place] ([facility])    [address]  [facility] [address]   [phone]           Please arrive at the time given for your first appointment. This visit is used internally to schedule the physician's time during your ultrasound.              Future tests that were ordered for you today     Open Future Orders        Priority Expected Expires Ordered    [facility] Genetic Counseling Routine  9/5/2019 9/5/2018    Maternal Fetal Nuchal Translucency Routine  7/5/2019 9/5/2018    [facility] US Comprehensive Single Routine  7/5/2019 9/5/2018            Who to contact     If you have questions or need follow up information about today's clinic visit or your schedule please contact [facility] directly at [phone].  Normal or non-critical  "lab and imaging results will be communicated to you by Mitrohart, letter or phone within 4 business days after the clinic has received the results. If you do not hear from us within 7 days, please contact the clinic through ActX or phone. If you have a critical or abnormal lab result, we will notify you by phone as soon as possible.  Submit refill requests through ActX or call your pharmacy and they will forward the refill request to us. Please allow 3 business days for your refill to be completed.          Additional Information About Your Visit        MitroharKonokopia Information     ActX gives you secure access to your electronic health record. If you see a primary care provider, you can also send messages to your care team and make appointments. If you have questions, please call your primary care clinic.  If you do not have a primary care provider, please call [phone] and they will assist you.        Care EveryWhere ID     This is your Care EveryWhere ID. This could be used by other organizations to access your [facility] medical records  [id number]        Your Vitals Were     Pulse Temperature Respirations Height Last Period BMI (Body Mass Index)    76 97.9  F (36.6  C) (Tympanic) 16 5' 7.75\" (1.721 m) 07/02/2018 (Within Weeks) 22.61 kg/m2       Blood Pressure from Last 3 Encounters:   09/05/18 120/81   08/24/18 129/83   08/13/18 122/78    Weight from Last 3 Encounters:   09/05/18 147 lb 9.6 oz (67 kg)   08/24/18 145 lb (65.8 kg)   08/13/18 141 lb (64 kg)              We Performed the Following     *UA reflex to Microscopic     ABO/Rh type and screen     CBC with platelets     Chlamydia trachomatis PCR     Hepatitis B surface antigen     HIV Antigen Antibody Combo     MAT FETAL MED CTR REFERRAL-PREGNANCY     Neisseria gonorrhoeae PCR     Rubella Antibody IgG Quantitative     Treponema Abs w Reflex to RPR and Titer     Urine Culture Aerobic Bacterial     US OB < 14 Weeks Single          Today's Medication " Changes          These changes are accurate as of 9/5/18  3:58 PM.  If you have any questions, ask your nurse or doctor.               Start taking these medicines.        Dose/Directions    folic acid 1 MG tablet   Commonly known as:  FOLVITE   Used for:  Elderly multigravida in first trimester, Rectovaginal fistula, Uterine leiomyoma, unspecified location, Encounter for supervision of other normal pregnancy in first trimester   Started by:  Sherin Frost MD        Dose:  1 mg   Take 1 tablet (1 mg) by mouth daily   Quantity:  100 tablet   Refills:  3            Where to get your medicines      These medications were sent to [facility] [address]  [address] [facility] [address]     Phone:  [phone]     folic acid 1 MG tablet                Primary Care Provider Office Phone # Fax #    Randy Graf [phone] [address] [facility] [address]        Equal Access to Services     SKY BLANCHARD : Hadii jordan ku hadasho Soomaali, waaxda luqadaha, qaybta kaalmada adeegyada, waxay idiin hayjimn deborah hoskins . So [facility] [phone].    ATENCIÓN: Si habla español, tiene a reyez disposición servicios gratuitos de asistencia lingüística. Llame al [phone].    We comply with applicable federal civil rights laws and [place] laws. We do not discriminate on the basis of race, color, national origin, age, disability, sex, sexual orientation, or gender identity.            Thank you!     Thank you for choosing [facility]  for your care. Our goal is always to provide you with excellent care. Hearing back from our patients is one way we can continue to improve our services. Please take a few minutes to complete the written survey that you may receive in the mail after your visit with us. Thank you!             Your Updated Medication List - Protect others around you: Learn how to safely use, store and throw away  your medicines at www.disposemymeds.org.          This list is accurate as of 9/5/18  3:58 PM.  Always use your most recent med list.                   Brand Name Dispense Instructions for use Diagnosis    * amphetamine-dextroamphetamine 10 MG per tablet    ADDERALL    150 tablet    Take 2 pills (20mg) orally in the morning and two pills (20mg) at 11am and one pill (10mg) at 2pm    Attention deficit hyperactivity disorder (ADHD), predominantly inattentive type       * amphetamine-dextroamphetamine 10 MG per tablet   Start taking on:  9/27/2018    ADDERALL    150 tablet    Take 2 pills (20mg) orally in the morning and two pills (20mg) at 11am and one pill (10mg) at 2pm    Attention deficit hyperactivity disorder (ADHD), predominantly inattentive type       FIBER CHOICE 1.5 g Chew   Generic drug:  Inulin      Take 1 chew tab by mouth daily        folic acid 1 MG tablet    FOLVITE    100 tablet    Take 1 tablet (1 mg) by mouth daily    Elderly multigravida in first trimester, Rectovaginal fistula, Uterine leiomyoma, unspecified location, Encounter for supervision of other normal pregnancy in first trimester       prenatal multivitamin plus iron 27-0.8 MG Tabs per tablet      Take 1 tablet by mouth daily        tobramycin 0.3 % ophthalmic solution    TOBREX    1 Bottle    Place 1-2 drops Into the left eye every 2 hours        * Notice:  This list has 2 medication(s) that are the same as other medications prescribed for you. Read the directions carefully, and ask your doctor or other care provider to review them with you.

## 2018-09-06 LAB
BACTERIA SPEC CULT: NO GROWTH
C TRACH DNA SPEC QL NAA+PROBE: NEGATIVE
Lab: NORMAL
N GONORRHOEA DNA SPEC QL NAA+PROBE: NEGATIVE
SPECIMEN SOURCE: NORMAL

## 2018-09-10 ENCOUNTER — ALLIED HEALTH/NURSE VISIT (OUTPATIENT)
Dept: FAMILY MEDICINE | Facility: CLINIC | Age: 33
End: 2018-09-10
Payer: COMMERCIAL

## 2018-09-10 ENCOUNTER — TELEPHONE (OUTPATIENT)
Dept: OBGYN | Facility: CLINIC | Age: 33
End: 2018-09-10

## 2018-09-10 DIAGNOSIS — Z23 NEED FOR PROPHYLACTIC VACCINATION AND INOCULATION AGAINST INFLUENZA: Primary | ICD-10-CM

## 2018-09-10 PROCEDURE — 90686 IIV4 VACC NO PRSV 0.5 ML IM: CPT

## 2018-09-10 PROCEDURE — 99207 ZZC NO CHARGE NURSE ONLY: CPT

## 2018-09-10 PROCEDURE — 90471 IMMUNIZATION ADMIN: CPT

## 2018-09-10 NOTE — MR AVS SNAPSHOT
After Visit Summary   9/10/2018    Ángela Zurita    MRN: [id number]           Patient Information     Date Of Birth          1985        Visit Information        Provider Department      9/10/2018 4:00 PM [facility] FLU SHOT [facility]        Today's Diagnoses     Need for prophylactic vaccination and inoculation against influenza    -  1       Follow-ups after your visit        Your next 10 appointments already scheduled     Oct 01, 2018 11:00 AM CDT   Genetic Counseling with CADY GEN COUNSELOR 1   [facility] Maternal Fetal Medicine - [facility])    [address]  [facility] [address]   [phone]            Oct 01, 2018 11:45 AM CDT   MFM NUCHAL TRANSLUCENCY with [id number]   [facility] Maternal Fetal Medicine Ultrasound - [facility])    [address]  [facility] [address] [phone]            Oct 01, 2018 12:15 PM CDT   Radiology MD with CADY MCCLURE MD   [facility] Maternal Fetal Medicine - [facility])    [address]  [facility] [address]   [phone]           Please arrive at the time given for your first appointment. This visit is used internally to schedule the physician's time during your ultrasound.            Oct 04, 2018  4:15 PM CDT   MyChart OB-GYN Established Prenatal with Sherin Frost MD   [facility]    [address] [facility] [address]   [phone]            Nov 16, 2018  8:00 AM CST   MFM US COMP with [id number]   [facility] Maternal Fetal Medicine Ultrasound - [place] ([facility])    [address]  [facility] [address] [phone]           Wear comfortable clothes and leave your valuables at home.            Nov 16, 2018  8:30 AM CST   Radiology MD with CADY MCCLURE MD   [facility]  Maternal Fetal Medicine [facility] ([facility])    [address]  [place] [address]   [phone]           Please arrive at the time given for your first appointment. This visit is used internally to schedule the physician's time during your ultrasound.              Who to contact     If you have questions or need follow up information about today's clinic visit or your schedule please contact [facility] directly at [phone].  Normal or non-critical lab and imaging results will be communicated to you by Rodatihart, letter or phone within 4 business days after the clinic has received the results. If you do not hear from us within 7 days, please contact the clinic through [employer] or phone. If you have a critical or abnormal lab result, we will notify you by phone as soon as possible.  Submit refill requests through finalsite or call your pharmacy and they will forward the refill request to us. Please allow 3 business days for your refill to be completed.          Additional Information About Your Visit        Rodatihart Information     finalsite gives you secure access to your electronic health record. If you see a primary care provider, you can also send messages to your care team and make appointments. If you have questions, please call your primary care clinic.  If you do not have a primary care provider, please call [phone] and they will assist you.        Care EveryWhere ID     This is your Care EveryWhere ID. This could be used by other organizations to access your [place] medical records  [id number]        Your Vitals Were     Last Period                   07/02/2018 (Within Weeks)            Blood Pressure from Last 3 Encounters:   09/05/18 120/81   08/24/18 129/83   08/13/18 122/78    Weight from Last 3 Encounters:   09/05/18 147 lb 9.6 oz (67 kg)   08/24/18 145 lb (65.8 kg)   08/13/18 141 lb (64 kg)              We Performed the Following     FLU  VACCINE, SPLIT VIRUS, IM (QUADRIVALENT) [06605]- >3 YRS     Vaccine Administration, Initial [52821]        Primary Care Provider Office Phone # Fax #    Randy Graf [phone] [address] [facility] [address]        Equal Access to Services     SKY BLANCHARD : Hadii aad ku hadasho Soomaali, waaxda luqadaha, qaybta kaalmada adeegyada, celia martinezin hayaan adelon jorifrancis hoskins . So [facility] [phone].    ATENCIÓN: Si habla español, tiene a reyez disposición servicios gratuitos de asistencia lingüística. Jovany al [phone].    We comply with applicable federal civil rights laws and [place] laws. We do not discriminate on the basis of race, color, national origin, age, disability, sex, sexual orientation, or gender identity.            Thank you!     Thank you for choosing [facility]  for your care. Our goal is always to provide you with excellent care. Hearing back from our patients is one way we can continue to improve our services. Please take a few minutes to complete the written survey that you may receive in the mail after your visit with us. Thank you!             Your Updated Medication List - Protect others around you: Learn how to safely use, store and throw away your medicines at www.disposemymeds.org.          This list is accurate as of 9/10/18  4:14 PM.  Always use your most recent med list.                   Brand Name Dispense Instructions for use Diagnosis    * amphetamine-dextroamphetamine 10 MG per tablet    ADDERALL    150 tablet    Take 2 pills (20mg) orally in the morning and two pills (20mg) at 11am and one pill (10mg) at 2pm    Attention deficit hyperactivity disorder (ADHD), predominantly inattentive type       * amphetamine-dextroamphetamine 10 MG per tablet   Start taking on:  9/27/2018    ADDERALL    150 tablet    Take 2 pills (20mg) orally in the morning and two pills (20mg) at 11am and one pill (10mg) at 2pm    Attention deficit hyperactivity  disorder (ADHD), predominantly inattentive type       FIBER CHOICE 1.5 g Chew   Generic drug:  Inulin      Take 1 chew tab by mouth daily        folic acid 1 MG tablet    FOLVITE    100 tablet    Take 1 tablet (1 mg) by mouth daily    Elderly multigravida in first trimester, Rectovaginal fistula, Uterine leiomyoma, unspecified location, Encounter for supervision of other normal pregnancy in first trimester       prenatal multivitamin plus iron 27-0.8 MG Tabs per tablet      Take 1 tablet by mouth daily        tobramycin 0.3 % ophthalmic solution    TOBREX    1 Bottle    Place 1-2 drops Into the left eye every 2 hours        * Notice:  This list has 2 medication(s) that are the same as other medications prescribed for you. Read the directions carefully, and ask your doctor or other care provider to review them with you.

## 2018-09-10 NOTE — TELEPHONE ENCOUNTER
"Return call to patient.  Spoke with patient on the phone.    S-(situation): Patient reports symptoms over the past couple of days, come and go, don't last long. Patient is not concerned and does not think she needs medical attention but wants to \" throw it out there\" that this is happening. Patient reports some dizziness with standing up and moving around. Patient reports sometimes she sees \" little stars for a few seconds.\" Patient reports when this happens and she sits down her pulse is in the 100-110's. Patient reports bouts of heartburn and onset of slight headache. Patient checked her blood pressure yesterday, reports \"106/something.\" Patient reports being at a wedding and on a bus this past weekend and had nothing to drink for 6 hours. Patient reports she was not able to find \" any water, it was all alcohol.\" Patient denies bright red bleeding or abnormal vaginal discharge. No change to urinary habits - urine culture 9/5/18 - NIL.    B-(background): 1st trimester pregnancy, 8W5D. Last clinic visit 9/5/18.    A-(assessment): 1st trimester pregnancy    R-(recommendations): Reassurance provided. Talked through each symptoms with patient. Recommended increasing oral fluids and pushing extra water, can try Gatorade or Powerade,  Good nutrition- snack or small meal every couple of hours, Tylenol per package directions for the headache, Tums can be tried for the heartburn. Reviewed with patient elevation in hormone level due to the pregnancy can cause the symptoms that she is describing. Monitor movements and move slowly and deliberately. Call back with further questions or concerns.    Pt in agreement and reports understanding.    Radha Holliday   [facility] Clinic  RN      "

## 2018-09-10 NOTE — PROGRESS NOTES

## 2018-09-10 NOTE — TELEPHONE ENCOUNTER
Reason for Call:  Other questions    Detailed comments: Pt has been  light headed, fast pulse, heartburn , 9 weeks pregnant, has some concerns, please call    Phone Number Patient can be reached at: Home number on file [phone] (home)    Best Time: today    Can we leave a detailed message on this number? YES    Call taken on 9/10/2018 at 1:07 PM by Marlen Monteiro

## 2018-09-12 DIAGNOSIS — Z34.81 ENCOUNTER FOR SUPERVISION OF OTHER NORMAL PREGNANCY IN FIRST TRIMESTER: ICD-10-CM

## 2018-09-12 LAB
ABO + RH BLD: NORMAL
ABO + RH BLD: NORMAL
BLD GP AB SCN SERPL QL: NORMAL
BLOOD BANK CMNT PATIENT-IMP: NORMAL
ERYTHROCYTE [DISTWIDTH] IN BLOOD BY AUTOMATED COUNT: 11.9 % (ref 10–15)
HCT VFR BLD AUTO: 37.3 % (ref 35–47)
HGB BLD-MCNC: 13.1 G/DL (ref 11.7–15.7)
MCH RBC QN AUTO: 32.3 PG (ref 26.5–33)
MCHC RBC AUTO-ENTMCNC: 35.1 G/DL (ref 31.5–36.5)
MCV RBC AUTO: 92 FL (ref 78–100)
PLATELET # BLD AUTO: 339 10E9/L (ref 150–450)
RBC # BLD AUTO: 4.05 10E12/L (ref 3.8–5.2)
SPECIMEN EXP DATE BLD: NORMAL
WBC # BLD AUTO: 13.1 10E9/L (ref 4–11)

## 2018-09-12 PROCEDURE — 86901 BLOOD TYPING SEROLOGIC RH(D): CPT | Performed by: OBSTETRICS & GYNECOLOGY

## 2018-09-12 PROCEDURE — 86900 BLOOD TYPING SEROLOGIC ABO: CPT | Performed by: OBSTETRICS & GYNECOLOGY

## 2018-09-12 PROCEDURE — 87340 HEPATITIS B SURFACE AG IA: CPT | Performed by: OBSTETRICS & GYNECOLOGY

## 2018-09-12 PROCEDURE — 85027 COMPLETE CBC AUTOMATED: CPT | Performed by: OBSTETRICS & GYNECOLOGY

## 2018-09-12 PROCEDURE — 36415 COLL VENOUS BLD VENIPUNCTURE: CPT | Performed by: OBSTETRICS & GYNECOLOGY

## 2018-09-12 PROCEDURE — 86850 RBC ANTIBODY SCREEN: CPT | Performed by: OBSTETRICS & GYNECOLOGY

## 2018-09-12 PROCEDURE — 86762 RUBELLA ANTIBODY: CPT | Performed by: OBSTETRICS & GYNECOLOGY

## 2018-09-12 PROCEDURE — 86780 TREPONEMA PALLIDUM: CPT | Performed by: OBSTETRICS & GYNECOLOGY

## 2018-09-12 PROCEDURE — 87389 HIV-1 AG W/HIV-1&-2 AB AG IA: CPT | Performed by: OBSTETRICS & GYNECOLOGY

## 2018-09-13 LAB
HBV SURFACE AG SERPL QL IA: NONREACTIVE
HIV 1+2 AB+HIV1 P24 AG SERPL QL IA: NONREACTIVE
RUBV IGG SERPL IA-ACNC: 9 IU/ML
T PALLIDUM AB SER QL: NONREACTIVE

## 2018-09-18 ENCOUNTER — OFFICE VISIT (OUTPATIENT)
Dept: FAMILY MEDICINE | Facility: CLINIC | Age: 33
End: 2018-09-18
Payer: COMMERCIAL

## 2018-09-18 VITALS
TEMPERATURE: 98.5 F | SYSTOLIC BLOOD PRESSURE: 104 MMHG | HEART RATE: 96 BPM | WEIGHT: 148 LBS | BODY MASS INDEX: 24.66 KG/M2 | RESPIRATION RATE: 18 BRPM | DIASTOLIC BLOOD PRESSURE: 68 MMHG | HEIGHT: 65 IN

## 2018-09-18 DIAGNOSIS — J02.9 VIRAL PHARYNGITIS: ICD-10-CM

## 2018-09-18 DIAGNOSIS — R07.0 THROAT PAIN: Primary | ICD-10-CM

## 2018-09-18 LAB
DEPRECATED S PYO AG THROAT QL EIA: NORMAL
SPECIMEN SOURCE: NORMAL

## 2018-09-18 PROCEDURE — 87880 STREP A ASSAY W/OPTIC: CPT | Performed by: PHYSICIAN ASSISTANT

## 2018-09-18 PROCEDURE — 99213 OFFICE O/P EST LOW 20 MIN: CPT | Performed by: PHYSICIAN ASSISTANT

## 2018-09-18 PROCEDURE — 87081 CULTURE SCREEN ONLY: CPT | Performed by: PHYSICIAN ASSISTANT

## 2018-09-18 ASSESSMENT — ENCOUNTER SYMPTOMS
WOUND: 0
ENDOCRINE NEGATIVE: 1
CARDIOVASCULAR NEGATIVE: 1
CHILLS: 0
DIZZINESS: 0
NECK PAIN: 0
PALPITATIONS: 0
HEMATOLOGIC/LYMPHATIC NEGATIVE: 1
BRUISES/BLEEDS EASILY: 0
SORE THROAT: 1
ARTHRALGIAS: 0
VOMITING: 0
WEAKNESS: 0
HEADACHES: 0
ALLERGIC/IMMUNOLOGIC NEGATIVE: 1
DIARRHEA: 0
NAUSEA: 0
MYALGIAS: 0
LIGHT-HEADEDNESS: 0
EYES NEGATIVE: 1
BACK PAIN: 0
COUGH: 0
FEVER: 0
MUSCULOSKELETAL NEGATIVE: 1
RHINORRHEA: 0
NECK STIFFNESS: 0
JOINT SWELLING: 0
RESPIRATORY NEGATIVE: 1
SHORTNESS OF BREATH: 0

## 2018-09-18 NOTE — MR AVS SNAPSHOT
After Visit Summary   9/18/2018    Ángela Zurita    MRN: [id number]           Patient Information     Date Of Birth          1985        Visit Information        Provider Department      9/18/2018 1:00 PM Efren Vasquez PA-C [facility]        Today's Diagnoses     Throat pain    -  1    Viral pharyngitis           Follow-ups after your visit        Your next 10 appointments already scheduled     Oct 01, 2018  8:00 AM CDT   Genetic Counseling with CADY GEN COUNSELOR 1   [facility] - [facility])    [address]  [facility] [address]   [phone]            Oct 01, 2018  8:45 AM CDT   MFM NUCHAL TRANSLUCENCY with [id number]   [facility] - [facility])    [address]  [facility] [id number] [phone]            Oct 01, 2018  9:15 AM CDT   Radiology MD with CADY MCCLURE MD   [facility] - [facility])    [address]  [facility] [address]   [phone]           Please arrive at the time given for your first appointment. This visit is used internally to schedule the physician's time during your ultrasound.            Oct 04, 2018  4:15 PM CDT   MyChart OB-GYN Established Prenatal with Sherin Frost MD   [facility]    [address] [facility] [address]   [phone]            Nov 16, 2018  8:00 AM CST   MFM US COMP with [id number]   [facility]etal [facility] Ultrasound - [place] ([facility])    [address]  [facility] [id number] [phone]           Wear comfortable clothes and leave your valuables at home.            Nov 16, 2018  8:30 AM CST   Radiology MD with CADY GARBEReal Maternal Fetal Medicine -  "[place] ([facility])    [address]  [facility] [address]   [phone]           Please arrive at the time given for your first appointment. This visit is used internally to schedule the physician's time during your ultrasound.              Who to contact     If you have questions or need follow up information about today's clinic visit or your schedule please contact [facility] directly at [phone].  Normal or non-critical lab and imaging results will be communicated to you by Opentopichart, letter or phone within 4 business days after the clinic has received the results. If you do not hear from us within 7 days, please contact the clinic through Pacinian or phone. If you have a critical or abnormal lab result, we will notify you by phone as soon as possible.  Submit refill requests through Pacinian or call your pharmacy and they will forward the refill request to us. Please allow 3 business days for your refill to be completed.          Additional Information About Your Visit        Pacinian Information     Pacinian gives you secure access to your electronic health record. If you see a primary care provider, you can also send messages to your care team and make appointments. If you have questions, please call your primary care clinic.  If you do not have a primary care provider, please call [phone] and they will assist you.        Care EveryWhere ID     This is your Care EveryWhere ID. This could be used by other organizations to access your [employer] medical records  [id number]        Your Vitals Were     Pulse Temperature Respirations Height Last Period BMI (Body Mass Index)    96 98.5  F (36.9  C) (Tympanic) 18 5' 5\" (1.651 m) 07/02/2018 (Within Weeks) 24.63 kg/m2       Blood Pressure from Last 3 Encounters:   09/18/18 104/68   09/05/18 120/81   08/24/18 129/83    Weight from Last 3 Encounters:   09/18/18 148 lb (67.1 kg)   09/05/18 147 lb 9.6 oz " (67 kg)   08/24/18 145 lb (65.8 kg)              We Performed the Following     Beta strep group A culture     Strep, Rapid Screen        Primary Care Provider Office Phone # Fax #    Randy Graf [phone] [address] [facility] [address]        Equal Access to Services     SKY BLANCHARD : Hadii aad ku hadasho Soomaali, waaxda luqadaha, qaybta kaalmada adeegyada, waxay idiin hayaan adeeg kharash laLeahaan . So [facility] [phone].    ATENCIÓN: Si habla español, tiene a reyez disposición servicios gratuitos de asistencia lingüística. Llame al [phone].    We comply with applicable federal civil rights laws and [place] laws. We do not discriminate on the basis of race, color, national origin, age, disability, sex, sexual orientation, or gender identity.            Thank you!     Thank you for choosing [facility]  for your care. Our goal is always to provide you with excellent care. Hearing back from our patients is one way we can continue to improve our services. Please take a few minutes to complete the written survey that you may receive in the mail after your visit with us. Thank you!             Your Updated Medication List - Protect others around you: Learn how to safely use, store and throw away your medicines at www.disposemymeds.org.          This list is accurate as of 9/18/18  1:42 PM.  Always use your most recent med list.                   Brand Name Dispense Instructions for use Diagnosis    * amphetamine-dextroamphetamine 10 MG per tablet    ADDERALL    150 tablet    Take 2 pills (20mg) orally in the morning and two pills (20mg) at 11am and one pill (10mg) at 2pm    Attention deficit hyperactivity disorder (ADHD), predominantly inattentive type       * amphetamine-dextroamphetamine 10 MG per tablet   Start taking on:  9/27/2018    ADDERALL    150 tablet    Take 2 pills (20mg) orally in the morning and two pills (20mg) at 11am and one pill (10mg) at  2pm    Attention deficit hyperactivity disorder (ADHD), predominantly inattentive type       FIBER CHOICE 1.5 g Chew   Generic drug:  Inulin      Take 1 chew tab by mouth daily        folic acid 1 MG tablet    FOLVITE    100 tablet    Take 1 tablet (1 mg) by mouth daily    Elderly multigravida in first trimester, Rectovaginal fistula, Uterine leiomyoma, unspecified location, Encounter for supervision of other normal pregnancy in first trimester       prenatal multivitamin plus iron 27-0.8 MG Tabs per tablet      Take 1 tablet by mouth daily        * Notice:  This list has 2 medication(s) that are the same as other medications prescribed for you. Read the directions carefully, and ask your doctor or other care provider to review them with you.

## 2018-09-18 NOTE — NURSING NOTE
"Chief Complaint   Patient presents with     Pharyngitis       Initial /68 (BP Location: Right arm, Patient Position: Chair, Cuff Size: Adult Regular)  Pulse 96  Temp 98.5  F (36.9  C) (Tympanic)  Resp 18  Ht 5' 5\" (1.651 m)  Wt 148 lb (67.1 kg)  LMP 07/02/2018 (Within Weeks)  BMI 24.63 kg/m2 Estimated body mass index is 24.63 kg/(m^2) as calculated from the following:    Height as of this encounter: 5' 5\" (1.651 m).    Weight as of this encounter: 148 lb (67.1 kg).    Patient presents to the clinic using No DME    Health Maintenance that is potentially due pending provider review:  NONE    n/a    Jackelyn Castillo MA Student      "

## 2018-09-18 NOTE — PROGRESS NOTES
Chief Complaint    Chief Complaint   Patient presents with     Pharyngitis       HPI  Ángela Zurita is an 33 year old female who presents for evaluation and treatment of sore throat.  Onset 2 days, unchanged since that time. Known Strep exposure: none.    Other symptoms include congestion.    Patient is eating well with no problems swallowing.  Patient is urinating regularly.     ROS:      Review of Systems   Constitutional: Negative for chills and fever.   HENT: Positive for sore throat. Negative for congestion, ear pain and rhinorrhea.    Eyes: Negative.    Respiratory: Negative.  Negative for cough and shortness of breath.    Cardiovascular: Negative.  Negative for chest pain and palpitations.   Gastrointestinal: Negative for diarrhea, nausea and vomiting.   Endocrine: Negative.    Genitourinary: Negative.    Musculoskeletal: Negative.  Negative for arthralgias, back pain, joint swelling, myalgias, neck pain and neck stiffness.   Skin: Negative.  Negative for rash and wound.   Allergic/Immunologic: Negative.  Negative for immunocompromised state.   Neurological: Negative for dizziness, weakness, light-headedness and headaches.   Hematological: Negative.  Does not bruise/bleed easily.        Respiratory History  occasional episodes of bronchitis     Family History   Family History   Problem Relation Age of Onset     Blood Disease Brother      twin brother-blood clots     Blood Disease Maternal Grandfather      blood clots     HEART DISEASE Maternal Grandfather      valve replacement     Hyperlipidemia Maternal Grandfather      Cerebrovascular Disease Maternal Grandfather      Prostate Cancer Maternal Grandfather      Other Cancer Maternal Grandfather      Gum/Jaw/Lymphnodes     Colon Cancer Maternal Grandfather      Mental Illness Maternal Grandfather      Alzheimer's     Respiratory Maternal Grandmother      Lung Cancer Maternal Grandmother      Diabetes Maternal Grandmother      type II     Hypertension  "Maternal Grandmother      Hyperlipidemia Maternal Grandmother      Depression Maternal Grandmother      Cancer Paternal Grandmother      non -hodgkins lymphoma     Lung Cancer Paternal Grandmother      Hypertension Paternal Grandmother      Hyperlipidemia Paternal Grandmother      Cerebrovascular Disease Paternal Grandmother      Diabetes Paternal Grandmother      Hypertension Father      Hyperlipidemia Father      Liver Disease Father      Hyperlipidemia Paternal Grandfather      Prostate Cancer Paternal Grandfather      Hypertension Paternal Grandfather      Colon Cancer Paternal Grandfather      Coronary Artery Disease Paternal Grandfather      Breast Cancer Mother      Stage 0, small duct carcinoma     Breast Cancer Other      2 aunt and 3 of my great aunts     Breast Cancer Cousin      Breast Cancer     Other Cancer Cousin      1\" tumor in lung, breast cancer relapse        Problem history  Patient Active Problem List   Diagnosis     Urgency-frequency syndrome     Fibroid uterus     Health Care Home     Rectovaginal fistula     CARDIOVASCULAR SCREENING; LDL GOAL LESS THAN 130     Oral herpes     Attention deficit hyperactivity disorder (ADHD), predominantly inattentive type     Posterior neck pain     Left ovarian cyst     Prenatal care, subsequent pregnancy        Allergies  No Known Allergies     Social History  Social History     Social History     Marital status:      Spouse name: N/A     Number of children: N/A     Years of education: N/A     Occupational History      [employer]     accounting     Social History Main Topics     Smoking status: Former Smoker     Packs/day: 0.50     Years: 15.00     Types: Cigarettes     Start date: 12/1/2014     Smokeless tobacco: Never Used      Comment: quit with pregnancy     Alcohol use 0.0 oz/week      Comment: occas-quit with pregnancy     Drug use: No     Sexual activity: Yes     Partners: Male     Birth control/ protection: Abstinence, None      " "Comment: We're okay if we get pregnant again ;)     Other Topics Concern     Parent/Sibling W/ Cabg, Mi Or Angioplasty Before 65f 55m? No     Social History Narrative        Current Meds    Current Outpatient Prescriptions:      folic acid (FOLVITE) 1 MG tablet, Take 1 tablet (1 mg) by mouth daily, Disp: 100 tablet, Rfl: 3     Inulin (FIBER CHOICE) 1.5 g CHEW, Take 1 chew tab by mouth daily, Disp: , Rfl:      Prenatal Vit-Fe Fumarate-FA (PRENATAL MULTIVITAMIN PLUS IRON) 27-0.8 MG TABS per tablet, Take 1 tablet by mouth daily, Disp: , Rfl:      [START ON 9/27/2018] amphetamine-dextroamphetamine (ADDERALL) 10 MG per tablet, Take 2 pills (20mg) orally in the morning and two pills (20mg) at 11am and one pill (10mg) at 2pm (Patient not taking: Reported on 8/13/2018), Disp: 150 tablet, Rfl: 0     amphetamine-dextroamphetamine (ADDERALL) 10 MG per tablet, Take 2 pills (20mg) orally in the morning and two pills (20mg) at 11am and one pill (10mg) at 2pm (Patient not taking: Reported on 8/13/2018), Disp: 150 tablet, Rfl: 0    OBJECTIVE     Vital signs noted and reviewed by Efren Vasquez  /68 (BP Location: Right arm, Patient Position: Chair, Cuff Size: Adult Regular)  Pulse 96  Temp 98.5  F (36.9  C) (Tympanic)  Resp 18  Ht 5' 5\" (1.651 m)  Wt 148 lb (67.1 kg)  LMP 07/02/2018 (Within Weeks)  BMI 24.63 kg/m2     PEFR:  General appearance: healthy, alert and no distress  Ears: R TM - normal: no effusions, no erythema, and normal landmarks, L TM - normal: no effusions, no erythema, and normal landmarks  Eyes: R normal, L normal  Nose: normal  Oropharynx: mild erythema  Neck: supple and no adenopathy  Lungs: normal and clear to auscultation  Heart: S1, S2 normal, no murmur, click, rub or gallop, regular rate and rhythm  Abdomen: Abdomen soft, non-tender without masses or organomegaly        Labs:     Results for orders placed or performed in visit on 09/18/18   Strep, Rapid Screen   Result Value Ref Range    " Specimen Description Throat     Rapid Strep A Screen       NEGATIVE: No Group A streptococcal antigen detected by immunoassay, await culture report.        ASSESSMENT:     (R07.0) Throat pain  (primary encounter diagnosis)  Plan: Strep, Rapid Screen, Beta strep group A culture       (J02.9) Viral pharyngitis       PLAN:    Strep screen was negative and communicated to patient. We will call with culture results only if positive.  Symptomatic treatment with fluids, vaporizer, acetaminophen,salt water gargles, and ibuprofen.  Follow up with PCP as needed for persistence, worsening, appearance of new symptoms.  Contagion reviewed.  Out of work/school until feeling better, or no fever without antipyretics for 24 hours.  Patient verbalized understanding and agreed with this plan.        Efren Vasquez  9/18/2018, 1:33 PM

## 2018-09-19 LAB
BACTERIA SPEC CULT: NORMAL
SPECIMEN SOURCE: NORMAL

## 2018-09-20 ENCOUNTER — OFFICE VISIT (OUTPATIENT)
Dept: FAMILY MEDICINE | Facility: CLINIC | Age: 33
End: 2018-09-20
Payer: COMMERCIAL

## 2018-09-20 ENCOUNTER — NURSE TRIAGE (OUTPATIENT)
Dept: NURSING | Facility: CLINIC | Age: 33
End: 2018-09-20

## 2018-09-20 VITALS
SYSTOLIC BLOOD PRESSURE: 120 MMHG | DIASTOLIC BLOOD PRESSURE: 80 MMHG | RESPIRATION RATE: 16 BRPM | WEIGHT: 149.2 LBS | HEART RATE: 122 BPM | BODY MASS INDEX: 24.83 KG/M2 | TEMPERATURE: 98.5 F | OXYGEN SATURATION: 97 %

## 2018-09-20 DIAGNOSIS — J18.9 PNEUMONIA OF BOTH LUNGS DUE TO INFECTIOUS ORGANISM, UNSPECIFIED PART OF LUNG: Primary | ICD-10-CM

## 2018-09-20 PROCEDURE — 99213 OFFICE O/P EST LOW 20 MIN: CPT | Performed by: FAMILY MEDICINE

## 2018-09-20 ASSESSMENT — PAIN SCALES - GENERAL: PAINLEVEL: MODERATE PAIN (4)

## 2018-09-20 NOTE — PATIENT INSTRUCTIONS
(J18.9) Pneumonia of both lungs due to infectious organism, unspecified part of lung  (primary encounter diagnosis)  Comment:   Plan: amoxicillin-clavulanate (AUGMENTIN) 875-125 MG         per tablet        We discussed the infection and stay well hydrated and do small amounts of activities and gradually increase them. Augmentin at 875 mg twice daily for 10 days.   Call if any side effects or if not better. This will take several weeks to fully resolve.

## 2018-09-20 NOTE — TELEPHONE ENCOUNTER
Reason for Disposition    [1] Coughed up blood-tinged sputum AND [2] more than once    Additional Information    Negative: Severe difficulty breathing (e.g., struggling for each breath, speaks in single words)    Negative: Bluish lips, tongue, or face now    Negative: [1] Difficulty breathing AND [2] exposure to flames, smoke, or fumes    Negative: [1] Stridor AND [2] difficulty breathing    Negative: Sounds like a life-threatening emergency to the triager    Negative: [1] Previous asthma attacks AND [2] this feels like asthma attack    Negative: Dry (non-productive) cough (i.e., no sputum or minimal clear sputum)    Negative: Chest pain  (Exception: MILD central chest pain, present only when coughing)    Negative: Difficulty breathing    Negative: Patient sounds very sick or weak to the triager    Negative: [1] Coughed up blood AND [2] > 1 tablespoon (15 ml) (Exception: blood-tinged sputum)    Negative: Fever > 103 F (39.4 C)    Negative: [1] Fever > 101 F (38.3 C) AND [2] age > 60    Negative: [1] Fever > 101 F (38.3 C) AND [2] bedridden (e.g., nursing home patient, CVA, chronic illness, recovering from surgery)    Negative: [1] Fever > 100.5 F (38.1 C) AND [2] diabetes mellitus or weak immune system (e.g., HIV positive, cancer chemo, splenectomy, organ transplant, chronic steroids)    Negative: Wheezing is present    Negative: SEVERE coughing spells (e.g., whooping sound after coughing, vomiting after coughing)    Negative: [1] Continuous (nonstop) coughing interferes with work or school AND [2] no improvement using cough treatment per Care Advice    Negative: Coughing up lisa-colored (reddish-brown) sputum    Negative: Fever present > 3 days (72 hours)    Negative: [1] Fever returns after gone for over 24 hours AND [2] symptoms worse or not improved    Negative: [1] Sinus pain (around cheekbone or eye) AND [2] present > 24 hours using nasal washes and pain meds    Negative: Earache    Negative: [1] Known COPD  or other severe lung disease (i.e., bronchiectasis, cystic fibrosis, lung surgery) AND [2] worsening symptoms (i.e., increased sputum purulence or amount, increased breathing difficulty    Protocols used: COUGH - ACUTE PRODUCTIVE-ADULT-AH  Caller states she has a cough and coughed up blood tinge mucus. Has seen a provider for symptoms earlier this week. Caller denies any fever. Triage guidelines recommend to see PCP within 3 days. Caller verbalized and understands directives. Caller states she is 11 weeks pregnant.

## 2018-09-20 NOTE — NURSING NOTE
"Chief Complaint   Patient presents with     Cough     fatigue & sore throat started sunday, productive red cough and congestion started wednesday. Heart burn. RST neg. on tuesday, 9/18       Initial /80 (BP Location: Left arm, Patient Position: Chair, Cuff Size: Adult Regular)  Pulse 122  Temp 98.5  F (36.9  C) (Tympanic)  Resp 16  Wt 149 lb 3.2 oz (67.7 kg)  LMP 07/02/2018 (Within Weeks)  SpO2 97%  BMI 24.83 kg/m2 Estimated body mass index is 24.83 kg/(m^2) as calculated from the following:    Height as of 9/18/18: 5' 5\" (1.651 m).    Weight as of this encounter: 149 lb 3.2 oz (67.7 kg).    Medication Reconciliation: complete  Kristi Nieves MA    "

## 2018-09-20 NOTE — PROGRESS NOTES
SUBJECTIVE:   Ángela Zurita is a 33 year old female who presents to clinic today for the following health issues:    Chief Complaint   Patient presents with     Cough     fatigue & sore throat started sunday, productive red cough and congestion started wednesday. Heart burn. RST neg. on tuesday, 9/18     ENT Symptoms             Symptoms: cc Present Absent Comment   Fever/Chills   x    Fatigue  x     Muscle Aches  x     Eye Irritation   x    Sneezing   x    Nasal Jw/Drg  x     Sinus Pressure/Pain  x     Loss of smell   x    Dental pain   x    Sore Throat  x     Swollen Glands  x     Ear Pain/Fullness   x    Cough  x     Wheeze  x     Chest Pain  x     Shortness of breath  x  With activity    Rash   x    Other   x      Symptom duration: ~5 days   Symptom severity:  moderate   Treatments tried:  tylenol -pregnant    Contacts:  unknown          Current Outpatient Prescriptions:      folic acid (FOLVITE) 1 MG tablet, Take 1 tablet (1 mg) by mouth daily, Disp: 100 tablet, Rfl: 3     Inulin (FIBER CHOICE) 1.5 g CHEW, Take 1 chew tab by mouth daily, Disp: , Rfl:      Prenatal Vit-Fe Fumarate-FA (PRENATAL MULTIVITAMIN PLUS IRON) 27-0.8 MG TABS per tablet, Take 1 tablet by mouth daily, Disp: , Rfl:      [START ON 9/27/2018] amphetamine-dextroamphetamine (ADDERALL) 10 MG per tablet, Take 2 pills (20mg) orally in the morning and two pills (20mg) at 11am and one pill (10mg) at 2pm (Patient not taking: Reported on 8/13/2018), Disp: 150 tablet, Rfl: 0     amphetamine-dextroamphetamine (ADDERALL) 10 MG per tablet, Take 2 pills (20mg) orally in the morning and two pills (20mg) at 11am and one pill (10mg) at 2pm (Patient not taking: Reported on 8/13/2018), Disp: 150 tablet, Rfl: 0    Patient Active Problem List   Diagnosis     Urgency-frequency syndrome     Fibroid uterus     Health Care Home     Rectovaginal fistula     CARDIOVASCULAR SCREENING; LDL GOAL LESS THAN 130     Oral herpes     Attention deficit hyperactivity  disorder (ADHD), predominantly inattentive type     Posterior neck pain     Left ovarian cyst     Prenatal care, subsequent pregnancy       Blood pressure 120/80, pulse 122, temperature 98.5  F (36.9  C), temperature source Tympanic, resp. rate 16, weight 149 lb 3.2 oz (67.7 kg), last menstrual period 07/02/2018, SpO2 97 %, not currently breastfeeding.    Exam:  GENERAL APPEARANCE: alert, mild distress, cooperative and flushed  EYES: EOMI,  PERRL  HENT: ear canals and TM's normal and nose and mouth without ulcers or lesions  NECK: no adenopathy, no asymmetry, masses, or scars and thyroid normal to palpation  RESP: rales bilateral  CV: regular rates and rhythm, normal S1 S2, no S3 or S4 and no murmur, click or rub -  PSYCH: mentation appears normal and affect normal/bright      (J18.9) Pneumonia of both lungs due to infectious organism, unspecified part of lung  (primary encounter diagnosis)  Comment:   Plan: amoxicillin-clavulanate (AUGMENTIN) 875-125 MG         per tablet        We discussed the infection and stay well hydrated and do small amounts of activities and gradually increase them. Augmentin at 875 mg twice daily for 10 days.   Call if any side effects or if not better. This will take several weeks to fully resolve.     Kushal Zimmer

## 2018-09-20 NOTE — MR AVS SNAPSHOT
After Visit Summary   9/20/2018    Ángela Zurita    MRN: [id number]           Patient Information     Date Of Birth          1985        Visit Information        Provider Department      9/20/2018 7:40 AM Kushal Zimmer MD [facility]        Today's Diagnoses     Pneumonia of both lungs due to infectious organism, unspecified part of lung    -  1      Care Instructions    (J18.9) Pneumonia of both lungs due to infectious organism, unspecified part of lung  (primary encounter diagnosis)  Comment:   Plan: amoxicillin-clavulanate (AUGMENTIN) 875-125 MG         per tablet        We discussed the infection and stay well hydrated and do small amounts of activities and gradually increase them. Augmentin at 875 mg twice daily for 10 days.   Call if any side effects or if not better. This will take several weeks to fully resolve.           Follow-ups after your visit        Your next 10 appointments already scheduled     Oct 01, 2018  8:00 AM CDT   Genetic Counseling with CADY GEN COUNSELOR 1   [facility] Maternal Fetal Medicine - [facility] [place])    [address]  [facility] [address]   [phone]            Oct 01, 2018  8:45 AM CDT   MFM NUCHAL TRANSLUCENCY with CADYMFMUSR1   [facility] Maternal Fetal Medicine Ultrasound - [facility] [place])    [address]  [facility] [address]   [phone]            Oct 01, 2018  9:15 AM CDT   Radiology MD with CADY MCCLURE MD   [facility] M[facility] Fetal Medicine - [facility] [place])    [address]  [facility] [address]   [phone]           Please arrive at the time given for your first appointment. This visit is used internally to schedule the physician's time during your ultrasound.            Oct 04, 2018  4:15 PM CDT   MyChart OB-GYN Established Prenatal with Sherin Frost MD   [place]  [facility] ([facility])    [address]  [facility] [address]   [phone]            Nov 16, 2018  8:00 AM CST   MFM US COMP with URMFMUSR1   [facility] Ultrasound - [place] ([facility])    [address]  [facility] [id number] [phone]           Wear comfortable clothes and leave your valuables at home.            Nov 16, 2018  8:30 AM RO   Radiology MD with CADY MCCLURE MD   [facility] Maternal Fetal Medicine - [facility])    [address]  [facility] [address]   [phone]           Please arrive at the time given for your first appointment. This visit is used internally to schedule the physician's time during your ultrasound.              Who to contact     If you have questions or need follow up information about today's clinic visit or your schedule please contact [facility] directly at [phone].  Normal or non-critical lab and imaging results will be communicated to you by ScanNan[employer]art, letter or phone within 4 business days after the clinic has received the results. If you do not hear from us within 7 days, please contact the clinic through [employer] or phone. If you have a critical or abnormal lab result, we will notify you by phone as soon as possible.  Submit refill requests through [employer] or call your pharmacy and they will forward the refill request to us. Please allow 3 business days for your refill to be completed.          Additional Information About Your Visit        [employer] Information     [employer] gives you secure access to your electronic health record. If you see a primary care provider, you can also send messages to your care team and make appointments. If you have questions, please call your primary care clinic.  If you do not have a primary care provider, please call [phone] and they will assist you.        Care  EveryWhere ID     This is your Care EveryWhere ID. This could be used by other organizations to access your [facility] medical records  [id number]        Your Vitals Were     Pulse Temperature Respirations Last Period Pulse Oximetry BMI (Body Mass Index)    122 98.5  F (36.9  C) (Tympanic) 16 07/02/2018 (Within Weeks) 97% 24.83 kg/m2       Blood Pressure from Last 3 Encounters:   09/20/18 120/80   09/18/18 104/68   09/05/18 120/81    Weight from Last 3 Encounters:   09/20/18 149 lb 3.2 oz (67.7 kg)   09/18/18 148 lb (67.1 kg)   09/05/18 147 lb 9.6 oz (67 kg)              Today, you had the following     No orders found for display         Today's Medication Changes          These changes are accurate as of 9/20/18  8:20 AM.  If you have any questions, ask your nurse or doctor.               Start taking these medicines.        Dose/Directions    amoxicillin-clavulanate 875-125 MG per tablet   Commonly known as:  AUGMENTIN   Used for:  Pneumonia of both lungs due to infectious organism, unspecified part of lung   Started by:  Kushal Zimmer MD        Dose:  1 tablet   Take 1 tablet by mouth 2 times daily for 10 days   Quantity:  20 tablet   Refills:  0            Where to get your medicines      These medications were sent to [facility] [place], [place] - [address] [facility]  [address] [facility] [address]     Phone:  [phone]     amoxicillin-clavulanate 875-125 MG per tablet                Primary Care Provider Office Phone # Fax #    Randy Graf [phone] [address] [facility] [address]        Equal Access to Services     WANDA [facility] Center[facility] AH: Hadii jordan bolton Soalex, waaxda luqadaha, qaybta kaalmada adelonyada, celia elizabeth. So [facility] [phone].    ATENCIÓN: Si habla español, tiene a reyez disposición servicios gratuitos de asistencia lingüística. Llame al [phone].    We comply with applicable federal civil rights laws  and [place] laws. We do not discriminate on the basis of race, color, national origin, age, disability, sex, sexual orientation, or gender identity.            Thank you!     Thank you for choosing [facility]  for your care. Our goal is always to provide you with excellent care. Hearing back from our patients is one way we can continue to improve our services. Please take a few minutes to complete the written survey that you may receive in the mail after your visit with us. Thank you!             Your Updated Medication List - Protect others around you: Learn how to safely use, store and throw away your medicines at www.disposemymeds.org.          This list is accurate as of 9/20/18  8:20 AM.  Always use your most recent med list.                   Brand Name Dispense Instructions for use Diagnosis    amoxicillin-clavulanate 875-125 MG per tablet    AUGMENTIN    20 tablet    Take 1 tablet by mouth 2 times daily for 10 days    Pneumonia of both lungs due to infectious organism, unspecified part of lung       * amphetamine-dextroamphetamine 10 MG per tablet    ADDERALL    150 tablet    Take 2 pills (20mg) orally in the morning and two pills (20mg) at 11am and one pill (10mg) at 2pm    Attention deficit hyperactivity disorder (ADHD), predominantly inattentive type       * amphetamine-dextroamphetamine 10 MG per tablet   Start taking on:  9/27/2018    ADDERALL    150 tablet    Take 2 pills (20mg) orally in the morning and two pills (20mg) at 11am and one pill (10mg) at 2pm    Attention deficit hyperactivity disorder (ADHD), predominantly inattentive type       FIBER CHOICE 1.5 g Chew   Generic drug:  Inulin      Take 1 chew tab by mouth daily        folic acid 1 MG tablet    FOLVITE    100 tablet    Take 1 tablet (1 mg) by mouth daily    Elderly multigravida in first trimester, Rectovaginal fistula, Uterine leiomyoma, unspecified location, Encounter for supervision of other normal pregnancy in first  trimester       prenatal multivitamin plus iron 27-0.8 MG Tabs per tablet      Take 1 tablet by mouth daily        * Notice:  This list has 2 medication(s) that are the same as other medications prescribed for you. Read the directions carefully, and ask your doctor or other care provider to review them with you.

## 2018-09-25 ENCOUNTER — PRE VISIT (OUTPATIENT)
Dept: MATERNAL FETAL MEDICINE | Facility: CLINIC | Age: 33
End: 2018-09-25

## 2018-10-01 ENCOUNTER — HOSPITAL ENCOUNTER (OUTPATIENT)
Dept: ULTRASOUND IMAGING | Facility: CLINIC | Age: 33
Discharge: HOME | End: 2018-10-01
Attending: OBSTETRICS & GYNECOLOGY | Admitting: OBSTETRICS & GYNECOLOGY
Payer: COMMERCIAL

## 2018-10-01 ENCOUNTER — OFFICE VISIT (OUTPATIENT)
Dept: MATERNAL FETAL MEDICINE | Facility: CLINIC | Age: 33
End: 2018-10-01
Attending: OBSTETRICS & GYNECOLOGY
Payer: COMMERCIAL

## 2018-10-01 DIAGNOSIS — O26.90 PREGNANCY RELATED CONDITION, ANTEPARTUM: ICD-10-CM

## 2018-10-01 DIAGNOSIS — O09.521 SUPERVISION OF ELDERLY MULTIGRAVIDA IN FIRST TRIMESTER: Primary | ICD-10-CM

## 2018-10-01 DIAGNOSIS — O09.521 SUPERVISION OF ELDERLY MULTIGRAVIDA IN FIRST TRIMESTER: ICD-10-CM

## 2018-10-01 DIAGNOSIS — O09.521 AMA (ADVANCED MATERNAL AGE) MULTIGRAVIDA 35+, FIRST TRIMESTER: Primary | ICD-10-CM

## 2018-10-01 PROCEDURE — 96040 ZZH GENETIC COUNSELING, EACH 30 MINUTES: CPT | Mod: ZF | Performed by: GENETIC COUNSELOR, MS

## 2018-10-01 PROCEDURE — 40000791 ZZHCL STATISTIC VERIFI PRENATAL TRISOMY 21,18,13: Performed by: OBSTETRICS & GYNECOLOGY

## 2018-10-01 PROCEDURE — 36415 COLL VENOUS BLD VENIPUNCTURE: CPT | Performed by: OBSTETRICS & GYNECOLOGY

## 2018-10-01 PROCEDURE — 76813 OB US NUCHAL MEAS 1 GEST: CPT

## 2018-10-01 NOTE — PROGRESS NOTES
[facility] Maternal Fetal Medicine Center  Genetic Counseling Consult    Patient: Ángela Zurita YOB: 1985   Date of Service: 10/01/18      Ángela Zurita was seen at [facility] for genetic consultation to discuss the options for screening and testing for fetal chromosome abnormalities.  The indication for genetic counseling is advanced maternal age. Ángela was accompanied to her appointment today by her  Chris.        Impression/Plan:   1.  Ángela had an ultrasound and blood draw for NIPT (Innatal test through [employer]).  Results are expected within 7-10 days, and will be available in [facility].  We will contact her to discuss the results, and a copy will be forwarded to the office of the referring OB provider.  Ángela will be contacted at the phone number she provided, [phone], and requests that detailed results, including fetal sex chromosomes indicated by testing, be left in her voicemail if she cannot be reached.    2.  Maternal serum AFP (single marker screen) is recommended after 15 weeks to screen for open neural tube defects. A quad screen should not be performed.    3.  An 18-20 week comprehensive ultrasound is standard of care for all women 35 or older at delivery.    Pregnancy History:   /Parity:    Age at Delivery: 34 year old  MANI: 2019, by Ultrasound  Gestational Age: 11w5d    Ángela reports a period of significant cramping and pain in her upper abdomen over the last 2-3 days.  Today's ultrasound was normal, and Ángela was encouraged to continue to contact her primary OB if the pain persists or she has any other questions or concerns    No significant complications or exposures were reported in the current pregnancy.    Ángela mclain pregnancy history is significant for 1 prior full term pregnancy with no reported complications.    Medical History:   Ángela mclain reported medical history is not expected to impact pregnancy  management or risks to fetal development.       Family History:   A three-generation pedigree was obtained, and is scanned under the  Media  tab.   The following significant findings were reported by Ángela:    Ángela has a significant family history of breast cancer, including her mother, maternal aunt, and male maternal first cousin.  Ángela reports having had a normal mammogram and that her primary care provider is aware of this family history.  We discussed that the family history is indicative of a higher risk for breast cancer and that it is possible that there is an identifiable underlying genetic cause for the higher prevalence.  We discussed that genetic testing for hereditary types of breast cancer (such as the BRCA genes) can be helpful for identifying individuals who may benefit from increased cancer screening.  I encouraged Ángela to discuss appropriate screening options with her primary care.    Ángela's  Chris has a maternal aunt with an unknown medical condition that causes easy bruising, bleeding, and joint/bone weakness.  Per report she was told she should not have children because they would be at risk for having the condition too.  We discussed that without knowing the exact nature of the condition that she has, it is difficult to assess if there is any risk or not for Chris's children to be similarly affected. Chris reports that the rest of his extended family history is negative for similar concerns.     Otherwise, the reported family history is negative for multiple miscarriages, stillbirths, birth defects, cognitive impairment, known genetic conditions, and consanguinity.       Carrier Screening:   The patient reports that she and the father of the pregnancy have  ancestry:      Cystic fibrosis is an autosomal recessive genetic condition that occurs with increased frequency in individuals of  ancestry and carrier screening for this condition is available.  In addition,   screening in the [facility] includes cystic fibrosis.      Expanded carrier screening for mutations in a large panel of genes associated with autosomal recessive conditions including cystic fibrosis, spinal muscular atrophy, and others, is now available.      The patient has declined the carrier screening options reviewed today.       Risk Assessment for Chromosome Conditions:   We explained that the risk for fetal chromosome abnormalities increases with maternal age. We discussed specific features of common chromosome abnormalities, including Down syndrome, trisomy 13, trisomy 18, and sex chromosome trisomies.      - At age 33 at midtrimester, the risk to have a baby with Down syndrome is 1 in 342.     - At age 33 at midtrimester, the risk to have a baby with any chromosome abnormality is 1 in  172.     - At age 34 at delivery, the risk to have a baby with Down syndrome is 1 in 385.     - At age 34 at delivery, the risk to have a baby with any chromosome abnormality is 1 in 202.          Testing Options:   We discussed the following options:   First trimester screening    First trimester ultrasound with nuchal translucency and nasal bone assessments, maternal plasma hCG, RENALDO-A, and AFP measurement    Screens for fetal trisomy 21, trisomy 13, and trisomy 18    Cannot screen for open neural tube defects; maternal serum AFP after 15 weeks is recommended       Non-invasive Prenatal Testing (NIPT)    Maternal plasma cell-free DNA testing; first trimester ultrasound with nuchal translucency and nasal bone assessment is recommended, when appropriate    Screens for fetal trisomy 21, trisomy 13, trisomy 18, and sex chromosome aneuploidy    Cannot screen for open neural tube defects; maternal serum AFP after 15 weeks is recommended       Genetic Amniocentesis    Invasive procedure typically performed in the second trimester by which amniotic fluid is obtained for the purpose of chromosome analysis and/or other  prenatal genetic analysis    Diagnostic results; >99% sensitivity for fetal chromosome abnormalities    AFAFP measurement tests for open neural tube defects       Comprehensive (Level II) ultrasound:     Detailed ultrasound performed between 18-22 weeks gestation    Screen for major birth defects and markers for aneuploidy    We reviewed the benefits and limitations of this testing.  Screening tests provide a risk assessment specific to the pregnancy for certain fetal chromosome abnormalities, but cannot definitively diagnose or exclude a fetal chromosome abnormality.  Follow-up genetic counseling and consideration of diagnostic testing is recommended with any abnormal screening result. Diagnostic tests carry inherent risks- including risk of miscarriage- that require careful consideration.  These tests can detect fetal chromosome abnormalities with greater than 99% certainty.  Results can be compromised by maternal cell contamination or mosaicism, and are limited by the resolution of cytogenetic G-banding technology.  There is no screening nor diagnostic test that can detect all forms of birth defects or mental disability.     It was a pleasure to be involved with Ángela mclain care. Face-to-face time of the meeting was 40 minutes.      Krunal Sullivan MS, [facility]  Licensed Genetic Counselor  Phone: [phone]  Pager: [phone]

## 2018-10-01 NOTE — PROGRESS NOTES
"Please see \"Imaging\" tab under \"Chart Review\" for details of today's US.    Mercy Tucker, DO    "

## 2018-10-01 NOTE — MR AVS SNAPSHOT
After Visit Summary   10/1/2018    Ángela Zurita    MRN: [id number]           Patient Information     Date Of Birth          1985        Visit Information        Provider Department      10/1/2018 8:00 AM Krunal Sullivan GC [facility] Maternal Fetal Medicine - [place]        Today's Diagnoses     Supervision of elderly multigravida in first trimester    -  1    Pregnancy related condition, antepartum           Follow-ups after your visit        Your next 10 appointments already scheduled     Oct 04, 2018  4:15 PM CDT   [facility] OB-GYN Established Prenatal with Sherin Frost MD   [facility]    [address] [facility] [address]   [phone]            Nov 01, 2018  4:15 PM CDBHANU Garcia OB-GYN Established Prenatal with Sherin Frost MD   [facility] ([facility])    [address] [facility] [address]   [phone]            Nov 16, 2018  8:00 AM RO MCCLURE US COMP with [id number]   [facility] Maternal Fetal Medicine Ultrasound - [facility])    [address]  [facility] [address] [phone]           Wear comfortable clothes and leave your valuables at home.            Nov 16, 2018  8:30 AM CST   Radiology MD with CADY MCCLURE MD   [facility] Maternal Fetal Medicine - [place] ([facility])    [address]  [facility] [address]   [phone]           Please arrive at the time given for your first appointment. This visit is used internally to schedule the physician's time during your ultrasound.            Nov 29, 2018  4:30 PM RO Garcia OB-GYN Established Prenatal with Sherin Frost MD   [facility])    [address] [facility] [address] [address]   [phone]              Who to contact     If you have  questions or need follow up information about today's clinic visit or your schedule please contact [facility] MATERNAL FETAL MEDICINE [facility] directly at [phone].  Normal or non-critical lab and imaging results will be communicated to you by MyChart, letter or phone within 4 business days after the clinic has received the results. If you do not hear from us within 7 days, please contact the clinic through MyChart or phone. If you have a critical or abnormal lab result, we will notify you by phone as soon as possible.  Submit refill requests through [employer] or call your pharmacy and they will forward the refill request to us. Please allow 3 business days for your refill to be completed.          Additional Information About Your Visit        [employer] Now [employer] [email or web address] Information     [employer] gives you secure access to your electronic health record. If you see a primary care provider, you can also send messages to your care team and make appointments. If you have questions, please call your primary care clinic.  If you do not have a primary care provider, please call [phone] and they will assist you.        Care EveryWhere ID     This is your Care EveryWhere ID. This could be used by other organizations to access your [facility] medical records  [id number]        Your Vitals Were     Last Period                   07/02/2018 (Within Weeks)            Blood Pressure from Last 3 Encounters:   09/20/18 120/80   09/18/18 104/68   09/05/18 120/81    Weight from Last 3 Encounters:   09/20/18 67.7 kg (149 lb 3.2 oz)   09/18/18 67.1 kg (148 lb)   09/05/18 67 kg (147 lb 9.6 oz)              We Performed the Following     [facility] Genetic Counseling        Primary Care Provider Office Phone # Fax #    Randy Graf [phone] [address] [facility] [address]        Equal Access to Services     SKY BLANCHARD : [place]j [place], jelly garcia, celia wells  bria hoskins ah. So [facility] [phone].    ATENCIÓN: Si brando almonte, tiene a reyez disposición servicios gratuitos de asistencia lingüística. Jovany cordero [phone].    We comply with applicable federal civil rights laws and [place] laws. We do not discriminate on the basis of race, color, national origin, age, disability, sex, sexual orientation, or gender identity.            Thank you!     Thank you for choosing [facility]  for your care. Our goal is always to provide you with excellent care. Hearing back from our patients is one way we can continue to improve our services. Please take a few minutes to complete the written survey that you may receive in the mail after your visit with us. Thank you!             Your Updated Medication List - Protect others around you: Learn how to safely use, store and throw away your medicines at www.disposemymeds.org.          This list is accurate as of 10/1/18 12:24 PM.  Always use your most recent med list.                   Brand Name Dispense Instructions for use Diagnosis    amphetamine-dextroamphetamine 10 MG per tablet    ADDERALL    150 tablet    Take 2 pills (20mg) orally in the morning and two pills (20mg) at 11am and one pill (10mg) at 2pm    Attention deficit hyperactivity disorder (ADHD), predominantly inattentive type       FIBER CHOICE 1.5 g Chew   Generic drug:  Inulin      Take 1 chew tab by mouth daily        folic acid 1 MG tablet    FOLVITE    100 tablet    Take 1 tablet (1 mg) by mouth daily    Elderly multigravida in first trimester, Rectovaginal fistula, Uterine leiomyoma, unspecified location, Encounter for supervision of other normal pregnancy in first trimester       prenatal multivitamin plus iron 27-0.8 MG Tabs per tablet      Take 1 tablet by mouth daily

## 2018-10-01 NOTE — MR AVS SNAPSHOT
After Visit Summary   10/1/2018    Ángela Zurita    MRN: [id number]           Patient Information     Date Of Birth          1985        Visit Information        Provider Department      10/1/2018 9:15 AM Mercy Tucker DO [facility] Maternal Fetal Medicine - [place]        Today's Diagnoses     AMA (advanced maternal age) multigravida 35+, first trimester    -  1       Follow-ups after your visit        Your next 10 appointments already scheduled     Oct 04, 2018  4:15 PM CDT   [facility] Established Prenatal with Sherin Frost MD   [facility]    [address] [facility] [address]   [phone]            Nov 01, 2018  4:15 PM CDT   [facility] OB-GYN Established Prenatal with Sherin Frost MD   [facility] ([facility])    [address] [address]   [phone]            Nov 16, 2018  8:00 AM RO MCCLURE US COMP with [id number]   [facility] Maternal Fetal Medicine Ultrasound - [facility])    [address]  [facility] [address] [phone]           Wear comfortable clothes and leave your valuables at home.            Nov 16, 2018  8:30 AM RO   Radiology MD with CADY MCCLURE MD   [facility] Maternal Fetal Medicine - [facility])    [address]  [facility] [address]   [phone]           Please arrive at the time given for your first appointment. This visit is used internally to schedule the physician's time during your ultrasound.            Nov 29, 2018  4:30 PM RO Garcia OB-GYN Established Prenatal with Sherin Frost MD   [facility])    [address] [facility] [address]   [phone]              Who to contact     If you have questions or need follow up  information about today's clinic visit or your schedule please contact [facility] MATERNAL FETAL MEDICINE [facility] directly at [phone].  Normal or non-critical lab and imaging results will be communicated to you by Onfidohart, letter or phone within 4 business days after the clinic has received the results. If you do not hear from us within 7 days, please contact the clinic through Onfidohart or phone. If you have a critical or abnormal lab result, we will notify you by phone as soon as possible.  Submit refill requests through Xconomy or call your pharmacy and they will forward the refill request to us. Please allow 3 business days for your refill to be completed.          Additional Information About Your Visit        [email or web address] Information     Xconomy gives you secure access to your electronic health record. If you see a primary care provider, you can also send messages to your care team and make appointments. If you have questions, please call your primary care clinic.  If you do not have a primary care provider, please call [phone] and they will assist you.        Care EveryWhere ID     This is your Care EveryWhere ID. This could be used by other organizations to access your [facility] medical records  [id number]        Your Vitals Were     Last Period                   07/02/2018 (Within Weeks)            Blood Pressure from Last 3 Encounters:   09/20/18 120/80   09/18/18 104/68   09/05/18 120/81    Weight from Last 3 Encounters:   09/20/18 67.7 kg (149 lb 3.2 oz)   09/18/18 67.1 kg (148 lb)   09/05/18 67 kg (147 lb 9.6 oz)              Today, you had the following     No orders found for display       Primary Care Provider Office Phone # Fax #    Randy Graf [phone] [address] [facility] [address]        Equal Access to Services     SKY BLANCHARD : Oj Cloud, jelly garcia, celia wells. So  [facility] [phone].    ATENCIÓN: Si brando almonte, tiene a reyez disposición servicios gratuitos de asistencia lingüística. Jovany cordero [phone].    We comply with applicable federal civil rights laws and [place] laws. We do not discriminate on the basis of race, color, national origin, age, disability, sex, sexual orientation, or gender identity.            Thank you!     Thank you for choosing [facility]  for your care. Our goal is always to provide you with excellent care. Hearing back from our patients is one way we can continue to improve our services. Please take a few minutes to complete the written survey that you may receive in the mail after your visit with us. Thank you!             Your Updated Medication List - Protect others around you: Learn how to safely use, store and throw away your medicines at www.disposemymeds.org.          This list is accurate as of 10/1/18 11:09 AM.  Always use your most recent med list.                   Brand Name Dispense Instructions for use Diagnosis    amphetamine-dextroamphetamine 10 MG per tablet    ADDERALL    150 tablet    Take 2 pills (20mg) orally in the morning and two pills (20mg) at 11am and one pill (10mg) at 2pm    Attention deficit hyperactivity disorder (ADHD), predominantly inattentive type       FIBER CHOICE 1.5 g Chew   Generic drug:  Inulin      Take 1 chew tab by mouth daily        folic acid 1 MG tablet    FOLVITE    100 tablet    Take 1 tablet (1 mg) by mouth daily    Elderly multigravida in first trimester, Rectovaginal fistula, Uterine leiomyoma, unspecified location, Encounter for supervision of other normal pregnancy in first trimester       prenatal multivitamin plus iron 27-0.8 MG Tabs per tablet      Take 1 tablet by mouth daily

## 2018-10-02 ENCOUNTER — PRENATAL OFFICE VISIT (OUTPATIENT)
Dept: OBGYN | Facility: CLINIC | Age: 33
End: 2018-10-02
Payer: COMMERCIAL

## 2018-10-02 VITALS
HEART RATE: 55 BPM | TEMPERATURE: 98.9 F | WEIGHT: 151 LBS | DIASTOLIC BLOOD PRESSURE: 70 MMHG | SYSTOLIC BLOOD PRESSURE: 106 MMHG | BODY MASS INDEX: 25.13 KG/M2

## 2018-10-02 DIAGNOSIS — R10.11 ABDOMINAL PAIN, RIGHT UPPER QUADRANT: ICD-10-CM

## 2018-10-02 DIAGNOSIS — Z34.81 PRENATAL CARE, SUBSEQUENT PREGNANCY IN FIRST TRIMESTER: Primary | ICD-10-CM

## 2018-10-02 PROCEDURE — 99207 ZZC PRENATAL VISIT: CPT | Performed by: OBSTETRICS & GYNECOLOGY

## 2018-10-02 NOTE — PROGRESS NOTES
Doing well.   C/o RUQ pain for the last week; has not paid attention to aggravating factors such as meal associations; has tried Tylenol PO, heat therapy and stretching but without improvement. It is sporadic, and lasts 15 min to an hour.   Denies VB, cramping  /70 (BP Location: Right arm, Patient Position: Chair, Cuff Size: Adult Regular)  Pulse 55  Temp 98.9  F (37.2  C) (Tympanic)  Wt 151 lb (68.5 kg)  LMP 2018 (Within Weeks)  Breastfeeding? No  BMI 25.13 kg/m2  General Appearance: NAD  Abdomen: Gravid, NT  Refer to flow sheet above.   A/P: 33 year old  at 11w6d  -- RUQ ultrasound ordered to rule out cholelithiasis  -- reviewed normal NT measurement obtained yesterday at [facility]; NIPT pending  -- reviewed normal new OB prenatal labs; rubella nonimmune status and informed her of MMR vaccine postpartum  -- SAB precautions reviewed  RTC in 4 weeks    Shelby Chino MD  [facility]

## 2018-10-02 NOTE — MR AVS SNAPSHOT
After Visit Summary   10/2/2018    Ángela Zurita    MRN: [id number]           Patient Information     Date Of Birth          1985        Visit Information        Provider Department      10/2/2018 9:00 AM Shelby Chino MD [facility]        Today's Diagnoses     Prenatal care, subsequent pregnancy in first trimester    -  1    Abdominal pain, right upper quadrant           Follow-ups after your visit        Your next 10 appointments already scheduled     Nov 16, 2018  8:00 AM RO MCCLURE US COMP with [id number]   [facility]aternal Fetal Medicine Ultrasound - [facility])    [address]  [facility] [address]   [phone]           Wear comfortable clothes and leave your valuables at home.            Nov 16, 2018  8:30 AM CST   Radiology MD with UR REN BHANDARI   [facility] Maternal Fetal Medicine - [facility])    [address]  [facility] [address]   [phone]           Please arrive at the time given for your first appointment. This visit is used internally to schedule the physician's time during your ultrasound.              Future tests that were ordered for you today     Open Future Orders        Priority Expected Expires Ordered    US Abdomen Complete Routine  10/2/2019 10/2/2018            Who to contact     If you have questions or need follow up information about today's clinic visit or your schedule please contact [facility] directly at [phone].  Normal or non-critical lab and imaging results will be communicated to you by MyChart, letter or phone within 4 business days after the clinic has received the results. If you do not hear from us within 7 days, please contact the clinic through MyChart or phone. If you have a critical or abnormal lab result, we will notify you by phone as soon as possible.  Submit refill requests through  Maker's Row or call your pharmacy and they will forward the refill request to us. Please allow 3 business days for your refill to be completed.          Additional Information About Your Visit        MyChart Information     Maker's Row gives you secure access to your electronic health record. If you see a primary care provider, you can also send messages to your care team and make appointments. If you have questions, please call your primary care clinic.  If you do not have a primary care provider, please call [phone] and they will assist you.        Care EveryWhere ID     This is your Care EveryWhere ID. This could be used by other organizations to access your [facility] medical records  [id number]        Your Vitals Were     Pulse Temperature Last Period Breastfeeding? BMI (Body Mass Index)       55 98.9  F (37.2  C) (Tympanic) 07/02/2018 (Within Weeks) No 25.13 kg/m2        Blood Pressure from Last 3 Encounters:   10/02/18 106/70   09/20/18 120/80   09/18/18 104/68    Weight from Last 3 Encounters:   10/02/18 151 lb (68.5 kg)   09/20/18 149 lb 3.2 oz (67.7 kg)   09/18/18 148 lb (67.1 kg)               Primary Care Provider Office Phone # Fax #    Rnady Graf [phone] [address] [facility] [address]        Equal Access to Services     SKY BLANCHARD AH: Hadii jordan ku hadasho Soomaali, waaxda luqadaha, qaybta kaalmada case, celia elizabeth. So [facility] [phone].    ATENCIÓN: Si habla español, tiene a reyez disposición servicios gratuitos de asistencia lingüística. Jovany al [phone].    We comply with applicable federal civil rights laws and [place] laws. We do not discriminate on the basis of race, color, national origin, age, disability, sex, sexual orientation, or gender identity.            Thank you!     Thank you for choosing [facility]  for your care. Our goal is always to provide you with excellent care. Hearing back from our  patients is one way we can continue to improve our services. Please take a few minutes to complete the written survey that you may receive in the mail after your visit with us. Thank you!             Your Updated Medication List - Protect others around you: Learn how to safely use, store and throw away your medicines at www.disposemymeds.org.          This list is accurate as of 10/2/18  9:43 AM.  Always use your most recent med list.                   Brand Name Dispense Instructions for use Diagnosis    amphetamine-dextroamphetamine 10 MG per tablet    ADDERALL    150 tablet    Take 2 pills (20mg) orally in the morning and two pills (20mg) at 11am and one pill (10mg) at 2pm    Attention deficit hyperactivity disorder (ADHD), predominantly inattentive type       FIBER CHOICE 1.5 g Chew   Generic drug:  Inulin      Take 1 chew tab by mouth daily        folic acid 1 MG tablet    FOLVITE    100 tablet    Take 1 tablet (1 mg) by mouth daily    Elderly multigravida in first trimester, Rectovaginal fistula, Uterine leiomyoma, unspecified location, Encounter for supervision of other normal pregnancy in first trimester       prenatal multivitamin plus iron 27-0.8 MG Tabs per tablet      Take 1 tablet by mouth daily

## 2018-10-04 ENCOUNTER — HOSPITAL ENCOUNTER (OUTPATIENT)
Dept: ULTRASOUND IMAGING | Facility: CLINIC | Age: 33
Discharge: HOME | End: 2018-10-04
Attending: OBSTETRICS & GYNECOLOGY | Admitting: OBSTETRICS & GYNECOLOGY
Payer: COMMERCIAL

## 2018-10-04 DIAGNOSIS — R10.11 ABDOMINAL PAIN, RIGHT UPPER QUADRANT: ICD-10-CM

## 2018-10-04 DIAGNOSIS — Z34.81 PRENATAL CARE, SUBSEQUENT PREGNANCY IN FIRST TRIMESTER: ICD-10-CM

## 2018-10-04 PROCEDURE — 76705 ECHO EXAM OF ABDOMEN: CPT

## 2018-10-05 ENCOUNTER — TELEPHONE (OUTPATIENT)
Dept: MATERNAL FETAL MEDICINE | Facility: CLINIC | Age: 33
End: 2018-10-05

## 2018-10-05 NOTE — TELEPHONE ENCOUNTER
10/5/2018    Ángela called regarding her NIPT results, which were drawn Monday, 10/1.  We discussed that these results are still pending and expected within the next few days.  Ángela will be contacted with results when they are available and was encouraged to continue to contact me with any questions or concerns .      Krunal Sullivan MS, [facility]  Licensed Genetic Counselor  Phone: [phone]  Pager: [phone]

## 2018-10-05 NOTE — PROGRESS NOTES
Inform patient that her ultrasound returned normal. There were no stones seen inside her gallbladder. So in all likelihood, her pain is likely pregnancy/musculoskeletal related.     Shelby Chino MD  [facility]

## 2018-10-08 ENCOUNTER — TELEPHONE (OUTPATIENT)
Dept: MATERNAL FETAL MEDICINE | Facility: CLINIC | Age: 33
End: 2018-10-08

## 2018-10-08 LAB — LAB SCANNED RESULT: NORMAL

## 2018-10-08 NOTE — TELEPHONE ENCOUNTER
10/8/2018       Called Ángela to discuss NIPT results.  Results came back negative for chromosome abnormalities in chromosomes 21, 18, & 13, as well as the sex chromosomes.  These test results do not definitively rule out the possibility of one of these conditions, but they do greatly reduce the likelihood.  The test identified sex chromosomes consistent with male sex (XY) which was disclosed.  Ángela had no questions at this time and was encouraged to reach out if she has any questions or concerns in the future.       Krunal Sullivan MS, [facility]  Licensed Genetic Counselor  Phone: [phone]  Pager: [phone]

## 2018-10-16 ENCOUNTER — HOSPITAL ENCOUNTER (OUTPATIENT)
Facility: CLINIC | Age: 33
End: 2018-10-16
Admitting: OBSTETRICS & GYNECOLOGY
Payer: COMMERCIAL

## 2018-10-20 ENCOUNTER — HOSPITAL ENCOUNTER (EMERGENCY)
Facility: CLINIC | Age: 33
Discharge: HOME | End: 2018-10-20
Attending: EMERGENCY MEDICINE | Admitting: EMERGENCY MEDICINE
Payer: COMMERCIAL

## 2018-10-20 ENCOUNTER — NURSE TRIAGE (OUTPATIENT)
Dept: NURSING | Facility: CLINIC | Age: 33
End: 2018-10-20

## 2018-10-20 VITALS
OXYGEN SATURATION: 99 % | TEMPERATURE: 98.9 F | BODY MASS INDEX: 25.13 KG/M2 | HEART RATE: 102 BPM | DIASTOLIC BLOOD PRESSURE: 97 MMHG | HEIGHT: 65 IN | RESPIRATION RATE: 16 BRPM | SYSTOLIC BLOOD PRESSURE: 146 MMHG

## 2018-10-20 DIAGNOSIS — N90.89 CYST OF PERINEUM OF FEMALE: ICD-10-CM

## 2018-10-20 LAB
ALBUMIN UR-MCNC: NEGATIVE MG/DL
APPEARANCE UR: CLEAR
BILIRUB UR QL STRIP: NEGATIVE
COLOR UR AUTO: YELLOW
GLUCOSE BLDC GLUCOMTR-MCNC: 77 MG/DL (ref 70–99)
GLUCOSE UR STRIP-MCNC: 50 MG/DL
HGB UR QL STRIP: NEGATIVE
HYALINE CASTS #/AREA URNS LPF: 1 /LPF (ref 0–2)
KETONES UR STRIP-MCNC: NEGATIVE MG/DL
LEUKOCYTE ESTERASE UR QL STRIP: NEGATIVE
NITRATE UR QL: NEGATIVE
PH UR STRIP: 6 PH (ref 5–7)
RBC #/AREA URNS AUTO: 0 /HPF (ref 0–2)
SOURCE: ABNORMAL
SP GR UR STRIP: 1.02 (ref 1–1.03)
SQUAMOUS #/AREA URNS AUTO: 1 /HPF (ref 0–1)
UROBILINOGEN UR STRIP-MCNC: 0 MG/DL (ref 0–2)
WBC #/AREA URNS AUTO: <1 /HPF (ref 0–5)

## 2018-10-20 PROCEDURE — 81001 URINALYSIS AUTO W/SCOPE: CPT | Performed by: EMERGENCY MEDICINE

## 2018-10-20 PROCEDURE — 99284 EMERGENCY DEPT VISIT MOD MDM: CPT | Mod: 25 | Performed by: EMERGENCY MEDICINE

## 2018-10-20 PROCEDURE — 10060 I&D ABSCESS SIMPLE/SINGLE: CPT | Mod: Z6 | Performed by: EMERGENCY MEDICINE

## 2018-10-20 PROCEDURE — 10060 I&D ABSCESS SIMPLE/SINGLE: CPT

## 2018-10-20 PROCEDURE — 00000146 ZZHCL STATISTIC GLUCOSE BY METER IP

## 2018-10-20 PROCEDURE — 99284 EMERGENCY DEPT VISIT MOD MDM: CPT | Mod: 25

## 2018-10-20 RX ORDER — CEPHALEXIN 500 MG/1
500 CAPSULE ORAL 4 TIMES DAILY
Qty: 40 CAPSULE | Refills: 0 | Status: SHIPPED | OUTPATIENT
Start: 2018-10-20 | End: 2018-10-24

## 2018-10-20 RX ORDER — HYDROCODONE BITARTRATE AND ACETAMINOPHEN 5; 325 MG/1; MG/1
1-2 TABLET ORAL EVERY 4 HOURS PRN
Qty: 12 TABLET | Refills: 0 | Status: SHIPPED | OUTPATIENT
Start: 2018-10-20 | End: 2018-10-24

## 2018-10-20 ASSESSMENT — ENCOUNTER SYMPTOMS
MUSCULOSKELETAL NEGATIVE: 1
DIARRHEA: 0
FLANK PAIN: 0
CONSTITUTIONAL NEGATIVE: 1
VOMITING: 0
CONSTIPATION: 0

## 2018-10-20 NOTE — ED AVS SNAPSHOT
[facility] Emergency Department    [address] [facility] [address]    Phone:  [phone]    Fax:  [phone]                                       Ángela Zurita   MRN: [id number]    Department:  [facility]   Date of Visit:  10/20/2018           Patient Information     Date Of Birth          1985        Your diagnoses for this visit were:     Cyst of perineum of female        You were seen by Chaitanya Tabor MD.      Follow-up Information     Schedule an appointment as soon as possible for a visit with Your surgeon.        Follow up with [facility].    Specialty:  EMERGENCY MEDICINE    Why:  If symptoms worsen    Contact information:    [address] [place] [address] [phone]    Additional information:    The medical center is located at   [address] [place] [address] (between [address] [place], [place] north   of [place]).      Your next 10 appointments already scheduled     Nov 16, 2018  8:00 AM RO MCCLURE US COMP with URMFMUSR1   [facility] Maternal Fetal Medicine Ultrasound - [facility])    [address]  [facility] [address] [phone]           Wear comfortable clothes and leave your valuables at home.            Nov 16, 2018  8:30 AM RO   Radiology MD with CADY MCCLURE MD   [facility] Maternal Fetal Medicine - [facility])    [address]  [facility] [address]   [phone]           Please arrive at the time given for your first appointment. This visit is used internally to schedule the physician's time during your ultrasound.              24 Hour Appointment Hotline       To make an appointment at any [facility], call [phone] ([phone]). If you don't have a family doctor or clinic, we will help you find one. [facility] are conveniently located to serve the needs  of you and your family.             Review of your medicines      START taking        Dose / Directions Last dose taken    cephALEXin 500 MG capsule   Commonly known as:  KEFLEX   Dose:  500 mg   Quantity:  40 capsule        Take 1 capsule (500 mg) by mouth 4 times daily for 10 days   Refills:  0        HYDROcodone-acetaminophen 5-325 MG per tablet   Commonly known as:  NORCO   Dose:  1-2 tablet   Quantity:  12 tablet        Take 1-2 tablets by mouth every 4 hours as needed for moderate to severe pain or severe pain maximum 6 tablet(s) per day   Refills:  0          Our records show that you are taking the medicines listed below. If these are incorrect, please call your family doctor or clinic.        Dose / Directions Last dose taken    amphetamine-dextroamphetamine 10 MG per tablet   Commonly known as:  ADDERALL   Quantity:  150 tablet        Take 2 pills (20mg) orally in the morning and two pills (20mg) at 11am and one pill (10mg) at 2pm   Refills:  0        FIBER CHOICE 1.5 g Chew   Dose:  1 chew tab   Generic drug:  Inulin        Take 1 chew tab by mouth daily   Refills:  0        folic acid 1 MG tablet   Commonly known as:  FOLVITE   Dose:  1 mg   Quantity:  100 tablet        Take 1 tablet (1 mg) by mouth daily   Refills:  3        prenatal multivitamin plus iron 27-0.8 MG Tabs per tablet   Dose:  1 tablet        Take 1 tablet by mouth daily   Refills:  0                Information about OPIOIDS     PRESCRIPTION OPIOIDS: WHAT YOU NEED TO KNOW   We gave you an opioid (narcotic) pain medicine. It is important to manage your pain, but opioids are not always the best choice. You should first try all the other options your care team gave you. Take this medicine for as short a time (and as few doses) as possible.    Some activities can increase your pain, such as bandage changes or therapy sessions. It may help to take your pain medicine 30 to 60 minutes before these activities. Reduce your stress by getting enough  sleep, working on hobbies you enjoy and practicing relaxation or meditation. Talk to your care team about ways to manage your pain beyond prescription opioids.    These medicines have risks:    DO NOT drive when on new or higher doses of pain medicine. These medicines can affect your alertness and reaction times, and you could be arrested for driving under the influence (DUI). If you need to use opioids long-term, talk to your care team about driving.    DO NOT operate heavy machinery    DO NOT do any other dangerous activities while taking these medicines.    DO NOT drink any alcohol while taking these medicines.     If the opioid prescribed includes acetaminophen, DO NOT take with any other medicines that contain acetaminophen. Read all labels carefully. Look for the word  acetaminophen  or  Tylenol.  Ask your pharmacist if you have questions or are unsure.    You can get addicted to pain medicines, especially if you have a history of addiction (chemical, alcohol or substance dependence). Talk to your care team about ways to reduce this risk.    All opioids tend to cause constipation. Drink plenty of water and eat foods that have a lot of fiber, such as fruits, vegetables, prune juice, apple juice and high-fiber cereal. Take a laxative (Miralax, milk of magnesia, Colace, Senna) if you don t move your bowels at least every other day. Other side effects include upset stomach, sleepiness, dizziness, throwing up, tolerance (needing more of the medicine to have the same effect), physical dependence and slowed breathing.    Store your pills in a secure place, locked if possible. We will not replace any lost or stolen medicine. If you don t finish your medicine, please throw away (dispose) as directed by your pharmacist. The [place] Pollution Control Agency has more information about safe disposal: https://www.pca.state.[place]/living-green/managing-unwanted-medications        Prescriptions were sent or printed at these  locations (2 Prescriptions)                   Other Prescriptions                Printed at Department/Unit printer (2 of 2)         cephALEXin (KEFLEX) 500 MG capsule               HYDROcodone-acetaminophen (NORCO) 5-325 MG per tablet                Procedures and tests performed during your visit     Glucose Monitoring Nursing    Glucose by meter    Incision and drainage    UA with Microscopic      Orders Needing Specimen Collection     None      Pending Results     No orders found from 10/18/2018 to 10/21/2018.            Pending Culture Results     No orders found from 10/18/2018 to 10/21/2018.            Pending Results Instructions     If you had any lab results that were not finalized at the time of your Discharge, you can call the ED Lab Result RN at [phone]. You will be contacted by this team for any positive Lab results or changes in treatment. The nurses are available 7 days a week from 10A to 6:30P.  You can leave a message 24 hours per day and they will return your call.        Test Results From Your Hospital Stay        10/20/2018  5:02 PM      Component Results     Component Value Ref Range & Units Status    Color Urine Yellow  Final    Appearance Urine Clear  Final    Glucose Urine 50 (A) NEG^Negative mg/dL Final    Bilirubin Urine Negative NEG^Negative Final    Ketones Urine Negative NEG^Negative mg/dL Final    Specific Gravity Urine 1.024 1.003 - 1.035 Final    Blood Urine Negative NEG^Negative Final    pH Urine 6.0 5.0 - 7.0 pH Final    Protein Albumin Urine Negative NEG^Negative mg/dL Final    Urobilinogen mg/dL 0.0 0.0 - 2.0 mg/dL Final    Nitrite Urine Negative NEG^Negative Final    Leukocyte Esterase Urine Negative NEG^Negative Final    Source Midstream Urine  Final    WBC Urine <1 0 - 5 /HPF Final    RBC Urine 0 0 - 2 /HPF Final    Squamous Epithelial /HPF Urine 1 0 - 1 /HPF Final    Hyaline Casts 1 0 - 2 /LPF Final         10/20/2018  5:42 PM      Component Results     Component Value  Ref Range & Units Status    Glucose 77 70 - 99 mg/dL Final                Thank you for choosing [facility]       Thank you for choosing [facility] for your care. Our goal is always to provide you with excellent care. Hearing back from our patients is one way we can continue to improve our services. Please take a few minutes to complete the written survey that you may receive in the mail after you visit with us. Thank you!        Phoodeezhart Information     [facility] gives you secure access to your electronic health record. If you see a primary care provider, you can also send messages to your care team and make appointments. If you have questions, please call your primary care clinic.  If you do not have a primary care provider, please call [phone] and they will assist you.        Care EveryWhere ID     This is your Care EveryWhere ID. This could be used by other organizations to access your [facility] medical records  [id number]        Equal Access to Services     SKY BLANCHARD : Oj Cloud, jelly garcia, celia wells. So [facility] [phone].    ATENCIÓN: Si habla español, tiene a reyez disposición servicios gratuitos de asistencia lingüística. Llame al [phone].    We comply with applicable federal civil rights laws and [place] laws. We do not discriminate on the basis of race, color, national origin, age, disability, sex, sexual orientation, or gender identity.            After Visit Summary       This is your record. Keep this with you and show to your community pharmacist(s) and doctor(s) at your next visit.

## 2018-10-20 NOTE — TELEPHONE ENCOUNTER
"14 wk, , Also s/p sphincteroplasty to repair rectal fistula 18, hx of 4th degree laceration.Pt reports sensation of non-painful pressure in vaginal area and lower abdominal/pelvic area. She reports some degree of pressure sensation in perineal area since surgery 5-6 mos ago but intensity has incr'd  x4 days. Also feels pressure in lower abd /pelvic area x2 days which is new. Sx come and go but present most of time; improve w/ lying down, worse w/ standing or activity. Denies pain, contractions, vaginal bleeding or fluid leakage. Pt reports  \"marble size lump\" in perineal area between vaginal opening and rectum. Lump is slightly tender w/palpation or sitting but otherwise non-painful. No Lump first noted yesterday. No fever. Feeling well. Triaged w/ 2 guidelines Advised see provider within 24 hrs. Pt interested in UC. Advised ED rather than UC as UC typically does not see pregnant pts for any pregnancy related sx - they don't have equipment,etc so these go to ED. Pt voiced understanding and agreement. Will go to ED for eval today. Milady Bangura RN/FNA      Reason for Disposition    Tender lump (swelling or \"ball\") at vaginal opening    [1] Intermittent lower abdominal pain (e.g., cramping) AND [2] present > 24 hours    Additional Information    Pregnant    Negative: Followed a genital area injury    Negative: Symptoms could be from sexual assault    Negative: [1] Having contractions or other symptoms of labor AND [2] < 37 weeks pregnant (i.e., )    Negative: [1] Having contractions or other symptoms of labor AND [2] > 36 weeks pregnant (i.e., term pregnancy)    Negative: Leakage of fluid (trickle, gush) from the vagina    Negative: Pain or burning with urination is main symptom    Negative: Vaginal discharge is main symptom    Negative: Pubic lice suspected    Negative: [1] Pregnant 23 or more weeks AND [2] baby is moving less today (e.g., kick count < 5 in 1 hour or < 10 in 2 hours)    Negative: " Patient sounds very sick or weak to the triager    Negative: SEVERE pain (e.g., excruciating)    Negative: MODERATE-SEVERE itching (e.g., interferes with school, work, or sleep)    Negative: Rash with painful tiny water blisters    Negative: [1] Rash (e.g., redness, tiny bumps, sore) of genital area AND [2] present > 24 hours    Negative: Shock suspected (e.g., cold/pale/clammy skin, too weak to stand, low BP, rapid pulse)    Negative: Difficult to awaken or acting confused  (e.g., disoriented, slurred speech)    Negative: Passed out (i.e., lost consciousness, collapsed and was not responding)    Negative: Sounds like a life-threatening emergency to the triager    Negative: [1] Vaginal bleeding AND [2] pregnant > 20 weeks    Negative: Not pregnant or pregnancy status unknown    Negative: SEVERE abdominal pain    Negative: [1] SEVERE vaginal bleeding (i.e., soaking 2 pads / hour, large blood clots) AND [2] present 2 or more hours    Negative: [1] MODERATE vaginal bleeding (i.e., soaking 1 pad / hour; clots) AND [2] present > 6 hours    Negative: [1] MODERATE vaginal bleeding (i.e., soaking 1 pad / hour; clots) AND [2] pregnant > 12 weeks    Negative: Passed tissue (e.g., gray-white)    Negative: Shoulder pain    Negative: Pale skin (pallor) of new onset or worsening    Negative: Patient sounds very sick or weak to the triager    Negative: [1] Constant abdominal pain AND [2] present > 2 hours    Negative: Fever > 100.4 F (38.0 C)    Protocols used: PREGNANCY - VULVAR SYMPTOMS-ADULT-AH, PREGNANCY - VAGINAL BLEEDING LESS THAN 20 WEEKS EGA-ADULT-AH, VULVAR SYMPTOMS-ADULT-AH

## 2018-10-20 NOTE — ED AVS SNAPSHOT
[facility] Emergency Department    [address] [address]    Phone:  [phone]    Fax:  [phone]                                       Ángela Zurita   MRN: [id number]    Department:  [facility] Emergency Department   Date of Visit:  10/20/2018           After Visit Summary Signature Page     I have received my discharge instructions, and my questions have been answered. I have discussed any challenges I see with this plan with the nurse or doctor.    ..........................................................................................................................................  Patient/Patient Representative Signature      ..........................................................................................................................................  Patient Representative Print Name and Relationship to Patient    ..................................................               ................................................  Date                                   Time    ..........................................................................................................................................  Reviewed by Signature/Title    ...................................................              ..............................................  Date                                               Time          22EPIC Rev 08/18

## 2018-10-20 NOTE — ED PROVIDER NOTES
"  History     Chief Complaint   Patient presents with     Abdominal Pain     14 weeks pregnant. states having tightness on vaginal area. denies bleeding. also has a \"lump\" in vaginal area that is painful     Headache     HPI  Ángela Zurita is a 33 year old female who is 14 weeks pregnant who presents for 2 days of a painful marble sized left perineal mass at the site of prior sphincterotomy and reconstructive surgery for severe perineal tear/episiotomy damage after prior vaginal delivery.  Status post sphincteroplasty repair rectal fistula 5/18/18 after fourth degree perineal laceration complicating vaginal delivery.  No history of prior similar problems.  No fever or chills. Also c/o low back pain, lower abd and perineal pressure when she stands and months of chronic headaches.  No contractions or vaginal bleeding or discharge.  Recent urinary frequency without dysuria, hematuria or other UTI signs or symptoms.  She has previously has received prenatal care and reports an unremarkable ultrasound earlier this month.  No other acute complaints or concerns.    Previous Records Reviewed:  Exam Date Exam Time Exam Date Exam Time Accession # Performing Department Results    10/1/18  8:58 AM 10/1/18  9:32 AM [id number] [facility] Maternal Fetal Medicine Ultrasound - [place]      IMPRESSION  ---------------------------------------------------------------------------------------------------------  1) Intrauterine pregnancy at 11 5/7 weeks gestational age.  2) The nuchal translucency measurement is within the normal range.  3) The nasal bone was visualized.  4) Small anterior fibroid noted.        Problem List:    Patient Active Problem List    Diagnosis Date Noted     Prenatal care, subsequent pregnancy 08/22/2018     Priority: Medium     Left ovarian cyst 03/19/2018     Priority: Medium     Posterior neck pain 09/23/2017     Priority: Medium     Attention deficit hyperactivity disorder (ADHD), predominantly " inattentive type 06/10/2016     Priority: Medium     Patient is followed by WILMAR WHEAT for ongoing prescription of stimulants.  All refills should be approved by this provider, or covering partner.    Medication(s): Adderall 10mg immediate release   Maximum quantity per month: 150  Clinic visit frequency required: Q 3 month     Controlled substance agreement on file: Yes 6/30/16  Neuropsych evaluation for ADD completed:  Yes, completed 2/2012 at [facility], on file and diagnosis confirmed    Last [facility] website verification:  done on 6/19/2016   [email or web address]           Oral herpes 03/21/2016     Priority: Medium     CARDIOVASCULAR SCREENING; LDL GOAL LESS THAN 130 08/12/2015     Priority: Medium     Rectovaginal fistula 01/26/2015     Priority: Medium     Has had consult with colorectal surgeon; opting to defer treatment until done with childbearing  Amenable to c/section with next pregnancy       Health Care Home 12/18/2014     Priority: Medium     Status:  Unable to reach  Care Coordinator:  Maira Starr    See Letters for [facility] Care Plan  Date:  December 18, 2014         Fibroid uterus 07/02/2014     Priority: Medium     Anterior intramural lower.  Right by bladder       Urgency-frequency syndrome 03/24/2014     Priority: Medium        Past Medical History:    Past Medical History:   Diagnosis Date     Cervical dysplasia      Chickenpox      Exercise-induced asthma      IBS (irritable bowel syndrome)      Tonsillitis 2/13/2014       Past Surgical History:    Past Surgical History:   Procedure Laterality Date     BIOPSY  Various    Had a few Leeps and numerous Colposcopys     COLONOSCOPY  2009    Normal     DILATION AND CURETTAGE, HYSTEROSCOPY DIAGNOSTIC, COMBINED N/A 4/3/2018    Procedure: COMBINED DILATION AND CURETTAGE, HYSTEROSCOPY DIAGNOSTIC;  Diagnostic Hysteroscopy with Dilation and Curettage,Laparoscopy with Left Ovarian Cystectomy, Right Uteral Sacral Biopsy;  Surgeon: Margot  Sherin Finnegan MD;  Location: [facility] OR     EXC SWEAT GLAND LESN AXILL,SIMPL Right 2009     LAPAROSCOPY DIAGNOSTIC (GYN) N/A 4/3/2018    Procedure: LAPAROSCOPY DIAGNOSTIC (GYN);;  Surgeon: Sherin Frost MD;  Location: [facility] OR     LEEP TX, CERVICAL  2006    yearly paps     MOUTH SURGERY      wisdom teeth     SOFT TISSUE SURGERY  Various    infected sweat-gland- cyst removed arm,armpit,face     SPHINCTEROPLASTY RECTUM  05/2018    with pernineal rebuild     SURGICAL HISTORY OF -   3/2005    Tonsillectomy     SURGICAL HISTORY OF -       infected sweat gland under her arm       Family History:    Family History   Problem Relation Age of Onset     Blood Disease Brother      twin brother-blood clots     Blood Disease Maternal Grandfather      blood clots     HEART DISEASE Maternal Grandfather      valve replacement     Hyperlipidemia Maternal Grandfather      Cerebrovascular Disease Maternal Grandfather      Prostate Cancer Maternal Grandfather      Other Cancer Maternal Grandfather      Gum/Jaw/Lymphnodes     Colon Cancer Maternal Grandfather      Mental Illness Maternal Grandfather      Alzheimer's     Respiratory Maternal Grandmother      Lung Cancer Maternal Grandmother      Diabetes Maternal Grandmother      type II     Hypertension Maternal Grandmother      Hyperlipidemia Maternal Grandmother      Depression Maternal Grandmother      Cancer Paternal Grandmother      non -hodgkins lymphoma     Lung Cancer Paternal Grandmother      Hypertension Paternal Grandmother      Hyperlipidemia Paternal Grandmother      Cerebrovascular Disease Paternal Grandmother      Diabetes Paternal Grandmother      Hypertension Father      Hyperlipidemia Father      Liver Disease Father      Hepatitis Father      Hyperlipidemia Paternal Grandfather      Prostate Cancer Paternal Grandfather      Hypertension Paternal Grandfather      Colon Cancer Paternal Grandfather      Coronary Artery Disease Paternal Grandfather      Breast  "Cancer Mother      Stage 0, small duct carcinoma     Breast Cancer Other      2 aunt and 3 of my great aunts     Breast Cancer Cousin      Breast Cancer     Other Cancer Cousin      1\" tumor in lung, breast cancer relapse       Social History:  Marital Status:   [2]  Social History   Substance Use Topics     Smoking status: Former Smoker     Packs/day: 0.50     Years: 15.00     Types: Cigarettes     Start date: 12/1/2014     Smokeless tobacco: Never Used      Comment: quit with pregnancy     Alcohol use 0.0 oz/week      Comment: occas-quit with pregnancy        Medications:      cephALEXin (KEFLEX) 500 MG capsule   HYDROcodone-acetaminophen (NORCO) 5-325 MG per tablet   amphetamine-dextroamphetamine (ADDERALL) 10 MG per tablet   folic acid (FOLVITE) 1 MG tablet   Inulin (FIBER CHOICE) 1.5 g CHEW   Prenatal Vit-Fe Fumarate-FA (PRENATAL MULTIVITAMIN PLUS IRON) 27-0.8 MG TABS per tablet       Review of Systems   Constitutional: Negative.    Gastrointestinal: Negative for constipation, diarrhea and vomiting.   Genitourinary: Negative for flank pain.   Musculoskeletal: Negative.        Physical Exam   BP: (!) 146/97  Pulse: 102  Temp: 98.9  F (37.2  C)  Resp: 16  Height: 165.1 cm (5' 5\")  SpO2: 99 %      Physical Exam   Constitutional: She is oriented to person, place, and time. She appears well-developed and well-nourished. No distress.   HENT:   Head: Normocephalic and atraumatic.   Eyes: Conjunctivae and EOM are normal. No scleral icterus.   Neck: Normal range of motion. Neck supple. No tracheal deviation present.   Cardiovascular: Normal rate, regular rhythm and normal heart sounds.    Pulmonary/Chest: Effort normal and breath sounds normal. No respiratory distress.   Abdominal: Soft. Bowel sounds are normal. She exhibits no distension. There is no tenderness.   Genitourinary:         Genitourinary Comments: 1.5 cm tender mass at the left side of the perineum as diagrammed. Mass is soft and with no localized " fluctuance or crepitance. No pointing or expressible drainage.   Musculoskeletal: Normal range of motion. She exhibits no edema.   Neurological: She is alert and oriented to person, place, and time.   Skin: Skin is warm and dry. No rash noted. No erythema. No pallor.   Psychiatric: She has a normal mood and affect. Her behavior is normal.   Nursing note reviewed.      ED Course       Incision + drainage  Performed by: KYLAH JIMENEZ  Authorized by: KYLAH JIMENEZ     Consent:     Consent obtained:  Verbal    Consent given by:  Patient  Location:     Type:  Abscess    Location:  Anogenital    Anogenital location:  Perineum  Pre-procedure details:     Procedure prep: Chlorhexidine.  Anesthesia (see MAR for exact dosages):     Anesthesia method:  Local infiltration    Local anesthetic:  Bupivacaine 0.25% w/o epi  Procedure type:     Complexity:  Simple  Procedure details:     Needle aspiration: yes      Needle size:  18 G    Drainage amount:  Scant (Small amount of blood and air with collapse of the cavity; no purulent or feculent drainage)    Wound treatment:  Wound left open  Post-procedure details:     Patient tolerance of procedure:  Tolerated well, no immediate complications                       Results for orders placed or performed during the hospital encounter of 10/20/18 (from the past 24 hour(s))   UA with Microscopic   Result Value Ref Range    Color Urine Yellow     Appearance Urine Clear     Glucose Urine 50 (A) NEG^Negative mg/dL    Bilirubin Urine Negative NEG^Negative    Ketones Urine Negative NEG^Negative mg/dL    Specific Gravity Urine 1.024 1.003 - 1.035    Blood Urine Negative NEG^Negative    pH Urine 6.0 5.0 - 7.0 pH    Protein Albumin Urine Negative NEG^Negative mg/dL    Urobilinogen mg/dL 0.0 0.0 - 2.0 mg/dL    Nitrite Urine Negative NEG^Negative    Leukocyte Esterase Urine Negative NEG^Negative    Source Midstream Urine     WBC Urine <1 0 - 5 /HPF    RBC Urine 0 0 - 2 /HPF    Squamous  Epithelial /HPF Urine 1 0 - 1 /HPF    Hyaline Casts 1 0 - 2 /LPF   Glucose by meter   Result Value Ref Range    Glucose 77 70 - 99 mg/dL     Fingerstick glucose: 77      Medications - No data to display    Assessments & Plan (with Medical Decision Making)   14 week pregnant female with 2 days of a painful marble sized left perineal mass at the site of prior sphincterotomy and reconstructive surgery for severe perineal tear/episiotomy injury associated with prior vaginal delivery.  Status post sphincteroplasty repair rectal fistula 5/18/18 after fourth degree perineal laceration complicating vaginal delivery. Needle aspiration of the cyst returned only air and with this the cyst collapsed. This appears to be due to a fistula.  No evidence of abscess or cellulitis. Will Rx prophylactic Keflex and Norco use as needed for pain refractory to NSAID. She will follow-up with surgeon as soon as possible. Patient was provided instructions for supportive care and will return as needed for worsened condition or worsening symptoms, or new problems or concerns.    I have reviewed the nursing notes.    I have reviewed the findings, diagnosis, plan and need for follow up with the patient.    New Prescriptions    CEPHALEXIN (KEFLEX) 500 MG CAPSULE    Take 1 capsule (500 mg) by mouth 4 times daily for 10 days    HYDROCODONE-ACETAMINOPHEN (NORCO) 5-325 MG PER TABLET    Take 1-2 tablets by mouth every 4 hours as needed for moderate to severe pain or severe pain maximum 6 tablet(s) per day       Final diagnoses:   Cyst of perineum of female       10/20/2018   [facility] EMERGENCY DEPARTMENT      Chaitanya Tabor MD  10/25/18 0914

## 2018-10-22 ENCOUNTER — TELEPHONE (OUTPATIENT)
Dept: OBGYN | Facility: CLINIC | Age: 33
End: 2018-10-22

## 2018-10-22 NOTE — TELEPHONE ENCOUNTER
Reason for Call:  Other call back    Detailed comments: Pt 14 weeks pregnant, she was seen in the ER 10/20/18 for a vaginal cyst and since Saturday night she has had a bad headache and feels shaky.  Please call to discuss.    Phone Number Patient can be reached at: Cell number on file:    Telephone Information:   Mobile [phone]       Best Time: today    Can we leave a detailed message on this number? Not Applicable    Call taken on 10/22/2018 at 9:55 AM by Jennifer Kelly

## 2018-10-22 NOTE — TELEPHONE ENCOUNTER
"Return call to patient.  Spoke with patient on the phone.    S-(situation): headache which is worsening, tingling in hands and feet, occasional visual changes    B-(background):  at 14W5D gestation. ER visit 10/20/18 for anogenital abscess ( I & D done ).    A-(assessment): Patient reports headache has been worsening, \" it never goes away.\" Patient reports currently she believes she has had this one for about 6 weeks. Patient reports she does occasionally notice visual changes. Patient reports her feet and hands \" are always tingling.\" patient reports BP in ER was about 146/97, \" they were not concerned.\"  Patient has prescription for oxycodone due to I & D that was done. Patient reports oxycodone does not help headache. Patient reports she is eating well and staying well hydrated. Patient reports dizzy spells that Dr. Garay thought were R/T pregnancy. Last office visit 10/2/18    R-(recommendations): Recommended clinic appointment tomorrow for further eval of symptoms, patient will check her calendar and then schedule through [place] - Patient did not want to schedule right now.  Recommended follow up in ER if symptoms worsen - headache worsens, becomes the worst headache in her life, patient needs to be seen. ER can treat migraine HA    Recommend patient may also check in with FP MD regarding BP, tingling and headache. Patient does not have history of high blood pressure.    Pt in agreement and reports understanding.    Radha Holliday   [facility]  RN      "

## 2018-10-24 ENCOUNTER — PRENATAL OFFICE VISIT (OUTPATIENT)
Dept: OBGYN | Facility: CLINIC | Age: 33
End: 2018-10-24
Payer: COMMERCIAL

## 2018-10-24 ENCOUNTER — TELEPHONE (OUTPATIENT)
Dept: OBGYN | Facility: CLINIC | Age: 33
End: 2018-10-24

## 2018-10-24 VITALS
DIASTOLIC BLOOD PRESSURE: 75 MMHG | TEMPERATURE: 98.7 F | HEART RATE: 95 BPM | WEIGHT: 158 LBS | SYSTOLIC BLOOD PRESSURE: 129 MMHG | BODY MASS INDEX: 26.29 KG/M2

## 2018-10-24 DIAGNOSIS — B00.1 COLD SORE: ICD-10-CM

## 2018-10-24 DIAGNOSIS — N82.3 RECTOVAGINAL FISTULA: ICD-10-CM

## 2018-10-24 DIAGNOSIS — O09.899 RUBELLA NON-IMMUNE STATUS, ANTEPARTUM: ICD-10-CM

## 2018-10-24 DIAGNOSIS — O26.892 LOW BACK PAIN DURING PREGNANCY IN SECOND TRIMESTER: ICD-10-CM

## 2018-10-24 DIAGNOSIS — M54.50 LOW BACK PAIN DURING PREGNANCY IN SECOND TRIMESTER: ICD-10-CM

## 2018-10-24 DIAGNOSIS — Z34.82 PRENATAL CARE, SUBSEQUENT PREGNANCY IN SECOND TRIMESTER: Primary | ICD-10-CM

## 2018-10-24 DIAGNOSIS — Z28.39 RUBELLA NON-IMMUNE STATUS, ANTEPARTUM: ICD-10-CM

## 2018-10-24 PROCEDURE — 99207 ZZC PRENATAL VISIT: CPT | Performed by: OBSTETRICS & GYNECOLOGY

## 2018-10-24 RX ORDER — ACYCLOVIR 400 MG/1
400 TABLET ORAL 3 TIMES DAILY
Qty: 15 TABLET | Refills: 5 | Status: SHIPPED | OUTPATIENT
Start: 2018-10-24 | End: 2019-02-01

## 2018-10-24 NOTE — MR AVS SNAPSHOT
After Visit Summary   10/24/2018    Ángela Zurita    MRN: [id number]           Patient Information     Date Of Birth          1985        Visit Information        Provider Department      10/24/2018 2:15 PM Lois Lua MD [facility]        Today's Diagnoses     Low back pain during pregnancy in second trimester    -  1    Prenatal care, subsequent pregnancy in second trimester           Follow-ups after your visit        Additional Services     [facility] PT, HAND, AND CHIROPRACTIC REFERRAL       Physical Therapy, Hand Therapy and Chiropractic Care are available through:  *[facility]  *Hand Therapy (Occupational Therapy or Physical Therapy)  *[facility]    Call one number to schedule at any of the above locations: [phone].    Physical therapy, Hand therapy and/or Chiropractic care has been recommended by your physician as an excellent treatment option to reduce pain and help people return to normal activities, including sports.  Therapy and/or chiropractic care services are a great complement or alternative to expensive and invasive surgery, injections, or long-term use of prescription medications. The primary goal is to identify the underlying problem and provide you the tools to manage your condition on your own.     Please be aware that coverage of these services is subject to the terms and limitations of your health insurance plan.  Call member services at your health plan with any benefit or coverage questions.      Please bring the following to your appointment:  *Your personal calendar for scheduling future appointments  *Comfortable clothing                  Your next 10 appointments already scheduled     Oct 31, 2018  8:00 AM CDT   ESTABLISHED PRENATAL with Jesenia Jefferson MD   [facility] ([facility])    [address] [place] [address] [phone]             Nov 16, 2018  8:00 AM RO WILLARD COMP with [id number]   [facility] [place] ([facility])    [address]  [facility] [id number] [phone]           Wear comfortable clothes and leave your valuables at home.            Nov 16, 2018  8:30 AM CST   Radiology MD with UR REN BHANDARI   [facility])    [address]  [facility] [address]   [phone]           Please arrive at the time given for your first appointment. This visit is used internally to schedule the physician's time during your ultrasound.              Future tests that were ordered for you today     Open Future Orders        Priority Expected Expires Ordered    [facility] PT, HAND, AND CHIROPRACTIC REFERRAL Routine  10/24/2019 10/24/2018            Who to contact     If you have questions or need follow up information about today's clinic visit or your schedule please contact [facility] directly at [phone].  Normal or non-critical lab and imaging results will be communicated to you by [employer], letter or phone within 4 business days after the clinic has received the results. If you do not hear from us within 7 days, please contact the clinic through [employer] or phone. If you have a critical or abnormal lab result, we will notify you by phone as soon as possible.  Submit refill requests through [employer] or call your pharmacy and they will forward the refill request to us. Please allow 3 business days for your refill to be completed.          Additional Information About Your Visit        [employer] Information     [employer] gives you secure access to your electronic health record. If you see a primary care provider, you can also send messages to your care team and make appointments. If you have questions, please call your primary care clinic.  If you do not have a primary  care provider, please call [phone] and they will assist you.        Care EveryWhere ID     This is your Care EveryWhere ID. This could be used by other organizations to access your [facility] medical records  [id number]        Your Vitals Were     Pulse Temperature Last Period BMI (Body Mass Index)          95 98.7  F (37.1  C) (Oral) 07/02/2018 (Within Weeks) 26.29 kg/m2         Blood Pressure from Last 3 Encounters:   10/24/18 129/75   10/20/18 (!) 146/97   10/02/18 106/70    Weight from Last 3 Encounters:   10/24/18 158 lb (71.7 kg)   10/02/18 151 lb (68.5 kg)   09/20/18 149 lb 3.2 oz (67.7 kg)              We Performed the Following     Alpha fetoprotein maternal screen        Primary Care Provider Office Phone # Fax #    Randy Graf [phone] [address] [address]        Equal Access to Services     WANDA BLANCHARD : Hadii aad ku hadasho Soomaali, waaxda luqadaha, qaybta kaalmada adeegyada, waxay michellein hayrissa hoskins . So [facility] [phone].    ATENCIÓN: Si habla español, tiene a reyez disposición servicios gratuitos de asistencia lingüística. Llame al [phone].    We comply with applicable federal civil rights laws and [place] laws. We do not discriminate on the basis of race, color, national origin, age, disability, sex, sexual orientation, or gender identity.            Thank you!     Thank you for choosing [facility]  for your care. Our goal is always to provide you with excellent care. Hearing back from our patients is one way we can continue to improve our services. Please take a few minutes to complete the written survey that you may receive in the mail after your visit with us. Thank you!             Your Updated Medication List - Protect others around you: Learn how to safely use, store and throw away your medicines at www.disposemymeds.org.          This list is accurate as of 10/24/18  2:41 PM.  Always use your most  recent med list.                   Brand Name Dispense Instructions for use Diagnosis    amphetamine-dextroamphetamine 10 MG per tablet    ADDERALL    150 tablet    Take 2 pills (20mg) orally in the morning and two pills (20mg) at 11am and one pill (10mg) at 2pm    Attention deficit hyperactivity disorder (ADHD), predominantly inattentive type       FIBER CHOICE 1.5 g Chew   Generic drug:  Inulin      Take 1 chew tab by mouth daily        folic acid 1 MG tablet    FOLVITE    100 tablet    Take 1 tablet (1 mg) by mouth daily    Elderly multigravida in first trimester, Rectovaginal fistula, Uterine leiomyoma, unspecified location, Encounter for supervision of other normal pregnancy in first trimester       prenatal multivitamin plus iron 27-0.8 MG Tabs per tablet      Take 1 tablet by mouth daily

## 2018-10-24 NOTE — PROGRESS NOTES
15w0d  Here to transfer care from [facility].    Pregnancy so far has been uncomplicated, but she does have a history significant for 4th degree laceration and rectovaginal fistula s/p repair in 2018.  Reports with her first pregnancy, she pushed for 6 hours.  Was prepped for surgery, but then tried to do VAVD.  Successful but resulted in 4th degree laceration.  Had problems with fecal incontinence and vaginal infections over the years until she had repair.  Desires primary  to avoid compromising her surgical repair.  Order placed to schedule at 39w    AMA:  Normal FTS and NIPT.  Has Level II on .  msAFP today.    Low back pain:  Musculoskeletal in nature.  Recommend PT and referral was placed.    Bump on perineum:  In the area of her surgical repair, but not sure if it's related or not.  Went to ED over the weekend, where they tried to aspirate with 18g needle.  Has appointment with her surgeon next week, but was told to see ob/gyn if the bump was in vagina.    Pelvic Exam:  Vulva: Normal hair distribution, no adenopathy.  Small 1cm tender nodule at approximately 5:00 outside vagina, near gluteal cleft.  Does not extend into vagina.  No drainage or opening in the area.  Vagina: Moist, pink, no abnormal discharge, well rugated, no lesions  Bimanual exam:  Pelvic floor very tense and tender to palpation    Discussed location of the nodule on her perineum.  Close to previous repair and does not involve vagina, so I am not comfortable draining.  Recommend she follow up with her surgeon, as scheduled.    Also discussed high tone of pelvic floor musculature.  This may be related to her previous fistula and repair.  Reports she had significant dyspareunia and only had intercourse one time since repair, which resulted in this pregnancy.  I feel she would get great benefit from pelvic floor physical therapy after her pregnancy.  She is in agreement.    RTC 4w  Lois Lau MD

## 2018-10-24 NOTE — TELEPHONE ENCOUNTER
----- Message from Lois Lau MD sent at 10/23/2018  6:24 PM CDT -----  Regarding: appt tomorrow  This is a [facility] patient who scheduled an appointment with me on MyChart tonight.  Can you contact her to see if she plans to transfer care or help her schedule with her clinic?    Thanks,  Lois

## 2018-10-25 ENCOUNTER — HOSPITAL ENCOUNTER (INPATIENT)
Facility: CLINIC | Age: 33
Setting detail: SURGERY ADMIT
End: 2018-10-25
Attending: OBSTETRICS & GYNECOLOGY | Admitting: OBSTETRICS & GYNECOLOGY
Payer: COMMERCIAL

## 2018-10-25 ENCOUNTER — TELEPHONE (OUTPATIENT)
Dept: OBGYN | Facility: CLINIC | Age: 33
End: 2018-10-25

## 2018-10-25 ENCOUNTER — THERAPY VISIT (OUTPATIENT)
Dept: PHYSICAL THERAPY | Facility: CLINIC | Age: 33
End: 2018-10-25
Attending: OBSTETRICS & GYNECOLOGY
Payer: COMMERCIAL

## 2018-10-25 DIAGNOSIS — M54.50 LOW BACK PAIN DURING PREGNANCY IN SECOND TRIMESTER: ICD-10-CM

## 2018-10-25 DIAGNOSIS — O26.892 LOW BACK PAIN DURING PREGNANCY IN SECOND TRIMESTER: ICD-10-CM

## 2018-10-25 PROCEDURE — 97110 THERAPEUTIC EXERCISES: CPT | Mod: GP | Performed by: PHYSICAL THERAPIST

## 2018-10-25 PROCEDURE — 97161 PT EVAL LOW COMPLEX 20 MIN: CPT | Mod: GP | Performed by: PHYSICAL THERAPIST

## 2018-10-25 NOTE — TELEPHONE ENCOUNTER
Type of surgery: OB  Location of surgery: [facility]  Date and time of surgery: 4/10/19 830a  Surgeon: Sid  Pre-Op Appt Date: tbd  Post-Op Appt Date: 6 weeks   Packet sent out: Yes  Pre-cert/Authorization completed:  No  Date: 10/25/2018    Ally PALACIOS, Surgery coordinator

## 2018-10-25 NOTE — LETTER
[facility] PHYSICAL [facility]ERAP[facility]  [address] [address]  [phone]    2018    Re: Ángela Zurita   :   1985  MRN:  [id number]   REFERRING PHYSICIAN:   Lois Lau    [facility]    Date of Initial Evaluation:  10/24/2018  Visits:  Rxs Used: 1  Reason for Referral:  Low back pain during pregnancy in second trimester    EVALUATION SUMMARY    [facility] Initial Evaluation  Subjective:  Patient is a 33 year old female presenting with rehab back hpi. The history is provided by the patient. No  was used. Ángela Zurita is a 33 year old female with a lumbar condition.  Condition occurred with:  Insidious onset.  Condition occurred: for unknown reasons.  This is a new condition  Onset of LBP about 1 week ago. No known injury however pt is 15 weeks pregnant with her 2nd child. Pregnancy is going OK overall. After her 1st child, she had a 4th degree tear that she had surgically repaired about 5 months ago. Is still having some issues with the healing of the repair and a small cyst that she has to see the colorectal surgeon for..  Patient reports pain:  Central lumbar spine.  Radiates to: around her waist.  Pain is described as aching and is intermittent and reported as 4/10 (0/10).  Associated symptoms:  Pregnancy and numbness (Gets numbness in her toes and in her legs which have been going on for a couple months now. Does get some lightheadedness as well). Pain is worse in the P.M..  Exacerbated by: activity in general, lifting, able to sit for prolonged periods of time but has a hard time getting back up then. and relieved by heat (massage).  Since onset symptoms are unchanged.        General health as reported by patient is good.                            Objective:  System    Lumbar/SI Evaluation  ROM:    AROM Lumbar:   Flexion:          To toes - pain  Ext:                    -25% + pain (doesn't have  a lot of movement in lower lumbar spine)-moves more from thoracic spine   Side Bend:        Left:  To knee - pain    Right:  To knee - pain  Rotation:           Left:     Right:   Side Glide:        Left:     Right:   Lumbar Palpation:  Palpation (lumbar): No tenderness but mild tightness noted in B LB paraspinals.  Lumbar Provocation:  Lumbar provocation: Unable to assess PA mobility or sacral mobility due to pt being pregnant.  Left negative with:  PROM hip  Right negative with:  PROM hip  SI joint/Sacrum:    Normal pelvic alignment with negative stork test  General Evaluation:  Posture:  Posture wnl general: Pt has good posture however notes she sits at edge of her chair all day without lumbar support. Is likely overusing back extensors though the day, causing spasm.    Assessment/Plan:    Patient is a 33 year old female with lumbar and pelvic complaints.  Patient has the following significant findings with corresponding treatment plan.    Diagnosis 1:  LBP with pregnancy. Pt's overall exam today was normal and unable to reproduce pain. Did have some mild hypertonicity in LB paraspinals but no tenderness to touch. Pt does note that she had a 4th degree tear repair of her perineum that is not healing the way it should and also has a cyst in the area now as well which is very tender to touch. Due to this, question tone of pelvic floor with attachments into sacrum causing poor mobility in lower lumbar spine and sacral area. Pt is seeing colorectal surgeon next week for the cyst. As the surgery was in May, patient likely has a lot of scar tissue in the area as well. Did ask pt to speak to colorectal surgeon to possibly do some external releasing of the perineum into ease to help with pain and tension in the area. This may help take the pressure off her LB as well. Unclear if they are related however, given extensive trauma to perineal area, recommend assessment and possible treatment of this if OK'd by surgeon. Would  defer any internal work at this time given pregnancy but again, after delivery, recommend full pelvic floor eval and treatment if appropriate.    Pain -  manual therapy, self management, education and home programDecreased strength - therapeutic exercise, therapeutic activities and home program  Impaired muscle performance - neuro re-education and home program Decreased function - therapeutic activities and home program Impaired posture - neuro re-education, therapeutic activities and home program    Therapy Evaluation Codes:   1) History comprised of:   Personal factors that impact the plan of care:      Past/current experiences.    Comorbidity factors that impact the plan of care are:      Current pregnancy, Migraines/headaches, Pain at night/rest and Incontinence.     Medications impacting care: None.  2) Examination of Body Systems comprised of:   Body structures and functions that impact the plan of care:      Lumbar spine and Pelvis.   Activity limitations that impact the plan of care are:      Sitting, Walking and Sleeping.  3) Clinical presentation characteristics are:   Stable/Uncomplicated.  4) Decision-Making    Low complexity using standardized patient assessment instrument and/or measureable assessment of functional outcome.  Cumulative Therapy Evaluation is: Low complexity.  Previous and current functional limitations:  (See Goal Flow Sheet for this information)    Short term and Long term goals: (See Goal Flow Sheet for this information)   Communication ability:  Patient appears to be able to clearly communicate and understand verbal and written communication and follow directions correctly.  Treatment Explanation - The following has been discussed with the patient:   RX ordered/plan of care  Anticipated outcomes  Possible risks and side effects  This patient would benefit from PT intervention to resume normal activities.   Rehab potential is good.  Frequency:  1 X week, once daily  Duration:  for 6-8  weeks  Discharge Plan:  Achieve all LTG.  Independent in home treatment program.  Reach maximal therapeutic benefit.        Thank you for your referral.    INQUIRIES  Therapist:Brigitte Gonzalez PT  [facility]  [address] [facility] [address] [address]  Phone: [phone]  Fax: [phone]

## 2018-10-25 NOTE — PROGRESS NOTES
[facility] Initial Evaluation  Subjective:  Patient is a 33 year old female presenting with rehab back hpi. The history is provided by the patient. No  was used.   Ángela Zurita is a 33 year old female with a lumbar condition.  Condition occurred with:  Insidious onset.  Condition occurred: for unknown reasons.  This is a new condition  Onset of LBP about 1 week ago. No known injury however pt is 15 weeks pregnant with her 2nd child. Pregnancy is going OK overall. After her 1st child, she had a 4th degree tear that she had surgically repaired about 5 months ago. Is still having some issues with the healing of the repair and a small cyst that she has to see the colorectal surgeon for..    Patient reports pain:  Central lumbar spine.  Radiates to: around her waist.  Pain is described as aching and is intermittent and reported as 4/10 (0/10).  Associated symptoms:  Pregnancy and numbness (Gets numbness in her toes and in her legs which have been going on for a couple months now. Does get some lightheadedness as well). Pain is worse in the P.M..  Exacerbated by: activity in general, lifting, able to sit for prolonged periods of time but has a hard time getting back up then. and relieved by heat (massage).  Since onset symptoms are unchanged.        General health as reported by patient is good.                                              Objective:  System         Lumbar/SI Evaluation  ROM:    AROM Lumbar:   Flexion:          To toes - pain  Ext:                    -25% + pain (doesn't have a lot of movement in lower lumbar spine)-moves more from thoracic spine   Side Bend:        Left:  To knee - pain    Right:  To knee - pain  Rotation:           Left:     Right:   Side Glide:        Left:     Right:                     Lumbar Palpation:  Palpation (lumbar): No tenderness but mild tightness noted in B LB paraspinals.        Lumbar Provocation:  Lumbar provocation: Unable  to assess PA mobility or sacral mobility due to pt being pregnant.    Left negative with:  PROM hip    Right negative with:  PROM hip    SI joint/Sacrum:    Normal pelvic alignment with negative stork test                                                           General Evaluation:                        Posture:  Posture wnl general: Pt has good posture however notes she sits at edge of her chair all day without lumbar support. Is likely overusing back extensors though the day, causing spasm.                                               ROS    Assessment/Plan:    Patient is a 33 year old female with lumbar and pelvic complaints.    Patient has the following significant findings with corresponding treatment plan.                Diagnosis 1:  LBP with pregnancy. Pt's overall exam today was normal and unable to reproduce pain. Did have some mild hypertonicity in LB paraspinals but no tenderness to touch. Pt does note that she had a 4th degree tear repair of her perineum that is not healing the way it should and also has a cyst in the area now as well which is very tender to touch. Due to this, question tone of pelvic floor with attachments into sacrum causing poor mobility in lower lumbar spine and sacral area. Pt is seeing colorectal surgeon next week for the cyst. As the surgery was in May, patient likely has a lot of scar tissue in the area as well. Did ask pt to speak to colorectal surgeon to possibly do some external releasing of the perineum into ease to help with pain and tension in the area. This may help take the pressure off her LB as well. Unclear if they are related however, given extensive trauma to perineal area, recommend assessment and possible treatment of this if OK'd by surgeon. Would defer any internal work at this time given pregnancy but again, after delivery, recommend full pelvic floor eval and treatment if appropriate.    Pain -  manual therapy, self management, education and home  program  Decreased strength - therapeutic exercise, therapeutic activities and home program  Impaired muscle performance - neuro re-education and home program  Decreased function - therapeutic activities and home program  Impaired posture - neuro re-education, therapeutic activities and home program    Therapy Evaluation Codes:   1) History comprised of:   Personal factors that impact the plan of care:      Past/current experiences.    Comorbidity factors that impact the plan of care are:      Current pregnancy, Migraines/headaches, Pain at night/rest and Incontinence.     Medications impacting care: None.  2) Examination of Body Systems comprised of:   Body structures and functions that impact the plan of care:      Lumbar spine and Pelvis.   Activity limitations that impact the plan of care are:      Sitting, Walking and Sleeping.  3) Clinical presentation characteristics are:   Stable/Uncomplicated.  4) Decision-Making    Low complexity using standardized patient assessment instrument and/or measureable assessment of functional outcome.  Cumulative Therapy Evaluation is: Low complexity.    Previous and current functional limitations:  (See Goal Flow Sheet for this information)    Short term and Long term goals: (See Goal Flow Sheet for this information)     Communication ability:  Patient appears to be able to clearly communicate and understand verbal and written communication and follow directions correctly.  Treatment Explanation - The following has been discussed with the patient:   RX ordered/plan of care  Anticipated outcomes  Possible risks and side effects  This patient would benefit from PT intervention to resume normal activities.   Rehab potential is good.    Frequency:  1 X week, once daily  Duration:  for 6-8 weeks  Discharge Plan:  Achieve all LTG.  Independent in home treatment program.  Reach maximal therapeutic benefit.    Please refer to the daily flowsheet for treatment today, total treatment  time and time spent performing 1:1 timed codes.

## 2018-10-29 ENCOUNTER — TELEPHONE (OUTPATIENT)
Dept: OBGYN | Facility: CLINIC | Age: 33
End: 2018-10-29

## 2018-10-29 NOTE — TELEPHONE ENCOUNTER
TC to patient to find out if she wants to have AFP done. Lab came to get a new order signed as pt did not stop at lab after appt on 10/24. LMTC to find out if she wants to have lab done. If so, should schedule lab only appt as she is 15w5d.   Sherron Jackson, DAVID-BSN

## 2018-10-29 NOTE — TELEPHONE ENCOUNTER
Pt called back. Discussed lab with patient. She stated she does want the lab done. Wasn't aware that it was ordered and she needed to go to the lab. New patient in clinic so learning how we work. Advised she schedule lab only appointment or get it done same day as [facility] appt. Pt stated understanding and she will call back to schedule lab only appt.

## 2018-11-16 ENCOUNTER — HOSPITAL ENCOUNTER (OUTPATIENT)
Dept: ULTRASOUND IMAGING | Facility: CLINIC | Age: 33
Discharge: HOME | End: 2018-11-16
Attending: OBSTETRICS & GYNECOLOGY | Admitting: OBSTETRICS & GYNECOLOGY
Payer: COMMERCIAL

## 2018-11-16 ENCOUNTER — OFFICE VISIT (OUTPATIENT)
Dept: MATERNAL FETAL MEDICINE | Facility: CLINIC | Age: 33
End: 2018-11-16
Attending: OBSTETRICS & GYNECOLOGY
Payer: COMMERCIAL

## 2018-11-16 DIAGNOSIS — O43.199 MARGINAL INSERTION OF UMBILICAL CORD AFFECTING MANAGEMENT OF MOTHER: ICD-10-CM

## 2018-11-16 DIAGNOSIS — O26.90 PREGNANCY RELATED CONDITION, ANTEPARTUM: ICD-10-CM

## 2018-11-16 DIAGNOSIS — O09.522 AMA (ADVANCED MATERNAL AGE) MULTIGRAVIDA 35+, SECOND TRIMESTER: Primary | ICD-10-CM

## 2018-11-16 DIAGNOSIS — Z34.82 PRENATAL CARE, SUBSEQUENT PREGNANCY IN SECOND TRIMESTER: ICD-10-CM

## 2018-11-16 PROCEDURE — 82105 ALPHA-FETOPROTEIN SERUM: CPT | Mod: 90 | Performed by: OBSTETRICS & GYNECOLOGY

## 2018-11-16 PROCEDURE — 36415 COLL VENOUS BLD VENIPUNCTURE: CPT | Performed by: OBSTETRICS & GYNECOLOGY

## 2018-11-16 PROCEDURE — 76811 OB US DETAILED SNGL FETUS: CPT

## 2018-11-16 PROCEDURE — 99000 SPECIMEN HANDLING OFFICE-LAB: CPT | Performed by: OBSTETRICS & GYNECOLOGY

## 2018-11-16 NOTE — MR AVS SNAPSHOT
After Visit Summary   11/16/2018    Ángela Zurita    MRN: [id number]           Patient Information     Date Of Birth          1985        Visit Information        Provider Department      11/16/2018 8:30 AM Deb Banks MD [facility] Maternal Fetal Medicine [facility]        Today's Diagnoses     AMA (advanced maternal age) multigravida 35+, second trimester    -  1    Marginal insertion of umbilical cord affecting management of mother           Follow-ups after your visit        Your next 10 appointments already scheduled     Nov 21, 2018  7:30 AM CST   NATA For Women Only with Brigitte Gonzalez, PT   [facility]")    [address] [address]   [phone]            Nov 21, 2018  1:00 PM CST   MyChart OB-GYN Established Prenatal with Lois Lau MD   [facility] ([facility])    [address] [place] [address]   [phone]            Nov 28, 2018  7:30 AM CST   NATA For Women Only with Brigitte Gonzalez, PT   [facility] (\[facility]")    [address] [address]   [phone]            Jan 25, 2019  8:00 AM CST   MFM US COMPRE SINGLE F/U with [id number]   [facility] Maternal Fetal Medicine Ultrasound - [facility])    [address]  [facility] [address] [phone]           Wear comfortable clothes and leave your valuables at home.            Jan 25, 2019  8:30 AM CST   Radiology MD with UR REN BHANDARI   [facility] Maternal Fetal Medicine - [facility])    [address]  [facility] [address]   [phone]           Please arrive at the time given for your first appointment. This visit is used internally to schedule the physician's time during your ultrasound.            Apr 10, 2019   Procedure with Lois Lau MD   [facility] (--)     [address]  [place] [address]   [phone]              Future tests that were ordered for you today     Open Future Orders        Priority Expected Expires Ordered    MFM US Comprehensive Single F/U Routine  11/16/2019 11/16/2018            Who to contact     If you have questions or need follow up information about today's clinic visit or your schedule please contact [facility] [place] directly at [phone].  Normal or non-critical lab and imaging results will be communicated to you by Litigainhart, letter or phone within 4 business days after the clinic has received the results. If you do not hear from us within 7 days, please contact the clinic through ilustrumt or phone. If you have a critical or abnormal lab result, we will notify you by phone as soon as possible.  Submit refill requests through [facility] or call your pharmacy and they will forward the refill request to us. Please allow 3 business days for your refill to be completed.          Additional Information About Your Visit        [facility] Information     [facility] gives you secure access to your electronic health record. If you see a primary care provider, you can also send messages to your care team and make appointments. If you have questions, please call your primary care clinic.  If you do not have a primary care provider, please call [phone] and they will assist you.        Care EveryWhere ID     This is your Care EveryWhere ID. This could be used by other organizations to access your [place] medical records  [id number]        Your Vitals Were     Last Period                   07/02/2018 (Within Weeks)            Blood Pressure from Last 3 Encounters:   10/24/18 129/75   10/20/18 (!) 146/97   10/02/18 106/70    Weight from Last 3 Encounters:   10/24/18 71.7 kg (158 lb)   10/02/18 68.5 kg (151 lb)   09/20/18 67.7 kg (149 lb 3.2 oz)               Primary Care Provider Office Phone # Fax #    Randy Graf,  [phone]       [address] [facility] [address]        Equal Access to Services     SKY BLANCHARD : Hadii aad ku hadbethdana Meredithlaex, jakeda gabrielleelhamha, kaylee kadonovan willoughby, celia michellein hayaamisha claroslon fraser laLeahrissa elizabethCleo So [facility] [phone].    ATENCIÓN: Si habla español, tiene a reyez disposición servicios gratuitos de asistencia lingüística. Llame al [phone].    We comply with applicable federal civil rights laws and [place] laws. We do not discriminate on the basis of race, color, national origin, age, disability, sex, sexual orientation, or gender identity.            Thank you!     Thank you for choosing [facility]  for your care. Our goal is always to provide you with excellent care. Hearing back from our patients is one way we can continue to improve our services. Please take a few minutes to complete the written survey that you may receive in the mail after your visit with us. Thank you!             Your Updated Medication List - Protect others around you: Learn how to safely use, store and throw away your medicines at www.disposemymeds.org.          This list is accurate as of 11/16/18  9:03 AM.  Always use your most recent med list.                   Brand Name Dispense Instructions for use Diagnosis    acyclovir 400 MG tablet    ZOVIRAX    15 tablet    Take 1 tablet (400 mg) by mouth 3 times daily    Cold sore       amphetamine-dextroamphetamine 10 MG per tablet    ADDERALL    150 tablet    Take 2 pills (20mg) orally in the morning and two pills (20mg) at 11am and one pill (10mg) at 2pm    Attention deficit hyperactivity disorder (ADHD), predominantly inattentive type       FIBER CHOICE 1.5 g Chew   Generic drug:  Inulin      Take 1 chew tab by mouth daily        folic acid 1 MG tablet    FOLVITE    100 tablet    Take 1 tablet (1 mg) by mouth daily    Elderly multigravida in first trimester, Rectovaginal fistula, Uterine leiomyoma, unspecified  location, Encounter for supervision of other normal pregnancy in first trimester       prenatal multivitamin plus iron 27-0.8 MG Tabs per tablet      Take 1 tablet by mouth daily

## 2018-11-16 NOTE — PROGRESS NOTES
"Please see \"Imaging\" tab under Chart Review for full details.    Deb Banks MD  Maternal Fetal Medicine    "

## 2018-11-19 PROBLEM — O43.199 MARGINAL INSERTION OF UMBILICAL CORD AFFECTING MANAGEMENT OF MOTHER: Status: ACTIVE | Noted: 2018-11-19

## 2018-11-19 LAB
# FETUSES US: NORMAL
# FETUSES: NORMAL
AFP ADJ MOM AMN: 0.65
AFP SERPL-MCNC: 28 NG/ML
AGE - REPORTED: 35.2 YR
CURRENT SMOKER: NO
CURRENT SMOKER: NO
DIABETES STATUS PATIENT: NO
FAMILY MEMBER DISEASES HX: NO
FAMILY MEMBER DISEASES HX: NO
GA METHOD: NORMAL
GA METHOD: NORMAL
GA: NORMAL WK
IDDM PATIENT QL: NO
INTEGRATED SCN PATIENT-IMP: NORMAL
LMP START DATE: NORMAL
MONOCHORIONIC TWINS: NO
SERVICE CMNT-IMP: NO
SPECIMEN DRAWN SERPL: NORMAL
VALPROIC/CARBAMAZEPINE STATUS: NO
WEIGHT UNITS: NORMAL

## 2018-11-26 ENCOUNTER — PRENATAL OFFICE VISIT (OUTPATIENT)
Dept: OBGYN | Facility: CLINIC | Age: 33
End: 2018-11-26
Payer: COMMERCIAL

## 2018-11-26 VITALS
TEMPERATURE: 97.3 F | WEIGHT: 165 LBS | HEART RATE: 93 BPM | DIASTOLIC BLOOD PRESSURE: 80 MMHG | SYSTOLIC BLOOD PRESSURE: 123 MMHG | BODY MASS INDEX: 27.46 KG/M2

## 2018-11-26 DIAGNOSIS — Z34.82 PRENATAL CARE, SUBSEQUENT PREGNANCY IN SECOND TRIMESTER: Primary | ICD-10-CM

## 2018-11-26 LAB
DEPRECATED S PYO AG THROAT QL EIA: NORMAL
SPECIMEN SOURCE: NORMAL
SPECIMEN SOURCE: NORMAL

## 2018-11-26 PROCEDURE — 99207 ZZC PRENATAL VISIT: CPT | Performed by: OBSTETRICS & GYNECOLOGY

## 2018-11-26 NOTE — PROGRESS NOTES
"Date:   Clinician: Myron Prabhakar  Clinician NPI: [id number]  Patient: Ángela Zurita  Patient : 1985  Patient Address: [address] [place] [address]  Patient Phone: [phone]  Visit Protocol: URI  Patient Summary:  Ángela is a 33 year old ( : 1985 ) female who initiated a Visit for cold, sinus infection, or influenza. When asked the question \"Please sign me up to receive news, health information and promotions. \", Ángela responded \"No\".    Ángela states her symptoms started today.   Her symptoms consist of enlarged lymph nodes, a headache, a sore throat, malaise, and myalgia.   Symptom details     Sore throat: Ángela reports having moderate throat pain (4-6 on a 10 point pain scale), does not have exudate on her tonsils, and can swallow liquids. The lymph nodes in her neck are enlarged. A rash has not appeared on the skin since the sore throat started.     Headache: She states the headache is mild (1-3 on a 10 point pain scale).      Ángela denies having ear pain, dyspnea, fever, cough, chills, facial pain or pressure, wheezing, teeth pain, rhinitis, and nasal congestion. She also denies taking antibiotic medication for the symptoms and having recent facial or sinus surgery in the past 60 days.   Within the past week, Ángela has been exposed to someone with strep throat. She has not recently been exposed to someone with influenza. Ángela has been in close contact with the following high risk individuals: pregnant women and children under the age of 5.   Weight: 165 lbs   Ángela does not smoke or use smokeless tobacco.   She is pregnant and denies breastfeeding.   MEDICATIONS: No current medications, ALLERGIES: NKDA  Clinician Response:  Dear Miryam Motta Strep Test    I am sorry you are not feeling well. Based on your lab results, you do not have strep throat. This means your symptoms are caused by a virus and not the bacteria that causes strep throat. Antibiotic medications are not " effective against a viral infection and will not help symptoms improve or make the condition less contagious.  The following tips will keep you as comfortable as possible while you recover:     Rest    Drink plenty of water and other liquids    Take a hot shower to loosen congestion    Use throat lozenges    Gargle with warm salt water (1/4 teaspoon of salt per 8 ounce glass of water)    Suck on frozen items such as popsicles or ice cubes    Drink hot tea with lemon and honey     Please be seen in a clinic or urgent care if new symptoms develop, or symptoms become worse.  Call 911 or go to the emergency room if you suddenly develop a rash, are drooling or unable to swallow fluids, if you are having difficulty breathing, or feel that your throat is closing off.  Because strep throat can be easily spread to others, proof of evaluation by a provider is often requested before returning to work, school, or . The statement below summarizes my evaluation. Please print a copy of this Visit if written verification is needed.  Ángela was evaluated for strep throat and lab testing was completed. As a result, strep throat was ruled out and symptoms are the result of a viral infection. Antibiotics were not prescribed because they are not an effective treatment for viral infections and will not make it less contagious. Ángela can return to work, school, or  if she has not had a fever for 24 hours without the use of a fever-reducing medication and feels well enough for daily activities.   Diagnosis: ZipTicket Strep  Diagnosis ICD: J02.9  ZipTicket Results: SSCRNT: NEGATIVE: No Group A streptococcal antigen detected by immunoassay, await  culture report.  ZipTicket Secondary Results: CULT: No beta hemolytic Streptococcus Group A isolated

## 2018-11-26 NOTE — PROGRESS NOTES
19w5d  Doing well since last visit.  Hasn't started feeling significant movement yet; only if she puts her hands on her belly.  S/p Level II US.  Marginal cord insertion; follow-up scheduled at 28w  Discussed GCT and hgb at 24+w  RTC 4-5w  Lois Lau MD

## 2018-11-26 NOTE — MR AVS SNAPSHOT
After Visit Summary   11/26/2018    Ángela Zurita    MRN: [id number]           Patient Information     Date Of Birth          1985        Visit Information        Provider Department      11/26/2018 8:15 AM Lois Lau MD [facility]        Today's Diagnoses     Prenatal care, subsequent pregnancy in second trimester    -  1       Follow-ups after your visit        Your next 10 appointments already scheduled     Jan 25, 2019  8:00 AM CST   MFM US COMPRE SINGLE F/U with [id number]   [facility] - [facility])    [address]  [facility] [place] [facility] [address] [phone]           Wear comfortable clothes and leave your valuables at home.            Jan 25, 2019  8:30 AM CST   Radiology MD with UR REN BHANDARI   [facility] - [facility])    [address]  [facility] [address] [phone]           Please arrive at the time given for your first appointment. This visit is used internally to schedule the physician's time during your ultrasound.            Apr 10, 2019   Procedure with Lois Lau MD   [facility] (--)    [address] [facility] [address] [phone]              Who to contact     If you have questions or need follow up information about today's clinic visit or your schedule please contact [facility] directly at [phone].  Normal or non-critical lab and imaging results will be communicated to you by MyChart, letter or phone within 4 business days after the clinic has received the results. If you do not hear from us within 7 days, please contact the clinic through MyChart or phone. If you have a critical or abnormal lab result, we will notify you by phone as soon as possible.  Submit refill requests through isango! or call your pharmacy and they will forward the refill request  to us. Please allow 3 business days for your refill to be completed.          Additional Information About Your Visit        MyChart Information     Or[employer] Internet Holdingshart gives you secure access to your electronic health record. If you see a primary care provider, you can also send messages to your care team and make appointments. If you have questions, please call your primary care clinic.  If you do not have a primary care provider, please call [phone] and they will assist you.        Care EveryWhere ID     This is your Care EveryWhere ID. This could be used by other organizations to access your [place] medical records  [id number]        Your Vitals Were     Pulse Temperature Last Period BMI (Body Mass Index)          93 97.3  F (36.3  C) (Oral) 07/02/2018 (Within Weeks) 27.46 kg/m2         Blood Pressure from Last 3 Encounters:   11/26/18 123/80   10/24/18 129/75   10/20/18 (!) 146/97    Weight from Last 3 Encounters:   11/26/18 165 lb (74.8 kg)   10/24/18 158 lb (71.7 kg)   10/02/18 151 lb (68.5 kg)              Today, you had the following     No orders found for display         Today's Medication Changes          These changes are accurate as of 11/26/18  9:26 AM.  If you have any questions, ask your nurse or doctor.               Start taking these medicines.        Dose/Directions    order for DME   Used for:  Prenatal care, subsequent pregnancy in second trimester   Started by:  Lois Lau MD        Equipment being ordered: double electric breast pump   Quantity:  1 pump   Refills:  0            Where to get your medicines      Some of these will need a paper prescription and others can be bought over the counter.  Ask your nurse if you have questions.     Bring a paper prescription for each of these medications     order for DME                Primary Care Provider Office Phone # Fax #    Randy Graf [phone] [address] [facility] [address]        Equal Access to  Services     [facility]: Hadii aad ku hadbethdana Cloud, waaxda luqadaha, qaybta kaalmada case, celia elizabeth. So [facility] [phone].    ATENCIÓN: Si habla gage, tiene a reyez disposición servicios gratuitos de asistencia lingüística. Llame al [phone].    We comply with applicable federal civil rights laws and [place] laws. We do not discriminate on the basis of race, color, national origin, age, disability, sex, sexual orientation, or gender identity.            Thank you!     Thank you for choosing [facility]  for your care. Our goal is always to provide you with excellent care. Hearing back from our patients is one way we can continue to improve our services. Please take a few minutes to complete the written survey that you may receive in the mail after your visit with us. Thank you!             Your Updated Medication List - Protect others around you: Learn how to safely use, store and throw away your medicines at www.disposemymeds.org.          This list is accurate as of 11/26/18  9:26 AM.  Always use your most recent med list.                   Brand Name Dispense Instructions for use Diagnosis    acyclovir 400 MG tablet    ZOVIRAX    15 tablet    Take 1 tablet (400 mg) by mouth 3 times daily    Cold sore       amphetamine-dextroamphetamine 10 MG tablet    ADDERALL    150 tablet    Take 2 pills (20mg) orally in the morning and two pills (20mg) at 11am and one pill (10mg) at 2pm    Attention deficit hyperactivity disorder (ADHD), predominantly inattentive type       FIBER CHOICE 1.5 g Chew   Generic drug:  Inulin      Take 1 chew tab by mouth daily        folic acid 1 MG tablet    FOLVITE    100 tablet    Take 1 tablet (1 mg) by mouth daily    Elderly multigravida in first trimester, Rectovaginal fistula, Uterine leiomyoma, unspecified location, Encounter for supervision of other normal pregnancy in first trimester       order for DME     1 pump     Equipment being ordered: double electric breast pump    Prenatal care, subsequent pregnancy in second trimester       prenatal multivitamin plus iron 27-0.8 MG Tabs per tablet      Take 1 tablet by mouth daily

## 2018-11-27 ENCOUNTER — VIRTUAL VISIT (OUTPATIENT)
Dept: LAB | Facility: CLINIC | Age: 33
End: 2018-11-27
Payer: COMMERCIAL

## 2018-11-27 DIAGNOSIS — R07.0 THROAT PAIN: Primary | ICD-10-CM

## 2018-11-27 LAB
BACTERIA SPEC CULT: NORMAL
SPECIMEN SOURCE: NORMAL

## 2018-11-27 NOTE — MR AVS SNAPSHOT
After Visit Summary   11/27/2018    Ángela Zurita    MRN: [id number]           Patient Information     Date Of Birth          1985        Visit Information        Provider Department      11/27/2018 12:30 PM [facility]        Today's Diagnoses     Throat pain    -  1       Follow-ups after your visit        Your next 10 appointments already scheduled     Nov 27, 2018 12:30 PM CST   My Health Lab Visit with [facility]   [facility]    [address] [place] [address]   [phone]           You must check in at the  no later than the appointment time. Please check Zipnosis for updates.            Jan 02, 2019 11:15 AM CST   LAB with RD LAB   [facility] ([facility])    [address] [place] [address] [phone]           Please do not eat 10-12 hours before your appointment if you are coming in fasting for labs on lipids, cholesterol, or glucose (sugar). This does not apply to pregnant women. Water, hot tea and black coffee (with nothing added) are okay. Do not drink other fluids, diet soda or chew gum.            Jan 02, 2019 11:30 AM CST   MyChart OB-GYN Established Prenatal with Lois Lau MD   [facility] ([facility])    [address] [place] [address] [phone]            Jan 25, 2019  8:00 AM CST   REN US COMPRE SINGLE F/U with [id number]   [facility])    [address] [place] [address] [phone]           Wear comfortable clothes and leave your valuables at home.            Jan 25, 2019  8:30 AM CST   Radiology MD with UR REN BHANDARI   [facility]  [place])    [address]  [facility] [place] [address]   [phone]           Please arrive at the time given for your first appointment. This visit is used internally to schedule the physician's time during your ultrasound.            Apr 10, 2019   Procedure with Lois Lau MD   [facility] (--)    [address]  [place] [address] [phone]              Who to contact     If you have questions or need follow up information about today's clinic visit or your schedule please contact [facility] directly at [phone].  Normal or non-critical lab and imaging results will be communicated to you by Altech Softwarehart, letter or phone within 4 business days after the clinic has received the results. If you do not hear from us within 7 days, please contact the clinic through Ec[employer] or phone. If you have a critical or abnormal lab result, we will notify you by phone as soon as possible.  Submit refill requests through [employer] or call your pharmacy and they will forward the refill request to us. Please allow 3 business days for your refill to be completed.          Additional Information About Your Visit        MyChart Information     [employer] gives you secure access to your electronic health record. If you see a primary care provider, you can also send messages to your care team and make appointments. If you have questions, please call your primary care clinic.  If you do not have a primary care provider, please call [phone] and they will assist you.        Care EveryWhere ID     This is your Care EveryWhere ID. This could be used by other organizations to access your [place] medical records  [id number]        Your Vitals Were     Last Period                   07/02/2018 (Within Weeks)            Blood Pressure from Last 3 Encounters:   11/26/18 123/80   10/24/18 129/75   10/20/18 (!) 146/97    Weight from Last 3 Encounters:   11/26/18 165 lb (74.8 kg)   10/24/18 158 lb (71.7 kg)   10/02/18 151 lb  (68.5 kg)              We Performed the Following     Rapid strep screen        Primary Care Provider Office Phone # Fax #    Randy Graf [phone] [address] [facility] [address]        Equal Access to Services     ELMAWANDA LO : Hadii jordan duran hadbetho Soomaali, waaxda luqadaha, qaybta kaalmada adeegyada, waxpaz michellein hayjimn adelon jorifrancis elizabeth. So [facility] [phone].    ATENCIÓN: Si habla español, tiene a reyez disposición servicios gratuitos de asistencia lingüística. Llame al [phone].    We comply with applicable federal civil rights laws and [place] laws. We do not discriminate on the basis of race, color, national origin, age, disability, sex, sexual orientation, or gender identity.            Thank you!     Thank you for choosing [facility]  for your care. Our goal is always to provide you with excellent care. Hearing back from our patients is one way we can continue to improve our services. Please take a few minutes to complete the written survey that you may receive in the mail after your visit with us. Thank you!             Your Updated Medication List - Protect others around you: Learn how to safely use, store and throw away your medicines at www.disposemymeds.org.          This list is accurate as of 11/26/18  1:21 PM.  Always use your most recent med list.                   Brand Name Dispense Instructions for use Diagnosis    acyclovir 400 MG tablet    ZOVIRAX    15 tablet    Take 1 tablet (400 mg) by mouth 3 times daily    Cold sore       amphetamine-dextroamphetamine 10 MG tablet    ADDERALL    150 tablet    Take 2 pills (20mg) orally in the morning and two pills (20mg) at 11am and one pill (10mg) at 2pm    Attention deficit hyperactivity disorder (ADHD), predominantly inattentive type       FIBER CHOICE 1.5 g Chew   Generic drug:  Inulin      Take 1 chew tab by mouth daily        folic acid 1 MG tablet    FOLVITE    100 tablet    Take 1  tablet (1 mg) by mouth daily    Elderly multigravida in first trimester, Rectovaginal fistula, Uterine leiomyoma, unspecified location, Encounter for supervision of other normal pregnancy in first trimester       order for DME     1 pump    Equipment being ordered: double electric breast pump    Prenatal care, subsequent pregnancy in second trimester       prenatal multivitamin plus iron 27-0.8 MG Tabs per tablet      Take 1 tablet by mouth daily

## 2018-11-28 ENCOUNTER — TELEPHONE (OUTPATIENT)
Dept: OBGYN | Facility: CLINIC | Age: 33
End: 2018-11-28

## 2018-11-28 NOTE — TELEPHONE ENCOUNTER
"Patient calling regarding leaking of fluid today. Stated that its a little tiny \"trickle\" but not a large gush and no constant. Jordan and watery. Is having some cramping, but that is normal for her. Instructed her it could be urine, normal vaginal discharge or possible amniotic fluid. Offered appointment, but patient would prefer not to come in. Discussed that she should empty her bladder, put on a clean cotton pair of underwear and lie flat for 45 minutes to an hour. At that time, to stand up and see what happens. If she notices any wetness, may be amnionitic fluid and would want to see her at that point. If nothing, okay to monitor knowing that if symptoms persist or worsen to call clinic back and/or present to ER for evaluation. Patient will call back with update.  Melanie Forrest    "

## 2018-11-29 ENCOUNTER — PRENATAL OFFICE VISIT (OUTPATIENT)
Dept: OBGYN | Facility: CLINIC | Age: 33
End: 2018-11-29
Payer: COMMERCIAL

## 2018-11-29 ENCOUNTER — RADIANT APPOINTMENT (OUTPATIENT)
Dept: ULTRASOUND IMAGING | Facility: CLINIC | Age: 33
End: 2018-11-29
Attending: OBSTETRICS & GYNECOLOGY
Payer: COMMERCIAL

## 2018-11-29 VITALS
WEIGHT: 168 LBS | OXYGEN SATURATION: 95 % | HEART RATE: 74 BPM | DIASTOLIC BLOOD PRESSURE: 69 MMHG | BODY MASS INDEX: 27.96 KG/M2 | SYSTOLIC BLOOD PRESSURE: 122 MMHG

## 2018-11-29 DIAGNOSIS — N89.8 VAGINAL DISCHARGE: ICD-10-CM

## 2018-11-29 DIAGNOSIS — N89.8 VAGINAL DISCHARGE: Primary | ICD-10-CM

## 2018-11-29 LAB
RUPTURE OF FETAL MEMBRANES BY ROM PLUS: NEGATIVE
SPECIMEN SOURCE: NORMAL
WET PREP SPEC: NORMAL

## 2018-11-29 PROCEDURE — 76815 OB US LIMITED FETUS(S): CPT | Performed by: OBSTETRICS & GYNECOLOGY

## 2018-11-29 PROCEDURE — 99207 ZZC PRENATAL VISIT: CPT | Performed by: OBSTETRICS & GYNECOLOGY

## 2018-11-29 PROCEDURE — 84112 EVAL AMNIOTIC FLUID PROTEIN: CPT | Performed by: OBSTETRICS & GYNECOLOGY

## 2018-11-29 PROCEDURE — 87210 SMEAR WET MOUNT SALINE/INK: CPT | Performed by: OBSTETRICS & GYNECOLOGY

## 2018-11-29 NOTE — MR AVS SNAPSHOT
After Visit Summary   11/29/2018    Ángela Zurita    MRN: [id number]           Patient Information     Date Of Birth          1985        Visit Information        Provider Department      11/29/2018 11:30 AM Cherry Dawkins MD [facility]        Today's Diagnoses     Vaginal discharge    -  1       Follow-ups after your visit        Your next 10 appointments already scheduled     Jan 02, 2019 11:15 AM CST   LAB with RD [facility]   [facility]    [address] [place] [id number] [phone]           Please do not eat 10-12 hours before your appointment if you are coming in fasting for labs on lipids, cholesterol, or glucose (sugar). This does not apply to pregnant women. Water, hot tea and black coffee (with nothing added) are okay. Do not drink other fluids, diet soda or chew gum.            Jan 02, 2019 11:30 AM CST   [facility] Established Prenatal with Lois Lau MD   [facility]    [address] [place] [id number]   [phone]            Jan 25, 2019  8:00 AM CST   MFM US COMPRE SINGLE F/U with [id number]   [facility])    [address]  [facility] [id number] [phone]           Wear comfortable clothes and leave your valuables at home.            Jan 25, 2019  8:30 AM CST   Radiology MD with UR REN BHANDARI   [facility])    [address]  [facility] [address]   [phone]           Please arrive at the time given for your first appointment. This visit is used internally to schedule the physician's time during your ultrasound.            Apr 10, 2019   Procedure with Lois Lau MD   [facility] (--)    [address] [facility]  [id number] [phone]              Who to contact     If you have questions or need follow up information about today's clinic visit or your schedule please contact [facility] directly at [phone].  Normal or non-critical lab and imaging results will be communicated to you by MyChart, letter or phone within 4 business days after the clinic has received the results. If you do not hear from us within 7 days, please contact the clinic through [employer] or phone. If you have a critical or abnormal lab result, we will notify you by phone as soon as possible.  Submit refill requests through [employer] or call your pharmacy and they will forward the refill request to us. Please allow 3 business days for your refill to be completed.          Additional Information About Your Visit        [employer] Information     [employer] gives you secure access to your electronic health record. If you see a primary care provider, you can also send messages to your care team and make appointments. If you have questions, please call your primary care clinic.  If you do not have a primary care provider, please call [phone] and they will assist you.        Care EveryWhere ID     This is your Care EveryWhere ID. This could be used by other organizations to access your [facility] medical records  [id number]        Your Vitals Were     Pulse Last Period Pulse Oximetry Breastfeeding? BMI (Body Mass Index)       74 07/02/2018 (Within Weeks) 95% No 27.96 kg/m2        Blood Pressure from Last 3 Encounters:   11/29/18 122/69   11/26/18 123/80   10/24/18 129/75    Weight from Last 3 Encounters:   11/29/18 168 lb (76.2 kg)   11/26/18 165 lb (74.8 kg)   10/24/18 158 lb (71.7 kg)              We Performed the Following     Rupture of Fetal Membranes by ROM Plus     Wet prep        Primary Care Provider Office Phone # Fax #    Randy Graf [phone] [address] [facility] [address]        Equal  Access to Services     [facility] : Hadii aad ku hadbethdana Meredithalex, waanthonyda luqadaha, qaybta kaalmacelia fuentes. So [facility] [phone].    ATENCIÓN: Si habla gage, tiene a reyez disposición servicios gratuitos de asistencia lingüística. Llame al [phone].    We comply with applicable federal civil rights laws and [place] laws. We do not discriminate on the basis of race, color, national origin, age, disability, sex, sexual orientation, or gender identity.            Thank you!     Thank you for choosing [facility]  for your care. Our goal is always to provide you with excellent care. Hearing back from our patients is one way we can continue to improve our services. Please take a few minutes to complete the written survey that you may receive in the mail after your visit with us. Thank you!             Your Updated Medication List - Protect others around you: Learn how to safely use, store and throw away your medicines at www.disposemymeds.org.          This list is accurate as of 11/29/18  2:16 PM.  Always use your most recent med list.                   Brand Name Dispense Instructions for use Diagnosis    acyclovir 400 MG tablet    ZOVIRAX    15 tablet    Take 1 tablet (400 mg) by mouth 3 times daily    Cold sore       amphetamine-dextroamphetamine 10 MG tablet    ADDERALL    150 tablet    Take 2 pills (20mg) orally in the morning and two pills (20mg) at 11am and one pill (10mg) at 2pm    Attention deficit hyperactivity disorder (ADHD), predominantly inattentive type       FIBER CHOICE 1.5 g Chew   Generic drug:  Inulin      Take 1 chew tab by mouth daily        folic acid 1 MG tablet    FOLVITE    100 tablet    Take 1 tablet (1 mg) by mouth daily    Elderly multigravida in first trimester, Rectovaginal fistula, Uterine leiomyoma, unspecified location, Encounter for supervision of other normal pregnancy in first trimester       order for DME     1  pump    Equipment being ordered: double electric breast pump    Prenatal care, subsequent pregnancy in second trimester       order for DME     2 Package    Equipment being ordered: Compression stockings, 20-30 Hz, knee high    Bilateral lower extremity edema       prenatal multivitamin w/iron 27-0.8 MG tablet      Take 1 tablet by mouth daily

## 2018-11-29 NOTE — PROGRESS NOTES
Comes in today to evaluate fluid leaking from vagina.  She noted this yesterday.  She is seeing no blood, the fluid is clear.  She has had some pelvic cramping but no regular contractions.  She has had back pain but this has been persistent throughout her pregnancy.  She has not been feeling much if any fetal movement.  Examination today shows a small amount of white yellow discharge at the posterior introitus.  Speculum examination shows a closed cervix, with some yellow white discharge in the posterior vagina.  ROM plus is negative.  Wet prep is negative.  Limited ultrasound shows LORIE 12.5, cervix 3.8 cm (LORIE was 11.6 at survey ultrasound).  Discussed, she will monitor symptoms and call prn.  If all well, RTC for scheduled OB visit.  AM

## 2018-12-12 ENCOUNTER — TELEPHONE (OUTPATIENT)
Dept: FAMILY MEDICINE | Facility: CLINIC | Age: 33
End: 2018-12-12

## 2018-12-12 NOTE — PATIENT INSTRUCTIONS
Patient Education     Fifth Disease    Fifth disease (erythema infectiosum) is a mild viral illness. It most often affects children during the winter and spring. The name  fifth disease  comes from it being the fifth childhood disease classified--after measles, mumps, rubella, and chicken pox. Like those diseases, there is a characteristic rash.  Symptoms  An initial incubation period of about 4 to 14 days occurs after the child is infected when the child looks and feels well. This is also when children are the most contagious. A rash appears 2 to 3 weeks after your child is infected. When the rash appears, your child can no longer give the illness to another child. This also means that children spread the disease before anyone knows they have it.  Fifth disease usually starts with symptoms of a mild flu-like illness:    Low-grade fever    Muscle aches    Runny nose    Headache    Sore throat    Tiredness    Joint pains  Several days later, a rash develops. This is a splotchy red facial rash that looks like your child has been slapped. In fact, many people used to call it  slap cheek  because of this look. The rash then spreads to the rest of the body.  The virus spreads by coughing and sneezing or by sharing glasses and utensils.  Most children with fifth disease fully recover without any problem. Complications may occur in people with weakened immune systems and those with sickle cell disease. Pregnant women who are exposed to this illness should talk to their healthcare providers.  Home care  Because fifth disease is caused by a virus, antibiotics don't help get rid of it. Antibiotics do not kill viruses. Instead, the goal of treatment is to relieve symptoms, as with most colds and viruses. Follow these guidelines when caring for your child at home.    Give your child extra fluids.    Encourage your child to rest until he or she is feeling better.    Have your child practice frequent handwashing and throw away  tissues after wiping or blowing the nose.    Wash your hands before and after touching your child.    Teach your child to cover the mouth and nose when he or she coughs or sneezes.    Teach your child not to touch his or her eyes, nose, or mouth.    Keep your child home until he or she is well.    Have your child stay away from people who are ill.    Follow your healthcare provider's instructions on the use of over-the-counter pain medications such as acetaminophen for fever, fussiness, or pain. In infants older than 6 months, you may use children's ibuprofen. It is OK to alternate giving acetaminophen and ibuprofen. Ask your healthcare provider about alternating acetaminophen and ibuprofen. If your child has chronic liver or kidney disease or has ever had a stomach ulcer or gastrointestinal bleeding, talk with your healthcare provider before using these medicines. Aspirin should never be given to anyone younger than 18 years of age who is ill with a viral infection or fever. It may cause severe liver or brain damage.  Follow-up care  Follow up with your child s healthcare provider, or as advised.  Call 911  Call 911 if any of these occur:    Trouble breathing    Inability to swallow    Extreme drowsiness or trouble awakening    Fainting or loss of consciousness    Rapid heart rate    Seizure    Stiff neck  When to seek medical advice  For a usually healthy child, call the healthcare provider right away if any of these occur:    Fever (see Fever and children, below)    Your baby is fussy or cries and cannot be soothed.    Symptoms get worse or do not start to improve after 2 days of treatment.    Your child shows unusual fussiness, drowsiness, or confusion.    Your child shows symptoms of dehydration: no urine for 8 hours, no tears when crying, sunken eyes, or dry mouth.    Your child has a headache, neck pain, or a stiff neck.    Your child experiences frequent diarrhea or vomiting.    Your child has ear pain or  increasing throat pain that causes difficulty swallowing.  Fever and children  Always use a digital thermometer to check your child s temperature. Never use a mercury thermometer.  For infants and toddlers, be sure to use a rectal thermometer correctly. A rectal thermometer may accidentally poke a hole in (perforate) the rectum. It may also pass on germs from the stool. Always follow the product maker s directions for proper use. If you don t feel comfortable taking a rectal temperature, use another method. When you talk to your child s healthcare provider, tell him or her which method you used to take your child s temperature.  Here are guidelines for fever temperature. Ear temperatures aren t accurate before 6 months of age. Don t take an oral temperature until your child is at least 4 years old.  Infant under 3 months old:    Ask your child s healthcare provider how you should take the temperature.    Rectal or forehead (temporal artery) temperature of 100.4 F (38 C) or higher, or as directed by the provider    Armpit temperature of 99 F (37.2 C) or higher, or as directed by the provider  Child age 3 to 36 months:    Rectal, forehead (temporal artery), or ear temperature of 102 F (38.9 C) or higher, or as directed by the provider    Armpit temperature of 101 F (38.3 C) or higher, or as directed by the provider  Child of any age:    Repeated temperature of 104 F (40 C) or higher, or as directed by the provider    Fever that lasts more than 24 hours in a child under 2 years old. Or a fever that lasts for 3 days in a child 2 years or older.   Date Last Reviewed: 11/1/2017 2000-2018 [employer]. [address] [place] [address]. All rights reserved. This information is not intended as a substitute for professional medical care. Always follow your healthcare professional's instructions.

## 2018-12-12 NOTE — TELEPHONE ENCOUNTER
Pt was calling into the clinic regarding her 3 year old having a fifth's disease looking rash on his cheeks.  She is 22 weeks gestation.  Pt reports that she is not ill-feeling.  Advised she report this to her ob care provider promptly should further testing be recommended to pt.  Pt agrees.    Deb Sexton RN

## 2018-12-13 DIAGNOSIS — Z20.828 PARVOVIRUS EXPOSURE: ICD-10-CM

## 2018-12-13 PROCEDURE — 36415 COLL VENOUS BLD VENIPUNCTURE: CPT | Performed by: OBSTETRICS & GYNECOLOGY

## 2018-12-13 PROCEDURE — 86747 PARVOVIRUS ANTIBODY: CPT | Mod: 91 | Performed by: OBSTETRICS & GYNECOLOGY

## 2018-12-13 PROCEDURE — 86747 PARVOVIRUS ANTIBODY: CPT | Performed by: OBSTETRICS & GYNECOLOGY

## 2018-12-15 LAB
B19V IGG SER IA-ACNC: 0.19 IV
B19V IGM SER IA-ACNC: 0.21 IV

## 2018-12-23 ENCOUNTER — NURSE TRIAGE (OUTPATIENT)
Dept: NURSING | Facility: CLINIC | Age: 33
End: 2018-12-23

## 2018-12-23 ENCOUNTER — TELEPHONE (OUTPATIENT)
Dept: OBGYN | Facility: CLINIC | Age: 33
End: 2018-12-23

## 2018-12-23 DIAGNOSIS — N39.0 URINARY TRACT INFECTION: Primary | ICD-10-CM

## 2018-12-23 DIAGNOSIS — R30.0 DYSURIA: ICD-10-CM

## 2018-12-23 NOTE — TELEPHONE ENCOUNTER
Patient called nurse line with 1 day symptoms of UTI.    Did not want to go to ED.   Looked on mychart and could not find a spot tomorrow for appt .  I told her to call nurse line in AM and ask to get an appt for UTI workup.  At the least she can do a lab only for UA/UC.   She felt better with that plan.      Triage~ please try to find a spot for her in someone's schedule for quick UTI check. Thanks RR

## 2018-12-23 NOTE — TELEPHONE ENCOUNTER
"Caller is  23 weeks pregnant and has dysuria and frequency, urgency; no known fever; baby active ; no bleeding   Triage protocol reviewed  Advised assessment in 24 hrs.   Reluctant to go to  [facility] because they will send her \"to L&D for 5 hrs\"  On call provider,Dr. Scherer    paged  via  to call patient @ home at [phone]  Patient understands and will comply   Lexie Brownlee RN  FNA         Reason for Disposition    [1] Painful urination in pregnancy AND [2] no current antibiotic treatment    Additional Information    Negative: Shock suspected (e.g., cold/pale/clammy skin, too weak to stand, low BP, rapid pulse)    Negative: Sounds like a life-threatening emergency to the triager    Negative: Followed a genital area injury    Negative: [1] Unable to urinate (or only a few drops) > 4 hours AND     [2] bladder feels very full (e.g., palpable bladder or strong urge to urinate)    Negative: [1] Pregnant 23 or more weeks AND [2] baby is moving less today (e.g., kick count < 5 in 1 hour or < 10 in 2 hours)    Negative: SEVERE pain with urination    Negative: Side (flank) or lower back pain present    Negative: Fever > 100.4 F (38.0 C)    Negative: Patient sounds very sick or weak to the triager    Negative: Blood in urine (red, pink, or tea-colored)    Negative: Diabetes mellitus or weak immune system (e.g., HIV positive, cancer chemotherapy, transplant patient)    Negative: Bedridden (e.g., chronic illness, recovering from surgery)    Protocols used: PREGNANCY - URINATION PAIN-ADULT-      "

## 2018-12-29 ENCOUNTER — HOSPITAL ENCOUNTER (OUTPATIENT)
Facility: CLINIC | Age: 33
Discharge: HOME | End: 2018-12-29
Attending: OBSTETRICS & GYNECOLOGY | Admitting: OBSTETRICS & GYNECOLOGY
Payer: COMMERCIAL

## 2018-12-29 ENCOUNTER — NURSE TRIAGE (OUTPATIENT)
Dept: NURSING | Facility: CLINIC | Age: 33
End: 2018-12-29

## 2018-12-29 VITALS
SYSTOLIC BLOOD PRESSURE: 128 MMHG | RESPIRATION RATE: 18 BRPM | TEMPERATURE: 98.6 F | HEIGHT: 66 IN | DIASTOLIC BLOOD PRESSURE: 74 MMHG | WEIGHT: 175 LBS | BODY MASS INDEX: 28.12 KG/M2

## 2018-12-29 PROCEDURE — G0463 HOSPITAL OUTPT CLINIC VISIT: HCPCS

## 2018-12-29 PROCEDURE — 99213 OFFICE O/P EST LOW 20 MIN: CPT | Mod: GC | Performed by: OBSTETRICS & GYNECOLOGY

## 2018-12-29 ASSESSMENT — MIFFLIN-ST. JEOR: SCORE: 1502.6

## 2018-12-29 NOTE — DISCHARGE INSTRUCTIONS
Discharge Instruction for Undelivered Patients      You were seen for: Labor Assessment  We Consulted: w MD      Diet:   Drink 8 to 12 glasses of liquids (milk, juice, water) every day.  You may eat meals and snacks.         Call your provider if you notice:  Swelling in your face or increased swelling in your hands or legs.  Headaches that are not relieved by Tylenol (acetaminophen).  Changes in your vision (blurring: seeing spots or stars.)  Nausea (sick to your stomach) and vomiting (throwing up).   Weight gain of 5 pounds or more per week.  Heartburn that doesn't go away.  Signs of bladder infection: pain when you urinate (use the toilet), need to go more often and more urgently.  The bag of pedro (rupture of membranes) breaks, or you notice leaking in your underwear.  Bright red blood in your underwear.  Abdominal (lower belly) or stomach pain.  Second (plus) baby: Contractions (tightening) less than 10 minutes apart and getting stronger.  If less than 34 weeks: Contractions (tightenings) more than 6 times in one hour.  Increase or change in vaginal discharge (note the color and amount)      Follow-up:  As scheduled in the clinic

## 2018-12-29 NOTE — PROGRESS NOTES
"Labor and delivery triage note  Patient name: Ángela Zurita  MRN: [id number]  : 1985    Subjective patient presents with abdominal cramping that began a little while ago she noted she had a bowel movement earlier this morning it was small and then as soon as she came into the hospital she had a much larger bowel movement and felt significantly better she currently notes minimal cramping and would like to go home.  Objective  Vitals:    18 1348   BP: 128/74   BP Location: Right arm   Resp: 18   Temp: 98.6  F (37  C)   TempSrc: Oral   Weight: 79.4 kg (175 lb)   Height: 1.664 m (5' 5.5\")     General very comfortable appearing laughing working on the computer  Abdomen soft nontender nondistended gravid  Cervix closed    Fetal heart rate tracing baseline of 120 moderate variability with accelerations no decelerations no consistent contractions.    Assessment and plan 33-year-old  001 at 24 weeks and 3 days who presents with abdominal discomfort found to have a closed cervix and no consistent contractions on monitoring. Plan for discharge home and follow-up in clinic as needed    Patient discussed with Dr. Mariza Bautista MD 2018 10:12 AM    I, Cherry Dawkins MD, personally saw and evaluated this patient.  I discussed the patient with the resident and care team, and agree with the assessment and plan of care as documented in the resident's note of 19.  I personally reviewed vital signs, medications, lab, and exam.     Key Findings:  No regular contractions, no vaginal bleeding.  No abdominal tenderness.  Fetal monitoring reassuring. Suspect GI etiology, symptoms now improved.       PLAN: Discharge home as above.     Cherry Dawkins MD   Date of Service (when I saw the patient) 18     "

## 2018-12-29 NOTE — PLAN OF CARE
Data: [facility]   Reason for maternal/fetal assessment per patient is  Labor  . Patient is a . Prenatal record reviewed.      Obstetric History       T1      L1     SAB0   TAB0   Ectopic0   Multiple0   Live Births1       # Outcome Date GA Lbr Travis/2nd Weight Sex Delivery Anes PTL Lv   2 Current            1 Term 14 38w5d 18:00 / 03:21 3.66 kg (8 lb 1.1 oz) M Vag-Vacuum EPI N JL      Name: Efren      Apgar1:  8                Apgar5: 9         Medical History:   Past Medical History:   Diagnosis Date     Cervical dysplasia     s/p LEEP      Chickenpox      Exercise-induced asthma     adolescent     IBS (irritable bowel syndrome)      Tonsillitis 2014   . Gestational Age 24w3d. VSS. Cervix: closed and long.  Fetal movement present. Patient denies, backache, vaginal discharge, pelvic pressure, UTI symptoms, GI problems, bloody show, vaginal bleeding, edema, headache, visual disturbances, epigastric or URQ pain, abdominal pain, rupture of membranes.   Action: Verbal consent for EFM. Triage assessment completed. EFM applied. Uterine assessment reveals rare contraction. Fetal assessment: Presumed adequate fetal oxygenation documented (see flow record). Patient instructed to report change in fetal movement, vaginal leaking of fluid or bleeding, abdominal pain, or any concerns related to the pregnancy to her nurse/physician.   Response: Dr. Bautista informed of patient arrival. Plan per provider is to discharge patient to home. Patient verbalized understanding of education and verbalized agreement with plan. Discharged ambulatory.

## 2018-12-29 NOTE — PLAN OF CARE
Pt arrived reporting cramping since last night. Upon arrival reports she had a large BM. Pt placed on monitor. RN in room for 20 minutes, no ctx noted on moitor, pt states the underlying cramping sensation is still there but hadn't felt a ctx while RN in room. Dr Bautista notified of patients arrival.

## 2018-12-29 NOTE — TELEPHONE ENCOUNTER
"Ángela 25 weeks pregnant with 2nd child.  Since last evening (18) at approx 18:00, has been having \"[place]\" which have continued into today.  Does believe they are <10 minutes apart today consistently.   Described as \"a little uncomfortable\", having slight vaginal pressure and temp currently at \"99.6\" (last night was 100.9).  Triaged to L & D.    Reason for Disposition    Contractions < 10 minutes apart for 1 hour (i.e., 6 or more contractions an hour)    Additional Information    Negative: MODERATE-SEVERE abdominal pain    Negative: [1] Contractions > 10 minutes apart AND [2] persist > 24 hours AND [3] no improvement using Care Advice    Negative: [1] Contractions AND [2] any vaginal bleeding (including: red blood, clots, spotting, or pink/brown mucous)    Negative: Vaginal bleeding    (Exception: spotting after intercourse or pelvic exam, or slight pinkish or brownish mucous discharge)    Negative: Leakage of fluid from vagina    Negative: [1] Baby moving less today (e.g., kick count < 5 in 1 hour or < 10 in 2 hours) AND [2] pregnant 23 or more weeks    Negative: No movement of baby for 8 hours    Negative: New hand or face swelling    Negative: Fever > 100.4 F (38.0 C)    Negative: Increased pressure in pelvic area    Negative: [1] Lower back discomfort AND [2] not relieved by rest    Negative: Has a cerclage (i.e., a procedure where the obstetrician stitches shut the cervix)    Negative: Pinkish or brownish mucous discharge  (Exception: spotting after intercourse or pelvic exam)    Negative: Patient sounds very sick or weak to the triager    Negative: Increase in vaginal discharge    Negative: Diarrhea    Negative: Baby has dropped    Protocols used: PREGNANCY - LABOR - -ADULT-AH      "

## 2019-01-04 ENCOUNTER — PRENATAL OFFICE VISIT (OUTPATIENT)
Dept: OBGYN | Facility: CLINIC | Age: 34
End: 2019-01-04
Payer: COMMERCIAL

## 2019-01-04 VITALS
HEART RATE: 129 BPM | SYSTOLIC BLOOD PRESSURE: 130 MMHG | WEIGHT: 177 LBS | BODY MASS INDEX: 29.01 KG/M2 | DIASTOLIC BLOOD PRESSURE: 76 MMHG | TEMPERATURE: 97 F

## 2019-01-04 DIAGNOSIS — G44.209 TENSION HEADACHE: ICD-10-CM

## 2019-01-04 DIAGNOSIS — R03.0 ELEVATED BLOOD PRESSURE READING WITHOUT DIAGNOSIS OF HYPERTENSION: ICD-10-CM

## 2019-01-04 DIAGNOSIS — R73.09 ABNORMAL GLUCOSE: Primary | ICD-10-CM

## 2019-01-04 DIAGNOSIS — O26.892 LOW BACK PAIN DURING PREGNANCY IN SECOND TRIMESTER: ICD-10-CM

## 2019-01-04 DIAGNOSIS — M54.50 LOW BACK PAIN DURING PREGNANCY IN SECOND TRIMESTER: ICD-10-CM

## 2019-01-04 DIAGNOSIS — Z34.82 ENCOUNTER FOR SUPERVISION OF OTHER NORMAL PREGNANCY IN SECOND TRIMESTER: Primary | ICD-10-CM

## 2019-01-04 DIAGNOSIS — Z34.90 SUPERVISION OF NORMAL PREGNANCY: ICD-10-CM

## 2019-01-04 LAB
ALT SERPL W P-5'-P-CCNC: 11 U/L (ref 0–50)
AST SERPL W P-5'-P-CCNC: 11 U/L (ref 0–45)
CREAT SERPL-MCNC: 0.57 MG/DL (ref 0.52–1.04)
CREAT UR-MCNC: 132 MG/DL
ERYTHROCYTE [DISTWIDTH] IN BLOOD BY AUTOMATED COUNT: 12.7 % (ref 10–15)
GFR SERPL CREATININE-BSD FRML MDRD: >90 ML/MIN/{1.73_M2}
GLUCOSE 1H P 50 G GLC PO SERPL-MCNC: 133 MG/DL (ref 60–129)
HCT VFR BLD AUTO: 33.2 % (ref 35–47)
HGB BLD-MCNC: 11.5 G/DL (ref 11.7–15.7)
MCH RBC QN AUTO: 31.7 PG (ref 26.5–33)
MCHC RBC AUTO-ENTMCNC: 34.6 G/DL (ref 31.5–36.5)
MCV RBC AUTO: 92 FL (ref 78–100)
PLATELET # BLD AUTO: 333 10E9/L (ref 150–450)
PROT UR-MCNC: 0.22 G/L
PROT/CREAT 24H UR: 0.17 G/G CR (ref 0–0.2)
RBC # BLD AUTO: 3.63 10E12/L (ref 3.8–5.2)
WBC # BLD AUTO: 14.4 10E9/L (ref 4–11)

## 2019-01-04 PROCEDURE — 82565 ASSAY OF CREATININE: CPT | Performed by: OBSTETRICS & GYNECOLOGY

## 2019-01-04 PROCEDURE — 99207 ZZC PRENATAL VISIT: CPT | Performed by: OBSTETRICS & GYNECOLOGY

## 2019-01-04 PROCEDURE — 85027 COMPLETE CBC AUTOMATED: CPT | Performed by: OBSTETRICS & GYNECOLOGY

## 2019-01-04 PROCEDURE — 84156 ASSAY OF PROTEIN URINE: CPT | Performed by: OBSTETRICS & GYNECOLOGY

## 2019-01-04 PROCEDURE — 84460 ALANINE AMINO (ALT) (SGPT): CPT | Performed by: OBSTETRICS & GYNECOLOGY

## 2019-01-04 PROCEDURE — 84450 TRANSFERASE (AST) (SGOT): CPT | Performed by: OBSTETRICS & GYNECOLOGY

## 2019-01-04 PROCEDURE — 36415 COLL VENOUS BLD VENIPUNCTURE: CPT | Performed by: OBSTETRICS & GYNECOLOGY

## 2019-01-04 PROCEDURE — 82950 GLUCOSE TEST: CPT | Performed by: OBSTETRICS & GYNECOLOGY

## 2019-01-04 RX ORDER — METOCLOPRAMIDE 5 MG/1
5 TABLET ORAL
Qty: 40 TABLET | Refills: 0 | Status: ON HOLD | OUTPATIENT
Start: 2019-01-04 | End: 2019-01-29

## 2019-01-04 NOTE — PROGRESS NOTES
Pt did not pass 1 hr glucose test. 3 hr scheduled for 1/7. Order placed.   Sherron Jackson RN-BSN

## 2019-01-04 NOTE — PROGRESS NOTES
25w2d  HA for last few weeks.  Tylenol helps, but doesn't resolve HA.  Previously had htn for about a year when in her early 20s, but it resolved.  Initial BP today elevated, but repeat WNL.  Will check baseline labs today.  Also recommended Reglan and Benadryl for headaches.  Having more episodes of fecal incontinence, which is quite distressing.  She was doing better with this prior to pregnancy, but is getting worse in the last few weeks.  Plans to contact the surgeon who did her fistula repair for follow-up.  GCT and hgb today.  RTC 4w  Lois Lau MD

## 2019-01-07 DIAGNOSIS — R73.09 ABNORMAL GLUCOSE: ICD-10-CM

## 2019-01-07 DIAGNOSIS — R30.0 DYSURIA: ICD-10-CM

## 2019-01-07 DIAGNOSIS — Z34.90 SUPERVISION OF NORMAL PREGNANCY: ICD-10-CM

## 2019-01-07 PROBLEM — O26.892 LOW BACK PAIN DURING PREGNANCY IN SECOND TRIMESTER: Status: RESOLVED | Noted: 2018-10-25 | Resolved: 2019-01-07

## 2019-01-07 PROBLEM — M54.50 LOW BACK PAIN DURING PREGNANCY IN SECOND TRIMESTER: Status: RESOLVED | Noted: 2018-10-25 | Resolved: 2019-01-07

## 2019-01-07 LAB
ALBUMIN UR-MCNC: NEGATIVE MG/DL
APPEARANCE UR: CLEAR
BILIRUB UR QL STRIP: NEGATIVE
COLOR UR AUTO: YELLOW
GLUCOSE 1H P 100 G GLC PO SERPL-MCNC: 155 MG/DL (ref 60–179)
GLUCOSE 2H P 100 G GLC PO SERPL-MCNC: 116 MG/DL (ref 60–154)
GLUCOSE 3H P 100 G GLC PO SERPL-MCNC: 51 MG/DL (ref 60–139)
GLUCOSE P FAST SERPL-MCNC: 76 MG/DL (ref 60–94)
GLUCOSE UR STRIP-MCNC: NEGATIVE MG/DL
HGB UR QL STRIP: NEGATIVE
KETONES UR STRIP-MCNC: NEGATIVE MG/DL
LEUKOCYTE ESTERASE UR QL STRIP: NEGATIVE
NITRATE UR QL: NEGATIVE
PH UR STRIP: 7.5 PH (ref 5–7)
SOURCE: ABNORMAL
SP GR UR STRIP: <=1.005 (ref 1–1.03)
UROBILINOGEN UR STRIP-ACNC: 0.2 EU/DL (ref 0.2–1)

## 2019-01-07 PROCEDURE — 36415 COLL VENOUS BLD VENIPUNCTURE: CPT | Performed by: OBSTETRICS & GYNECOLOGY

## 2019-01-07 PROCEDURE — 81003 URINALYSIS AUTO W/O SCOPE: CPT | Performed by: OBSTETRICS & GYNECOLOGY

## 2019-01-07 PROCEDURE — 82952 GTT-ADDED SAMPLES: CPT | Performed by: OBSTETRICS & GYNECOLOGY

## 2019-01-07 PROCEDURE — 82951 GLUCOSE TOLERANCE TEST (GTT): CPT | Performed by: OBSTETRICS & GYNECOLOGY

## 2019-01-07 PROCEDURE — 87086 URINE CULTURE/COLONY COUNT: CPT | Performed by: OBSTETRICS & GYNECOLOGY

## 2019-01-07 NOTE — PROGRESS NOTES
Patient has not been seen since IE as she cancelled numerous appts for various reasons so will be DC'd per IE

## 2019-01-08 LAB
BACTERIA SPEC CULT: NORMAL
BACTERIA SPEC CULT: NORMAL
SPECIMEN SOURCE: NORMAL

## 2019-01-14 ENCOUNTER — PRENATAL OFFICE VISIT (OUTPATIENT)
Dept: OBGYN | Facility: CLINIC | Age: 34
End: 2019-01-14
Payer: COMMERCIAL

## 2019-01-14 VITALS
TEMPERATURE: 98.5 F | WEIGHT: 179 LBS | DIASTOLIC BLOOD PRESSURE: 70 MMHG | OXYGEN SATURATION: 97 % | BODY MASS INDEX: 28.77 KG/M2 | SYSTOLIC BLOOD PRESSURE: 128 MMHG | HEART RATE: 104 BPM | HEIGHT: 66 IN

## 2019-01-14 DIAGNOSIS — O43.199 MARGINAL INSERTION OF UMBILICAL CORD AFFECTING MANAGEMENT OF MOTHER: ICD-10-CM

## 2019-01-14 DIAGNOSIS — O09.529 ELDERLY MULTIGRAVIDA WITH ANTEPARTUM CONDITION OR COMPLICATION: Primary | ICD-10-CM

## 2019-01-14 DIAGNOSIS — R30.0 DYSURIA: ICD-10-CM

## 2019-01-14 PROCEDURE — 99207 ZZC PRENATAL VISIT: CPT | Performed by: OBSTETRICS & GYNECOLOGY

## 2019-01-14 PROCEDURE — 81003 URINALYSIS AUTO W/O SCOPE: CPT | Performed by: OBSTETRICS & GYNECOLOGY

## 2019-01-14 ASSESSMENT — MIFFLIN-ST. JEOR: SCORE: 1520.75

## 2019-01-14 ASSESSMENT — PATIENT HEALTH QUESTIONNAIRE - PHQ9: SUM OF ALL RESPONSES TO PHQ QUESTIONS 1-9: 6

## 2019-01-14 NOTE — PROGRESS NOTES
26w5d  complains of right flank pain.  UA is completely clean today.   Baby is moving ok.  No fever, chills or sweats.  Patient reassured.  Suspect pain is musculoskeletal, but will let us know if worsens.   Passed GTT.  RR

## 2019-01-24 ENCOUNTER — TELEPHONE (OUTPATIENT)
Dept: OBGYN | Facility: CLINIC | Age: 34
End: 2019-01-24

## 2019-01-24 NOTE — TELEPHONE ENCOUNTER
Pt has been having ceasar butler contractions for a few hours.  They are not incredibly painful but they have been consistent.  She has emptied her bladder and she's been resting on the couch.  Discussed with Dr. Lau who suggested continued monitoring, encouraged pt to take a bath, and pt can call back with increased concern/contractions.  Pt is agreeable to the plan.  She will be in the clinic tomorrow for an ultrasound.  Jessica Dyer RN

## 2019-01-25 ENCOUNTER — OFFICE VISIT (OUTPATIENT)
Dept: MATERNAL FETAL MEDICINE | Facility: CLINIC | Age: 34
End: 2019-01-25
Attending: OBSTETRICS & GYNECOLOGY
Payer: COMMERCIAL

## 2019-01-25 ENCOUNTER — HOSPITAL ENCOUNTER (OUTPATIENT)
Dept: ULTRASOUND IMAGING | Facility: CLINIC | Age: 34
Discharge: HOME | End: 2019-01-25
Attending: OBSTETRICS & GYNECOLOGY | Admitting: OBSTETRICS & GYNECOLOGY
Payer: COMMERCIAL

## 2019-01-25 DIAGNOSIS — O43.123 VELAMENTOUS INSERTION OF UMBILICAL CORD IN THIRD TRIMESTER: Primary | ICD-10-CM

## 2019-01-25 DIAGNOSIS — O43.199 MARGINAL INSERTION OF UMBILICAL CORD AFFECTING MANAGEMENT OF MOTHER: ICD-10-CM

## 2019-01-25 PROCEDURE — 76816 OB US FOLLOW-UP PER FETUS: CPT

## 2019-01-26 NOTE — PROGRESS NOTES
"Please see \"Imaging\" tab under \"Chart Review\" for details of today's visit.    Jasiel Andrews    "

## 2019-01-29 ENCOUNTER — TELEPHONE (OUTPATIENT)
Dept: OBGYN | Facility: CLINIC | Age: 34
End: 2019-01-29

## 2019-01-29 ENCOUNTER — HOSPITAL ENCOUNTER (OUTPATIENT)
Facility: CLINIC | Age: 34
Discharge: HOME | End: 2019-01-29
Attending: OBSTETRICS & GYNECOLOGY | Admitting: OBSTETRICS & GYNECOLOGY
Payer: COMMERCIAL

## 2019-01-29 VITALS — TEMPERATURE: 98.7 F | RESPIRATION RATE: 18 BRPM | SYSTOLIC BLOOD PRESSURE: 120 MMHG | DIASTOLIC BLOOD PRESSURE: 71 MMHG

## 2019-01-29 PROBLEM — O47.00 PRETERM CONTRACTIONS: Status: ACTIVE | Noted: 2019-01-29

## 2019-01-29 LAB
ALBUMIN UR-MCNC: NEGATIVE MG/DL
APPEARANCE UR: CLEAR
BILIRUB UR QL STRIP: NEGATIVE
COLOR UR AUTO: NORMAL
GLUCOSE UR STRIP-MCNC: NEGATIVE MG/DL
HGB UR QL STRIP: NEGATIVE
KETONES UR STRIP-MCNC: NEGATIVE MG/DL
LEUKOCYTE ESTERASE UR QL STRIP: NEGATIVE
NITRATE UR QL: NEGATIVE
PH UR STRIP: 6.5 PH (ref 5–7)
SOURCE: NORMAL
SP GR UR STRIP: 1.01 (ref 1–1.03)
SPECIMEN SOURCE: NORMAL
UROBILINOGEN UR STRIP-MCNC: NORMAL MG/DL (ref 0–2)
WET PREP SPEC: NORMAL

## 2019-01-29 PROCEDURE — G0463 HOSPITAL OUTPT CLINIC VISIT: HCPCS

## 2019-01-29 PROCEDURE — 81003 URINALYSIS AUTO W/O SCOPE: CPT | Performed by: STUDENT IN AN ORGANIZED HEALTH CARE EDUCATION/TRAINING PROGRAM

## 2019-01-29 PROCEDURE — 87210 SMEAR WET MOUNT SALINE/INK: CPT | Performed by: STUDENT IN AN ORGANIZED HEALTH CARE EDUCATION/TRAINING PROGRAM

## 2019-01-29 PROCEDURE — 99213 OFFICE O/P EST LOW 20 MIN: CPT | Mod: GC | Performed by: OBSTETRICS & GYNECOLOGY

## 2019-01-29 RX ORDER — ONDANSETRON 2 MG/ML
4 INJECTION INTRAMUSCULAR; INTRAVENOUS EVERY 6 HOURS PRN
Status: DISCONTINUED | OUTPATIENT
Start: 2019-01-29 | End: 2019-01-29 | Stop reason: HOSPADM

## 2019-01-29 NOTE — TELEPHONE ENCOUNTER
Patient calling regarding ceasar butler contractions. Has been having them since last week intermittently, but today noticed them more frequently. Has timed them every 8 minutes x 2 hours. Is not painful when she them, but does feel tightening. No leaking or bleeding. + FM per patient. Has had good fluid intake today. Been resting x1 hour with no relief in contractions. Spoke with on call MD Dr. Martinez who instructed that patient be seen for evaluation at L&D triage for reassurance. Patient aware. L&D aware as well.   Melanie Forrest

## 2019-01-29 NOTE — PLAN OF CARE
Data: Patient presented to [facility] at 1625.   Reason for maternal/fetal assessment per patient is Rule Out Labor  .  Patient is a . Prenatal record reviewed.      Obstetric History       T1      L1     SAB0   TAB0   Ectopic0   Multiple0   Live Births1       # Outcome Date GA Lbr Travis/2nd Weight Sex Delivery Anes PTL Lv   2 Current            1 Term 14 38w5d 18:00 / 03:21 3.66 kg (8 lb 1.1 oz) M Vag-Vacuum EPI N JL      Name: Efren      Apgar1:  8                Apgar5: 9      . Medical history:   Past Medical History:   Diagnosis Date     Cervical dysplasia     s/p LEEP      Chickenpox      Exercise-induced asthma     adolescent     IBS (irritable bowel syndrome)      Tonsillitis 2014   . Gestational Age 28w6d. VSS. Fetal movement present. Patient denies  backache, vaginal discharge, pelvic pressure, UTI symptoms, GI problems, bloody show, vaginal bleeding, edema, headache, visual disturbances, epigastric or URQ pain, rupture of membranes. Patient C/O contractions. Support persons are not  present.  Action: Verbal consent for EFM. Triage assessment completed. EFM and TOCO applied for fetal and uterine assessment.  Response: Dr. España informed of arrival.

## 2019-01-29 NOTE — PROGRESS NOTES
[facility]  OB Triage Note    CC: contractions    HPI: Ms. Ángela Zurita is a 34 year old  at 28w6d by 8w0d US, who presents with uterine cramping x1 week. She denies leaking fluid, vaginal bleeding.  + Good fetal movement.    Pregnancy complications:  - AMA, nl NIPT  - Rubella non-immune  - H/o VAVD  - H/o 4th degree laceration s/p repaired fistula. Plan primary  section  - H/o LEEP  - Progression marginal cord insertion to velamentous cord insertion  - H/o ADHD, off of adderall  - H/o tobacco use, quit w/ pregnancy    O:  Patient Vitals for the past 24 hrs:   BP Temp Temp src Heart Rate Resp   19 1645 120/71 98.7  F (37.1  C) Oral 86 18     Gen: Well-appearing, NAD  CV: Well-perfused  Pulm: Breathing comfortably on room air  Abd: Soft, gravid  Ext: Trace LE edema b/l    Spec: Normal appearing external genitalia, scar tissue along rectovaginal septum from rectovaginal fistula repair. Moderate amount of white physiologic appearing discharge. Cervix appeared closed/thick/high.  Declined SSE    FHT: Baseline 140, mod variability, +accelerations, no decelerations  [place]: Uterine irritability    Labs:  UA neg nitrite, neg LE  Wet prep neg    A/P:  Ms. Ángela Zurita is a 34 year old  at 28w6d by 8+0 US who presents with uterine cramping x1 week. No evidence of UTI, vaginitis or  labor on exam. FHT reassuring and appropriate for gestation age. Discussed return precautions. Has follow-up OB appointment on 19.    Patient discussed with Dr. Martinez who agrees with plan of care.    Cesar España MD  OB/GYN, PGY-3  2019, 5:01 PM    Physician Attestation   I, Lois Martinez, personally examined and evaluated this patient.  I discussed the patient with the medical student and/or resident and care team, and agree with the assessment and plan of care as documented in the note of 2019  [date].      I personally reviewed vital signs,  medications, labs and exam.    Key findings: 34 year old  at 28w6d who presents with non painful  contractions. Not in labor. Category 1 fetal tracing. Reviewed s/sx  labor. RTC on Friday as scheduled, sooner PRN.   Lois Martinez MD  Date of Service (when I saw the patient): 19

## 2019-02-01 ENCOUNTER — PRENATAL OFFICE VISIT (OUTPATIENT)
Dept: OBGYN | Facility: CLINIC | Age: 34
End: 2019-02-01
Payer: COMMERCIAL

## 2019-02-01 VITALS
WEIGHT: 187 LBS | BODY MASS INDEX: 30.65 KG/M2 | SYSTOLIC BLOOD PRESSURE: 134 MMHG | HEART RATE: 101 BPM | TEMPERATURE: 98.5 F | DIASTOLIC BLOOD PRESSURE: 79 MMHG

## 2019-02-01 DIAGNOSIS — O43.129 VELAMENTOUS INSERTION OF UMBILICAL CORD, ANTEPARTUM: ICD-10-CM

## 2019-02-01 DIAGNOSIS — Z34.83 PRENATAL CARE, SUBSEQUENT PREGNANCY IN THIRD TRIMESTER: Primary | ICD-10-CM

## 2019-02-01 DIAGNOSIS — Z23 NEED FOR VACCINATION: ICD-10-CM

## 2019-02-01 PROCEDURE — 90715 TDAP VACCINE 7 YRS/> IM: CPT | Performed by: OBSTETRICS & GYNECOLOGY

## 2019-02-01 PROCEDURE — 90471 IMMUNIZATION ADMIN: CPT | Performed by: OBSTETRICS & GYNECOLOGY

## 2019-02-01 PROCEDURE — 99207 ZZC PRENATAL VISIT: CPT | Performed by: OBSTETRICS & GYNECOLOGY

## 2019-02-01 NOTE — PROGRESS NOTES
29w2d  Seen on L&D on 1/29 for non-painful contractions.  Seems to be worse daily between the hours of 1 and 3.  Never painful, but describes it as tightening.  Can occur as many as 6-10 times an hour before it resolves.  Laying down at work seems to help.  Increasing hydration does not have much effect.  Hasn't had any leaking or bleeding.  Aware that she should call if they become more frequent or more painful.    Had marginal cord insertion that progressed to velamentous insertion.  We discussed what this means and the rationale behind growth US.  Answered all questions to Ángela's satisfaction.  Baby very active.  RTC 2w  Lois Lau MD

## 2019-02-03 PROBLEM — O43.129 VELAMENTOUS INSERTION OF UMBILICAL CORD, ANTEPARTUM: Status: ACTIVE | Noted: 2019-02-03

## 2019-02-08 ENCOUNTER — AMBULATORY - HEALTHEAST (OUTPATIENT)
Dept: MATERNAL FETAL MEDICINE | Facility: HOSPITAL | Age: 34
End: 2019-02-08

## 2019-02-08 DIAGNOSIS — O26.90 PREGNANCY, ANTEPARTUM, COMPLICATIONS: ICD-10-CM

## 2019-02-11 ENCOUNTER — TELEPHONE (OUTPATIENT)
Dept: OBGYN | Facility: CLINIC | Age: 34
End: 2019-02-11

## 2019-02-11 ENCOUNTER — AMBULATORY - HEALTHEAST (OUTPATIENT)
Dept: MATERNAL FETAL MEDICINE | Facility: HOSPITAL | Age: 34
End: 2019-02-11

## 2019-02-11 NOTE — TELEPHONE ENCOUNTER
Pt is having ceasar butler contractions this afternoonwhich are normal for her.  She came home from work, rested, took a bath, drank 4 bottles of water, bladder is emptying regularly.  She has noted that there is a little cramping and the contractions are a little more intense than usual, noting them every 5 minutes or so (not new for her.)  Discussed that if the contractions are not backing off that she should come for evaluation, but she is reluctant to come in.  She is going to rest a bit longer to see how things go and then she will consider going to L&D for eval.  Jessica Dyer RN

## 2019-02-13 ENCOUNTER — OFFICE VISIT (OUTPATIENT)
Dept: FAMILY MEDICINE | Facility: CLINIC | Age: 34
End: 2019-02-13
Payer: COMMERCIAL

## 2019-02-13 VITALS
WEIGHT: 190.9 LBS | BODY MASS INDEX: 30.68 KG/M2 | HEIGHT: 66 IN | HEART RATE: 100 BPM | RESPIRATION RATE: 20 BRPM | SYSTOLIC BLOOD PRESSURE: 122 MMHG | DIASTOLIC BLOOD PRESSURE: 79 MMHG | TEMPERATURE: 98.6 F

## 2019-02-13 DIAGNOSIS — R07.0 THROAT PAIN: Primary | ICD-10-CM

## 2019-02-13 LAB
DEPRECATED S PYO AG THROAT QL EIA: NORMAL
SPECIMEN SOURCE: NORMAL

## 2019-02-13 PROCEDURE — 99213 OFFICE O/P EST LOW 20 MIN: CPT | Performed by: FAMILY MEDICINE

## 2019-02-13 PROCEDURE — 87880 STREP A ASSAY W/OPTIC: CPT | Performed by: FAMILY MEDICINE

## 2019-02-13 PROCEDURE — 87070 CULTURE OTHR SPECIMN AEROBIC: CPT | Performed by: FAMILY MEDICINE

## 2019-02-13 ASSESSMENT — PAIN SCALES - GENERAL: PAINLEVEL: MILD PAIN (3)

## 2019-02-13 ASSESSMENT — MIFFLIN-ST. JEOR: SCORE: 1569.73

## 2019-02-13 NOTE — PROGRESS NOTES
SUBJECTIVE:                                                    Ángela Zurita is a 34 year old female who presents to clinic today for the following health issues:    ENT Symptoms             Symptoms: cc Present Absent Comment   Fever/Chills   x    Fatigue  x     Muscle Aches  x     Eye Irritation   x    Sneezing   x    Nasal Jw/Drg  x     Sinus Pressure/Pain   x    Loss of smell   x    Dental pain  x  On the right side/ her gums   Sore Throat  x     Swollen Glands  x     Ear Pain/Fullness  x  Right ear   Cough   x    Wheeze   x    Chest Pain   x    Shortness of breath   x    Rash   x    Other   x      Symptom duration:  about a week   Symptom severity:  moderate   Treatments tried:  tylenol   Contacts:  strep and hand foot and mouth are going around sons school. But he is not sick.          Problem list and histories reviewed & adjusted, as indicated.  Additional history:     Patient Active Problem List   Diagnosis     Urgency-frequency syndrome     Fibroid uterus     Health Care Home     Rectovaginal fistula     CARDIOVASCULAR SCREENING; LDL GOAL LESS THAN 130     Oral herpes     Attention deficit hyperactivity disorder (ADHD), predominantly inattentive type     Posterior neck pain     Left ovarian cyst     Prenatal care, subsequent pregnancy     Rubella non-immune status, antepartum     Marginal insertion of umbilical cord affecting management of mother     Encounter for triage in pregnant patient      contractions     Velamentous insertion of umbilical cord, antepartum     Past Surgical History:   Procedure Laterality Date     BIOPSY  Various    Had a few Leeps and numerous Colposcopys     COLONOSCOPY      Normal     DILATION AND CURETTAGE, HYSTEROSCOPY DIAGNOSTIC, COMBINED N/A 4/3/2018    Procedure: COMBINED DILATION AND CURETTAGE, HYSTEROSCOPY DIAGNOSTIC;  Diagnostic Hysteroscopy with Dilation and Curettage,Laparoscopy with Left Ovarian Cystectomy, Right Uteral Sacral Biopsy;  Surgeon:  Sherin Frost MD;  Location: [facility] OR     EXC SWEAT GLAND LESN AXILL,SIMPL Right 2009     LAPAROSCOPY DIAGNOSTIC (GYN) N/A 4/3/2018    Procedure: LAPAROSCOPY DIAGNOSTIC (GYN);;  Surgeon: Sherin Frost MD;  Location: [facility] OR     LEEP TX, CERVICAL  2006    yearly paps     MOUTH SURGERY      wisdom teeth     SOFT TISSUE SURGERY  Various    infected sweat-gland- cyst removed arm,armpit,face     SPHINCTEROPLASTY RECTUM  05/2018    with pernineal rebuild     SURGICAL HISTORY OF -   3/2005    Tonsillectomy     SURGICAL HISTORY OF -       infected sweat gland under her arm       Social History     Tobacco Use     Smoking status: Former Smoker     Packs/day: 0.50     Years: 15.00     Pack years: 7.50     Types: Cigarettes     Start date: 12/1/2014     Smokeless tobacco: Never Used     Tobacco comment: quit with pregnancy   Substance Use Topics     Alcohol use: Yes     Alcohol/week: 0.0 oz     Comment: occas-quit with pregnancy     Family History   Problem Relation Age of Onset     Blood Disease Brother         twin brother-blood clots     Blood Disease Maternal Grandfather         blood clots     Heart Disease Maternal Grandfather         valve replacement     Hyperlipidemia Maternal Grandfather      Cerebrovascular Disease Maternal Grandfather      Prostate Cancer Maternal Grandfather      Other Cancer Maternal Grandfather         Gum/Jaw/Lymphnodes     Colon Cancer Maternal Grandfather      Mental Illness Maternal Grandfather         Alzheimer's     Respiratory Maternal Grandmother      Lung Cancer Maternal Grandmother      Diabetes Maternal Grandmother         type II     Hypertension Maternal Grandmother      Hyperlipidemia Maternal Grandmother      Depression Maternal Grandmother      Cancer Paternal Grandmother         non -hodgkins lymphoma     Lung Cancer Paternal Grandmother      Hypertension Paternal Grandmother      Hyperlipidemia Paternal Grandmother      Cerebrovascular Disease  "Paternal Grandmother      Diabetes Paternal Grandmother      Hypertension Father      Hyperlipidemia Father      Liver Disease Father      Hepatitis Father      Hyperlipidemia Paternal Grandfather      Prostate Cancer Paternal Grandfather      Hypertension Paternal Grandfather      Colon Cancer Paternal Grandfather      Coronary Artery Disease Paternal Grandfather      Breast Cancer Mother         Stage 0, small duct carcinoma     Breast Cancer Other         2 aunt and 3 of my great aunts     Breast Cancer Cousin         Breast Cancer     Other Cancer Cousin         1\" tumor in lung, breast cancer relapse         Current Outpatient Medications   Medication Sig Dispense Refill     Acetaminophen (TYLENOL PO)        amphetamine-dextroamphetamine (ADDERALL) 10 MG per tablet Take 2 pills (20mg) orally in the morning and two pills (20mg) at 11am and one pill (10mg) at 2pm 150 tablet 0     DiphenhydrAMINE HCl (BENADRYL PO)        folic acid (FOLVITE) 1 MG tablet Take 1 tablet (1 mg) by mouth daily 100 tablet 3     Inulin (FIBER CHOICE) 1.5 g CHEW Take 1 chew tab by mouth daily       Prenatal Vit-Fe Fumarate-FA (PRENATAL MULTIVITAMIN PLUS IRON) 27-0.8 MG TABS per tablet Take 1 tablet by mouth daily         ROS:  Constitutional, HEENT, cardiovascular, pulmonary, gi and gu systems are negative, except as otherwise noted.    OBJECTIVE:                                                    /79 (BP Location: Right arm, Patient Position: Sitting, Cuff Size: Adult Large)   Pulse 100   Temp 98.6  F (37  C) (Tympanic)   Resp 20   Ht 1.664 m (5' 5.5\")   Wt 86.6 kg (190 lb 14.4 oz)   LMP 07/02/2018 (Within Weeks)   BMI 31.28 kg/m   Body mass index is 31.28 kg/m .     GENERAL: healthy, alert, well nourished, well hydrated, no distress  HENT: ear canals- normal; TMs- normal; Nose- normal; Mouth- no ulcers, no lesions  NECK: no tenderness, no adenopathy, no asymmetry, no masses, no stiffness; thyroid- normal to palpation  RESP: " lungs clear to auscultation - no rales, no rhonchi, no wheezes  CV: regular rates and rhythm, normal S1 S2, no S3 or S4 and no murmur, no click or rub -  ABDOMEN: pregnant  soft, no tenderness, no  hepatosplenomegaly, no masses, normal bowel sounds       ASSESSMENT/PLAN:                                                      (R07.0) Throat pain  (primary encounter diagnosis)  Plan: Strep, Rapid Screen, Throat Culture Aerobic         Bacterial     return to clinic or call if unimproved in 3-4 days sooner if worse.     reports that she has quit smoking. Her smoking use included cigarettes. She started smoking about 4 years ago. She has a 7.50 pack-year smoking history. she has never used smokeless tobacco.    [facility]

## 2019-02-15 ENCOUNTER — OFFICE VISIT - HEALTHEAST (OUTPATIENT)
Dept: MATERNAL FETAL MEDICINE | Facility: HOSPITAL | Age: 34
End: 2019-02-15

## 2019-02-15 ENCOUNTER — RECORDS - HEALTHEAST (OUTPATIENT)
Dept: ULTRASOUND IMAGING | Facility: HOSPITAL | Age: 34
End: 2019-02-15

## 2019-02-15 ENCOUNTER — RECORDS - HEALTHEAST (OUTPATIENT)
Dept: ADMINISTRATIVE | Facility: OTHER | Age: 34
End: 2019-02-15

## 2019-02-15 ENCOUNTER — COMMUNICATION - HEALTHEAST (OUTPATIENT)
Dept: MATERNAL FETAL MEDICINE | Facility: HOSPITAL | Age: 34
End: 2019-02-15

## 2019-02-15 DIAGNOSIS — O26.90 PREGNANCY RELATED CONDITIONS, UNSPECIFIED, UNSPECIFIED TRIMESTER: ICD-10-CM

## 2019-02-15 DIAGNOSIS — O43.129: ICD-10-CM

## 2019-02-15 LAB
BACTERIA SPEC CULT: NORMAL
Lab: NORMAL
SPECIMEN SOURCE: NORMAL

## 2019-02-19 ENCOUNTER — PRENATAL OFFICE VISIT (OUTPATIENT)
Dept: OBGYN | Facility: CLINIC | Age: 34
End: 2019-02-19
Payer: COMMERCIAL

## 2019-02-19 VITALS
BODY MASS INDEX: 31.14 KG/M2 | DIASTOLIC BLOOD PRESSURE: 82 MMHG | TEMPERATURE: 98.6 F | HEART RATE: 102 BPM | WEIGHT: 190 LBS | SYSTOLIC BLOOD PRESSURE: 130 MMHG

## 2019-02-19 DIAGNOSIS — O43.129 VELAMENTOUS INSERTION OF UMBILICAL CORD, ANTEPARTUM: ICD-10-CM

## 2019-02-19 DIAGNOSIS — Z34.83 PRENATAL CARE, SUBSEQUENT PREGNANCY IN THIRD TRIMESTER: Primary | ICD-10-CM

## 2019-02-19 PROCEDURE — 99207 ZZC PRENATAL VISIT: CPT | Performed by: OBSTETRICS & GYNECOLOGY

## 2019-02-19 NOTE — PROGRESS NOTES
31w6d  Irregular [place] contractions continue, mostly when she is at work.  No bleeding or leaking fluid.  Baby active.  Growth ultrasounds have been normal for velamentous cord insertion.  Has questions about  and recovery today, which we reviewed in detail.  RTC 2w.  Lois Lau MD

## 2019-02-24 ENCOUNTER — NURSE TRIAGE (OUTPATIENT)
Dept: NURSING | Facility: CLINIC | Age: 34
End: 2019-02-24

## 2019-02-24 NOTE — TELEPHONE ENCOUNTER
"Patient calling. States she has had severe left side pain. The pain goes from her  hip to just below her rib cage. She rates the pain at 7-8/10.    States she has had the stomach flu since Thursday but she hasn't thrown up since yesterday.    The pain lasts 1-5 minutes and brings her to tears.     She states her baby is moving the same amount as it was. Also states urine is clear yellow and she has been drinking large amounts of water.    Protocol and care advice reviewed  Caller states understanding of the recommended disposition. Will page on-call OB to call patient and review symptoms.     Advised to call back if further questions or concerns      Reason for Disposition    MODERATE-SEVERE abdominal pain (e.g., interferes with normal activities, awakens from sleep)    Additional Information    Negative: Passed out (i.e., lost consciousness, collapsed and was not responding)    Negative: Shock suspected (e.g., cold/pale/clammy skin, too weak to stand, low BP, rapid pulse)    Negative: Difficult to awaken or acting confused  (e.g., disoriented, slurred speech)    Negative: [1] SEVERE abdominal pain (e.g., excruciating) AND [2] constant AND [3] present > 1 hour    Negative: SEVERE vaginal bleeding (e.g., continuous red blood from vagina, or large blood clots)    Negative: Sounds like a life-threatening emergency to the triager    Negative: Followed an abdomen (stomach) injury    Negative: [1] Having contractions or other symptoms of labor AND [2] >= 37 weeks pregnant (i.e., term pregnancy)    Negative: [1] Having contractions or other symptoms of labor AND [2] < 37 weeks pregnant (i.e., )    Negative: [1] Abdominal pain AND [2] pregnant < 20 weeks    Negative: [1] Vomiting AND [2] contains red blood or black (\"coffee ground\") material  (Exception: few red streaks in vomit that only happened once)    Protocols used: PREGNANCY - ABDOMINAL PAIN GREATER THAN 20 WEEKS EGA-ADULT-      "

## 2019-03-01 ENCOUNTER — PRENATAL OFFICE VISIT (OUTPATIENT)
Dept: OBGYN | Facility: CLINIC | Age: 34
End: 2019-03-01
Payer: COMMERCIAL

## 2019-03-01 VITALS
WEIGHT: 195 LBS | TEMPERATURE: 97.8 F | BODY MASS INDEX: 31.96 KG/M2 | HEART RATE: 99 BPM | DIASTOLIC BLOOD PRESSURE: 81 MMHG | SYSTOLIC BLOOD PRESSURE: 126 MMHG

## 2019-03-01 DIAGNOSIS — O43.129 VELAMENTOUS INSERTION OF UMBILICAL CORD, ANTEPARTUM: ICD-10-CM

## 2019-03-01 DIAGNOSIS — Z34.83 PRENATAL CARE, SUBSEQUENT PREGNANCY IN THIRD TRIMESTER: Primary | ICD-10-CM

## 2019-03-01 PROCEDURE — 99207 ZZC PRENATAL VISIT: CPT | Performed by: OBSTETRICS & GYNECOLOGY

## 2019-03-03 ENCOUNTER — NURSE TRIAGE (OUTPATIENT)
Dept: NURSING | Facility: CLINIC | Age: 34
End: 2019-03-03

## 2019-03-03 ENCOUNTER — HOSPITAL ENCOUNTER (OUTPATIENT)
Facility: CLINIC | Age: 34
Discharge: HOME | End: 2019-03-03
Attending: OBSTETRICS & GYNECOLOGY | Admitting: OBSTETRICS & GYNECOLOGY
Payer: COMMERCIAL

## 2019-03-03 VITALS
DIASTOLIC BLOOD PRESSURE: 80 MMHG | SYSTOLIC BLOOD PRESSURE: 121 MMHG | RESPIRATION RATE: 20 BRPM | TEMPERATURE: 97.5 F | HEART RATE: 115 BPM | BODY MASS INDEX: 31.02 KG/M2 | HEIGHT: 66 IN | WEIGHT: 193 LBS

## 2019-03-03 PROBLEM — Z36.89 ENCOUNTER FOR TRIAGE IN PREGNANT PATIENT: Status: ACTIVE | Noted: 2018-12-29

## 2019-03-03 LAB
ALBUMIN UR-MCNC: NEGATIVE MG/DL
APPEARANCE UR: ABNORMAL
BILIRUB UR QL STRIP: NEGATIVE
COLOR UR AUTO: YELLOW
GLUCOSE UR STRIP-MCNC: 30 MG/DL
HGB UR QL STRIP: NEGATIVE
KETONES UR STRIP-MCNC: NEGATIVE MG/DL
LEUKOCYTE ESTERASE UR QL STRIP: NEGATIVE
MUCOUS THREADS #/AREA URNS LPF: PRESENT /LPF
NITRATE UR QL: NEGATIVE
PH UR STRIP: 6.5 PH (ref 5–7)
RBC #/AREA URNS AUTO: <1 /HPF (ref 0–2)
SOURCE: ABNORMAL
SP GR UR STRIP: 1.01 (ref 1–1.03)
UROBILINOGEN UR STRIP-MCNC: NORMAL MG/DL (ref 0–2)
WBC #/AREA URNS AUTO: <1 /HPF (ref 0–5)

## 2019-03-03 PROCEDURE — G0463 HOSPITAL OUTPT CLINIC VISIT: HCPCS

## 2019-03-03 PROCEDURE — 59025 FETAL NON-STRESS TEST: CPT | Mod: 26 | Performed by: OBSTETRICS & GYNECOLOGY

## 2019-03-03 PROCEDURE — 99213 OFFICE O/P EST LOW 20 MIN: CPT | Mod: 25 | Performed by: OBSTETRICS & GYNECOLOGY

## 2019-03-03 PROCEDURE — 81001 URINALYSIS AUTO W/SCOPE: CPT | Performed by: OBSTETRICS & GYNECOLOGY

## 2019-03-03 ASSESSMENT — MIFFLIN-ST. JEOR: SCORE: 1579.25

## 2019-03-03 NOTE — PROGRESS NOTES
TRIAGE NOTE    Ángela Zurita is a 34 year old female  33w4d  Estimated Date of Delivery: 2019 is calculated from Patient's last menstrual period was 2018 (within weeks). is seen in triage for contractions every 5-8 min all day and more discharge. Has been resting at home and trying hydration. No bleeding, +FM.    PRENATAL COURSE   Prenatal vital signs WNL   Prenatal course was   complicated by    Patient Active Problem List    Diagnosis Date Noted     Velamentous insertion of umbilical cord, antepartum 2019     Priority: Medium      contractions 2019     Priority: Medium     Encounter for triage in pregnant patient 2018     Priority: Medium     Marginal insertion of umbilical cord affecting management of mother 2018     Priority: Medium     Repeat US at 28w       Rubella non-immune status, antepartum 10/24/2018     Priority: Medium     Prenatal care, subsequent pregnancy 2018     Priority: Medium     Left ovarian cyst 2018     Priority: Medium     Posterior neck pain 2017     Priority: Medium     Attention deficit hyperactivity disorder (ADHD), predominantly inattentive type 06/10/2016     Priority: Medium     Patient is followed by WILMAR WHEAT for ongoing prescription of stimulants.  All refills should be approved by this provider, or covering partner.    Medication(s): Adderall 10mg immediate release   Maximum quantity per month: 150  Clinic visit frequency required: Q 3 month     Controlled substance agreement on file: Yes 16  Neuropsych evaluation for ADD completed:  Yes, completed 2012 at [facility], on file and diagnosis confirmed    Last [facility] website verification:  done on 2016   [email or web address]           Oral herpes 2016     Priority: Medium     CARDIOVASCULAR SCREENING; LDL GOAL LESS THAN 130 2015     Priority: Medium     Rectovaginal fistula 2015     Priority: Medium     Has had consult  with colorectal surgeon; opting to defer treatment until done with childbearing  Amenable to c/section with next pregnancy       Health Care Home 12/18/2014     Priority: Medium     Status:  Unable to reach  Care Coordinator:  Maira Starr    See Letters for [facility] Care Plan  Date:  December 18, 2014         Fibroid uterus 07/02/2014     Priority: Medium     Anterior intramural lower.  Right by bladder       Urgency-frequency syndrome 03/24/2014     Priority: Medium        HISTORIES   Allergies   Allergen Reactions     Bee Pollen      In the past, has not had issues lately      Past Medical History:   Diagnosis Date     Cervical dysplasia     s/p LEEP      Chickenpox      Exercise-induced asthma     adolescent     IBS (irritable bowel syndrome)      Tonsillitis 2/13/2014      Past Surgical History:   Procedure Laterality Date     BIOPSY  Various    Had a few Leeps and numerous Colposcopys     COLONOSCOPY  2009    Normal     DILATION AND CURETTAGE, HYSTEROSCOPY DIAGNOSTIC, COMBINED N/A 4/3/2018    Procedure: COMBINED DILATION AND CURETTAGE, HYSTEROSCOPY DIAGNOSTIC;  Diagnostic Hysteroscopy with Dilation and Curettage,Laparoscopy with Left Ovarian Cystectomy, Right Uteral Sacral Biopsy;  Surgeon: Sherin Frost MD;  Location: [facility] OR     EXC SWEAT GLAND LESN AXILL,SIMPL Right 2009     LAPAROSCOPY DIAGNOSTIC (GYN) N/A 4/3/2018    Procedure: LAPAROSCOPY DIAGNOSTIC (GYN);;  Surgeon: Sherin Frost MD;  Location: [facility] OR     LEEP TX, CERVICAL  2006    yearly paps     MOUTH SURGERY      wisdom teeth     SOFT TISSUE SURGERY  Various    infected sweat-gland- cyst removed arm,armpit,face     SPHINCTEROPLASTY RECTUM  05/2018    with pernineal rebuild     SURGICAL HISTORY OF -   3/2005    Tonsillectomy     SURGICAL HISTORY OF -       infected sweat gland under her arm      Family History   Problem Relation Age of Onset     Blood Disease Brother         twin brother-blood clots     Blood Disease  "Maternal Grandfather         blood clots     Heart Disease Maternal Grandfather         valve replacement     Hyperlipidemia Maternal Grandfather      Cerebrovascular Disease Maternal Grandfather      Prostate Cancer Maternal Grandfather      Other Cancer Maternal Grandfather         Gum/Jaw/Lymphnodes     Colon Cancer Maternal Grandfather      Mental Illness Maternal Grandfather         Alzheimer's     Respiratory Maternal Grandmother      Lung Cancer Maternal Grandmother      Diabetes Maternal Grandmother         type II     Hypertension Maternal Grandmother      Hyperlipidemia Maternal Grandmother      Depression Maternal Grandmother      Cancer Paternal Grandmother         non -hodgkins lymphoma     Lung Cancer Paternal Grandmother      Hypertension Paternal Grandmother      Hyperlipidemia Paternal Grandmother      Cerebrovascular Disease Paternal Grandmother      Diabetes Paternal Grandmother      Hypertension Father      Hyperlipidemia Father      Liver Disease Father      Hepatitis Father      Hyperlipidemia Paternal Grandfather      Prostate Cancer Paternal Grandfather      Hypertension Paternal Grandfather      Colon Cancer Paternal Grandfather      Coronary Artery Disease Paternal Grandfather      Breast Cancer Mother         Stage 0, small duct carcinoma     Breast Cancer Other         2 aunt and 3 of my great aunts     Breast Cancer Cousin         Breast Cancer     Other Cancer Cousin         1\" tumor in lung, breast cancer relapse      Social History     Tobacco Use     Smoking status: Former Smoker     Packs/day: 0.50     Years: 15.00     Pack years: 7.50     Types: Cigarettes     Start date: 2014     Smokeless tobacco: Never Used     Tobacco comment: quit with pregnancy   Substance Use Topics     Alcohol use: Yes     Alcohol/week: 0.0 oz     Comment: occas-quit with pregnancy      Obstetric History       T1      L1     SAB0   TAB0   Ectopic0   Multiple0   Live Births1       # " "Outcome Date GA Lbr Travis/2nd Weight Sex Delivery Anes PTL Lv   2 Current            1 Term 09/09/14 38w5d 18:00 / 03:21 3.66 kg (8 lb 1.1 oz) M Vag-Vacuum EPI N JL      Name: Efren      Apgar1:  8                Apgar5: 9             ROS   As per HPI    PHYSICAL EXAM:   Vitals:    03/03/19 1549   BP: 121/80   BP Location: Left arm   Pulse: 115   Resp: 20   Temp: 97.5  F (36.4  C)   TempSrc: Oral   Weight: 87.5 kg (193 lb)   Height: 1.664 m (5' 5.5\")     General appearance healthy, alert, active and no distress   Neuro: denies headache and visual disturbances   Psych: Mentation normal and bright   Legs: 2+/2+, no clonus, no edema     Abdomen: gravid, single fetus, non-tender      FETAL HEART TONES: baseline 130 .  moderate variablility, + accels, no decels, Category I     NST reactive  Pt is emir in an irregular pattern     CERVICAL EXAM: closed/ 50/ Posterior/ firm/ FLOATING     ASSESSMENT:   IUP @ 33w4d not in labor.  NST reactive.  Category  I    PLAN:   Discussed with closed cervix not concerned and fine to go home. PTL discussed and urine was negative. RTC as scheduled.     JAVIER HAYS      "

## 2019-03-03 NOTE — TELEPHONE ENCOUNTER
"Patient calling reporting having increase ceasar butler contractions. Reports having increased pelvic pressure and back pain. Denies vaginal bleeding. Denies leakage of fluid from Vagina. Informed RN will page on call provider for second level triage.     Paged an on call provider Anjelica Spain MD for [facility] at 2:16pm through smart web to call patient directly at [phone].    Advised patient to call back if no call from provider within 20 minutes. Caller verbalized understanding. Denies further questions.      Keshav Scott RN  [facility]     Reason for Disposition    Increased pressure in pelvic area    Additional Information    Negative: Passed out (i.e., lost consciousness, collapsed and was not responding)    Negative: Shock suspected (e.g., cold/pale/clammy skin, too weak to stand, low BP, rapid pulse)    Negative: Difficult to awaken or acting confused  (e.g., disoriented, slurred speech)    Negative: [1] SEVERE abdominal pain (e.g., excruciating) AND [2] constant AND [3] present > 1 hour    Negative: Severe bleeding (e.g., continuous red blood from vagina, or large blood clots)    Negative: Umbilical cord hanging out of the vagina (shiny, white, curled appearance, \"like telephone cord\")    Negative: Uncontrollable urge to push (i.e., feels like baby is coming out now)    Negative: Can see baby    Negative: Sounds like a life-threatening emergency to the triager    Negative: MODERATE-SEVERE abdominal pain    Negative: Contractions < 10 minutes apart for 1 hour (i.e., 6 or more contractions an hour)    Negative: [1] Contractions > 10 minutes apart AND [2] persist > 24 hours AND [3] no improvement using Care Advice    Negative: [1] Contractions AND [2] any vaginal bleeding (including: red blood, clots, spotting, or pink/brown mucous)    Negative: Vaginal bleeding    (Exception: spotting after intercourse or pelvic exam, or slight pinkish or brownish mucous discharge)    " Negative: Leakage of fluid from vagina    Negative: [1] Baby moving less today (e.g., kick count < 5 in 1 hour or < 10 in 2 hours) AND [2] pregnant 23 or more weeks    Negative: No movement of baby for 8 hours    Negative: New hand or face swelling    Negative: Fever > 100.4 F (38.0 C)    Protocols used: PREGNANCY - LABOR - -ADULT-

## 2019-03-03 NOTE — DISCHARGE INSTRUCTIONS
Discharge Instruction for Undelivered Patients      You were seen for: Labor Assessment  We Consulted: Dr. Spain  You had (Test or Medicine):monitoring, UA, cervical check     Diet:   Drink 8 to 12 glasses of liquids (milk, juice, water) every day.  You may eat meals and snacks.     Activity:  Call your doctor or nurse midwife if your baby is moving less than usual.     Call your provider if you notice:  Swelling in your face or increased swelling in your hands or legs.  Headaches that are not relieved by Tylenol (acetaminophen).  Changes in your vision (blurring: seeing spots or stars.)  Nausea (sick to your stomach) and vomiting (throwing up).   Weight gain of 5 pounds or more per week.  Heartburn that doesn't go away.  Signs of bladder infection: pain when you urinate (use the toilet), need to go more often and more urgently.  The bag of pedro (rupture of membranes) breaks, or you notice leaking in your underwear.  Bright red blood in your underwear.  Abdominal (lower belly) or stomach pain.  For first baby: Contractions (tightening) less than 5 minutes apart for one hour or more.  Second (plus) baby: Contractions (tightening) less than 10 minutes apart and getting stronger.  *If less than 34 weeks: Contractions (tightenings) more than 6 times in one hour.  Increase or change in vaginal discharge (note the color and amount)  Other: Call with any questions or concerns.    Follow-up:  As scheduled in the clinic

## 2019-03-04 NOTE — PLAN OF CARE
Data: [facility] at 1540.   Reason for maternal/fetal assessment per patient is Contractions  . Patient is a . Prenatal record reviewed. Pt has felt on/off contractions since about 0900, not painful but feels as tightening and lower uterine pressure/cramping. Also has increased vaginal discharge.      Obstetric History       T1      L1     SAB0   TAB0   Ectopic0   Multiple0   Live Births1       # Outcome Date GA Lbr Travis/2nd Weight Sex Delivery Anes PTL Lv   2 Current            1 Term 14 38w5d 18:00 / 03:21 3.66 kg (8 lb 1.1 oz) M Vag-Vacuum EPI N JL      Name: Efren      Apgar1:  8                Apgar5: 9         Medical History:   Past Medical History:   Diagnosis Date     Cervical dysplasia     s/p LEEP      Chickenpox      Exercise-induced asthma     adolescent     IBS (irritable bowel syndrome)      Tonsillitis 2014   . Gestational Age 33w4d. VSS. Cervix: not examined.  Fetal movement present. Patient denies cramping, backache, vaginal discharge, pelvic pressure, UTI symptoms, GI problems, bloody show, vaginal bleeding, edema, headache, visual disturbances, epigastric or URQ pain, abdominal pain, rupture of membranes. Support persons not present.  Action: Verbal consent for EFM. Triage assessment completed. EFM applied for assessment. Uterine assessment TOCO applied. Fetal assessment: Presumed adequate fetal oxygenation documented (see flow record). Patient instructed to report change in fetal movement, vaginal leaking of fluid or bleeding, abdominal pain, or any concerns related to the pregnancy to her nurse/physician.   Response: Dr. Spain informed of arrival. Plan per provider is cervical exam and discharge home. Patient verbalized understanding of education and verbalized agreement with plan. Discharged ambulatory at 1640.

## 2019-03-11 ENCOUNTER — TELEPHONE (OUTPATIENT)
Dept: OBGYN | Facility: CLINIC | Age: 34
End: 2019-03-11

## 2019-03-11 DIAGNOSIS — R12 HEART BURN: ICD-10-CM

## 2019-03-11 DIAGNOSIS — O21.9 NAUSEA AND VOMITING IN PREGNANCY: Primary | ICD-10-CM

## 2019-03-11 RX ORDER — CEFAZOLIN SODIUM 2 G/100ML
2 INJECTION, SOLUTION INTRAVENOUS
Status: CANCELLED | OUTPATIENT
Start: 2019-04-10

## 2019-03-11 RX ORDER — CITRIC ACID/SODIUM CITRATE 334-500MG
30 SOLUTION, ORAL ORAL
Status: CANCELLED | OUTPATIENT
Start: 2019-04-10

## 2019-03-11 RX ORDER — FAMOTIDINE 20 MG/1
20 TABLET, FILM COATED ORAL 2 TIMES DAILY
Qty: 60 TABLET | Refills: 0 | Status: SHIPPED | OUTPATIENT
Start: 2019-03-11 | End: 2019-07-09

## 2019-03-11 RX ORDER — CEFAZOLIN SODIUM 1 G/3ML
1 INJECTION, POWDER, FOR SOLUTION INTRAMUSCULAR; INTRAVENOUS SEE ADMIN INSTRUCTIONS
Status: CANCELLED | OUTPATIENT
Start: 2019-04-10

## 2019-03-11 RX ORDER — METOCLOPRAMIDE 5 MG/1
5 TABLET ORAL 4 TIMES DAILY PRN
Qty: 30 TABLET | Refills: 1 | Status: ON HOLD | OUTPATIENT
Start: 2019-03-11 | End: 2019-03-24

## 2019-03-11 RX ORDER — SODIUM CHLORIDE, SODIUM LACTATE, POTASSIUM CHLORIDE, CALCIUM CHLORIDE 600; 310; 30; 20 MG/100ML; MG/100ML; MG/100ML; MG/100ML
INJECTION, SOLUTION INTRAVENOUS CONTINUOUS
Status: CANCELLED | OUTPATIENT
Start: 2019-04-10

## 2019-03-11 NOTE — TELEPHONE ENCOUNTER
Sent prescriptions for Reglan and Pepcid.  Called Ángela and left message letting her know.    Lois Lau MD

## 2019-03-11 NOTE — TELEPHONE ENCOUNTER
Patient is 34w5d, is having really bad indigestion. Threw up this weekend and nauseous. Hungry but can't eat anything. Pt has tried Tums, Zantac and both not helping. Wondering if you can prescribe something. Asked pt if she had any zofran or reglan at home. Pt has Ondansetron at home - advised to take some of that for nausea. Pharmacy is teed up.   Sherron Jackson, RN-BSN

## 2019-03-14 ENCOUNTER — HOSPITAL ENCOUNTER (OUTPATIENT)
Dept: ULTRASOUND IMAGING | Facility: CLINIC | Age: 34
Discharge: HOME | End: 2019-03-14
Attending: OBSTETRICS & GYNECOLOGY | Admitting: OBSTETRICS & GYNECOLOGY
Payer: COMMERCIAL

## 2019-03-14 ENCOUNTER — OFFICE VISIT (OUTPATIENT)
Dept: MATERNAL FETAL MEDICINE | Facility: CLINIC | Age: 34
End: 2019-03-14
Attending: OBSTETRICS & GYNECOLOGY
Payer: COMMERCIAL

## 2019-03-14 ENCOUNTER — PRENATAL OFFICE VISIT (OUTPATIENT)
Dept: OBGYN | Facility: CLINIC | Age: 34
End: 2019-03-14
Payer: COMMERCIAL

## 2019-03-14 VITALS
DIASTOLIC BLOOD PRESSURE: 86 MMHG | SYSTOLIC BLOOD PRESSURE: 128 MMHG | HEART RATE: 112 BPM | BODY MASS INDEX: 33.19 KG/M2 | WEIGHT: 202.5 LBS

## 2019-03-14 DIAGNOSIS — O43.123 VELAMENTOUS INSERTION OF UMBILICAL CORD IN THIRD TRIMESTER: Primary | ICD-10-CM

## 2019-03-14 DIAGNOSIS — Z34.83 ENCOUNTER FOR SUPERVISION OF OTHER NORMAL PREGNANCY IN THIRD TRIMESTER: Primary | ICD-10-CM

## 2019-03-14 DIAGNOSIS — O43.129 VELAMENTOUS INSERTION OF UMBILICAL CORD, ANTEPARTUM: ICD-10-CM

## 2019-03-14 DIAGNOSIS — O26.90 PREGNANCY RELATED CONDITION, ANTEPARTUM: ICD-10-CM

## 2019-03-14 DIAGNOSIS — O43.199 MARGINAL INSERTION OF UMBILICAL CORD AFFECTING MANAGEMENT OF MOTHER: ICD-10-CM

## 2019-03-14 PROCEDURE — 76816 OB US FOLLOW-UP PER FETUS: CPT

## 2019-03-14 PROCEDURE — 99207 ZZC PRENATAL VISIT: CPT | Performed by: OBSTETRICS & GYNECOLOGY

## 2019-03-14 NOTE — PROGRESS NOTES
35w1d  Seen in triage for worsening contractions recently, but things have gone back to normal.  Only has painful contractions every once in a while.  No vaginal bleeding or leakage of fluid.  Called with bad heartburn recently, as well as nausea.  Reglan and zantac helping  Discussed GBS at next visit.  RTC weekly.  Lois Lau MD

## 2019-03-14 NOTE — PROGRESS NOTES
"Please see \"Imaging\" tab under \"Chart Review\" for details of today's US at the [facility].    Jermaine Tillman MD  Maternal-Fetal Medicine      "

## 2019-03-21 ENCOUNTER — PRENATAL OFFICE VISIT (OUTPATIENT)
Dept: OBGYN | Facility: CLINIC | Age: 34
End: 2019-03-21
Payer: COMMERCIAL

## 2019-03-21 VITALS
SYSTOLIC BLOOD PRESSURE: 140 MMHG | TEMPERATURE: 98.6 F | WEIGHT: 205 LBS | BODY MASS INDEX: 33.59 KG/M2 | HEART RATE: 88 BPM | DIASTOLIC BLOOD PRESSURE: 74 MMHG

## 2019-03-21 DIAGNOSIS — Z34.83 ENCOUNTER FOR SUPERVISION OF OTHER NORMAL PREGNANCY IN THIRD TRIMESTER: ICD-10-CM

## 2019-03-21 DIAGNOSIS — Z34.83 PRENATAL CARE, SUBSEQUENT PREGNANCY IN THIRD TRIMESTER: Primary | ICD-10-CM

## 2019-03-21 LAB
ALBUMIN UR-MCNC: NEGATIVE MG/DL
APPEARANCE UR: CLEAR
BILIRUB UR QL STRIP: NEGATIVE
COLOR UR AUTO: YELLOW
CREAT UR-MCNC: 19 MG/DL
ERYTHROCYTE [DISTWIDTH] IN BLOOD BY AUTOMATED COUNT: 13.7 % (ref 10–15)
GLUCOSE UR STRIP-MCNC: NEGATIVE MG/DL
HCT VFR BLD AUTO: 33.2 % (ref 35–47)
HGB BLD-MCNC: 10.9 G/DL (ref 11.7–15.7)
HGB BLD-MCNC: 11.8 G/DL (ref 11.7–15.7)
HGB UR QL STRIP: NEGATIVE
KETONES UR STRIP-MCNC: NEGATIVE MG/DL
LEUKOCYTE ESTERASE UR QL STRIP: NEGATIVE
MCH RBC QN AUTO: 29.3 PG (ref 26.5–33)
MCHC RBC AUTO-ENTMCNC: 32.8 G/DL (ref 31.5–36.5)
MCV RBC AUTO: 89 FL (ref 78–100)
NITRATE UR QL: NEGATIVE
PH UR STRIP: 5.5 PH (ref 5–7)
PLATELET # BLD AUTO: 309 10E9/L (ref 150–450)
PROT UR-MCNC: 0.05 G/L
PROT/CREAT 24H UR: 0.27 G/G CR (ref 0–0.2)
RBC # BLD AUTO: 3.72 10E12/L (ref 3.8–5.2)
RBC #/AREA URNS AUTO: NORMAL /HPF
SOURCE: NORMAL
SP GR UR STRIP: 1.01 (ref 1–1.03)
UROBILINOGEN UR STRIP-ACNC: 0.2 EU/DL (ref 0.2–1)
WBC # BLD AUTO: 10.1 10E9/L (ref 4–11)
WBC #/AREA URNS AUTO: NORMAL /HPF

## 2019-03-21 PROCEDURE — 81001 URINALYSIS AUTO W/SCOPE: CPT | Performed by: OBSTETRICS & GYNECOLOGY

## 2019-03-21 PROCEDURE — 87653 STREP B DNA AMP PROBE: CPT | Performed by: OBSTETRICS & GYNECOLOGY

## 2019-03-21 PROCEDURE — 84156 ASSAY OF PROTEIN URINE: CPT | Performed by: OBSTETRICS & GYNECOLOGY

## 2019-03-21 PROCEDURE — 99207 ZZC PRENATAL VISIT: CPT | Performed by: OBSTETRICS & GYNECOLOGY

## 2019-03-21 PROCEDURE — 85027 COMPLETE CBC AUTOMATED: CPT | Performed by: OBSTETRICS & GYNECOLOGY

## 2019-03-21 PROCEDURE — 84450 TRANSFERASE (AST) (SGOT): CPT | Performed by: OBSTETRICS & GYNECOLOGY

## 2019-03-21 PROCEDURE — 84460 ALANINE AMINO (ALT) (SGPT): CPT | Performed by: OBSTETRICS & GYNECOLOGY

## 2019-03-21 PROCEDURE — 36416 COLLJ CAPILLARY BLOOD SPEC: CPT | Performed by: OBSTETRICS & GYNECOLOGY

## 2019-03-21 PROCEDURE — 87186 SC STD MICRODIL/AGAR DIL: CPT | Performed by: OBSTETRICS & GYNECOLOGY

## 2019-03-21 PROCEDURE — 00000218 ZZHCL STATISTIC OBHBG - HEMOGLOBIN: Performed by: OBSTETRICS & GYNECOLOGY

## 2019-03-21 NOTE — PROGRESS NOTES
Feeling more tired; having frontal headaches this week,  trouble sleeping.  Some congestion, unchanged.  She took yesterday off, felt better today.  Less swelling today.  Baby active.  US 3/14: good growth, no further sonograms needed.  Slight increase in BP; will RTC 3/22 for PIH lab results and BP check.  LT

## 2019-03-22 ENCOUNTER — PRENATAL OFFICE VISIT (OUTPATIENT)
Dept: OBGYN | Facility: CLINIC | Age: 34
End: 2019-03-22
Payer: COMMERCIAL

## 2019-03-22 ENCOUNTER — ANESTHESIA (OUTPATIENT)
Dept: OBGYN | Facility: CLINIC | Age: 34
End: 2019-03-22
Payer: COMMERCIAL

## 2019-03-22 ENCOUNTER — HOSPITAL ENCOUNTER (INPATIENT)
Facility: CLINIC | Age: 34
LOS: 3 days | Discharge: HOME HEALTH | End: 2019-03-25
Attending: OBSTETRICS & GYNECOLOGY | Admitting: OBSTETRICS & GYNECOLOGY
Payer: COMMERCIAL

## 2019-03-22 ENCOUNTER — ANESTHESIA EVENT (OUTPATIENT)
Dept: OBGYN | Facility: CLINIC | Age: 34
End: 2019-03-22
Payer: COMMERCIAL

## 2019-03-22 VITALS
DIASTOLIC BLOOD PRESSURE: 95 MMHG | HEART RATE: 77 BPM | SYSTOLIC BLOOD PRESSURE: 157 MMHG | WEIGHT: 207 LBS | BODY MASS INDEX: 33.92 KG/M2

## 2019-03-22 DIAGNOSIS — O43.199 MARGINAL INSERTION OF UMBILICAL CORD AFFECTING MANAGEMENT OF MOTHER: ICD-10-CM

## 2019-03-22 DIAGNOSIS — Z98.891 S/P C-SECTION: Primary | ICD-10-CM

## 2019-03-22 DIAGNOSIS — O43.129 VELAMENTOUS INSERTION OF UMBILICAL CORD, ANTEPARTUM: ICD-10-CM

## 2019-03-22 PROBLEM — O16.9 HYPERTENSION IN PREGNANCY: Status: ACTIVE | Noted: 2019-03-22

## 2019-03-22 LAB
ABO + RH BLD: NORMAL
ABO + RH BLD: NORMAL
ALT SERPL W P-5'-P-CCNC: 13 U/L (ref 0–50)
ALT SERPL W P-5'-P-CCNC: 9 U/L (ref 0–50)
AST SERPL W P-5'-P-CCNC: 12 U/L (ref 0–45)
AST SERPL W P-5'-P-CCNC: 15 U/L (ref 0–45)
BLD GP AB SCN SERPL QL: NORMAL
BLOOD BANK CMNT PATIENT-IMP: NORMAL
CREAT SERPL-MCNC: 0.64 MG/DL (ref 0.52–1.04)
CREAT UR-MCNC: 69 MG/DL
ERYTHROCYTE [DISTWIDTH] IN BLOOD BY AUTOMATED COUNT: 13.4 % (ref 10–15)
GFR SERPL CREATININE-BSD FRML MDRD: >90 ML/MIN/{1.73_M2}
GP B STREP DNA SPEC QL NAA+PROBE: POSITIVE
HCT VFR BLD AUTO: 34.1 % (ref 35–47)
HGB BLD-MCNC: 10.9 G/DL (ref 11.7–15.7)
MCH RBC QN AUTO: 28.8 PG (ref 26.5–33)
MCHC RBC AUTO-ENTMCNC: 32 G/DL (ref 31.5–36.5)
MCV RBC AUTO: 90 FL (ref 78–100)
PLATELET # BLD AUTO: 302 10E9/L (ref 150–450)
PROT UR-MCNC: 0.23 G/L
PROT/CREAT 24H UR: 0.33 G/G CR (ref 0–0.2)
RBC # BLD AUTO: 3.78 10E12/L (ref 3.8–5.2)
SPECIMEN EXP DATE BLD: NORMAL
SPECIMEN SOURCE: ABNORMAL
URATE SERPL-MCNC: 4.4 MG/DL (ref 2.6–6)
WBC # BLD AUTO: 11.6 10E9/L (ref 4–11)

## 2019-03-22 PROCEDURE — 25000128 H RX IP 250 OP 636: Performed by: STUDENT IN AN ORGANIZED HEALTH CARE EDUCATION/TRAINING PROGRAM

## 2019-03-22 PROCEDURE — 37000009 ZZH ANESTHESIA TECHNICAL FEE, EACH ADDTL 15 MIN: Performed by: OBSTETRICS & GYNECOLOGY

## 2019-03-22 PROCEDURE — G0463 HOSPITAL OUTPT CLINIC VISIT: HCPCS | Mod: 25

## 2019-03-22 PROCEDURE — 99207 ZZC PRENATAL VISIT: CPT | Performed by: OBSTETRICS & GYNECOLOGY

## 2019-03-22 PROCEDURE — 27210794 ZZH OR GENERAL SUPPLY STERILE: Performed by: OBSTETRICS & GYNECOLOGY

## 2019-03-22 PROCEDURE — 86901 BLOOD TYPING SEROLOGIC RH(D): CPT | Performed by: OBSTETRICS & GYNECOLOGY

## 2019-03-22 PROCEDURE — 71000015 ZZH RECOVERY PHASE 1 LEVEL 2 EA ADDTL HR: Performed by: OBSTETRICS & GYNECOLOGY

## 2019-03-22 PROCEDURE — 84460 ALANINE AMINO (ALT) (SGPT): CPT | Performed by: OBSTETRICS & GYNECOLOGY

## 2019-03-22 PROCEDURE — 59025 FETAL NON-STRESS TEST: CPT

## 2019-03-22 PROCEDURE — 25800030 ZZH RX IP 258 OP 636: Performed by: STUDENT IN AN ORGANIZED HEALTH CARE EDUCATION/TRAINING PROGRAM

## 2019-03-22 PROCEDURE — 37000008 ZZH ANESTHESIA TECHNICAL FEE, 1ST 30 MIN: Performed by: OBSTETRICS & GYNECOLOGY

## 2019-03-22 PROCEDURE — 12000001 ZZH R&B MED SURG/OB UMMC

## 2019-03-22 PROCEDURE — 82565 ASSAY OF CREATININE: CPT | Performed by: OBSTETRICS & GYNECOLOGY

## 2019-03-22 PROCEDURE — 88307 TISSUE EXAM BY PATHOLOGIST: CPT | Performed by: STUDENT IN AN ORGANIZED HEALTH CARE EDUCATION/TRAINING PROGRAM

## 2019-03-22 PROCEDURE — 36000059 ZZH SURGERY LEVEL 3 EA 15 ADDTL MIN UMMC: Performed by: OBSTETRICS & GYNECOLOGY

## 2019-03-22 PROCEDURE — 25000125 ZZHC RX 250: Performed by: STUDENT IN AN ORGANIZED HEALTH CARE EDUCATION/TRAINING PROGRAM

## 2019-03-22 PROCEDURE — 84156 ASSAY OF PROTEIN URINE: CPT | Performed by: OBSTETRICS & GYNECOLOGY

## 2019-03-22 PROCEDURE — 25000132 ZZH RX MED GY IP 250 OP 250 PS 637

## 2019-03-22 PROCEDURE — 85027 COMPLETE CBC AUTOMATED: CPT | Performed by: OBSTETRICS & GYNECOLOGY

## 2019-03-22 PROCEDURE — 86900 BLOOD TYPING SEROLOGIC ABO: CPT | Performed by: OBSTETRICS & GYNECOLOGY

## 2019-03-22 PROCEDURE — 25000132 ZZH RX MED GY IP 250 OP 250 PS 637: Performed by: OBSTETRICS & GYNECOLOGY

## 2019-03-22 PROCEDURE — 84450 TRANSFERASE (AST) (SGOT): CPT | Performed by: OBSTETRICS & GYNECOLOGY

## 2019-03-22 PROCEDURE — 40000170 ZZH STATISTIC PRE-PROCEDURE ASSESSMENT II: Performed by: OBSTETRICS & GYNECOLOGY

## 2019-03-22 PROCEDURE — C1765 ADHESION BARRIER: HCPCS | Performed by: OBSTETRICS & GYNECOLOGY

## 2019-03-22 PROCEDURE — 25000132 ZZH RX MED GY IP 250 OP 250 PS 637: Performed by: STUDENT IN AN ORGANIZED HEALTH CARE EDUCATION/TRAINING PROGRAM

## 2019-03-22 PROCEDURE — 36415 COLL VENOUS BLD VENIPUNCTURE: CPT | Performed by: OBSTETRICS & GYNECOLOGY

## 2019-03-22 PROCEDURE — 36000057 ZZH SURGERY LEVEL 3 1ST 30 MIN - UMMC: Performed by: OBSTETRICS & GYNECOLOGY

## 2019-03-22 PROCEDURE — 86850 RBC ANTIBODY SCREEN: CPT | Performed by: OBSTETRICS & GYNECOLOGY

## 2019-03-22 PROCEDURE — C9290 INJ, BUPIVACAINE LIPOSOME: HCPCS | Performed by: STUDENT IN AN ORGANIZED HEALTH CARE EDUCATION/TRAINING PROGRAM

## 2019-03-22 PROCEDURE — 84550 ASSAY OF BLOOD/URIC ACID: CPT | Performed by: OBSTETRICS & GYNECOLOGY

## 2019-03-22 PROCEDURE — 27110028 ZZH OR GENERAL SUPPLY NON-STERILE: Performed by: OBSTETRICS & GYNECOLOGY

## 2019-03-22 PROCEDURE — 59510 CESAREAN DELIVERY: CPT | Mod: GC | Performed by: OBSTETRICS & GYNECOLOGY

## 2019-03-22 PROCEDURE — 71000014 ZZH RECOVERY PHASE 1 LEVEL 2 FIRST HR: Performed by: OBSTETRICS & GYNECOLOGY

## 2019-03-22 RX ORDER — SODIUM CHLORIDE, SODIUM LACTATE, POTASSIUM CHLORIDE, CALCIUM CHLORIDE 600; 310; 30; 20 MG/100ML; MG/100ML; MG/100ML; MG/100ML
INJECTION, SOLUTION INTRAVENOUS CONTINUOUS
Status: DISCONTINUED | OUTPATIENT
Start: 2019-03-22 | End: 2019-03-22 | Stop reason: HOSPADM

## 2019-03-22 RX ORDER — MAGNESIUM SULFATE HEPTAHYDRATE 40 MG/ML
4 INJECTION, SOLUTION INTRAVENOUS
Status: DISCONTINUED | OUTPATIENT
Start: 2019-03-22 | End: 2019-03-25 | Stop reason: HOSPADM

## 2019-03-22 RX ORDER — NALOXONE HYDROCHLORIDE 0.4 MG/ML
.1-.4 INJECTION, SOLUTION INTRAMUSCULAR; INTRAVENOUS; SUBCUTANEOUS
Status: DISCONTINUED | OUTPATIENT
Start: 2019-03-22 | End: 2019-03-25 | Stop reason: HOSPADM

## 2019-03-22 RX ORDER — OXYCODONE HYDROCHLORIDE 5 MG/1
5-10 TABLET ORAL
Status: DISCONTINUED | OUTPATIENT
Start: 2019-03-22 | End: 2019-03-25 | Stop reason: HOSPADM

## 2019-03-22 RX ORDER — BISACODYL 10 MG
10 SUPPOSITORY, RECTAL RECTAL DAILY PRN
Status: DISCONTINUED | OUTPATIENT
Start: 2019-03-24 | End: 2019-03-25 | Stop reason: HOSPADM

## 2019-03-22 RX ORDER — ACETAMINOPHEN 325 MG/1
650 TABLET ORAL EVERY 4 HOURS PRN
Status: DISCONTINUED | OUTPATIENT
Start: 2019-03-25 | End: 2019-03-25 | Stop reason: HOSPADM

## 2019-03-22 RX ORDER — MAGNESIUM SULFATE HEPTAHYDRATE 40 MG/ML
4 INJECTION, SOLUTION INTRAVENOUS ONCE
Status: COMPLETED | OUTPATIENT
Start: 2019-03-22 | End: 2019-03-22

## 2019-03-22 RX ORDER — ALUMINA, MAGNESIA, AND SIMETHICONE 2400; 2400; 240 MG/30ML; MG/30ML; MG/30ML
30 SUSPENSION ORAL
Status: COMPLETED | OUTPATIENT
Start: 2019-03-22 | End: 2019-03-22

## 2019-03-22 RX ORDER — CALCIUM GLUCONATE 94 MG/ML
1 INJECTION, SOLUTION INTRAVENOUS
Status: DISCONTINUED | OUTPATIENT
Start: 2019-03-22 | End: 2019-03-25 | Stop reason: HOSPADM

## 2019-03-22 RX ORDER — OXYTOCIN/0.9 % SODIUM CHLORIDE 30/500 ML
PLASTIC BAG, INJECTION (ML) INTRAVENOUS CONTINUOUS PRN
Status: DISCONTINUED | OUTPATIENT
Start: 2019-03-22 | End: 2019-03-22

## 2019-03-22 RX ORDER — BETAMETHASONE SODIUM PHOSPHATE AND BETAMETHASONE ACETATE 3; 3 MG/ML; MG/ML
12 INJECTION, SUSPENSION INTRA-ARTICULAR; INTRALESIONAL; INTRAMUSCULAR; SOFT TISSUE ONCE
Status: COMPLETED | OUTPATIENT
Start: 2019-03-22 | End: 2019-03-22

## 2019-03-22 RX ORDER — LIDOCAINE 40 MG/G
CREAM TOPICAL
Status: DISCONTINUED | OUTPATIENT
Start: 2019-03-22 | End: 2019-03-25 | Stop reason: HOSPADM

## 2019-03-22 RX ORDER — ONDANSETRON 4 MG/1
4 TABLET, ORALLY DISINTEGRATING ORAL EVERY 30 MIN PRN
Status: DISCONTINUED | OUTPATIENT
Start: 2019-03-22 | End: 2019-03-22 | Stop reason: HOSPADM

## 2019-03-22 RX ORDER — EPHEDRINE SULFATE 50 MG/ML
5 INJECTION, SOLUTION INTRAMUSCULAR; INTRAVENOUS; SUBCUTANEOUS
Status: DISCONTINUED | OUTPATIENT
Start: 2019-03-22 | End: 2019-03-24

## 2019-03-22 RX ORDER — NALBUPHINE HYDROCHLORIDE 10 MG/ML
2.5-5 INJECTION, SOLUTION INTRAMUSCULAR; INTRAVENOUS; SUBCUTANEOUS EVERY 6 HOURS PRN
Status: DISCONTINUED | OUTPATIENT
Start: 2019-03-22 | End: 2019-03-24

## 2019-03-22 RX ORDER — ONDANSETRON 2 MG/ML
4 INJECTION INTRAMUSCULAR; INTRAVENOUS EVERY 6 HOURS PRN
Status: DISCONTINUED | OUTPATIENT
Start: 2019-03-22 | End: 2019-03-25 | Stop reason: HOSPADM

## 2019-03-22 RX ORDER — HYDROCORTISONE 2.5 %
CREAM (GRAM) TOPICAL 3 TIMES DAILY PRN
Status: DISCONTINUED | OUTPATIENT
Start: 2019-03-22 | End: 2019-03-25 | Stop reason: HOSPADM

## 2019-03-22 RX ORDER — AMOXICILLIN 250 MG
1 CAPSULE ORAL 2 TIMES DAILY PRN
Status: DISCONTINUED | OUTPATIENT
Start: 2019-03-22 | End: 2019-03-25 | Stop reason: HOSPADM

## 2019-03-22 RX ORDER — ONDANSETRON 2 MG/ML
4 INJECTION INTRAMUSCULAR; INTRAVENOUS EVERY 30 MIN PRN
Status: DISCONTINUED | OUTPATIENT
Start: 2019-03-22 | End: 2019-03-22 | Stop reason: HOSPADM

## 2019-03-22 RX ORDER — ONDANSETRON 2 MG/ML
4 INJECTION INTRAMUSCULAR; INTRAVENOUS EVERY 6 HOURS PRN
Status: DISCONTINUED | OUTPATIENT
Start: 2019-03-22 | End: 2019-03-24

## 2019-03-22 RX ORDER — CITRIC ACID/SODIUM CITRATE 334-500MG
30 SOLUTION, ORAL ORAL
Status: DISCONTINUED | OUTPATIENT
Start: 2019-03-22 | End: 2019-03-22 | Stop reason: HOSPADM

## 2019-03-22 RX ORDER — CEFAZOLIN SODIUM 2 G/100ML
2 INJECTION, SOLUTION INTRAVENOUS
Status: COMPLETED | OUTPATIENT
Start: 2019-03-22 | End: 2019-03-22

## 2019-03-22 RX ORDER — ONDANSETRON 2 MG/ML
INJECTION INTRAMUSCULAR; INTRAVENOUS PRN
Status: DISCONTINUED | OUTPATIENT
Start: 2019-03-22 | End: 2019-03-22

## 2019-03-22 RX ORDER — MAGNESIUM SULFATE HEPTAHYDRATE 500 MG/ML
4 INJECTION, SOLUTION INTRAMUSCULAR; INTRAVENOUS
Status: DISCONTINUED | OUTPATIENT
Start: 2019-03-22 | End: 2019-03-25 | Stop reason: HOSPADM

## 2019-03-22 RX ORDER — FENTANYL CITRATE 50 UG/ML
25-50 INJECTION, SOLUTION INTRAMUSCULAR; INTRAVENOUS
Status: DISCONTINUED | OUTPATIENT
Start: 2019-03-22 | End: 2019-03-22 | Stop reason: HOSPADM

## 2019-03-22 RX ORDER — PROCHLORPERAZINE 25 MG
25 SUPPOSITORY, RECTAL RECTAL EVERY 12 HOURS PRN
Status: DISCONTINUED | OUTPATIENT
Start: 2019-03-22 | End: 2019-03-25 | Stop reason: HOSPADM

## 2019-03-22 RX ORDER — CITRIC ACID/SODIUM CITRATE 334-500MG
SOLUTION, ORAL ORAL
Status: COMPLETED
Start: 2019-03-22 | End: 2019-03-22

## 2019-03-22 RX ORDER — OXYTOCIN/0.9 % SODIUM CHLORIDE 30/500 ML
PLASTIC BAG, INJECTION (ML) INTRAVENOUS
Status: DISCONTINUED
Start: 2019-03-22 | End: 2019-03-22 | Stop reason: HOSPADM

## 2019-03-22 RX ORDER — NALOXONE HYDROCHLORIDE 0.4 MG/ML
.1-.4 INJECTION, SOLUTION INTRAMUSCULAR; INTRAVENOUS; SUBCUTANEOUS
Status: DISCONTINUED | OUTPATIENT
Start: 2019-03-22 | End: 2019-03-24

## 2019-03-22 RX ORDER — LORAZEPAM 2 MG/ML
2 INJECTION INTRAMUSCULAR
Status: DISCONTINUED | OUTPATIENT
Start: 2019-03-22 | End: 2019-03-25 | Stop reason: HOSPADM

## 2019-03-22 RX ORDER — LANOLIN 100 %
OINTMENT (GRAM) TOPICAL
Status: DISCONTINUED | OUTPATIENT
Start: 2019-03-22 | End: 2019-03-25 | Stop reason: HOSPADM

## 2019-03-22 RX ORDER — KETOROLAC TROMETHAMINE 30 MG/ML
INJECTION, SOLUTION INTRAMUSCULAR; INTRAVENOUS PRN
Status: DISCONTINUED | OUTPATIENT
Start: 2019-03-22 | End: 2019-03-22

## 2019-03-22 RX ORDER — OXYTOCIN 10 [USP'U]/ML
10 INJECTION, SOLUTION INTRAMUSCULAR; INTRAVENOUS
Status: DISCONTINUED | OUTPATIENT
Start: 2019-03-22 | End: 2019-03-25 | Stop reason: HOSPADM

## 2019-03-22 RX ORDER — NALOXONE HYDROCHLORIDE 0.4 MG/ML
.1-.4 INJECTION, SOLUTION INTRAMUSCULAR; INTRAVENOUS; SUBCUTANEOUS
Status: ACTIVE | OUTPATIENT
Start: 2019-03-22 | End: 2019-03-23

## 2019-03-22 RX ORDER — FAMOTIDINE 20 MG/1
20 TABLET, FILM COATED ORAL 2 TIMES DAILY
Status: DISCONTINUED | OUTPATIENT
Start: 2019-03-22 | End: 2019-03-24

## 2019-03-22 RX ORDER — BUPIVACAINE HYDROCHLORIDE 7.5 MG/ML
INJECTION, SOLUTION INTRASPINAL
Status: DISCONTINUED
Start: 2019-03-22 | End: 2019-03-22 | Stop reason: HOSPADM

## 2019-03-22 RX ORDER — MAGNESIUM SULFATE IN WATER 40 MG/ML
2 INJECTION, SOLUTION INTRAVENOUS CONTINUOUS
Status: DISPENSED | OUTPATIENT
Start: 2019-03-22 | End: 2019-03-23

## 2019-03-22 RX ORDER — AMOXICILLIN 250 MG
2 CAPSULE ORAL 2 TIMES DAILY PRN
Status: DISCONTINUED | OUTPATIENT
Start: 2019-03-22 | End: 2019-03-25 | Stop reason: HOSPADM

## 2019-03-22 RX ORDER — IBUPROFEN 600 MG/1
600 TABLET, FILM COATED ORAL EVERY 6 HOURS PRN
Status: DISCONTINUED | OUTPATIENT
Start: 2019-03-22 | End: 2019-03-25 | Stop reason: HOSPADM

## 2019-03-22 RX ORDER — METHYLERGONOVINE MALEATE 0.2 MG/ML
200 INJECTION INTRAVENOUS
Status: DISCONTINUED | OUTPATIENT
Start: 2019-03-22 | End: 2019-03-25 | Stop reason: HOSPADM

## 2019-03-22 RX ORDER — OXYTOCIN/0.9 % SODIUM CHLORIDE 30/500 ML
340 PLASTIC BAG, INJECTION (ML) INTRAVENOUS CONTINUOUS PRN
Status: DISCONTINUED | OUTPATIENT
Start: 2019-03-22 | End: 2019-03-25 | Stop reason: HOSPADM

## 2019-03-22 RX ORDER — SIMETHICONE 80 MG
80 TABLET,CHEWABLE ORAL 4 TIMES DAILY PRN
Status: DISCONTINUED | OUTPATIENT
Start: 2019-03-22 | End: 2019-03-25 | Stop reason: HOSPADM

## 2019-03-22 RX ORDER — BUPIVACAINE HYDROCHLORIDE 2.5 MG/ML
INJECTION, SOLUTION EPIDURAL; INFILTRATION; INTRACAUDAL PRN
Status: DISCONTINUED | OUTPATIENT
Start: 2019-03-22 | End: 2019-03-22

## 2019-03-22 RX ORDER — SODIUM CHLORIDE, SODIUM LACTATE, POTASSIUM CHLORIDE, CALCIUM CHLORIDE 600; 310; 30; 20 MG/100ML; MG/100ML; MG/100ML; MG/100ML
INJECTION, SOLUTION INTRAVENOUS CONTINUOUS PRN
Status: DISCONTINUED | OUTPATIENT
Start: 2019-03-22 | End: 2019-03-22

## 2019-03-22 RX ORDER — OXYTOCIN/0.9 % SODIUM CHLORIDE 30/500 ML
100 PLASTIC BAG, INJECTION (ML) INTRAVENOUS CONTINUOUS
Status: DISCONTINUED | OUTPATIENT
Start: 2019-03-22 | End: 2019-03-25 | Stop reason: HOSPADM

## 2019-03-22 RX ORDER — METOCLOPRAMIDE HYDROCHLORIDE 5 MG/ML
10 INJECTION INTRAMUSCULAR; INTRAVENOUS EVERY 6 HOURS PRN
Status: DISCONTINUED | OUTPATIENT
Start: 2019-03-22 | End: 2019-03-25 | Stop reason: HOSPADM

## 2019-03-22 RX ORDER — MORPHINE SULFATE 1 MG/ML
150 INJECTION, SOLUTION EPIDURAL; INTRATHECAL; INTRAVENOUS ONCE
Status: COMPLETED | OUTPATIENT
Start: 2019-03-22 | End: 2019-03-22

## 2019-03-22 RX ORDER — CEFAZOLIN SODIUM 1 G/3ML
1 INJECTION, POWDER, FOR SOLUTION INTRAMUSCULAR; INTRAVENOUS SEE ADMIN INSTRUCTIONS
Status: DISCONTINUED | OUTPATIENT
Start: 2019-03-22 | End: 2019-03-22 | Stop reason: HOSPADM

## 2019-03-22 RX ORDER — MORPHINE SULFATE 1 MG/ML
INJECTION, SOLUTION EPIDURAL; INTRATHECAL; INTRAVENOUS
Status: DISCONTINUED
Start: 2019-03-22 | End: 2019-03-22 | Stop reason: HOSPADM

## 2019-03-22 RX ORDER — CARBOPROST TROMETHAMINE 250 UG/ML
250 INJECTION, SOLUTION INTRAMUSCULAR
Status: DISCONTINUED | OUTPATIENT
Start: 2019-03-22 | End: 2019-03-25 | Stop reason: HOSPADM

## 2019-03-22 RX ORDER — BUPIVACAINE HYDROCHLORIDE 7.5 MG/ML
INJECTION, SOLUTION INTRASPINAL PRN
Status: DISCONTINUED | OUTPATIENT
Start: 2019-03-22 | End: 2019-03-22

## 2019-03-22 RX ORDER — KETOROLAC TROMETHAMINE 30 MG/ML
30 INJECTION, SOLUTION INTRAMUSCULAR; INTRAVENOUS EVERY 6 HOURS
Status: COMPLETED | OUTPATIENT
Start: 2019-03-22 | End: 2019-03-23

## 2019-03-22 RX ORDER — LIDOCAINE 40 MG/G
CREAM TOPICAL
Status: DISCONTINUED | OUTPATIENT
Start: 2019-03-22 | End: 2019-03-24

## 2019-03-22 RX ORDER — ACETAMINOPHEN 325 MG/1
650 TABLET ORAL EVERY 4 HOURS PRN
Status: DISCONTINUED | OUTPATIENT
Start: 2019-03-22 | End: 2019-03-24

## 2019-03-22 RX ORDER — DEXTROSE, SODIUM CHLORIDE, SODIUM LACTATE, POTASSIUM CHLORIDE, AND CALCIUM CHLORIDE 5; .6; .31; .03; .02 G/100ML; G/100ML; G/100ML; G/100ML; G/100ML
INJECTION, SOLUTION INTRAVENOUS CONTINUOUS
Status: DISCONTINUED | OUTPATIENT
Start: 2019-03-22 | End: 2019-03-25 | Stop reason: HOSPADM

## 2019-03-22 RX ORDER — LABETALOL 20 MG/4 ML (5 MG/ML) INTRAVENOUS SYRINGE
10
Status: DISCONTINUED | OUTPATIENT
Start: 2019-03-22 | End: 2019-03-22 | Stop reason: HOSPADM

## 2019-03-22 RX ORDER — MISOPROSTOL 200 UG/1
800 TABLET ORAL
Status: DISCONTINUED | OUTPATIENT
Start: 2019-03-22 | End: 2019-03-25 | Stop reason: HOSPADM

## 2019-03-22 RX ORDER — ACETAMINOPHEN 325 MG/1
975 TABLET ORAL EVERY 8 HOURS
Status: DISCONTINUED | OUTPATIENT
Start: 2019-03-22 | End: 2019-03-25 | Stop reason: HOSPADM

## 2019-03-22 RX ORDER — SODIUM CHLORIDE, SODIUM LACTATE, POTASSIUM CHLORIDE, CALCIUM CHLORIDE 600; 310; 30; 20 MG/100ML; MG/100ML; MG/100ML; MG/100ML
10-125 INJECTION, SOLUTION INTRAVENOUS CONTINUOUS
Status: DISCONTINUED | OUTPATIENT
Start: 2019-03-22 | End: 2019-03-25 | Stop reason: HOSPADM

## 2019-03-22 RX ORDER — FENTANYL CITRATE 50 UG/ML
INJECTION, SOLUTION INTRAMUSCULAR; INTRAVENOUS PRN
Status: DISCONTINUED | OUTPATIENT
Start: 2019-03-22 | End: 2019-03-22

## 2019-03-22 RX ADMIN — OXYTOCIN-SODIUM CHLORIDE 0.9% IV SOLN 30 UNIT/500ML 600 ML/HR: 30-0.9/5 SOLUTION at 17:10

## 2019-03-22 RX ADMIN — PHENYLEPHRINE HYDROCHLORIDE 0.25 MCG/KG/MIN: 10 INJECTION, SOLUTION INTRAMUSCULAR; INTRAVENOUS; SUBCUTANEOUS at 16:47

## 2019-03-22 RX ADMIN — FENTANYL CITRATE 50 MCG: 50 INJECTION, SOLUTION INTRAMUSCULAR; INTRAVENOUS at 19:50

## 2019-03-22 RX ADMIN — BUPIVACAINE HYDROCHLORIDE IN DEXTROSE 1.6 ML: 7.5 INJECTION, SOLUTION SUBARACHNOID at 16:47

## 2019-03-22 RX ADMIN — MAGNESIUM SULFATE IN WATER 2 G/HR: 40 INJECTION, SOLUTION INTRAVENOUS at 18:12

## 2019-03-22 RX ADMIN — MAGNESIUM SULFATE IN WATER 2 G/HR: 40 INJECTION, SOLUTION INTRAVENOUS at 13:43

## 2019-03-22 RX ADMIN — ALUMINUM HYDROXIDE, MAGNESIUM HYDROXIDE, AND DIMETHICONE 30 ML: 400; 400; 40 SUSPENSION ORAL at 14:42

## 2019-03-22 RX ADMIN — MORPHINE SULFATE 0.15 MG: 1 INJECTION, SOLUTION EPIDURAL; INTRATHECAL; INTRAVENOUS at 16:47

## 2019-03-22 RX ADMIN — KETOROLAC TROMETHAMINE 30 MG: 30 INJECTION, SOLUTION INTRAMUSCULAR at 17:39

## 2019-03-22 RX ADMIN — BUPIVACAINE HYDROCHLORIDE 20 ML: 2.5 INJECTION, SOLUTION EPIDURAL; INFILTRATION; INTRACAUDAL at 18:23

## 2019-03-22 RX ADMIN — OXYTOCIN-SODIUM CHLORIDE 0.9% IV SOLN 30 UNIT/500ML 100 ML/HR: 30-0.9/5 SOLUTION at 20:32

## 2019-03-22 RX ADMIN — BUPIVACAINE 20 ML: 13.3 INJECTION, SUSPENSION, LIPOSOMAL INFILTRATION at 18:23

## 2019-03-22 RX ADMIN — MAGNESIUM SULFATE IN WATER 4 G: 40 INJECTION, SOLUTION INTRAVENOUS at 13:15

## 2019-03-22 RX ADMIN — BETAMETHASONE SODIUM PHOSPHATE AND BETAMETHASONE ACETATE 12 MG: 3; 3 INJECTION, SUSPENSION INTRA-ARTICULAR; INTRALESIONAL; INTRAMUSCULAR at 13:49

## 2019-03-22 RX ADMIN — ONDANSETRON 4 MG: 2 INJECTION INTRAMUSCULAR; INTRAVENOUS at 17:19

## 2019-03-22 RX ADMIN — SODIUM CHLORIDE, POTASSIUM CHLORIDE, SODIUM LACTATE AND CALCIUM CHLORIDE: 600; 310; 30; 20 INJECTION, SOLUTION INTRAVENOUS at 15:51

## 2019-03-22 RX ADMIN — SODIUM CHLORIDE, POTASSIUM CHLORIDE, SODIUM LACTATE AND CALCIUM CHLORIDE: 600; 310; 30; 20 INJECTION, SOLUTION INTRAVENOUS at 16:35

## 2019-03-22 RX ADMIN — CEFAZOLIN SODIUM 2 G: 2 INJECTION, SOLUTION INTRAVENOUS at 15:57

## 2019-03-22 RX ADMIN — PHENYLEPHRINE HYDROCHLORIDE 50 MCG: 10 INJECTION, SOLUTION INTRAMUSCULAR; INTRAVENOUS; SUBCUTANEOUS at 17:01

## 2019-03-22 RX ADMIN — OXYCODONE HYDROCHLORIDE 10 MG: 5 TABLET ORAL at 21:38

## 2019-03-22 RX ADMIN — PHENYLEPHRINE HYDROCHLORIDE 50 MCG: 10 INJECTION, SOLUTION INTRAMUSCULAR; INTRAVENOUS; SUBCUTANEOUS at 16:57

## 2019-03-22 RX ADMIN — ACETAMINOPHEN 975 MG: 325 TABLET, FILM COATED ORAL at 21:38

## 2019-03-22 RX ADMIN — SODIUM CHLORIDE, POTASSIUM CHLORIDE, SODIUM LACTATE AND CALCIUM CHLORIDE: 600; 310; 30; 20 INJECTION, SOLUTION INTRAVENOUS at 18:12

## 2019-03-22 RX ADMIN — FENTANYL CITRATE 10 MCG: 50 INJECTION, SOLUTION INTRAMUSCULAR; INTRAVENOUS at 16:47

## 2019-03-22 RX ADMIN — FENTANYL CITRATE 50 MCG: 50 INJECTION, SOLUTION INTRAMUSCULAR; INTRAVENOUS at 19:20

## 2019-03-22 RX ADMIN — FENTANYL CITRATE 50 MCG: 50 INJECTION, SOLUTION INTRAMUSCULAR; INTRAVENOUS at 18:38

## 2019-03-22 RX ADMIN — SODIUM CITRATE AND CITRIC ACID MONOHYDRATE 30 ML: 500; 334 SOLUTION ORAL at 15:40

## 2019-03-22 RX ADMIN — ACETAMINOPHEN 650 MG: 325 TABLET, FILM COATED ORAL at 14:42

## 2019-03-22 RX ADMIN — SODIUM CHLORIDE, POTASSIUM CHLORIDE, SODIUM LACTATE AND CALCIUM CHLORIDE 500 ML: 600; 310; 30; 20 INJECTION, SOLUTION INTRAVENOUS at 16:06

## 2019-03-22 RX ADMIN — Medication 2 G: at 16:35

## 2019-03-22 RX ADMIN — SODIUM CHLORIDE, POTASSIUM CHLORIDE, SODIUM LACTATE AND CALCIUM CHLORIDE 75 ML/HR: 600; 310; 30; 20 INJECTION, SOLUTION INTRAVENOUS at 12:56

## 2019-03-22 RX ADMIN — FENTANYL CITRATE 50 MCG: 50 INJECTION, SOLUTION INTRAMUSCULAR; INTRAVENOUS at 19:41

## 2019-03-22 SDOH — HEALTH STABILITY: MENTAL HEALTH: HOW OFTEN DO YOU HAVE A DRINK CONTAINING ALCOHOL?: NEVER

## 2019-03-22 ASSESSMENT — MIFFLIN-ST. JEOR: SCORE: 1642.76

## 2019-03-22 NOTE — PLAN OF CARE
Data: Patient presented to [facility] at 1000.   Reason for maternal/fetal assessment per patient is Rule Out Pre-eclampsia  .  Patient is a . Prenatal record reviewed.      Obstetric History       T1      L1     SAB0   TAB0   Ectopic0   Multiple0   Live Births1       # Outcome Date GA Lbr Travis/2nd Weight Sex Delivery Anes PTL Lv   2 Current            1 Term 14 38w5d 18:00 / 03:21 3.66 kg (8 lb 1.1 oz) M Vag-Vacuum EPI N JL      Name: Efren      Apgar1:  8                Apgar5: 9      . Medical history:   Past Medical History:   Diagnosis Date    ADHD     Cervical dysplasia     s/p LEEP     Chickenpox     Exercise-induced asthma     adolescent    Hypertension     IBS (irritable bowel syndrome)     Tonsillitis 2014   . Gestational Age 36w2d. VSS. Fetal movement present. Patient denies cramping, backache, vaginal discharge, pelvic pressure, UTI symptoms, GI problems, bloody show, vaginal bleeding, edema,  epigastric or URQ pain, abdominal pain, rupture of membranes. Pt sent from clinic with high blood pressure, episodes of unrelenting headaches and some visual disturbances. Support persons are not present initially but her  Chris arrived about 1330  Action: Verbal consent for EFM. Triage assessment completed. EFM applied for NST. Uterine assessment occasional mild contraction. Fetal assessment: Presumed adequate fetal oxygenation documented (see flow record).   Response: Dr. Jefferson  informed of arrival. Plan per provider is primary CS due to pre eclampsia. Patient verbalized agreement with plan.

## 2019-03-22 NOTE — PROGRESS NOTES
Had a HA yesterday, took tylenol and it resolved.  Trying to wrap up work.  Explained that she has PIH and urine protein trending high. (0.27). LFTs and platelets are wnl.  To [facility] for [facility].  Dr. Jefferson on call.  LT

## 2019-03-22 NOTE — ANESTHESIA POSTPROCEDURE EVALUATION
Anesthesia POST Procedure Evaluation    Patient: Ángela Zurita   MRN:     [id number] Gender:   female   Age:    34 year old :      1985        Preoperative Diagnosis: pregnancy   Procedure(s):   SECTION   Postop Comments: No value filed.       Anesthesia Type:  Regional    Reportable Event: NO     PAIN: Uncomplicated   Sign Out status: Comfortable, Well controlled pain     PONV: No PONV   Sign Out status:  No Nausea or Vomiting     Neuro/Psych: Uneventful perioperative course   Sign Out Status: Preoperative baseline; Age appropriate mentation     Airway/Resp.: Uneventful perioperative course   Sign Out Status: Non labored breathing, age appropriate RR; Resp. Status within EXPECTED Parameters     CV: Uneventful perioperative course   Sign Out status: Appropriate BP and perfusion indices; Appropriate HR/Rhythm     Disposition:   Sign Out in:  PACU  Disposition:  Floor  Recovery Course: Uneventful  Follow-Up: Not required           Last Anesthesia Record Vitals:  CRNA VITALS  3/22/2019 1715 - 3/22/2019 1815      3/22/2019             Pulse:  82    SpO2:  95 %    Resp Rate (observed):  2  (Abnormal)           Last PACU/Preop Vitals:  Vitals:    19 1808 19 1809 19 1823   BP: 147/79     Pulse: 74     Resp:     Temp:      SpO2:   97%         Electronically Signed By: Usha Zamorano MD, 2019, 6:32 PM

## 2019-03-22 NOTE — H&P
"Antepartum History and Physical   2019  Ángela Zurita  [id number]      HPI: Ángela Zurita is a 34 year old  at 36w2d by 8w0d US who presents from clinic with 2 days of elevated BP.    She is generally feeling well but is having a constant HA x1 week which has not improved significantly with acetaminophen. Her headache worsens when she lies supine and is most severe overnight. Its intensity caused her to become tearful 2 days ago, but has since moderated. She endorses seeing stars with her HA. Is also having recurrent heartburn x1 week, recurrent nausea x1.5 weeks and 3-4 episodes of vomiting this past weekend (3/16-). Had a brief, ~1hr episode of R sided mid-abdominal pain yesterday which she feels was r/t fetal activity. She describes having continuous contraction pain throughout her pregnancy. Has felt intermittently SOB since yesterday. At its worst, it feels like a \"cow sitting on my chest.\" Becomes dyspneic with any physical activity, such as walking to the bathroom, and is only able to catch her breath after staying seated for 1 hr. She has noticed mild waxing and waning edema in her ankles which improve with elevation, and lips. Has felt hot and flushed for 2 weeks. No chills.    She denies vaginal bleeding, discharge or loss of fluid and is feeling normal fetal movement.    Her prior pregnancy was complicated by a 4th degree laceration for which she has had fistula repair and sphincteroplasty.    ROS: As above.    Her pregnancy has been complicated by:  - Velamentous cord insertion  - Advanced maternal age--normal NIPT, Level 2   - Rubella non-immune  - Cervical dysplasia s/p Leep, last pap NIL in 2016  - Fibroid uterus - Anterior intramural fibroid (<2 cm)  - H/o vaginal bleeding s/p diagnostic hysteroscopy with D&C, L ovarian cystectomy  - H/o 4th degree laceration s/p fistula repair and sphincteroplasty  - ADHD (takes Adderall PRN, none in past 2 months)  - Asthma, " exercise-induced    Lab Results   Component Value Date    ABO A 2019    RH Pos 2019    AS Neg 2019    HEPBANG Nonreactive 2018    CHPCRT Negative 2018    GCPCRT Negative 2018    TREPAB Negative 2018    HGB 10.9 (L) 2019   Rubella NI  HIV NR    OBHX:   Obstetric History       T1      L1     SAB0   TAB0   Ectopic0   Multiple0   Live Births1       # Outcome Date GA Lbr Travis/2nd Weight Sex Delivery Anes PTL Lv   2 Current            1 Term 14 38w5d 18:00 / 03:21 3.66 kg (8 lb 1.1 oz) M Vag-Vacuum EPI N JL      Name: Efren      Apgar1:  8                Apgar5: 9          MedicalHX:   Past Medical History:   Diagnosis Date     ADHD      Cervical dysplasia     s/p LEEP      Chickenpox      Exercise-induced asthma     adolescent     Hypertension      IBS (irritable bowel syndrome)      Tonsillitis 2014       SurgicalHX:   Past Surgical History:   Procedure Laterality Date     BIOPSY  Various    Had a few Leeps and numerous Colposcopys     COLONOSCOPY      Normal     DILATION AND CURETTAGE, HYSTEROSCOPY DIAGNOSTIC, COMBINED N/A 4/3/2018    Procedure: COMBINED DILATION AND CURETTAGE, HYSTEROSCOPY DIAGNOSTIC;  Diagnostic Hysteroscopy with Dilation and Curettage,Laparoscopy with Left Ovarian Cystectomy, Right Uteral Sacral Biopsy;  Surgeon: Sherin Frost MD;  Location: [facility] OR     EXC SWEAT GLAND LESN AXILL,SIMPL Right      LAPAROSCOPY DIAGNOSTIC (GYN) N/A 4/3/2018    Procedure: LAPAROSCOPY DIAGNOSTIC (GYN);;  Surgeon: Sherin Frost MD;  Location: [facility] OR     LEEP TX, CERVICAL      yearly paps     MOUTH SURGERY      wisdom teeth     SOFT TISSUE SURGERY  Various    infected sweat-gland- cyst removed arm,armpit,face     SPHINCTEROPLASTY RECTUM  2018    with pernineal rebuild     SURGICAL HISTORY OF -   3/2005    Tonsillectomy     SURGICAL HISTORY OF -       infected sweat gland under her arm        Medications:     No current facility-administered medications on file prior to encounter.   Current Outpatient Medications on File Prior to Encounter:  Acetaminophen (TYLENOL PO)    DiphenhydrAMINE HCl (BENADRYL PO)    famotidine (PEPCID) 20 MG tablet Take 1 tablet (20 mg) by mouth 2 times daily   folic acid (FOLVITE) 1 MG tablet Take 1 tablet (1 mg) by mouth daily   Inulin (FIBER CHOICE) 1.5 g CHEW Take 1 chew tab by mouth daily   metoclopramide (REGLAN) 5 MG tablet Take 1 tablet (5 mg) by mouth 4 times daily as needed (nausea, vomiting)   Prenatal Vit-Fe Fumarate-FA (PRENATAL MULTIVITAMIN PLUS IRON) 27-0.8 MG TABS per tablet Take 1 tablet by mouth daily   amphetamine-dextroamphetamine (ADDERALL) 10 MG per tablet Take 2 pills (20mg) orally in the morning and two pills (20mg) at 11am and one pill (10mg) at 2pm       Allergies:  Allergies   Allergen Reactions     Bee Pollen      In the past, has not had issues lately       FamilyHX:    Family History   Problem Relation Age of Onset     Blood Disease Brother         twin brother-blood clots     Blood Disease Maternal Grandfather         blood clots     Heart Disease Maternal Grandfather         valve replacement     Hyperlipidemia Maternal Grandfather      Cerebrovascular Disease Maternal Grandfather      Prostate Cancer Maternal Grandfather      Other Cancer Maternal Grandfather         Gum/Jaw/Lymphnodes     Colon Cancer Maternal Grandfather      Mental Illness Maternal Grandfather         Alzheimer's     Respiratory Maternal Grandmother      Lung Cancer Maternal Grandmother      Diabetes Maternal Grandmother         type II     Hypertension Maternal Grandmother      Hyperlipidemia Maternal Grandmother      Depression Maternal Grandmother      Cancer Paternal Grandmother         non -hodgkins lymphoma     Lung Cancer Paternal Grandmother      Hypertension Paternal Grandmother      Hyperlipidemia Paternal Grandmother      Cerebrovascular Disease Paternal  "Grandmother      Diabetes Paternal Grandmother      Hypertension Father      Hyperlipidemia Father      Liver Disease Father      Hepatitis Father      Hyperlipidemia Paternal Grandfather      Prostate Cancer Paternal Grandfather      Hypertension Paternal Grandfather      Colon Cancer Paternal Grandfather      Coronary Artery Disease Paternal Grandfather      Breast Cancer Mother         Stage 0, small duct carcinoma     Breast Cancer Other         2 aunt and 3 of my great aunts     Breast Cancer Cousin         Breast Cancer     Other Cancer Cousin         1\" tumor in lung, breast cancer relapse       SocialHX:   Social History     Socioeconomic History     Marital status:      Spouse name: None     Number of children: None     Years of education: None     Highest education level: None   Occupational History     Employer: [employer]     Comment: accounting   Social Needs     Financial resource strain: None     Food insecurity:     Worry: None     Inability: None     Transportation needs:     Medical: None     Non-medical: None   Tobacco Use     Smoking status: Former Smoker     Packs/day: 0.50     Years: 15.00     Pack years: 7.50     Types: Cigarettes     Start date: 12/1/2014     Smokeless tobacco: Never Used     Tobacco comment: Quit with each pregnancy   Substance and Sexual Activity     Alcohol use: No     Alcohol/week: 0.0 oz     Frequency: Never     Comment: occas-quit with pregnancy     Drug use: No     Sexual activity: Yes     Partners: Male     Birth control/protection: Abstinence, None     Comment: We're okay if we get pregnant again ;)   Lifestyle     Physical activity:     Days per week: None     Minutes per session: None     Stress: None   Relationships     Social connections:     Talks on phone: None     Gets together: None     Attends Adventism service: None     Active member of club or organization: None     Attends meetings of clubs or organizations: None     Relationship status: None "     Intimate partner violence:     Fear of current or ex partner: None     Emotionally abused: None     Physically abused: None     Forced sexual activity: None   Other Topics Concern     Parent/sibling w/ CABG, MI or angioplasty before 65F 55M? No   Social History Narrative     None       ROS: 10-point ROS negative except as indicated in HPI.    Physical Exam:  Vitals:    19 1100 19 1110 19 1130 19 1200   BP: 125/71 133/76 133/73 141/82   Pulse:    85   Resp:    20   Temp:       TempSrc:       Weight:       Height:         General: Alert and oriented. Resting comfortably in bed.  CV: RRR. Normal S1/S2. No m/r/g.  Lungs: CTAB. No crackles, wheezes or rales.  Abdomen: Soft, gravid, non-tender. EFW   Extremities: Bilateral lower extremities non-tender with non-pitting ankle edema, bilaterally.  Neuro: L patellar reflex 2+. L biceps reflex 2+.  Psych: Appropriately reactive.     Presentation: Cephalic by US 3/14/19.    FHT: Baseline 140, moderate variability, no accelerations, no decelerations  [place]: No contractions.    Prenatal ultrasounds:  1. 2018: IUP with single fetus. Normal anatomy.  2. 18 Normal anatomy, marginal cord insertion  3. 3/14/2019: Cephalic. Placenta anterior. Velamentous cord insertion. Normal fetal anatomy. EFW 3273g, 87%ile    Labs:      Hemoglobin   Date Value Ref Range Status   2019 10.9 (L) 11.7 - 15.7 g/dL Final     Platelet Count   Date Value Ref Range Status   2019 302 150 - 450 10e9/L Final     AST   Date Value Ref Range Status   2019 12 0 - 45 U/L Final     ALT   Date Value Ref Range Status   2019 9 0 - 50 U/L Final     Creatinine   Date Value Ref Range Status   2019 0.64 0.52 - 1.04 mg/dL Final     A+    Assessment & Plan: 34 year old  at 36w2d by 8w0d US who presents from clinic with pre-eclampsia with severe features based on headache. Delivery recommended at this time as she is >34 weeks. Plan delivery via   due to history of 4th degree laceration.    # Primary  Section  Indicated due to preeclampsia with severe features and history of 4th degree. Will plan for a Pfannenstiel skin incision with low transverse hysterotomy  - Labs: CBC, T&S, RPR   - Pre-op Hgb 10.9, Plts 302  - Placenta: anterior  - Anesthesia: spinal  - 2g Ancef   - PPH Meds/Ppx: reassess risk intraop, avoid methergine  - Diet: NPO  - PPx: SCDs  - Consent: Discussed risks and benefits of procedure, including but not limited to bleeding, infection, injury to surrounding organs, injury to infant, and the potential need for another surgery should some injury go unrecognized or patient were to have continued bleeding. Patient had time to ask questions and expressed understanding of procedure and associated risks. Agreed to blood transfusion if necessary. Consent signed.      # Pre-eclampsia with severe features  - Serial BP monitoring   - IV Antihypertensives prn for sustained severe range blood pressures (>160/>110)  - Labs: HELLP labs, UPC,   - Daily weights, strict I&Os   - Magnesium: 4g loading dose, followed by 2g per hour   - Mg level 6hrs after initiation of therapy   - Mg checks q4h   - Will continue for 24hrs post-delivery  - Diet: NPO until postop then soft  - PPx: SCDs      Fetal well-being  - Category I FHT  - BMZ for fetal lung maturity    Patient seen and care plan discussed under supervision of Dr. Jefferson.    Latoya Levy, MS3    I was present with the medical student who participated in the service and in the documentation of this note.  I have verified the history and personally performed the physical exam and medical decision making, and have verified the content of the note, which accurately reflect my assessment of the patient and the plan of care. 33 yo  at 36w2d who presents w/preE w/SF (HA.) Recommend delivery. Patient planning delivery via C/S due to h/o 4th degree. Starting magnesium sulfate for seizure prophylaxis. BMZ  for fetal lung maturity.    Ayesha Bradford MD  OBGYN PGY-2  11:35 AM 3/22/2019    Patient seen and examined by me, agree with above resident note.  at 36w2d with preeclampsia with severe features due to ongoing HA, unrelieved with tylenol or other usual tactics.  We discussed diagnosis of severe pre-e and recommendation for delivery and she agrees.  She had a 4th degree lac with her first child and planning a primary c/s, so will proceed with that now.  Her  is at work, she will have him come to hospital.  She is currently stable, will admit, begin MgSo4 for seizure prophylaxis and proceed with  when he arrives.  If her status changes prior to his arrival, we would need to expediate delivery.  Informed consent obtained, risks and benefits explained to patient.  All questions answered.    NANI HU MD

## 2019-03-22 NOTE — ANESTHESIA CARE TRANSFER NOTE
Patient: Ángela Zurita    Procedure(s):   SECTION    Diagnosis: pregnancy  Diagnosis Additional Information: No value filed.    Anesthesia Type:   No value filed.     Note:  Airway :Room Air  Patient transferred to:PACU  Comments: Transferred to pacu in stable condition, no issues on arrival.    Ac Hobbs MD  [id number] Report: Identifed the Patient, Identified the Reponsible Provider, Reviewed the pertinent medical history, Discussed the surgical course, Reviewed Intra-OP anesthesia mangement and issues during anesthesia, Set expectations for post-procedure period and Allowed opportunity for questions and acknowledgement of understanding      Vitals: (Last set prior to Anesthesia Care Transfer)    CRNA VITALS  3/22/2019 1715 - 3/22/2019 1810      3/22/2019             Pulse:  82    SpO2:  95 %    Resp Rate (observed):  2  (Abnormal)                 Electronically Signed By: Ac Hobbs MD  2019  6:10 PM

## 2019-03-22 NOTE — BRIEF OP NOTE
[facility]   Brief Operative Note     Surgery Date: 3/22/2019    Surgeon:  Jesenia Jefferson MD    Assistants:  Ayesha Bradford MD, PGY-2    Latoya Levy, MS3    Pre-op Diagnosis:  1. Intrauterine pregnancy at 36w2d     2. Preeclampsia with severe features     3. H/o 4th degree perineal laceration     4. Advanced maternal age     5. Rubella non-immune     6. Velamentous cord insertion     7. Fibroid uterus     8. ADHD     9. Asthma    Post-op Diagnosis:  1. Same      2. Liveborn male infant     Procedure:  Primary low-transverse  section with double layer uterine closure via Pfannenstiel incision    Anesthesia: Spinal    QBL:  1070 cc    IVF:  700 cc crystalloid    UOP:  200 cc clear urine at the end of the case    Drains: [place] Catheter     Specimens:  Cord blood, placenta    Complications: None apparent    Findings:   1. No rectofascial or intraabdominal adhesions  2. Clear amniotic fluid  3. Liveborn male infant in vertex presentation at 1708 on 3/22/2019. Apgars 7 at 1 minute & 8 at 5 minutes. Weight pending.  4. Venous cord pH 7.2, base deficit 6.2.  5. Normal uterus, fallopian tubes, and ovaries.     Disposition:  Transferred in stable condition to FRANSISCO Bradford MD  OBGYN PGY-2  5:51 PM 3/22/2019

## 2019-03-22 NOTE — ANESTHESIA PREPROCEDURE EVALUATION
Anesthesia Pre-Procedure Evaluation    Patient: Ángela Zurita   MRN:     [id number] Gender:   female   Age:    34 year old :      1985        Preoperative Diagnosis: pregnancy   Procedure(s):   SECTION     Past Medical History:   Diagnosis Date     ADHD      Cervical dysplasia     s/p LEEP      Chickenpox      Exercise-induced asthma     adolescent     Hypertension      IBS (irritable bowel syndrome)      Tonsillitis 2014      Past Surgical History:   Procedure Laterality Date     BIOPSY  Various    Had a few Leeps and numerous Colposcopys     COLONOSCOPY      Normal     DILATION AND CURETTAGE, HYSTEROSCOPY DIAGNOSTIC, COMBINED N/A 4/3/2018    Procedure: COMBINED DILATION AND CURETTAGE, HYSTEROSCOPY DIAGNOSTIC;  Diagnostic Hysteroscopy with Dilation and Curettage,Laparoscopy with Left Ovarian Cystectomy, Right Uteral Sacral Biopsy;  Surgeon: Sherin Frost MD;  Location: [facility] OR     EXC SWEAT GLAND LESN AXILL,SIMPL Right      LAPAROSCOPY DIAGNOSTIC (GYN) N/A 4/3/2018    Procedure: LAPAROSCOPY DIAGNOSTIC (GYN);;  Surgeon: Sherin Frost MD;  Location: [facility] OR     LEEP TX, CERVICAL      yearly paps     MOUTH SURGERY      wisdom teeth     SOFT TISSUE SURGERY  Various    infected sweat-gland- cyst removed arm,armpit,face     SPHINCTEROPLASTY RECTUM  2018    with pernineal rebuild     SURGICAL HISTORY OF -   3/2005    Tonsillectomy     SURGICAL HISTORY OF -       infected sweat gland under her arm          Anesthesia Evaluation     . Pt has had prior anesthetic. Type: MAC, General and Regional    No history of anesthetic complications          ROS/MED HX    ENT/Pulmonary:     (+)Intermittent asthma , . .    Neurologic:  - neg neurologic ROS     Cardiovascular:     (+) hypertension----. : . . . :. .       METS/Exercise Tolerance:  >4 METS   Hematologic:  - neg hematologic  ROS       Musculoskeletal:  - neg musculoskeletal ROS       GI/Hepatic:  - neg  "GI/hepatic ROS       Renal/Genitourinary:  - ROS Renal section negative       Endo:  - neg endo ROS       Psychiatric: Comment: ADHD    (+) psychiatric history other (comment)      Infectious Disease:  - neg infectious disease ROS       Malignancy:         Other:    (+) C-spine cleared: N/A, no H/O Chronic Pain,no other significant disability                    [facility] AN PHYSICAL EXAM    Lab Results   Component Value Date    WBC 11.6 (H) 03/22/2019    HGB 10.9 (L) 03/22/2019    HCT 34.1 (L) 03/22/2019     03/22/2019    CRP <2.9 09/23/2017    SED 6 09/23/2017     04/06/2018    POTASSIUM 3.7 04/06/2018    CHLORIDE 104 04/06/2018    CO2 32 04/06/2018    BUN 12 04/06/2018    CR 0.64 03/22/2019    GLC 96 04/06/2018    ADRI 8.6 04/06/2018    MAG 1.8 05/29/2014    ALBUMIN 3.6 04/06/2018    PROTTOTAL 6.3 (L) 04/06/2018    ALT 9 03/22/2019    AST 12 03/22/2019    ALKPHOS 53 04/06/2018    BILITOTAL 0.4 04/06/2018    LIPASE 222 08/10/2015    AMYLASE 66 07/17/2014    TSH 1.64 03/27/2018    T4 0.84 05/29/2014    HCG Negative 06/02/2018    HCGS Negative 09/22/2017       Preop Vitals  BP Readings from Last 3 Encounters:   03/22/19 141/82   03/22/19 (!) 157/95   03/21/19 140/74    Pulse Readings from Last 3 Encounters:   03/22/19 85   03/22/19 77   03/21/19 88      Resp Readings from Last 3 Encounters:   03/22/19 20   03/03/19 20   02/13/19 20    SpO2 Readings from Last 3 Encounters:   01/14/19 97%   11/29/18 95%   10/20/18 99%      Temp Readings from Last 1 Encounters:   03/22/19 36.9  C (98.4  F) (Oral)    Ht Readings from Last 1 Encounters:   03/22/19 1.664 m (5' 5.5\")      Wt Readings from Last 1 Encounters:   03/22/19 93.9 kg (207 lb)    Estimated body mass index is 33.92 kg/m  as calculated from the following:    Height as of this encounter: 1.664 m (5' 5.5\").    Weight as of this encounter: 93.9 kg (207 lb).     LDA:  Peripheral IV 03/22/19 Anterior;Left Hand (Active)   Number of days: 0       Urethral Catheter " Straight-tip 16 fr (Active)   Number of days: 353            Assessment:   ASA SCORE: 2    NPO Status: Increased aspiration risk   Documentation: H&P complete; Preop Testing complete; Consents complete   Proceeding: Proceed without further delay  Tobacco Use:  NO Active use of Tobacco/UNKNOWN Tobacco use status     Plan:   Anes. Type:  Regional     RA-Location/Type: Spinal   Pre-Induction: Acetaminophen PO   Induction:  IV (Standard)      Access/Monitoring: PIV   Maintenance: Balanced   Emergence: Procedure Site   Logistics: Same Day Surgery     Postop Pain/Sedation Strategy:  Standard-Options: Opioids PRN     PONV Management:  Adult Risk Factors: Female, Non-Smoker, Postop Opioids                         Helga Shafer MD

## 2019-03-22 NOTE — ANESTHESIA PROCEDURE NOTES
Spinal/LP Procedure Note    Spinal Block  Staff:     Anesthesiologist:  Usha Zamorano MD    Resident/CRNA:  Helga Shafer MD    Spinal/LP performed by resident/CRNA in presence of a teaching physician.    Location: OB and In OR BEFORE Induction  Procedure Start/Stop Times:     patient identified, IV checked, site marked, risks and benefits discussed, informed consent, monitors and equipment checked, pre-op evaluation and at physician/surgeon's request      Correct Patient: Yes      Correct Position: Yes      Correct Site: Yes      Correct Procedure: Yes      Correct Laterality:  N/A    Site Marked:  N/A  Procedure:     Procedure:  Intrathecal    ASA:  2    Position:  Sitting    Sterile Prep: Betadine      Insertion site:  L4-5    Approach:  Midline    Needle Type:  Siri    Local Skin Infiltration:  1% lidocaine    amount (ml):  4    Needle Length (in):  3.5    Introducer used: Yes      Introducer gauge:  20 G    Attempts:  2    Redirects:  1    CSF:  Clear    Paresthesias:  No

## 2019-03-22 NOTE — ANESTHESIA PROCEDURE NOTES
Peripheral Nerve Block Procedure Note    Staff:     Anesthesiologist:  Usha Zamorano MD    Resident/CRNA:  Ac Hobbs MD    Block performed by resident/CRNA in the presence of a teaching physician    Location: PACU  Procedure Start/Stop TImes:      3/22/2019 6:19 PM     3/22/2019 6:23 PM    patient identified, IV checked, site marked, risks and benefits discussed, informed consent, monitors and equipment checked, pre-op evaluation, at physician/surgeon's request and post-op pain management      Correct Patient: Yes      Correct Position: Yes      Correct Site: Yes      Correct Procedure: Yes      Correct Laterality:  Yes    Site Marked:  Yes  Procedure details:     Procedure:  TAP    ASA:  2    Diagnosis:  Post op pain    Laterality:  Bilateral    Position:  Supine    Sterile Prep: chloraprep, mask and sterile gloves      Needle:  Insulated and short bevel    Needle gauge:  21    Needle length (mm):  110    Ultrasound: Yes      Ultrasound used to identify targeted nerve, plexus, or vascular structure and placed a needle adjacent to it      Permanent Image entered into patiient's record      Abnormal pain on injection: No      Blood Aspirated: No      Paresthesias:  No    Bleeding at site: No      Bolus via:  Needle    Infusion Method:  Single Shot    Complications:  None

## 2019-03-23 LAB
ALT SERPL W P-5'-P-CCNC: 11 U/L (ref 0–50)
AST SERPL W P-5'-P-CCNC: 13 U/L (ref 0–45)
CREAT SERPL-MCNC: 0.59 MG/DL (ref 0.52–1.04)
ERYTHROCYTE [DISTWIDTH] IN BLOOD BY AUTOMATED COUNT: 13.4 % (ref 10–15)
GFR SERPL CREATININE-BSD FRML MDRD: >90 ML/MIN/{1.73_M2}
HCT VFR BLD AUTO: 31.3 % (ref 35–47)
HGB BLD-MCNC: 10.2 G/DL (ref 11.7–15.7)
MAGNESIUM SERPL-MCNC: 6 MG/DL (ref 1.6–2.3)
MCH RBC QN AUTO: 29.1 PG (ref 26.5–33)
MCHC RBC AUTO-ENTMCNC: 32.6 G/DL (ref 31.5–36.5)
MCV RBC AUTO: 89 FL (ref 78–100)
PLATELET # BLD AUTO: 315 10E9/L (ref 150–450)
RBC # BLD AUTO: 3.51 10E12/L (ref 3.8–5.2)
T PALLIDUM AB SER QL: NONREACTIVE
WBC # BLD AUTO: 15.6 10E9/L (ref 4–11)

## 2019-03-23 PROCEDURE — 0064U ANTB TP TOTAL&RPR IA QUAL: CPT | Performed by: INTERNAL MEDICINE

## 2019-03-23 PROCEDURE — 84450 TRANSFERASE (AST) (SGOT): CPT | Performed by: INTERNAL MEDICINE

## 2019-03-23 PROCEDURE — 25000132 ZZH RX MED GY IP 250 OP 250 PS 637: Performed by: STUDENT IN AN ORGANIZED HEALTH CARE EDUCATION/TRAINING PROGRAM

## 2019-03-23 PROCEDURE — 36415 COLL VENOUS BLD VENIPUNCTURE: CPT | Performed by: INTERNAL MEDICINE

## 2019-03-23 PROCEDURE — 85027 COMPLETE CBC AUTOMATED: CPT | Performed by: INTERNAL MEDICINE

## 2019-03-23 PROCEDURE — 25800030 ZZH RX IP 258 OP 636: Performed by: STUDENT IN AN ORGANIZED HEALTH CARE EDUCATION/TRAINING PROGRAM

## 2019-03-23 PROCEDURE — 12000001 ZZH R&B MED SURG/OB UMMC

## 2019-03-23 PROCEDURE — 84460 ALANINE AMINO (ALT) (SGPT): CPT | Performed by: INTERNAL MEDICINE

## 2019-03-23 PROCEDURE — 25000128 H RX IP 250 OP 636: Performed by: STUDENT IN AN ORGANIZED HEALTH CARE EDUCATION/TRAINING PROGRAM

## 2019-03-23 PROCEDURE — 83735 ASSAY OF MAGNESIUM: CPT | Performed by: INTERNAL MEDICINE

## 2019-03-23 PROCEDURE — 25000132 ZZH RX MED GY IP 250 OP 250 PS 637: Performed by: OBSTETRICS & GYNECOLOGY

## 2019-03-23 PROCEDURE — 82565 ASSAY OF CREATININE: CPT | Performed by: INTERNAL MEDICINE

## 2019-03-23 RX ADMIN — MAGNESIUM SULFATE IN WATER 2 G/HR: 40 INJECTION, SOLUTION INTRAVENOUS at 00:17

## 2019-03-23 RX ADMIN — OXYCODONE HYDROCHLORIDE 5 MG: 5 TABLET ORAL at 11:07

## 2019-03-23 RX ADMIN — OXYCODONE HYDROCHLORIDE 10 MG: 5 TABLET ORAL at 01:10

## 2019-03-23 RX ADMIN — FAMOTIDINE 20 MG: 20 TABLET, FILM COATED ORAL at 21:37

## 2019-03-23 RX ADMIN — SENNOSIDES AND DOCUSATE SODIUM 1 TABLET: 8.6; 5 TABLET ORAL at 21:36

## 2019-03-23 RX ADMIN — ACETAMINOPHEN 975 MG: 325 TABLET, FILM COATED ORAL at 21:36

## 2019-03-23 RX ADMIN — SIMETHICONE CHEW TAB 80 MG 80 MG: 80 TABLET ORAL at 21:04

## 2019-03-23 RX ADMIN — SODIUM CHLORIDE, SODIUM LACTATE, POTASSIUM CHLORIDE, CALCIUM CHLORIDE AND DEXTROSE MONOHYDRATE: 5; 600; 310; 30; 20 INJECTION, SOLUTION INTRAVENOUS at 01:09

## 2019-03-23 RX ADMIN — MAGNESIUM SULFATE IN WATER 2 G/HR: 40 INJECTION, SOLUTION INTRAVENOUS at 09:18

## 2019-03-23 RX ADMIN — SODIUM CHLORIDE, POTASSIUM CHLORIDE, SODIUM LACTATE AND CALCIUM CHLORIDE 75 ML/HR: 600; 310; 30; 20 INJECTION, SOLUTION INTRAVENOUS at 07:56

## 2019-03-23 RX ADMIN — KETOROLAC TROMETHAMINE 30 MG: 30 INJECTION, SOLUTION INTRAMUSCULAR; INTRAVENOUS at 18:44

## 2019-03-23 RX ADMIN — KETOROLAC TROMETHAMINE 30 MG: 30 INJECTION, SOLUTION INTRAMUSCULAR; INTRAVENOUS at 06:03

## 2019-03-23 RX ADMIN — ACETAMINOPHEN 975 MG: 325 TABLET, FILM COATED ORAL at 13:52

## 2019-03-23 RX ADMIN — KETOROLAC TROMETHAMINE 30 MG: 30 INJECTION, SOLUTION INTRAMUSCULAR; INTRAVENOUS at 00:19

## 2019-03-23 RX ADMIN — ACETAMINOPHEN 975 MG: 325 TABLET, FILM COATED ORAL at 06:00

## 2019-03-23 RX ADMIN — SIMETHICONE CHEW TAB 80 MG 80 MG: 80 TABLET ORAL at 02:53

## 2019-03-23 RX ADMIN — KETOROLAC TROMETHAMINE 30 MG: 30 INJECTION, SOLUTION INTRAMUSCULAR; INTRAVENOUS at 12:29

## 2019-03-23 ASSESSMENT — MIFFLIN-ST. JEOR: SCORE: 1573.36

## 2019-03-23 NOTE — PLAN OF CARE
OR to PACU Transfer Note  Data: Pt to OB PACU via cart. PIV infusing without complications, jeffers with 1751 urine to gravity, temp 98.4 Oral, vs 128/75 upon arrival, pt does not complain of pain and/or nausea.   Interventions: IV to pump, monitors and alarms on, SCD on.  Response: stable .  Plan: Patient instructed to notify RN for pain or nausea, routine post op cares, initiate breastfeeding/pumping as soon as patient/infant able.

## 2019-03-23 NOTE — PLAN OF CARE
Pt transferred to [facility] after seeing infant in NICU. Pt had a lot of pain in her LRQ of abdomen. Dr Alejandre came and saw pt for this reason. Scheduled tylenol and prn oxy given at 2145, which greatly reduced pain. Pt able to drink water and have ice chips. Pt is going to try a saltine cracker soon. BP checks WDL. Reflexes are brisk, with absent clonus. Pt does complain of a headache, but it is much improved at end of shift. Fundus and lochia WDL. Pt pumped. Continue to monitor/assess and assist as needed.

## 2019-03-23 NOTE — PROGRESS NOTES
"[facility]  Magnesium Check Note    Subjective:     Yudith Jc is doing \"okay.\" She is emotional about baby being in the NICU at this time. She reports lower right-sided pain near her incision that is worse with fundal checks. She has a mild, dull headache, and will receive tylenol soon. She denies changes in vision, chest pain, shortness of breath, nausea, vomiting, and RUQ pain. Washington catheter in place.     Objective:  Patient Vitals for the past 4 hrs:   BP Temp Temp src Pulse Heart Rate Resp SpO2   03/22/19 2110 124/78 98.2  F (36.8  C) Oral -- 78 18 96 %   03/22/19 2015 132/85 -- -- -- 87 18 96 %   03/22/19 1955 136/85 98.1  F (36.7  C) Oral 86 86 17 98 %   03/22/19 1941 132/83 -- -- 88 88 18 --   03/22/19 1925 (!) 141/100 98.5  F (36.9  C) Oral 93 84 14 98 %   03/22/19 1920 -- -- -- -- -- -- 96 %   03/22/19 1910 (!) 149/91 -- -- 72 73 16 99 %   03/22/19 1900 -- -- -- -- -- 24 --   03/22/19 1853 140/84 -- -- -- 83 (!) 0 97 %   03/22/19 1845 -- -- -- -- -- 26 96 %   03/22/19 1838 137/77 -- -- -- 86 13 97 %   03/22/19 1823 138/82 -- -- 84 76 19 97 %   03/22/19 1809 -- -- -- -- -- 19 --   03/22/19 1808 147/79 -- -- 74 78 -- --   03/22/19 1800 -- -- -- -- 99 18 96 %     Gen: NAD. A&Ox3  CV:  RRR, no murmur  Pulm:  CTAB, no wheezes or crackles. Normal respiratory effort  Abd:  Soft, non-tender to palpation of RUQ, tender to palpation of right lower abdomen just superolateral to incision and pain in the same location with palpation of fundus (no discrete fundal tenderness)  Ext:  Patellar reflexes 2+, no clonus, 1+ edema to the knees    I/O:  300 ml from 0363-4452, not yet recorded after that time period    Assessment/Plan:  Ángela Zurita is a 34-year-old  now POD#0 s/p PLTCS for preeclampsia with severe features and history of 4th degree perineal laceration. She is doing okay in the immediate postpartum period.     Preeclampsia with severe features (HA, mild range blood " pressures   - No signs or symptoms of worsening pre-eclampsia or magnesium toxicity.   - Symptoms: Mild headache at this time (improved from prior)   - Mag sulfate for seizure prophylaxis: 2g/hr   - BP: normotensive to low mild range   - UOP: adequate   - HELLP labs wnl on admission, recheck in AM    Postpartum:     - Routine postpartum care    Anjelica Alejandre  OB/GYN Resident, PGY-3  3/22/2019 9:52 PM

## 2019-03-23 NOTE — LACTATION NOTE
D:  I spoke with Ángela at bedside today.  This is her second baby.  She pumped and bottled for 6 months with her first, as she was not able to get him to latch.  Ángela has ADD but is not currently taking medication and does not plan to while breastfeeding.  She has not history of breast/chest surgery or trauma, has already pumped x4 getting 20-30 ml each time.  I:  I gave her a folder of introductory materials and went over pumping guidelines.    We talked about hands on pumping techniques, hand expression and how to access the [employer] websites. I advised her to move to maintenance setting.  She already has a new pump for home use.  I told her that if she decides to take medication for her ADD, we can give her safety recommendations on it.  A:  Experienced pumping mother, hoping to breastfeed this time.  P:  Will continue to provide lactation support.       Lizzie Pina, RNC, IBCLC

## 2019-03-23 NOTE — PROGRESS NOTES
OB/GYN Interval Update    Spoke with DAVID Hutchison, by phone regarding patient's status. Ms. Zurita has been tolerating the magnesium well. Her blood pressures have been normotensive. She continues to have adequate UOP. Reflexes at 0019 continue to be 2+ and without clonus. Plan to continue magnesium for 24 hours. Repeat HELLP labs in AM.    Anjelica Alejandre MD  OB/GYN, PGY3  03/23/19

## 2019-03-23 NOTE — PROGRESS NOTES
Magnesium Sulfate Progress Note    Subjective:  Patient is doing well.  No complaints. Minimal lochia.  Pain well controlled on pain meds.  Tolerating PO and ambulating without any issues. Washington in place. Passing flatus. Denies any fever, chills, SOB, chest pain, N/V, headache, dizziness.  Pumping for baby in NICU.     Objective:  Patient Vitals for the past 24 hrs:   BP Temp Temp src Pulse Heart Rate Resp SpO2 Weight   03/23/19 1500 124/67 98.5  F (36.9  C) Oral -- -- 17 98 % --   03/23/19 1100 121/82 98.4  F (36.9  C) Oral -- -- 18 99 % --   03/23/19 0807 118/78 98.4  F (36.9  C) Oral -- 84 16 99 % --   03/23/19 0627 -- -- -- -- -- -- -- 87 kg (191 lb 11.2 oz)   03/23/19 0600 118/77 97.8  F (36.6  C) Oral -- -- 18 98 % --   03/23/19 0300 121/74 98.2  F (36.8  C) Oral 84 -- 18 97 % --   03/23/19 0253 -- -- -- 82 -- -- 97 % --   03/23/19 0010 118/77 98.1  F (36.7  C) Oral -- 89 18 96 % --   03/22/19 2306 126/78 98.2  F (36.8  C) Oral -- 81 18 96 % --   03/22/19 2210 122/79 97.8  F (36.6  C) Oral -- 88 18 96 % --   03/22/19 2110 124/78 98.2  F (36.8  C) Oral -- 78 18 96 % --   03/22/19 2015 132/85 -- -- -- 87 18 96 % --   03/22/19 1955 136/85 98.1  F (36.7  C) Oral 86 86 17 98 % --   03/22/19 1941 132/83 -- -- 88 88 18 -- --   03/22/19 1925 (!) 141/100 98.5  F (36.9  C) Oral 93 84 14 98 % --   03/22/19 1920 -- -- -- -- -- -- 96 % --   03/22/19 1910 (!) 149/91 -- -- 72 73 16 99 % --   03/22/19 1900 -- -- -- -- -- 24 -- --   03/22/19 1853 140/84 -- -- -- 83 (!) 0 97 % --   03/22/19 1845 -- -- -- -- -- 26 96 % --   03/22/19 1838 137/77 -- -- -- 86 13 97 % --   03/22/19 1823 138/82 -- -- 84 76 19 97 % --   03/22/19 1809 -- -- -- -- -- 19 -- --   03/22/19 1808 147/79 -- -- 74 78 -- -- --   03/22/19 1800 -- -- -- -- 99 18 96 % --   03/22/19 1620 135/77 -- -- -- -- 16 -- --       General: AAOx3, NAD, appears generally well  CV: RRR, no m/r/g  Resp:  CTA, no crackles  Abd:  Soft, nontender, nondistended, fundus firm at  approximately umbilicus  Incision: clean/dry/intact  Ext: trace edema in bilateral LE, 2+ reflexes at patella bilaterally. 1 beat of clonus b/l.         Intake/Output Summary (Last 24 hours) at 3/23/2019 1545  Last data filed at 3/23/2019 1500  Gross per 24 hour   Intake 5715.09 ml   Output 7870 ml   Net -2154.91 ml       Labs  No new labs    Assessment and Plan:  34 year old old  now POD#1 s/p PLTCS in context of preeclampsia with severe features (headache, mild range blood pressures) and history of fourth degree laceration s/p fistula repair. She is doing well in the postpartum period.      #Preeclampsia with severe features  - No signs/symptoms of worsening preeclampsia or magnesium toxicity  - Symptoms: fatigued but otherwise feels well  - Magnesium sulfate for seizure prophylaxis: 2g/h> mag level 6.0, continue until 1700  - BPs: normal range  - UOP: excellent   - HELLP labs wnl on admission and on repeat this AM  - Weight: 93.9 kg (3/22) > 87 kg    #Postpartum cares  - Pain: Tylenol, ibuprofen, oxycodone PRN.  - Heme: Hgb 10.9 > QBL 1070 > 10.2, asymptomatic   - GI: Regular diet. Bowel regimen. Antiemetics PRN.  - : [place] catheter in place  - Rh positive, Rubella nonimmune, MMR ordered  - Pumping for baby in NICU  - Likely IUD for birth control at 6 week postpartum visit    Anticipate discharge to home POD#3     Cleopatra Stephens MD  OB/GYN PGY-1  03/23/19 10:43 AM

## 2019-03-23 NOTE — PROGRESS NOTES
Post Partum Progress Note    Subjective:  Patient is doing well.  No complaints. Minimal lochia.  Pain well controlled on pain meds.  Tolerating PO and ambulating without any issues. Washington in place. Passing flatus. Denies any fever, chills, SOB, chest pain, N/V, headache, dizziness.  Planning on breast feeding, pumping for baby in NICU.     Objective:  Patient Vitals for the past 24 hrs:   BP Temp Temp src Pulse Heart Rate Resp SpO2 Weight   03/23/19 1100 121/82 98.4  F (36.9  C) Oral -- -- 18 99 % --   03/23/19 0807 118/78 98.4  F (36.9  C) Oral -- 84 16 99 % --   03/23/19 0627 -- -- -- -- -- -- -- 87 kg (191 lb 11.2 oz)   03/23/19 0600 118/77 97.8  F (36.6  C) Oral -- -- 18 98 % --   03/23/19 0300 121/74 98.2  F (36.8  C) Oral 84 -- 18 97 % --   03/23/19 0253 -- -- -- 82 -- -- 97 % --   03/23/19 0010 118/77 98.1  F (36.7  C) Oral -- 89 18 96 % --   03/22/19 2306 126/78 98.2  F (36.8  C) Oral -- 81 18 96 % --   03/22/19 2210 122/79 97.8  F (36.6  C) Oral -- 88 18 96 % --   03/22/19 2110 124/78 98.2  F (36.8  C) Oral -- 78 18 96 % --   03/22/19 2015 132/85 -- -- -- 87 18 96 % --   03/22/19 1955 136/85 98.1  F (36.7  C) Oral 86 86 17 98 % --   03/22/19 1941 132/83 -- -- 88 88 18 -- --   03/22/19 1925 (!) 141/100 98.5  F (36.9  C) Oral 93 84 14 98 % --   03/22/19 1920 -- -- -- -- -- -- 96 % --   03/22/19 1910 (!) 149/91 -- -- 72 73 16 99 % --   03/22/19 1900 -- -- -- -- -- 24 -- --   03/22/19 1853 140/84 -- -- -- 83 (!) 0 97 % --   03/22/19 1845 -- -- -- -- -- 26 96 % --   03/22/19 1838 137/77 -- -- -- 86 13 97 % --   03/22/19 1823 138/82 -- -- 84 76 19 97 % --   03/22/19 1809 -- -- -- -- -- 19 -- --   03/22/19 1808 147/79 -- -- 74 78 -- -- --   03/22/19 1800 -- -- -- -- 99 18 96 % --   03/22/19 1620 135/77 -- -- -- -- 16 -- --   03/22/19 1515 131/78 -- -- -- -- -- -- --   03/22/19 1445 133/82 -- -- -- -- -- -- --   03/22/19 1415 140/80 -- -- -- -- -- -- --     General: AAOx3, NAD, appears generally well  CV: RRR, no  m/r/g  Resp:  CTA, no crackles  Abd:  Soft, nontender, nondistended, fundus firm at approximately umbilicus  Incision: clean/dry/intact  Ext: trace edema in bilateral LE, 2+ reflexes at patella bilaterally. 1 beat of clonus b/l.       I&O      Labs  Mag 6.0  Cr 0.64>0.59  ALT 9>11  AST 12>13  Hgb 10.9>10.2  Plt 302>315    Assessment and Plan:  34 year old old  now POD#2 s/p PLTCS in context of preeclampsia with severe features (headache, mild range blood pressures) and history of fourth degree laceration s/p fistula repair. She is doing well in the postpartum period.      #Preeclampsia with severe features  - No signs/symptoms of worsening preeclampsia  - s/p Magnesium sulfate for seizure prophylaxis  - BPs: normal range  - UOP: excellent   - HELLP labs wnl on admission and on repeat 3/23  - Weight: 93.9 kg (3/22) > 87 kg>    #Postpartum cares  - Pain: Tylenol, ibuprofen, oxycodone PRN.  - Heme: Hgb 10.9 > QBL 1070 > 10.2, asymptomatic   - GI: Regular diet. Bowel regimen. Antiemetics PRN.  - : [place] catheter in place  - Rh positive, Rubella nonimmune, MMR ordered  - Pumping for baby in NICU  - Likely IUD for birth control at 6 week postpartum visit    Anticipate discharge to home POD#3     Cleopatra Stephens MD  OB/GYN PGY-1    I saw and examined the patient who is meeting POD#1 goals.  BP are within satisfactory range.  Ileus is present but resolving.  Likely discharge to home on POD #3.    Shahida Castro MD  [facility] [place]  March 23 2019

## 2019-03-23 NOTE — DISCHARGE SUMMARY
"[facility] Discharge Summary    Ángela Zurita MRN# [id number]   Age: 34 year old YOB: 1985     Date of Admission:  3/22/2019  Date of Discharge:  3/25/2019  Admitting Physician:  Jesenia Jefferson MD  Discharge Physician:  Lois Lau MD    Admit Dx:   -  at 36w2d  - Preeclampsia with severe features  - Velamentous cord insertion  - Advanced maternal age  - Rubella non-immune  - History of a 4th degree perineal laceration  - Asthma    Discharge Dx:  - , s/p primary low transverse  section  - Same as above    Procedures:  - Primary low transverse  section with double layer uterine closure via Pfannenstiel incision  - Spinal analgesia    Admit HPI:  Ángela Zurita is a 34 year old  at 36w2d by 8w0d US who presents from clinic with 2 days of elevated BP.     She is generally feeling well but is having a constant HA x1 week which has not improved significantly with acetaminophen. Her headache worsens when she lies supine and is most severe overnight. Its intensity caused her to become tearful 2 days ago, but has since moderated. She endorses seeing stars with her HA. Is also having recurrent heartburn x1 week, recurrent nausea x1.5 weeks and 3-4 episodes of vomiting this past weekend (3/16-). Had a brief, ~1hr episode of R sided mid-abdominal pain yesterday which she feels was r/t fetal activity. She describes having continuous contraction pain throughout her pregnancy. Has felt intermittently SOB since yesterday. At its worst, it feels like a \"cow sitting on my chest.\" Becomes dyspneic with any physical activity, such as walking to the bathroom, and is only able to catch her breath after staying seated for 1 hr. She has noticed mild waxing and waning edema in her ankles which improve with elevation, and lips. Has felt hot and flushed for 2 weeks. No chills.     She denies vaginal bleeding, discharge or loss of fluid and is " feeling normal fetal movement.     Her prior pregnancy was complicated by a 4th degree laceration for which she has had fistula repair and sphincteroplasty.     ROS: As above.     Her pregnancy has been complicated by:  - Velamentous cord insertion  - Advanced maternal age--normal NIPT, Level 2   - Rubella non-immune  - Cervical dysplasia s/p Leep, last pap NIL in 2016  - Fibroid uterus - Anterior intramural fibroid (<2 cm)  - H/o vaginal bleeding s/p diagnostic hysteroscopy with D&C, L ovarian cystectomy  - H/o 4th degree laceration s/p fistula repair and sphincteroplasty  - ADHD (takes Adderall PRN, none in past 2 months)  - Asthma, exercise-induced    Please see her admit H&P for full details of her PMH, PSH, Meds, Allergies and exam on admit.    Given her headache and shortness of breath, a diagnosis of preeclampsia with severe features was made. She desired a primary CS due to history of a prior 4th degree perineal laceration.     Operative Course:  Surgery was uncomplicated. QBL from the delivery was 1070mL. Please see her  Section Operative Note for full details regarding her delivery.    Operative Findings:   1. No rectofascial or intraabdominal adhesions  2. Clear amniotic fluid  3. Liveborn male infant in vertex presentation at 1708 on 3/22/2019. Apgars 7 at 1 minute & 8 at 5 minutes. Weight 3450g.  4. Venous cord pH 7.2, base deficit 6.2.  5. Normal uterus, fallopian tubes, and ovaries.     Postoperative Course:  She was maintained on magnesium for 24 hours postpartum. Her blood pressure remained largely normotensive. On POD#3, she was meeting all of her postpartum goals and deemed stable for discharge. She was voiding without difficulty, tolerating a regular diet without nausea and vomiting, her pain was well controlled on oral pain medicines and her lochia was appropriate. Her hemoglobin prior to delivery was 10.9 and after delivery was 10.2. Her Rh status was positive and Rhogam was not  indicated. She was given MMR prior to discharge for rubella non-immune status.     Discharge Medications:     Review of your medicines      START taking      Dose / Directions   ibuprofen 600 MG tablet  Commonly known as:  ADVIL/MOTRIN      Dose:  600 mg  Take 1 tablet (600 mg) by mouth every 6 hours as needed for moderate pain Start after delivery  Quantity:  60 tablet  Refills:  0     oxyCODONE 5 MG tablet  Commonly known as:  ROXICODONE      Dose:  5-10 mg  Take 1-2 tablets (5-10 mg) by mouth every 3 hours as needed for breakthrough pain  Quantity:  12 tablet  Refills:  0     senna-docusate 8.6-50 MG tablet  Commonly known as:  SENOKOT-S/PERICOLACE      Dose:  1 tablet  Take 1 tablet by mouth daily Start after delivery.  Quantity:  100 tablet  Refills:  0        CONTINUE these medicines which may have CHANGED, or have new prescriptions. If we are uncertain of the size of tablets/capsules you have at home, strength may be listed as something that might have changed.      Dose / Directions   acetaminophen 325 MG tablet  Commonly known as:  TYLENOL  This may have changed:      medication strength    how much to take    when to take this    reasons to take this    additional instructions      Dose:  650 mg  Take 2 tablets (650 mg) by mouth every 6 hours as needed for mild pain Start after Delivery.  Quantity:  100 tablet  Refills:  0        CONTINUE these medicines which have NOT CHANGED      Dose / Directions   amphetamine-dextroamphetamine 10 MG tablet  Commonly known as:  ADDERALL  Used for:  Attention deficit hyperactivity disorder (ADHD), predominantly inattentive type      Take 2 pills (20mg) orally in the morning and two pills (20mg) at 11am and one pill (10mg) at 2pm  Quantity:  150 tablet  Refills:  0     BENADRYL PO      Refills:  0     famotidine 20 MG tablet  Commonly known as:  PEPCID  Used for:  Heart burn      Dose:  20 mg  Take 1 tablet (20 mg) by mouth 2 times daily  Quantity:  60 tablet  Refills:   0     FIBER CHOICE 1.5 g Chew  Generic drug:  Inulin      Dose:  1 chew tab  Take 1 chew tab by mouth daily  Refills:  0     folic acid 1 MG tablet  Commonly known as:  FOLVITE  Used for:  Elderly multigravida in first trimester, Rectovaginal fistula, Uterine leiomyoma, unspecified location, Encounter for supervision of other normal pregnancy in first trimester      Dose:  1 mg  Take 1 tablet (1 mg) by mouth daily  Quantity:  100 tablet  Refills:  3     prenatal multivitamin w/iron 27-0.8 MG tablet      Dose:  1 tablet  Take 1 tablet by mouth daily  Refills:  0        STOP taking    metoclopramide 5 MG tablet  Commonly known as:  REGLAN              Where to get your medicines      These medications were sent to [facility] [place], [place] - [address]  [address] [address]    Phone:  [phone]     acetaminophen 325 MG tablet    ibuprofen 600 MG tablet    senna-docusate 8.6-50 MG tablet     Some of these will need a paper prescription and others can be bought over the counter. Ask your nurse if you have questions.    Bring a paper prescription for each of these medications    oxyCODONE 5 MG tablet       Discharge/Disposition:  Ángela Zurita was discharged to home in stable condition with the following instructions/medications:  1) Call for temperature > 100.4, bright red vaginal bleeding >1 pad an hour x 2 hours, foul smelling vaginal discharge, pain not controlled by usual oral pain meds, persistent nausea and vomiting not controlled on medications, drainage or redness from incision site  2) She desired an IUD for contraception.  3) For feeding she decided to pump/breastfeed.  4) She was instructed to follow-up with her primary OB in 6 weeks for a routine postpartum visit and on 3/29/19 for a blood pressure and mood check.  5) Discharge activity:  No heavy lifting >15 lbs or strenuous activity for 6 weeks, pelvic rest for 6 weeks, no driving or operating  machinery while on narcotics.    Ayesha Bradford MD  OB-GYN PGY-2

## 2019-03-23 NOTE — PROGRESS NOTES
Post Partum Progress Note    Subjective:  Patient is doing well.  No complaints. Minimal lochia.  Pain well controlled on pain meds.  Tolerating PO and ambulating without any issues. /Washington in place. Passing flatus. Denies any fever, chills, SOB, chest pain, N/V, headache, dizziness.  Planning on breast feeding, pumping for baby in NICU.     Objective:  Patient Vitals for the past 24 hrs:   BP Temp Temp src Pulse Heart Rate Resp SpO2 Weight   03/23/19 0807 118/78 98.4  F (36.9  C) Oral -- 84 16 99 % --   03/23/19 0627 -- -- -- -- -- -- -- 87 kg (191 lb 11.2 oz)   03/23/19 0600 118/77 97.8  F (36.6  C) Oral -- -- 18 98 % --   03/23/19 0300 121/74 98.2  F (36.8  C) Oral 84 -- 18 97 % --   03/23/19 0253 -- -- -- 82 -- -- 97 % --   03/23/19 0010 118/77 98.1  F (36.7  C) Oral -- 89 18 96 % --   03/22/19 2306 126/78 98.2  F (36.8  C) Oral -- 81 18 96 % --   03/22/19 2210 122/79 97.8  F (36.6  C) Oral -- 88 18 96 % --   03/22/19 2110 124/78 98.2  F (36.8  C) Oral -- 78 18 96 % --   03/22/19 2015 132/85 -- -- -- 87 18 96 % --   03/22/19 1955 136/85 98.1  F (36.7  C) Oral 86 86 17 98 % --   03/22/19 1941 132/83 -- -- 88 88 18 -- --   03/22/19 1925 (!) 141/100 98.5  F (36.9  C) Oral 93 84 14 98 % --   03/22/19 1920 -- -- -- -- -- -- 96 % --   03/22/19 1910 (!) 149/91 -- -- 72 73 16 99 % --   03/22/19 1900 -- -- -- -- -- 24 -- --   03/22/19 1853 140/84 -- -- -- 83 (!) 0 97 % --   03/22/19 1845 -- -- -- -- -- 26 96 % --   03/22/19 1838 137/77 -- -- -- 86 13 97 % --   03/22/19 1823 138/82 -- -- 84 76 19 97 % --   03/22/19 1809 -- -- -- -- -- 19 -- --   03/22/19 1808 147/79 -- -- 74 78 -- -- --   03/22/19 1800 -- -- -- -- 99 18 96 % --   03/22/19 1620 135/77 -- -- -- -- 16 -- --   03/22/19 1515 131/78 -- -- -- -- -- -- --   03/22/19 1445 133/82 -- -- -- -- -- -- --   03/22/19 1415 140/80 -- -- -- -- -- -- --   03/22/19 1345 130/74 -- -- -- -- -- -- --   03/22/19 1340 129/72 -- -- -- -- -- -- --   03/22/19 1335 132/73 -- -- -- --  -- -- --   03/22/19 1330 141/80 -- -- -- -- -- -- --   03/22/19 1325 133/75 -- -- -- -- -- -- --   03/22/19 1320 127/79 -- -- -- -- -- -- --   03/22/19 1315 136/84 -- -- -- -- -- -- --   03/22/19 1200 141/82 -- -- 85 -- 20 -- --   03/22/19 1130 133/73 -- -- -- -- -- -- --   03/22/19 1110 133/76 -- -- -- -- -- -- --   03/22/19 1100 125/71 -- -- -- -- -- -- --       General: AAOx3, NAD, appears generally well  CV: RRR, no m/r/g  Resp:  CTA, no crackles  Abd:  Soft, nontender, nondistended, fundus firm at approximately umbilicus  Incision: clean/dry/intact  Ext: trace edema in bilateral LE, 2+ reflexes at patella bilaterally. 1 beat of clonus b/l.       Intake/Output Summary (Last 24 hours) at 3/23/2019 1052  Last data filed at 3/23/2019 1000  Gross per 24 hour   Intake 5309.09 ml   Output 6070 ml   Net -760.91 ml     Labs  Mag 6.0  Cr 0.64>0.59  ALT 9>11  AST 12>13  Hgb 10.9>10.2  Plt 302>315    Assessment and Plan:  34 year old old  now POD#1 s/p PLTCS in context of preeclampsia with severe features (headache, mild range blood pressures) and history of fourth degree laceration s/p fistula repair. She is doing well in the postpartum period.      #Preeclampsia with severe features  - No signs/symptoms of worsening preeclampsia or magnesium toxicity  - Symptoms: fatigued but otherwise feels well  - Magnesium sulfate for seizure prophylaxis: 2g/h> mag level 6.0, continue until 1700  - BPs: normal range  - UOP: excellent   - HELLP labs wnl on admission and on repeat this AM  - Weight: 93.9 kg (3/22) > 87 kg    #Postpartum cares  - Pain: Tylenol, ibuprofen, oxycodone PRN.  - Heme: Hgb 10.9 > QBL 1070 > 10.2, asymptomatic   - GI: Regular diet. Bowel regimen. Antiemetics PRN.  - : Washington catheter in place  - Rh positive, Rubella nonimmune, MMR ordered  - Pumping for baby in NICU  - Likely IUD for birth control at 6 week postpartum visit    Anticipate discharge to home POD#3     Cleopatra Stephens MD  OB/GYN  PGY-1  03/23/19 10:43 AM

## 2019-03-23 NOTE — PLAN OF CARE
Transfer to OR & C/S Delivery Note  Patient to OR at 1637 via patient ambulation.   Delivered viable Male via primary  section by Dr. Jefferson.  To warmer, stimulated and dried by NICU team.  APGAR at 1 minute:  7  APGAR at 5 minute: 8      Brought to NICU for CPAP support

## 2019-03-23 NOTE — PLAN OF CARE
Patient VSS and afebrile. Pt denies any new s/s of pre-eclampsia,or change in condition. Patient pain comfortably manageable with oral medication. Patient pumping with encouragement. Plans to visit NICU after resting. Patient notified when to call out to RN, verbalized understanding and agreed to plan. Will proceed with ongoing assessment.

## 2019-03-23 NOTE — PLAN OF CARE
Data: PACU recovery completed, and patient Ángela Zurita transferred to [facility] via cart at 2100 after parents visited baby boy in NICU.  Action: Receiving unit notified of transfer: Yes. Patient and family notified of room change. Report given to Jessa Sher RN at 2100. Belongings sent to receiving unit. Accompanied by Registered Nurse. Oriented patient to surroundings. Call light within reach.  Response: Patient tolerated transfer and is stable.

## 2019-03-23 NOTE — PROGRESS NOTES
Patient arrived to [facility] unit via zoom cart at 2110,with belongings, accompanied by spouse/ significant other, with infant in in NICU. Received report from Kianna  and checked bands. Unit and room orientation completd. Call light given; yes concerns present at this time. Pt having uncontrolled pain. Dr. Alejandre to see pt. Prn medications to be given. Continue with plan of care.

## 2019-03-23 NOTE — PLAN OF CARE
Vital signs stable overnight. Postpartum assessment WDL. Incision clean, dry, intact with dressing in place. Pain controlled with Toradol, Tylenol, and PRN Oxycodone. Patient denies HA currently, Washington patent, urine output overnight 2000 ml. Reflexes 2+/no clonus bilaterally.Patient passing gas.  Pumping for baby in NICU, getting 5-15ml colostrum each time. Plan is for repeat labs in am.Will continue with current plan of care.

## 2019-03-23 NOTE — PLAN OF CARE
[id number]: The patient was stable. Blood pressure WNL at 118/77. Magnesium sulfate was infusing at 2gm/hour with lactated ringers at 75 mL/hour. She denies headache, epigastric pain and vision changes. No edema present. The patient ambulated and went down to the NICU to see her baby by wheelchair. She is pumping and was able to express 35 mls of colostrum. The patient was handed off to DAVID Turenr at 1030. Continue with postpartum cares.

## 2019-03-24 PROCEDURE — 12000001 ZZH R&B MED SURG/OB UMMC

## 2019-03-24 PROCEDURE — 25000132 ZZH RX MED GY IP 250 OP 250 PS 637: Performed by: STUDENT IN AN ORGANIZED HEALTH CARE EDUCATION/TRAINING PROGRAM

## 2019-03-24 PROCEDURE — 25000132 ZZH RX MED GY IP 250 OP 250 PS 637: Performed by: OBSTETRICS & GYNECOLOGY

## 2019-03-24 RX ORDER — CEFAZOLIN SODIUM 1 G/3ML
1 INJECTION, POWDER, FOR SOLUTION INTRAMUSCULAR; INTRAVENOUS SEE ADMIN INSTRUCTIONS
Status: DISCONTINUED | OUTPATIENT
Start: 2019-03-24 | End: 2019-03-25 | Stop reason: HOSPADM

## 2019-03-24 RX ORDER — AMOXICILLIN 250 MG
1 CAPSULE ORAL DAILY
Qty: 100 TABLET | Refills: 0 | Status: SHIPPED | OUTPATIENT
Start: 2019-03-24 | End: 2019-07-09

## 2019-03-24 RX ORDER — NALOXONE HYDROCHLORIDE 0.4 MG/ML
.1-.4 INJECTION, SOLUTION INTRAMUSCULAR; INTRAVENOUS; SUBCUTANEOUS
Status: DISCONTINUED | OUTPATIENT
Start: 2019-03-24 | End: 2019-03-25 | Stop reason: HOSPADM

## 2019-03-24 RX ORDER — CITRIC ACID/SODIUM CITRATE 334-500MG
30 SOLUTION, ORAL ORAL
Status: DISCONTINUED | OUTPATIENT
Start: 2019-03-24 | End: 2019-03-25 | Stop reason: HOSPADM

## 2019-03-24 RX ORDER — IBUPROFEN 600 MG/1
600 TABLET, FILM COATED ORAL EVERY 6 HOURS PRN
Qty: 60 TABLET | Refills: 0 | Status: SHIPPED | OUTPATIENT
Start: 2019-03-24 | End: 2019-12-03

## 2019-03-24 RX ORDER — ACETAMINOPHEN 325 MG/1
650 TABLET ORAL EVERY 6 HOURS PRN
Qty: 100 TABLET | Refills: 0 | Status: SHIPPED | OUTPATIENT
Start: 2019-03-24 | End: 2020-01-10

## 2019-03-24 RX ORDER — POLYETHYLENE GLYCOL 3350 17 G/17G
17 POWDER, FOR SOLUTION ORAL 2 TIMES DAILY PRN
Status: DISCONTINUED | OUTPATIENT
Start: 2019-03-24 | End: 2019-03-25 | Stop reason: HOSPADM

## 2019-03-24 RX ORDER — CEFAZOLIN SODIUM 2 G/100ML
2 INJECTION, SOLUTION INTRAVENOUS
Status: DISCONTINUED | OUTPATIENT
Start: 2019-03-24 | End: 2019-03-25 | Stop reason: HOSPADM

## 2019-03-24 RX ORDER — FLUMAZENIL 0.1 MG/ML
0.2 INJECTION, SOLUTION INTRAVENOUS
Status: DISCONTINUED | OUTPATIENT
Start: 2019-03-24 | End: 2019-03-25

## 2019-03-24 RX ORDER — SODIUM CHLORIDE, SODIUM LACTATE, POTASSIUM CHLORIDE, CALCIUM CHLORIDE 600; 310; 30; 20 MG/100ML; MG/100ML; MG/100ML; MG/100ML
INJECTION, SOLUTION INTRAVENOUS CONTINUOUS
Status: DISCONTINUED | OUTPATIENT
Start: 2019-03-24 | End: 2019-03-25 | Stop reason: HOSPADM

## 2019-03-24 RX ADMIN — OXYCODONE HYDROCHLORIDE 10 MG: 5 TABLET ORAL at 08:18

## 2019-03-24 RX ADMIN — SIMETHICONE CHEW TAB 80 MG 80 MG: 80 TABLET ORAL at 05:51

## 2019-03-24 RX ADMIN — ACETAMINOPHEN 975 MG: 325 TABLET, FILM COATED ORAL at 05:51

## 2019-03-24 RX ADMIN — SIMETHICONE CHEW TAB 80 MG 80 MG: 80 TABLET ORAL at 22:43

## 2019-03-24 RX ADMIN — SENNOSIDES AND DOCUSATE SODIUM 2 TABLET: 8.6; 5 TABLET ORAL at 20:41

## 2019-03-24 RX ADMIN — ACETAMINOPHEN 975 MG: 325 TABLET, FILM COATED ORAL at 14:40

## 2019-03-24 RX ADMIN — OXYCODONE HYDROCHLORIDE 10 MG: 5 TABLET ORAL at 22:55

## 2019-03-24 RX ADMIN — POLYETHYLENE GLYCOL 3350 17 G: 17 POWDER, FOR SOLUTION ORAL at 16:52

## 2019-03-24 RX ADMIN — SIMETHICONE CHEW TAB 80 MG 80 MG: 80 TABLET ORAL at 16:52

## 2019-03-24 RX ADMIN — ACETAMINOPHEN 975 MG: 325 TABLET, FILM COATED ORAL at 22:43

## 2019-03-24 RX ADMIN — IBUPROFEN 600 MG: 600 TABLET ORAL at 09:21

## 2019-03-24 RX ADMIN — FAMOTIDINE 20 MG: 20 TABLET, FILM COATED ORAL at 08:19

## 2019-03-24 RX ADMIN — IBUPROFEN 600 MG: 600 TABLET ORAL at 03:30

## 2019-03-24 RX ADMIN — IBUPROFEN 600 MG: 600 TABLET ORAL at 20:41

## 2019-03-24 RX ADMIN — IBUPROFEN 600 MG: 600 TABLET ORAL at 15:19

## 2019-03-24 RX ADMIN — SENNOSIDES AND DOCUSATE SODIUM 2 TABLET: 8.6; 5 TABLET ORAL at 06:02

## 2019-03-24 ASSESSMENT — MIFFLIN-ST. JEOR: SCORE: 1614.18

## 2019-03-24 NOTE — PLAN OF CARE
Data: Vital signs within normal limits. Postpartum checks within normal limits - see flow record. Patient eating and drinking normally. Patient able to empty bladder independently and is up ambulating. No apparent signs of infection. Incision healing well. Patient performing self cares and is able to breast pump independently. Denies headache, vision changes, reflexes normal.   Action: Patient medicated during the shift for pain. See MAR. Patient reassessed within 1 hour after each medication and pain was improved - patient stated she was comfortable.   Response: Positive attachment behaviors observed with infant going to NICU frequently. Support persons FOB and family present.   Plan: Anticipate discharge on tomorrow. discharge videos need to be watched

## 2019-03-24 NOTE — PLAN OF CARE
Data: Vital signs within normal limits. Postpartum checks within normal limits - see flow record. Patient eating and drinking normally. Washington catheter draining adequate amounts of clear urine, removed at 1720 after MgSo4 stopped. No apparent signs of infection. Pt up to shower and dressing removed, Incision healing well. Patient performing self cares and is able to care for infant by pumping q3h and visiting infant in NICU.  Action: Patient medicated during the shift for pain. See MAR. Patient reassessed within 1 hour after each medication and pain was improved - patient stated she was comfortable. Patient education done about hygiene cares, magnesium sulfate, pain management, course of lactation and plan of care. See flow record.  Response: Positive attachment behaviors observed with infant. Support persons present.   Plan: Anticipate discharge on 3/25 or 3/26.

## 2019-03-24 NOTE — PROGRESS NOTES
Post Partum Progress Note  3/24/2019     Subjective:  Patient is doing well. Feeling much better since mag off. No complaints. Minimal lochia.  Pain well controlled on pain meds.  Tolerating PO and ambulating without any issues. Washington in place. Passing flatus. Denies any fever, chills, SOB, chest pain, N/V, headache, dizziness.  Planning on breast feeding, pumping for baby in NICU.     Objective:  Patient Vitals for the past 24 hrs:   BP Temp Temp src Pulse Heart Rate Resp SpO2   03/24/19 0551 116/71 98  F (36.7  C) Oral 82 -- 16 --   03/24/19 0330 103/66 98.3  F (36.8  C) Oral 78 -- 16 --   03/23/19 2045 132/82 -- -- -- 82 16 --   03/23/19 1550 127/81 98.3  F (36.8  C) Oral -- 85 16 --   03/23/19 1500 124/67 98.5  F (36.9  C) Oral -- -- 17 98 %   03/23/19 1100 121/82 98.4  F (36.9  C) Oral -- -- 18 99 %   03/23/19 0807 118/78 98.4  F (36.9  C) Oral -- 84 16 99 %     General: AAOx3, NAD, appears generally well  CV: Regular rate, normal perfusion  Resp:  CTA, no crackles  Abd:  Soft, nontender, nondistended, fundus firm at approximately umbilicus  Incision: clean/dry/intact  Ext: Trace edema in bilateral LE       I&O  Intake/Output Summary (Last 24 hours) at 3/24/2019 0639  Last data filed at 3/24/2019 0400  Gross per 24 hour   Intake 3113 ml   Output 4400 ml   Net -1287 ml       Labs  No new labs    Assessment and Plan:  34 year old old  now POD#2 s/p PLTCS in context of preeclampsia with severe features (headache, mild range blood pressures) and history of fourth degree laceration s/p fistula repair. She is doing well in the postpartum period.      #Preeclampsia with severe features  - No signs/symptoms of worsening preeclampsia  - s/p Magnesium sulfate for seizure prophylaxis  - BPs: normal range  - UOP: excellent   - HELLP labs wnl on admission and on repeat 3/23  - Weight: 93.9 kg (3/22) > 87 kg>AM pending    #Postpartum cares  - Pain: Tylenol, ibuprofen, oxycodone PRN.  - Heme: Hgb 10.9 > QBL 1070 > 10.2,  asymptomatic   - GI: Regular diet. Bowel regimen. Antiemetics PRN.  - : voiding  - Rh positive, Rubella nonimmune, MMR ordered  - Pumping for baby in NICU  - Likely IUD for birth control at 6 week postpartum visit    Anticipate discharge to home POD#3     Cleopatra Stephens MD  OB/GYN PGY-1  19 6:39 AM     Physician Attestation   I, Lois Martinez, personally examined and evaluated this patient.  I discussed the patient with the medical student and/or resident and care team, and agree with the assessment and plan of care as documented in the note of 3/24/2019  [date].      I personally reviewed vital signs, medications, labs and exam.    Key findings: 34 year old  on POD 2 s/p PLTCS. Pre-eclampsia with severe features at 36w2d. Doing well. BPs normal range. Anticipate discharge tomorrow - will inquire about boarding room. Will need BP check approx 1 week after discharge.     # Pain Assessment:  Current Pain Score 3/24/2019   Patient currently in pain? yes   Pain score (0-10) -   Pain location -   Pain descriptors Discomfort   - Ángela is experiencing pain due to surgery. Pain management was discussed and the plan was created in a collaborative fashion.  Ángela's response to the current recommendations: engaged  - Opioid regimen: oxycodone 5-10mg q3h PRN. Has taken 15mg in 24h  - Response to opioid medications: Reduction of symptoms  - Bowel regimen: senna and docusate  - Pharmacologic adjuvants: NSAIDs and Acetaminophen  - Non-pharmacologic adjuvants: Heat, Ice and abdominal binder      Lois Martinez MD  Date of Service (when I saw the patient): 19

## 2019-03-25 ENCOUNTER — TELEPHONE (OUTPATIENT)
Dept: OBGYN | Facility: CLINIC | Age: 34
End: 2019-03-25

## 2019-03-25 VITALS
HEART RATE: 90 BPM | OXYGEN SATURATION: 98 % | HEIGHT: 66 IN | BODY MASS INDEX: 32.54 KG/M2 | RESPIRATION RATE: 18 BRPM | DIASTOLIC BLOOD PRESSURE: 72 MMHG | SYSTOLIC BLOOD PRESSURE: 123 MMHG | TEMPERATURE: 97.8 F | WEIGHT: 202.44 LBS

## 2019-03-25 LAB
BACTERIA SPEC CULT: ABNORMAL
SPECIMEN SOURCE: ABNORMAL

## 2019-03-25 PROCEDURE — 25000132 ZZH RX MED GY IP 250 OP 250 PS 637: Performed by: STUDENT IN AN ORGANIZED HEALTH CARE EDUCATION/TRAINING PROGRAM

## 2019-03-25 PROCEDURE — 90707 MMR VACCINE SC: CPT | Performed by: STUDENT IN AN ORGANIZED HEALTH CARE EDUCATION/TRAINING PROGRAM

## 2019-03-25 PROCEDURE — 25000128 H RX IP 250 OP 636: Performed by: STUDENT IN AN ORGANIZED HEALTH CARE EDUCATION/TRAINING PROGRAM

## 2019-03-25 RX ORDER — OXYCODONE HYDROCHLORIDE 5 MG/1
5-10 TABLET ORAL
Qty: 12 TABLET | Refills: 0 | Status: SHIPPED | OUTPATIENT
Start: 2019-03-25 | End: 2019-07-09

## 2019-03-25 RX ORDER — HYDROXYZINE HYDROCHLORIDE 25 MG/1
50-100 TABLET, FILM COATED ORAL
Status: DISCONTINUED | OUTPATIENT
Start: 2019-03-25 | End: 2019-03-25 | Stop reason: HOSPADM

## 2019-03-25 RX ADMIN — IBUPROFEN 600 MG: 600 TABLET ORAL at 02:16

## 2019-03-25 RX ADMIN — ACETAMINOPHEN 975 MG: 325 TABLET, FILM COATED ORAL at 06:42

## 2019-03-25 RX ADMIN — OXYCODONE HYDROCHLORIDE 5 MG: 5 TABLET ORAL at 02:16

## 2019-03-25 RX ADMIN — SIMETHICONE CHEW TAB 80 MG 80 MG: 80 TABLET ORAL at 06:42

## 2019-03-25 RX ADMIN — IBUPROFEN 600 MG: 600 TABLET ORAL at 08:27

## 2019-03-25 RX ADMIN — SENNOSIDES AND DOCUSATE SODIUM 2 TABLET: 8.6; 5 TABLET ORAL at 08:27

## 2019-03-25 RX ADMIN — HYDROXYZINE HYDROCHLORIDE 50 MG: 25 TABLET ORAL at 01:12

## 2019-03-25 RX ADMIN — HYDROXYZINE HYDROCHLORIDE 50 MG: 25 TABLET ORAL at 02:16

## 2019-03-25 RX ADMIN — OXYCODONE HYDROCHLORIDE 5 MG: 5 TABLET ORAL at 11:26

## 2019-03-25 RX ADMIN — OXYCODONE HYDROCHLORIDE 5 MG: 5 TABLET ORAL at 06:42

## 2019-03-25 RX ADMIN — MEASLES, MUMPS, AND RUBELLA VIRUS VACCINE LIVE 0.5 ML: 1000; 12500; 1000 INJECTION, POWDER, LYOPHILIZED, FOR SUSPENSION SUBCUTANEOUS at 13:50

## 2019-03-25 ASSESSMENT — MIFFLIN-ST. JEOR: SCORE: 1622.06

## 2019-03-25 NOTE — TELEPHONE ENCOUNTER
Called and left message to call clinic back.   Needs BP check at end of week scheduled.  Tatyana Cifuentes,

## 2019-03-25 NOTE — CONSULTS
[facility] \[facility]\""  MATERNAL CHILD HEALTH   INITIAL NICU PSYCHOSOCIAL ASSESSMENT     DATA:     Reason for Social Work Consult: NICU admission.     Presenting Information: Baby boy Bill is a 36+2 week gestation baby admitted to the NICU on 19 for RDS. Parents are Ángela and Chris. Mom is a .      Living Situation: Family currently reside in [place], [place] (approximately 1 hour outside of the [place]).     Family Constellation: Blil is 2nd baby for couple. They also have a 4 y.o. Son, Quang.     Social Support: Couple report having great support. Chris's mother is currently caring for Quang while parents are at the hospital. Ángela's mother will be back to [place] from [place] on Saturday.    Employment: Parents are both employed full-time. They reports their employers are supportive and denied having any issues related to time off.     Insurance: [employer].     Source of Financial Support: Employment.      Mental Health History: Ángela denied any mental health history. She does have a history of ADHD, which takes Adderall PRN for.     History of Postpartum Mood Disorders: Ángela denied any history of PMAD's.     Chemical Health History: No concerns reports.     Current Coping: Ángela reports feeling teary and sad about being apart from her baby. She discussed his  delivery came as a surprise to parents. Ángela is feeling great relief knowing she can room in with her baby (baby is being transferred to BMT floor, NICU team will continue to follow). She is thoughtful about her risk of developing a PMAD. However, feels that much of her current mood/coping is related to her baby's medical needs. Couple were appreciative of social work visit. They denied having any additional questions, concerns, or resource needs at this time.     Community Resources//Baby Supplies: No needs identified at this time. Accessed  resources and provided family with 1 week parking pass.      Interest in transferring to OSH closer to family home: N/A.     INTERVENTION:       SW completed chart review and collaborated with the multidisciplinary team.     Psychosocial Assessment     Introduction to Maternal Child Health  role and scope of practice     Provided SW business card     Orientation to the NICU      Reviewed Hospital and Community Resources     Assessed Chemical Health History and Current Symptoms     Assessed Mental Health History and Current Symptoms     Identified stressors, barriers and family concerns     Provided supportive counseling. Active empathetic listening and validation.     Provided psychoeducation on  mood and anxiety disorders, assessed for any current symptoms or history    Provided community resource postcard for Postpartum Support [place] ([facility])     ASSESSMENT:     Couple easily engaged in conversation. Mom was understandably tearful and worried about being apart from her baby. She is aware of her risk of developing a PMAD. She is aware of additional resources available to her and feels comfortable accessing these as needed. Couple were receptive to social work visit. No additional needs identified at this time.     PLAN:     SW will continue to follow throughout pt's Maternal-Child Health Journey as needs arise. SW will continue to collaborate with the multidisciplinary team.    NATALIE Abraham, [facility]   Social Worker  Maternal Child Health   Phone: [phone]  Pager: [phone]

## 2019-03-25 NOTE — PROGRESS NOTES
Called to assess patient for sleep meds. Patient very tearful, having a difficult time with infant in NICU. Patient also very fatigued and having difficulty sleeping. Discussed option for vistaril for sleep. Patient agreeable to trying vistaril.     Alicia Myhre, DO  Obstetrics and Gyncology, PGY-2  March 25, 2019 , 2:27 AM

## 2019-03-25 NOTE — PROGRESS NOTES
Post Partum Progress Note  3/25/2019   POD#3    Subjective:  Patient had difficult night last night. Feeling very tearful regarding child in NICU. Sleep improved with Vistaril for sleep.  Minimal lochia.  Pain improving on pain meds.  Tolerating PO and ambulating without any issues. Voiding spontaneously. Passing flatus, no BM. Denies any fever, chills, SOB, chest pain, N/V, headache, dizziness.  Planning on breast feeding, pumping for baby in NICU. Would like boarding room if possible. Patient denies history of mood issues following prior deliveries.    Objective:  Patient Vitals for the past 24 hrs:   BP Temp Temp src Pulse Heart Rate Resp Weight   03/25/19 0030 122/72 97.8  F (36.6  C) Oral -- 87 17 --   03/24/19 2251 121/70 -- -- -- 85 18 --   03/24/19 2045 132/77 98.6  F (37  C) Oral -- 95 18 --   03/24/19 1700 132/78 97.9  F (36.6  C) Oral -- 93 18 --   03/24/19 0830 131/69 97.9  F (36.6  C) Oral 78 -- 18 91 kg (200 lb 11.2 oz)     General: AAOx3, NAD  CV: Regular rate, normal perfusion  Resp:  CTA, no crackles  Abd:  Soft, nontender, mildly distended, fundus firm below umbilicus  Incision: clean/dry/intact  Ext: Trace edema in bilateral LE   .    Assessment and Plan:  34 year old old  now POD#3 s/p PLTCS in context of preeclampsia with severe features (headache, mild range blood pressures) and history of fourth degree laceration s/p fistula repair. Patient having difficulty adjusting to child in NICU.     #Preeclampsia with severe features  - No signs/symptoms of worsening preeclampsia  - s/p Magnesium sulfate for seizure prophylaxis  - BPs: normal range  - UOP: excellent   - HELLP labs wnl on admission and on repeat 3/23  - Weight: 93.9 kg (3/22) > 87 kg    #Postpartum cares  - Pain: Tylenol, ibuprofen, oxycodone PRN.  - Heme: Hgb 10.9 > QBL 1070 > 10.2, asymptomatic   - GI: Regular diet. Bowel regimen. Antiemetics PRN.  - : voiding  - Rh positive, Rubella nonimmune, MMR ordered  - Pumping for baby in  NICU  - Mirena IUD for birth control at 6 week postpartum visit    Anticipate discharge to home POD#4     Alicia Myhre, DO  Obstetrics and Gyncology, PGY-2  March 25, 2019 , 6:36 AM

## 2019-03-25 NOTE — PROVIDER NOTIFICATION
03/24/19 2216   Provider Notification   Provider Name/Title Dr Myhre   Method of Notification Electronic Page   Request Evaluate-Remote   Notification Reason Status Update;Medication Request   Pt has been emotional and crying much of the evening due to infant in NICU.  She reported a new headache and increased abdominal pain.  Last check she reflexes were more brisk and she had 1 beat of clonus on her left foot.  Would you like to assess?  Could you order some hydroxyzine to try to help her relax and sleep tonight?  Thank you.

## 2019-03-25 NOTE — DISCHARGE INSTRUCTIONS
Preeclampsia   Call your doctor right away if you have any of the following:  - Edema (swelling) in your face or hands  - Rapid weight gain-about 1 pound or more in a day  - Headache  - Abdominal pain on your right side  - Vision problems (flashes or spots)  - You have questions or concerns once you return home.      Postop  Birth Instructions    Activity       Do not lift more than 10 pounds for 6 weeks after surgery.  Ask family and friends for help when you need it.    No driving until you have stopped taking your pain medications (usually two weeks after surgery).    No heavy exercise or activity for 6 weeks.  Don't do anything that will put a strain on your surgery site.    Don't strain when using the toilet.  Your care team may prescribe a stool softener if you have problems with your bowel movements.     To care for your incision:       Keep the incision clean and dry.    Do not soak your incision in water. No swimming or hot tubs until it has fully healed. You may soak in the bathtub if the water level is below your incision.    Do not use peroxide, gel, cream, lotion, or ointment on your incision.    Adjust your clothes to avoid pressure on your surgery site (check the elastic in your underwear for example).     You may see a small amount of clear or pink drainage and this is normal.  Check with your health care provider:       If the drainage increases or has an odor.    If the incision reddens, you have swelling, or develop a rash.    If you have increased pain and the medicine we prescribed doesn't help.    If you have a fever above 100.4 F (38 C) with or without chills when placing thermometer under your tongue.   The area around your incision (surgery wound), will feel numb.  This is normal. The numbness should go away in less than a year.     Keep your hands clean:  Always wash your hands before touching your incision (surgery wound). This helps reduce your risk of infection. If your hands  aren't dirty, you may use an alcohol hand-rub to clean your hands. Keep your nails clean and short.    Call your healthcare provider if you have any of these symptoms:       You soak a sanitary pad with blood within 1 hour, or you see blood clots larger than a golf ball.    Bleeding that lasts more than 6 weeks.    Vaginal discharge that smells bad.    Severe pain, cramping or tenderness in your lower belly area.    A need to urinate more frequently (use the toilet more often), more urgently (use the toilet very quickly), or it burns when you urinate.    Nausea and vomiting.    Redness, swelling or pain around a vein in your leg.    Problems breastfeeding or a red or painful area on your breast.    Chest pain and cough or are gasping for air.    Problems with coping with sadness, anxiety or depression. If you have concerns about hurting yourself or the baby, call your provider immediately.      You have questions or concerns after you return home.

## 2019-03-25 NOTE — PLAN OF CARE
Data: Vital signs and postpartum checks within normal limits - see flow record. Has some edema on her legs.  Pt is very tearing and anxious about baby and missing baby's feeding in NICU. Patient eating and drinking normally. Patient emptying her bladder independently and is up ambulating with no problem. Pt is pumping and getting large amount of breast milk. Incision is in tact with sterile strips in place. No apparent signs of infection noted prior to discharge. Patient performing self cares and visiting baby in NICU.  Action: Patient medicated during the shift for pain control. Reviewed teaching and discharge instructions. Pt was given discharge medications and copies of the discharge papers. Patient reassessed for pain and pt stated she was comfortable.   Response: Positive attachment behaviors observed with infant. Support persons present.  Plan: pt discharge to boarding in NICU.

## 2019-03-25 NOTE — PLAN OF CARE
Data: Vital signs and postpartum checks WDL  Patient eating and drinking normally. Patient able to empty bladder independently and is up ambulating.  Patient performing self cares and is able to pump for infant in NICU. Pt been crying on and off due to  the condition of baby in NICU. Unable to sleep. MD came to talk to the pt and ordered PRN Atarax. Pt took 50 mg but didn't work. Took another 50 mg and was able sleep. Reflex monitored.  Action: Patient medicated during the shift for pain with Tylenol, Oxycodone and Ibuprofen with relief after 1 hour. Patient education done see education record.  Response: Positive attachment behaviors observed with infant. Support persons  present.    Plan: Continue with the plan of care

## 2019-03-25 NOTE — PROVIDER NOTIFICATION
03/24/19 5854   Provider Notification   Provider Name/Title Dr Myhre   Method of Notification Electronic Page   Request Evaluate-Remote   Notification Reason Medication Request   Sorry to page again but could you please order hydroxyzine for pt to try to help her relax and sleep?  She has been crying on and off all evening and been very emotional/anxious.  Thank you.

## 2019-03-25 NOTE — PLAN OF CARE
VSS and postpartum assessments WDL.  Pt has been emotional and crying on and off this evening due to infant in NICU, provided therapeutic listening and aromatherapy and requested hydroxyzine to help with sleep tonight, awaiting response from provider.  Pt reported new headache and increased abdominal and back pain late this shift.  Denies other symptoms of preeclampsia, reflexes brisk bilaterally and one beat of clonus on left foot.  Up ad layla with steady gait.  Independent with cares.  Pumping independently for infant in NICU and also going down to feed infant in NICU.  Pain managed with tylenol, ibuprofen, oxycodone, and simethicone per MAR as well as hot packs.  Incisional steristrips intact.  , Chris present and supportive.  Encouraged rest overnight.  Will continue with postpartum cares and education per plan of care.

## 2019-03-27 ENCOUNTER — LACTATION ENCOUNTER (OUTPATIENT)
Age: 34
End: 2019-03-27

## 2019-03-27 NOTE — LACTATION NOTE
"This note was copied from a baby's chart.  Discharge Instructions for Bill and Ángela:    Make sure baby is eating at least 8 times a day, has at least 6-8 wet diapers in 24 hours, and 4 stools in 24 hours, to show adequate intake.      Birth Control and Other Medications: Avoid hormonal birth control for as long as possible and until your milk supply is well established, as it may impact your supply.  Some women also find decongestants and antihistamines may impact supply.  Always get a second opinion from a lactation consultant if told to \"pump and dump\" when starting a new medication; most medications are compatible.    Paced Bottlefeeding: Continue to use paced bottlefeeding techniques, even when your baby is bigger and stronger, to prevent overeating. A bottlefeeding should take 20-30 minutes, just like an adult's meal. You may need to show caregivers (, grandparents, etc) this technique.  Please let us know if you'd like information on direct breastfeeding in the future; remember this is always an option if you decide to resume it.    Outpatient lactation resources:   [facility] Out[facility] NICU Lactation [facility]   [phone]  Breastfeeding Connection at [facility]  [phone]   Breastfeeding Connection at [facility]   [phone]  [facility] Lactation Services    [phone]  [facility]       [phone]  [facility]      [phone]  [facility]      [phone]  [facility]      [phone]    [facility]       [phone]    BabyCafes (www.babycafeusa.org):  [facility] [place] (Wed 12:30-2:30)     [phone].  [facility] [place] (Thurs 12:30-2:30)    [phone].  [facility] [place] (Tuesday 9:30-11:30)   [phone].  [facility] (Wednesdays (1:30-3p)    [phone].  [facility] [place] (Mondays 12n-2p)    [phone].  [facility] [place] (Wed " 12:30p-2:30p)   [phone].  [facility] [place] (Wednesdays 10a-12n    [phone].  [facility] [place] (Mondays 10a-12n)    [phone].  [facility] [place] (Tuesday 10a-12n)    [phone].    Other Walk-In Lactaton Help:  [facility]/ Kate Molina (Tues/Wed)   [phone]  [facility] (Thurs 2:30-3:30)   [phone]  [facility] Baby Weigh In (various times and locations)  www.[employer]            [facility] Lactation Support:  [facility] Outpatient Lactation Clinics Phone: [phone]  Locations: [facility], [facility], [facility] clinics  Clinic hours: Monday - Friday 8 am to 4 pm - Closed all major holidays.  Phone calls answered: Monday - Friday between 9 am and 2 pm.  Phone calls after hours: Leave a message and your call will be returned the next business  day. You can also talk with a [facility] Care Connection Triage Nurse by calling [phone].   [facility] Home Care: home nurse visit for mother band baby: [phone]    Other Resources:  WIC (call for eligibility information)     [phone]    La Leche League International   www.Purplleli.org  1-109-6-LA-LECHE (313-201-3407)    Office on Women's Health National Breastfeeding Help Line  8am to 5pm, English and British 1-221.436.1041 option 1  https://www.womenshealth.gov/breastfeeding/   International Breastfeeding [place] (Louie Hernadez)-- http://ibconline.ca/  Michelle-- up to date lactation information: www.[employer]age  Drugs and lactation database:  https://toxnet.nlm.nih.gov/newtoxnet/lactmed.htm   The [facility] Call Center is available to answer questions about the use of medications during pregnancy and while breastfeeding. [phone] www.[employer]     Lizzie Pina RNC, IBCLC/ Vanessa Stallings RNC, IBCLC/ Nguyen Fernandez RNC, IBCLC [phone]

## 2019-03-29 ENCOUNTER — ALLIED HEALTH/NURSE VISIT (OUTPATIENT)
Dept: FAMILY MEDICINE | Facility: CLINIC | Age: 34
End: 2019-03-29
Payer: COMMERCIAL

## 2019-03-29 ENCOUNTER — TELEPHONE (OUTPATIENT)
Dept: OBGYN | Facility: CLINIC | Age: 34
End: 2019-03-29

## 2019-03-29 VITALS — SYSTOLIC BLOOD PRESSURE: 122 MMHG | DIASTOLIC BLOOD PRESSURE: 74 MMHG | HEART RATE: 96 BPM

## 2019-03-29 DIAGNOSIS — Z01.30 BP CHECK: Primary | ICD-10-CM

## 2019-03-29 PROCEDURE — 99207 ZZC NO CHARGE NURSE ONLY: CPT

## 2019-03-29 NOTE — TELEPHONE ENCOUNTER
TC to patient to follow up on BP. Pt is going back in for a nurse only appt at 3pm to check BP. Will let us know what it is once she goes back in.   Sherron Jackson, DAVID-BSN

## 2019-03-29 NOTE — TELEPHONE ENCOUNTER
Discussed with SK. Pt does not need to be seen next week as long as she is feeling well. TC to patient. Pt stated understanding and will call back with any questions or concerns.   Sherron Jackson RN-BSN

## 2019-03-29 NOTE — PROGRESS NOTES
S-(situation): BP check     B-(background): Advised by OBGYN to come for BP check. Had preeclampsia and delivered last week.     A-(assessment):   Vital Signs 3/29/2019   Systolic 122   Diastolic 74   Pulse 96   Manual.     No complaints reported at time of visit.     R-(recommendations): Patient states she will call her OBGYN office to report. Will also CC provider.      LEÓN BerryN, RN

## 2019-03-29 NOTE — TELEPHONE ENCOUNTER
TC to patient as she was scheduled today at 1:30pm for BP check to schedule her later in the day as SK pushed back in surgery. Pt is feeling good.  Pt is wondering if she can be seen next week instead of today since they live 40 miles away and baby needs to be on bili blanket all the time. Baby has bili check at [place] today at 11am. Asked pt to have them check her BP at baby's appointment and call us back with the reading. Discussed with SK. Ok with plan as long as she gets BP checked today. Will send BP reading to SK once pt calls back and she will let us know f/u plan if pt needs to be seen next week.   Sherron Jackson, RN-BSN

## 2019-03-29 NOTE — TELEPHONE ENCOUNTER
Pt called back with BP reading. It was 122/74. Routing to SK. Please advise if pt needs to be seen next week. Thanks.   Sherron Jackson, RN-BSN

## 2019-04-11 ENCOUNTER — MYC MEDICAL ADVICE (OUTPATIENT)
Dept: FAMILY MEDICINE | Facility: CLINIC | Age: 34
End: 2019-04-11

## 2019-04-12 ENCOUNTER — E-VISIT (OUTPATIENT)
Dept: FAMILY MEDICINE | Facility: CLINIC | Age: 34
End: 2019-04-12
Payer: COMMERCIAL

## 2019-04-12 DIAGNOSIS — F90.0 ATTENTION DEFICIT HYPERACTIVITY DISORDER (ADHD), PREDOMINANTLY INATTENTIVE TYPE: Primary | ICD-10-CM

## 2019-04-12 PROCEDURE — 99444 ZZC PHYSICIAN ONLINE EVALUATION & MANAGEMENT SERVICE: CPT | Performed by: FAMILY MEDICINE

## 2019-04-12 RX ORDER — BUPROPION HYDROCHLORIDE 150 MG/1
150 TABLET ORAL EVERY MORNING
Qty: 30 TABLET | Refills: 1 | Status: SHIPPED | OUTPATIENT
Start: 2019-04-12 | End: 2019-05-24

## 2019-04-12 NOTE — TELEPHONE ENCOUNTER
Dr. Graf,    Please see my chart message from patient about her ADD meds, parenting and breast feeding and advise. Maira CHAVEZ RN

## 2019-04-25 LAB — COPATH REPORT: NORMAL

## 2019-04-29 NOTE — PROGRESS NOTES
OB GYN CLINIC VISIT  2019    CC: postpartum visit    SUBJECTIVE:  Ángela Zurita is a 34 year old female  here for a postpartum visit.  She had a  Section  on 3/22/19 delivering a healthy baby boy weighing 7 lbs 9.2 oz at 36w2d.      PHQ-9 SCORE 10/20/2014 2019 2019   PHQ-9 Total Score 6 - -   PHQ-9 Total Score - 6 16     No flowsheet data found.    Re her PHQ-9:  Having troubles with her ADD and isn't sleeping well.  Baby sleeps during the day and is up all night, but she has a hard time turning her brain off to nap during the day.  Feels bonded with the baby.  No SI/HI.  Doesn't feel particularly depressed, just stressed and tired.    delivery complications: pre-eclampsia with severe features  breast feeding:  Yes, going well  bladder problems:  No  bowel problems/hemorrhoids:  No  episiotomy/laceration/incision healed? Yes:  incision well healed  vaginal flow:  None  [place]:  No  contraception:  Plans IUD  back to work:  At 12 weeks    Current Outpatient Medications   Medication     acetaminophen (TYLENOL) 325 MG tablet     amphetamine-dextroamphetamine (ADDERALL) 10 MG per tablet     buPROPion (WELLBUTRIN XL) 150 MG 24 hr tablet     DiphenhydrAMINE HCl (BENADRYL PO)     famotidine (PEPCID) 20 MG tablet     folic acid (FOLVITE) 1 MG tablet     ibuprofen (ADVIL/MOTRIN) 600 MG tablet     Inulin (FIBER CHOICE) 1.5 g CHEW     oxyCODONE (ROXICODONE) 5 MG tablet     Prenatal Vit-Fe Fumarate-FA (PRENATAL MULTIVITAMIN PLUS IRON) 27-0.8 MG TABS per tablet     senna-docusate (SENOKOT-S/PERICOLACE) 8.6-50 MG tablet     No current facility-administered medications for this visit.        OBJECTIVE:  Blood pressure 121/77, pulse 81, temperature 98.1  F (36.7  C), temperature source Oral, weight 84.8 kg (187 lb), last menstrual period 2018, unknown if currently breastfeeding.   General - pleasant female in no acute distress.  Abdomen - soft, nontender, nondistended, no  hepatosplenomegaly.  Incision well healed.    ASSESSMENT:  34 year old  with normal postpartum exam    PLAN:  Discussed kegel exercises   May resume normal activities without restrictions  Pap smear was not  done today, as she is up to date.      During pregnancy, we had discussed trying pelvic floor physical therapy.  She is a history of painful intercourse due to very high tone of her pelvic floor, which I suspect results from her history of now repaired rectovaginal fistula.  Order was placed for pelvic floor PT.    The patient will use IUD for birth control. Full counseling was provided, and all questions answered. Compliance is strongly emphasized.    Return to clinic for IUD placement.    Lois Lau MD

## 2019-04-30 ENCOUNTER — PRENATAL OFFICE VISIT (OUTPATIENT)
Dept: OBGYN | Facility: CLINIC | Age: 34
End: 2019-04-30
Payer: COMMERCIAL

## 2019-04-30 VITALS
BODY MASS INDEX: 30.65 KG/M2 | TEMPERATURE: 98.1 F | DIASTOLIC BLOOD PRESSURE: 77 MMHG | HEART RATE: 81 BPM | SYSTOLIC BLOOD PRESSURE: 121 MMHG | WEIGHT: 187 LBS

## 2019-04-30 DIAGNOSIS — M62.89 HIGH-TONE PELVIC FLOOR DYSFUNCTION: ICD-10-CM

## 2019-04-30 PROCEDURE — 99207 ZZC POST PARTUM EXAM: CPT | Performed by: OBSTETRICS & GYNECOLOGY

## 2019-04-30 ASSESSMENT — PATIENT HEALTH QUESTIONNAIRE - PHQ9: SUM OF ALL RESPONSES TO PHQ QUESTIONS 1-9: 16

## 2019-05-23 ENCOUNTER — E-VISIT (OUTPATIENT)
Dept: FAMILY MEDICINE | Facility: CLINIC | Age: 34
End: 2019-05-23
Payer: COMMERCIAL

## 2019-05-23 DIAGNOSIS — F90.0 ATTENTION DEFICIT HYPERACTIVITY DISORDER (ADHD), PREDOMINANTLY INATTENTIVE TYPE: ICD-10-CM

## 2019-05-23 PROCEDURE — 99444 ZZC PHYSICIAN ONLINE EVALUATION & MANAGEMENT SERVICE: CPT | Performed by: FAMILY MEDICINE

## 2019-05-24 RX ORDER — FLUOXETINE 10 MG/1
CAPSULE ORAL
Qty: 60 CAPSULE | Refills: 0 | Status: SHIPPED | OUTPATIENT
Start: 2019-05-24 | End: 2019-06-25

## 2019-05-24 RX ORDER — BUPROPION HYDROCHLORIDE 150 MG/1
150 TABLET ORAL EVERY MORNING
Qty: 30 TABLET | Refills: 1 | Status: SHIPPED | OUTPATIENT
Start: 2019-05-24 | End: 2019-07-09

## 2019-06-11 ENCOUNTER — E-VISIT (OUTPATIENT)
Dept: FAMILY MEDICINE | Facility: CLINIC | Age: 34
End: 2019-06-11
Payer: COMMERCIAL

## 2019-06-11 DIAGNOSIS — B30.9 VIRAL CONJUNCTIVITIS: ICD-10-CM

## 2019-06-11 DIAGNOSIS — H10.30 ACUTE BACTERIAL CONJUNCTIVITIS, UNSPECIFIED LATERALITY: ICD-10-CM

## 2019-06-11 PROCEDURE — 99444 ZZC PHYSICIAN ONLINE EVALUATION & MANAGEMENT SERVICE: CPT | Performed by: FAMILY MEDICINE

## 2019-06-11 RX ORDER — POLYMYXIN B SULFATE AND TRIMETHOPRIM 1; 10000 MG/ML; [USP'U]/ML
2 SOLUTION OPHTHALMIC 4 TIMES DAILY
Qty: 10 ML | Refills: 0 | Status: SHIPPED | OUTPATIENT
Start: 2019-06-11 | End: 2019-07-09

## 2019-06-11 NOTE — PATIENT INSTRUCTIONS
Thank you for choosing us for your care. I have placed an order for a prescription so that you can start treatment. View your full visit summary for details by clicking on the link below. Your pharmacist will able to address any questions you may have about the medication.     If you re not feeling better within 2-3 days, please schedule an appointment.  You can schedule an appointment right here in [facility], or call [phone]  If the visit is for the same symptoms as your e-visit, we ll refund the cost of your e-visit if seen within seven days.      Conjunctivitis, Nonspecific    The membrane that covers the white part of your eye (the conjunctiva) is inflamed. Inflammation happens when your body responds to an injury, allergic reaction, infection, or illness. Symptoms of inflammation in the eye may include redness, irritation, itching, swelling, or burning. These symptoms should go away within the next 24 hours. Conjunctivitis may be related to a particle that was in your eye. If so, it may wash out with your tears or irrigation treatment. Being exposed to liquid chemicals or fumes may also cause this reaction.   Home care    Apply a cold pack over the eye for 20 minutes at a time. This will reduce pain. To make a cold pack, put ice cubes in a plastic bag that seals at the top. Wrap the bag in a clean, thin towel or cloth.    Artificial tears may be prescribed to reduce irritation or redness.  These should be used 3 to 4 times a day.    You may use acetaminophen or ibuprofen to control pain, unless another medicine was prescribed. (Note: If you have chronic liver or kidney disease, or if you have ever had a stomach ulcer or gastrointestinal bleeding, talk with your healthcare provider before using these medicines.)  Follow-up care  Follow up with your healthcare provider, or as advised.  When to seek medical advice  Call your healthcare provider right away if any of these occur:    Increased eyelid  swelling    Increased eye pain    Increased redness or drainage from the eye    Increased blurry vision or increased sensitivity to light    Failure of normal vision to return within 24 to 48 hours  Date Last Reviewed: 7/1/2017 2000-2018 [employer]. [address] [place] [address], [place] [address]. All rights reserved. This information is not intended as a substitute for professional medical care. Always follow your healthcare professional's instructions.

## 2019-06-25 ENCOUNTER — MYC REFILL (OUTPATIENT)
Dept: FAMILY MEDICINE | Facility: CLINIC | Age: 34
End: 2019-06-25

## 2019-06-28 RX ORDER — FLUOXETINE 10 MG/1
20 CAPSULE ORAL DAILY
Qty: 60 CAPSULE | Refills: 0 | Status: SHIPPED | OUTPATIENT
Start: 2019-06-28 | End: 2019-07-09

## 2019-07-09 ENCOUNTER — OFFICE VISIT (OUTPATIENT)
Dept: FAMILY MEDICINE | Facility: CLINIC | Age: 34
End: 2019-07-09
Payer: COMMERCIAL

## 2019-07-09 VITALS
BODY MASS INDEX: 31.01 KG/M2 | RESPIRATION RATE: 16 BRPM | SYSTOLIC BLOOD PRESSURE: 116 MMHG | HEART RATE: 85 BPM | OXYGEN SATURATION: 97 % | WEIGHT: 189.2 LBS | DIASTOLIC BLOOD PRESSURE: 70 MMHG | TEMPERATURE: 97.9 F

## 2019-07-09 DIAGNOSIS — F90.0 ATTENTION DEFICIT HYPERACTIVITY DISORDER (ADHD), PREDOMINANTLY INATTENTIVE TYPE: ICD-10-CM

## 2019-07-09 PROCEDURE — 99214 OFFICE O/P EST MOD 30 MIN: CPT | Performed by: FAMILY MEDICINE

## 2019-07-09 RX ORDER — BUPROPION HYDROCHLORIDE 150 MG/1
150 TABLET ORAL EVERY MORNING
Qty: 90 TABLET | Refills: 1 | Status: SHIPPED | OUTPATIENT
Start: 2019-07-09 | End: 2019-09-24

## 2019-07-09 ASSESSMENT — ANXIETY QUESTIONNAIRES
1. FEELING NERVOUS, ANXIOUS, OR ON EDGE: MORE THAN HALF THE DAYS
IF YOU CHECKED OFF ANY PROBLEMS ON THIS QUESTIONNAIRE, HOW DIFFICULT HAVE THESE PROBLEMS MADE IT FOR YOU TO DO YOUR WORK, TAKE CARE OF THINGS AT HOME, OR GET ALONG WITH OTHER PEOPLE: VERY DIFFICULT
2. NOT BEING ABLE TO STOP OR CONTROL WORRYING: MORE THAN HALF THE DAYS
GAD7 TOTAL SCORE: 12
6. BECOMING EASILY ANNOYED OR IRRITABLE: MORE THAN HALF THE DAYS
5. BEING SO RESTLESS THAT IT IS HARD TO SIT STILL: MORE THAN HALF THE DAYS
7. FEELING AFRAID AS IF SOMETHING AWFUL MIGHT HAPPEN: NOT AT ALL
3. WORRYING TOO MUCH ABOUT DIFFERENT THINGS: MORE THAN HALF THE DAYS

## 2019-07-09 ASSESSMENT — PATIENT HEALTH QUESTIONNAIRE - PHQ9
SUM OF ALL RESPONSES TO PHQ QUESTIONS 1-9: 16
5. POOR APPETITE OR OVEREATING: MORE THAN HALF THE DAYS

## 2019-07-09 NOTE — PATIENT INSTRUCTIONS
Continue Prozac at 20mg daily and the Wellbutrin at 150mg daily.  Next clinic appt at 6 months or sooner if done breast feeding and ready to start ADD medication again.

## 2019-07-09 NOTE — PROGRESS NOTES
Subjective     Ángela Zurita is a 34 year old female who presents to clinic today for the following health issues:    HPI   Depression Followup    How are you doing with your depression since your last visit? Improved with adding the Prozac to the Wellbutrin. Patient wanting to know what to expect with this post partum depression and if she'll have to stay on these medications for a long time.    Has ADHD and breastfeeding so cannot start the ADHD medications again.    Are you having other symptoms that might be associated with depression? No    Have you had a significant life event?  OTHER: new baby: premie      Are you feeling anxious or having panic attacks?   Yes:  anxiety with her ADHD.    Do you have any concerns with your use of alcohol or other drugs? No    Social History     Tobacco Use     Smoking status: Former Smoker     Packs/day: 0.50     Years: 15.00     Pack years: 7.50     Types: Cigarettes     Start date: 12/1/2014     Smokeless tobacco: Never Used     Tobacco comment: Quit with each pregnancy   Substance Use Topics     Alcohol use: No     Alcohol/week: 0.0 oz     Frequency: Never     Comment: occas-quit with pregnancy     Drug use: No     PHQ 1/14/2019 4/30/2019   PHQ-9 Total Score 6 16   Q9: Thoughts of better off dead/self-harm past 2 weeks Not at all Not at all     No flowsheet data found.    In the past two weeks have you had thoughts of suicide or self-harm?  No.    Do you have concerns about your personal safety or the safety of others?   No    Suicide Assessment Five-step Evaluation and Treatment (SAFE-T)    Amount of exercise or physical activity: None    Problems taking medications regularly: No    Medication side effects: none    Diet: regular (no restrictions)      BP Readings from Last 3 Encounters:   07/09/19 116/70   04/30/19 121/77   03/29/19 122/74    Wt Readings from Last 3 Encounters:   07/09/19 85.8 kg (189 lb 3.2 oz)   04/30/19 84.8 kg (187 lb)   03/25/19 91.8 kg (202 lb 7  "oz)               Reviewed and updated as needed this visit by Provider         Review of Systems   ROS COMP: Constitutional, HEENT, cardiovascular, pulmonary, gi and gu systems are negative, except as otherwise noted.      Objective    /70   Pulse 85   Temp 97.9  F (36.6  C) (Tympanic)   Resp 16   Wt 85.8 kg (189 lb 3.2 oz)   SpO2 97%   BMI 31.01 kg/m    Body mass index is 31.01 kg/m .  Physical Exam   GENERAL: healthy, alert and no distress  PSYCH: mentation appears normal, affect normal/bright    Diagnostic Test Results:  Labs reviewed in Epic        Assessment & Plan     Ángela was seen today for recheck medication.    Diagnoses and all orders for this visit:    Depression, postpartum: improving  -continue current dose.  -     FLUoxetine (PROZAC) 20 MG capsule; Take 1 capsule (20 mg) by mouth daily    Attention deficit hyperactivity disorder (ADHD), predominantly inattentive type: not well controlled, but stable, once done breastfeeding then will restart adderall.  -     buPROPion (WELLBUTRIN XL) 150 MG 24 hr tablet; Take 1 tablet (150 mg) by mouth every morning         BMI:   Estimated body mass index is 31.01 kg/m  as calculated from the following:    Height as of 3/22/19: 1.664 m (5' 5.5\").    Weight as of this encounter: 85.8 kg (189 lb 3.2 oz).   Weight management plan: Discussed healthy diet and exercise guidelines        Patient Instructions   Continue Prozac at 20mg daily and the Wellbutrin at 150mg daily.  Next clinic appt at 6 months or sooner if done breast feeding and ready to start ADD medication again.        No follow-ups on file.    Randy Graf MD  [facility]      "

## 2019-07-10 ASSESSMENT — ANXIETY QUESTIONNAIRES: GAD7 TOTAL SCORE: 12

## 2019-07-29 ENCOUNTER — NURSE TRIAGE (OUTPATIENT)
Dept: FAMILY MEDICINE | Facility: CLINIC | Age: 34
End: 2019-07-29

## 2019-07-29 NOTE — TELEPHONE ENCOUNTER
"  Additional Information    Negative: Severe difficulty breathing (e.g., struggling for each breath, speaks in single words)    Negative: Very weak (can't stand)    Negative: Sounds like a life-threatening emergency to the triager    Negative: Runny nose is caused by pollen or other allergies    Negative: Cough is the main symptom    Negative: Sore throat is the main symptom    Negative: Patient sounds very sick or weak to the triager    Negative: Fever > 103 F (39.4 C)    Negative: Fever > 101 F (38.3 C) and over 60 years of age    Negative: Fever > 100.0 F (37.8 C) and has diabetes mellitus or a weak immune system (e.g., HIV positive, cancer chemotherapy, organ transplant, splenectomy, chronic steroids)    Negative: Fever > 100.0 F (37.8 C) and bedridden (e.g., nursing home patient, stroke, chronic illness, recovering from surgery)    Negative: Fever present > 3 days (72 hours)    Negative: Fever returns after gone for over 24 hours and symptoms worse or not improved    Negative: Sinus pain (not just congestion) and fever    Negative: Earache    Negative: Sinus congestion (pressure, fullness) present > 10 days    Negative: Nasal discharge present > 10 days    Negative: Using nasal washes and pain medicine > 24 hours and sinus pain (lower forehead, cheekbone, or eye) persists    Negative: Patient wants to be seen    Negative: Sore throat present > 5 days    Answer Assessment - Initial Assessment Questions  1. ONSET: \"When did the nasal discharge start?\"       7 days ago  2. AMOUNT: \"How much discharge is there?\"       moderate  3. COUGH: \"Do you have a cough?\" If yes, ask: \"Describe the color of your sputum\" (clear, white, yellow, green)      Yellow phlegm  4. RESPIRATORY DISTRESS: \"Describe your breathing.\"       Normal  5. FEVER: \"Do you have a fever?\" If so, ask: \"What is your temperature, how was it measured, and when did it start?\"      No fever  6. SEVERITY: \"Overall, how bad are you feeling right now?\" " "(e.g., doesn't interfere with normal activities, staying home from school/work, staying in bed)       Staying home from work  7. OTHER SYMPTOMS: \"Do you have any other symptoms?\" (e.g., sore throat, earache, wheezing, vomiting)      No  8. PREGNANCY: \"Is there any chance you are pregnant?\" \"When was your last menstrual period?\"      No    Protocols used: COMMON COLD-A-[place]    "

## 2019-07-29 NOTE — TELEPHONE ENCOUNTER
Reason for call:  Patient reporting a symptom    Symptom or request: Pt is coughing, head and chest congestion. This has been for about a week. No fever. Pt is nursing her baby. She breaks out in a cold sweat about 5 times a day. Is this her hormones? This had been for last couple of days.   Overall she doesn't feel like anything major is wrong, just the cold. Please advise.     Duration (how long have symptoms been present): one week    Have you been treated for this before?     Additional comments:     Phone Number patient can be reached at:  Home number on file [phone] (home)    Best Time:  any    Can we leave a detailed message on this number:  YES    Call taken on 7/29/2019 at 11:30 AM by Michelle Marrufo

## 2019-07-29 NOTE — TELEPHONE ENCOUNTER
Patient has cold.  Triaged as non emergent.  Patient declined to make appt at  clinic stating that she would follow up with her OBGYN on Wed appt.    No further action necessary.  Encounter closed.    Yaron Harris RN

## 2019-08-20 ENCOUNTER — E-VISIT (OUTPATIENT)
Dept: FAMILY MEDICINE | Facility: CLINIC | Age: 34
End: 2019-08-20
Payer: COMMERCIAL

## 2019-08-20 DIAGNOSIS — F90.0 ATTENTION DEFICIT HYPERACTIVITY DISORDER (ADHD), PREDOMINANTLY INATTENTIVE TYPE: ICD-10-CM

## 2019-08-20 PROCEDURE — 99444 ZZC PHYSICIAN ONLINE EVALUATION & MANAGEMENT SERVICE: CPT | Performed by: FAMILY MEDICINE

## 2019-08-20 RX ORDER — DEXTROAMPHETAMINE SACCHARATE, AMPHETAMINE ASPARTATE, DEXTROAMPHETAMINE SULFATE AND AMPHETAMINE SULFATE 2.5; 2.5; 2.5; 2.5 MG/1; MG/1; MG/1; MG/1
TABLET ORAL
Qty: 150 TABLET | Refills: 0 | Status: SHIPPED | OUTPATIENT
Start: 2019-10-20 | End: 2019-11-26

## 2019-08-20 RX ORDER — DEXTROAMPHETAMINE SACCHARATE, AMPHETAMINE ASPARTATE, DEXTROAMPHETAMINE SULFATE AND AMPHETAMINE SULFATE 2.5; 2.5; 2.5; 2.5 MG/1; MG/1; MG/1; MG/1
TABLET ORAL
Qty: 150 TABLET | Refills: 0 | Status: SHIPPED | OUTPATIENT
Start: 2019-09-20 | End: 2019-11-26

## 2019-08-20 RX ORDER — DEXTROAMPHETAMINE SACCHARATE, AMPHETAMINE ASPARTATE, DEXTROAMPHETAMINE SULFATE AND AMPHETAMINE SULFATE 2.5; 2.5; 2.5; 2.5 MG/1; MG/1; MG/1; MG/1
TABLET ORAL
Qty: 150 TABLET | Refills: 0 | Status: SHIPPED | OUTPATIENT
Start: 2019-08-20 | End: 2019-11-26

## 2019-08-20 NOTE — TELEPHONE ENCOUNTER
Left message on patients phone and a [employer] message. Placed at the  .  Misa Velasquez on 8/20/2019 at 1:22 PM

## 2019-08-27 ENCOUNTER — MYC MEDICAL ADVICE (OUTPATIENT)
Dept: FAMILY MEDICINE | Facility: CLINIC | Age: 34
End: 2019-08-27

## 2019-09-24 ENCOUNTER — TELEPHONE (OUTPATIENT)
Dept: FAMILY MEDICINE | Facility: CLINIC | Age: 34
End: 2019-09-24

## 2019-09-24 ASSESSMENT — PATIENT HEALTH QUESTIONNAIRE - PHQ9: SUM OF ALL RESPONSES TO PHQ QUESTIONS 1-9: 8

## 2019-09-24 NOTE — TELEPHONE ENCOUNTER
Dr. Graf,    S-(situation): medications and depression    B-(background): has not been taking wellbutrin at all for months.  Thought she was suppose to stop this when she started the ADD medication?.  Has not taken prozac since yesterday.  Is still taking her Adderall.  Patient can not find her Prozac and wanting to know if she can just stop taking it.  I have updated her PHQ-9 today.      A-(assessment): depression and medication compliance    R-(recommendations): please advise. Maira CHAVEZ RN    PHQ-9 ([employer]) 9/24/2019   No Interest In Doing Things    Feeling Depressed    Trouble Sleeping    Tired / No Energy    No appetite or Over-Eating    Feeling Bad about Self    Trouble Concentrating    Moving Slow or Restless    Suicidal Thoughts    Total Score    1.  Little interest or pleasure in doing things 0   2.  Feeling down, depressed, or hopeless 1   3.  Trouble falling or staying asleep, or sleeping too much 3   4.  Feeling tired or having little energy 3   5.  Poor appetite or overeating 0   6.  Feeling bad about yourself 1   7.  Trouble concentrating 0   8.  Moving slowly or restless 0   9.  Suicidal or self-harm thoughts 0   PHQ-9 Total Score 8   Difficulty at work, home, or with people Somewhat difficult

## 2019-09-24 NOTE — TELEPHONE ENCOUNTER
Reason for Call:  Other med question    Detailed comments: Patient states she has lost her Prozac.  She has no idea where they are, has searched her entire house, cannot find them.  She wants to know if it is ok to just stop taking them now.    Phone Number Patient can be reached at: Home number on file [phone] (home)    Best Time: any    Can we leave a detailed message on this number? YES    Call taken on 9/24/2019 at 2:05 PM by Airam Caballero

## 2019-09-24 NOTE — TELEPHONE ENCOUNTER
If mood is good and stable we could just stop the prozac from the 20mg per day dose. This is a safe dose to just stop and not wean down.  Sometimes it is nice to decrease a little more slowly like taking 10mg daily for 2 weeks then stop, but we don't have to.    But please call me if mood is not going well with stopping the Prozac.    Thanks,  Randy Graf MD

## 2019-09-30 ENCOUNTER — OFFICE VISIT (OUTPATIENT)
Dept: DERMATOLOGY | Facility: CLINIC | Age: 34
End: 2019-09-30
Payer: COMMERCIAL

## 2019-09-30 VITALS
DIASTOLIC BLOOD PRESSURE: 76 MMHG | HEART RATE: 84 BPM | HEIGHT: 65 IN | BODY MASS INDEX: 31.48 KG/M2 | SYSTOLIC BLOOD PRESSURE: 132 MMHG

## 2019-09-30 DIAGNOSIS — L70.0 ACNE VULGARIS: Primary | ICD-10-CM

## 2019-09-30 DIAGNOSIS — D48.5 NEOPLASM OF UNCERTAIN BEHAVIOR OF SKIN: ICD-10-CM

## 2019-09-30 DIAGNOSIS — D18.01 HEMANGIOMA OF SKIN: ICD-10-CM

## 2019-09-30 DIAGNOSIS — D22.9 NEVUS: ICD-10-CM

## 2019-09-30 DIAGNOSIS — L81.4 LENTIGO: ICD-10-CM

## 2019-09-30 DIAGNOSIS — L82.1 SEBORRHEIC KERATOSIS: ICD-10-CM

## 2019-09-30 PROCEDURE — 11106 INCAL BX SKN SINGLE LES: CPT | Performed by: PHYSICIAN ASSISTANT

## 2019-09-30 PROCEDURE — 11107 INCAL BX SKN EA SEP/ADDL: CPT | Performed by: PHYSICIAN ASSISTANT

## 2019-09-30 PROCEDURE — 99214 OFFICE O/P EST MOD 30 MIN: CPT | Mod: 25 | Performed by: PHYSICIAN ASSISTANT

## 2019-09-30 PROCEDURE — 88305 TISSUE EXAM BY PATHOLOGIST: CPT | Mod: TC | Performed by: PHYSICIAN ASSISTANT

## 2019-09-30 RX ORDER — TRETINOIN 0.5 MG/G
CREAM TOPICAL
Qty: 45 G | Refills: 11 | Status: SHIPPED | OUTPATIENT
Start: 2019-09-30 | End: 2020-10-23

## 2019-09-30 RX ORDER — DOXYCYCLINE 100 MG/1
CAPSULE ORAL
Qty: 60 CAPSULE | Refills: 2 | Status: SHIPPED | OUTPATIENT
Start: 2019-09-30 | End: 2020-01-10

## 2019-09-30 RX ORDER — CLINDAMYCIN PHOSPHATE 10 MG/G
GEL TOPICAL
Qty: 60 G | Refills: 11 | Status: SHIPPED | OUTPATIENT
Start: 2019-09-30 | End: 2020-10-23

## 2019-09-30 NOTE — PATIENT INSTRUCTIONS
Wound Care Instructions     FOR SUPERFICIAL WOUNDS ON THE    R Deltoid and R Upper arm        AFTER 24 HOURS YOU SHOULD REMOVE THE BANDAGE AND BEGIN DAILY DRESSING CHANGES AS FOLLOWS:     1) Remove Dressing.     2) Clean and dry the area with tap water using a Q-tip or sterile gauze pad.     3) Apply Aquafor, Vaseline, Polysporin ointment or Bacitracin ointment over entire wound.  Do NOT use Neosporin ointment.     4) Cover the wound with a band-aid, or a sterile non-stick gauze pad and micropore paper tape      REPEAT THESE INSTRUCTIONS AT LEAST ONCE A DAY UNTIL THE WOUND HAS COMPLETELY HEALED.    It is an old wives tale that a wound heals better when it is exposed to air and allowed to dry out. The wound will heal faster with a better cosmetic result if it is kept moist with ointment and covered with a bandage.  Do not let the wound dry out.      IMPORTANT INFORMATION ON REVERSE SIDE.      Supplies Needed:      *Cotton tipped applicators (Q-tips)    *Polysporin Ointment or Bacitracin Ointment (NOT NEOSPORIN)    *Band-aids or non-stick gauze pads and micropore paper tape.              PATIENT INFORMATION    During the healing process you will notice a number of changes. All wounds develop a small halo of redness surrounding the wound.  This means healing is occurring. Severe itching with extensive redness usually indicates sensitivity to the ointment or bandage tape used to dress the wound.  You should call our office if this develops.      Swelling  and/or discoloration around your surgical site is common, particularly when performed around the eye.    All wounds normally drain.  The larger the wound the more drainage there will be.  After 7-10 days, you will notice the wound beginning to shrink and new skin will begin to grow.  The wound is healed when you can see skin has formed over the entire area.  A healed wound has a healthy, shiny look to the surface and is red to dark pink in color to normalize.   Wounds may take approximately 4-6 weeks to heal.  Larger wounds may take 6-8 weeks.  After the wound is healed you may discontinue dressing changes.    You may experience a sensation of tightness as your wound heals. This is normal and will gradually subside.    Your healed wound may be sensitive to temperature changes. This sensitivity improves with time, but if you re having a lot of discomfort, try to avoid temperature extremes.    Patients frequently experience itching after their wound appears to have healed because of the continue healing under the skin.  Plain Vaseline will help relieve the itching.     ACNE INFORMATION     Acne is a skin disease affecting oil glands and hair follicles in your skin.  When these pores do not drain properly, hair follicles can becomes clogged and bacteria can be trapped causing inflammation to occur. Clinical manifestations range from mild to severe, such as comedones (whiteheads and blackheads) or cysts.     Several factors contribute to acne:     1. Hormonal- Androgen (a type of hormone) can cause oil glands to enlarges and produces more sebum (oil).    2. Bacterial- A specific type of bacteria called Propionibacterium acnes are found in these oily follicles and stimulate more inflammation.     3. Genetics- History of family members (parents or siblings)     4. External factors- mechanical trauma, humid conditions, cosmetics, topical steroids, smoking or some oral medications.      Combination therapy is the mainstay of treatment given the multiple factors contributing to acne.  The type of treatment is also dependant on whether you are pregnancy or breastfeeding.     TOPICAL TREATMENTS   ??Topical Antibiotics These medications help prevent growth of bacteria in your pores.     X?Topical Exfoliating Agent These are drying agents that will help normalize oil production, unplug pores and reduce bacterial growth. (Note: Make sure you discontinue this medication ONE week prior to  waxing.)     ORAL TREATMENTS   X?Oral Antibiotics: Tetracyclines are the most commonly used oral antibiotics to treat moderate to severe acne. They are safe to take for months at a time. Some side effects to these medications include: sun sensitivity, stomach upset, dizziness and heartburn. If you experience a sudden onset of rash or severe unusual headache, discontinue the medication and contact your clinician immediately.     ??Spironolactone: This oral medication blocks androgens (hormones that can aggravate acne).  Since this medication is also used as a diuretic, baseline blood work is needed to check your electrolytes and liver function.     ??Birth Control Pills (Females only). In order to decrease hormonal fluctuations that affect acne, birth control pills such as Jessica  or Cheryl  can be effective in treating acne.  We advise you to discuss with your OB/GYN or Primary Care Doctor to be screened and to check if you are eligible to be on this pill.     ??Accutane  (generic: isotretinoin). This oral medication is a retinoid (Vitamin A derivative like Retin-A) used to treat severe acne that does not respond to the above medications. Accutane  helps clear acne for years and the average period of treatment is five to seven months, however, the duration of treatment may be adjusted based on severity.     ALTERNATIVE TREATMENTS   ??Microdermabrasion & light chemical peels help improve skin texture, decrease pore size, decrease mild scarring & increase absorption of your topical treatments     ??Blue Spectrum Light Therapy consists of a concentrated visible light that kills bacteria in the oil ducts. It is safe for pregnancy & nursing. At times, ALA (a topical solution) may be applied prior to the Blue Light exposure in order to enhance light penetration.     ??Pulse Dye Laser (PDL) decreases inflammation and improves certain acne scars.     Recommended facial cleansers, moisturizers & cosmetics:     ??Cetaphil   CeraVe  (Note: Look for  non-comeodogenic  cleansers & moisturizers)   ??Clinique , Prescriptives  make-up         Acne instructions:    Once daily wash with an over the counter benzoyl peroxide wash (Oxy, panoxyl, etc) - face and back    After the shower  Apply clindamycin gel - thin layer to face and back  Tretinoin cream - pea size to face; 2-3 peas to the back  Moisturizer over    Start doxycycline (antibiotic) twice per day with food and full glass of water for 3 months.     Follow up for acne in 2 months

## 2019-09-30 NOTE — LETTER
"    9/30/2019         RE: Ángela Zurita  [address] [place] [id number]        Dear Colleague,    Thank you for referring your patient, Ángela Zurita, to the [facility]. Please see a copy of my visit note below.    HPI:   Chief complaints: Ángela Zurita is a 34 year old female who presents for Full skin cancer screening to rule out skin cancer   Last Skin Exam: n/a      1st Baseline: yes  Personal HX of Skin Cancer: no   Personal HX of Malignant Melanoma: no   Family HX of Skin Cancer / Malignant Melanoma: Yes grandparents with skin CA: unsure what kind  Personal HX of Atypical Moles:   no  Risk factors: history of tanning bed use especially in highschool  New / Changing lesions:yes few spots on the right arm.  Additional concern: Acne on the face and back  Social History: Has 6 month old and 4 yo boys   On review of systems, there are no further skin complaints, patient is feeling otherwise well.  See patient intake sheet.  ROS of the following were done and are negative: Constitutional, Eyes, Ears, Nose,   Mouth, Throat, Cardiovascular, Respiratory, GI, Genitourinary, Musculoskeletal,   Psychiatric, Endocrine, Allergic/Immunologic.    PHYSICAL EXAM:   /76   Pulse 84   Ht 1.651 m (5' 5\")   BMI 31.48 kg/m     Skin exam performed as follows: Type 2 skin. Mood appropriate  Alert and Oriented X 3. Well developed, well nourished in no distress.  General appearance: Normal  Head including face: Normal  Eyes: conjunctiva and lids: Normal  Mouth: Lips, teeth, gums: Normal  Neck: Normal  Chest-breast/axillae: Normal  Back: Normal  Spleen and liver: Normal  Cardiovascular: Exam of peripheral vascular system by observation for swelling, varicosities, edema: Normal  Genitalia: groin, buttocks: Normal  Extremities: digits/nails (clubbing): Normal  Eccrine and Apocrine glands: Normal  Right upper extremity: Normal  Left upper extremity: Normal  Right lower extremity: Normal  Left lower " extremity: Normal  Skin: Scalp and body hair: See below    Pt deferred exam of breasts, groin, buttocks: No    Other physical findings:  1. Multiple pigmented macules on extremities and trunk  2. Multiple pigmented macules on face, trunk and extremities  3. Multiple vascular papules on trunk, arms and legs  4. Multiple scattered keratotic plaques  5. 3 mm irregular gray macule on the right deltoid  6. 4 mm irregular gray macule on the right upper arm  7. 1-2+ comedones and inflammatory papules on the face and back       Except as noted above, no other signs of skin cancer or melanoma.     ASSESSMENT/PLAN:   Benign Full skin cancer screening today. . Patient with history of none  Advised on monthly self exams and 1 year  Patient Education: Appropriate brochures given.    1. Multiple benign appearing nevi on arms, legs and trunk. Discussed ABCDEs of melanoma and sunscreen.   2. Multiple lentigos on arms, legs and trunk. Advised benign, no treatment needed.  3. Multiple scattered angiomas. Advised benign, no treatment needed.   4. Seborrheic keratosis on arms, legs and trunk. Advised benign, no treatment needed.  5. Atypical nevus on the right deltoid, right upper arm - advised. Anesthestized with lidocaine and area cleaned with betadine. Incisional biopsy performed and specimen placed in formalin. Patient will receive call with results.   6. Acne Vulgaris - advised on diagnosis and treatment options. Discussed use of topical medications and antibiotics.   --Start doxycycline 100 mg BID x 3 months. Advised to take with food. Discussed risk of GI upset, esophagitis and photosensitivity.   --Start OTC BPO wash every day  --Start clindamyicn gel every day  --Start tretinoin 0.05% cream QD  7. Ángela to follow up with Primary Care provider regarding elevated blood pressure.            Follow-up: 2 months for acne    1.) Patient was asked about new and changing moles. YES  2.) Patient received a complete physical skin  examination: YES  3.) Patient was counseled to perform a monthly self skin examination: YES  Scribed By: Christianne Thomas MS, PASANDIP        Again, thank you for allowing me to participate in the care of your patient.        Sincerely,        Christianne Thomas PA-C

## 2019-09-30 NOTE — PROGRESS NOTES
"HPI:   Chief complaints: Ángela Zurita is a 34 year old female who presents for Full skin cancer screening to rule out skin cancer   Last Skin Exam: n/a      1st Baseline: yes  Personal HX of Skin Cancer: no   Personal HX of Malignant Melanoma: no   Family HX of Skin Cancer / Malignant Melanoma: Yes grandparents with skin CA: unsure what kind  Personal HX of Atypical Moles:   no  Risk factors: history of tanning bed use especially in highschool  New / Changing lesions:yes few spots on the right arm.  Additional concern: Acne on the face and back  Social History: Has 6 month old and 4 yo boys   On review of systems, there are no further skin complaints, patient is feeling otherwise well.  See patient intake sheet.  ROS of the following were done and are negative: Constitutional, Eyes, Ears, Nose,   Mouth, Throat, Cardiovascular, Respiratory, GI, Genitourinary, Musculoskeletal,   Psychiatric, Endocrine, Allergic/Immunologic.    PHYSICAL EXAM:   /76   Pulse 84   Ht 1.651 m (5' 5\")   BMI 31.48 kg/m    Skin exam performed as follows: Type 2 skin. Mood appropriate  Alert and Oriented X 3. Well developed, well nourished in no distress.  General appearance: Normal  Head including face: Normal  Eyes: conjunctiva and lids: Normal  Mouth: Lips, teeth, gums: Normal  Neck: Normal  Chest-breast/axillae: Normal  Back: Normal  Spleen and liver: Normal  Cardiovascular: Exam of peripheral vascular system by observation for swelling, varicosities, edema: Normal  Genitalia: groin, buttocks: Normal  Extremities: digits/nails (clubbing): Normal  Eccrine and Apocrine glands: Normal  Right upper extremity: Normal  Left upper extremity: Normal  Right lower extremity: Normal  Left lower extremity: Normal  Skin: Scalp and body hair: See below    Pt deferred exam of breasts, groin, buttocks: No    Other physical findings:  1. Multiple pigmented macules on extremities and trunk  2. Multiple pigmented macules on face, trunk and " extremities  3. Multiple vascular papules on trunk, arms and legs  4. Multiple scattered keratotic plaques  5. 3 mm irregular gray macule on the right deltoid  6. 4 mm irregular gray macule on the right upper arm  7. 1-2+ comedones and inflammatory papules on the face and back       Except as noted above, no other signs of skin cancer or melanoma.     ASSESSMENT/PLAN:   Benign Full skin cancer screening today. . Patient with history of none  Advised on monthly self exams and 1 year  Patient Education: Appropriate brochures given.    1. Multiple benign appearing nevi on arms, legs and trunk. Discussed ABCDEs of melanoma and sunscreen.   2. Multiple lentigos on arms, legs and trunk. Advised benign, no treatment needed.  3. Multiple scattered angiomas. Advised benign, no treatment needed.   4. Seborrheic keratosis on arms, legs and trunk. Advised benign, no treatment needed.  5. Atypical nevus on the right deltoid, right upper arm - advised. Anesthestized with lidocaine and area cleaned with betadine. Incisional biopsy performed and specimen placed in formalin. Patient will receive call with results.   6. Acne Vulgaris - advised on diagnosis and treatment options. Discussed use of topical medications and antibiotics.   --Start doxycycline 100 mg BID x 3 months. Advised to take with food. Discussed risk of GI upset, esophagitis and photosensitivity.   --Start OTC BPO wash every day  --Start clindamyicn gel every day  --Start tretinoin 0.05% cream QD  7. Ángela to follow up with Primary Care provider regarding elevated blood pressure.            Follow-up: 2 months for acne    1.) Patient was asked about new and changing moles. YES  2.) Patient received a complete physical skin examination: YES  3.) Patient was counseled to perform a monthly self skin examination: YES  Scribed By: Christianne Thomas, MS, PA-C

## 2019-09-30 NOTE — NURSING NOTE
"Initial /76   Pulse 84   Ht 1.651 m (5' 5\")   BMI 31.48 kg/m   Estimated body mass index is 31.48 kg/m  as calculated from the following:    Height as of this encounter: 1.651 m (5' 5\").    Weight as of 7/9/19: 85.8 kg (189 lb 3.2 oz). .      "

## 2019-10-01 LAB — COPATH REPORT: NORMAL

## 2019-11-03 ENCOUNTER — HEALTH MAINTENANCE LETTER (OUTPATIENT)
Age: 34
End: 2019-11-03

## 2019-11-26 ENCOUNTER — E-VISIT (OUTPATIENT)
Dept: FAMILY MEDICINE | Facility: CLINIC | Age: 34
End: 2019-11-26
Payer: COMMERCIAL

## 2019-11-26 DIAGNOSIS — F90.0 ATTENTION DEFICIT HYPERACTIVITY DISORDER (ADHD), PREDOMINANTLY INATTENTIVE TYPE: Primary | ICD-10-CM

## 2019-11-26 PROCEDURE — 99444 ZZC PHYSICIAN ONLINE EVALUATION & MANAGEMENT SERVICE: CPT | Performed by: FAMILY MEDICINE

## 2019-11-26 RX ORDER — DEXTROAMPHETAMINE SACCHARATE, AMPHETAMINE ASPARTATE, DEXTROAMPHETAMINE SULFATE AND AMPHETAMINE SULFATE 2.5; 2.5; 2.5; 2.5 MG/1; MG/1; MG/1; MG/1
TABLET ORAL
Qty: 150 TABLET | Refills: 0 | Status: SHIPPED | OUTPATIENT
Start: 2019-12-26 | End: 2021-05-17

## 2019-11-26 RX ORDER — DEXTROAMPHETAMINE SACCHARATE, AMPHETAMINE ASPARTATE, DEXTROAMPHETAMINE SULFATE AND AMPHETAMINE SULFATE 2.5; 2.5; 2.5; 2.5 MG/1; MG/1; MG/1; MG/1
TABLET ORAL
Qty: 150 TABLET | Refills: 0 | Status: SHIPPED | OUTPATIENT
Start: 2020-01-25 | End: 2020-01-28

## 2019-11-26 RX ORDER — DEXTROAMPHETAMINE SACCHARATE, AMPHETAMINE ASPARTATE, DEXTROAMPHETAMINE SULFATE AND AMPHETAMINE SULFATE 2.5; 2.5; 2.5; 2.5 MG/1; MG/1; MG/1; MG/1
TABLET ORAL
Qty: 150 TABLET | Refills: 0 | Status: SHIPPED | OUTPATIENT
Start: 2019-11-26 | End: 2020-01-10

## 2019-11-26 NOTE — PROGRESS NOTES
"   SUBJECTIVE:   CC: Ángela Zurita is an 34 year old woman who presents for preventive health visit.     Healthy Habits:    Do you get at least three servings of calcium containing foods daily (dairy, green leafy vegetables, etc.)? { :959528::\"yes\"}    Amount of exercise or daily activities, outside of work: { :329692}    Problems taking medications regularly { :910778::\"No\"}    Medication side effects: { :905200::\"No\"}    Have you had an eye exam in the past two years? { :352242}    Do you see a dentist twice per year? { :874176}    Do you have sleep apnea, excessive snoring or daytime drowsiness?{ :513481}  {Outside tests to abstract? :940446}    {additional problems to add (Optional):470156}    Today's PHQ-2 Score:   PHQ-2 ( 1999 [employer]) 1/14/2019 9/3/2018   Q1: Little interest or pleasure in doing things 0 0   Q2: Feeling down, depressed or hopeless 0 0   PHQ-2 Score 0 0   Q1: Little interest or pleasure in doing things - Not at all   Q2: Feeling down, depressed or hopeless - Not at all   PHQ-2 Score - 0     {PHQ-2 LOOK IN ASSESSMENTS (Optional) :300600}  Abuse: Current or Past(Physical, Sexual or Emotional)- {YES/NO/NA:264398}  Do you feel safe in your environment? {YES/NO/NA:440431}        Social History     Tobacco Use     Smoking status: Current Every Day Smoker     Packs/day: 0.50     Years: 15.00     Pack years: 7.50     Types: Cigarettes     Start date: 12/1/2014     Smokeless tobacco: Never Used     Tobacco comment: Quit with each pregnancy   Substance Use Topics     Alcohol use: No     Alcohol/week: 0.0 standard drinks     Frequency: Never     Comment: occas-quit with pregnancy     If you drink alcohol do you typically have >3 drinks per day or >7 drinks per week? {ETOH :296020}                     Reviewed orders with patient.  Reviewed health maintenance and updated orders accordingly - {Yes/No:554227::\"Yes\"}  {Chronicprobdata (Optional):788544}    {Mammo Decision Support " "(Optional):619527}    Pertinent mammograms are reviewed under the imaging tab.  History of abnormal Pap smear: {PAP HX:715533}  PAP / HPV Latest Ref Rng & Units 3/14/2016 1/26/2015 1/29/2014   PAP - NIL NIL NIL   HPV 16 DNA NEG Negative - -   HPV 18 DNA NEG Negative - -   OTHER HR HPV NEG Negative - -     Reviewed and updated as needed this visit by clinical staff         Reviewed and updated as needed this visit by Provider        {HISTORY OPTIONS (Optional):669974}    ROS:  { :913692}    OBJECTIVE:   There were no vitals taken for this visit.  EXAM:  {Exam Choices:006644}    {Diagnostic Test Results (Optional):541085::\"Diagnostic Test Results:\",\"Labs reviewed in Epic\"}    ASSESSMENT/PLAN:   {Diag Picklist:346308}    COUNSELING:   {FEMALE COUNSELING MESSAGES:323401::\"Reviewed preventive health counseling, as reflected in patient instructions\"}    Estimated body mass index is 31.48 kg/m  as calculated from the following:    Height as of 9/30/19: 1.651 m (5' 5\").    Weight as of 7/9/19: 85.8 kg (189 lb 3.2 oz).    {Weight Management Plan (ACO) Complete if BMI is abnormal-  Ages 18-64  BMI >24.9.  Age 65+ with BMI <23 or >30 (Optional):966821}     reports that she has been smoking cigarettes. She started smoking about 4 years ago. She has a 7.50 pack-year smoking history. She has never used smokeless tobacco.  {Tobacco Cessation -- Complete if patient is a smoker (Optional):835390}    Counseling Resources:  ATP IV Guidelines  Pooled Cohorts Equation Calculator  Breast Cancer Risk Calculator  FRAX Risk Assessment  ICSI Preventive Guidelines  Dietary Guidelines for Americans, 2010  USDA's MyPlate  ASA Prophylaxis  Lung CA Screening    Randy rGaf MD  [facility]  "

## 2019-11-26 NOTE — PATIENT INSTRUCTIONS
Do call to schedule the Mammogram [phone].    Plan cholesterol labs at next physical fasting for 12 hours.    Preventive Health Recommendations  Female Ages 26 - 39  Yearly exam:   See your health care provider every year in order to    Review health changes.     Discuss preventive care.      Review your medicines if you your doctor has prescribed any.    Until age 30: Get a Pap test every three years (more often if you have had an abnormal result).    After age 30: Talk to your doctor about whether you should have a Pap test every 3 years or have a Pap test with HPV screening every 5 years.   You do not need a Pap test if your uterus was removed (hysterectomy) and you have not had cancer.  You should be tested each year for STDs (sexually transmitted diseases), if you're at risk.   Talk to your provider about how often to have your cholesterol checked.  If you are at risk for diabetes, you should have a diabetes test (fasting glucose).  Shots: Get a flu shot each year. Get a tetanus shot every 10 years.   Nutrition:     Eat at least 5 servings of fruits and vegetables each day.    Eat whole-grain bread, whole-wheat pasta and brown rice instead of white grains and rice.    Get adequate Calcium and Vitamin D.     Lifestyle    Exercise at least 150 minutes a week (30 minutes a day, 5 days of the week). This will help you control your weight and prevent disease.    Limit alcohol to one drink per day.    No smoking.     Wear sunscreen to prevent skin cancer.    See your dentist every six months for an exam and cleaning.

## 2019-12-03 ENCOUNTER — OFFICE VISIT (OUTPATIENT)
Dept: FAMILY MEDICINE | Facility: CLINIC | Age: 34
End: 2019-12-03
Payer: COMMERCIAL

## 2019-12-03 VITALS
TEMPERATURE: 97 F | OXYGEN SATURATION: 97 % | DIASTOLIC BLOOD PRESSURE: 66 MMHG | HEIGHT: 65 IN | HEART RATE: 82 BPM | SYSTOLIC BLOOD PRESSURE: 122 MMHG | WEIGHT: 182.4 LBS | RESPIRATION RATE: 16 BRPM | BODY MASS INDEX: 30.39 KG/M2

## 2019-12-03 DIAGNOSIS — Z23 NEED FOR VACCINATION: ICD-10-CM

## 2019-12-03 DIAGNOSIS — Z00.00 ROUTINE GENERAL MEDICAL EXAMINATION AT A HEALTH CARE FACILITY: Primary | ICD-10-CM

## 2019-12-03 DIAGNOSIS — Z23 NEED FOR PROPHYLACTIC VACCINATION AND INOCULATION AGAINST INFLUENZA: ICD-10-CM

## 2019-12-03 DIAGNOSIS — Z80.3 FAMILY HISTORY OF MALIGNANT NEOPLASM OF BREAST: ICD-10-CM

## 2019-12-03 DIAGNOSIS — Z12.31 ENCOUNTER FOR SCREENING MAMMOGRAM FOR BREAST CANCER: ICD-10-CM

## 2019-12-03 PROCEDURE — 90471 IMMUNIZATION ADMIN: CPT | Performed by: FAMILY MEDICINE

## 2019-12-03 PROCEDURE — 99395 PREV VISIT EST AGE 18-39: CPT | Mod: 25 | Performed by: FAMILY MEDICINE

## 2019-12-03 PROCEDURE — 90732 PPSV23 VACC 2 YRS+ SUBQ/IM: CPT | Performed by: FAMILY MEDICINE

## 2019-12-03 PROCEDURE — 90686 IIV4 VACC NO PRSV 0.5 ML IM: CPT | Performed by: FAMILY MEDICINE

## 2019-12-03 PROCEDURE — 90472 IMMUNIZATION ADMIN EACH ADD: CPT | Performed by: FAMILY MEDICINE

## 2019-12-03 ASSESSMENT — ENCOUNTER SYMPTOMS
HEMATOCHEZIA: 0
EYE PAIN: 0
NERVOUS/ANXIOUS: 0
BREAST MASS: 0
PALPITATIONS: 0
CHILLS: 1
MYALGIAS: 0
HEADACHES: 1
FREQUENCY: 0
SHORTNESS OF BREATH: 0
CONSTIPATION: 0
COUGH: 0
JOINT SWELLING: 0
ABDOMINAL PAIN: 0
DYSURIA: 0
WEAKNESS: 0
DIZZINESS: 0
FEVER: 0
HEARTBURN: 0
PARESTHESIAS: 0
SORE THROAT: 0
NAUSEA: 0
DIARRHEA: 0
HEMATURIA: 0
ARTHRALGIAS: 1

## 2019-12-03 ASSESSMENT — MIFFLIN-ST. JEOR: SCORE: 1523.24

## 2019-12-03 NOTE — PROGRESS NOTES
SUBJECTIVE:   CC: Ángela Zurita is an 34 year old woman who presents for preventive health visit.     Healthy Habits:     Getting at least 3 servings of Calcium per day:  Yes    Bi-annual eye exam:  Yes    Dental care twice a year:  Yes    Sleep apnea or symptoms of sleep apnea:  None    Diet:  Regular (no restrictions)    Frequency of exercise:  1 day/week    Duration of exercise:  15-30 minutes    Taking medications regularly:  Yes    Barriers to taking medications:  None    Medication side effects:  Not applicable    PHQ-2 Total Score: 0    Additional concerns today:  Yes (joint pain, family hx of liver issues (dad), yellowish eyes sometimes- wondering if it's from the retin-A cream.)  started during treatment for acne with doxycycline and retin-A and mostly eyes get dry not yellow.    - Patient states she is getting an IUD soon here and will get her pap smear done at that time in OB.    Depression Followup    How are you doing with your depression since your last visit? Improved. Did restart the Prozac 20mg going well. They do help.     Are you having other symptoms that might be associated with depression? No    Have you had a significant life event?  No     Are you feeling anxious or having panic attacks?   No    Do you have any concerns with your use of alcohol or other drugs? No    Social History     Tobacco Use     Smoking status: Current Every Day Smoker     Packs/day: 0.50     Years: 15.00     Pack years: 7.50     Types: Cigarettes     Start date: 12/1/2014     Smokeless tobacco: Never Used     Tobacco comment: Quit with each pregnancy   Substance Use Topics     Alcohol use: No     Alcohol/week: 0.0 standard drinks     Frequency: Never     Comment: occas-quit with pregnancy     Drug use: No     PHQ 4/30/2019 7/9/2019 9/24/2019   PHQ-9 Total Score 16 16 8   Q9: Thoughts of better off dead/self-harm past 2 weeks Not at all Not at all Not at all     NADIA-7 SCORE 7/9/2019   Total Score 12          Suicide Assessment Five-step Evaluation and Treatment (SAFE-T)      Today's PHQ-2 Score:   PHQ-2 ( 1999 Pfizer) 12/3/2019   Q1: Little interest or pleasure in doing things -   Q2: Feeling down, depressed or hopeless -   PHQ-2 Score -   Q1: Little interest or pleasure in doing things -   Q2: Feeling down, depressed or hopeless -   PHQ-2 Score Incomplete       Abuse: Current or Past(Physical, Sexual or Emotional)- No  Do you feel safe in your environment? Yes        Social History     Tobacco Use     Smoking status: Current Every Day Smoker     Packs/day: 0.50     Years: 15.00     Pack years: 7.50     Types: Cigarettes     Start date: 12/1/2014     Smokeless tobacco: Never Used     Tobacco comment: Quit with each pregnancy   Substance Use Topics     Alcohol use: No     Alcohol/week: 0.0 standard drinks     Frequency: Never     Comment: occas-quit with pregnancy     If you drink alcohol do you typically have >3 drinks per day or >7 drinks per week? No    Alcohol Use 12/3/2019   Prescreen: >3 drinks/day or >7 drinks/week? No   Prescreen: >3 drinks/day or >7 drinks/week? -   No flowsheet data found.    Reviewed orders with patient.  Reviewed health maintenance and updated orders accordingly - Yes  BP Readings from Last 3 Encounters:   12/03/19 122/66   09/30/19 132/76   07/09/19 116/70    Wt Readings from Last 3 Encounters:   12/03/19 82.7 kg (182 lb 6.4 oz)   07/09/19 85.8 kg (189 lb 3.2 oz)   04/30/19 84.8 kg (187 lb)                    Mammogram Screening: Patient under age 50, mutual decision reflected in health maintenance.      Pertinent mammograms are reviewed under the imaging tab.  History of abnormal Pap smear: NO - age 30-65 PAP every 5 years with negative HPV co-testing recommended  PAP / HPV Latest Ref Rng & Units 3/14/2016 1/26/2015 1/29/2014   PAP - NIL NIL NIL   HPV 16 DNA NEG Negative - -   HPV 18 DNA NEG Negative - -   OTHER HR HPV NEG Negative - -     Reviewed and updated as needed this visit  "by clinical staff  Tobacco  Allergies  Meds  Med Hx  Surg Hx  Fam Hx  Soc Hx        Reviewed and updated as needed this visit by Provider            Review of Systems   Constitutional: Positive for chills. Negative for fever.   HENT: Negative for congestion, ear pain, hearing loss and sore throat.    Eyes: Negative for pain and visual disturbance.   Respiratory: Negative for cough and shortness of breath.    Cardiovascular: Negative for chest pain, palpitations and peripheral edema.   Gastrointestinal: Negative for abdominal pain, constipation, diarrhea, heartburn, hematochezia and nausea.   Breasts:  Positive for discharge. Negative for tenderness and breast mass.   Genitourinary: Negative for dysuria, frequency, genital sores, hematuria, pelvic pain, urgency, vaginal bleeding and vaginal discharge.   Musculoskeletal: Positive for arthralgias. Negative for joint swelling and myalgias.   Skin: Negative for rash.   Neurological: Positive for headaches. Negative for dizziness, weakness and paresthesias.   Psychiatric/Behavioral: Positive for mood changes. The patient is not nervous/anxious.         OBJECTIVE:   /66   Pulse 82   Temp 97  F (36.1  C) (Tympanic)   Resp 16   Ht 1.651 m (5' 5\")   Wt 82.7 kg (182 lb 6.4 oz)   LMP 11/22/2019 (Approximate)   SpO2 97%   BMI 30.35 kg/m    Physical Exam  GENERAL: healthy, alert and no distress  EYES: Eyes grossly normal to inspection, PERRL and conjunctivae and sclerae normal  HENT: ear canals and TM's normal, nose and mouth without ulcers or lesions  NECK: no adenopathy, no asymmetry, masses, or scars and thyroid normal to palpation  RESP: lungs clear to auscultation - no rales, rhonchi or wheezes  CV: regular rate and rhythm, normal S1 S2, no S3 or S4, no murmur, click or rub, no peripheral edema and peripheral pulses strong  ABDOMEN: soft, nontender, no hepatosplenomegaly, no masses and bowel sounds normal  MS: no gross musculoskeletal defects noted, no " "edema  SKIN: no suspicious lesions or rashes  NEURO: Normal strength and tone, mentation intact and speech normal  PSYCH: mentation appears normal, affect normal/bright    Diagnostic Test Results:  Labs reviewed in Epic    ASSESSMENT/PLAN:   Ángela was seen today for physical.    Diagnoses and all orders for this visit:    Routine general medical examination at a health care facility    Family history of malignant neoplasm of breast: very strong family history on mom's side including a a male cousin although no one known has been tested for BRCA, so continue screening mammograms  -     MA Screen Bilateral w/Miles; Future    Encounter for screening mammogram for breast cancer  -     MA Screen Bilateral w/Miles; Future        COUNSELING:  Reviewed preventive health counseling, as reflected in patient instructions       Regular exercise       Healthy diet/nutrition       Vision screening    Estimated body mass index is 30.35 kg/m  as calculated from the following:    Height as of this encounter: 1.651 m (5' 5\").    Weight as of this encounter: 82.7 kg (182 lb 6.4 oz).    Weight management plan: Discussed healthy diet and exercise guidelines     reports that she has been smoking cigarettes. She started smoking about 5 years ago. She has a 7.50 pack-year smoking history. She has never used smokeless tobacco.  Tobacco Cessation Action Plan: Information offered: Patient not interested at this time    Counseling Resources:  ATP IV Guidelines  Pooled Cohorts Equation Calculator  Breast Cancer Risk Calculator  FRAX Risk Assessment  ICSI Preventive Guidelines  Dietary Guidelines for Americans, 2010  USDA's MyPlate  ASA Prophylaxis  Lung CA Screening    Randy Graf MD  [facility]  "

## 2019-12-04 ENCOUNTER — ANCILLARY PROCEDURE (OUTPATIENT)
Dept: MAMMOGRAPHY | Facility: CLINIC | Age: 34
End: 2019-12-04
Attending: FAMILY MEDICINE
Payer: COMMERCIAL

## 2019-12-04 DIAGNOSIS — Z80.3 FAMILY HISTORY OF MALIGNANT NEOPLASM OF BREAST: ICD-10-CM

## 2019-12-04 DIAGNOSIS — Z12.31 ENCOUNTER FOR SCREENING MAMMOGRAM FOR BREAST CANCER: ICD-10-CM

## 2019-12-04 PROCEDURE — 77063 BREAST TOMOSYNTHESIS BI: CPT | Mod: TC

## 2019-12-04 PROCEDURE — 77067 SCR MAMMO BI INCL CAD: CPT | Mod: TC

## 2019-12-16 ENCOUNTER — MYC REFILL (OUTPATIENT)
Dept: FAMILY MEDICINE | Facility: CLINIC | Age: 34
End: 2019-12-16

## 2019-12-16 ENCOUNTER — E-VISIT (OUTPATIENT)
Dept: FAMILY MEDICINE | Facility: CLINIC | Age: 34
End: 2019-12-16
Payer: COMMERCIAL

## 2019-12-16 DIAGNOSIS — G43.009 MIGRAINE WITHOUT AURA AND WITHOUT STATUS MIGRAINOSUS, NOT INTRACTABLE: Primary | ICD-10-CM

## 2019-12-16 PROCEDURE — 99444 ZZC PHYSICIAN ONLINE EVALUATION & MANAGEMENT SERVICE: CPT | Performed by: FAMILY MEDICINE

## 2019-12-17 ENCOUNTER — HOSPITAL ENCOUNTER (EMERGENCY)
Facility: CLINIC | Age: 34
Discharge: HOME | End: 2019-12-17
Payer: COMMERCIAL

## 2019-12-17 VITALS
HEIGHT: 66 IN | RESPIRATION RATE: 18 BRPM | HEART RATE: 97 BPM | SYSTOLIC BLOOD PRESSURE: 140 MMHG | TEMPERATURE: 98.6 F | WEIGHT: 180 LBS | BODY MASS INDEX: 28.93 KG/M2 | OXYGEN SATURATION: 97 % | DIASTOLIC BLOOD PRESSURE: 92 MMHG

## 2019-12-17 RX ORDER — ONDANSETRON 4 MG/1
4-8 TABLET, ORALLY DISINTEGRATING ORAL EVERY 8 HOURS PRN
Qty: 12 TABLET | Refills: 1 | Status: SHIPPED | OUTPATIENT
Start: 2019-12-17 | End: 2022-03-08

## 2019-12-17 RX ORDER — SUMATRIPTAN 50 MG/1
50 TABLET, FILM COATED ORAL
Qty: 9 TABLET | Refills: 1 | Status: SHIPPED | OUTPATIENT
Start: 2019-12-17 | End: 2020-05-13

## 2019-12-17 ASSESSMENT — MIFFLIN-ST. JEOR: SCORE: 1520.28

## 2019-12-19 NOTE — TELEPHONE ENCOUNTER
"Requested Prescriptions   Pending Prescriptions Disp Refills     FLUoxetine (PROZAC) 20 MG capsule 90 capsule 1     Sig: Take 1 capsule (20 mg) by mouth daily       SSRIs Protocol Failed - 12/18/2019 11:39 AM        Failed - PHQ-9 score less than 5 in past 6 months     Please review last PHQ-9 score.           Passed - Medication is active on med list        Passed - Patient is age 18 or older        Passed - No active pregnancy on record        Passed - No positive pregnancy test in last 12 months        Passed - Recent (6 mo) or future (30 days) visit within the authorizing provider's specialty     Patient had office visit in the last 6 months or has a visit in the next 30 days with authorizing provider or within the authorizing provider's specialty.  See \"Patient Info\" tab in inbasket, or \"Choose Columns\" in Meds & Orders section of the refill encounter.            Last Written Prescription Date:  7/9/2019  Last Fill Quantity: 90,  # refills: 1   Last office visit: 12/3/2019 with prescribing provider:  Lesia   Future Office Visit:      "

## 2019-12-20 NOTE — TELEPHONE ENCOUNTER
PHQ-9 SCORE 4/30/2019 7/9/2019 9/24/2019   PHQ-9 Total Score - - -   PHQ-9 Total Score 16 16 8     Patient was in recently.  PHQ-9 over 5 needs provider approval.     Rosy Decker RN

## 2020-01-10 ENCOUNTER — APPOINTMENT (OUTPATIENT)
Dept: ULTRASOUND IMAGING | Facility: CLINIC | Age: 35
End: 2020-01-10
Attending: EMERGENCY MEDICINE
Payer: COMMERCIAL

## 2020-01-10 ENCOUNTER — HOSPITAL ENCOUNTER (EMERGENCY)
Facility: CLINIC | Age: 35
Discharge: HOME | End: 2020-01-10
Attending: EMERGENCY MEDICINE | Admitting: EMERGENCY MEDICINE
Payer: COMMERCIAL

## 2020-01-10 ENCOUNTER — TELEPHONE (OUTPATIENT)
Dept: FAMILY MEDICINE | Facility: CLINIC | Age: 35
End: 2020-01-10

## 2020-01-10 VITALS
WEIGHT: 182 LBS | SYSTOLIC BLOOD PRESSURE: 122 MMHG | BODY MASS INDEX: 29.83 KG/M2 | DIASTOLIC BLOOD PRESSURE: 85 MMHG | HEART RATE: 72 BPM | RESPIRATION RATE: 16 BRPM | TEMPERATURE: 98.3 F | OXYGEN SATURATION: 95 %

## 2020-01-10 DIAGNOSIS — S40.021A ARM BRUISE, RIGHT, INITIAL ENCOUNTER: ICD-10-CM

## 2020-01-10 LAB
ALBUMIN SERPL-MCNC: 3.8 G/DL (ref 3.4–5)
ALP SERPL-CCNC: 87 U/L (ref 40–150)
ALT SERPL W P-5'-P-CCNC: 23 U/L (ref 0–50)
ANION GAP SERPL CALCULATED.3IONS-SCNC: 6 MMOL/L (ref 3–14)
APTT PPP: 29 SEC (ref 22–37)
AST SERPL W P-5'-P-CCNC: 15 U/L (ref 0–45)
BASOPHILS # BLD AUTO: 0 10E9/L (ref 0–0.2)
BASOPHILS NFR BLD AUTO: 0.5 %
BILIRUB SERPL-MCNC: 0.4 MG/DL (ref 0.2–1.3)
BUN SERPL-MCNC: 13 MG/DL (ref 7–30)
CALCIUM SERPL-MCNC: 8.7 MG/DL (ref 8.5–10.1)
CHLORIDE SERPL-SCNC: 107 MMOL/L (ref 94–109)
CO2 SERPL-SCNC: 27 MMOL/L (ref 20–32)
CREAT SERPL-MCNC: 0.68 MG/DL (ref 0.52–1.04)
DIFFERENTIAL METHOD BLD: NORMAL
EOSINOPHIL # BLD AUTO: 0.1 10E9/L (ref 0–0.7)
EOSINOPHIL NFR BLD AUTO: 0.8 %
ERYTHROCYTE [DISTWIDTH] IN BLOOD BY AUTOMATED COUNT: 12 % (ref 10–15)
GFR SERPL CREATININE-BSD FRML MDRD: >90 ML/MIN/{1.73_M2}
GLUCOSE SERPL-MCNC: 93 MG/DL (ref 70–99)
HCT VFR BLD AUTO: 39.7 % (ref 35–47)
HGB BLD-MCNC: 13.8 G/DL (ref 11.7–15.7)
IMM GRANULOCYTES # BLD: 0 10E9/L (ref 0–0.4)
IMM GRANULOCYTES NFR BLD: 0.2 %
INR PPP: 1.03 (ref 0.86–1.14)
LYMPHOCYTES # BLD AUTO: 2.8 10E9/L (ref 0.8–5.3)
LYMPHOCYTES NFR BLD AUTO: 31.6 %
MCH RBC QN AUTO: 30.9 PG (ref 26.5–33)
MCHC RBC AUTO-ENTMCNC: 34.8 G/DL (ref 31.5–36.5)
MCV RBC AUTO: 89 FL (ref 78–100)
MONOCYTES # BLD AUTO: 0.5 10E9/L (ref 0–1.3)
MONOCYTES NFR BLD AUTO: 5.6 %
NEUTROPHILS # BLD AUTO: 5.4 10E9/L (ref 1.6–8.3)
NEUTROPHILS NFR BLD AUTO: 61.3 %
NRBC # BLD AUTO: 0 10*3/UL
NRBC BLD AUTO-RTO: 0 /100
PLATELET # BLD AUTO: 398 10E9/L (ref 150–450)
POTASSIUM SERPL-SCNC: 3.9 MMOL/L (ref 3.4–5.3)
PROT SERPL-MCNC: 6.9 G/DL (ref 6.8–8.8)
RBC # BLD AUTO: 4.47 10E12/L (ref 3.8–5.2)
SODIUM SERPL-SCNC: 140 MMOL/L (ref 133–144)
WBC # BLD AUTO: 8.8 10E9/L (ref 4–11)

## 2020-01-10 PROCEDURE — 85610 PROTHROMBIN TIME: CPT | Performed by: EMERGENCY MEDICINE

## 2020-01-10 PROCEDURE — 93971 EXTREMITY STUDY: CPT | Mod: RT

## 2020-01-10 PROCEDURE — 80053 COMPREHEN METABOLIC PANEL: CPT | Performed by: EMERGENCY MEDICINE

## 2020-01-10 PROCEDURE — 99284 EMERGENCY DEPT VISIT MOD MDM: CPT | Mod: 25

## 2020-01-10 PROCEDURE — 99284 EMERGENCY DEPT VISIT MOD MDM: CPT | Mod: Z6 | Performed by: EMERGENCY MEDICINE

## 2020-01-10 PROCEDURE — 85025 COMPLETE CBC W/AUTO DIFF WBC: CPT | Performed by: EMERGENCY MEDICINE

## 2020-01-10 PROCEDURE — 85730 THROMBOPLASTIN TIME PARTIAL: CPT | Performed by: EMERGENCY MEDICINE

## 2020-01-10 RX ORDER — MULTIVITAMIN,THERAPEUTIC
1 TABLET ORAL DAILY
COMMUNITY
End: 2022-11-29

## 2020-01-10 NOTE — ED PROVIDER NOTES
History     Chief Complaint   Patient presents with     Arm Pain     sudden onset of bruise to right wrist, pain in arm, family hx blood clots     HPI  Ángela Zurita is a 34 year old female who noted insidious onset of bruising discoloration with mild swelling and tenderness in the distal right forearm area at the volar wrist area.  No known injury or trauma.  Localized tenderness with no proximal tenderness and no associated swelling.  Earlier her right arm felt numb and tingly, but this is resolved.  No arm swelling or discoloration.  She is right handed.  She is concerned about DVT as she has a family history of DVT.  She is unsure of why multiple family members have had DVTs.  In addition, she smokes but has no other known risk factors for DVT/PE.  No chest pain, cough, hemoptysis or leg pain or leg swelling.  No other acute complaints or concerns.    Allergies:  Allergies   Allergen Reactions     Bee Pollen      In the past, has not had issues lately       Problem List:    Patient Active Problem List    Diagnosis Date Noted     Hypertension in pregnancy 2019     Priority: Medium     S/P  2019     Priority: Medium     Velamentous insertion of umbilical cord, antepartum 2019     Priority: Medium      contractions 2019     Priority: Medium     Encounter for triage in pregnant patient 2018     Priority: Medium     Marginal insertion of umbilical cord affecting management of mother 2018     Priority: Medium     Repeat US at 28w       Rubella non-immune status, antepartum 10/24/2018     Priority: Medium     Prenatal care, subsequent pregnancy 2018     Priority: Medium     Left ovarian cyst 2018     Priority: Medium     Posterior neck pain 2017     Priority: Medium     Attention deficit hyperactivity disorder (ADHD), predominantly inattentive type 06/10/2016     Priority: Medium     Patient is followed by WILMAR WHEAT for ongoing  prescription of stimulants.  All refills should be approved by this provider, or covering partner.    Medication(s): Adderall 10mg immediate release   Maximum quantity per month: 150  Clinic visit frequency required: Q 3 month     Controlled substance agreement on file: Yes 16  Neuropsych evaluation for ADD completed:  Yes, completed 2012 at [facility], on file and diagnosis confirmed    Last [facility] website verification:  done on 2016   [email or web address]           Oral herpes 2016     Priority: Medium     CARDIOVASCULAR SCREENING; LDL GOAL LESS THAN 130 2015     Priority: Medium     Rectovaginal fistula 2015     Priority: Medium     Has had consult with colorectal surgeon; opting to defer treatment until done with childbearing  Amenable to c/section with next pregnancy       Health Care Home 2014     Priority: Medium     Status:  Unable to reach  Care Coordinator:  Maira Starr    See Letters for H Care Plan  Date:  2014         Fibroid uterus 2014     Priority: Medium     Anterior intramural lower.  Right by bladder       Urgency-frequency syndrome 2014     Priority: Medium        Past Medical History:    Past Medical History:   Diagnosis Date     ADHD      Cervical dysplasia      Chickenpox      Exercise-induced asthma      Hypertension      IBS (irritable bowel syndrome)      Tonsillitis 2014       Past Surgical History:    Past Surgical History:   Procedure Laterality Date     BIOPSY  Various    Had a few Leeps and numerous Colposcopys      SECTION N/A 3/22/2019    Procedure:  SECTION;  Surgeon: Jesenia Jefferson MD;  Location: [facility] L[facility]     COLONOSCOPY      Normal     DILATION AND CURETTAGE, HYSTEROSCOPY DIAGNOSTIC, COMBINED N/A 4/3/2018    Procedure: COMBINED DILATION AND CURETTAGE, HYSTEROSCOPY DIAGNOSTIC;  Diagnostic Hysteroscopy with Dilation and Curettage,Laparoscopy with Left Ovarian Cystectomy, Right Uteral Sacral  Biopsy;  Surgeon: Sherin Frost MD;  Location: [facility] OR     EXC SWEAT GLAND LESN AXILL,SIMPL Right 2009     LAPAROSCOPY DIAGNOSTIC (GYN) N/A 4/3/2018    Procedure: LAPAROSCOPY DIAGNOSTIC (GYN);;  Surgeon: Sherin Frost MD;  Location: [facility] OR     LEEP TX, CERVICAL  2006    yearly paps     MOUTH SURGERY      wisdom teeth     SOFT TISSUE SURGERY  Various    infected sweat-gland- cyst removed arm,armpit,face     SPHINCTEROPLASTY RECTUM  05/2018    with pernineal rebuild     SURGICAL HISTORY OF -   3/2005    Tonsillectomy     SURGICAL HISTORY OF -       infected sweat gland under her arm       Family History:    Family History   Problem Relation Age of Onset     Blood Disease Brother         twin brother-blood clots     Blood Disease Maternal Grandfather         blood clots     Heart Disease Maternal Grandfather         valve replacement     Hyperlipidemia Maternal Grandfather      Cerebrovascular Disease Maternal Grandfather      Prostate Cancer Maternal Grandfather      Other Cancer Maternal Grandfather         Gum/Jaw/Lymphnodes     Colon Cancer Maternal Grandfather      Mental Illness Maternal Grandfather         Alzheimer's     Respiratory Maternal Grandmother      Lung Cancer Maternal Grandmother      Diabetes Maternal Grandmother         type II     Hypertension Maternal Grandmother      Hyperlipidemia Maternal Grandmother      Depression Maternal Grandmother      Cancer Paternal Grandmother         non -hodgkins lymphoma     Lung Cancer Paternal Grandmother      Hypertension Paternal Grandmother      Hyperlipidemia Paternal Grandmother      Cerebrovascular Disease Paternal Grandmother      Diabetes Paternal Grandmother      Hypertension Father      Hyperlipidemia Father      Liver Disease Father      Hepatitis Father      Hyperlipidemia Paternal Grandfather      Prostate Cancer Paternal Grandfather      Hypertension Paternal Grandfather      Colon Cancer Paternal Grandfather       "Coronary Artery Disease Paternal Grandfather      Breast Cancer Mother         Stage 0, small duct carcinoma     Breast Cancer Other         2 aunt and 3 of my great aunts     Breast Cancer Cousin         Breast Cancer     Other Cancer Cousin         1\" tumor in lung, breast cancer relapse       Social History:  Marital Status:   [2]  Social History     Tobacco Use     Smoking status: Current Every Day Smoker     Packs/day: 0.50     Years: 15.00     Pack years: 7.50     Types: Cigarettes     Start date: 12/1/2014     Smokeless tobacco: Never Used     Tobacco comment: Quit with each pregnancy   Substance Use Topics     Alcohol use: No     Alcohol/week: 0.0 standard drinks     Frequency: Never     Comment: occas-quit with pregnancy     Drug use: No        Medications:    amphetamine-dextroamphetamine (ADDERALL) 10 MG tablet  FLUoxetine (PROZAC) 20 MG capsule  multivitamin, therapeutic (THERA-VIT) TABS tablet  [START ON 1/25/2020] amphetamine-dextroamphetamine (ADDERALL) 10 MG tablet  clindamycin (CLINDAMAX) 1 % external gel  ondansetron (ZOFRAN-ODT) 4 MG ODT tab  SUMAtriptan (IMITREX) 50 MG tablet  tretinoin (RETIN-A) 0.05 % external cream        Review of Systems  As mentioned above in the history present illness.  All other systems were reviewed and are negative.    Physical Exam   BP: 121/75  Heart Rate: 76  Temp: 98.3  F (36.8  C)  Resp: 16  Weight: 82.6 kg (182 lb)  SpO2: 95 %      Physical Exam  Vitals signs and nursing note reviewed.   Constitutional:       General: She is not in acute distress.     Appearance: Normal appearance. She is well-developed. She is not ill-appearing or diaphoretic.   HENT:      Head: Normocephalic and atraumatic.      Right Ear: External ear normal.      Left Ear: External ear normal.      Nose: Nose normal.      Mouth/Throat:      Mouth: Mucous membranes are moist.   Eyes:      General: No scleral icterus.     Extraocular Movements: Extraocular movements intact.      " Conjunctiva/sclera: Conjunctivae normal.   Neck:      Musculoskeletal: Normal range of motion and neck supple.      Trachea: No tracheal deviation.   Cardiovascular:      Rate and Rhythm: Normal rate and regular rhythm.      Pulses: Normal pulses.      Heart sounds: Normal heart sounds. No murmur. No friction rub. No gallop.    Pulmonary:      Effort: Pulmonary effort is normal. No respiratory distress.      Breath sounds: Normal breath sounds. No wheezing, rhonchi or rales.   Musculoskeletal: Normal range of motion.      Right forearm: She exhibits tenderness. She exhibits no bony tenderness, no swelling and no deformity.        Arms:       Right lower leg: No edema.      Left lower leg: No edema.   Skin:     General: Skin is warm and dry.      Coloration: Skin is not pale.      Findings: Bruising ( Distal aspect of the volar right forearm as diagrammed.) present. No erythema or rash.   Neurological:      General: No focal deficit present.      Mental Status: She is alert and oriented to person, place, and time.      Sensory: No sensory deficit.      Motor: No weakness.      Coordination: Coordination normal.   Psychiatric:         Mood and Affect: Mood normal.         Behavior: Behavior normal.         ED Course        Procedures                 Results for orders placed or performed during the hospital encounter of 01/10/20 (from the past 24 hour(s))   CBC with platelets differential   Result Value Ref Range    WBC 8.8 4.0 - 11.0 10e9/L    RBC Count 4.47 3.8 - 5.2 10e12/L    Hemoglobin 13.8 11.7 - 15.7 g/dL    Hematocrit 39.7 35.0 - 47.0 %    MCV 89 78 - 100 fl    MCH 30.9 26.5 - 33.0 pg    MCHC 34.8 31.5 - 36.5 g/dL    RDW 12.0 10.0 - 15.0 %    Platelet Count 398 150 - 450 10e9/L    Diff Method Automated Method     % Neutrophils 61.3 %    % Lymphocytes 31.6 %    % Monocytes 5.6 %    % Eosinophils 0.8 %    % Basophils 0.5 %    % Immature Granulocytes 0.2 %    Nucleated RBCs 0 0 /100    Absolute Neutrophil 5.4 1.6  - 8.3 10e9/L    Absolute Lymphocytes 2.8 0.8 - 5.3 10e9/L    Absolute Monocytes 0.5 0.0 - 1.3 10e9/L    Absolute Eosinophils 0.1 0.0 - 0.7 10e9/L    Absolute Basophils 0.0 0.0 - 0.2 10e9/L    Abs Immature Granulocytes 0.0 0 - 0.4 10e9/L    Absolute Nucleated RBC 0.0    Comprehensive metabolic panel   Result Value Ref Range    Sodium 140 133 - 144 mmol/L    Potassium 3.9 3.4 - 5.3 mmol/L    Chloride 107 94 - 109 mmol/L    Carbon Dioxide 27 20 - 32 mmol/L    Anion Gap 6 3 - 14 mmol/L    Glucose 93 70 - 99 mg/dL    Urea Nitrogen 13 7 - 30 mg/dL    Creatinine 0.68 0.52 - 1.04 mg/dL    GFR Estimate >90 >60 mL/min/[1.73_m2]    GFR Estimate If Black >90 >60 mL/min/[1.73_m2]    Calcium 8.7 8.5 - 10.1 mg/dL    Bilirubin Total 0.4 0.2 - 1.3 mg/dL    Albumin 3.8 3.4 - 5.0 g/dL    Protein Total 6.9 6.8 - 8.8 g/dL    Alkaline Phosphatase 87 40 - 150 U/L    ALT 23 0 - 50 U/L    AST 15 0 - 45 U/L   Partial thromboplastin time   Result Value Ref Range    PTT 29 22 - 37 sec   INR   Result Value Ref Range    INR 1.03 0.86 - 1.14   US Upper Extremity Venous Duplex Right    Narrative    US RIGHT UPPER EXTREMITY VENOUS DUPLEX ULTRASOUND   1/10/2020 3:08 PM     HISTORY: Pain, distal wrist area pain and swelling, family history of  deep vein thrombosis (DVT).    COMPARISON: None.    FINDINGS: Gray-scale, color and Doppler spectral analysis ultrasound  was performed of the right upper extremity. Compression and  augmentation imaging was performed.    There is no evidence for deep venous thrombosis.      Impression    IMPRESSION: No evidence for DVT within the right upper extremity.    CARI ALMEIDA MD       Medications - No data to display    Assessments & Plan (with Medical Decision Making)   34-year-old female with family history of DVT/thrombosis of unclear etiology with insidious onset of bruising to the distal right forearm/wrist area noted this morning.  No known injury or trauma, or clear precipitant.  This appears a benign nature  and there are no other signs or symptoms to suggest DVT, PE, coagulopathy, clotting disorder or blood dyscrasia.  Right upper extremity ultrasound evaluation was negative.  Laboratory evaluation was unremarkable.  She will research her family history to ascertain the etiology of their clotting disorder and follow-up in her primary care clinic.  She was carefully counseled on signs and symptoms indicate need for emergent reevaluation for possible DVT/PE.    I have reviewed the nursing notes.    I have reviewed the findings, diagnosis, plan and need for follow up with the patient.    New Prescriptions    Ibuprofen 600 mg po q 6 hrs prn (OTC)         Final diagnoses:   Arm bruise, right, initial encounter       1/10/2020   [facility] EMERGENCY DEPARTMENT     Chaitanya Tabor MD  01/10/20 2930

## 2020-01-10 NOTE — TELEPHONE ENCOUNTER
Patient reports:  She has a swollen spot with redness around it on her right fore arm that is tender to the touch.    She denies warmth at the affected area or a red streak present.  Patient denies shortness of air, chest pain.  Patient's twin brother has had a blood clot in his arm.  Advised patient to be evaluated in UC/ED right away for evaluation of symptoms and due to family history of blood clots.  Patient verbalized understanding and agreed with this plan.    Serene HILL Rn

## 2020-01-10 NOTE — ED AVS SNAPSHOT
[facility] Emergency Department  [address] [address]  Phone:  [phone]  Fax:  [phone]                                    Ángela Zurita   MRN: [id number]    Department:  [facility] Emergency Department   Date of Visit:  1/10/2020           After Visit Summary Signature Page    I have received my discharge instructions, and my questions have been answered. I have discussed any challenges I see with this plan with the nurse or doctor.    ..........................................................................................................................................  Patient/Patient Representative Signature      ..........................................................................................................................................  Patient Representative Print Name and Relationship to Patient    ..................................................               ................................................  Date                                   Time    ..........................................................................................................................................  Reviewed by Signature/Title    ...................................................              ..............................................  Date                                               Time          22EPIC Rev 08/18

## 2020-01-10 NOTE — TELEPHONE ENCOUNTER
Reason for call:  Patient reporting a symptom    Symptom or request: Bruise on right wrist said it is swollen in the middle. She stated her twin brother gets blood clots.      Duration (how long have symptoms been present): this morning    Have you been treated for this before? No  Additional comments: any    Phone Number patient can be reached at:  Cell number on file:    Telephone Information:   Mobile [phone]       Best Time:  any    Can we leave a detailed message on this number:  YES    Call taken on 1/10/2020 at 11:33 AM by Misa Velasquez

## 2020-01-10 NOTE — ED NOTES
Pt presents to ED with concerns of pain, tingling and bruising on her right arm. Pt is concerned about a blood clot; notes there is a strong family history of blood clots. Pt reports the spontaneous bruising on the palm side of her wrist appeared about 10 am today and was shortly followed by the tingling and pain sensations. Pt has not had a blood clot before.

## 2020-01-23 ENCOUNTER — OFFICE VISIT (OUTPATIENT)
Dept: OBGYN | Facility: CLINIC | Age: 35
End: 2020-01-23
Payer: COMMERCIAL

## 2020-01-23 VITALS
WEIGHT: 180 LBS | DIASTOLIC BLOOD PRESSURE: 86 MMHG | BODY MASS INDEX: 29.5 KG/M2 | SYSTOLIC BLOOD PRESSURE: 128 MMHG | HEART RATE: 87 BPM | TEMPERATURE: 97.7 F

## 2020-01-23 DIAGNOSIS — N93.9 ABNORMAL UTERINE BLEEDING (AUB): Primary | ICD-10-CM

## 2020-01-23 DIAGNOSIS — R10.2 PELVIC PRESSURE IN FEMALE: ICD-10-CM

## 2020-01-23 PROCEDURE — 99215 OFFICE O/P EST HI 40 MIN: CPT | Performed by: OBSTETRICS & GYNECOLOGY

## 2020-01-23 PROCEDURE — G0145 SCR C/V CYTO,THINLAYER,RESCR: HCPCS | Performed by: OBSTETRICS & GYNECOLOGY

## 2020-01-23 PROCEDURE — 87624 HPV HI-RISK TYP POOLED RSLT: CPT | Performed by: OBSTETRICS & GYNECOLOGY

## 2020-01-23 NOTE — PROGRESS NOTES
Gynecology Clinic Note      HPI: Ángela Zurita is a 35 year old  who presents with multiple complaints.  Her first concern is of urinary incontinence.  She states that this has been an ongoing issue since the birth of her children.  She reports essentially only stress incontinence.  Denies any symptoms consistent with urge incontinence.  She states that she recently restarted playing volleyball and has significant trouble with leaking during her games.  She states that this is moderately bothersome but would like to avoid surgery if at all possible.  She is most interested in pelvic floor therapy.  Her next concern is regarding pain with intercourse and pelvic pain.  The patient states that she has a history of fourth degree laceration with subsequent fecal and flatal incontinence.  She is status post repair with colorectal surgery last year prior to her most recent  delivery.  She states that she briefly started physical therapy but then after becoming pregnant has not returned.  She describes mostly penetrative discomfort and has not really been able to have intercourse.  Her next concern is of pelvic pressure.  She does not think that she is experiencing prolapse or a bulge but in general just describes pressure within her abdomen.  She denies any bowel symptoms.  Her incontinence has resolved.  Is not complaining of constipation.  She does report history of uterine fibroid though thinks that it was quite small.  Finally, the patient has complaints of abnormal uterine bleeding.  She states that in general her periods are regular but are quite long lasting 8 to 9 days and heavy, previously requiring 2 tampons but with her vaginal discomfort has only been able to use one at a time.  She has not had recent pelvic ultrasound to evaluate for cause of heavy bleeding.  The patient also notes some intermenstrual spotting, states is very light but is bothersome.    ROS: 10 pt ROS neg other than  HPI    PMH:   Past Medical History:   Diagnosis Date     ADHD      Cervical dysplasia     s/p LEEP      Chickenpox      Exercise-induced asthma     adolescent     Hypertension      IBS (irritable bowel syndrome)      Tonsillitis 2014       PSHx:   Past Surgical History:   Procedure Laterality Date     BIOPSY  Various    Had a few Leeps and numerous Colposcopys      SECTION N/A 3/22/2019    Procedure:  SECTION;  Surgeon: Jesenia Jefferson MD;  Location: [facility]     COLONOSCOPY      Normal     DILATION AND CURETTAGE, HYSTEROSCOPY DIAGNOSTIC, COMBINED N/A 4/3/2018    Procedure: COMBINED DILATION AND CURETTAGE, HYSTEROSCOPY DIAGNOSTIC;  Diagnostic Hysteroscopy with Dilation and Curettage,Laparoscopy with Left Ovarian Cystectomy, Right Uteral Sacral Biopsy;  Surgeon: Sherin Frost MD;  Location: [facility]     EXC SWEAT GLAND LESN AXILL,SIMPL Right      LAPAROSCOPY DIAGNOSTIC (GYN) N/A 4/3/2018    Procedure: LAPAROSCOPY DIAGNOSTIC (GYN);;  Surgeon: Sherin Frost MD;  Location: [facility]     LEEP TX, CERVICAL      yearly paps     MOUTH SURGERY      wisdom teeth     SOFT TISSUE SURGERY  Various    infected sweat-gland- cyst removed arm,armpit,face     SPHINCTEROPLASTY RECTUM  2018    with pernineal rebuild     SURGICAL HISTORY OF -   3/2005    Tonsillectomy     SURGICAL HISTORY OF -       infected sweat gland under her arm       Medications:   [START ON 2020] amphetamine-dextroamphetamine (ADDERALL) 10 MG tablet, Take 2 pills (20mg) orally in the morning and two pills (20mg) at 11am and one pill (10mg) at 2pm  amphetamine-dextroamphetamine (ADDERALL) 10 MG tablet, Take 2 pills (20mg) orally in the morning and two pills (20mg) at 11am and one pill (10mg) at 2pm  clindamycin (CLINDAMAX) 1 % external gel, Apply to face and back once daily  FLUoxetine (PROZAC) 20 MG capsule, Take 1 capsule (20 mg) by mouth daily  multivitamin, therapeutic (THERA-VIT) TABS  tablet, Take 1 tablet by mouth daily  ondansetron (ZOFRAN-ODT) 4 MG ODT tab, Take 1-2 tablets (4-8 mg) by mouth every 8 hours as needed for nausea  SUMAtriptan (IMITREX) 50 MG tablet, Take 1 tablet (50 mg) by mouth at onset of headache for migraine May repeat in 2 hours. Max 4 tablets/24 hours.  tretinoin (RETIN-A) 0.05 % external cream, Spread a pea size amount into affected area topically once daily  Use sunscreen SPF>20.    No current facility-administered medications on file prior to visit.        Allergies:      Allergies   Allergen Reactions     Bee Pollen      In the past, has not had issues lately       Social History:   Social History     Socioeconomic History     Marital status:      Spouse name: Not on file     Number of children: Not on file     Years of education: Not on file     Highest education level: Not on file   Occupational History     Employer: [employer]     Comment: accounting   Social Needs     Financial resource strain: Not on file     Food insecurity:     Worry: Not on file     Inability: Not on file     Transportation needs:     Medical: Not on file     Non-medical: Not on file   Tobacco Use     Smoking status: Current Every Day Smoker     Packs/day: 0.50     Years: 15.00     Pack years: 7.50     Types: Cigarettes     Start date: 12/1/2014     Smokeless tobacco: Never Used     Tobacco comment: Quit with each pregnancy   Substance and Sexual Activity     Alcohol use: No     Alcohol/week: 0.0 standard drinks     Frequency: Never     Comment: occas-quit with pregnancy     Drug use: No     Sexual activity: Not Currently     Partners: Male     Birth control/protection: Abstinence, None   Lifestyle     Physical activity:     Days per week: Not on file     Minutes per session: Not on file     Stress: Not on file   Relationships     Social connections:     Talks on phone: Not on file     Gets together: Not on file     Attends Christianity service: Not on file     Active member of club or  organization: Not on file     Attends meetings of clubs or organizations: Not on file     Relationship status: Not on file     Intimate partner violence:     Fear of current or ex partner: Not on file     Emotionally abused: Not on file     Physically abused: Not on file     Forced sexual activity: Not on file   Other Topics Concern     Parent/sibling w/ CABG, MI or angioplasty before 65F 55M? No   Social History Narrative     Not on file       Family History:  Family History   Problem Relation Age of Onset     Blood Disease Brother         twin brother-blood clots     Blood Disease Maternal Grandfather         blood clots     Heart Disease Maternal Grandfather         valve replacement     Hyperlipidemia Maternal Grandfather      Cerebrovascular Disease Maternal Grandfather      Prostate Cancer Maternal Grandfather      Other Cancer Maternal Grandfather         Gum/Jaw/Lymphnodes     Colon Cancer Maternal Grandfather      Mental Illness Maternal Grandfather         Alzheimer's     Respiratory Maternal Grandmother      Lung Cancer Maternal Grandmother      Diabetes Maternal Grandmother         type II     Hypertension Maternal Grandmother      Hyperlipidemia Maternal Grandmother      Depression Maternal Grandmother      Cancer Paternal Grandmother         non -hodgkins lymphoma     Lung Cancer Paternal Grandmother      Hypertension Paternal Grandmother      Hyperlipidemia Paternal Grandmother      Cerebrovascular Disease Paternal Grandmother      Diabetes Paternal Grandmother      Hypertension Father      Hyperlipidemia Father      Liver Disease Father      Hepatitis Father      Hyperlipidemia Paternal Grandfather      Prostate Cancer Paternal Grandfather      Hypertension Paternal Grandfather      Colon Cancer Paternal Grandfather      Coronary Artery Disease Paternal Grandfather      Breast Cancer Mother         Stage 0, small duct carcinoma     Breast Cancer Other         2 aunt and 3 of my great aunts     Breast  "Cancer Cousin         Breast Cancer     Other Cancer Cousin         1\" tumor in lung, breast cancer relapse       Physical Exam:   Vitals:    20 1343   BP: 128/86   BP Location: Right arm   Patient Position: Chair   Cuff Size: Adult Regular   Pulse: 87   Temp: 97.7  F (36.5  C)   TempSrc: Tympanic   Weight: 81.6 kg (180 lb)      Gen: lying in bed, NAD  CV: Reg rate, well perfused  Pulm: no increased work of breathing  Abd: non-tender, non-distended, no masses   Pelvis: normal appearing external genitalia, vaginal mucosa, cervix, bimanual exam with normal size and contour of uterus with no adnexal masses  Extremities: non-tender, no erythema; no edema  Psych: normal mood and affect  Neuro: no focal deficits    A&P: Ángela Zurita is a 35 year old  who presents with multiple complaints.      #Urinary incontinence- Based on patient's history consistent with stress incontinence. Patient strongly desires to avoid surgical intervention. Recommended that she return to pelvic floor PT to see to what degree this improves her symptoms. Discussed that if this continues to be an issue, she should consider consult visit with Dr. Reddy or Bennie or urogynecology to discuss mid urethral sling.     #Pelvic pressure- the etiology of this remains unclear. No evidence of prolapse on exam. Given hx of small fibroid recommended repeat US to evaluate for enlargement. Suspect more related to pelvic floor dysfunction and encouraged pelvic floor PT.    #dysparunia- again suspect pelvic floor dysfunction, no significant tenderness in area of previous surgical intervention with colorectal for fecal and flatal incontinence. More significant tenderness with penetration and with speculum placement today. Recommended use of vaginal dilators (at least 15-20 min daily) , which were given and follow up with pelvic floor PT.    #AUB- patient reports heavy menses, requiring multiple tampons at a time as well as intermenstrual " spotting. Again recommended pelvic US. Discussed options for management . Plans IUD if US is unrevealing.     I spent 40 min with the patients. Of that time, >50% was in counseling and care coordination.     Susan Benito MD   1/23/2020 1:47 PM

## 2020-01-23 NOTE — NURSING NOTE
"Initial /86 (BP Location: Right arm, Patient Position: Chair, Cuff Size: Adult Regular)   Pulse 87   Temp 97.7  F (36.5  C) (Tympanic)   Wt 81.6 kg (180 lb)   LMP 01/13/2020 (Exact Date)   Breastfeeding No   BMI 29.50 kg/m   Estimated body mass index is 29.5 kg/m  as calculated from the following:    Height as of 12/17/19: 1.664 m (5' 5.5\").    Weight as of this encounter: 81.6 kg (180 lb). .    Mandy Davalos MA    "

## 2020-01-24 ENCOUNTER — HOSPITAL ENCOUNTER (OUTPATIENT)
Dept: ULTRASOUND IMAGING | Facility: CLINIC | Age: 35
Discharge: HOME | End: 2020-01-24
Attending: OBSTETRICS & GYNECOLOGY | Admitting: OBSTETRICS & GYNECOLOGY
Payer: COMMERCIAL

## 2020-01-24 DIAGNOSIS — N93.9 ABNORMAL UTERINE BLEEDING (AUB): ICD-10-CM

## 2020-01-24 DIAGNOSIS — R10.2 PELVIC PRESSURE IN FEMALE: ICD-10-CM

## 2020-01-24 DIAGNOSIS — N83.209 CYST OF OVARY, UNSPECIFIED LATERALITY: Primary | ICD-10-CM

## 2020-01-24 PROCEDURE — 76830 TRANSVAGINAL US NON-OB: CPT

## 2020-01-25 ENCOUNTER — TELEPHONE (OUTPATIENT)
Dept: FAMILY MEDICINE | Facility: CLINIC | Age: 35
End: 2020-01-25

## 2020-01-25 DIAGNOSIS — F90.0 ATTENTION DEFICIT HYPERACTIVITY DISORDER (ADHD), PREDOMINANTLY INATTENTIVE TYPE: Primary | ICD-10-CM

## 2020-01-25 NOTE — TELEPHONE ENCOUNTER
PA required for more than 3 tablets per day.    Prior Authorization Retail Medication Request    Medication/Dose: Amphetamine salt 10mg tabs  ICD code (if different than what is on RX):    Previously Tried and Failed:    Rationale:      Insurance Name:  [employer]  Insurance ID:  [id number]      Pharmacy Information (if different than what is on RX)  Name:    Phone:      ThanksJesenia  Certified Pharmacy Technician  [facility]  [phone]

## 2020-01-27 NOTE — TELEPHONE ENCOUNTER
Dr. Graf,  Patient's insurance does not cover Adderall 10 mg three pills daily.  Since patient is taking 20 mg in am can prescription be written for 20 mg once daily and 10 mg once daily?  Insurance should cover these quantities.     Jennifer Garcia

## 2020-01-28 LAB
COPATH REPORT: NORMAL
PAP: NORMAL

## 2020-01-28 RX ORDER — DEXTROAMPHETAMINE SACCHARATE, AMPHETAMINE ASPARTATE, DEXTROAMPHETAMINE SULFATE AND AMPHETAMINE SULFATE 2.5; 2.5; 2.5; 2.5 MG/1; MG/1; MG/1; MG/1
10 TABLET ORAL DAILY
Qty: 30 TABLET | Refills: 0 | Status: SHIPPED | OUTPATIENT
Start: 2020-01-28 | End: 2020-06-01

## 2020-01-28 RX ORDER — DEXTROAMPHETAMINE SACCHARATE, AMPHETAMINE ASPARTATE, DEXTROAMPHETAMINE SULFATE AND AMPHETAMINE SULFATE 5; 5; 5; 5 MG/1; MG/1; MG/1; MG/1
20 TABLET ORAL 2 TIMES DAILY
Qty: 60 TABLET | Refills: 0 | Status: SHIPPED | OUTPATIENT
Start: 2020-01-28 | End: 2020-02-27

## 2020-01-28 NOTE — TELEPHONE ENCOUNTER
Please notify patient of the change.  ASSESSMENT/PLAN  Ángela was seen today for prior auth - medication.    Diagnoses and all orders for this visit:    Attention deficit hyperactivity disorder (ADHD), predominantly inattentive type  -     amphetamine-dextroamphetamine (ADDERALL) 20 MG tablet; Take 1 tablet (20 mg) by mouth 2 times daily Morning and 11am.  -     amphetamine-dextroamphetamine (ADDERALL) 10 MG tablet; Take 1 tablet (10 mg) by mouth daily at 2pm        Randy Graf MD  [facility]

## 2020-01-29 LAB
FINAL DIAGNOSIS: NORMAL
HPV HR 12 DNA CVX QL NAA+PROBE: NEGATIVE
HPV16 DNA SPEC QL NAA+PROBE: NEGATIVE
HPV18 DNA SPEC QL NAA+PROBE: NEGATIVE
SPECIMEN DESCRIPTION: NORMAL
SPECIMEN SOURCE CVX/VAG CYTO: NORMAL

## 2020-01-30 PROBLEM — N87.0 MILD DYSPLASIA OF CERVIX: Status: ACTIVE | Noted: 2020-01-30

## 2020-01-30 PROBLEM — D06.9 SEVERE DYSPLASIA OF CERVIX (CIN III): Status: RESOLVED | Noted: 2020-01-30 | Resolved: 2020-01-30

## 2020-01-30 PROBLEM — D06.9 SEVERE DYSPLASIA OF CERVIX (CIN III): Status: ACTIVE | Noted: 2020-01-30

## 2020-02-22 ENCOUNTER — VIRTUAL VISIT (OUTPATIENT)
Dept: FAMILY MEDICINE | Facility: OTHER | Age: 35
End: 2020-02-22

## 2020-02-23 NOTE — PROGRESS NOTES
"Date: 2020 20:21:44  Clinician: Skye Beltran  Clinician NPI: [id number]  Patient: Ángela Zurita  Patient : 1985  Patient Address: [address] [place] [address]  Patient Phone: [phone]  Visit Protocol: URI  Patient Summary:  Ángela is a 35 year old ( : 1985 ) female who initiated a Visit for cold, sinus infection, or influenza. When asked the question \"Please sign me up to receive news, health information and promotions. \", Ángela responded \"No\".    Ángela states her symptoms started suddenly 3-6 days ago.   Her symptoms consist of facial pain or pressure, myalgia, a headache, a sore throat, chills, malaise, nasal congestion, and rhinitis. Ángela also feels feverish.   Symptom details     Nasal secretions: The color of her mucus is green, blood-tinged, and yellow.    Sore throat: Ángela reports having moderate throat pain (4-6 on a 10 point pain scale), does not have exudate on her tonsils, and can swallow liquids. The lymph nodes in her neck are not enlarged. A rash has not appeared on the skin since the sore throat started.     Temperature: Her current temperature is 99.2 degrees Fahrenheit.     Facial pain or pressure: The facial pain or pressure feels worse when bending over or leaning forward.     Headache: She states the headache is mild (1-3 on a 10 point pain scale).      Ángela denies having cough, ear pain, enlarged lymph nodes, wheezing, and teeth pain. She also denies having recent facial or sinus surgery in the past 60 days, double sickening (worsening symptoms after initial improvement), and taking antibiotic medication for the symptoms. She is not experiencing dyspnea.   Precipitating events  Within the past week, Ángela has not been exposed to someone with strep throat. She has not recently been exposed to someone with influenza. Ángela has been in close contact with the following high risk individuals: children under the age of 5.   Ángela has not traveled internationally in the " last 14 days before the start of her symptoms.   Pertinent medical history  Ángela had 2 sinus infections within the past year.   Ángela does not get yeast infections when she takes antibiotics.   Weight: 182 lbs   Ángela smokes or uses smokeless tobacco.   She denies pregnancy and denies breastfeeding. She has menstruated in the past month.   Weight: 182 lbs    MEDICATIONS: Prozac oral, Adderall oral, ALLERGIES: NKDA  Clinician Response:  Dear Ángela,  Based on the information provided, you have viral sinusitis, also known as a sinus infection. Sinus infections are caused by bacteria or a virus and symptoms are almost always identical. The difference between the 2 types of infections is timing.  Sinus infections start as viral infections and symptoms improve on their own in about 7 days. If symptoms have not improved after 7 days or have even worsened, a bacterial infection may have developed.  Medication information  Because you have a viral infection, antibiotics will not help you get better. Treating a viral infection with antibiotics could actually make you feel worse.  Unless you are allergic to the over-the-counter medication(s) below, I recommend using:     An antihistamine such as Benadryl, Claritin, or store brand.    A decongestant such as Sudafed PE or store brand.     Over-the-counter medications do not require a prescription. Ask the pharmacist if you have any questions.  Self care  The following tips will keep you as comfortable as possible while you recover:     Rest    Drink plenty of water and other liquids    Take a hot shower to loosen congestion    Use throat lozenges    Gargle with warm salt water (1/4 teaspoon of salt per 8 ounce glass of water)    Suck on frozen items such as popsicles or ice cubes    Drink hot tea with lemon and honey     Also, as your provider, I need you to know that becoming tobacco-free is the most important thing you can do to protect your current and future health.  When to  seek care  Please be seen in a clinic or urgent care if new symptoms develop, or symptoms become worse.  Call 911 or go to the emergency room if you feel that your throat is closing off, you suddenly develop a rash, you are unable to swallow fluids, you are drooling, or you are having difficulty breathing.  I am providing you with a promo code for a free Visit (redeemable at this website only). This code will  in two weeks and may only be used once. Please redeem your free Visit by entering the following promo code on the payment screen: [id number]   Diagnosis: Viral sinusitis  Diagnosis ICD: J01.90

## 2020-03-03 ENCOUNTER — E-VISIT (OUTPATIENT)
Dept: FAMILY MEDICINE | Facility: CLINIC | Age: 35
End: 2020-03-03
Payer: COMMERCIAL

## 2020-03-03 DIAGNOSIS — F90.0 ATTENTION DEFICIT HYPERACTIVITY DISORDER (ADHD), PREDOMINANTLY INATTENTIVE TYPE: Primary | ICD-10-CM

## 2020-03-03 PROCEDURE — 99207 ZZC NO BILLABLE SERVICE THIS VISIT: CPT | Performed by: FAMILY MEDICINE

## 2020-03-03 RX ORDER — DEXTROAMPHETAMINE SACCHARATE, AMPHETAMINE ASPARTATE, DEXTROAMPHETAMINE SULFATE AND AMPHETAMINE SULFATE 2.5; 2.5; 2.5; 2.5 MG/1; MG/1; MG/1; MG/1
10 TABLET ORAL DAILY
Qty: 30 TABLET | Refills: 0 | Status: SHIPPED | OUTPATIENT
Start: 2020-05-04 | End: 2020-06-03

## 2020-03-03 RX ORDER — DEXTROAMPHETAMINE SACCHARATE, AMPHETAMINE ASPARTATE, DEXTROAMPHETAMINE SULFATE AND AMPHETAMINE SULFATE 5; 5; 5; 5 MG/1; MG/1; MG/1; MG/1
20 TABLET ORAL 2 TIMES DAILY
Qty: 60 TABLET | Refills: 0 | Status: SHIPPED | OUTPATIENT
Start: 2020-03-03 | End: 2020-04-02

## 2020-03-03 RX ORDER — DEXTROAMPHETAMINE SACCHARATE, AMPHETAMINE ASPARTATE, DEXTROAMPHETAMINE SULFATE AND AMPHETAMINE SULFATE 2.5; 2.5; 2.5; 2.5 MG/1; MG/1; MG/1; MG/1
10 TABLET ORAL DAILY
Qty: 30 TABLET | Refills: 0 | Status: SHIPPED | OUTPATIENT
Start: 2020-04-03 | End: 2020-05-03

## 2020-03-03 RX ORDER — DEXTROAMPHETAMINE SACCHARATE, AMPHETAMINE ASPARTATE, DEXTROAMPHETAMINE SULFATE AND AMPHETAMINE SULFATE 5; 5; 5; 5 MG/1; MG/1; MG/1; MG/1
20 TABLET ORAL 2 TIMES DAILY
Qty: 60 TABLET | Refills: 0 | Status: SHIPPED | OUTPATIENT
Start: 2020-04-03 | End: 2020-05-03

## 2020-03-03 RX ORDER — DEXTROAMPHETAMINE SACCHARATE, AMPHETAMINE ASPARTATE, DEXTROAMPHETAMINE SULFATE AND AMPHETAMINE SULFATE 2.5; 2.5; 2.5; 2.5 MG/1; MG/1; MG/1; MG/1
10 TABLET ORAL DAILY
Qty: 30 TABLET | Refills: 0 | Status: SHIPPED | OUTPATIENT
Start: 2020-03-03 | End: 2020-04-02

## 2020-03-03 RX ORDER — DEXTROAMPHETAMINE SACCHARATE, AMPHETAMINE ASPARTATE, DEXTROAMPHETAMINE SULFATE AND AMPHETAMINE SULFATE 5; 5; 5; 5 MG/1; MG/1; MG/1; MG/1
20 TABLET ORAL 2 TIMES DAILY
Qty: 60 TABLET | Refills: 0 | Status: SHIPPED | OUTPATIENT
Start: 2020-05-04 | End: 2020-06-03

## 2020-04-01 NOTE — PROGRESS NOTES
"Subjective     Ángela Zurita is a 35 year old female who is being evaluated via a billable telephone visit.      The patient has been notified of following:     \"This telephone visit will be conducted via a call between you and your physician/provider. We have found that certain health care needs can be provided without the need for a physical exam.  This service lets us provide the care you need with a short phone conversation.  If a prescription is necessary we can send it directly to your pharmacy.  If lab work is needed we can place an order for that and you can then stop by our lab to have the test done at a later time.    If during the course of the call the physician/provider feels a telephone visit is not appropriate, you will not be charged for this service.\"     Patient has given verbal consent for Telephone visit?  Yes    Ángela Zurita complains of   Chief Complaint   Patient presents with     Abnormal Uterine Bleeding       ALLERGIES  Bee pollen      Gynecology Clinic Note        HPI: Ángela Zurita is a 35 year old  states that her menses continue to be quite heavy. States that she then missed a period-- with 6 weeks between. Has been very light.  She states that she is now waiting for her menses to start again, wonders if it will be heavy or light or if it will be spaced far apart again.  She does note that over the last year or so her facial hair has been thicker.  She states that she has been seen by dermatology for issues with back and face care.  No complaints of acne.  States that she is always had a little bit of milk leakage.  Not currently breast-feeding.       ROS: 10 pt ROS neg other than HPI     PMH:   Past Medical History        Past Medical History:   Diagnosis Date     ADHD       Cervical dysplasia       s/p LEEP      Chickenpox       Exercise-induced asthma       adolescent     Hypertension       IBS (irritable bowel syndrome)       Tonsillitis 2014           PSHx: "   Past Surgical History         Past Surgical History:   Procedure Laterality Date     BIOPSY   Various     Had a few Leeps and numerous Colposcopys      SECTION N/A 3/22/2019     Procedure:  SECTION;  Surgeon: Jesenia Jefferson MD;  Location: [facility]     COLONOSCOPY        Normal     DILATION AND CURETTAGE, HYSTEROSCOPY DIAGNOSTIC, COMBINED N/A 4/3/2018     Procedure: COMBINED DILATION AND CURETTAGE, HYSTEROSCOPY DIAGNOSTIC;  Diagnostic Hysteroscopy with Dilation and Curettage,Laparoscopy with Left Ovarian Cystectomy, Right Uteral Sacral Biopsy;  Surgeon: Sherin Frost MD;  Location: [facility]     EXC SWEAT GLAND LESN AXILL,SIMPL Right      LAPAROSCOPY DIAGNOSTIC (GYN) N/A 4/3/2018     Procedure: LAPAROSCOPY DIAGNOSTIC (GYN);;  Surgeon: Sherin Frost MD;  Location: [facility]     LEEP TX, CERVICAL        yearly paps     MOUTH SURGERY         wisdom teeth     SOFT TISSUE SURGERY   Various     infected sweat-gland- cyst removed arm,armpit,face     SPHINCTEROPLASTY RECTUM   2018     with pernineal rebuild     SURGICAL HISTORY OF -    3/2005     Tonsillectomy     SURGICAL HISTORY OF -          infected sweat gland under her arm           Medications:   [START ON 2020] amphetamine-dextroamphetamine (ADDERALL) 10 MG tablet, Take 2 pills (20mg) orally in the morning and two pills (20mg) at 11am and one pill (10mg) at 2pm  amphetamine-dextroamphetamine (ADDERALL) 10 MG tablet, Take 2 pills (20mg) orally in the morning and two pills (20mg) at 11am and one pill (10mg) at 2pm  clindamycin (CLINDAMAX) 1 % external gel, Apply to face and back once daily  FLUoxetine (PROZAC) 20 MG capsule, Take 1 capsule (20 mg) by mouth daily  multivitamin, therapeutic (THERA-VIT) TABS tablet, Take 1 tablet by mouth daily  ondansetron (ZOFRAN-ODT) 4 MG ODT tab, Take 1-2 tablets (4-8 mg) by mouth every 8 hours as needed for nausea  SUMAtriptan (IMITREX) 50 MG tablet, Take 1 tablet  (50 mg) by mouth at onset of headache for migraine May repeat in 2 hours. Max 4 tablets/24 hours.  tretinoin (RETIN-A) 0.05 % external cream, Spread a pea size amount into affected area topically once daily  Use sunscreen SPF>20.     No current facility-administered medications on file prior to visit.         Allergies:            Allergies   Allergen Reactions     Bee Pollen         In the past, has not had issues lately        Social History:   Social History   Social History            Socioeconomic History     Marital status:        Spouse name: Not on file     Number of children: Not on file     Years of education: Not on file     Highest education level: Not on file   Occupational History       Employer: [employer]       Comment: accounting   Social Needs     Financial resource strain: Not on file     Food insecurity:       Worry: Not on file       Inability: Not on file     Transportation needs:       Medical: Not on file       Non-medical: Not on file   Tobacco Use     Smoking status: Current Every Day Smoker       Packs/day: 0.50       Years: 15.00       Pack years: 7.50       Types: Cigarettes       Start date: 12/1/2014     Smokeless tobacco: Never Used     Tobacco comment: Quit with each pregnancy   Substance and Sexual Activity     Alcohol use: No       Alcohol/week: 0.0 standard drinks       Frequency: Never       Comment: occas-quit with pregnancy     Drug use: No     Sexual activity: Not Currently       Partners: Male       Birth control/protection: Abstinence, None   Lifestyle     Physical activity:       Days per week: Not on file       Minutes per session: Not on file     Stress: Not on file   Relationships     Social connections:       Talks on phone: Not on file       Gets together: Not on file       Attends Restoration service: Not on file       Active member of club or organization: Not on file       Attends meetings of clubs or organizations: Not on file       Relationship status:  Not on file     Intimate partner violence:       Fear of current or ex partner: Not on file       Emotionally abused: Not on file       Physically abused: Not on file       Forced sexual activity: Not on file   Other Topics Concern     Parent/sibling w/ CABG, MI or angioplasty before 65F 55M? No   Social History Narrative     Not on file           Family History:  Family History         Family History   Problem Relation Age of Onset     Blood Disease Brother           twin brother-blood clots     Blood Disease Maternal Grandfather           blood clots     Heart Disease Maternal Grandfather           valve replacement     Hyperlipidemia Maternal Grandfather       Cerebrovascular Disease Maternal Grandfather       Prostate Cancer Maternal Grandfather       Other Cancer Maternal Grandfather           Gum/Jaw/Lymphnodes     Colon Cancer Maternal Grandfather       Mental Illness Maternal Grandfather           Alzheimer's     Respiratory Maternal Grandmother       Lung Cancer Maternal Grandmother       Diabetes Maternal Grandmother           type II     Hypertension Maternal Grandmother       Hyperlipidemia Maternal Grandmother       Depression Maternal Grandmother       Cancer Paternal Grandmother           non -hodgkins lymphoma     Lung Cancer Paternal Grandmother       Hypertension Paternal Grandmother       Hyperlipidemia Paternal Grandmother       Cerebrovascular Disease Paternal Grandmother       Diabetes Paternal Grandmother       Hypertension Father       Hyperlipidemia Father       Liver Disease Father       Hepatitis Father       Hyperlipidemia Paternal Grandfather       Prostate Cancer Paternal Grandfather       Hypertension Paternal Grandfather       Colon Cancer Paternal Grandfather       Coronary Artery Disease Paternal Grandfather       Breast Cancer Mother           Stage 0, small duct carcinoma     Breast Cancer Other           2 aunt and 3 of my great aunts     Breast Cancer Cousin           Breast  "Cancer     Other Cancer Cousin           1\" tumor in lung, breast cancer relapse           Physical Exam:   Deferred, virtual visit     A&P: Ángela Zurita is a 35 year old  who calls with [facility].    The etiology of the patient's abnormal uterine bleeding remains unclear.  She did have recent pelvic ultrasound that was normal with exception of a small 1 cm cyst that may be consistent with a dermoid.  Given coronavirus precautions will plan to collect a repeat in several months.  The patient does have what sounds like even more irregular bleeding compared with what she had last visit.  We will therefore plan on laboratory evaluation to help work-up this abnormal bleeding.  Given her irregular bleeding and facial hair as well as obesity, do have some concern for polycystic ovarian syndrome.  Discussed with the patient that this is a diagnosis of exclusion and would rule out other causes for her symptoms with laboratory evaluation.  The patient is hesitant to have these labs completed right now given the coronavirus.  She will have them collected in the next month or 2.  Unfortunately, the patient is not a candidate for combination hormonal therapy as she is a smoker and has a history of migraines with aura.  Therefore would plan a progesterone only options.  She is very interested in IUD.  She would like to have this placed once some of the limitations of coronavirus have improved.  In the meantime she would consider progesterone only pills until placement.    Susan Benito MD  2020 10:41 AM     Telephone visit duration 30 minutes.  "

## 2020-04-02 ENCOUNTER — VIRTUAL VISIT (OUTPATIENT)
Dept: OBGYN | Facility: CLINIC | Age: 35
End: 2020-04-02
Payer: COMMERCIAL

## 2020-04-02 VITALS — TEMPERATURE: 99.9 F | BODY MASS INDEX: 27.7 KG/M2 | WEIGHT: 169 LBS

## 2020-04-02 DIAGNOSIS — N93.9 ABNORMAL UTERINE BLEEDING (AUB): Primary | ICD-10-CM

## 2020-04-02 PROCEDURE — 99214 OFFICE O/P EST MOD 30 MIN: CPT | Mod: TEL | Performed by: OBSTETRICS & GYNECOLOGY

## 2020-04-23 ENCOUNTER — HOSPITAL ENCOUNTER (OUTPATIENT)
Dept: ULTRASOUND IMAGING | Facility: CLINIC | Age: 35
Discharge: HOME | End: 2020-04-23
Attending: OBSTETRICS & GYNECOLOGY | Admitting: OBSTETRICS & GYNECOLOGY
Payer: COMMERCIAL

## 2020-04-23 DIAGNOSIS — N93.9 ABNORMAL UTERINE BLEEDING (AUB): ICD-10-CM

## 2020-04-23 DIAGNOSIS — N83.209 CYST OF OVARY, UNSPECIFIED LATERALITY: ICD-10-CM

## 2020-04-23 LAB
ESTRADIOL SERPL-MCNC: 18 PG/ML
FSH SERPL-ACNC: 4.1 IU/L
HBA1C MFR BLD: 5.2 % (ref 0–5.6)
PROLACTIN SERPL-MCNC: 22 UG/L (ref 3–27)
TSH SERPL DL<=0.005 MIU/L-ACNC: 0.78 MU/L (ref 0.4–4)

## 2020-04-23 PROCEDURE — 84403 ASSAY OF TOTAL TESTOSTERONE: CPT | Performed by: OBSTETRICS & GYNECOLOGY

## 2020-04-23 PROCEDURE — 84270 ASSAY OF SEX HORMONE GLOBUL: CPT | Performed by: OBSTETRICS & GYNECOLOGY

## 2020-04-23 PROCEDURE — 83036 HEMOGLOBIN GLYCOSYLATED A1C: CPT | Performed by: OBSTETRICS & GYNECOLOGY

## 2020-04-23 PROCEDURE — 82627 DEHYDROEPIANDROSTERONE: CPT | Performed by: OBSTETRICS & GYNECOLOGY

## 2020-04-23 PROCEDURE — 76856 US EXAM PELVIC COMPLETE: CPT

## 2020-04-23 PROCEDURE — 83498 ASY HYDROXYPROGESTERONE 17-D: CPT | Performed by: OBSTETRICS & GYNECOLOGY

## 2020-04-23 PROCEDURE — 84146 ASSAY OF PROLACTIN: CPT | Performed by: OBSTETRICS & GYNECOLOGY

## 2020-04-23 PROCEDURE — 83001 ASSAY OF GONADOTROPIN (FSH): CPT | Performed by: OBSTETRICS & GYNECOLOGY

## 2020-04-23 PROCEDURE — 84443 ASSAY THYROID STIM HORMONE: CPT | Performed by: OBSTETRICS & GYNECOLOGY

## 2020-04-23 PROCEDURE — 82670 ASSAY OF TOTAL ESTRADIOL: CPT | Performed by: OBSTETRICS & GYNECOLOGY

## 2020-04-23 PROCEDURE — 36415 COLL VENOUS BLD VENIPUNCTURE: CPT | Performed by: OBSTETRICS & GYNECOLOGY

## 2020-04-27 LAB — 17OHP SERPL-MCNC: 23 NG/DL

## 2020-04-28 LAB
DHEA-S SERPL-MCNC: 510 UG/DL (ref 35–430)
SHBG SERPL-SCNC: 78 NMOL/L (ref 30–135)
TESTOST FREE SERPL-MCNC: 0.21 NG/DL (ref 0.13–0.92)
TESTOST SERPL-MCNC: 23 NG/DL (ref 8–60)

## 2020-05-01 DIAGNOSIS — R79.89 ELEVATED DEHYDROEPIANDROSTERONE SULFATE LEVEL: Primary | ICD-10-CM

## 2020-05-05 ENCOUNTER — HOSPITAL ENCOUNTER (OUTPATIENT)
Dept: MRI IMAGING | Facility: CLINIC | Age: 35
Discharge: HOME | End: 2020-05-05
Attending: OBSTETRICS & GYNECOLOGY | Admitting: OBSTETRICS & GYNECOLOGY
Payer: COMMERCIAL

## 2020-05-05 DIAGNOSIS — R79.89 ELEVATED DEHYDROEPIANDROSTERONE SULFATE LEVEL: ICD-10-CM

## 2020-05-05 PROCEDURE — A9585 GADOBUTROL INJECTION: HCPCS | Performed by: OBSTETRICS & GYNECOLOGY

## 2020-05-05 PROCEDURE — 74183 MRI ABD W/O CNTR FLWD CNTR: CPT

## 2020-05-05 PROCEDURE — 25500064 ZZH RX 255 OP 636: Performed by: OBSTETRICS & GYNECOLOGY

## 2020-05-05 RX ORDER — GADOBUTROL 604.72 MG/ML
7 INJECTION INTRAVENOUS ONCE
Status: COMPLETED | OUTPATIENT
Start: 2020-05-05 | End: 2020-05-05

## 2020-05-05 RX ADMIN — GADOBUTROL 7 ML: 604.72 INJECTION INTRAVENOUS at 07:24

## 2020-06-01 ENCOUNTER — E-VISIT (OUTPATIENT)
Dept: FAMILY MEDICINE | Facility: CLINIC | Age: 35
End: 2020-06-01
Payer: COMMERCIAL

## 2020-06-01 DIAGNOSIS — F90.0 ATTENTION DEFICIT HYPERACTIVITY DISORDER (ADHD), PREDOMINANTLY INATTENTIVE TYPE: Primary | ICD-10-CM

## 2020-06-01 PROCEDURE — 99207 ZZC NO BILLABLE SERVICE THIS VISIT: CPT | Performed by: FAMILY MEDICINE

## 2020-06-01 RX ORDER — DEXTROAMPHETAMINE SACCHARATE, AMPHETAMINE ASPARTATE, DEXTROAMPHETAMINE SULFATE AND AMPHETAMINE SULFATE 5; 5; 5; 5 MG/1; MG/1; MG/1; MG/1
20 TABLET ORAL 2 TIMES DAILY
Qty: 60 TABLET | Refills: 0 | Status: SHIPPED | OUTPATIENT
Start: 2020-06-01 | End: 2020-07-01

## 2020-06-01 RX ORDER — DEXTROAMPHETAMINE SACCHARATE, AMPHETAMINE ASPARTATE, DEXTROAMPHETAMINE SULFATE AND AMPHETAMINE SULFATE 5; 5; 5; 5 MG/1; MG/1; MG/1; MG/1
20 TABLET ORAL 2 TIMES DAILY
Qty: 60 TABLET | Refills: 0 | Status: SHIPPED | OUTPATIENT
Start: 2020-07-02 | End: 2020-08-01

## 2020-06-01 RX ORDER — DEXTROAMPHETAMINE SACCHARATE, AMPHETAMINE ASPARTATE, DEXTROAMPHETAMINE SULFATE AND AMPHETAMINE SULFATE 2.5; 2.5; 2.5; 2.5 MG/1; MG/1; MG/1; MG/1
10 TABLET ORAL DAILY
Qty: 30 TABLET | Refills: 0 | Status: SHIPPED | OUTPATIENT
Start: 2020-06-01 | End: 2020-07-01

## 2020-06-01 RX ORDER — DEXTROAMPHETAMINE SACCHARATE, AMPHETAMINE ASPARTATE, DEXTROAMPHETAMINE SULFATE AND AMPHETAMINE SULFATE 5; 5; 5; 5 MG/1; MG/1; MG/1; MG/1
20 TABLET ORAL 2 TIMES DAILY
Qty: 60 TABLET | Refills: 0 | Status: SHIPPED | OUTPATIENT
Start: 2020-08-02 | End: 2020-09-01

## 2020-06-01 RX ORDER — DEXTROAMPHETAMINE SACCHARATE, AMPHETAMINE ASPARTATE, DEXTROAMPHETAMINE SULFATE AND AMPHETAMINE SULFATE 2.5; 2.5; 2.5; 2.5 MG/1; MG/1; MG/1; MG/1
10 TABLET ORAL DAILY
Qty: 30 TABLET | Refills: 0 | Status: SHIPPED | OUTPATIENT
Start: 2020-08-02 | End: 2020-09-01

## 2020-06-01 RX ORDER — DEXTROAMPHETAMINE SACCHARATE, AMPHETAMINE ASPARTATE, DEXTROAMPHETAMINE SULFATE AND AMPHETAMINE SULFATE 2.5; 2.5; 2.5; 2.5 MG/1; MG/1; MG/1; MG/1
10 TABLET ORAL DAILY
Qty: 30 TABLET | Refills: 0 | Status: SHIPPED | OUTPATIENT
Start: 2020-07-02 | End: 2020-08-01

## 2020-07-08 NOTE — TELEPHONE ENCOUNTER
I spoke with pt.  Reviewed instructions in detail below with pt.  Pt wishes to follow up with Dr Avery.  She says that they discussed the results briefly but did not finish the discussion since she was seeing him for the fistula.  Transferred pt to gynecology to follow up.  Deb Sexton RN     Last visit:  2/7/20  Next visit:none  Last labs: 5/27/20    Rx requested: Atorvastatin   Pharmacy: [facility] [place]

## 2020-08-24 ENCOUNTER — E-VISIT (OUTPATIENT)
Dept: FAMILY MEDICINE | Facility: CLINIC | Age: 35
End: 2020-08-24
Payer: COMMERCIAL

## 2020-08-24 DIAGNOSIS — F90.0 ATTENTION DEFICIT HYPERACTIVITY DISORDER (ADHD), PREDOMINANTLY INATTENTIVE TYPE: Primary | ICD-10-CM

## 2020-08-24 PROCEDURE — 99207 ZZC NO BILLABLE SERVICE THIS VISIT: CPT | Performed by: FAMILY MEDICINE

## 2020-08-24 RX ORDER — DEXTROAMPHETAMINE SACCHARATE, AMPHETAMINE ASPARTATE, DEXTROAMPHETAMINE SULFATE AND AMPHETAMINE SULFATE 5; 5; 5; 5 MG/1; MG/1; MG/1; MG/1
20 TABLET ORAL 2 TIMES DAILY
Qty: 60 TABLET | Refills: 0 | Status: SHIPPED | OUTPATIENT
Start: 2020-10-29 | End: 2020-11-28

## 2020-08-24 RX ORDER — DEXTROAMPHETAMINE SACCHARATE, AMPHETAMINE ASPARTATE, DEXTROAMPHETAMINE SULFATE AND AMPHETAMINE SULFATE 2.5; 2.5; 2.5; 2.5 MG/1; MG/1; MG/1; MG/1
10 TABLET ORAL DAILY
Qty: 30 TABLET | Refills: 0 | Status: SHIPPED | OUTPATIENT
Start: 2020-10-29 | End: 2020-11-28

## 2020-08-24 RX ORDER — DEXTROAMPHETAMINE SACCHARATE, AMPHETAMINE ASPARTATE, DEXTROAMPHETAMINE SULFATE AND AMPHETAMINE SULFATE 2.5; 2.5; 2.5; 2.5 MG/1; MG/1; MG/1; MG/1
10 TABLET ORAL DAILY
Qty: 30 TABLET | Refills: 0 | Status: SHIPPED | OUTPATIENT
Start: 2020-08-24 | End: 2020-09-23

## 2020-08-24 RX ORDER — DEXTROAMPHETAMINE SACCHARATE, AMPHETAMINE ASPARTATE, DEXTROAMPHETAMINE SULFATE AND AMPHETAMINE SULFATE 2.5; 2.5; 2.5; 2.5 MG/1; MG/1; MG/1; MG/1
10 TABLET ORAL DAILY
Qty: 30 TABLET | Refills: 0 | Status: SHIPPED | OUTPATIENT
Start: 2020-09-29 | End: 2020-10-29

## 2020-08-24 RX ORDER — DEXTROAMPHETAMINE SACCHARATE, AMPHETAMINE ASPARTATE, DEXTROAMPHETAMINE SULFATE AND AMPHETAMINE SULFATE 5; 5; 5; 5 MG/1; MG/1; MG/1; MG/1
20 TABLET ORAL 2 TIMES DAILY
Qty: 60 TABLET | Refills: 0 | Status: SHIPPED | OUTPATIENT
Start: 2020-09-29 | End: 2020-10-29

## 2020-08-24 RX ORDER — DEXTROAMPHETAMINE SACCHARATE, AMPHETAMINE ASPARTATE, DEXTROAMPHETAMINE SULFATE AND AMPHETAMINE SULFATE 5; 5; 5; 5 MG/1; MG/1; MG/1; MG/1
20 TABLET ORAL 2 TIMES DAILY
Qty: 60 TABLET | Refills: 0 | Status: SHIPPED | OUTPATIENT
Start: 2020-08-24 | End: 2020-09-23

## 2020-08-24 NOTE — PATIENT INSTRUCTIONS
Thank you for choosing us for your care. I have placed an order for a prescription so that you can start treatment. View your full visit summary for details by clicking on the link below. Your pharmacist will able to address any questions you may have about the medication.     If you're not feeling better within 5-7 days, please schedule an appointment.  You can schedule an appointment right here in [facility], or call [phone]  If the visit is for the same symptoms as your e-visit, we'll refund the cost of your e-visit if seen within seven days.

## 2020-09-24 ENCOUNTER — TELEPHONE (OUTPATIENT)
Dept: DERMATOLOGY | Facility: CLINIC | Age: 35
End: 2020-09-24

## 2020-09-25 NOTE — TELEPHONE ENCOUNTER
Prior Authorization Retail Medication Request    Medication/Dose: Tretinoin 0.05%      Insurance Name:  [employer]  Insurance ID:  [id number]    Thank you,  Gini Narvaez [facility]

## 2020-09-25 NOTE — TELEPHONE ENCOUNTER
PA is approved.  Have not received dates.  Will call pharmacy to process and notify patient. I will document dates when determination fax is received.

## 2020-09-25 NOTE — TELEPHONE ENCOUNTER
Central Prior Authorization Team   Phone: [phone]    PA Initiation    Medication: Tretinoin 0.05%  Insurance Company: [employer] - Phone [phone] Fax [phone]  Pharmacy Filling the Rx: [facility] [place], [place] - [address]  Filling Pharmacy Phone: [phone]  Filling Pharmacy Fax: [phone]  Start Date: 9/25/2020

## 2020-09-30 NOTE — TELEPHONE ENCOUNTER
Prior Authorization Approval    Authorization Effective Date: 9/25/2020  Authorization Expiration Date: 9/25/2021  Medication: Tretinoin 0.05%-APPROVED  Approved Dose/Quantity:    Reference #:     Insurance Company: [employer] - Phone [phone] Fax [phone]  Expected CoPay:       CoPay Card Available:      Foundation Assistance Needed:    Which Pharmacy is filling the prescription (Not needed for infusion/clinic administered): [facility] [place], [place] - [address] [place] [address]  Pharmacy Notified: Yes  Patient Notified: Yes

## 2020-10-13 ENCOUNTER — MYC MEDICAL ADVICE (OUTPATIENT)
Dept: FAMILY MEDICINE | Facility: CLINIC | Age: 35
End: 2020-10-13

## 2020-10-23 DIAGNOSIS — L70.0 ACNE VULGARIS: ICD-10-CM

## 2020-10-23 RX ORDER — CLINDAMYCIN PHOSPHATE 10 MG/G
GEL TOPICAL
Qty: 60 G | Refills: 0 | Status: SHIPPED | OUTPATIENT
Start: 2020-10-23 | End: 2021-08-13

## 2020-10-23 RX ORDER — TRETINOIN 0.5 MG/G
CREAM TOPICAL
Qty: 45 G | Refills: 0 | Status: SHIPPED | OUTPATIENT
Start: 2020-10-23 | End: 2021-08-13

## 2020-10-23 NOTE — LETTER
[facility]  [address] [facility] [address]  Phone: [phone]       October 23, 2020         Ángela Zurita  [address] [place] [id number]            Dear Ángela:    We are concerned about your health care.  We recently provided you with medication refills.  Many medications require routine follow-up with your doctor.    Your prescription(s) have been refilled for one month so you may have time for the above noted follow-up. Please call to schedule soon so we can assure you have an appointment before your next refills are needed.    Thank you,      Christianne MCDONALD / tr

## 2020-10-23 NOTE — TELEPHONE ENCOUNTER
Medication is being filled for 1 time refill only due to:  Patient needs to be seen because it has been more than one year since last visit. Letter sent to make appt.  Sandrita BURDEN RN BSN PHN  Specialty Clinics

## 2020-11-16 ENCOUNTER — HEALTH MAINTENANCE LETTER (OUTPATIENT)
Age: 35
End: 2020-11-16

## 2020-11-24 ENCOUNTER — E-VISIT (OUTPATIENT)
Dept: FAMILY MEDICINE | Facility: CLINIC | Age: 35
End: 2020-11-24
Payer: COMMERCIAL

## 2020-11-24 DIAGNOSIS — F90.0 ATTENTION DEFICIT HYPERACTIVITY DISORDER (ADHD), PREDOMINANTLY INATTENTIVE TYPE: Primary | ICD-10-CM

## 2020-11-24 PROCEDURE — 99421 OL DIG E/M SVC 5-10 MIN: CPT | Performed by: FAMILY MEDICINE

## 2020-11-24 RX ORDER — DEXTROAMPHETAMINE SACCHARATE, AMPHETAMINE ASPARTATE, DEXTROAMPHETAMINE SULFATE AND AMPHETAMINE SULFATE 2.5; 2.5; 2.5; 2.5 MG/1; MG/1; MG/1; MG/1
10 TABLET ORAL DAILY
Qty: 30 TABLET | Refills: 0 | Status: SHIPPED | OUTPATIENT
Start: 2021-01-23 | End: 2021-02-22

## 2020-11-24 RX ORDER — DEXTROAMPHETAMINE SACCHARATE, AMPHETAMINE ASPARTATE, DEXTROAMPHETAMINE SULFATE AND AMPHETAMINE SULFATE 2.5; 2.5; 2.5; 2.5 MG/1; MG/1; MG/1; MG/1
10 TABLET ORAL DAILY
Qty: 30 TABLET | Refills: 0 | Status: SHIPPED | OUTPATIENT
Start: 2020-11-24 | End: 2020-12-24

## 2020-11-24 RX ORDER — DEXTROAMPHETAMINE SACCHARATE, AMPHETAMINE ASPARTATE, DEXTROAMPHETAMINE SULFATE AND AMPHETAMINE SULFATE 5; 5; 5; 5 MG/1; MG/1; MG/1; MG/1
20 TABLET ORAL 2 TIMES DAILY
Qty: 60 TABLET | Refills: 0 | Status: SHIPPED | OUTPATIENT
Start: 2020-11-24 | End: 2020-12-24

## 2020-11-24 RX ORDER — DEXTROAMPHETAMINE SACCHARATE, AMPHETAMINE ASPARTATE, DEXTROAMPHETAMINE SULFATE AND AMPHETAMINE SULFATE 2.5; 2.5; 2.5; 2.5 MG/1; MG/1; MG/1; MG/1
10 TABLET ORAL DAILY
Qty: 30 TABLET | Refills: 0 | Status: SHIPPED | OUTPATIENT
Start: 2020-12-24 | End: 2021-01-23

## 2020-11-24 RX ORDER — DEXTROAMPHETAMINE SACCHARATE, AMPHETAMINE ASPARTATE, DEXTROAMPHETAMINE SULFATE AND AMPHETAMINE SULFATE 5; 5; 5; 5 MG/1; MG/1; MG/1; MG/1
20 TABLET ORAL 2 TIMES DAILY
Qty: 60 TABLET | Refills: 0 | Status: SHIPPED | OUTPATIENT
Start: 2021-01-23 | End: 2021-02-22

## 2020-11-24 RX ORDER — DEXTROAMPHETAMINE SACCHARATE, AMPHETAMINE ASPARTATE, DEXTROAMPHETAMINE SULFATE AND AMPHETAMINE SULFATE 5; 5; 5; 5 MG/1; MG/1; MG/1; MG/1
20 TABLET ORAL 2 TIMES DAILY
Qty: 60 TABLET | Refills: 0 | Status: SHIPPED | OUTPATIENT
Start: 2020-12-24 | End: 2021-01-23

## 2020-11-24 NOTE — PATIENT INSTRUCTIONS
Thank you for choosing us for your care. I have placed an order for a prescription so that you can start treatment. View your full visit summary for details by clicking on the link below. Your pharmacist will able to address any questions you may have about the medication.     If you're not feeling better within 5-7 days, please schedule an appointment.  You can schedule an appointment right here in [facility], or call [phone]  If the visit is for the same symptoms as your e-visit, we'll refund the cost of your e-visit if seen within seven days.

## 2020-12-09 ENCOUNTER — MYC REFILL (OUTPATIENT)
Dept: FAMILY MEDICINE | Facility: CLINIC | Age: 35
End: 2020-12-09

## 2020-12-11 NOTE — TELEPHONE ENCOUNTER
Patient my charted that she will wait to get this filled next week at appointment and is good till then.    Will keep this open to ensure this has been filled.    DAVID Cruz

## 2020-12-11 NOTE — TELEPHONE ENCOUNTER
Needs appointment and updated PHQ/NADIA, will send through my chart. And will check to see if patient is out.      Awaiting results.      DAVID Cruz

## 2021-01-07 ENCOUNTER — E-VISIT (OUTPATIENT)
Dept: FAMILY MEDICINE | Facility: CLINIC | Age: 36
End: 2021-01-07
Payer: COMMERCIAL

## 2021-01-07 PROCEDURE — 99421 OL DIG E/M SVC 5-10 MIN: CPT | Performed by: FAMILY MEDICINE

## 2021-01-07 ASSESSMENT — PATIENT HEALTH QUESTIONNAIRE - PHQ9
10. IF YOU CHECKED OFF ANY PROBLEMS, HOW DIFFICULT HAVE THESE PROBLEMS MADE IT FOR YOU TO DO YOUR WORK, TAKE CARE OF THINGS AT HOME, OR GET ALONG WITH OTHER PEOPLE: SOMEWHAT DIFFICULT
SUM OF ALL RESPONSES TO PHQ QUESTIONS 1-9: 8
SUM OF ALL RESPONSES TO PHQ QUESTIONS 1-9: 8

## 2021-01-08 RX ORDER — FLUOXETINE 40 MG/1
40 CAPSULE ORAL DAILY
Qty: 90 CAPSULE | Refills: 1 | Status: SHIPPED | OUTPATIENT
Start: 2021-01-08 | End: 2021-08-13

## 2021-01-08 ASSESSMENT — PATIENT HEALTH QUESTIONNAIRE - PHQ9: SUM OF ALL RESPONSES TO PHQ QUESTIONS 1-9: 8

## 2021-01-08 NOTE — TELEPHONE ENCOUNTER
Patient has responded to your mychart msg.  She would like to increase the prozac.    Routing to provider.  Ashwini HILL MSN, RN

## 2021-01-08 NOTE — TELEPHONE ENCOUNTER
Provider E-Visit time total (minutes): 8    PHQ 7/9/2019 9/24/2019 1/7/2021   PHQ-9 Total Score 16 8 8   Q9: Thoughts of better off dead/self-harm past 2 weeks Not at all Not at all Not at all     NADIA-7 SCORE 7/9/2019   Total Score 12

## 2021-01-08 NOTE — PATIENT INSTRUCTIONS
Depression: Tips to Help Yourself    As your healthcare providers help treat your depression, you can also help yourself. Keep in mind that your illness affects you emotionally, physically, mentally, and socially. So full recovery will take time. Take care of your body and your soul, and be patient with yourself as you get better.   Self-care    Educate yourself. Read about treatment and medicine options. If you have the energy, attend local conferences or support groups. Keep a list of useful websites and helpful books and use them as needed. This illness is not your fault. Don t blame yourself for your depression.    Manage early symptoms. If you notice symptoms returning, experience triggers, or identify other factors that may lead to a depressive episode, get help as soon as possible. Ask trusted friends and family to monitor your behavior and let you know if they see anything of concern.    Work with your provider. Find a provider you can trust. Communicate honestly with that person and share information on your treatment for depression and your reaction to medicines.    Be prepared for a crisis. Know what to do if you experience a crisis. Keep the phone number of a crisis hotline and know the location of your community's urgent care centers and the closest emergency department.    Hold off on big decisions. Depression can cloud your judgment. So wait until you feel better before making major life decisions, such as changing jobs, moving, or getting  or .    Be patient. Recovering from depression is a process. Don t be discouraged if it takes some time to feel better.    Keep it simple. Depression saps your energy and concentration. So you won t be able to do all the things you used to do. Set small goals and do what you can.    Be with others. Don t isolate yourself--you ll only feel worse. Try to be with other people. And take part in fun activities when you can. Go to a movie, ballgame,  Temple service, or social event. Talk openly with people you can trust. And accept help when it s offered.    Take care of your body  People with depression often lose the desire to take care of themselves. That only makes their problems worse. During treatment and afterward, make a point to:     Exercise. It s a great way to take care of your body. And studies have shown that exercise helps fight depression. Aim for 30 minutes of moderate activity a day. Walking in small blocks of time (5-10 minutes) is a good way to start, but anything that gets you moving (gardening, house cleaning) counts.    Don't use drugs and alcohol. These may ease the pain in the short term. But they ll only make your problems worse in the long run.    Get relief from stress. Ask your healthcare provider for relaxation exercises and techniques to help relieve stress. Consider activities like meditation, yoga, or Raimundo Chi.    Eat right. A balanced and healthy diet helps keep your body healthy.    Get adequate sleep. Aim for 8 hours per night. Too much or too little sleep can cause other physical and emotional problems.  PT PAL last reviewed this educational content on 12/1/2019 2000-2020 [employer]viously [employer]). [address] [place] [address], [place] [address]. All rights reserved. This information is not intended as a substitute for professional medical care. Always follow your healthcare professional's instructions.

## 2021-01-15 ENCOUNTER — HEALTH MAINTENANCE LETTER (OUTPATIENT)
Age: 36
End: 2021-01-15

## 2021-02-18 ENCOUNTER — E-VISIT (OUTPATIENT)
Dept: FAMILY MEDICINE | Facility: CLINIC | Age: 36
End: 2021-02-18

## 2021-02-18 DIAGNOSIS — F90.0 ATTENTION DEFICIT HYPERACTIVITY DISORDER (ADHD), PREDOMINANTLY INATTENTIVE TYPE: Primary | ICD-10-CM

## 2021-02-18 PROCEDURE — 99421 OL DIG E/M SVC 5-10 MIN: CPT | Performed by: FAMILY MEDICINE

## 2021-02-18 RX ORDER — DEXTROAMPHETAMINE SACCHARATE, AMPHETAMINE ASPARTATE, DEXTROAMPHETAMINE SULFATE AND AMPHETAMINE SULFATE 2.5; 2.5; 2.5; 2.5 MG/1; MG/1; MG/1; MG/1
10 TABLET ORAL DAILY
Qty: 30 TABLET | Refills: 0 | Status: SHIPPED | OUTPATIENT
Start: 2021-04-21 | End: 2021-05-21

## 2021-02-18 RX ORDER — DEXTROAMPHETAMINE SACCHARATE, AMPHETAMINE ASPARTATE, DEXTROAMPHETAMINE SULFATE AND AMPHETAMINE SULFATE 2.5; 2.5; 2.5; 2.5 MG/1; MG/1; MG/1; MG/1
10 TABLET ORAL DAILY
Qty: 30 TABLET | Refills: 0 | Status: SHIPPED | OUTPATIENT
Start: 2021-03-21 | End: 2021-04-20

## 2021-02-18 RX ORDER — DEXTROAMPHETAMINE SACCHARATE, AMPHETAMINE ASPARTATE, DEXTROAMPHETAMINE SULFATE AND AMPHETAMINE SULFATE 5; 5; 5; 5 MG/1; MG/1; MG/1; MG/1
20 TABLET ORAL 2 TIMES DAILY
Qty: 60 TABLET | Refills: 0 | Status: SHIPPED | OUTPATIENT
Start: 2021-02-18 | End: 2021-03-20

## 2021-02-18 RX ORDER — DEXTROAMPHETAMINE SACCHARATE, AMPHETAMINE ASPARTATE, DEXTROAMPHETAMINE SULFATE AND AMPHETAMINE SULFATE 5; 5; 5; 5 MG/1; MG/1; MG/1; MG/1
20 TABLET ORAL 2 TIMES DAILY
Qty: 60 TABLET | Refills: 0 | Status: SHIPPED | OUTPATIENT
Start: 2021-04-21 | End: 2021-05-21

## 2021-02-18 RX ORDER — DEXTROAMPHETAMINE SACCHARATE, AMPHETAMINE ASPARTATE, DEXTROAMPHETAMINE SULFATE AND AMPHETAMINE SULFATE 5; 5; 5; 5 MG/1; MG/1; MG/1; MG/1
20 TABLET ORAL 2 TIMES DAILY
Qty: 60 TABLET | Refills: 0 | Status: SHIPPED | OUTPATIENT
Start: 2021-03-21 | End: 2021-04-20

## 2021-02-18 RX ORDER — DEXTROAMPHETAMINE SACCHARATE, AMPHETAMINE ASPARTATE, DEXTROAMPHETAMINE SULFATE AND AMPHETAMINE SULFATE 2.5; 2.5; 2.5; 2.5 MG/1; MG/1; MG/1; MG/1
10 TABLET ORAL DAILY
Qty: 30 TABLET | Refills: 0 | Status: SHIPPED | OUTPATIENT
Start: 2021-02-18 | End: 2021-03-20

## 2021-04-05 ENCOUNTER — E-VISIT (OUTPATIENT)
Dept: URGENT CARE | Facility: URGENT CARE | Age: 36
End: 2021-04-05
Payer: COMMERCIAL

## 2021-04-05 DIAGNOSIS — Z20.822 SUSPECTED COVID-19 VIRUS INFECTION: Primary | ICD-10-CM

## 2021-04-05 PROCEDURE — 99421 OL DIG E/M SVC 5-10 MIN: CPT | Performed by: NURSE PRACTITIONER

## 2021-04-05 NOTE — PATIENT INSTRUCTIONS
Dear Ángela Zurita,    Your symptoms show that you may have coronavirus (COVID-19). This illness can cause fever, cough and trouble breathing. Many people get a mild case and get better on their own. Some people can get very sick.    Will I be tested for COVID-19?  We would like to test you for Covid-19 virus. I have placed orders for this test.     To schedule: go to your [employer] home page and scroll down to the section that says  You have an appointment that needs to be scheduled  and click the large green button that says  Schedule Now  and follow the steps to find the next available openings.    If you are unable to complete these [employer] scheduling steps, please call [phone] to schedule your testing.     Return to work/school/ guidance:  Please let your workplace manager and staffing office know when your quarantine ends     We can t give you an exact date as it depends on the above. You can calculate this on your own or work with your manager/staffing office to calculate this. (For example if you were exposed on 10/4, you would have to quarantine for 14 full days. That would be through 10/18. You could return on 10/19.)      If you receive a positive COVID-19 test result, follow the guidance of the those who are giving you the results. Usually the return to work is 10 (or in some cases 20 days from symptom onset.) If you work at [facility], you must also be cleared by Employee Occupational Health and Safety to return to work.        If you receive a negative COVID-19 test result and did not have a high risk exposure to someone with a known positive COVID-19 test, you can return to work once you're free of fever for 24 hours without fever-reducing medication and your symptoms are improving or resolved.      If you receive a negative COVID-19 test and If you had a high risk exposure to someone who has tested positive for COVID-19 then you can return to work 14 days after your last contact  with the positive individual    Note: If you have ongoing exposure to the covid positive person, this quarantine period may be more than 14 days. (For example, if you are continued to be exposed to your child who tested positive and cannot isolate from them, then the quarantine of 7-14 days can't start until your child is no longer contagious. This is typically 10 days from onset of the child's symptoms. So the total duration may be 17-24 days in this case.)    Sign up for [employer].   We know it's scary to hear that you might have COVID-19. We want to track your symptoms to make sure you're okay over the next 2 weeks. Please look for an email from [employer]--this is a free, online program that we'll use to keep in touch. To sign up, follow the link in the email you will receive. Learn more at [email or web address]    How can I take care of myself?    Get lots of rest. Drink extra fluids (unless a doctor has told you not to)    Take Tylenol (acetaminophen) or ibuprofen for fever or pain. If you have liver or kidney problems, ask your family doctor if it's okay to take Tylenol o ibuprofen    If you have other health problems (like cancer, heart failure, an organ transplant or severe kidney disease): Call your specialty clinic if you don't feel better in the next 2 days.    Know when to call 911. Emergency warning signs include:  o Trouble breathing or shortness of breath  o Pain or pressure in the chest that doesn't go away  o Feeling confused like you haven't felt before, or not being able to wake up  o Bluish-colored lips or face    Where can I get more information?  [employer] - About COVID-19:   [email or web address]    CDC - What to Do If You're Sick:   www.cdc.gov/coronavirus/2019-ncov/about/steps-when-sick.html

## 2021-04-10 ENCOUNTER — HOSPITAL ENCOUNTER (OUTPATIENT)
Dept: MAMMOGRAPHY | Facility: CLINIC | Age: 36
Discharge: HOME | End: 2021-04-10
Attending: FAMILY MEDICINE | Admitting: FAMILY MEDICINE
Payer: COMMERCIAL

## 2021-04-10 DIAGNOSIS — Z12.31 VISIT FOR SCREENING MAMMOGRAM: ICD-10-CM

## 2021-04-10 PROCEDURE — 77063 BREAST TOMOSYNTHESIS BI: CPT

## 2021-05-15 ENCOUNTER — E-VISIT (OUTPATIENT)
Dept: FAMILY MEDICINE | Facility: CLINIC | Age: 36
End: 2021-05-15
Payer: COMMERCIAL

## 2021-05-15 DIAGNOSIS — F90.0 ATTENTION DEFICIT HYPERACTIVITY DISORDER (ADHD), PREDOMINANTLY INATTENTIVE TYPE: ICD-10-CM

## 2021-05-15 PROCEDURE — 99421 OL DIG E/M SVC 5-10 MIN: CPT | Performed by: FAMILY MEDICINE

## 2021-05-17 RX ORDER — DEXTROAMPHETAMINE SACCHARATE, AMPHETAMINE ASPARTATE, DEXTROAMPHETAMINE SULFATE AND AMPHETAMINE SULFATE 2.5; 2.5; 2.5; 2.5 MG/1; MG/1; MG/1; MG/1
TABLET ORAL
Qty: 150 TABLET | Refills: 0 | Status: SHIPPED | OUTPATIENT
Start: 2021-07-16 | End: 2021-08-13

## 2021-05-17 RX ORDER — DEXTROAMPHETAMINE SACCHARATE, AMPHETAMINE ASPARTATE, DEXTROAMPHETAMINE SULFATE AND AMPHETAMINE SULFATE 2.5; 2.5; 2.5; 2.5 MG/1; MG/1; MG/1; MG/1
TABLET ORAL
Qty: 150 TABLET | Refills: 0 | Status: SHIPPED | OUTPATIENT
Start: 2021-06-16 | End: 2021-08-13

## 2021-05-17 RX ORDER — DEXTROAMPHETAMINE SACCHARATE, AMPHETAMINE ASPARTATE, DEXTROAMPHETAMINE SULFATE AND AMPHETAMINE SULFATE 2.5; 2.5; 2.5; 2.5 MG/1; MG/1; MG/1; MG/1
TABLET ORAL
Qty: 150 TABLET | Refills: 0 | Status: SHIPPED | OUTPATIENT
Start: 2021-05-17 | End: 2021-08-13

## 2021-06-01 VITALS — WEIGHT: 144.8 LBS | HEIGHT: 66 IN | BODY MASS INDEX: 23.27 KG/M2

## 2021-06-18 NOTE — ANESTHESIA POSTPROCEDURE EVALUATION
Patient: Ángela BROWN Kellerhuis  SPHINCTEROPLASTY  Anesthesia type: general    Patient location: PACU  Last vitals:   Vitals:    05/18/18 1740   BP: 107/57   Pulse: 72   Resp: 12   Temp:    SpO2: 100%     Post vital signs: stable  Level of consciousness: awake and responds to simple questions  Post-anesthesia pain: pain controlled  Post-anesthesia nausea and vomiting: no  Pulmonary: unassisted, nasal cannula  Cardiovascular: stable and blood pressure at baseline  Hydration: adequate  Anesthetic events: no    QCDR Measures:  ASA# 11 - Lizzy-op Cardiac Arrest: ASA11B - Patient did NOT experience unanticipated cardiac arrest  ASA# 12 - Lizzy-op Mortality Rate: ASA12B - Patient did NOT die  ASA# 13 - PACU Re-Intubation Rate: ASA13B - Patient did NOT require a new airway mgmt  ASA# 10 - Composite Anes Safety: ASA10A - No serious adverse event    Additional Notes:

## 2021-06-18 NOTE — ANESTHESIA PREPROCEDURE EVALUATION
Anesthesia Evaluation      Patient summary reviewed   No history of anesthetic complications     Airway   Mallampati: I   Pulmonary - normal exam   (+) a smoker (1/2 ppd 20 years)                         Cardiovascular - negative ROS  Exercise tolerance: > or = 4 METS  Rhythm: regular  Rate: normal,         Neuro/Psych - negative ROS   Chronic pain: 13.8.    Endo/Other - negative ROS      GI/Hepatic/Renal - negative ROS      Other findings: 4/6/18 [place] pre op labs  Hgb=13.8  rjw=709  k=3.7  GFR>90      Dental - normal exam                        Anesthesia Plan  Planned anesthetic: general endotracheal  Soft bite block  Scopolamine  Zofran/decadron  Propofol background infusion  ASA 2   Induction: intravenous   Anesthetic plan and risks discussed with: patient and spouse    Post-op plan: routine recovery

## 2021-06-18 NOTE — ANESTHESIA CARE TRANSFER NOTE
Last vitals:   Vitals:    05/18/18 1650   BP: 124/79   Pulse: (!) 101   Resp: 16   Temp: 37  C (98.6  F)   SpO2: 100%     Patient's level of consciousness is drowsy  Spontaneous respirations: yes  Maintains airway independently: yes  Dentition unchanged: yes  Oropharynx: oropharynx clear of all foreign objects    QCDR Measures:  ASA# 20 - Surgical Safety Checklist: WHO surgical safety checklist completed prior to induction  PQRS# 430 - Adult PONV Prevention: 4558F - Pt received => 2 anti-emetic agents (different classes) preop & intraop  ASA# 8 - Peds PONV Prevention: NA - Not pediatric patient, not GA or 2 or more risk factors NOT present  PQRS# 424 - Lizzy-op Temp Management: 4559F - At least one body temp DOCUMENTED => 35.5C or 95.9F within required timeframe  PQRS# 426 - PACU Transfer Protocol: - Transfer of care checklist used  ASA# 14 - Acute Post-op Pain: ASA14B - Patient did NOT experience pain >= 7 out of 10

## 2021-06-24 NOTE — TELEPHONE ENCOUNTER
Pt going to [facility] for follow up appt.     Maria Dolores JAMES  , [facility]

## 2021-06-24 NOTE — PROGRESS NOTES
"Please see \"Imaging\" tab under Chart Review for full report.  This ultrasound was performed in the [facility], and may be located under Care Everywhere.    Deb Banks MD  Maternal Fetal Medicine    "

## 2021-07-08 ENCOUNTER — TELEPHONE (OUTPATIENT)
Dept: OBGYN | Facility: CLINIC | Age: 36
End: 2021-07-08

## 2021-07-08 DIAGNOSIS — N83.202 LEFT OVARIAN CYST: Primary | ICD-10-CM

## 2021-07-08 NOTE — TELEPHONE ENCOUNTER
Reason for Call: Request for an order     Order or referral being requested: Pt is due for her yearly Ultrasound scan. Please place in an order. Pt will be making her annual appt with provider through [facility].     Date needed: as soon as possible    Has the patient been seen by the PCP for this problem? NO    Additional comments: NO    Phone number Patient can be reached at:  Home number on file [phone] (home)    Best Time:  anytime    Can we leave a detailed message on this number?  YES    Call taken on 7/8/2021 at 4:54 PM by Mirtha Mckeon

## 2021-07-09 NOTE — TELEPHONE ENCOUNTER
S-(situation): request for yearly US order    B-(background): US 4/2020 for left ovarian mass.  Unchanged from previous US  Recommendation for yearly assessment to assess stability.    A-(assessment): left ovarian mass     R-(recommendations):Please review and advise.  OK to place order?    Thank you.    Radha Holliday   [facility]  RN

## 2021-07-12 ENCOUNTER — MYC MEDICAL ADVICE (OUTPATIENT)
Dept: FAMILY MEDICINE | Facility: CLINIC | Age: 36
End: 2021-07-12

## 2021-07-12 NOTE — TELEPHONE ENCOUNTER
Provider covering for Dr. Graf,    Please see my chart message.    Routing refill request to provider for review/approval because:  Drug not on the [facility] refill protocol Maira CHAVEZ RN

## 2021-07-12 NOTE — TELEPHONE ENCOUNTER
Covering for PCP (out of clinic today):  Okay to let pharmacy know they can fill early.    Thanks,  Avinash Alcantar MD

## 2021-07-13 NOTE — TELEPHONE ENCOUNTER
Reached out to our pharmacy and gave pharmacist Hakeem a verbal to fill Adderall today.  Understanding relayed back.    MyChart sent to patient.     Alba Harris RN  [facility]

## 2021-08-08 ENCOUNTER — APPOINTMENT (OUTPATIENT)
Dept: URGENT CARE | Facility: CLINIC | Age: 36
End: 2021-08-08
Payer: COMMERCIAL

## 2021-08-13 ENCOUNTER — VIRTUAL VISIT (OUTPATIENT)
Dept: FAMILY MEDICINE | Facility: CLINIC | Age: 36
End: 2021-08-13
Payer: COMMERCIAL

## 2021-08-13 DIAGNOSIS — G43.009 MIGRAINE WITHOUT AURA AND WITHOUT STATUS MIGRAINOSUS, NOT INTRACTABLE: ICD-10-CM

## 2021-08-13 DIAGNOSIS — F90.0 ATTENTION DEFICIT HYPERACTIVITY DISORDER (ADHD), PREDOMINANTLY INATTENTIVE TYPE: ICD-10-CM

## 2021-08-13 PROCEDURE — 99213 OFFICE O/P EST LOW 20 MIN: CPT | Mod: 95 | Performed by: FAMILY MEDICINE

## 2021-08-13 RX ORDER — DEXTROAMPHETAMINE SACCHARATE, AMPHETAMINE ASPARTATE, DEXTROAMPHETAMINE SULFATE AND AMPHETAMINE SULFATE 2.5; 2.5; 2.5; 2.5 MG/1; MG/1; MG/1; MG/1
TABLET ORAL
Qty: 150 TABLET | Refills: 0 | Status: SHIPPED | OUTPATIENT
Start: 2021-08-13 | End: 2022-02-02

## 2021-08-13 RX ORDER — DEXTROAMPHETAMINE SACCHARATE, AMPHETAMINE ASPARTATE, DEXTROAMPHETAMINE SULFATE AND AMPHETAMINE SULFATE 2.5; 2.5; 2.5; 2.5 MG/1; MG/1; MG/1; MG/1
TABLET ORAL
Qty: 150 TABLET | Refills: 0 | Status: SHIPPED | OUTPATIENT
Start: 2021-10-12 | End: 2022-02-02

## 2021-08-13 RX ORDER — DEXTROAMPHETAMINE SACCHARATE, AMPHETAMINE ASPARTATE, DEXTROAMPHETAMINE SULFATE AND AMPHETAMINE SULFATE 2.5; 2.5; 2.5; 2.5 MG/1; MG/1; MG/1; MG/1
TABLET ORAL
Qty: 150 TABLET | Refills: 0 | Status: SHIPPED | OUTPATIENT
Start: 2021-09-12 | End: 2022-02-02

## 2021-08-13 NOTE — PROGRESS NOTES
Ángela is a 36 year old who is being evaluated via a billable video visit.      How would you like to obtain your AVS? MyChart  If the video visit is dropped, the invitation should be resent by: Text to cell phone: [phone]  Will anyone else be joining your video visit? No  Video Start Time: 7:14 AM    Assessment & Plan     Attention deficit hyperactivity disorder (ADHD), predominantly inattentive type  Stable, refil  - amphetamine-dextroamphetamine (ADDERALL) 10 MG tablet; Take 2 pills (20mg) orally in the morning and two pills (20mg) at 11am and one pill (10mg) at 2pm  - amphetamine-dextroamphetamine (ADDERALL) 10 MG tablet; Take 2 pills (20mg) orally in the morning and two pills (20mg) at 11am and one pill (10mg) at 2pm  - amphetamine-dextroamphetamine (ADDERALL) 10 MG tablet; Take 2 pills (20mg) orally in the morning and two pills (20mg) at 11am and one pill (10mg) at 2pm         Tobacco Cessation:   reports that she has been smoking cigarettes. She started smoking about 6 years ago. She has a 7.50 pack-year smoking history. She has never used smokeless tobacco.      See Patient Instructions    No follow-ups on file.    Randy Graf MD  [facility]    Subjective   Ángela is a 36 year old who presents for the following health issues     HPI     SUBJECTIVE:  Patient is here today to recheck ADHD/ADD.    Updates since last visit: working well  Routine for taking medicine, including time: Patient takes 2 10 mg tablets in the morning and 2 tablets at 11 am and 1 tablet at 2 pm. Patient states there is a lot of stuff going on. She is going through a divorce, which she says is for the better. Patient is seeing a counselor.   Time medicine wears off: 4-5 pm  Issues at school/Work: None  Issues at home: None  Control of symptoms: well controlled  Working:[employer].    Side effects:  Headaches: No  Stomach aches: No  Irritability/mood swings: No  Difficulties with sleep:  No  Social withdrawal: No  Unusual movements/tics: No  Decreased appetite: No    Other concerns: None      How many servings of fruits and vegetables do you eat daily?  2-3    On average, how many sweetened beverages do you drink each day (Examples: soda, juice, sweet tea, etc.  Do NOT count diet or artificially sweetened beverages)?   2    How many days per week do you exercise enough to make your heart beat faster? 3 or less    How many minutes a day do you exercise enough to make your heart beat faster? 20 - 29    How many days per week do you miss taking your medication? 0        Review of Systems   Constitutional, HEENT, cardiovascular, pulmonary, gi and gu systems are negative, except as otherwise noted.      Objective           Vitals:  No vitals were obtained today due to virtual visit.    Physical Exam   GENERAL: Healthy, alert and no distress  EYES: Eyes grossly normal to inspection.  No discharge or erythema, or obvious scleral/conjunctival abnormalities.  RESP: No audible wheeze, cough, or visible cyanosis.  No visible retractions or increased work of breathing.    SKIN: Visible skin clear. No significant rash, abnormal pigmentation or lesions.  NEURO: Cranial nerves grossly intact.  Mentation and speech appropriate for age.  PSYCH: Mentation appears normal, affect normal/bright, judgement and insight intact, normal speech and appearance well-groomed.            Video-Visit Details    Type of service:  Video Visit    Video End Time:7:21 AM    Originating Location (pt. Location): Home    Distant Location (provider location):  [facility]     Platform used for Video Visit: [employer]

## 2021-08-17 RX ORDER — SUMATRIPTAN 50 MG/1
50 TABLET, FILM COATED ORAL
Qty: 9 TABLET | Refills: 4 | Status: SHIPPED | OUTPATIENT
Start: 2021-08-17 | End: 2022-11-01

## 2021-08-26 ENCOUNTER — E-VISIT (OUTPATIENT)
Dept: PEDIATRICS | Facility: CLINIC | Age: 36
End: 2021-08-26
Payer: COMMERCIAL

## 2021-08-26 DIAGNOSIS — N39.0 ACUTE UTI (URINARY TRACT INFECTION): Primary | ICD-10-CM

## 2021-08-26 PROCEDURE — 99421 OL DIG E/M SVC 5-10 MIN: CPT | Performed by: PHYSICIAN ASSISTANT

## 2021-08-26 RX ORDER — NITROFURANTOIN 25; 75 MG/1; MG/1
100 CAPSULE ORAL 2 TIMES DAILY
Qty: 10 CAPSULE | Refills: 0 | Status: SHIPPED | OUTPATIENT
Start: 2021-08-26 | End: 2021-08-31

## 2021-08-26 NOTE — PATIENT INSTRUCTIONS
Dear Ángela Zurita    After reviewing your responses, I've been able to diagnose you with a urinary tract infection, which is a common infection of the bladder with bacteria.  This is not a sexually transmitted infection, though urinating immediately after intercourse can help prevent infections.  Drinking lots of fluids is also helpful to clear your current infection and prevent the next one.      I have sent a prescription for antibiotics to your pharmacy to treat this infection.    It is important that you take all of your prescribed medication even if your symptoms are improving after a few doses.  Taking all of your medicine helps prevent the symptoms from returning.     If your symptoms worsen, you develop pain in your back or stomach, develop fevers, or are not improving in 5 days, please contact your primary care provider for an appointment or visit any of our convenient Walk-in or Urgent Care Centers to be seen, which can be found on our website here.    Thanks again for choosing us as your health care partner,    Robby Llanes PA-C    Urinary Tract Infections in Women  Urinary tract infections (UTIs) are most often caused by bacteria. These bacteria enter the urinary tract. The bacteria may come from inside the body. Or they may travel from the skin outside the rectum or vagina into the urethra. Female anatomy makes it easy for bacteria from the bowel to enter a woman s urinary tract, which is the most common source of UTI. This means women develop UTIs more often than men. Pain in or around the urinary tract is a common UTI symptom. But the only way to know for sure if you have a UTI for the healthcare provider to test your urine. The two tests that may be done are the urinalysis and urine culture.     Types of UTIs    Cystitis. A bladder infection (cystitis) is the most common UTI in women. You may have urgent or frequent need to pee. You may also have pain, burning when you pee, and bloody  urine.    Urethritis. This is an inflamed urethra, which is the tube that carries urine from the bladder to outside the body. You may have lower stomach or back pain. You may also have urgent or frequent need to pee.    Pyelonephritis. This is a kidney infection. If not treated, it can be serious and damage your kidneys. In severe cases, you may need to stay in the hospital. You may have a fever and lower back pain.    Medicines to treat a UTI  Most UTIs are treated with antibiotics. These kill the bacteria. The length of time you need to take them depends on the type of infection. It may be as short as 3 days. If you have repeated UTIs, you may need a low-dose antibiotic for several months. Take antibiotics exactly as directed. Don t stop taking them until all of the medicine is gone. If you stop taking the antibiotic too soon, the infection may not go away. You may also develop a resistance to the antibiotic. This can make it much harder to treat.   Lifestyle changes to treat and prevent UTIs   The lifestyle changes below will help get rid of your UTI. They may also help prevent future UTIs.     Drink plenty of fluids. This includes water, juice, or other caffeine-free drinks. Fluids help flush bacteria out of your body.    Empty your bladder. Always empty your bladder when you feel the urge to pee. And always pee before going to sleep. Urine that stays in your bladder can lead to infection. Try to pee before and after sex as well.    Practice good personal hygiene. Wipe yourself from front to back after using the toilet. This helps keep bacteria from getting into the urethra.    Use condoms during sex. These help prevent UTIs caused by sexually transmitted bacteria. Also don't use spermicides during sex. These can increase the risk for UTIs. Choose other forms of birth control instead. For women who tend to get UTIs after sex, a low-dose of a preventive antibiotic may be used. Be sure to discuss this option with  your healthcare provider.    Follow up with your healthcare provider as directed. He or she may test to make sure the infection has cleared. If needed, more treatment may be started.  [employer] last reviewed this educational content on 7/1/2019 2000-2021 [employer]. All rights reserved. This information is not intended as a substitute for professional medical care. Always follow your healthcare professional's instructions.

## 2021-09-18 ENCOUNTER — HEALTH MAINTENANCE LETTER (OUTPATIENT)
Age: 36
End: 2021-09-18

## 2021-10-10 ENCOUNTER — MYC MEDICAL ADVICE (OUTPATIENT)
Dept: FAMILY MEDICINE | Facility: CLINIC | Age: 36
End: 2021-10-10

## 2021-10-11 ENCOUNTER — MYC MEDICAL ADVICE (OUTPATIENT)
Dept: FAMILY MEDICINE | Facility: CLINIC | Age: 36
End: 2021-10-11

## 2021-10-11 NOTE — TELEPHONE ENCOUNTER
Pharmacy notified with acknowledgement.  Patient notified also.     Alba Harris RN  [facility]

## 2021-10-11 NOTE — TELEPHONE ENCOUNTER
Patient sent jose is going out of town at 11am today she is requesting adderall refill to be approved for refill Today.    not here to approve is there another provider that will approve today?    Need to call the pharmacy ASAP to approval if it is.     Misa Collins CSS on 10/11/2021 at 8:50 AM

## 2021-11-09 ENCOUNTER — E-VISIT (OUTPATIENT)
Dept: FAMILY MEDICINE | Facility: CLINIC | Age: 36
End: 2021-11-09
Payer: COMMERCIAL

## 2021-11-09 DIAGNOSIS — F90.0 ATTENTION DEFICIT HYPERACTIVITY DISORDER (ADHD), PREDOMINANTLY INATTENTIVE TYPE: Primary | ICD-10-CM

## 2021-11-09 PROCEDURE — 99421 OL DIG E/M SVC 5-10 MIN: CPT | Performed by: FAMILY MEDICINE

## 2021-11-09 RX ORDER — DEXTROAMPHETAMINE SACCHARATE, AMPHETAMINE ASPARTATE, DEXTROAMPHETAMINE SULFATE AND AMPHETAMINE SULFATE 5; 5; 5; 5 MG/1; MG/1; MG/1; MG/1
20 TABLET ORAL 3 TIMES DAILY
Qty: 90 TABLET | Refills: 0 | Status: SHIPPED | OUTPATIENT
Start: 2021-11-09 | End: 2021-12-09

## 2021-11-09 RX ORDER — DEXTROAMPHETAMINE SACCHARATE, AMPHETAMINE ASPARTATE, DEXTROAMPHETAMINE SULFATE AND AMPHETAMINE SULFATE 5; 5; 5; 5 MG/1; MG/1; MG/1; MG/1
20 TABLET ORAL 3 TIMES DAILY
Qty: 90 TABLET | Refills: 0 | Status: SHIPPED | OUTPATIENT
Start: 2021-12-09 | End: 2022-01-08

## 2021-11-09 RX ORDER — DEXTROAMPHETAMINE SACCHARATE, AMPHETAMINE ASPARTATE, DEXTROAMPHETAMINE SULFATE AND AMPHETAMINE SULFATE 5; 5; 5; 5 MG/1; MG/1; MG/1; MG/1
20 TABLET ORAL 3 TIMES DAILY
Qty: 90 TABLET | Refills: 0 | Status: SHIPPED | OUTPATIENT
Start: 2022-01-08 | End: 2022-02-07

## 2021-11-09 NOTE — PATIENT INSTRUCTIONS
I sent refills for the 3 months to your pharmacy.  Please schedule a clinic appt in 3months.    Thank you for choosing us for your care. I have placed an order for a prescription so that you can start treatment. View your full visit summary for details by clicking on the link below. Your pharmacist will able to address any questions you may have about the medication.     If you're not feeling better within 5-7 days, please schedule an appointment.  You can schedule an appointment right here in [facility], or call [phone]  If the visit is for the same symptoms as your eVisit, we'll refund the cost of your eVisit if seen within seven days.

## 2021-12-06 ENCOUNTER — MYC MEDICAL ADVICE (OUTPATIENT)
Dept: FAMILY MEDICINE | Facility: CLINIC | Age: 36
End: 2021-12-06
Payer: COMMERCIAL

## 2021-12-06 NOTE — TELEPHONE ENCOUNTER
Pt sent [employer] message asking to fill Adderall today which is 3 days early.  Script does indicate earliest fill date is 12/6.  However, pharmacy policy is 2 days early which is tomorrow.    Provider is not in clinic today and cannot authorize.    Pt informed in [facility].  Deb Sexton RN

## 2021-12-29 ENCOUNTER — E-VISIT (OUTPATIENT)
Dept: URGENT CARE | Facility: CLINIC | Age: 36
End: 2021-12-29
Payer: COMMERCIAL

## 2021-12-29 ENCOUNTER — LAB (OUTPATIENT)
Dept: FAMILY MEDICINE | Facility: CLINIC | Age: 36
End: 2021-12-29
Attending: PHYSICIAN ASSISTANT
Payer: COMMERCIAL

## 2021-12-29 DIAGNOSIS — N39.0 ACUTE UTI (URINARY TRACT INFECTION): ICD-10-CM

## 2021-12-29 DIAGNOSIS — Z20.822 SUSPECTED COVID-19 VIRUS INFECTION: Primary | ICD-10-CM

## 2021-12-29 DIAGNOSIS — Z20.822 SUSPECTED COVID-19 VIRUS INFECTION: ICD-10-CM

## 2021-12-29 LAB — SARS-COV-2 RNA RESP QL NAA+PROBE: NEGATIVE

## 2021-12-29 PROCEDURE — U0003 INFECTIOUS AGENT DETECTION BY NUCLEIC ACID (DNA OR RNA); SEVERE ACUTE RESPIRATORY SYNDROME CORONAVIRUS 2 (SARS-COV-2) (CORONAVIRUS DISEASE [COVID-19]), AMPLIFIED PROBE TECHNIQUE, MAKING USE OF HIGH THROUGHPUT TECHNOLOGIES AS DESCRIBED BY CMS-2020-01-R: HCPCS

## 2021-12-29 PROCEDURE — 99421 OL DIG E/M SVC 5-10 MIN: CPT | Performed by: PHYSICIAN ASSISTANT

## 2021-12-29 PROCEDURE — U0005 INFEC AGEN DETEC AMPLI PROBE: HCPCS

## 2021-12-29 NOTE — PATIENT INSTRUCTIONS
Ángela,      Based on your responses, you may have coronavirus (COVID-19). This illness can cause fever, cough and trouble breathing. Many people get a mild case and get better on their own. Some people can get very sick.    Will I be tested for COVID-19?  We would like to test you for COVID-19 virus. I have placed orders for this test.     To schedule: go to your [employer] home page and scroll down to the section that says  You have an appointment that needs to be scheduled  and click the large green button that says  Schedule Now  and follow the steps to find the next available openings.    If you are unable to complete these [employer] scheduling steps, please call [phone] to schedule your testing.     Return to work/school/ guidance:  Please let your workplace manager and staffing office know when your isolation ends.       If you receive a positive COVID-19 test result, follow the guidance of the those who are giving you the results. Usually the return to work is 10 days from symptom onset or positive test date, (or in some cases 20 days if you are immunocompromised). If your symptoms started after your positive test, the 10 days should start when your symptoms started.   o If you work at [employer], you must also be cleared by Employee Occupational Health and Safety to return to work.      If you receive a negative COVID-19 test result and did not have a high risk exposure to someone with a known positive COVID-19 test, you can return to work once you're free of fever for 24 hours without fever-reducing medication and your symptoms are improving or resolved.    If you receive a negative COVID-19 test and had a high-risk exposure to someone who has tested positive for COVID-19 then you can return to work 14 days after your last contact with the positive individual. Follow quarantine guidance given by your doctor or public health officials.     Sign up for [employer]:  We know it's scary to hear  that you might have COVID-19. We want to track your symptoms to make sure you're okay over the next 2 weeks. Please look for an email from [employer]--this is a free, online program that we'll use to keep in touch. To sign up, follow the link in the email you will receive. Learn more at [email or web address]    How can I take care of myself?  Over the counter medications may help with your symptoms like congestion, cough, chills, or fever.     There are not many effective prescription treatments for early COVID-19. Hydroxychloroquine, ivermectin, and azithromycin are not effective or recommended for COVID-19.    If your symptoms started in the last 10 days, you may be able to receive a treatment with monoclonal antibodies. This treatment can lower your risk of severe illness and going to the hospital. It is given through an IV or under your skin (subcutaneous) and must be given at an infusion center. You must be 12 or older, weight at least 88 pounds, and have a positive COVID-19 test.     If you would like to sign up to be considered to receive the monoclonal antibody medicine, please complete a participation form through the [facility] here: MNRAP ([email or web address]). You may also call the [facility] COVID-19 Public Hotline at 1-321.422.9265 (open Mon-Fri: 9am-7pm and Sat: 10am-6pm).     Not all people who are eligible will receive the medicine, since supply is limited. You will be contacted in the next 1 to 2 business days only if you are selected. If you do not receive a call, you have not been selected to receive the medicine. If you have any questions about this medication, please contact your primary care provider. For more information, see [email or web address]/[email or web address]      Get lots of rest. Drink extra fluids (unless a doctor has told you not to)    Take Tylenol (acetaminophen) or ibuprofen for fever or  pain. If you have liver or kidney problems, ask your family doctor if it's okay to take Tylenol o ibuprofen    Take over the counter medications for your symptoms, as directed by your doctor. You may also talk to your pharmacist.      If you have other health problems (like cancer, heart failure, an organ transplant or severe kidney disease): Call your specialty clinic if you don't feel better in the next 2 days.    Know when to call 911. Emergency warning signs include:  o Trouble breathing or shortness of breath  o Pain or pressure in the chest that doesn't go away  o Feeling confused like you haven't felt before, or not being able to wake up  o Bluish-colored lips or face    Where can I get more information?     [facility] - About COVID-19: [email or web address]     CDC - What to Do If You're Sick:     www.cdc.gov/coronavirus/2019-ncov/about/steps-when-sick.html    CDC - Ending Home Isolation:  https://www.cdc.gov/coronavirus/2019-ncov/your-health/quarantine-isolation.html    CDC - Caring for Someone:  www.cdc.gov/coronavirus/2019-ncov/if-you-are-sick/care-for-someone.html    [facility] clinical trials (COVID-19 research studies): clinicalaffairs.[facility]-clinical-trials    Below are the COVID-19 hotlines at the [facility] of Health ([facility]). Interpreters are available.  o For health questions: Call 496-790-4750 or 1-986.451.1089 (7 a.m. to 7 p.m.)  o For questions about schools and childcare: Call 498-478-3356 or 1-607.873.7539 (7 a.m. to 7 p.m.)  December 29, 2021  RE:  Ángela Rutledgesergiofreddie                                                                                                                  [address] [place] [id number]      To whom it may concern:    I evaluated Ángela Zurita on December 29, 2021. Ángela Zurita should be excused from work/school.     They should let their workplace manager and staffing office know when their quarantine ends.    We  can not give an exact date as it depends on the information below. They can calculate this on their own or work with their manager/staffing office to calculate this. (For example if they were exposed on 10/04, they would have to quarantine for 14 full days. That would be through 10/18. They could return on 10/19.)    Quarantine Guidelines:    If patient receives a positive COVID-19 test result, they should follow the guidance of those who are giving the results. Usually the return to work is 10 (or in some cases 20 days from symptom onset.) If they work at [employer], they must be cleared by Employee Occupational Health and Safety to return to work.      If patient receives a negative COVID-19 test result and did not have a high risk exposure to someone with a known positive COVID-19 test, they can return to work once they're free of fever for 24 hours without fever-reducing medication and their symptoms are improving or resolved.    If patient receives a negative COVID-19 test and if they had a high risk exposure to someone who has tested positive for COVID-19 then they can return to work 14 days after their last contact with the positive individual    Note: If there is ongoing exposure to the covid positive person, this quarantine period may be longer than 14 days. (For example, if they are continually exposed to their child, who tested positive and cannot isolate from them, then the quarantine of 7-14 days can't start until their child is no longer contagious. This is typically 10 days from onset to the child's symptoms. So the total duration may be 17-24 days in this case.)     Sincerely,  JEAN Gutierrez

## 2022-01-01 ENCOUNTER — E-VISIT (OUTPATIENT)
Dept: URGENT CARE | Facility: CLINIC | Age: 37
End: 2022-01-01
Payer: COMMERCIAL

## 2022-01-01 DIAGNOSIS — J01.90 ACUTE SINUSITIS WITH SYMPTOMS > 10 DAYS: Primary | ICD-10-CM

## 2022-01-01 PROCEDURE — 99421 OL DIG E/M SVC 5-10 MIN: CPT | Performed by: NURSE PRACTITIONER

## 2022-01-01 NOTE — TELEPHONE ENCOUNTER
COMMENTS:  Remains on caffeine therapy. Last documented episode on 9/5.     PLANS:  - Continue caffeine  - Follow clinically   Recommend office visit for further evaluation if symptoms are not improving and review of US.  Left message on identifiable voice mail.  Asked patient to call back with further questions or concerns.    Radha Holliday   [facility]yn [facility]  RN

## 2022-01-02 NOTE — PATIENT INSTRUCTIONS
Sinusitis (Antibiotic Treatment)    The sinuses are air-filled spaces within the bones of the face. They connect to the inside of the nose. Sinusitis is an inflammation of the tissue that lines the sinuses. Sinusitis can occur during a cold. It can also happen due to allergies to pollens and other particles in the air. Sinusitis can cause symptoms of sinus congestion and a feeling of fullness. A sinus infection causes fever, headache, and facial pain. There is often green or yellow fluid draining from the nose or into the back of the throat (post-nasal drip). You have been given antibiotics to treat this condition.   Home care    Take the full course of antibiotics as instructed. Don't stop taking them, even when you feel better.    Drink plenty of water, hot tea, and other liquids as directed by the healthcare provider. This may help thin nasal mucus. It also may help your sinuses drain fluids.    Heat may help soothe painful areas of your face. Use a towel soaked in hot water. Or,  the shower and direct the warm spray onto your face. Using a vaporizer along with a menthol rub at night may also help soothe symptoms.     An expectorant with guaifenesin may help thin nasal mucus and help your sinuses drain fluids. Talk with your provider or pharmacists before taking an over-the-counter (OTC) medicine if you have any questions about it or its side effects..    You can use an OTC decongestant, unless a similar medicine was prescribed to you. Nasal sprays work the fastest. Use one that contains phenylephrine or oxymetazoline. First blow your nose gently. Then use the spray. Don't use these medicines more often than directed on the label. If you do, your symptoms may get worse. You may also take pills that contain pseudoephedrine. Don t use products that combine multiple medicines. This is because side effects may be increased. Read labels. You can also ask the pharmacist for help. (People with high blood  pressure should not use decongestants. They can raise blood pressure.) Talk with your provider or pharmacist if you have any questions about the medicine..    OTC antihistamines may help if allergies contributed to your sinusitis. Talk with your provider or pharmacist if you have any questions about the medicine..    Don't use nasal rinses or irrigation during an acute sinus infection, unless your healthcare provider tells you to. Rinsing may spread the infection to other areas in your sinuses.    Use acetaminophen or ibuprofen to control pain, unless another pain medicine was prescribed to you. If you have chronic liver or kidney disease or ever had a stomach ulcer, talk with your healthcare provider before using these medicines. Never give aspirin to anyone under age 18 who is ill with a fever. It may cause severe liver damage.    Don't smoke. This can make symptoms worse.    Follow-up care  Follow up with your healthcare provider, or as advised.   When to seek medical advice  Call your healthcare provider if any of these occur:     Facial pain or headache that gets worse    Stiff neck    Unusual drowsiness or confusion    Swelling of your forehead or eyelids    Symptoms don't go away in 10 days    Vision problems, such as blurred or double vision    Fever of 100.4 F (38 C) or higher, or as directed by your healthcare provider  Call 911  Call 911 if any of these occur:     Seizure    Trouble breathing    Feeling dizzy or faint    Fingernails, skin or lips look blue, purple , or gray  Prevention  Here are steps you can take to help prevent an infection:     Keep good hand washing habits.    Don t have close contact with people who have sore throats, colds, or other upper respiratory infections.    Don t smoke, and stay away from secondhand smoke.    Stay up to date with of your vaccines.  [employer] last reviewed this educational content on 12/1/2019 2000-2021 [employer]. All rights reserved. This  information is not intended as a substitute for professional medical care. Always follow your healthcare professional's instructions.        Dear Ángela Zurita    After reviewing your responses, I've been able to diagnose you with?a sinus infection caused by bacteria.?     Based on your responses and diagnosis, I have prescribed augmentin to treat your symptoms. I have sent this to your pharmacy.?     It is also important to stay well hydrated, get lots of rest and take over-the-counter decongestants,?tylenol?or ibuprofen if you?are able to?take those medications per your primary care provider to help relieve discomfort.?     It is important that you take?all of?your prescribed medication even if your symptoms are improving after a few doses.? Taking?all of?your medicine helps prevent the symptoms from returning.?     If your symptoms worsen, you develop severe headache, vomiting, high fever (>102), or are not improving in 7 days, please contact your primary care provider for an appointment or visit any of our convenient Walk-in Care or Urgent Care Centers to be seen which can be found on our website?here.?     Thanks again for choosing?us?as your health care partner,?   ?  Rayna Hayes, CNP?

## 2022-02-02 ENCOUNTER — E-VISIT (OUTPATIENT)
Dept: FAMILY MEDICINE | Facility: CLINIC | Age: 37
End: 2022-02-02
Payer: COMMERCIAL

## 2022-02-02 DIAGNOSIS — F90.0 ATTENTION DEFICIT HYPERACTIVITY DISORDER (ADHD), PREDOMINANTLY INATTENTIVE TYPE: Primary | ICD-10-CM

## 2022-02-02 PROCEDURE — 99421 OL DIG E/M SVC 5-10 MIN: CPT | Performed by: FAMILY MEDICINE

## 2022-02-02 RX ORDER — DEXTROAMPHETAMINE SACCHARATE, AMPHETAMINE ASPARTATE, DEXTROAMPHETAMINE SULFATE AND AMPHETAMINE SULFATE 5; 5; 5; 5 MG/1; MG/1; MG/1; MG/1
20 TABLET ORAL 3 TIMES DAILY
Qty: 90 TABLET | Refills: 0 | Status: SHIPPED | OUTPATIENT
Start: 2022-02-02 | End: 2022-03-04

## 2022-02-02 RX ORDER — DEXTROAMPHETAMINE SACCHARATE, AMPHETAMINE ASPARTATE, DEXTROAMPHETAMINE SULFATE AND AMPHETAMINE SULFATE 5; 5; 5; 5 MG/1; MG/1; MG/1; MG/1
20 TABLET ORAL 3 TIMES DAILY
Qty: 90 TABLET | Refills: 0 | Status: SHIPPED | OUTPATIENT
Start: 2022-03-05 | End: 2022-04-04

## 2022-02-02 RX ORDER — DEXTROAMPHETAMINE SACCHARATE, AMPHETAMINE ASPARTATE, DEXTROAMPHETAMINE SULFATE AND AMPHETAMINE SULFATE 5; 5; 5; 5 MG/1; MG/1; MG/1; MG/1
20 TABLET ORAL 3 TIMES DAILY
Qty: 90 TABLET | Refills: 0 | Status: SHIPPED | OUTPATIENT
Start: 2022-04-05 | End: 2022-03-29

## 2022-02-02 NOTE — PATIENT INSTRUCTIONS
Sounds like a plan.  Glad to hear all is going well.  I sent 3 months of refills to your pharmacy.  Thank you,  Randy Graf MD        Thank you for choosing us for your care. I have placed an order for a prescription so that you can start treatment. View your full visit summary for details by clicking on the link below. Your pharmacist will able to address any questions you may have about the medication.     If you're not feeling better within 5-7 days, please schedule an appointment.  You can schedule an appointment right here in [facility], or call [phone]  If the visit is for the same symptoms as your eVisit, we'll refund the cost of your eVisit if seen within seven days.

## 2022-02-27 ENCOUNTER — HEALTH MAINTENANCE LETTER (OUTPATIENT)
Age: 37
End: 2022-02-27

## 2022-03-08 ENCOUNTER — ANCILLARY PROCEDURE (OUTPATIENT)
Dept: CT IMAGING | Facility: CLINIC | Age: 37
End: 2022-03-08
Attending: FAMILY MEDICINE
Payer: COMMERCIAL

## 2022-03-08 ENCOUNTER — OFFICE VISIT (OUTPATIENT)
Dept: FAMILY MEDICINE | Facility: CLINIC | Age: 37
End: 2022-03-08
Payer: COMMERCIAL

## 2022-03-08 VITALS
TEMPERATURE: 98.1 F | DIASTOLIC BLOOD PRESSURE: 72 MMHG | HEART RATE: 99 BPM | BODY MASS INDEX: 26.2 KG/M2 | WEIGHT: 163 LBS | RESPIRATION RATE: 18 BRPM | OXYGEN SATURATION: 98 % | HEIGHT: 66 IN | SYSTOLIC BLOOD PRESSURE: 116 MMHG

## 2022-03-08 DIAGNOSIS — R22.1 MASS OF RIGHT SIDE OF NECK: ICD-10-CM

## 2022-03-08 DIAGNOSIS — R22.1 MASS OF RIGHT SIDE OF NECK: Primary | ICD-10-CM

## 2022-03-08 PROCEDURE — 70491 CT SOFT TISSUE NECK W/DYE: CPT | Mod: TC | Performed by: RADIOLOGY

## 2022-03-08 PROCEDURE — 99214 OFFICE O/P EST MOD 30 MIN: CPT | Performed by: FAMILY MEDICINE

## 2022-03-08 RX ORDER — IOPAMIDOL 755 MG/ML
200 INJECTION, SOLUTION INTRAVASCULAR ONCE
Status: COMPLETED | OUTPATIENT
Start: 2022-03-08 | End: 2022-03-08

## 2022-03-08 RX ADMIN — Medication 50 ML: at 15:41

## 2022-03-08 RX ADMIN — IOPAMIDOL 80 ML: 755 INJECTION, SOLUTION INTRAVASCULAR at 15:41

## 2022-03-08 ASSESSMENT — PAIN SCALES - GENERAL: PAINLEVEL: SEVERE PAIN (6)

## 2022-03-08 NOTE — PROGRESS NOTES
Assessment & Plan       Mass of right side of neck  Kind of seems like sialolithiasis but I couldn't see a sialolith and want to make sure it's not any kind of malignancy, also with description of cyclic worsening with period, question of abnormal glandular tissue?  - keep sjogrens on the differential , consider workup if worsening symptoms  - CT Soft Tissue Neck w Contrast    Health maintenance  - Honoring choices provided and explained  - counseled on influenza/covid but she would like to come back and do it w/her pcp  - she was reminded of her health maintenance checklist and would like to discuss it with her PCP at her preventative      Return if symptoms worsen or fail to improve.       Jesenia Da Silva MD  [facility]    Subjective    Chief Complaint   Patient presents with     Ear Problem     R ear throat and facial pain      Health Maintenance     Patient will get flu shot when feeling better     Ángela is a 37 year old who presents for the following health issues  accompanied by her self.    History of Present Illness       Reason for visit:  Ear pain and throat bump  Symptom onset:  3-4 weeks ago  Symptom intensity:  Moderate  Symptom progression:  Worsening  Had these symptoms before:  No    She eats 2-3 servings of fruits and vegetables daily.She consumes 2 sweetened beverage(s) daily.She exercises with enough effort to increase her heart rate 9 or less minutes per day.  She exercises with enough effort to increase her heart rate 3 or less days per week.   She is taking medications regularly.       Concern - Patient is having R side ear, throat and facial pain   Onset: going on for a while   Description: Patient has r side pain ear feels plugged throat  has a spot on R side and is having facial pain with some swelling. all sx seem to all go together    Intensity: moderate, at times can be severe  Progression of Symptoms:  worsening  Accompanying Signs & Symptoms: none   Previous  "history of similar problem: none   Precipitating factors:        Worsened by: is more severe with menstrual cycle   Alleviating factors:        Improved by: none   Therapies tried and outcome: Patient has been doing sinus rinse  and sinus medication but is not helping     She SEEs a lump on the inside under where her tonsils were removed  And first noticed it a few weeks ago  This is somewhat normal since she has had things caught in there for years  So she feels like this is different though because it's below the \"holes\" where her tonsils used to be  Not severely painful, but then it seems like it feels like it's a lot more painful every time she gets her period  LMP was 3/6 - active right now, so it came on last week this time around  Admits to recent worsening eye irritaiton but doesn't notice dryness necessarily, denies dry mouth/throat      Review of Systems   Constitutional, HEENT, cardiovascular, pulmonary, gi and gu systems are negative, except as otherwise noted.      Objective    /72   Pulse 99   Temp 98.1  F (36.7  C) (Tympanic)   Resp 18   Ht 1.664 m (5' 5.5\")   Wt 73.9 kg (163 lb)   SpO2 98%   BMI 26.71 kg/m    Body mass index is 26.71 kg/m .  Physical Exam   GENERAL: healthy, alert and no distress  EYES: Eyes grossly normal to inspection, PERRL and conjunctivae and sclerae normal  HENT: right ear: injected canal and TM without drainage nor prurlence, left ear: normal. oropharynx clear -> some increased injection in the R palatine arch and skin bridges seen where tonsils were removed but otherwise no masses or lesions seen  NECK: grossly visiable and soft palpable swelling overlying R pwldzhmhnwpuc-nr-rjhtjgaj-cervical distribution. without significant tenderness. no adenopathy, no scars and thyroid normal to palpation  RESP: lungs clear to auscultation - no rales, rhonchi or wheezes  CV: regular rate and rhythm, normal S1 S2, no S3 or S4, no murmur, click or rub, no peripheral edema and " peripheral pulses strong  ABDOMEN: soft, nontender, no hepatosplenomegaly, no masses and bowel sounds normal  MS: no gross musculoskeletal defects noted, no edema  PSYCH: mentation appears normal, affect normal/bright

## 2022-03-09 ENCOUNTER — MYC MEDICAL ADVICE (OUTPATIENT)
Dept: FAMILY MEDICINE | Facility: CLINIC | Age: 37
End: 2022-03-09
Payer: COMMERCIAL

## 2022-03-09 DIAGNOSIS — J32.0 CHRONIC MAXILLARY SINUSITIS: Primary | ICD-10-CM

## 2022-03-13 ENCOUNTER — HOSPITAL ENCOUNTER (OUTPATIENT)
Dept: ULTRASOUND IMAGING | Facility: CLINIC | Age: 37
Discharge: HOME | End: 2022-03-13
Attending: OBSTETRICS & GYNECOLOGY | Admitting: OBSTETRICS & GYNECOLOGY
Payer: COMMERCIAL

## 2022-03-13 DIAGNOSIS — N83.202 LEFT OVARIAN CYST: ICD-10-CM

## 2022-03-13 PROCEDURE — 76856 US EXAM PELVIC COMPLETE: CPT

## 2022-03-28 ENCOUNTER — MYC MEDICAL ADVICE (OUTPATIENT)
Dept: FAMILY MEDICINE | Facility: CLINIC | Age: 37
End: 2022-03-28
Payer: COMMERCIAL

## 2022-03-28 DIAGNOSIS — F90.0 ATTENTION DEFICIT HYPERACTIVITY DISORDER (ADHD), PREDOMINANTLY INATTENTIVE TYPE: ICD-10-CM

## 2022-03-29 RX ORDER — DEXTROAMPHETAMINE SACCHARATE, AMPHETAMINE ASPARTATE, DEXTROAMPHETAMINE SULFATE AND AMPHETAMINE SULFATE 5; 5; 5; 5 MG/1; MG/1; MG/1; MG/1
20 TABLET ORAL 3 TIMES DAILY
Qty: 90 TABLET | Refills: 0 | Status: SHIPPED | OUTPATIENT
Start: 2022-04-01 | End: 2022-04-25

## 2022-04-18 NOTE — NURSING NOTE
"Chief Complaint   Patient presents with     Prenatal Care       Initial /77 (BP Location: Left arm, Patient Position: Sitting, Cuff Size: Adult Regular)   Pulse 81   Temp 98.1  F (36.7  C) (Oral)   Wt 84.8 kg (187 lb)   LMP 2018 (Within Weeks)   BMI 30.65 kg/m   Estimated body mass index is 30.65 kg/m  as calculated from the following:    Height as of 3/22/19: 1.664 m (5' 5.5\").    Weight as of this encounter: 84.8 kg (187 lb).  BP completed using cuff size: regular    Questioned patient about current smoking habits.  Pt. quit smoking some time ago.          The following HM Due: pap smear      The following patient reported/Care Every where data was sent to:  P ABSTRACT QUALITY INITIATIVES [[id number]]  SHIRA Taylor           "
Female

## 2022-04-25 ENCOUNTER — E-VISIT (OUTPATIENT)
Dept: FAMILY MEDICINE | Facility: CLINIC | Age: 37
End: 2022-04-25
Payer: COMMERCIAL

## 2022-04-25 DIAGNOSIS — F90.0 ATTENTION DEFICIT HYPERACTIVITY DISORDER (ADHD), PREDOMINANTLY INATTENTIVE TYPE: Primary | ICD-10-CM

## 2022-04-25 PROBLEM — O09.899 RUBELLA NON-IMMUNE STATUS, ANTEPARTUM: Status: RESOLVED | Noted: 2018-10-24 | Resolved: 2022-04-25

## 2022-04-25 PROBLEM — O43.129 VELAMENTOUS INSERTION OF UMBILICAL CORD, ANTEPARTUM: Status: RESOLVED | Noted: 2019-02-03 | Resolved: 2022-04-25

## 2022-04-25 PROBLEM — N83.202 LEFT OVARIAN CYST: Status: RESOLVED | Noted: 2018-03-19 | Resolved: 2022-04-25

## 2022-04-25 PROBLEM — O47.00 PRETERM CONTRACTIONS: Status: RESOLVED | Noted: 2019-01-29 | Resolved: 2022-04-25

## 2022-04-25 PROBLEM — Z36.89 ENCOUNTER FOR TRIAGE IN PREGNANT PATIENT: Status: RESOLVED | Noted: 2018-12-29 | Resolved: 2022-04-25

## 2022-04-25 PROBLEM — Z98.891 S/P C-SECTION: Status: RESOLVED | Noted: 2019-03-22 | Resolved: 2022-04-25

## 2022-04-25 PROBLEM — Z28.39 RUBELLA NON-IMMUNE STATUS, ANTEPARTUM: Status: RESOLVED | Noted: 2018-10-24 | Resolved: 2022-04-25

## 2022-04-25 PROBLEM — Z34.80 PRENATAL CARE, SUBSEQUENT PREGNANCY: Status: RESOLVED | Noted: 2018-08-22 | Resolved: 2022-04-25

## 2022-04-25 PROBLEM — O43.199 MARGINAL INSERTION OF UMBILICAL CORD AFFECTING MANAGEMENT OF MOTHER: Status: RESOLVED | Noted: 2018-11-19 | Resolved: 2022-04-25

## 2022-04-25 PROCEDURE — 99421 OL DIG E/M SVC 5-10 MIN: CPT | Performed by: FAMILY MEDICINE

## 2022-04-25 RX ORDER — DEXTROAMPHETAMINE SACCHARATE, AMPHETAMINE ASPARTATE, DEXTROAMPHETAMINE SULFATE AND AMPHETAMINE SULFATE 5; 5; 5; 5 MG/1; MG/1; MG/1; MG/1
20 TABLET ORAL 3 TIMES DAILY
Qty: 90 TABLET | Refills: 0 | Status: SHIPPED | OUTPATIENT
Start: 2022-05-26 | End: 2022-06-25

## 2022-04-25 RX ORDER — DEXTROAMPHETAMINE SACCHARATE, AMPHETAMINE ASPARTATE, DEXTROAMPHETAMINE SULFATE AND AMPHETAMINE SULFATE 5; 5; 5; 5 MG/1; MG/1; MG/1; MG/1
20 TABLET ORAL 3 TIMES DAILY
Qty: 90 TABLET | Refills: 0 | Status: SHIPPED | OUTPATIENT
Start: 2022-04-25 | End: 2022-05-25

## 2022-04-25 RX ORDER — DEXTROAMPHETAMINE SACCHARATE, AMPHETAMINE ASPARTATE, DEXTROAMPHETAMINE SULFATE AND AMPHETAMINE SULFATE 5; 5; 5; 5 MG/1; MG/1; MG/1; MG/1
20 TABLET ORAL 3 TIMES DAILY
Qty: 90 TABLET | Refills: 0 | Status: SHIPPED | OUTPATIENT
Start: 2022-06-26 | End: 2022-07-11

## 2022-04-25 NOTE — PATIENT INSTRUCTIONS
Thank you for choosing us for your care. I have placed an order for a prescription so that you can start treatment. View your full visit summary for details by clicking on the link below. Your pharmacist will able to address any questions you may have about the medication.     If you're not feeling better within 5-7 days, please schedule an appointment.  You can schedule an appointment right here in [facility], or call [phone]  If the visit is for the same symptoms as your eVisit, we'll refund the cost of your eVisit if seen within seven days.

## 2022-05-30 ENCOUNTER — E-VISIT (OUTPATIENT)
Dept: URGENT CARE | Facility: CLINIC | Age: 37
End: 2022-05-30
Payer: COMMERCIAL

## 2022-05-30 DIAGNOSIS — B00.1 HERPES LABIALIS: Primary | ICD-10-CM

## 2022-05-30 PROCEDURE — 99421 OL DIG E/M SVC 5-10 MIN: CPT | Performed by: INTERNAL MEDICINE

## 2022-05-30 RX ORDER — VALACYCLOVIR HYDROCHLORIDE 1 G/1
2000 TABLET, FILM COATED ORAL 2 TIMES DAILY
Qty: 4 TABLET | Refills: 1 | Status: SHIPPED | OUTPATIENT
Start: 2022-05-30 | End: 2022-08-26

## 2022-07-08 ENCOUNTER — OFFICE VISIT (OUTPATIENT)
Dept: FAMILY MEDICINE | Facility: CLINIC | Age: 37
End: 2022-07-08
Payer: COMMERCIAL

## 2022-07-08 VITALS
BODY MASS INDEX: 25.13 KG/M2 | HEIGHT: 66 IN | TEMPERATURE: 97.1 F | DIASTOLIC BLOOD PRESSURE: 80 MMHG | SYSTOLIC BLOOD PRESSURE: 100 MMHG | OXYGEN SATURATION: 99 % | WEIGHT: 156.4 LBS | RESPIRATION RATE: 20 BRPM | HEART RATE: 82 BPM

## 2022-07-08 DIAGNOSIS — Z11.59 NEED FOR HEPATITIS C SCREENING TEST: ICD-10-CM

## 2022-07-08 DIAGNOSIS — F90.0 ATTENTION DEFICIT HYPERACTIVITY DISORDER (ADHD), PREDOMINANTLY INATTENTIVE TYPE: ICD-10-CM

## 2022-07-08 DIAGNOSIS — F17.200 NICOTINE DEPENDENCE, UNCOMPLICATED, UNSPECIFIED NICOTINE PRODUCT TYPE: ICD-10-CM

## 2022-07-08 DIAGNOSIS — Z00.00 ROUTINE GENERAL MEDICAL EXAMINATION AT A HEALTH CARE FACILITY: Primary | ICD-10-CM

## 2022-07-08 DIAGNOSIS — Z11.4 SCREENING FOR HUMAN IMMUNODEFICIENCY VIRUS WITHOUT PRESENCE OF RISK FACTORS: ICD-10-CM

## 2022-07-08 DIAGNOSIS — Z80.3 FAMILY HISTORY OF MALIGNANT NEOPLASM OF BREAST: ICD-10-CM

## 2022-07-08 PROCEDURE — 99214 OFFICE O/P EST MOD 30 MIN: CPT | Mod: 25 | Performed by: FAMILY MEDICINE

## 2022-07-08 PROCEDURE — 99395 PREV VISIT EST AGE 18-39: CPT | Performed by: FAMILY MEDICINE

## 2022-07-08 RX ORDER — BUPROPION HYDROCHLORIDE 150 MG/1
TABLET, FILM COATED, EXTENDED RELEASE ORAL
Qty: 57 TABLET | Refills: 0 | Status: SHIPPED | OUTPATIENT
Start: 2022-07-08 | End: 2022-08-07

## 2022-07-08 RX ORDER — DEXTROAMPHETAMINE SACCHARATE, AMPHETAMINE ASPARTATE, DEXTROAMPHETAMINE SULFATE AND AMPHETAMINE SULFATE 5; 5; 5; 5 MG/1; MG/1; MG/1; MG/1
20 TABLET ORAL 3 TIMES DAILY
Qty: 90 TABLET | Refills: 0 | Status: SHIPPED | OUTPATIENT
Start: 2022-08-23 | End: 2022-08-19

## 2022-07-08 RX ORDER — DEXTROAMPHETAMINE SACCHARATE, AMPHETAMINE ASPARTATE, DEXTROAMPHETAMINE SULFATE AND AMPHETAMINE SULFATE 5; 5; 5; 5 MG/1; MG/1; MG/1; MG/1
20 TABLET ORAL 3 TIMES DAILY
Qty: 90 TABLET | Refills: 0 | Status: SHIPPED | OUTPATIENT
Start: 2022-09-22 | End: 2022-10-22

## 2022-07-08 RX ORDER — DEXTROAMPHETAMINE SACCHARATE, AMPHETAMINE ASPARTATE, DEXTROAMPHETAMINE SULFATE AND AMPHETAMINE SULFATE 5; 5; 5; 5 MG/1; MG/1; MG/1; MG/1
20 TABLET ORAL 3 TIMES DAILY
Qty: 90 TABLET | Refills: 0 | Status: SHIPPED | OUTPATIENT
Start: 2022-07-25 | End: 2022-08-24

## 2022-07-08 RX ORDER — BUPROPION HYDROCHLORIDE 150 MG/1
150 TABLET, FILM COATED, EXTENDED RELEASE ORAL 2 TIMES DAILY
Qty: 60 TABLET | Refills: 2 | Status: SHIPPED | OUTPATIENT
Start: 2022-08-07 | End: 2023-04-06

## 2022-07-08 ASSESSMENT — ENCOUNTER SYMPTOMS
CONSTIPATION: 0
CHILLS: 0
JOINT SWELLING: 0
HEARTBURN: 0
EYE PAIN: 0
HEMATOCHEZIA: 0
PALPITATIONS: 0
ABDOMINAL PAIN: 0
DIZZINESS: 0
HEADACHES: 0
WEAKNESS: 0
SORE THROAT: 0
FEVER: 0
FREQUENCY: 0
DIARRHEA: 0
COUGH: 0
DYSURIA: 0
MYALGIAS: 0
NERVOUS/ANXIOUS: 1
HEMATURIA: 0
PARESTHESIAS: 0
SHORTNESS OF BREATH: 0
ARTHRALGIAS: 0
NAUSEA: 0

## 2022-07-08 ASSESSMENT — PAIN SCALES - GENERAL: PAINLEVEL: NO PAIN (0)

## 2022-07-08 NOTE — PATIENT INSTRUCTIONS
Mammogram [phone] to schedule.    Preventive Health Recommendations  Female Ages 26 - 39  Yearly exam:   See your health care provider every year in order to  Review health changes.   Discuss preventive care.    Review your medicines if you your doctor has prescribed any.    Until age 30: Get a Pap test every three years (more often if you have had an abnormal result).    After age 30: Talk to your doctor about whether you should have a Pap test every 3 years or have a Pap test with HPV screening every 5 years.   You do not need a Pap test if your uterus was removed (hysterectomy) and you have not had cancer.  You should be tested each year for STDs (sexually transmitted diseases), if you're at risk.   Talk to your provider about how often to have your cholesterol checked.  If you are at risk for diabetes, you should have a diabetes test (fasting glucose).  Shots: Get a flu shot each year. Get a tetanus shot every 10 years.   Nutrition:   Eat at least 5 servings of fruits and vegetables each day.  Eat whole-grain bread, whole-wheat pasta and brown rice instead of white grains and rice.  Get adequate Calcium and Vitamin D.     Lifestyle  Exercise at least 150 minutes a week (30 minutes a day, 5 days of the week). This will help you control your weight and prevent disease.  Limit alcohol to one drink per day.  No smoking.   Wear sunscreen to prevent skin cancer.  See your dentist every six months for an exam and cleaning.    Zyban / Wellbutrin    Dosing:    Before Your Quit Date: Dose   Days 1-3 Take 150 mg by mouth once daily in the morning.   Days 4-7 (or up to 4-14 days) Take 150 mg by mouth twice daily in the morning and evening.   After Your Quit Date:    Treatment usually continues 7-12 weeks Take 150 mg by mouth twice daily in the morning and evening.     Please let me know around the 8-12 weeks kit how this is going, certainly sooner if any concerns with medication.    Some tips:  You will start taking  bupropion at least 1 week before quitting smoking. It is okay to smoke while using bupropion.   You can use nicotine replacement therapy such as nicotine gum while using  Bupropion.   Take your 2 daily doses at least 8 hours apart.   DO NOT CRUSH OR BREAK TABLETS. Swallow the tablet whole.   You can take your medication with or without food.   Do not drink alcohol while taking this medication.     Side Effects:  Some people may experience dry mouth and insomnia. These may resolve in time.   Small frequent meals, frequent mouth care, chewing gum, or sucking lozenges may help with dry mouth.   Other possible rare side effects include constipation, nausea, headache, drowsiness, and weight loss may occur.   If you experience any other side effects that are troublesome, or if you feel something is not right, call your health care professional.       HOW TO QUIT SMOKING  Smoking is one of the hardest habits to break. About half of all those who have ever smoked have been able to quit, and most of those (about 70%) who still smoke want to quit. Here are some of the best ways to stop smoking.     KEEP TRYING:  It takes most smokers about 8 tries before they are finally able to fully quit. So, the more often you try and fail, the better your chance of quitting the next time! So, don't give up!    GO COLD TURKEY:  Most ex-smokers quit cold turkey. Trying to cut back gradually doesn't seem to work as well, perhaps because it continues the smoking habit. Also, it is possible to fool yourself by inhaling more while smoking fewer cigarettes. This results in the same amount of nicotine in your body!    GET SUPPORT:  Support programs can make an important difference, especially for the heavy smoker. These groups offer lectures, methods to change your behavior and peer support. Call the free national Quitline for more information. 800-QUIT-NOW (582-293-2580). Low-cost or free programs are offered by many hospitals, local chapters of  the American Lung Association (586-844-8260) and the American Cancer Society (393-941-8946). Support at home is important too. Non-smokers can help by offering praise and encouragement. If the smoker fails to quit, encourage them to try again!    OVER-THE-COUNTER MEDICINES:  For those who can't quit on their own, Nicotine Replacement Therapy (NRT) may make quitting much easier. Certain aids such as the nicotine patch, gum and lozenge are available without a prescription. However, it is best to use these under the guidance of your doctor. The skin patch provides a steady supply of nicotine to the body. Nicotine gum and lozenge gives temporary bursts of low levels of nicotine. Both methods take the edge off the craving for cigarettes. WARNING: If you feel symptoms of nicotine overdose, such as nausea, vomiting, dizziness, weakness, or fast heartbeat, stop using these and see your doctor.    PRESCRIPTION MEDICINES:  After evaluating your smoking patterns and prior attempts at quitting, your doctor may offer a prescription medicine such as bupropion (Zyban, Wellbutrin), varenicline (Chantix, Champix), a niocotine inhaler or nasal spray. Each has its unique advantage and side effects which your doctor can review with you.    HEALTH BENEFITS OF QUITTING:  The benefits of quitting start right away and keep improving the longer you go without smokin minutes: blood pressure and pulse return to normal  8 hours: oxygen levels return to normal  2 days: ability to smell and taste begins to improve as damaged nerves start to regrow  2-3 weeks: circulation and lung function improves  1-9 months: decreased cough, congestion and shortness of breath; less tired  1 year: risk of heart attack decreases by half  5 years: risk of lung cancer decreases by half; risk of stroke becomes the same as a non-smoker  For information about how to quit smoking, visit the following links:  National [employer] Acton ,   Clearing the Air, Quit  Smoking Today   - an online booklet. http://www.smokefree.gov/pubs/clearing_the_air.pdf  Smokefree.gov http://smokefree.gov/  QuitNet http://www.quitnet.com/    5451-1398 Kartina Robin, [address] [place] [address], [place] [address]. All rights reserved. This information is not intended as a substitute for professional medical care. Always follow your healthcare professional's instructions.

## 2022-07-08 NOTE — PROGRESS NOTES
SUBJECTIVE:   CC: Ángela Zurita is an 37 year old woman who presents for preventive health visit.     Chief Complaint   Patient presents with     Physical     Health Maintenance     Advised patient of need for Pneumococcal vaccine and COVID shot. She declines today.         Patient has been advised of split billing requirements and indicates understanding: Yes  Healthy Habits:     Getting at least 3 servings of Calcium per day:  NO    Bi-annual eye exam:  NO    Dental care twice a year:  Yes    Sleep apnea or symptoms of sleep apnea:  None    Diet:  Regular (no restrictions)    Frequency of exercise:  2-3 days/week    Duration of exercise:  15-30 minutes    Taking medications regularly:  Yes    Medication side effects:  Not applicable    PHQ-2 Total Score: 0    Additional concerns today:  No    Updates since last visit: working well  Routine for taking medicine, including time: Patient takes 2 10 mg tablets in the morning and 2 tablets at 11 am and 1 tablet at 2 pm. Patient states there is a lot of stuff going on. She is going through a divorce, which she says is for the better. Had been seeing a therapist which wasn't the best fit. Would be interested in therapy again, especially for ADHD.   Time medicine wears off: 4-5 pm  Issues at school/Work: None  Issues at home: None  Control of symptoms: well controlled  Working:[employer] which is moving to [place].     Side effects:  Headaches: No  Stomach aches: No  Irritability/mood swings: No  Difficulties with sleep: No  Social withdrawal: No  Unusual movements/tics: No  Decreased appetite: No     Other concerns: None          Today's PHQ-2 Score:   PHQ-2 ( 1999 Pfizer) 7/8/2022   Q1: Little interest or pleasure in doing things 0   Q2: Feeling down, depressed or hopeless 0   PHQ-2 Score 0   PHQ-2 Total Score (12-17 Years)- Positive if 3 or more points; Administer PHQ-A if positive -   Q1: Little interest or pleasure in doing things Not at all    Q2: Feeling down, depressed or hopeless Not at all   PHQ-2 Score 0       Abuse: Current or Past (Physical, Sexual or Emotional) - No  Do you feel safe in your environment? Yes        Social History     Tobacco Use     Smoking status: Current Some Day Smoker     Packs/day: 0.50     Years: 15.00     Pack years: 7.50     Types: Cigarettes     Start date: 12/1/2014     Smokeless tobacco: Never Used     Tobacco comment: Quit with each pregnancy   Substance Use Topics     Alcohol use: No     Alcohol/week: 0.0 standard drinks     Comment: occas-quit with pregnancy         Alcohol Use 7/8/2022   Prescreen: >3 drinks/day or >7 drinks/week? No   Prescreen: >3 drinks/day or >7 drinks/week? -       Reviewed orders with patient.  Reviewed health maintenance and updated orders accordingly - Yes  BP Readings from Last 3 Encounters:   07/08/22 100/80   03/08/22 116/72   01/23/20 128/86    Wt Readings from Last 3 Encounters:   07/08/22 70.9 kg (156 lb 6.4 oz)   03/08/22 73.9 kg (163 lb)   04/02/20 76.7 kg (169 lb)                    Breast Cancer Screening:    FHS-7:   Breast CA Risk Assessment (FHS-7) 7/8/2022   Did any of your first-degree relatives have breast or ovarian cancer? Yes   Did any of your relatives have bilateral breast cancer? Yes   Did any man in your family have breast cancer? Yes   Did any woman in your family have breast and ovarian cancer? Unknown   Did any woman in your family have breast cancer before age 50 y? No   Do you have 2 or more relatives with breast and/or ovarian cancer? Yes   Do you have 2 or more relatives with breast and/or bowel cancer? Yes       mammo due to family history  Pertinent mammograms are reviewed under the imaging tab.    History of abnormal Pap smear: NO - age 30-65 PAP every 5 years with negative HPV co-testing recommended  PAP / HPV Latest Ref Rng & Units 1/23/2020 3/14/2016 1/26/2015   PAP (Historical) - NIL NIL NIL   HPV16 NEG:Negative Negative Negative -   HPV18 NEG:Negative  "Negative Negative -   HRHPV NEG:Negative Negative Negative -     Reviewed and updated as needed this visit by clinical staff   Tobacco  Allergies  Meds  Problems  Med Hx  Surg Hx  Fam Hx  Soc   Hx          Reviewed and updated as needed this visit by Provider                       Review of Systems   Constitutional: Negative for chills and fever.   HENT: Negative for congestion, ear pain, hearing loss and sore throat.    Eyes: Negative for pain and visual disturbance.   Respiratory: Negative for cough and shortness of breath.    Cardiovascular: Positive for peripheral edema. Negative for chest pain and palpitations.   Gastrointestinal: Negative for abdominal pain, constipation, diarrhea, heartburn, hematochezia and nausea.   Genitourinary: Negative for dysuria, frequency, genital sores, hematuria and urgency.   Musculoskeletal: Negative for arthralgias, joint swelling and myalgias.   Skin: Negative for rash.   Neurological: Negative for dizziness, weakness, headaches and paresthesias.   Psychiatric/Behavioral: Negative for mood changes. The patient is nervous/anxious.           OBJECTIVE:   /80 (BP Location: Right arm, Patient Position: Sitting, Cuff Size: Adult Regular)   Pulse 82   Temp 97.1  F (36.2  C) (Tympanic)   Resp 20   Ht 1.664 m (5' 5.5\")   Wt 70.9 kg (156 lb 6.4 oz)   LMP 06/24/2022 (Approximate)   SpO2 99%   Breastfeeding No   BMI 25.63 kg/m    Physical Exam  GENERAL: healthy, alert and no distress  EYES: Eyes grossly normal to inspection, PERRL and conjunctivae and sclerae normal  HENT: ear canals and TM's normal, nose and mouth without ulcers or lesions  NECK: no adenopathy, no asymmetry, masses, or scars and thyroid normal to palpation  RESP: lungs clear to auscultation - no rales, rhonchi or wheezes  BREAST: normal without masses, tenderness or nipple discharge and no palpable axillary masses or adenopathy  CV: regular rate and rhythm, normal S1 S2, no S3 or S4, no murmur, " click or rub, no peripheral edema and peripheral pulses strong  ABDOMEN: soft, nontender, no hepatosplenomegaly, no masses and bowel sounds normal  MS: no gross musculoskeletal defects noted, no edema  SKIN: no suspicious lesions or rashes  NEURO: Normal strength and tone, mentation intact and speech normal  PSYCH: mentation appears normal, affect normal/bright    Diagnostic Test Results:  Labs reviewed in Epic    ASSESSMENT/PLAN:   Ángela was seen today for physical and health maintenance.    Diagnoses and all orders for this visit:    Routine general medical examination at a health care facility    Need for hepatitis C screening test  -     Hepatitis C Screen Reflex to HCV RNA Quant and Genotype; Future    Family history of malignant neoplasm of breast  -     MA Screen Bilateral w/Miles; Future    Nicotine dependence, uncomplicated, unspecified nicotine product type: plan zyban  -discussed risks, benefits, and side effects of this new medication. Patient understands and is in agreement.  -     buPROPion (ZYBAN) 150 MG 12 hr tablet; Take 1 tablet (150 mg) by mouth every morning for 3 days, THEN 1 tablet (150 mg) 2 times daily for 27 days. Take 2nd dose no later than 4pm  -     buPROPion (ZYBAN) 150 MG 12 hr tablet; Take 1 tablet (150 mg) by mouth 2 times daily After your quit date treatment generally continues 7-12 weeks. Take your afternoon dose no later than 4pm    Attention deficit hyperactivity disorder (ADHD), predominantly inattentive type: stable, refill  -     amphetamine-dextroamphetamine (ADDERALL) 20 MG tablet; Take 1 tablet (20 mg) by mouth 3 times daily for 30 days  -     amphetamine-dextroamphetamine (ADDERALL) 20 MG tablet; Take 1 tablet (20 mg) by mouth 3 times daily for 30 days  -     amphetamine-dextroamphetamine (ADDERALL) 20 MG tablet; Take 1 tablet (20 mg) by mouth 3 times daily for 30 days    Screening for human immunodeficiency virus without presence of risk factors  -     HIV Antigen Antibody  "Combo; Future    Other orders  -     REVIEW OF HEALTH MAINTENANCE PROTOCOL ORDERS        Patient has been advised of split billing requirements and indicates understanding: Yes    COUNSELING:  Reviewed preventive health counseling, as reflected in patient instructions       Regular exercise       Healthy diet/nutrition       Vision screening       Immunizations    Discussed COVID #2 and pneumonia and will plan these in the future.          Estimated body mass index is 26.71 kg/m  as calculated from the following:    Height as of 3/8/22: 1.664 m (5' 5.5\").    Weight as of 3/8/22: 73.9 kg (163 lb).    Weight management plan: Discussed healthy diet and exercise guidelines    She reports that she has been smoking cigarettes. She started smoking about 7 years ago. She has a 7.50 pack-year smoking history. She has never used smokeless tobacco.  Tobacco Cessation Action Plan:   Self help information given to patient  goal to quit smoking after divorce is final, just quit for 6 months by self.      Counseling Resources:  ATP IV Guidelines  Pooled Cohorts Equation Calculator  Breast Cancer Risk Calculator  BRCA-Related Cancer Risk Assessment: FHS-7 Tool  FRAX Risk Assessment  ICSI Preventive Guidelines  Dietary Guidelines for Americans, 2010  USDA's MyPlate  ASA Prophylaxis  Lung CA Screening    Randy Graf MD  [facility]  "

## 2022-07-10 ENCOUNTER — MYC MEDICAL ADVICE (OUTPATIENT)
Dept: FAMILY MEDICINE | Facility: CLINIC | Age: 37
End: 2022-07-10

## 2022-07-10 DIAGNOSIS — F90.0 ATTENTION DEFICIT HYPERACTIVITY DISORDER (ADHD), PREDOMINANTLY INATTENTIVE TYPE: ICD-10-CM

## 2022-07-11 ENCOUNTER — E-VISIT (OUTPATIENT)
Dept: URGENT CARE | Facility: URGENT CARE | Age: 37
End: 2022-07-11
Payer: COMMERCIAL

## 2022-07-11 ENCOUNTER — HOSPITAL ENCOUNTER (EMERGENCY)
Facility: CLINIC | Age: 37
End: 2022-07-11
Payer: COMMERCIAL

## 2022-07-11 ENCOUNTER — NURSE TRIAGE (OUTPATIENT)
Dept: NURSING | Facility: CLINIC | Age: 37
End: 2022-07-11

## 2022-07-11 ENCOUNTER — MYC REFILL (OUTPATIENT)
Dept: FAMILY MEDICINE | Facility: CLINIC | Age: 37
End: 2022-07-11

## 2022-07-11 DIAGNOSIS — N39.0 ACUTE UTI (URINARY TRACT INFECTION): Primary | ICD-10-CM

## 2022-07-11 DIAGNOSIS — F90.0 ATTENTION DEFICIT HYPERACTIVITY DISORDER (ADHD), PREDOMINANTLY INATTENTIVE TYPE: ICD-10-CM

## 2022-07-11 PROCEDURE — 87086 URINE CULTURE/COLONY COUNT: CPT

## 2022-07-11 PROCEDURE — 81001 URINALYSIS AUTO W/SCOPE: CPT

## 2022-07-11 PROCEDURE — 99421 OL DIG E/M SVC 5-10 MIN: CPT | Performed by: EMERGENCY MEDICINE

## 2022-07-11 PROCEDURE — 87186 SC STD MICRODIL/AGAR DIL: CPT

## 2022-07-11 RX ORDER — DEXTROAMPHETAMINE SACCHARATE, AMPHETAMINE ASPARTATE, DEXTROAMPHETAMINE SULFATE AND AMPHETAMINE SULFATE 5; 5; 5; 5 MG/1; MG/1; MG/1; MG/1
20 TABLET ORAL 3 TIMES DAILY
Qty: 90 TABLET | Refills: 0 | Status: CANCELLED | OUTPATIENT
Start: 2022-07-11

## 2022-07-11 RX ORDER — DEXTROAMPHETAMINE SACCHARATE, AMPHETAMINE ASPARTATE, DEXTROAMPHETAMINE SULFATE AND AMPHETAMINE SULFATE 5; 5; 5; 5 MG/1; MG/1; MG/1; MG/1
20 TABLET ORAL 3 TIMES DAILY
Qty: 42 TABLET | Refills: 0 | Status: SHIPPED | OUTPATIENT
Start: 2022-07-11 | End: 2022-07-25

## 2022-07-11 RX ORDER — NITROFURANTOIN 25; 75 MG/1; MG/1
100 CAPSULE ORAL 2 TIMES DAILY
Qty: 10 CAPSULE | Refills: 0 | Status: SHIPPED | OUTPATIENT
Start: 2022-07-11 | End: 2022-07-16

## 2022-07-11 NOTE — TELEPHONE ENCOUNTER
Dr. Graf,    Please see my chart message about lost meds for ADHD and how she is needing help. Maira CHAVEZ RN

## 2022-07-11 NOTE — TELEPHONE ENCOUNTER
Pt calling stating [facility] won't give her this short refill unless you contact them.   She is kind of a wreck right now. If you could take a minute to do this she would really appreciate it. She is on her way there now.  [facility] # is   Thank  You    Monica Gamblekumar on 7/11/2022 at 4:26 PM

## 2022-07-11 NOTE — PATIENT INSTRUCTIONS
Dear Ángela Zurita    After reviewing your responses, I've been able to diagnose you with a urinary tract infection, which is a common infection of the bladder with bacteria.  This is not a sexually transmitted infection, though urinating immediately after intercourse can help prevent infections.  Drinking lots of fluids is also helpful to clear your current infection and prevent the next one.      I have sent a prescription for antibiotics to your pharmacy to treat this infection.  You should go give a urine sample to the nearest [facility] or urgent care, have placed an order for this to be done.  This way we can know what bacteria we are treating.    It is important that you take all of your prescribed medication even if your symptoms are improving after a few doses.  Taking all of your medicine helps prevent the symptoms from returning.     If your symptoms worsen, you develop pain in your back or stomach, develop fevers, or are not improving in 5 days, please contact your primary care provider for an appointment or visit any of our convenient Walk-in or Urgent Care Centers to be seen, which can be found on our website here.    Thanks again for choosing us as your health care partner,    Nikita Major PA-C    Urinary Tract Infections in Women  Urinary tract infections (UTIs) are most often caused by bacteria. These bacteria enter the urinary tract. The bacteria may come from inside the body. Or they may travel from the skin outside the rectum or vagina into the urethra. Female anatomy makes it easy for bacteria from the bowel to enter a woman s urinary tract, which is the most common source of UTI. This means women develop UTIs more often than men. Pain in or around the urinary tract is a common UTI symptom. But the only way to know for sure if you have a UTI for the healthcare provider to test your urine. The two tests that may be done are the urinalysis and urine culture.     Types of  UTIs    Cystitis. A bladder infection (cystitis) is the most common UTI in women. You may have urgent or frequent need to pee. You may also have pain, burning when you pee, and bloody urine.    Urethritis. This is an inflamed urethra, which is the tube that carries urine from the bladder to outside the body. You may have lower stomach or back pain. You may also have urgent or frequent need to pee.    Pyelonephritis. This is a kidney infection. If not treated, it can be serious and damage your kidneys. In severe cases, you may need to stay in the hospital. You may have a fever and lower back pain.    Medicines to treat a UTI  Most UTIs are treated with antibiotics. These kill the bacteria. The length of time you need to take them depends on the type of infection. It may be as short as 3 days. If you have repeated UTIs, you may need a low-dose antibiotic for several months. Take antibiotics exactly as directed. Don t stop taking them until all of the medicine is gone. If you stop taking the antibiotic too soon, the infection may not go away. You may also develop a resistance to the antibiotic. This can make it much harder to treat.   Lifestyle changes to treat and prevent UTIs   The lifestyle changes below will help get rid of your UTI. They may also help prevent future UTIs.     Drink plenty of fluids. This includes water, juice, or other caffeine-free drinks. Fluids help flush bacteria out of your body.    Empty your bladder. Always empty your bladder when you feel the urge to pee. And always pee before going to sleep. Urine that stays in your bladder can lead to infection. Try to pee before and after sex as well.    Practice good personal hygiene. Wipe yourself from front to back after using the toilet. This helps keep bacteria from getting into the urethra.    Use condoms during sex. These help prevent UTIs caused by sexually transmitted bacteria. Also don't use spermicides during sex. These can increase the risk  for UTIs. Choose other forms of birth control instead. For women who tend to get UTIs after sex, a low-dose of a preventive antibiotic may be used. Be sure to discuss this option with your healthcare provider.    Follow up with your healthcare provider as directed. He or she may test to make sure the infection has cleared. If needed, more treatment may be started.  [employer] last reviewed this educational content on 7/1/2019 2000-2021 [employer]. All rights reserved. This information is not intended as a substitute for professional medical care. Always follow your healthcare professional's instructions.

## 2022-07-12 NOTE — TELEPHONE ENCOUNTER
Writer contacted the pharmacy, they report that the medication was already approved and picked up by the patient.    Alix Magaña RN  [facility]

## 2022-07-12 NOTE — TELEPHONE ENCOUNTER
Caller asking for clarification  On instructions from   e visit   Caller is to go to the lab at [facility] and give a clean catch urine specimen  Before she starts antibiotic   Caller understands and will comply   Nikita Alvares RN, PA-C LL    7/11/22 6:39 PM  Note    You should go give a urine sample to the nearest [facility] or urgent care, have placed an order for this to be done.           Reason for Disposition    Question about upcoming scheduled test, no triage required and triager able to answer question    Protocols used: INFORMATION ONLY CALL - NO TRIAGE-A-

## 2022-07-12 NOTE — TELEPHONE ENCOUNTER
Called [facility], they weren't open yet, so I left a message to authorize it.  Thank you,  Randy Graf MD

## 2022-07-13 ENCOUNTER — LAB (OUTPATIENT)
Dept: LAB | Facility: CLINIC | Age: 37
End: 2022-07-13
Payer: COMMERCIAL

## 2022-07-13 ENCOUNTER — DOCUMENTATION ONLY (OUTPATIENT)
Dept: LAB | Facility: CLINIC | Age: 37
End: 2022-07-13

## 2022-07-13 DIAGNOSIS — Z11.59 NEED FOR HEPATITIS C SCREENING TEST: ICD-10-CM

## 2022-07-13 DIAGNOSIS — Z11.4 SCREENING FOR HUMAN IMMUNODEFICIENCY VIRUS WITHOUT PRESENCE OF RISK FACTORS: ICD-10-CM

## 2022-07-13 LAB
HCV AB SERPL QL IA: NONREACTIVE
HIV 1+2 AB+HIV1 P24 AG SERPL QL IA: NONREACTIVE
HOLD SPECIMEN: NORMAL
HOLD SPECIMEN: NORMAL

## 2022-07-13 PROCEDURE — 87389 HIV-1 AG W/HIV-1&-2 AB AG IA: CPT

## 2022-07-13 PROCEDURE — 86803 HEPATITIS C AB TEST: CPT

## 2022-07-13 PROCEDURE — 36415 COLL VENOUS BLD VENIPUNCTURE: CPT

## 2022-07-13 NOTE — PROGRESS NOTES
Labs were ordered by Dr Graf and already pending (done)    I do not see that other, or additional labs were discussed during last office visit.    Sabi Barrientos, KATHLEEN CNP, covering for PCP

## 2022-07-14 ENCOUNTER — TELEPHONE (OUTPATIENT)
Dept: FAMILY MEDICINE | Facility: CLINIC | Age: 37
End: 2022-07-14

## 2022-07-14 DIAGNOSIS — N39.0 ACUTE UTI (URINARY TRACT INFECTION): Primary | ICD-10-CM

## 2022-07-14 DIAGNOSIS — F17.200 NICOTINE DEPENDENCE, UNCOMPLICATED, UNSPECIFIED NICOTINE PRODUCT TYPE: ICD-10-CM

## 2022-07-14 RX ORDER — CIPROFLOXACIN 500 MG/1
500 TABLET, FILM COATED ORAL 2 TIMES DAILY
Qty: 6 TABLET | Refills: 0 | Status: SHIPPED | OUTPATIENT
Start: 2022-07-14 | End: 2022-07-17

## 2022-07-14 NOTE — TELEPHONE ENCOUNTER
Called and left message on voicemail about new medication and to stop old med. Also stated to seek treatment if worsening allergic reaction    Chavo Knight RN

## 2022-07-14 NOTE — TELEPHONE ENCOUNTER
Urine culture pos for e coli and sensitive to Cipro.  Stop Macrobid - start Cipro 500 mg X 3 days.    Follow-up if no improvement in symptoms or rash.  MARKUS Grant

## 2022-07-14 NOTE — TELEPHONE ENCOUNTER
Patient called concerning new rash. She was prescribed Macrobid for an UTI after an e Visit on 7-. Ordered 2 times daily for 5 days. Patient now has rash located on face and neck. Describes as pinpoint raised spots. Spots are itchy.  Has tried Benadryl but have not improved. No other changes besides adding medication to cause rash. No issues with breathing. She feels warm, foggy and tired. UTI symptoms have improved. Denies pain with urination. States she had a back pain but is now gone. Please advise if possible medication reaction. Should she stop medications? Does she need to start a different antibiotic? See UA/UC results.    Thank you  Jennifer Perdomo RN

## 2022-07-15 ENCOUNTER — OFFICE VISIT (OUTPATIENT)
Dept: FAMILY MEDICINE | Facility: CLINIC | Age: 37
End: 2022-07-15
Payer: COMMERCIAL

## 2022-07-15 DIAGNOSIS — Z91.199 NO-SHOW FOR APPOINTMENT: Primary | ICD-10-CM

## 2022-07-15 NOTE — PROGRESS NOTES
This patient was a no show for this scheduled appointment.  Answers for HPI/ROS submitted by the patient on 7/15/2022  What is the reason for your visit today? : Rash and hives  How many servings of fruits and vegetables do you eat daily?: 2-3  On average, how many sweetened beverages do you drink each day (Examples: soda, juice, sweet tea, etc.  Do NOT count diet or artificially sweetened beverages)?: 1  How many minutes a day do you exercise enough to make your heart beat faster?: 20 to 29  How many days a week do you exercise enough to make your heart beat faster?: 5  How many days per week do you miss taking your medication?: 0

## 2022-08-19 ENCOUNTER — MYC MEDICAL ADVICE (OUTPATIENT)
Dept: FAMILY MEDICINE | Facility: CLINIC | Age: 37
End: 2022-08-19

## 2022-08-19 DIAGNOSIS — F90.0 ATTENTION DEFICIT HYPERACTIVITY DISORDER (ADHD), PREDOMINANTLY INATTENTIVE TYPE: ICD-10-CM

## 2022-08-19 RX ORDER — DEXTROAMPHETAMINE SACCHARATE, AMPHETAMINE ASPARTATE, DEXTROAMPHETAMINE SULFATE AND AMPHETAMINE SULFATE 5; 5; 5; 5 MG/1; MG/1; MG/1; MG/1
20 TABLET ORAL 3 TIMES DAILY
Qty: 90 TABLET | Refills: 0 | Status: SHIPPED | OUTPATIENT
Start: 2022-08-19 | End: 2023-01-20

## 2022-08-19 NOTE — TELEPHONE ENCOUNTER
Routed to out of office provider.  Dr Graf is out of clinic today.  Please see MyChart message from pt.  Deb Sexton RN

## 2022-08-19 NOTE — TELEPHONE ENCOUNTER
Noted and MyChart sent to patient, as she was expecting communication when addressed.     Alba Harrsi RN  [facility]

## 2022-08-19 NOTE — TELEPHONE ENCOUNTER
Forwarding to provider for review.  Okay to discontinue Rx sent to [facility] today and resend to our our pharmacy/give them verbal order to fill existing Rx on file today?    RN reached out to [facility] pharmacy to give verbal order to fill Adderall today per provider's notes and Rx today. Pharmacist states they're unable to fill it, as the 20 mg tab is on backorder. There is a 10 mg tab, but that's on backorder as well. He said [facility] might have it.  RN will reach out to our pharmacy as well.    MyChart update sent to patient.     RN spoke with our pharmacy, who states they still have an active Rx on file, but patient wouldn't be due to fill it until 8/25/22, as last fill date was 7/25/22.  Also, patient apparently told them she was out of pills, not that she was going out of town.  RN advised will f/u with patient.     Spoke with patient to update and discuss.  She states she did last  the Rx on 7/25/22 and began them that day, so will run out on 8/23/22 or 8/24/22 before she gets back into town.   Will forward back to provider for final review and authorization to fill today given all of this new information.    Alba Harris RN  [facility]

## 2022-08-19 NOTE — TELEPHONE ENCOUNTER
Spoke with pharmacist - patient reported she would be out of pills before she is back in town, not that she is out of pills now.  Authorized early refill with pharmacy directly

## 2022-08-26 DIAGNOSIS — B00.1 HERPES LABIALIS: ICD-10-CM

## 2022-08-26 RX ORDER — VALACYCLOVIR HYDROCHLORIDE 1 G/1
2000 TABLET, FILM COATED ORAL 2 TIMES DAILY
Qty: 4 TABLET | Refills: 1 | Status: SHIPPED | OUTPATIENT
Start: 2022-08-26 | End: 2022-11-01

## 2022-08-26 NOTE — TELEPHONE ENCOUNTER
Valacyclovir 1gm        Last written prescription date:         Last fill quantity: 4, # refills:         Last office visit:         Future office visit:         Is this a controlled substance?  No       Routing refill request to provider for review/approval because:    Thank You!  Monica Serrano  Certified Pharmacy Technician  [facility]  P: [phone] F:[phone]

## 2022-10-26 ENCOUNTER — MYC REFILL (OUTPATIENT)
Dept: FAMILY MEDICINE | Facility: CLINIC | Age: 37
End: 2022-10-26

## 2022-10-26 DIAGNOSIS — F90.0 ATTENTION DEFICIT HYPERACTIVITY DISORDER (ADHD), PREDOMINANTLY INATTENTIVE TYPE: ICD-10-CM

## 2022-10-26 RX ORDER — DEXTROAMPHETAMINE SACCHARATE, AMPHETAMINE ASPARTATE, DEXTROAMPHETAMINE SULFATE AND AMPHETAMINE SULFATE 5; 5; 5; 5 MG/1; MG/1; MG/1; MG/1
20 TABLET ORAL 3 TIMES DAILY
Qty: 90 TABLET | Refills: 0 | Status: CANCELLED | OUTPATIENT
Start: 2022-10-26

## 2022-10-26 RX ORDER — DEXTROAMPHETAMINE SACCHARATE, AMPHETAMINE ASPARTATE, DEXTROAMPHETAMINE SULFATE AND AMPHETAMINE SULFATE 5; 5; 5; 5 MG/1; MG/1; MG/1; MG/1
20 TABLET ORAL 3 TIMES DAILY
Qty: 90 TABLET | Refills: 0 | Status: SHIPPED | OUTPATIENT
Start: 2022-11-26 | End: 2022-12-26

## 2022-10-26 RX ORDER — DEXTROAMPHETAMINE SACCHARATE, AMPHETAMINE ASPARTATE, DEXTROAMPHETAMINE SULFATE AND AMPHETAMINE SULFATE 5; 5; 5; 5 MG/1; MG/1; MG/1; MG/1
20 TABLET ORAL 3 TIMES DAILY
Qty: 90 TABLET | Refills: 0 | Status: SHIPPED | OUTPATIENT
Start: 2022-12-27 | End: 2023-01-26

## 2022-10-26 RX ORDER — DEXTROAMPHETAMINE SACCHARATE, AMPHETAMINE ASPARTATE, DEXTROAMPHETAMINE SULFATE AND AMPHETAMINE SULFATE 5; 5; 5; 5 MG/1; MG/1; MG/1; MG/1
20 TABLET ORAL 3 TIMES DAILY
Qty: 90 TABLET | Refills: 0 | Status: SHIPPED | OUTPATIENT
Start: 2022-10-26 | End: 2022-11-04

## 2022-10-26 NOTE — TELEPHONE ENCOUNTER
Routing to ordering provider for consideration, not on refill protocol.           Deyanira Alejandre     RN MSN

## 2022-10-31 DIAGNOSIS — B00.1 HERPES LABIALIS: ICD-10-CM

## 2022-10-31 DIAGNOSIS — G43.009 MIGRAINE WITHOUT AURA AND WITHOUT STATUS MIGRAINOSUS, NOT INTRACTABLE: ICD-10-CM

## 2022-11-01 RX ORDER — SUMATRIPTAN 50 MG/1
50 TABLET, FILM COATED ORAL
Qty: 9 TABLET | Refills: 4 | Status: SHIPPED | OUTPATIENT
Start: 2022-11-01 | End: 2024-03-27

## 2022-11-01 RX ORDER — VALACYCLOVIR HYDROCHLORIDE 1 G/1
TABLET, FILM COATED ORAL
Qty: 4 TABLET | Refills: 1 | Status: SHIPPED | OUTPATIENT
Start: 2022-11-01 | End: 2023-03-21

## 2022-11-01 NOTE — TELEPHONE ENCOUNTER
Patient has been waiting since Sunday and really needs her migraine med and wants it sent to the  pharmacy in [place] now because she is not longer in [place].  Please disregard her CASTT message.      Darlene Goff, MENDOZA Henry

## 2022-11-04 ENCOUNTER — TELEPHONE (OUTPATIENT)
Dept: FAMILY MEDICINE | Facility: CLINIC | Age: 37
End: 2022-11-04

## 2022-11-04 DIAGNOSIS — F90.0 ATTENTION DEFICIT HYPERACTIVITY DISORDER (ADHD), PREDOMINANTLY INATTENTIVE TYPE: ICD-10-CM

## 2022-11-04 RX ORDER — DEXTROAMPHETAMINE SACCHARATE, AMPHETAMINE ASPARTATE, DEXTROAMPHETAMINE SULFATE AND AMPHETAMINE SULFATE 5; 5; 5; 5 MG/1; MG/1; MG/1; MG/1
20 TABLET ORAL 3 TIMES DAILY
Qty: 90 TABLET | Refills: 0 | Status: SHIPPED | OUTPATIENT
Start: 2022-11-04 | End: 2022-11-04

## 2022-11-04 NOTE — TELEPHONE ENCOUNTER
I called pt.    Pt says that she does not need this prescription after all.  She had one refill remaining that she filled today with [facility] pharmacy at [place].    I called [facility] and cancelled this prescription.    Notified provider.  FREDERIC Sexton RN   [facility]

## 2022-11-04 NOTE — TELEPHONE ENCOUNTER
Sent to [facility]. I sent one month, if needing other months transferred let me know.  ASSESSMENT/PLAN  Ángela was seen today for medication question.    Diagnoses and all orders for this visit:    Attention deficit hyperactivity disorder (ADHD), predominantly inattentive type  -     amphetamine-dextroamphetamine (ADDERALL) 20 MG tablet; Take 1 tablet (20 mg) by mouth 3 times daily        Randy Graf MD  [facility]

## 2022-11-04 NOTE — TELEPHONE ENCOUNTER
Medication Question or Refill        What medication are you calling about (include dose and sig)?:  Adderall    Controlled Substance Agreement on file:   CSA -- Patient Level:    CSA: None found at the patient level.       Who prescribed the medication?:  Lesia    Do you need a refill? No - transfer to different pharmacy    Patient offered an appointment? No    Do you have any questions or concerns?  No      Could we send this information to you in [place] or would you prefer to receive a phone call?:   Patient would prefer a phone call   Okay to leave a detailed message?: Yes at Home number on file [phone] (home)

## 2022-11-04 NOTE — TELEPHONE ENCOUNTER
Pt has not picked up 10/26 Adderall as her pharm is out of stock.  Pt will check around to pharmacies to see where they have Adderall in stock. She will call us back.  [facility] does not have any strength of Adderall.  Pt refused [facility] in [place] as it is 1 hour away.

## 2022-11-09 ENCOUNTER — E-VISIT (OUTPATIENT)
Dept: FAMILY MEDICINE | Facility: CLINIC | Age: 37
End: 2022-11-09
Payer: COMMERCIAL

## 2022-11-09 DIAGNOSIS — J02.0 STREPTOCOCCAL SORE THROAT: Primary | ICD-10-CM

## 2022-11-09 PROCEDURE — 99421 OL DIG E/M SVC 5-10 MIN: CPT | Performed by: FAMILY MEDICINE

## 2022-11-09 RX ORDER — PENICILLIN V POTASSIUM 500 MG/1
500 TABLET, FILM COATED ORAL 2 TIMES DAILY
Qty: 20 TABLET | Refills: 0 | Status: SHIPPED | OUTPATIENT
Start: 2022-11-09 | End: 2022-11-19

## 2022-11-09 NOTE — TELEPHONE ENCOUNTER
Provider E-Visit time total (minutes): 5  Modified Centor Criteria for Strep  One point for each  History of fever  Tonsillar exudate  Tender anterior cervical adenopathy  Absence of cough  Age <15     Age >44 subtract 1 point   Guidelines for management  -1, 0 or 1 points - No antibiotic or throat culture necessary (Risk of strep. infection <10%)   2 or 3 points - Should receive a throat culture and treat with an antibiotic if culture is positive (Risk of strep. infection 32% if 3 criteria, 15% if 2)   4 or 5 points - Treat empirically with an antibiotic (Risk of strep. infection 56%)

## 2022-11-09 NOTE — PATIENT INSTRUCTIONS
Thank you for choosing us for your care. Because of your exposure and symptoms we could empirically treat for strep without a strep test. However, if you would like to do the test to know first, I did place that order as well.     I have placed an order for a prescription so that you can start treatment. View your full visit summary for details by clicking on the link below. Your pharmacist will able to address any questions you may have about the medication.     If you're not feeling better within 5-7 days, please schedule an appointment.  You can schedule an appointment right here in [employer], or call [phone]  If the visit is for the same symptoms as your eVisit, we'll refund the cost of your eVisit if seen within seven days.    Thank you for choosing us for your care. I have placed the orders for the strep test.  Please schedule an appointment with the lab right here in [employer], or call [phone].  I will let you know when the results are back and next steps to take.

## 2022-11-19 ENCOUNTER — HEALTH MAINTENANCE LETTER (OUTPATIENT)
Age: 37
End: 2022-11-19

## 2022-11-28 ENCOUNTER — MYC MEDICAL ADVICE (OUTPATIENT)
Dept: FAMILY MEDICINE | Facility: CLINIC | Age: 37
End: 2022-11-28

## 2022-11-28 DIAGNOSIS — F90.0 ATTENTION DEFICIT HYPERACTIVITY DISORDER (ADHD), PREDOMINANTLY INATTENTIVE TYPE: ICD-10-CM

## 2022-11-29 ENCOUNTER — MYC MEDICAL ADVICE (OUTPATIENT)
Dept: FAMILY MEDICINE | Facility: CLINIC | Age: 37
End: 2022-11-29

## 2022-11-29 ENCOUNTER — TELEPHONE (OUTPATIENT)
Dept: FAMILY MEDICINE | Facility: CLINIC | Age: 37
End: 2022-11-29

## 2022-11-29 ENCOUNTER — E-VISIT (OUTPATIENT)
Dept: URGENT CARE | Facility: CLINIC | Age: 37
End: 2022-11-29
Payer: COMMERCIAL

## 2022-11-29 DIAGNOSIS — N30.00 ACUTE CYSTITIS WITHOUT HEMATURIA: Primary | ICD-10-CM

## 2022-11-29 DIAGNOSIS — N39.0 ACUTE UTI (URINARY TRACT INFECTION): Primary | ICD-10-CM

## 2022-11-29 PROCEDURE — 99421 OL DIG E/M SVC 5-10 MIN: CPT | Performed by: EMERGENCY MEDICINE

## 2022-11-29 RX ORDER — DEXTROAMPHETAMINE SACCHARATE, AMPHETAMINE ASPARTATE, DEXTROAMPHETAMINE SULFATE AND AMPHETAMINE SULFATE 5; 5; 5; 5 MG/1; MG/1; MG/1; MG/1
20 TABLET ORAL 3 TIMES DAILY
Qty: 90 TABLET | Refills: 0 | Status: CANCELLED | OUTPATIENT
Start: 2022-11-29

## 2022-11-29 RX ORDER — CIPROFLOXACIN 500 MG/1
500 TABLET, FILM COATED ORAL 2 TIMES DAILY
Qty: 10 TABLET | Refills: 0 | Status: SHIPPED | OUTPATIENT
Start: 2022-11-29 | End: 2022-12-04

## 2022-11-29 RX ORDER — NITROFURANTOIN 25; 75 MG/1; MG/1
100 CAPSULE ORAL 2 TIMES DAILY
Qty: 10 CAPSULE | Refills: 0 | Status: SHIPPED | OUTPATIENT
Start: 2022-11-29 | End: 2022-12-04

## 2022-11-29 NOTE — TELEPHONE ENCOUNTER
Cipro ordered to [facility].      Forwarding to prescribing/billing provider for review. Please see initial note below and advise re: alternative antibiotic.    Alba Harris RN  [facility]

## 2022-11-29 NOTE — TELEPHONE ENCOUNTER
RN notes staff message that  provider ordered Cipro.  Patient notified.     Alba Harris RN  [facility]      Efren Bonilla MD  You 31 minutes ago (12:18 PM)     RS  Cipro ordered

## 2022-11-29 NOTE — PATIENT INSTRUCTIONS
Dear Ángela Zurita    After reviewing your responses, I've been able to diagnose you with a urinary tract infection, which is a common infection of the bladder with bacteria.  This is not a sexually transmitted infection, though urinating immediately after intercourse can help prevent infections.  Drinking lots of fluids is also helpful to clear your current infection and prevent the next one.      I have sent a prescription for antibiotics to your pharmacy to treat this infection.    It is important that you take all of your prescribed medication even if your symptoms are improving after a few doses.  Taking all of your medicine helps prevent the symptoms from returning.     If your symptoms worsen, you develop pain in your back or stomach, develop fevers, or are not improving in 5 days, please contact your primary care provider for an appointment or visit any of our convenient Walk-in or Urgent Care Centers to be seen, which can be found on our website here.    Thanks again for choosing us as your health care partner,    Efren Bonilla MD    Urinary Tract Infections in Women  Urinary tract infections (UTIs) are most often caused by bacteria. These bacteria enter the urinary tract. The bacteria may come from inside the body. Or they may travel from the skin outside the rectum or vagina into the urethra. Female anatomy makes it easy for bacteria from the bowel to enter a woman s urinary tract, which is the most common source of UTI. This means women develop UTIs more often than men. Pain in or around the urinary tract is a common UTI symptom. But the only way to know for sure if you have a UTI for the healthcare provider to test your urine. The two tests that may be done are the urinalysis and urine culture.     Types of UTIs    Cystitis. A bladder infection (cystitis) is the most common UTI in women. You may have urgent or frequent need to pee. You may also have pain, burning when you pee, and bloody  urine.    Urethritis. This is an inflamed urethra, which is the tube that carries urine from the bladder to outside the body. You may have lower stomach or back pain. You may also have urgent or frequent need to pee.    Pyelonephritis. This is a kidney infection. If not treated, it can be serious and damage your kidneys. In severe cases, you may need to stay in the hospital. You may have a fever and lower back pain.    Medicines to treat a UTI  Most UTIs are treated with antibiotics. These kill the bacteria. The length of time you need to take them depends on the type of infection. It may be as short as 3 days. If you have repeated UTIs, you may need a low-dose antibiotic for several months. Take antibiotics exactly as directed. Don t stop taking them until all of the medicine is gone. If you stop taking the antibiotic too soon, the infection may not go away. You may also develop a resistance to the antibiotic. This can make it much harder to treat.   Lifestyle changes to treat and prevent UTIs   The lifestyle changes below will help get rid of your UTI. They may also help prevent future UTIs.     Drink plenty of fluids. This includes water, juice, or other caffeine-free drinks. Fluids help flush bacteria out of your body.    Empty your bladder. Always empty your bladder when you feel the urge to pee. And always pee before going to sleep. Urine that stays in your bladder can lead to infection. Try to pee before and after sex as well.    Practice good personal hygiene. Wipe yourself from front to back after using the toilet. This helps keep bacteria from getting into the urethra.    Use condoms during sex. These help prevent UTIs caused by sexually transmitted bacteria. Also don't use spermicides during sex. These can increase the risk for UTIs. Choose other forms of birth control instead. For women who tend to get UTIs after sex, a low-dose of a preventive antibiotic may be used. Be sure to discuss this option with  your healthcare provider.    Follow up with your healthcare provider as directed. He or she may test to make sure the infection has cleared. If needed, more treatment may be started.  [employer] last reviewed this educational content on 7/1/2019 2000-2021 [employer]. All rights reserved. This information is not intended as a substitute for professional medical care. Always follow your healthcare professional's instructions.

## 2022-11-29 NOTE — TELEPHONE ENCOUNTER
New Medication Request    Contacts       Type Contact Phone/Fax    11/29/2022 08:18 AM CST Phone (Incoming) AamirsergioÁngela frazier (Self) [phone] (H)          What medication are you requesting?: Cipro    Reason for medication request: Patient had e-visit today with Dr. Efren Bonilla, , and he prescribed Macrobid but patient breaks out in rash, per previous experience. Patient would like Dr. Graf to prescribe Cipro instead      Have you taken this medication before?: Yes:     Controlled Substance Agreement on file:   CSA -- Patient Level:    CSA: None found at the patient level.         Patient offered an appointment? No    Preferred Pharmacy:     [id number] [id number]  AT [facility] [address]  [address] [address] [facility] [address]  Phone: [phone] Fax: [phone]    Could we send this information to you [facility] or would you prefer to receive a phone call?:   Patient would prefer a phone call   Okay to leave a detailed message?: Yes at Home number on file [phone] (home)

## 2022-11-30 NOTE — LETTER
4/10/2018       RE: Ángela Zurita  [address] [place] [id number]     Dear Colleague,    Thank you for referring your patient, Ángela Zurita, to the [facility] COLON AND RECTAL SURGERY at [facility]. Please see a copy of my visit note below.    Colon and Rectal Surgery Follow-up Clinic Note    Referring provider:  Referred Self, MD  No address on file       RE: Ángela Zurita  : 1985  TRISH: 4/10/2018      Ángela Zurita is a very pleasant 33 year old female here for follow-up of documented obstetrical birth injury during the birth of her 8-pound 1-ounce son in 2014. That was an asynclitic vertex delivery that required use of vacuum extraction.  A fourth-degree laceration was noted and primary sphincter, rectal and vaginal repairs were performed.  Ángela reported that she was passing flatus and feculent matter through her vagina post repairs. She does have a previous history of irritable bowel syndrome.  I initially saw her in clinic on 2014, at which time I advised conservative therapy.  We discussed possible pelvic floor evaluation at that time, but this was not pursued and her clinical status initially improved.  And does have a previous diagnosis of irritable bowel syndrome.  Her last colonoscopy was approximately 10 years ago.    Interval history: Over the past 4 years Ángela complains of recurrent urinary tract infections and bacterial vaginosis. She also complains of gradual worsening of faecal urgency and incontinence, she believes she has less than 30 seconds to about 1 minute from when she has the urge to have a bowel movement to get to a toilet. She wears a pad prophylactically.  Her number of incontinent episodes is variable but she now has roughly one accident every other week.  However, on bad days she can have up to 10 accidents daily.  There is history of abdominal pain, on and off, predominantly left lower side since the past 1-2  "years.She complains of altered bowel habits( constipation- diarrhea), more of diarrhea than constipation. She also complains of painful intercourse. She is unable to tell if she passes flatus/ stools per vagina.   In view of left lower abdominal pain and menorrhagia she underwent dilation and curettage, diagnostic hysteroscopy, diagnostic laparoscopy with left ovarian cystectomies, lysis of adhesions and peritoneal biopsy.       Physical Examination: was chaparoned by Zachary Mckeon LPN and Robinson Palumbo MS3.    /90  Pulse 104  Temp 98.3  F (36.8  C) (Oral)  Ht 5' 5.5\"  Wt 138 lb 14.4 oz  LMP 03/28/2018  SpO2 96%  BMI 22.76 kg/m2  General: comfortable appearing  Abdomen:Soft, non tender.  Perianal external examination:  Perianal skin: intact.  The perineal body is grossly thinned and appears to have little muscle.  Lesions: No.  Eversion of buttocks: There was no evidence of an anal fissure.   Skin tags or external hemorrhoids: No.    Digital rectal examination: Was performed.   Sphincter tone: Poor.   Minimal augmentation at the level of the puborectalis with squeeze effort.  Palpable lesions: None.  No palpable fistula track.      Anoscopy: Was performed.   Hemorrhoids: No significant internal hemorrhoids.  No visiblefistula track  Lesions: No.      Vaginal examination:   Minimal white non-foul smelling discharge present, no other obvious lesions seen.  No feculent material present.    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 In view of altered bowel habits Ángela was counselled regarding the use of metamucil and imodium to regularize bowel movements and fecal consistency.  Given her diarrhea predominance, I do think it would be wise to perform colonoscopy with biopsies to be absolutely certain there is no microscopic colitis present, though she does have an obvious obstetrical sphincter injury that is almost certainly the main cause of her problem.  " This should be evaluated at the [facility], and we will help make arrangements.  I briefly discussed the options of operative sphincter repair versus sacral neuromodulation, and reviewed the anticipated outcomes of each, including the significant risk of long-term failure with sphincteroplasty.  Nonetheless, this may well have a role to play if there is extensive anatomic damage to the sphincter.  I will plan to meet with Ángela again after her testing is to complete review the results and formulate a treatment plan.    I answered all of the patient's questions to her stated satisfaction.  She expressed understanding and agreement with the proposed plan.    Total time 30 minutes.  Greater than half the time was spent counseling.      PLEASE SEE NOTE BELOW FOR PHYSICAL EXAMINATION, REVIEW OF SYSTEMS, AND OTHER HISTORY.      Dr. Efren Olvera MD  Colon and Rectal Surgery Attending  Department of Surgery  [facility]    -------------------------------------------------------------------------------------------------------------------    Medical history:  Past Medical History:   Diagnosis Date     Cervical dysplasia     s/p LEEP      Chickenpox      Exercise-induced asthma     adolescent     IBS (irritable bowel syndrome)      Tonsillitis 2/13/2014       Surgical history:  Past Surgical History:   Procedure Laterality Date     BIOPSY  Various    Had a few Leeps and numerous Colposcopys     COLONOSCOPY  2009    Normal     DILATION AND CURETTAGE, HYSTEROSCOPY DIAGNOSTIC, COMBINED N/A 4/3/2018    Procedure: COMBINED DILATION AND CURETTAGE, HYSTEROSCOPY DIAGNOSTIC;  Diagnostic Hysteroscopy with Dilation and Curettage,Laparoscopy with Left Ovarian Cystectomy, Right Uteral Sacral Biopsy;  Surgeon: Sherin Frost MD;  Location: [facility] OR     EXC SWEAT GLAND MATILDE REYNOSO,SIMPL Right 2009     LAPAROSCOPY DIAGNOSTIC (GYN) N/A 4/3/2018    Procedure: LAPAROSCOPY DIAGNOSTIC (GYN);;   Surgeon: Sherin Frost MD;  Location: [facility] OR     [facility] [place], CERVICAL  2006    yearly paps     MOUTH SURGERY      wisdom teeth     SOFT TISSUE SURGERY  Various    infected sweat-gland- cyst removed arm,armpit,face     SURGICAL HISTORY OF -   3/2005    Tonsillectomy     SURGICAL HISTORY OF -       infected sweat gland under her arm       Problem list:  Patient Active Problem List    Diagnosis Date Noted     Left ovarian cyst 03/19/2018     Priority: Medium     Posterior neck pain 09/23/2017     Priority: Medium     Attention deficit hyperactivity disorder (ADHD), predominantly inattentive type 06/10/2016     Priority: Medium     Patient is followed by WILMAR WHEAT for ongoing prescription of stimulants.  All refills should be approved by this provider, or covering partner.    Medication(s): Adderall 10mg immediate release   Maximum quantity per month: 150  Clinic visit frequency required: Q 3 month     Controlled substance agreement on file: Yes 6/30/16  Neuropsych evaluation for ADD completed:  Yes, completed 2/2012 at [facility], on file and diagnosis confirmed    Last [facility] website verification:  done on 6/19/2016   [email or web address]           Oral herpes 03/21/2016     Priority: Medium     CARDIOVASCULAR SCREENING; LDL GOAL LESS THAN 130 08/12/2015     Priority: Medium     Rectovaginal fistula 01/26/2015     Priority: Medium     Has had consult with colorectal surgeon; opting to defer treatment until done with childbearing  Amenable to c/section with next pregnancy       Health Care Home 12/18/2014     Priority: Medium     Status:  Unable to reach  Care Coordinator:  Maira Starr    See Letters for [facility] Care Plan  Date:  December 18, 2014         Fibroid uterus 07/02/2014     Priority: Medium     Anterior intramural lower.  Right by bladder       Urgency-frequency syndrome 03/24/2014     Priority: Medium       Medications:  Current Outpatient Prescriptions   Medication Sig Dispense  Refill     ibuprofen (ADVIL/MOTRIN) 600 MG tablet Take 1 tablet (600 mg) by mouth every 6 hours as needed for pain (mild) 30 tablet 0     acetaminophen (TYLENOL) 325 MG tablet Take 2 tablets (650 mg) by mouth every 4 hours as needed for other (mild pain) 100 tablet 0     Probiotic Product (SOLUBLE FIBER/PROBIOTICS PO)        amphetamine-dextroamphetamine (ADDERALL) 10 MG per tablet Take 2 pills (20mg) orally in the morning and two pills (20mg) at 11am and one pill (10mg) at 2pm 150 tablet 0       Allergies:  No Known Allergies    Family history:  Family History   Problem Relation Age of Onset     Blood Disease Brother      twin brother-blood clots     Blood Disease Maternal Grandfather      blood clots     HEART DISEASE Maternal Grandfather      valve replacement     Hyperlipidemia Maternal Grandfather      CEREBROVASCULAR DISEASE Maternal Grandfather      Prostate Cancer Maternal Grandfather      Other Cancer Maternal Grandfather      Gum/Jaw/Lymphnodes     Colon Cancer Maternal Grandfather      MENTAL ILLNESS Maternal Grandfather      Alzheimer's     Respiratory Maternal Grandmother      Lung Cancer Maternal Grandmother      DIABETES Maternal Grandmother      type II     Hypertension Maternal Grandmother      Hyperlipidemia Maternal Grandmother      Depression Maternal Grandmother      CANCER Paternal Grandmother      non -hodgkins lymphoma     Lung Cancer Paternal Grandmother      Hypertension Paternal Grandmother      Hyperlipidemia Paternal Grandmother      CEREBROVASCULAR DISEASE Paternal Grandmother      Hypertension Father      Hyperlipidemia Father      Hyperlipidemia Paternal Grandfather      Prostate Cancer Paternal Grandfather      Hypertension Paternal Grandfather      Colon Cancer Paternal Grandfather      Coronary Artery Disease Paternal Grandfather      Breast Cancer Mother      Stage 0, small duct carcinoma     Breast Cancer Other      2 aunt and 3 of my great aunts     Breast Cancer Cousin       "Breast Cancer       Social history:  Social History     Social History     Marital status: Single     Spouse name: N/A     Number of children: N/A     Years of education: N/A     Occupational History      [employer]     accounting     Social History Main Topics     Smoking status: Current Every Day Smoker     Packs/day: 0.50     Years: 15.00     Types: Cigarettes     Start date: 12/1/2014     Smokeless tobacco: Never Used     Alcohol use 0.0 oz/week      Comment: 3 drinks weekly- quit with pregnancy     Drug use: No     Sexual activity: Yes     Partners: Male     Birth control/ protection: None      Comment: We're okay if we get pregnant again ;)     Other Topics Concern     Parent/Sibling W/ Cabg, Mi Or Angioplasty Before 65f 55m? No     Social History Narrative         Nursing Notes:   Tatiana Mckeon LPN  4/10/2018  3:41 PM  Signed  Chief Complaint   Patient presents with     Rectal Problem     New pt consult, rectovaginal fistula.        Vitals:    04/10/18 1539   BP: 126/90   Pulse: 104   Temp: 98.3  F (36.8  C)   TempSrc: Oral   SpO2: 96%   Weight: 138 lb 14.4 oz   Height: 5' 5.5\"       Body mass index is 22.76 kg/(m^2).  Zachary YU LPN    Again, thank you for allowing me to participate in the care of your patient.      Sincerely,    Efren Olvera MD      " Patient was offered a  but patient's son comfortable with translating.  67-year-old female with past medical history hyperlipidemia, h/o lap caden who presents to the ER for myalgias, gradual onset diffuse headache, nausea, vomiting and syncope this morning. Patient denies LOC, remembers feeling lightheadedness and generalized weakness. EKG, labs and imaging personally reviewed.  EKG nonischemic, CT shows no acute findings.  Patient is positive for influenza A.  She was given fluids, antipyretics and on reassessment is well-appearing.  She denies dizziness, chest pain, shortness of breath.  She is ambulatory unassisted with a steady gait.  Discussed all findings with patient and son.  Discussed admission versus discharge and feels comfortable with discharge home. I have fully discussed the medical management and delivery of care with the patient. I have discussed any available labs, imaging and treatment options with the patient. All Questions answered at the bedside and printed copies of all results provided and recommended to review with PCP. Patient confirms understanding and has been given detailed return precautions. Patient instructed to return to the ED should symptoms persist or worsen. Patient has demonstrated capacity and has verbalized understanding. Patient is well appearing upon discharge, ambulatory with a steady gait.

## 2023-01-20 ENCOUNTER — E-VISIT (OUTPATIENT)
Dept: FAMILY MEDICINE | Facility: CLINIC | Age: 38
End: 2023-01-20
Payer: COMMERCIAL

## 2023-01-20 DIAGNOSIS — F90.0 ATTENTION DEFICIT HYPERACTIVITY DISORDER (ADHD), PREDOMINANTLY INATTENTIVE TYPE: Primary | ICD-10-CM

## 2023-01-20 PROCEDURE — 99421 OL DIG E/M SVC 5-10 MIN: CPT | Performed by: FAMILY MEDICINE

## 2023-01-20 RX ORDER — DEXTROAMPHETAMINE SACCHARATE, AMPHETAMINE ASPARTATE, DEXTROAMPHETAMINE SULFATE AND AMPHETAMINE SULFATE 5; 5; 5; 5 MG/1; MG/1; MG/1; MG/1
20 TABLET ORAL 3 TIMES DAILY
Qty: 90 TABLET | Refills: 0 | Status: SHIPPED | OUTPATIENT
Start: 2023-02-27 | End: 2023-03-29

## 2023-01-20 RX ORDER — DEXTROAMPHETAMINE SACCHARATE, AMPHETAMINE ASPARTATE, DEXTROAMPHETAMINE SULFATE AND AMPHETAMINE SULFATE 5; 5; 5; 5 MG/1; MG/1; MG/1; MG/1
20 TABLET ORAL 3 TIMES DAILY
Qty: 90 TABLET | Refills: 0 | Status: SHIPPED | OUTPATIENT
Start: 2023-03-28 | End: 2023-04-27

## 2023-01-20 RX ORDER — DEXTROAMPHETAMINE SACCHARATE, AMPHETAMINE ASPARTATE, DEXTROAMPHETAMINE SULFATE AND AMPHETAMINE SULFATE 5; 5; 5; 5 MG/1; MG/1; MG/1; MG/1
20 TABLET ORAL 3 TIMES DAILY
Qty: 90 TABLET | Refills: 0 | Status: SHIPPED | OUTPATIENT
Start: 2023-01-21 | End: 2023-02-20

## 2023-01-20 NOTE — PATIENT INSTRUCTIONS
Thank you for choosing us for your care. I have placed an order for a prescription so that you can start treatment.fill 1/21/23 I told pharmacy, then I send 3 months total.   View your full visit summary for details by clicking on the link below. Your pharmacist will able to address any questions you may have about the medication.     If you're not feeling better within 5-7 days, please schedule an appointment.  You can schedule an appointment right here in [facility], or call [phone]  If the visit is for the same symptoms as your eVisit, we'll refund the cost of your eVisit if seen within seven days.

## 2023-02-18 ENCOUNTER — HOSPITAL ENCOUNTER (OUTPATIENT)
Dept: MAMMOGRAPHY | Facility: CLINIC | Age: 38
Discharge: HOME | End: 2023-02-18
Attending: FAMILY MEDICINE | Admitting: FAMILY MEDICINE
Payer: COMMERCIAL

## 2023-02-18 DIAGNOSIS — Z12.31 VISIT FOR SCREENING MAMMOGRAM: ICD-10-CM

## 2023-02-18 PROCEDURE — 77067 SCR MAMMO BI INCL CAD: CPT

## 2023-02-22 ENCOUNTER — TELEPHONE (OUTPATIENT)
Dept: FAMILY MEDICINE | Facility: CLINIC | Age: 38
End: 2023-02-22

## 2023-02-22 DIAGNOSIS — F90.0 ATTENTION DEFICIT HYPERACTIVITY DISORDER (ADHD), PREDOMINANTLY INATTENTIVE TYPE: ICD-10-CM

## 2023-02-22 RX ORDER — DEXTROAMPHETAMINE SACCHARATE, AMPHETAMINE ASPARTATE, DEXTROAMPHETAMINE SULFATE AND AMPHETAMINE SULFATE 5; 5; 5; 5 MG/1; MG/1; MG/1; MG/1
20 TABLET ORAL 3 TIMES DAILY
Qty: 90 TABLET | Refills: 0 | Status: CANCELLED | OUTPATIENT
Start: 2023-02-27

## 2023-02-23 ENCOUNTER — MYC MEDICAL ADVICE (OUTPATIENT)
Dept: FAMILY MEDICINE | Facility: CLINIC | Age: 38
End: 2023-02-23
Payer: COMMERCIAL

## 2023-02-24 NOTE — TELEPHONE ENCOUNTER
Covering for PCP.  Message from 2 days ago just has been accessed today and today is her due date for her refill of Adderall. No further action is needed. Patient may obtain Rx from pharmacy today.

## 2023-03-09 NOTE — PROGRESS NOTES
I am seeing this patient in consultation for chronic maxillary sinusitis at the request of the provider Jesenia Tejada.    Chief Complaint - right parotid salivary gland swelling    History of Present Illness - Ángela Zurita is a 38 year old female with swelling of the right parotid gland, maybe the right submandibular too. It happens every month, lasts a week, then goes down. She thinks it correlates with her menstrual cycle. It has been present for 2 years. It doesn't bother her much, but sometimes is painful. It feels like sometimes when she eats a sour candy. Eating doesn't flair it. It doesn't happen on the left side. She has dry eyes. No dry mouth. The patient is a former smoker, quit a few months ago. The patient has tried nothing.      Past Medical History -   Patient Active Problem List   Diagnosis     Urgency-frequency syndrome     Fibroid uterus     Oral herpes     Attention deficit hyperactivity disorder (ADHD), predominantly inattentive type     Posterior neck pain     Hypertension in pregnancy       Current Medications -   Current Outpatient Medications:      acyclovir (ZOVIRAX) 400 MG tablet, TAKE 1 TABLET(400 MG) BY MOUTH THREE TIMES DAILY (Patient not taking: Reported on 7/8/2022), Disp: 15 tablet, Rfl: 5     amphetamine-dextroamphetamine (ADDERALL) 20 MG tablet, Take 1 tablet (20 mg) by mouth 3 times daily for 30 days, Disp: 90 tablet, Rfl: 0     [START ON 3/28/2023] amphetamine-dextroamphetamine (ADDERALL) 20 MG tablet, Take 1 tablet (20 mg) by mouth 3 times daily for 30 days, Disp: 90 tablet, Rfl: 0     buPROPion (ZYBAN) 150 MG 12 hr tablet, Take 1 tablet (150 mg) by mouth 2 times daily After your quit date treatment generally continues 7-12 weeks. Take your afternoon dose no later than 4pm, Disp: 60 tablet, Rfl: 2     SUMAtriptan (IMITREX) 50 MG tablet, Take 1 tablet (50 mg) by mouth at onset of headache for migraine May repeat in 2 hours. Max 4 tablets/24 hours., Disp: 9 tablet, Rfl:  4     valACYclovir (VALTREX) 1000 mg tablet, TAKE 2 TABLETS (2,000 MG) BY MOUTH 2 TIMES DAILY FOR 1 DAY, Disp: 4 tablet, Rfl: 1    Allergies -   Allergies   Allergen Reactions     Bee Pollen      In the past, has not had issues lately       Social History -   Social History     Socioeconomic History     Marital status: Patient Declined   Occupational History     Employer: [employer]     Comment: accounting   Tobacco Use     Smoking status: Some Days     Packs/day: 0.50     Years: 15.00     Pack years: 7.50     Types: Cigarettes     Start date: 12/1/2014     Smokeless tobacco: Never     Tobacco comments:     Quit with each pregnancy   Vaping Use     Vaping Use: Never used   Substance and Sexual Activity     Alcohol use: No     Alcohol/week: 0.0 standard drinks     Comment: occas-quit with pregnancy     Drug use: No     Sexual activity: Not Currently     Partners: Male     Birth control/protection: Abstinence, None   Other Topics Concern     Parent/sibling w/ CABG, MI or angioplasty before 65F 55M? No       Family History -   Family History   Problem Relation Age of Onset     Blood Disease Brother         twin brother-blood clots     Blood Disease Maternal Grandfather         blood clots     Heart Disease Maternal Grandfather         valve replacement     Hyperlipidemia Maternal Grandfather      Cerebrovascular Disease Maternal Grandfather      Prostate Cancer Maternal Grandfather      Other Cancer Maternal Grandfather         Gum/Jaw/Lymphnodes     Colon Cancer Maternal Grandfather      Mental Illness Maternal Grandfather         Alzheimer's     Respiratory Maternal Grandmother      Lung Cancer Maternal Grandmother      Diabetes Maternal Grandmother         type II     Hypertension Maternal Grandmother      Hyperlipidemia Maternal Grandmother      Depression Maternal Grandmother      Cancer Paternal Grandmother         non -hodgkins lymphoma     Lung Cancer Paternal Grandmother      Hypertension Paternal  "Grandmother      Hyperlipidemia Paternal Grandmother      Cerebrovascular Disease Paternal Grandmother      Diabetes Paternal Grandmother      Hypertension Father      Hyperlipidemia Father      Liver Disease Father      Hepatitis Father      Hyperlipidemia Paternal Grandfather      Prostate Cancer Paternal Grandfather      Hypertension Paternal Grandfather      Colon Cancer Paternal Grandfather      Coronary Artery Disease Paternal Grandfather      Breast Cancer Mother         Stage 0, small duct carcinoma     Breast Cancer Other         2 aunt and 3 of my great aunts     Breast Cancer Cousin         Breast Cancer     Other Cancer Cousin         1\" tumor in lung, breast cancer relapse     Review of Systems - As per HPI and PMHx, otherwise 7 system review of the head and neck is negative.    Physical Exam  Pulse 88   Resp 18   SpO2 98%   General - The patient is in no distress.  Alert and oriented x3, answers questions and cooperates with examination appropriately.   Voice and Breathing - The patient was breathing comfortably without the use of accessory muscles. There was no wheezing, stridor, or stertor.  The patients voice was clear and strong.  Eyes - Extraocular movements intact. Sclera were not icteric or injected, conjunctiva were pink and moist.  Neurologic - Cranial nerves II-XII are grossly intact. Specifically, the facial nerve is intact, House-Brackmann grade 1 of 6. Facial sensation is intact and symmetric.  Mouth - Examination of the oral cavity showed pink, healthy oral mucosa. No lesions or ulcerations noted.  The tongue was mobile and protrudes midline.  Oropharynx - The walls of the oropharynx were smooth, symmetric, and had no lesions or ulcerations. The uvula was midline and the palate raised symmetrically.   Neck - palpation of both parotid glands are soft without masses.  I do not see any firmness or swelling today.  I can milk both parotid glands and gets clear saliva.  Bilateral " submandibular glands are also normal.  No edema or masses.  No lymphadenopathy.          A/P - Ángela Zurita is a 38 year old female with a intermittent right parotid and submandibular gland swelling.  This seems random and does not correlate with eating.  When it occurs it will last for a few days or week and then resolved.  She thinks it possibly correlates with her menstrual cycle.  I do not feel any masses and she feels that it will completely resolve and that there is no persistent lump or swelling.  She has some dry eye and this could be intermittent sialadenitis from Sjogren's.  I offered lab testing but she deferred.  I recommend hot packs, massage, sialogogues, and hydration when this occurs and routine hydration to prevent it.  If it is persistent I recommend CT to rule out stone or mass.  We could also consider Sjogren's labs.      Brian Edouard MD  Otolaryngology  [facility]

## 2023-03-10 ENCOUNTER — OFFICE VISIT (OUTPATIENT)
Dept: OTOLARYNGOLOGY | Facility: CLINIC | Age: 38
End: 2023-03-10
Payer: COMMERCIAL

## 2023-03-10 VITALS — HEART RATE: 88 BPM | RESPIRATION RATE: 18 BRPM | OXYGEN SATURATION: 98 %

## 2023-03-10 DIAGNOSIS — R60.0 SWELLING OF RIGHT PAROTID GLAND: Primary | ICD-10-CM

## 2023-03-10 PROCEDURE — 99203 OFFICE O/P NEW LOW 30 MIN: CPT | Performed by: OTOLARYNGOLOGY

## 2023-03-10 NOTE — LETTER
3/10/2023         RE: Ángela Zurita  [address] [place] [id number]        Dear Colleague,    Thank you for referring your patient, Ángela Zurita, to the [facility]. Please see a copy of my visit note below.    I am seeing this patient in consultation for chronic maxillary sinusitis at the request of the provider Jesenia Tejada.    Chief Complaint - right parotid salivary gland swelling    History of Present Illness - Ángela Zurita is a 38 year old female with swelling of the right parotid gland, maybe the right submandibular too. It happens every month, lasts a week, then goes down. She thinks it correlates with her menstrual cycle. It has been present for 2 years. It doesn't bother her much, but sometimes is painful. It feels like sometimes when she eats a sour candy. Eating doesn't flair it. It doesn't happen on the left side. She has dry eyes. No dry mouth. The patient is a former smoker, quit a few months ago. The patient has tried nothing.      Past Medical History -   Patient Active Problem List   Diagnosis     Urgency-frequency syndrome     Fibroid uterus     Oral herpes     Attention deficit hyperactivity disorder (ADHD), predominantly inattentive type     Posterior neck pain     Hypertension in pregnancy       Current Medications -   Current Outpatient Medications:      acyclovir (ZOVIRAX) 400 MG tablet, TAKE 1 TABLET(400 MG) BY MOUTH THREE TIMES DAILY (Patient not taking: Reported on 7/8/2022), Disp: 15 tablet, Rfl: 5     amphetamine-dextroamphetamine (ADDERALL) 20 MG tablet, Take 1 tablet (20 mg) by mouth 3 times daily for 30 days, Disp: 90 tablet, Rfl: 0     [START ON 3/28/2023] amphetamine-dextroamphetamine (ADDERALL) 20 MG tablet, Take 1 tablet (20 mg) by mouth 3 times daily for 30 days, Disp: 90 tablet, Rfl: 0     buPROPion (ZYBAN) 150 MG 12 hr tablet, Take 1 tablet (150 mg) by mouth 2 times daily After your quit date treatment generally continues 7-12  weeks. Take your afternoon dose no later than 4pm, Disp: 60 tablet, Rfl: 2     SUMAtriptan (IMITREX) 50 MG tablet, Take 1 tablet (50 mg) by mouth at onset of headache for migraine May repeat in 2 hours. Max 4 tablets/24 hours., Disp: 9 tablet, Rfl: 4     valACYclovir (VALTREX) 1000 mg tablet, TAKE 2 TABLETS (2,000 MG) BY MOUTH 2 TIMES DAILY FOR 1 DAY, Disp: 4 tablet, Rfl: 1    Allergies -   Allergies   Allergen Reactions     Bee Pollen      In the past, has not had issues lately       Social History -   Social History     Socioeconomic History     Marital status: Patient Declined   Occupational History     Employer: [employer]     Comment: accounting   Tobacco Use     Smoking status: Some Days     Packs/day: 0.50     Years: 15.00     Pack years: 7.50     Types: Cigarettes     Start date: 12/1/2014     Smokeless tobacco: Never     Tobacco comments:     Quit with each pregnancy   Vaping Use     Vaping Use: Never used   Substance and Sexual Activity     Alcohol use: No     Alcohol/week: 0.0 standard drinks     Comment: occas-quit with pregnancy     Drug use: No     Sexual activity: Not Currently     Partners: Male     Birth control/protection: Abstinence, None   Other Topics Concern     Parent/sibling w/ CABG, MI or angioplasty before 65F 55M? No       Family History -   Family History   Problem Relation Age of Onset     Blood Disease Brother         twin brother-blood clots     Blood Disease Maternal Grandfather         blood clots     Heart Disease Maternal Grandfather         valve replacement     Hyperlipidemia Maternal Grandfather      Cerebrovascular Disease Maternal Grandfather      Prostate Cancer Maternal Grandfather      Other Cancer Maternal Grandfather         Gum/Jaw/Lymphnodes     Colon Cancer Maternal Grandfather      Mental Illness Maternal Grandfather         Alzheimer's     Respiratory Maternal Grandmother      Lung Cancer Maternal Grandmother      Diabetes Maternal Grandmother         type II  "    Hypertension Maternal Grandmother      Hyperlipidemia Maternal Grandmother      Depression Maternal Grandmother      Cancer Paternal Grandmother         non -hodgkins lymphoma     Lung Cancer Paternal Grandmother      Hypertension Paternal Grandmother      Hyperlipidemia Paternal Grandmother      Cerebrovascular Disease Paternal Grandmother      Diabetes Paternal Grandmother      Hypertension Father      Hyperlipidemia Father      Liver Disease Father      Hepatitis Father      Hyperlipidemia Paternal Grandfather      Prostate Cancer Paternal Grandfather      Hypertension Paternal Grandfather      Colon Cancer Paternal Grandfather      Coronary Artery Disease Paternal Grandfather      Breast Cancer Mother         Stage 0, small duct carcinoma     Breast Cancer Other         2 aunt and 3 of my great aunts     Breast Cancer Cousin         Breast Cancer     Other Cancer Cousin         1\" tumor in lung, breast cancer relapse     Review of Systems - As per HPI and PMHx, otherwise 7 system review of the head and neck is negative.    Physical Exam  Pulse 88   Resp 18   SpO2 98%   General - The patient is in no distress.  Alert and oriented x3, answers questions and cooperates with examination appropriately.   Voice and Breathing - The patient was breathing comfortably without the use of accessory muscles. There was no wheezing, stridor, or stertor.  The patients voice was clear and strong.  Eyes - Extraocular movements intact. Sclera were not icteric or injected, conjunctiva were pink and moist.  Neurologic - Cranial nerves II-XII are grossly intact. Specifically, the facial nerve is intact, House-Brackmann grade 1 of 6. Facial sensation is intact and symmetric.  Mouth - Examination of the oral cavity showed pink, healthy oral mucosa. No lesions or ulcerations noted.  The tongue was mobile and protrudes midline.  Oropharynx - The walls of the oropharynx were smooth, symmetric, and had no lesions or ulcerations. The " uvula was midline and the palate raised symmetrically.   Neck - palpation of both parotid glands are soft without masses.  I do not see any firmness or swelling today.  I can milk both parotid glands and gets clear saliva.  Bilateral submandibular glands are also normal.  No edema or masses.  No lymphadenopathy.          A/P - Ángela Zurita is a 38 year old female with a intermittent right parotid and submandibular gland swelling.  This seems random and does not correlate with eating.  When it occurs it will last for a few days or week and then resolved.  She thinks it possibly correlates with her menstrual cycle.  I do not feel any masses and she feels that it will completely resolve and that there is no persistent lump or swelling.  She has some dry eye and this could be intermittent sialadenitis from Sjogren's.  I offered lab testing but she deferred.  I recommend hot packs, massage, sialogogues, and hydration when this occurs and routine hydration to prevent it.  If it is persistent I recommend CT to rule out stone or mass.  We could also consider Sjogren's labs.      Brian Edouard MD  Otolaryngology  [facility]          Again, thank you for allowing me to participate in the care of your patient.        Sincerely,        Brian Edouard MD

## 2023-03-19 ENCOUNTER — HOSPITAL ENCOUNTER (EMERGENCY)
Facility: CLINIC | Age: 38
Discharge: HOME | End: 2023-03-19
Attending: PHYSICIAN ASSISTANT | Admitting: PHYSICIAN ASSISTANT
Payer: COMMERCIAL

## 2023-03-19 ENCOUNTER — NURSE TRIAGE (OUTPATIENT)
Dept: NURSING | Facility: CLINIC | Age: 38
End: 2023-03-19
Payer: COMMERCIAL

## 2023-03-19 VITALS
DIASTOLIC BLOOD PRESSURE: 87 MMHG | RESPIRATION RATE: 18 BRPM | OXYGEN SATURATION: 96 % | HEART RATE: 94 BPM | TEMPERATURE: 98.7 F | SYSTOLIC BLOOD PRESSURE: 143 MMHG

## 2023-03-19 DIAGNOSIS — B00.2 ORAL HERPES SIMPLEX INFECTION: ICD-10-CM

## 2023-03-19 DIAGNOSIS — J02.0 STREP THROAT: ICD-10-CM

## 2023-03-19 LAB — DEPRECATED S PYO AG THROAT QL EIA: POSITIVE

## 2023-03-19 PROCEDURE — 99214 OFFICE O/P EST MOD 30 MIN: CPT | Performed by: PHYSICIAN ASSISTANT

## 2023-03-19 PROCEDURE — 87880 STREP A ASSAY W/OPTIC: CPT | Performed by: PHYSICIAN ASSISTANT

## 2023-03-19 PROCEDURE — G0463 HOSPITAL OUTPT CLINIC VISIT: HCPCS | Performed by: PHYSICIAN ASSISTANT

## 2023-03-19 RX ORDER — CEFDINIR 300 MG/1
300 CAPSULE ORAL 2 TIMES DAILY
Qty: 20 CAPSULE | Refills: 0 | Status: SHIPPED | OUTPATIENT
Start: 2023-03-19 | End: 2023-03-22

## 2023-03-19 RX ORDER — VALACYCLOVIR HYDROCHLORIDE 1 G/1
2000 TABLET, FILM COATED ORAL 2 TIMES DAILY
Qty: 4 TABLET | Refills: 0 | Status: SHIPPED | OUTPATIENT
Start: 2023-03-19 | End: 2023-04-06

## 2023-03-19 ASSESSMENT — ENCOUNTER SYMPTOMS
HEADACHES: 1
MYALGIAS: 1
RHINORRHEA: 1
COUGH: 1
SINUS PRESSURE: 1
SINUS PAIN: 1
SORE THROAT: 1

## 2023-03-19 NOTE — TELEPHONE ENCOUNTER
Feel like right side of face is bad sinus infection. Started this week with sore throat. Last two days really bad. Lips have cold sores, like one giant cold sore and on chin there is a pus area. Feels like a migrane too. She will get in to urgent care today.  Nidia Harris RN  [facility]      Reason for Disposition    [1] SEVERE pain AND [2] not improved 2 hours after pain medicine    Additional Information    Negative: SEVERE difficulty breathing (e.g., struggling for each breath, speaks in single words)    Negative: Sounds like a life-threatening emergency to the triager    Negative: [1] Sinus infection AND [2] taking an antibiotic AND [3] symptoms continue    Negative: [1] Difficulty breathing AND [2] not from stuffy nose (e.g., not relieved by cleaning out the nose)    Negative: [1] SEVERE headache AND [2] fever     Headache at 9.  No fever.    Negative: [1] Redness or swelling on the cheek, forehead or around the eye AND [2] fever    Negative: Fever > 104 F (40 C)    Negative: Patient sounds very sick or weak to the triager    Protocols used: SINUS PAIN OR CONGESTION-A-AH

## 2023-03-19 NOTE — DISCHARGE INSTRUCTIONS
Use Medication as directed    Throw a toothbrush tomorrow night get a new 1.  You are contagious for 24 hours on antibiotics.    Symptomatic treatment with fluids, rest, salt water gargles, warm compresses over sinuses, Flonase nasal spray once daily to each nostril for the next 5 to 7 days then as needed, and cool humidifier.  May use acetaminophen, ibuprofen as needed.     Patient may return to work/school after 24 hours of antibiotic treatment and fever free for 24 hours.    Return to care if any worsening symptoms or if not improving (Stanly may need to be ruled out if symptoms fail to improve).    Patient to go to Emergency Room if drooling, change in voice, difficulty swallowing or talking, or persistent fevers occur.      Patient voiced understanding of instructions given.

## 2023-03-19 NOTE — ED PROVIDER NOTES
History     Chief Complaint   Patient presents with     Nasal Congestion     HPI  Ángela Zurita is a 38 year old female who presents today with 4 to 5 days of runny nose congestion, cough, sore throat, right facial pain and sinus pressure, cold sores, headache body aches.  Patient states she tried over-the-counter medication with no improvement of symptoms.  She denies any known exposures    Allergies:  Allergies   Allergen Reactions     Bee Pollen      In the past, has not had issues lately       Problem List:    Patient Active Problem List    Diagnosis Date Noted     Hypertension in pregnancy 03/22/2019     Priority: Medium     Posterior neck pain 09/23/2017     Priority: Medium     Attention deficit hyperactivity disorder (ADHD), predominantly inattentive type 06/10/2016     Priority: Medium     Patient is followed by WILMAR WHEAT for ongoing prescription of stimulants.  All refills should be approved by this provider, or covering partner.    Medication(s): Adderall 10mg immediate release   Maximum quantity per month: 150  Clinic visit frequency required: Q 3 month     Controlled substance agreement on file: Yes 6/30/16  Neuropsych evaluation for ADD completed:  Yes, completed 2/2012 at [facility], on file and diagnosis confirmed    Last [facility] website verification:  done on 6/19/2016   [email or web address]           Oral herpes 03/21/2016     Priority: Medium     Fibroid uterus 07/02/2014     Priority: Medium     Anterior intramural lower.  Right by bladder       Urgency-frequency syndrome 03/24/2014     Priority: Medium        Past Medical History:    Past Medical History:   Diagnosis Date     ADHD      Chickenpox      Exercise-induced asthma      Hypertension      IBS (irritable bowel syndrome)      Severe dysplasia of cervix (WASHINGTON III) 2005     Tonsillitis 02/13/2014       Past Surgical History:    Past Surgical History:   Procedure Laterality Date     ANUS SURGERY N/A 05/18/2018    Procedure:  SPHINCTEROPLASTY;  Surgeon: Jorge Dennis MD;  Location: [facility] OR;  Service:      BIOPSY  Various    Had a few Leeps and numerous Colposcopys     BIOPSY CERVICAL, LOCAL EXCISION, SINGLE/MULTIPLE        SECTION N/A 2019    Procedure:  SECTION;  Surgeon: Jesenia Jefferson MD;  Location: [facility]     COLONOSCOPY      Normal     DILATION AND CURETTAGE, HYSTEROSCOPY DIAGNOSTIC, COMBINED N/A 2018    Procedure: COMBINED DILATION AND CURETTAGE, HYSTEROSCOPY DIAGNOSTIC;  Diagnostic Hysteroscopy with Dilation and Curettage,Laparoscopy with Left Ovarian Cystectomy, Right Uteral Sacral Biopsy;  Surgeon: Sherin Frost MD;  Location: [place] OR     EXC SWEAT GLAND LESN AXILL,SIMPL Right      LAPAROSCOPY DIAGNOSTIC (GYN) N/A 2018    Procedure: LAPAROSCOPY DIAGNOSTIC (GYN);;  Surgeon: Sherin Frost MD;  Location: [place] OR     LEEP TX, CERVICAL  2006     MOUTH SURGERY      wisdom teeth     RECTOVAGINAL FISTULA CLOSURE  2015     SOFT TISSUE SURGERY  Various    infected sweat-gland- cyst removed arm,armpit,face     SPHINCTEROPLASTY RECTUM  2018    with pernineal rebuild     SURGICAL HISTORY OF -   2005    Tonsillectomy     SURGICAL HISTORY OF -       infected sweat gland under her arm     TONSILLECTOMY         Family History:    Family History   Problem Relation Age of Onset     Blood Disease Brother         twin brother-blood clots     Blood Disease Maternal Grandfather         blood clots     Heart Disease Maternal Grandfather         valve replacement     Hyperlipidemia Maternal Grandfather      Cerebrovascular Disease Maternal Grandfather      Prostate Cancer Maternal Grandfather      Other Cancer Maternal Grandfather         Gum/Jaw/Lymphnodes     Colon Cancer Maternal Grandfather      Mental Illness Maternal Grandfather         Alzheimer's     Respiratory Maternal Grandmother      Lung Cancer Maternal Grandmother      Diabetes  "Maternal Grandmother         type II     Hypertension Maternal Grandmother      Hyperlipidemia Maternal Grandmother      Depression Maternal Grandmother      Cancer Paternal Grandmother         non -hodgkins lymphoma     Lung Cancer Paternal Grandmother      Hypertension Paternal Grandmother      Hyperlipidemia Paternal Grandmother      Cerebrovascular Disease Paternal Grandmother      Diabetes Paternal Grandmother      Hypertension Father      Hyperlipidemia Father      Liver Disease Father      Hepatitis Father      Hyperlipidemia Paternal Grandfather      Prostate Cancer Paternal Grandfather      Hypertension Paternal Grandfather      Colon Cancer Paternal Grandfather      Coronary Artery Disease Paternal Grandfather      Breast Cancer Mother         Stage 0, small duct carcinoma     Breast Cancer Other         2 aunt and 3 of my great aunts     Breast Cancer Cousin         Breast Cancer     Other Cancer Cousin         1\" tumor in lung, breast cancer relapse       Social History:  Marital Status:  Patient Declined [9]  Social History     Tobacco Use     Smoking status: Some Days     Packs/day: 0.50     Years: 15.00     Pack years: 7.50     Types: Cigarettes     Start date: 12/1/2014     Smokeless tobacco: Never     Tobacco comments:     Quit with each pregnancy   Vaping Use     Vaping Use: Never used   Substance Use Topics     Alcohol use: No     Alcohol/week: 0.0 standard drinks     Comment: occas-quit with pregnancy     Drug use: No        Medications:    amphetamine-dextroamphetamine (ADDERALL) 20 MG tablet  [START ON 3/28/2023] amphetamine-dextroamphetamine (ADDERALL) 20 MG tablet  cefdinir (OMNICEF) 300 MG capsule  valACYclovir (VALTREX) 1000 mg tablet  acyclovir (ZOVIRAX) 400 MG tablet  buPROPion (ZYBAN) 150 MG 12 hr tablet  SUMAtriptan (IMITREX) 50 MG tablet  valACYclovir (VALTREX) 1000 mg tablet          Review of Systems   HENT: Positive for congestion, rhinorrhea, sinus pressure, sinus pain and sore " throat.    Respiratory: Positive for cough.    Musculoskeletal: Positive for myalgias.   Neurological: Positive for headaches.   All other systems reviewed and are negative.      Physical Exam   BP: (!) 143/87  Pulse: 94  Temp: 98.7  F (37.1  C)  Resp: 18  SpO2: 96 %      Physical Exam  Vitals and nursing note reviewed.   Constitutional:       General: She is not in acute distress.     Appearance: Normal appearance. She is normal weight. She is not ill-appearing or toxic-appearing.   HENT:      Right Ear: Tympanic membrane and ear canal normal.      Left Ear: Tympanic membrane and ear canal normal.      Nose: Congestion and rhinorrhea present.      Comments: Positive right-sided maxillary facial tenderness.     Mouth/Throat:      Mouth: Mucous membranes are moist.      Pharynx: Posterior oropharyngeal erythema present. No oropharyngeal exudate.   Eyes:      General: No scleral icterus.     Extraocular Movements: Extraocular movements intact.      Conjunctiva/sclera: Conjunctivae normal.      Pupils: Pupils are equal, round, and reactive to light.   Cardiovascular:      Rate and Rhythm: Normal rate and regular rhythm.      Heart sounds: Normal heart sounds.   Pulmonary:      Effort: Pulmonary effort is normal.      Breath sounds: Normal breath sounds.   Musculoskeletal:      Cervical back: Normal range of motion and neck supple. No rigidity or tenderness.   Lymphadenopathy:      Cervical: Cervical adenopathy present.   Skin:     General: Skin is warm.      Capillary Refill: Capillary refill takes less than 2 seconds.      Findings: No rash.   Neurological:      General: No focal deficit present.      Mental Status: She is alert and oriented to person, place, and time.   Psychiatric:         Mood and Affect: Mood normal.         Behavior: Behavior normal.         Thought Content: Thought content normal.         Judgment: Judgment normal.         ED Course                 Procedures             Critical Care time:   none               Results for orders placed or performed during the hospital encounter of 03/19/23 (from the past 24 hour(s))   Streptococcus A Rapid Scr w Reflx to PCR    Specimen: Throat; Swab   Result Value Ref Range    Group A Strep antigen Positive (A) Negative       Medications - No data to display    Assessments & Plan (with Medical Decision Making)     I have reviewed the nursing notes.    I have reviewed the findings, diagnosis, plan and need for follow up with the patient.    Ángela Zurita is a 38 year old female who presents today with 4 to 5 days of runny nose congestion, cough, sore throat, right facial pain and sinus pressure, cold sores, headache body aches.  Patient states she tried over-the-counter medication with no improvement of symptoms.  She denies any known exposures    Rapid strep today was positive.  No concerns for Alvin's angina or peritonsillar abscess.  Symptomatic treatments discussed and patient discharged in stable condition. Patient declined prescription for Augmentin stating that she is not sure if she did not feel well the last time she was on it or not but no known allergy noted on chart.  Patient states she is not 100% sure, but would like to do something other than Augmentin.  Prescription for cefdinir sent to the pharmacy instead.  Single dose of Valtrex also sent with patient for cold sores.    New Prescriptions    CEFDINIR (OMNICEF) 300 MG CAPSULE    Take 1 capsule (300 mg) by mouth 2 times daily for 10 days    VALACYCLOVIR (VALTREX) 1000 MG TABLET    Take 2 tablets (2,000 mg) by mouth 2 times daily for 1 day       Final diagnoses:   Oral herpes simplex infection - cold sores   Strep throat       3/19/2023   [facility] EMERGENCY DEPT     Vibha Eller PA-C  03/19/23 [id number]

## 2023-03-21 DIAGNOSIS — B00.1 HERPES LABIALIS: ICD-10-CM

## 2023-03-21 RX ORDER — VALACYCLOVIR HYDROCHLORIDE 1 G/1
TABLET, FILM COATED ORAL
Qty: 4 TABLET | Refills: 1 | Status: SHIPPED | OUTPATIENT
Start: 2023-03-21 | End: 2023-04-06

## 2023-03-24 ENCOUNTER — E-VISIT (OUTPATIENT)
Dept: URGENT CARE | Facility: CLINIC | Age: 38
End: 2023-03-24
Payer: COMMERCIAL

## 2023-03-24 DIAGNOSIS — R04.2 HEMOPTYSIS: Primary | ICD-10-CM

## 2023-03-24 PROCEDURE — 99207 PR NON-BILLABLE SERV PER CHARTING: CPT | Performed by: NURSE PRACTITIONER

## 2023-03-24 NOTE — PATIENT INSTRUCTIONS
Dear Ángela Zurita,    We are sorry you are not feeling well. Based on the responses you provided, it is recommended that you be seen in-person in urgent care so we can better evaluate your symptoms. Please click here to find the nearest urgent care location to you.   You will not be charged for this Visit. Thank you for trusting us with your care.    KATHLEEN Scherer CNP

## 2023-03-27 ENCOUNTER — NURSE TRIAGE (OUTPATIENT)
Dept: FAMILY MEDICINE | Facility: CLINIC | Age: 38
End: 2023-03-27
Payer: COMMERCIAL

## 2023-03-27 NOTE — TELEPHONE ENCOUNTER
Patient has strep. Has been on antibiotics for a week. She is now coughing a lot of phlegm. She was seeing blood in her phlegm on 3/25/23. She is not coughing up blood anymore. She states that it is painful to eat. See ER visit 3/19/23. Patient states that she is now having chest pain. She states that the pain is between her ribs and her stomach. She feels like she is having acid reflux. The pain is intermittent. It is worse at night. She states that she hasn't been able to eat or drink anything for 3-4 days due to the pain. Patient does have a productive cough of thick, yellowish phlegm. Denies difficulty breathing. She states that her chest feels tight. She states that she has had this symptom since 3/23/23. Occurs throughout the day. Lasts for several hours. She states that it is irritating. Hurts worse with eating, drinking, or laying down. Denies dizziness, lightheadedness, vision changes. No cocaine use, broken bones or travel in the last month. Rates pain 5/10 most of the time. Last night the pain increased and was 8/10. This lasted for a couple hours.     Per protocol, recommended that patient be seen in the ER/UC due to continuing pain that isn't like past heartburn. Patient verbalizes understanding. She will go into urgent care today.     Jen Saldana, LEÓNN, RN, PHN  Registered Nurse-Clinic Triage  [facility]  3/27/2023 at 8:53 AM      Reason for Disposition    Chest pain lasting longer than 5 minutes and occurred in last 3 days (72 hours) (Exception: feels exactly the same as previously diagnosed heartburn and has accompanying sour taste in mouth)    Additional Information    Negative: SEVERE difficulty breathing (e.g., struggling for each breath, speaks in single words)    Negative: Passed out (i.e., fainted, collapsed and was not responding)    Negative: Difficult to awaken or acting confused (e.g., disoriented, slurred speech)    Negative: Shock suspected (e.g.,  cold/pale/clammy skin, too weak to stand, low BP, rapid pulse)    Negative: Chest pain lasting longer than 5 minutes and ANY of the following:* Over 44 years old* Over 30 years old and at least one cardiac risk factor (e.g., diabetes mellitus, high blood pressure, high cholesterol, smoker, or strong family history of heart disease)* History of heart disease (i.e., angina, heart attack, heart failure, bypass surgery, takes nitroglycerin)* Pain is crushing, pressure-like, or heavy    Negative: Heart beating < 50 beats per minute OR > 140 beats per minute    Negative: Visible sweat on face or sweat dripping down face    Negative: Sounds like a life-threatening emergency to the triager    Negative: Followed an injury to chest    Negative: SEVERE chest pain    Negative: Pain also in shoulder(s) or arm(s) or jaw    Negative: Difficulty breathing    Negative: Cocaine use within last 3 days    Negative: Major surgery in the past month    Negative: Hip or leg fracture (broken bone) in past month (or had cast on leg or ankle in past month)    Negative: Illness requiring prolonged bedrest in past month (e.g., immobilization, long hospital stay)    Negative: Long-distance travel in past month (e.g., car, bus, train, plane; with trip lasting 6 or more hours)    Negative: History of prior 'blood clot' in leg or lungs (i.e., deep vein thrombosis, pulmonary embolism)    Negative: History of inherited increased risk of blood clots (e.g., Factor 5 Leiden, Anti-thrombin 3, Protein C or Protein S deficiency, Prothrombin mutation)    Negative: Cancer treatment in the past two months (or has cancer now)    Negative: Heart beating irregularly or very rapidly    Protocols used: CHEST PAIN-A-[place]

## 2023-04-04 ENCOUNTER — TRANSFERRED RECORDS (OUTPATIENT)
Dept: HEALTH INFORMATION MANAGEMENT | Facility: CLINIC | Age: 38
End: 2023-04-04
Payer: COMMERCIAL

## 2023-04-05 ENCOUNTER — HOSPITAL ENCOUNTER (OUTPATIENT)
Dept: CT IMAGING | Facility: CLINIC | Age: 38
Discharge: HOME | End: 2023-04-05
Attending: FAMILY MEDICINE | Admitting: FAMILY MEDICINE
Payer: COMMERCIAL

## 2023-04-05 ENCOUNTER — MYC MEDICAL ADVICE (OUTPATIENT)
Dept: FAMILY MEDICINE | Facility: CLINIC | Age: 38
End: 2023-04-05
Payer: COMMERCIAL

## 2023-04-05 DIAGNOSIS — K22.2 COMPRESSION OF ESOPHAGUS: Primary | ICD-10-CM

## 2023-04-05 DIAGNOSIS — Z80.41 FAMILY HISTORY OF MALIGNANT NEOPLASM OF OVARY: ICD-10-CM

## 2023-04-05 DIAGNOSIS — K22.2 COMPRESSION OF ESOPHAGUS: ICD-10-CM

## 2023-04-05 PROCEDURE — 250N000011 HC RX IP 250 OP 636: Performed by: FAMILY MEDICINE

## 2023-04-05 PROCEDURE — 71260 CT THORAX DX C+: CPT

## 2023-04-05 PROCEDURE — 250N000009 HC RX 250: Performed by: FAMILY MEDICINE

## 2023-04-05 RX ORDER — IOPAMIDOL 755 MG/ML
70 INJECTION, SOLUTION INTRAVASCULAR ONCE
Status: COMPLETED | OUTPATIENT
Start: 2023-04-05 | End: 2023-04-05

## 2023-04-05 RX ADMIN — IOPAMIDOL 70 ML: 755 INJECTION, SOLUTION INTRAVENOUS at 15:05

## 2023-04-05 RX ADMIN — SODIUM CHLORIDE 59 ML: 9 INJECTION, SOLUTION INTRAVENOUS at 15:06

## 2023-04-06 ENCOUNTER — TELEPHONE (OUTPATIENT)
Dept: FAMILY MEDICINE | Facility: CLINIC | Age: 38
End: 2023-04-06
Payer: COMMERCIAL

## 2023-04-06 ENCOUNTER — PATIENT OUTREACH (OUTPATIENT)
Dept: ONCOLOGY | Facility: CLINIC | Age: 38
End: 2023-04-06
Payer: COMMERCIAL

## 2023-04-06 DIAGNOSIS — R22.2 MASS, CHEST: Primary | ICD-10-CM

## 2023-04-06 DIAGNOSIS — R91.8 LUNG MASS: Primary | ICD-10-CM

## 2023-04-06 DIAGNOSIS — K22.2 COMPRESSION OF ESOPHAGUS: ICD-10-CM

## 2023-04-06 NOTE — PROGRESS NOTES
New Patient Oncology Nurse Navigator Note     Referring provider: Dr. Randy Graf    Referring Clinic/Organization: [facility]     Referred to: Thoracic Surgery    Requested provider (if applicable): First available - did not specify     Referral Received: 04/06/23       Evaluation for :   Diagnosis   R22.2 (ICD-10-CM) - Mass, chest   K22.2 (ICD-10-CM) - Compression of esophagus     Clinical History (per Nurse review of records provided):    04/05/2023 CT Chest w/ contrast (bookmarked) showed:   IMPRESSION:   1.  Cystic appearing subcarinal mass. Differential considerations  include a foregut duplication cyst (bronchogenic or esophageal cyst),  pericardial cyst, lymphangioma, or thoracic duct cyst among other  etiologies.  2.  3 mm right upper lobe pulmonary nodule.  3.  Hepatic steatosis.    Clinical Assessment / Barriers to Care (Per Nurse):    Tobacco History    Smoking Status   Some Days Smoking Start Date   12/1/2014 Smoking Frequency   0.50 packs/day for 15.00 years (7.50 pk-yrs) Smoking Tobacco Type   Cigarettes since 12/1/2014     Records Location: [facility]   Additional testing needed prior to consult: PFT  Referral updates and Plan:   Sending scheduling instructions to NPS to schedule with soonest available Thoracic Surgeon.     LOUIE Walton, LEÓNN, RN, LAc, OCN  [facility] Oncology Nurse Navigator  [phone] / [phone]

## 2023-04-06 NOTE — TELEPHONE ENCOUNTER
Called pt and left message with referral info and adv info is in result note.        Chavo Knight RN

## 2023-04-06 NOTE — TELEPHONE ENCOUNTER
Test Results        Who ordered the test:  Looking for treatment plan following ct scan, she know she has a cyst but no one is telling her treatment plan    Type of test: Lab and CT    Date of test:  04/5/    [place]     What are your questions/concerns?:  See above    Could we send this information to you in Tomorrow or would you prefer to receive a phone call?:   No preference   Okay to leave a detailed message?: Yes at Cell number on file:    Telephone Information:   Mobile [phone]

## 2023-04-07 ENCOUNTER — TELEPHONE (OUTPATIENT)
Dept: OTOLARYNGOLOGY | Facility: CLINIC | Age: 38
End: 2023-04-07
Payer: COMMERCIAL

## 2023-04-07 NOTE — TELEPHONE ENCOUNTER
[facility] Call Center    Phone Message    May a detailed message be left on voicemail: no     Reason for Call: Appointment Intake    Referring Provider Name: Randy Graf MD  Diagnosis and/or Symptoms: Mass, chest [R22.2]  Compression of esophagus [K22.2]    Note:subcarinal mass causing esophageal compression, see CT for differential (uncertainly if this is thoracic or ENT?)    Looking for directive on how to schedule this appt. Please advise. [place] residence.    Action Taken: Message routed to:  Other: [place] ENT    Travel Screening: Not Applicable

## 2023-04-07 NOTE — TELEPHONE ENCOUNTER
Action April 11, 2023 8:13 AM ABT   Action Taken Called and unable to speak to [facility] ZACKERY, LVM for records and sent records request back to [facility]    11:38 AM  Records from [facility] received and sent to [facility] for upload.     Imaging Received  April 10, 2023 9:01 AM ABT   Action: Images from [facility] received and resolved to PACS.     Action April 7, 2023 10:50 AM ABT   Action Taken Called and spoke with Whiteny with [facility] HIM, she confirms that 04/04/23 UES report will be faxed over and no other records with them.     RECORDS STATUS - ALL OTHER DIAGNOSIS      RECORDS RECEIVED FROM: [facility], [facility]   DATE RECEIVED:    NOTES STATUS DETAILS   OFFICE NOTE from referring provider Epic Dr. Randy Graf   DISCHARGE REPORT from the ER Epic, [facility] 03/27/23: [facility] ER    06/16/16, 12/17/14: [facility] ED   OPERATIVE REPORT Rec 04/11-[facility] 04/04/23: Upper GI Endoscopy   MEDICATION LIST [facility]    LABS     PATHOLOGY REPORTS     ANYTHING RELATED TO DIAGNOSIS [facility] Most recent 03/27/23   IMAGING (NEED IMAGES & REPORT)     CT SCANS PACS 04/05/23: CT Chest   XRAYS PACS 02/06/18-12/17/14: XR Chest    [facility]:  03/27/23, 07/11/13: XR Chest

## 2023-04-08 ENCOUNTER — HOSPITAL ENCOUNTER (OUTPATIENT)
Dept: CT IMAGING | Facility: CLINIC | Age: 38
Discharge: HOME | End: 2023-04-08
Attending: FAMILY MEDICINE | Admitting: FAMILY MEDICINE
Payer: COMMERCIAL

## 2023-04-08 DIAGNOSIS — K22.2 COMPRESSION OF ESOPHAGUS: ICD-10-CM

## 2023-04-08 PROCEDURE — 250N000009 HC RX 250: Performed by: FAMILY MEDICINE

## 2023-04-08 PROCEDURE — 250N000011 HC RX IP 250 OP 636: Performed by: FAMILY MEDICINE

## 2023-04-08 PROCEDURE — 70491 CT SOFT TISSUE NECK W/DYE: CPT

## 2023-04-08 RX ORDER — IOPAMIDOL 755 MG/ML
90 INJECTION, SOLUTION INTRAVASCULAR ONCE
Status: COMPLETED | OUTPATIENT
Start: 2023-04-08 | End: 2023-04-08

## 2023-04-08 RX ADMIN — IOPAMIDOL 90 ML: 755 INJECTION, SOLUTION INTRAVENOUS at 15:55

## 2023-04-08 RX ADMIN — SODIUM CHLORIDE 80 ML: 9 INJECTION, SOLUTION INTRAVENOUS at 15:56

## 2023-04-10 ENCOUNTER — HOSPITAL ENCOUNTER (OUTPATIENT)
Dept: ULTRASOUND IMAGING | Facility: CLINIC | Age: 38
Discharge: HOME | End: 2023-04-10
Attending: FAMILY MEDICINE | Admitting: FAMILY MEDICINE
Payer: COMMERCIAL

## 2023-04-10 ENCOUNTER — ALLIED HEALTH/NURSE VISIT (OUTPATIENT)
Dept: PULMONOLOGY | Facility: CLINIC | Age: 38
End: 2023-04-10
Payer: COMMERCIAL

## 2023-04-10 DIAGNOSIS — Z80.41 FAMILY HISTORY OF MALIGNANT NEOPLASM OF OVARY: ICD-10-CM

## 2023-04-10 DIAGNOSIS — R91.8 LUNG MASS: ICD-10-CM

## 2023-04-10 LAB
DLCOUNC-%PRED-PRE: 114 %
DLCOUNC-PRE: 26.38 ML/MIN/MMHG
DLCOUNC-PRED: 23.07 ML/MIN/MMHG
ERV-%PRED-PRE: 103 %
ERV-PRE: 1.13 L
ERV-PRED: 1.09 L
EXPTIME-PRE: 6.71 SEC
FEF2575-%PRED-PRE: 137 %
FEF2575-PRE: 4.34 L/SEC
FEF2575-PRED: 3.16 L/SEC
FEFMAX-%PRED-PRE: 129 %
FEFMAX-PRE: 9.32 L/SEC
FEFMAX-PRED: 7.22 L/SEC
FEV1-%PRED-PRE: 111 %
FEV1-PRE: 3.33 L
FEV1FEV6-PRE: 87 %
FEV1FEV6-PRED: 84 %
FEV1FVC-PRE: 87 %
FEV1FVC-PRED: 83 %
FEV1SVC-PRE: 84 %
FEV1SVC-PRED: 76 %
FIFMAX-PRE: 6.93 L/SEC
FRCPLETH-%PRED-PRE: 101 %
FRCPLETH-PRE: 2.8 L
FRCPLETH-PRED: 2.77 L
FVC-%PRED-PRE: 106 %
FVC-PRE: 3.84 L
FVC-PRED: 3.59 L
IC-%PRED-PRE: 100 %
IC-PRE: 2.82 L
IC-PRED: 2.82 L
RVPLETH-%PRED-PRE: 102 %
RVPLETH-PRE: 1.67 L
RVPLETH-PRED: 1.64 L
TLCPLETH-%PRED-PRE: 108 %
TLCPLETH-PRE: 5.62 L
TLCPLETH-PRED: 5.19 L
VA-%PRED-PRE: 96 %
VA-PRE: 5.08 L
VC-%PRED-PRE: 101 %
VC-PRE: 3.95 L
VC-PRED: 3.9 L

## 2023-04-10 PROCEDURE — 94375 RESPIRATORY FLOW VOLUME LOOP: CPT | Performed by: INTERNAL MEDICINE

## 2023-04-10 PROCEDURE — 94729 DIFFUSING CAPACITY: CPT | Performed by: INTERNAL MEDICINE

## 2023-04-10 PROCEDURE — 94727 GAS DIL/WSHOT DETER LNG VOL: CPT | Performed by: INTERNAL MEDICINE

## 2023-04-10 PROCEDURE — 76856 US EXAM PELVIC COMPLETE: CPT

## 2023-04-11 ENCOUNTER — PREP FOR PROCEDURE (OUTPATIENT)
Dept: SURGERY | Facility: CLINIC | Age: 38
End: 2023-04-11
Payer: COMMERCIAL

## 2023-04-11 ENCOUNTER — PRE VISIT (OUTPATIENT)
Dept: SURGERY | Facility: CLINIC | Age: 38
End: 2023-04-11
Payer: COMMERCIAL

## 2023-04-11 ENCOUNTER — ONCOLOGY VISIT (OUTPATIENT)
Dept: SURGERY | Facility: CLINIC | Age: 38
End: 2023-04-11
Attending: FAMILY MEDICINE
Payer: COMMERCIAL

## 2023-04-11 VITALS
DIASTOLIC BLOOD PRESSURE: 83 MMHG | HEART RATE: 80 BPM | OXYGEN SATURATION: 100 % | BODY MASS INDEX: 26.01 KG/M2 | SYSTOLIC BLOOD PRESSURE: 124 MMHG | TEMPERATURE: 98.7 F | WEIGHT: 158.7 LBS

## 2023-04-11 DIAGNOSIS — Q34.1 MEDIASTINAL CYST: Primary | ICD-10-CM

## 2023-04-11 DIAGNOSIS — Q34.1 MEDIASTINAL CYST: ICD-10-CM

## 2023-04-11 PROCEDURE — G0463 HOSPITAL OUTPT CLINIC VISIT: HCPCS | Performed by: THORACIC SURGERY (CARDIOTHORACIC VASCULAR SURGERY)

## 2023-04-11 PROCEDURE — 99204 OFFICE O/P NEW MOD 45 MIN: CPT | Performed by: THORACIC SURGERY (CARDIOTHORACIC VASCULAR SURGERY)

## 2023-04-11 RX ORDER — HEPARIN SODIUM 5000 [USP'U]/.5ML
5000 INJECTION, SOLUTION INTRAVENOUS; SUBCUTANEOUS
Status: CANCELLED | OUTPATIENT
Start: 2023-04-11

## 2023-04-11 RX ORDER — CEFAZOLIN SODIUM 2 G/50ML
2 SOLUTION INTRAVENOUS SEE ADMIN INSTRUCTIONS
Status: CANCELLED | OUTPATIENT
Start: 2023-04-11

## 2023-04-11 RX ORDER — CEFAZOLIN SODIUM 2 G/50ML
2 SOLUTION INTRAVENOUS
Status: CANCELLED | OUTPATIENT
Start: 2023-04-11

## 2023-04-11 ASSESSMENT — PAIN SCALES - GENERAL: PAINLEVEL: NO PAIN (0)

## 2023-04-11 NOTE — LETTER
4/11/2023         RE: Ángela Zurita  [address] [place] [id number]        Dear Colleague,    Thank you for referring your patient, Ángela Zurita, to the [facility]. Please see a copy of my visit note below.    THORACIC SURGERY - NEW PATIENT OFFICE VISIT        I saw Ángela Zurita at Dr. Graf s request in consultation for the evaluation and treatment of a subcarinal cyst.    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 About one month ago, she felt ill and was found to have strep throat and was placed on antibiotics. As she was recovering, she had pain with swallowing. She then had an endoscopy and was found to have external compression. She now has dysphagia for the past weeks to solid food and is just taking in liquids and protein shakes. She has not lost weight. Lying flat causes shortness of breath quickly. She has no weight loss.       ECOG performance status  0- Fully active, without restriction                                   Previsit Tests:    CT (4/5/2023): 3.3 x 4.1 x 4.0 cm subcarinal cystic pulmonary Mass, with one enlarge lymph node, with no pleural effusion         1.  Cystic appearing subcarinal mass    PFTS (4/10/2023):  FEV1 3.33 L, 111%; DLCO 114%      ECHO: No results found.     Covid vaccination status: Vaccinated x1  COVID- last year, no complications   Strep a few times (last bout sick for 2 weeks)       PMH    Past Medical History:   Diagnosis Date    ADHD     Chickenpox     Exercise-induced asthma     adolescent    Hypertension     IBS (irritable bowel syndrome)     Severe dysplasia of cervix (WASHINGTON III) 2005    s/p LEEP; see problem list    Tonsillitis 02/13/2014        PSH    Past Surgical History:   Procedure Laterality Date    ANUS SURGERY N/A 05/18/2018    Procedure: SPHINCTEROPLASTY;  Surgeon: Jorge Dennis MD;  [place] [facility] [place];  Service:     BIOPSY  Various    Had a  few Leeps and numerous Colposcopys    BIOPSY CERVICAL, LOCAL EXCISION, SINGLE/MULTIPLE       SECTION N/A 2019    Procedure:  SECTION;  Surgeon: Jesenia Jefferson MD;  Location: [facility]    COLONOSCOPY      Normal    DILATION AND CURETTAGE, HYSTEROSCOPY DIAGNOSTIC, COMBINED N/A 2018    Procedure: COMBINED DILATION AND CURETTAGE, HYSTEROSCOPY DIAGNOSTIC;  Diagnostic Hysteroscopy with Dilation and Curettage,Laparoscopy with Left Ovarian Cystectomy, Right Uteral Sacral Biopsy;  Surgeon: Sherin Frost MD;  Location: [place] OR    EXC SWEAT GLAND LESN AXILL,SIMPL Right     LAPAROSCOPY DIAGNOSTIC (GYN) N/A 2018    Procedure: LAPAROSCOPY DIAGNOSTIC (GYN);;  Surgeon: Sherin Frost MD;  Location: [place] OR    LEEP TX, CERVICAL      MOUTH SURGERY      wisdom teeth    RECTOVAGINAL FISTULA CLOSURE  2015    SOFT TISSUE SURGERY  Various    infected sweat-gland- cyst removed arm,armpit,face    SPHINCTEROPLASTY RECTUM  2018    with pernineal rebuild    SURGICAL HISTORY OF -   2005    Tonsillectomy    SURGICAL HISTORY OF -       infected sweat gland under her arm    TONSILLECTOMY          Meds:    Current Outpatient Medications   Medication    amphetamine-dextroamphetamine (ADDERALL) 20 MG tablet    SUMAtriptan (IMITREX) 50 MG tablet     No current facility-administered medications for this visit.       Allergies       Allergies   Allergen Reactions    Bee Pollen      In the past, has not had issues lately         ETOH: Occasionally, quit with each pregnancy  TOBACCO: Started in  and stopped in 2022, ; 1 pack per day (20 pack years) quit for each pregnancy,   OTHER DRUGS: No      Physical examination  Vitals:  /83   Pulse 80   Temp 98.7  F (37.1  C) (Oral)   Wt 72 kg (158 lb 11.2 oz)   SpO2 100%   BMI 26.01 kg/m     Physical Exam  Constitutional:       Appearance: Normal appearance. She is normal weight.   Eyes:       Conjunctiva/sclera: Conjunctivae normal.   Cardiovascular:      Rate and Rhythm: Normal rate.   Pulmonary:      Effort: Pulmonary effort is normal.   Skin:     General: Skin is warm and dry.   Neurological:      Mental Status: She is alert and oriented to person, place, and time.   Psychiatric:         Mood and Affect: Mood normal.         Behavior: Behavior normal.         Thought Content: Thought content normal.         Judgment: Judgment normal.         From a personal perspective, she works for [employer]. She is a . She is on a computer all day. She lives with her 2 kids (ages 4 and 8).She has family in the area.    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 She is a good surgical candidate.      PLAN  I spent 45 min on the date of the encounter in chart review, patient visit, review of tests, documentation and/or discussion with other providers about the issues documented above. I reviewed the plan as follows:    Procedure planned: Robot-assisted right thoracoscopic resection of mediastinal cyst    I discussed the risks and benefits of the operation, including obtaining a diagnosis and resecting the mass. The risks include bleeding, infection,prolonged air leak, chylothorax, deep venous thrombosis and pulmonary embolism, and death.  There is also a risk of prolonged pain, which could require further treatment. Postoperative bleeding (rare) could require a return to the OR.     Necessary Preop Tests & Appointments: Preoperative assessment clinic    Regional Anesthesia Plan: Intercostal block administered by surgeon     Anticoagulation Plan: Lovenox      All questions were answered and Ángela Zurita and present family were in agreement with the plan.    I appreciate the opportunity to participate in the care of your patient and will keep you updated.      Sincerely,      Pa Moore MD

## 2023-04-11 NOTE — NURSING NOTE
"Oncology Rooming Note    April 11, 2023 2:26 PM   Ángela Zurita is a 38 year old female who presents for:    Chief Complaint   Patient presents with     Oncology Clinic Visit     NEW EVAL: Abt Mass     Initial Vitals: /83   Pulse 80   Temp 98.7  F (37.1  C) (Oral)   Wt 72 kg (158 lb 11.2 oz)   SpO2 100%   BMI 26.01 kg/m   Estimated body mass index is 26.01 kg/m  as calculated from the following:    Height as of 7/8/22: 1.664 m (5' 5.5\").    Weight as of this encounter: 72 kg (158 lb 11.2 oz). Body surface area is 1.82 meters squared.  No Pain (0) Comment: Data Unavailable   No LMP recorded.  Allergies reviewed: Yes  Medications reviewed: Yes    Medications: Medication refills not needed today.  Pharmacy name entered into EPIC:    [facility] [place] [place] - [address] [id number] - [place], [place] - [address] [facility]  [id number] - [place], [place] - [address] [id number] - [place] - [address]  [id number] - [place], [place] - [address] AT [address]  [id number] - [place], [place] - [address]    Clinical concerns: none    Nery Artis              "

## 2023-04-11 NOTE — TELEPHONE ENCOUNTER
Patient had Mycharted her PCP on 4/5/23 with concerns about swallowing. She states that she had an EGD completed 4/4/23 with [facility] and it was recommended that she reach out to her PCP for an order for a CT. CT was completed 4/5/23 found a cystic mass that is causing compression of her esophagus. Routine referral was placed by Randy Graf MD 4/6/23.     Patient is established with Dr. Edouard. Last office visit was 3/10/23. Please assist patient in scheduling follow-up appointment.     Rekha Quarles RN   [facility]

## 2023-04-11 NOTE — PROGRESS NOTES
THORACIC SURGERY - NEW PATIENT OFFICE VISIT        I saw Ángela Zurita at Dr. Graf s request in consultation for the evaluation and treatment of a subcarinal cyst.    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 About one month ago, she felt ill and was found to have strep throat and was placed on antibiotics. As she was recovering, she had pain with swallowing. She then had an endoscopy and was found to have external compression. She now has dysphagia for the past weeks to solid food and is just taking in liquids and protein shakes. She has not lost weight. Lying flat causes shortness of breath quickly. She has no weight loss.       ECOG performance status  0- Fully active, without restriction                                   Previsit Tests:    CT (2023): 3.3 x 4.1 x 4.0 cm subcarinal cystic pulmonary Mass, with one enlarge lymph node, with no pleural effusion         1.  Cystic appearing subcarinal mass    PFTS (4/10/2023):  FEV1 3.33 L, 111%; DLCO 114%      ECHO: No results found.     Covid vaccination status: Vaccinated x1  COVID- last year, no complications   Strep a few times (last bout sick for 2 weeks)       PMH    Past Medical History:   Diagnosis Date     ADHD      Chickenpox      Exercise-induced asthma     adolescent     Hypertension      IBS (irritable bowel syndrome)      Severe dysplasia of cervix ([place] III)     s/p LEEP; see problem list     Tonsillitis 2014        PSH    Past Surgical History:   Procedure Laterality Date     ANUS SURGERY N/A 2018    Procedure: SPHINCTEROPLASTY;  Surgeon: Jorge Dennis MD;  Location: [facility] OR;  Service:      BIOPSY  Various    Had a few Leeps and numerous Colposcopys     BIOPSY CERVICAL, LOCAL EXCISION, SINGLE/MULTIPLE        SECTION N/A 2019    Procedure:  SECTION;  Surgeon: Jesenia Jefferson MD;  Location:  L+D     COLONOSCOPY      Normal      DILATION AND CURETTAGE, HYSTEROSCOPY DIAGNOSTIC, COMBINED N/A 04/03/2018    Procedure: COMBINED DILATION AND CURETTAGE, HYSTEROSCOPY DIAGNOSTIC;  Diagnostic Hysteroscopy with Dilation and Curettage,Laparoscopy with Left Ovarian Cystectomy, Right Uteral Sacral Biopsy;  Surgeon: Sherin Frost MD;  Location: [facility] OR     EXC SWEAT GLAND LESN AXILL,SIMPL Right 2009     LAPAROSCOPY DIAGNOSTIC (GYN) N/A 04/03/2018    Procedure: LAPAROSCOPY DIAGNOSTIC (GYN);;  Surgeon: Sherin Frost MD;  Location: [facility] OR     LEEP TX, CERVICAL  2006     MOUTH SURGERY      wisdom teeth     RECTOVAGINAL FISTULA CLOSURE  01/26/2015     SOFT TISSUE SURGERY  Various    infected sweat-gland- cyst removed arm,armpit,face     SPHINCTEROPLASTY RECTUM  05/2018    with pernineal rebuild     SURGICAL HISTORY OF -   03/2005    Tonsillectomy     SURGICAL HISTORY OF -       infected sweat gland under her arm     TONSILLECTOMY          Meds:    Current Outpatient Medications   Medication     amphetamine-dextroamphetamine (ADDERALL) 20 MG tablet     SUMAtriptan (IMITREX) 50 MG tablet     No current facility-administered medications for this visit.       Allergies       Allergies   Allergen Reactions     Bee Pollen      In the past, has not had issues lately         ETOH: Occasionally, quit with each pregnancy  TOBACCO: Started in 2000 and stopped in December 2022, ; 1 pack per day (20 pack years) quit for each pregnancy,   OTHER DRUGS: No      Physical examination  Vitals:  /83   Pulse 80   Temp 98.7  F (37.1  C) (Oral)   Wt 72 kg (158 lb 11.2 oz)   SpO2 100%   BMI 26.01 kg/m     Physical Exam  Constitutional:       Appearance: Normal appearance. She is normal weight.   Eyes:      Conjunctiva/sclera: Conjunctivae normal.   Cardiovascular:      Rate and Rhythm: Normal rate.   Pulmonary:      Effort: Pulmonary effort is normal.   Skin:     General: Skin is warm and dry.   Neurological:      Mental Status: She is  alert and oriented to person, place, and time.   Psychiatric:         Mood and Affect: Mood normal.         Behavior: Behavior normal.         Thought Content: Thought content normal.         Judgment: Judgment normal.         From a personal perspective, she works for [employer]. She is a . She is on a computer all day. She lives with her 2 kids (ages 4 and 8).She has family in the area.    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 She is a good surgical candidate.      PLAN  I spent 45 min on the date of the encounter in chart review, patient visit, review of tests, documentation and/or discussion with other providers about the issues documented above. I reviewed the plan as follows:    Procedure planned: Robot-assisted right thoracoscopic resection of mediastinal cyst    I discussed the risks and benefits of the operation, including obtaining a diagnosis and resecting the mass. The risks include bleeding, infection,prolonged air leak, chylothorax, deep venous thrombosis and pulmonary embolism, and death.  There is also a risk of prolonged pain, which could require further treatment. Postoperative bleeding (rare) could require a return to the OR.     Necessary Preop Tests & Appointments: Preoperative assessment clinic    Regional Anesthesia Plan: Intercostal block administered by surgeon     Anticoagulation Plan: Lovenox      All questions were answered and Ángela Zurita and present family were in agreement with the plan.    I appreciate the opportunity to participate in the care of your patient and will keep you updated.      Sincerely,      Pa Moore MD

## 2023-04-12 NOTE — TELEPHONE ENCOUNTER
Brian Edouard MD  You 1 hour ago (8:57 AM)     JT  Thoracic surgery and head and neck cancer ENT at the [facility]. That's not one of our [facility]s. Too complex. Wally or Nelda at the [facility].       RN called and spoke to patient, reports she has already has been seen and surgery is scheduled.     Christianne YOUNG, Specialty RN 4/12/2023 10:09 AM

## 2023-04-13 ENCOUNTER — TELEPHONE (OUTPATIENT)
Dept: ONCOLOGY | Facility: CLINIC | Age: 38
End: 2023-04-13
Payer: COMMERCIAL

## 2023-04-13 NOTE — TELEPHONE ENCOUNTER
Spoke with patient to schedule procedure with Dr. Moore   Procedure was scheduled on 4/26/23 at  [facility]  Patient will have H&P with PAC     Patient is aware a COVID-19 test is needed before their procedure.   (Patient is aware IP/Thoracic are still requiring COVID-19 test)     Patient will complete a PCR COVID-19 test due to inpatient status, patient will schedule at:     Patient is aware a / is needed day of surgery.   Surgery Letter was sent via [place],     Patient has my direct contact information for any further questions.     Appointments in Next Year    Apr 19, 2023  7:15 AM  (Arrive by 7:00 AM)  PAC EVALUATION with Mercy Weiss PA-C  [facility] [place] ([facility] ) [phone]

## 2023-04-14 NOTE — TELEPHONE ENCOUNTER
FUTURE VISIT INFORMATION      SURGERY INFORMATION:    Date: 4/26/23    Location:  OR    Surgeon:  Pa Moore MD    Anesthesia Type:  general    Procedure: Robot-assisted right thoracoscopic resection of mediastinal mass    Consult: ov 4/11    RECORDS REQUESTED FROM:       Primary Care Provider: Randy Graf MD- [facility]    Pertinent Medical History: hypertension    Most recent EKG+ Tracing: 3/27/23- [facility]    Most recent PFT's: 4/10/23

## 2023-04-16 ENCOUNTER — E-VISIT (OUTPATIENT)
Dept: FAMILY MEDICINE | Facility: CLINIC | Age: 38
End: 2023-04-16
Payer: COMMERCIAL

## 2023-04-16 DIAGNOSIS — F90.0 ATTENTION DEFICIT HYPERACTIVITY DISORDER (ADHD), PREDOMINANTLY INATTENTIVE TYPE: Primary | ICD-10-CM

## 2023-04-16 PROCEDURE — 99421 OL DIG E/M SVC 5-10 MIN: CPT | Performed by: FAMILY MEDICINE

## 2023-04-17 ENCOUNTER — TELEPHONE (OUTPATIENT)
Dept: FAMILY MEDICINE | Facility: CLINIC | Age: 38
End: 2023-04-17
Payer: COMMERCIAL

## 2023-04-17 ENCOUNTER — MYC MEDICAL ADVICE (OUTPATIENT)
Dept: FAMILY MEDICINE | Facility: CLINIC | Age: 38
End: 2023-04-17
Payer: COMMERCIAL

## 2023-04-17 RX ORDER — DEXTROAMPHETAMINE SACCHARATE, AMPHETAMINE ASPARTATE, DEXTROAMPHETAMINE SULFATE AND AMPHETAMINE SULFATE 5; 5; 5; 5 MG/1; MG/1; MG/1; MG/1
20 TABLET ORAL 3 TIMES DAILY
Qty: 90 TABLET | Refills: 0 | Status: SHIPPED | OUTPATIENT
Start: 2023-05-23 | End: 2023-06-22

## 2023-04-17 RX ORDER — DEXTROAMPHETAMINE SACCHARATE, AMPHETAMINE ASPARTATE, DEXTROAMPHETAMINE SULFATE AND AMPHETAMINE SULFATE 5; 5; 5; 5 MG/1; MG/1; MG/1; MG/1
20 TABLET ORAL 3 TIMES DAILY
Qty: 90 TABLET | Refills: 0 | Status: SHIPPED | OUTPATIENT
Start: 2023-04-23 | End: 2023-05-23

## 2023-04-17 RX ORDER — DEXTROAMPHETAMINE SACCHARATE, AMPHETAMINE ASPARTATE, DEXTROAMPHETAMINE SULFATE AND AMPHETAMINE SULFATE 5; 5; 5; 5 MG/1; MG/1; MG/1; MG/1
20 TABLET ORAL 3 TIMES DAILY
Qty: 90 TABLET | Refills: 0 | Status: SHIPPED | OUTPATIENT
Start: 2023-06-22 | End: 2023-07-13

## 2023-04-17 NOTE — TELEPHONE ENCOUNTER
Dr. Graf,    Patient is leaving town and needs to  her adderall today.  Please advise. Maira CHAVEZ RN

## 2023-04-17 NOTE — TELEPHONE ENCOUNTER
Dr Graf  [facility]  Called pharmacy. Even though med states earliest fill date 4/17 their policy shows it can be filled 4/22 so they would need a verbal ok if you are approving early refill today for out of town trip      Chavo Knight RN

## 2023-04-17 NOTE — PATIENT INSTRUCTIONS
Thank you for choosing us for your care. I have placed an order for a prescription so that you can continue treatment for the next 3 months.     Usually I tell people not to take their medication the morning of surgery because you don't really need it that day.  Then it depends on recovery.  So if you are resting and recovering and not doing much then you likely don't need to take the medication, but if you are up and about and getting things done around the house then you would want to take it.    View your full visit summary for details by clicking on the link below. Your pharmacist will able to address any questions you may have about the medication.     If you're not feeling better within 5-7 days, please schedule an appointment.  You can schedule an appointment right here in [place], or call [phone]  If the visit is for the same symptoms as your eVisit, we'll refund the cost of your eVisit if seen within seven days.

## 2023-04-17 NOTE — TELEPHONE ENCOUNTER
I sent it today to say to fill 4/17.  I didn't specify that she needs to pick it up today to the pharmacy.  Will you let the pharmacy know please to fill today.  Thank you,  Randy Graf MD

## 2023-04-18 LAB
ABO/RH(D): NORMAL
ANTIBODY SCREEN: NEGATIVE
SPECIMEN EXPIRATION DATE: NORMAL

## 2023-04-19 ENCOUNTER — PRE VISIT (OUTPATIENT)
Dept: SURGERY | Facility: CLINIC | Age: 38
End: 2023-04-19

## 2023-04-19 ENCOUNTER — ANESTHESIA EVENT (OUTPATIENT)
Dept: SURGERY | Facility: CLINIC | Age: 38
DRG: 358 | End: 2023-04-19
Payer: COMMERCIAL

## 2023-04-19 ENCOUNTER — LAB (OUTPATIENT)
Dept: LAB | Facility: CLINIC | Age: 38
End: 2023-04-19
Payer: COMMERCIAL

## 2023-04-19 ENCOUNTER — OFFICE VISIT (OUTPATIENT)
Dept: SURGERY | Facility: CLINIC | Age: 38
End: 2023-04-19
Payer: COMMERCIAL

## 2023-04-19 VITALS
BODY MASS INDEX: 25.7 KG/M2 | TEMPERATURE: 98.2 F | HEIGHT: 66 IN | HEART RATE: 99 BPM | WEIGHT: 159.9 LBS | SYSTOLIC BLOOD PRESSURE: 139 MMHG | DIASTOLIC BLOOD PRESSURE: 80 MMHG | OXYGEN SATURATION: 100 % | RESPIRATION RATE: 16 BRPM

## 2023-04-19 DIAGNOSIS — Q34.1 MEDIASTINAL CYST: ICD-10-CM

## 2023-04-19 DIAGNOSIS — Z01.818 PRE-OP EXAMINATION: ICD-10-CM

## 2023-04-19 DIAGNOSIS — Z01.818 PRE-OP EXAMINATION: Primary | ICD-10-CM

## 2023-04-19 LAB
ANION GAP SERPL CALCULATED.3IONS-SCNC: 9 MMOL/L (ref 7–15)
BUN SERPL-MCNC: 12.2 MG/DL (ref 6–20)
CALCIUM SERPL-MCNC: 9.5 MG/DL (ref 8.6–10)
CHLORIDE SERPL-SCNC: 103 MMOL/L (ref 98–107)
CREAT SERPL-MCNC: 0.8 MG/DL (ref 0.51–0.95)
DEPRECATED HCO3 PLAS-SCNC: 25 MMOL/L (ref 22–29)
ERYTHROCYTE [DISTWIDTH] IN BLOOD BY AUTOMATED COUNT: 12.3 % (ref 10–15)
GFR SERPL CREATININE-BSD FRML MDRD: >90 ML/MIN/1.73M2
GLUCOSE SERPL-MCNC: 109 MG/DL (ref 70–99)
HCT VFR BLD AUTO: 39.9 % (ref 35–47)
HGB BLD-MCNC: 13.9 G/DL (ref 11.7–15.7)
HOLD SPECIMEN: NORMAL
MCH RBC QN AUTO: 30.6 PG (ref 26.5–33)
MCHC RBC AUTO-ENTMCNC: 34.8 G/DL (ref 31.5–36.5)
MCV RBC AUTO: 88 FL (ref 78–100)
PLATELET # BLD AUTO: 377 10E3/UL (ref 150–450)
POTASSIUM SERPL-SCNC: 4 MMOL/L (ref 3.4–5.3)
RBC # BLD AUTO: 4.54 10E6/UL (ref 3.8–5.2)
SODIUM SERPL-SCNC: 137 MMOL/L (ref 136–145)
WBC # BLD AUTO: 9.1 10E3/UL (ref 4–11)

## 2023-04-19 PROCEDURE — 86901 BLOOD TYPING SEROLOGIC RH(D): CPT | Mod: 90 | Performed by: PATHOLOGY

## 2023-04-19 PROCEDURE — 86900 BLOOD TYPING SEROLOGIC ABO: CPT | Mod: 90 | Performed by: PATHOLOGY

## 2023-04-19 PROCEDURE — 99000 SPECIMEN HANDLING OFFICE-LAB: CPT | Performed by: PATHOLOGY

## 2023-04-19 PROCEDURE — 85027 COMPLETE CBC AUTOMATED: CPT | Performed by: PATHOLOGY

## 2023-04-19 PROCEDURE — 36415 COLL VENOUS BLD VENIPUNCTURE: CPT | Performed by: PATHOLOGY

## 2023-04-19 PROCEDURE — 99203 OFFICE O/P NEW LOW 30 MIN: CPT | Performed by: PHYSICIAN ASSISTANT

## 2023-04-19 PROCEDURE — 80048 BASIC METABOLIC PNL TOTAL CA: CPT | Performed by: PATHOLOGY

## 2023-04-19 PROCEDURE — 86850 RBC ANTIBODY SCREEN: CPT | Mod: 90 | Performed by: PATHOLOGY

## 2023-04-19 RX ORDER — GABAPENTIN 300 MG/1
300 CAPSULE ORAL
Status: CANCELLED | OUTPATIENT
Start: 2023-04-19

## 2023-04-19 RX ORDER — CELECOXIB 200 MG/1
200 CAPSULE ORAL ONCE
Status: CANCELLED | OUTPATIENT
Start: 2023-04-19 | End: 2023-04-19

## 2023-04-19 RX ORDER — CHLORHEXIDINE GLUCONATE ORAL RINSE 1.2 MG/ML
15 SOLUTION DENTAL ONCE
Status: CANCELLED | OUTPATIENT
Start: 2023-04-19 | End: 2023-04-19

## 2023-04-19 RX ORDER — ACETAMINOPHEN 325 MG/1
975 TABLET ORAL ONCE
Status: CANCELLED | OUTPATIENT
Start: 2023-04-19 | End: 2023-04-19

## 2023-04-19 ASSESSMENT — ENCOUNTER SYMPTOMS: SEIZURES: 0

## 2023-04-19 ASSESSMENT — PAIN SCALES - GENERAL: PAINLEVEL: NO PAIN (0)

## 2023-04-19 ASSESSMENT — LIFESTYLE VARIABLES: TOBACCO_USE: 1

## 2023-04-19 NOTE — H&P (VIEW-ONLY)
Pre-Operative H & P     CC:  Preoperative exam to assess for increased cardiopulmonary risk while undergoing surgery and anesthesia.    Date of Encounter: 4/19/2023  Primary Care Physician:  Randy Graf     Reason for visit:   Encounter Diagnoses   Name Primary?     Pre-op examination Yes     Mediastinal cyst        HPI  Ángela Zurita is a 38 year old female who presents for pre-operative H & P in preparation for  Procedure Information     Case: [id number] Date/Time: 04/26/23 1245    Procedure: Robot-assisted right thoracoscopic resection of mediastinal mass (Right: Chest)    Anesthesia type: General    Diagnosis: Mediastinal cyst [Q34.1]    Pre-op diagnosis: Mediastinal cyst [Q34.1]    Location:  OR [facility] /  OR    Providers: Pa Moore MD          The patient is a 38-year-old woman with past medical history significant for hypertension, tobacco use, exercise-induced asthma in adolescence, migraines, IBS, cold sores and ADHD.  The patient went to the ER on 3/27/2023 for ongoing symptoms of cough, congestion, sore throat and upper chest pain.  She underwent an EKG that was unremarkable as well as labs and chest x-ray.  In the ER they discussed possible diagnosis of esophagitis and she discharged home.  She underwent an upper endoscopy on 4/4/2023 as well as completed a chest x-ray.  Her chest x-ray showed cystic appearing subcarinal mass and she was referred to Dr. Moore.  She was seen on 4/11/2023 and counseled for the procedure as above.    History is obtained from the patient and chart review    Hx of abnormal bleeding or anti-platelet use: none    Menstrual history: Patient's last menstrual period was 03/22/2023 (within days).:      Past Medical History  Past Medical History:   Diagnosis Date     ADHD      Chickenpox      Cold sore      Exercise-induced asthma     adolescent     History of pre-eclampsia      IBS (irritable bowel syndrome)      Mediastinal cyst      Migraine      Severe  dysplasia of cervix ([place] III)     s/p LEEP; see problem list     Tonsillitis 2014       Past Surgical History  Past Surgical History:   Procedure Laterality Date     ANUS SURGERY N/A 2018    Procedure: SPHINCTEROPLASTY;  Surgeon: Jorge Dennis MD;  Location: [facility] [place];  Service:      BIOPSY  Various    Had a few Leeps and numerous Colposcopys     BIOPSY CERVICAL, LOCAL EXCISION, SINGLE/MULTIPLE        SECTION N/A 2019    Procedure:  SECTION;  Surgeon: Jesenia Jefferson MD;  Location: [facility] L+D     COLONOSCOPY      Normal     DILATION AND CURETTAGE, HYSTEROSCOPY DIAGNOSTIC, COMBINED N/A 2018    Procedure: COMBINED DILATION AND CURETTAGE, HYSTEROSCOPY DIAGNOSTIC;  Diagnostic Hysteroscopy with Dilation and Curettage,Laparoscopy with Left Ovarian Cystectomy, Right Uteral Sacral Biopsy;  Surgeon: Sherin Frost MD;  Location: [facility] OR     EXC SWEAT GLAND LESN AXILL,SIMPL Right      LAPAROSCOPY DIAGNOSTIC (GYN) N/A 2018    Procedure: LAPAROSCOPY DIAGNOSTIC (GYN);;  Surgeon: Sherin Frost MD;  Location: [facility] OR     LEEP TX, CERVICAL  2006     MOUTH SURGERY      wisdom teeth     RECTOVAGINAL FISTULA CLOSURE  2015     SOFT TISSUE SURGERY  Various    infected sweat-gland- cyst removed arm,armpit,face     SPHINCTEROPLASTY RECTUM  2018    with pernineal rebuild     SURGICAL HISTORY OF -       infected sweat gland under her arm     TONSILLECTOMY         Prior to Admission Medications  Current Outpatient Medications   Medication Sig Dispense Refill     [START ON 2023] amphetamine-dextroamphetamine (ADDERALL) 20 MG tablet Take 1 tablet (20 mg) by mouth 3 times daily for 30 days 90 tablet 0     [START ON 2023] amphetamine-dextroamphetamine (ADDERALL) 20 MG tablet Take 1 tablet (20 mg) by mouth 3 times daily for 30 days 90 tablet 0     [START ON 2023] amphetamine-dextroamphetamine (ADDERALL) 20 MG tablet Take  1 tablet (20 mg) by mouth 3 times daily for 30 days 90 tablet 0     amphetamine-dextroamphetamine (ADDERALL) 20 MG tablet Take 1 tablet (20 mg) by mouth 3 times daily for 30 days 90 tablet 0     omeprazole (PRILOSEC) 20 MG DR capsule Take 20 mg by mouth every morning       SUMAtriptan (IMITREX) 50 MG tablet Take 1 tablet (50 mg) by mouth at onset of headache for migraine May repeat in 2 hours. Max 4 tablets/24 hours. 9 tablet 4       Allergies  Allergies   Allergen Reactions     Bee Pollen      In the past, has not had issues lately     Doxycycline Rash       Social History  Social History     Socioeconomic History     Marital status: Patient Declined     Spouse name: Not on file     Number of children: Not on file     Years of education: Not on file     Highest education level: Not on file   Occupational History     Employer: [employer]     Comment: accounting   Tobacco Use     Smoking status: Former     Packs/day: 0.50     Years: 15.00     Pack years: 7.50     Types: Cigarettes     Start date: 2014     Quit date: 2022     Years since quittin.3     Smokeless tobacco: Never     Tobacco comments:     Quit with each pregnancy   Vaping Use     Vaping status: Never Used     Passive vaping exposure: Yes   Substance and Sexual Activity     Alcohol use: Yes     Comment: rarely     Drug use: No     Sexual activity: Not Currently     Partners: Male     Birth control/protection: Abstinence, None   Other Topics Concern     Parent/sibling w/ CABG, MI or angioplasty before 65F 55M? No   Social History Narrative     Not on file     Social Determinants of Health     Financial Resource Strain: Not on file   Food Insecurity: Not on file   Transportation Needs: Not on file   Physical Activity: Not on file   Stress: Not on file   Social Connections: Not on file   Intimate Partner Violence: Not on file   Housing Stability: Not on file       Family History  Family History   Problem Relation Age of Onset     Breast  "Cancer Mother         Stage 0, small duct carcinoma     Hypertension Father      Hyperlipidemia Father      Liver Disease Father      Hepatitis Father      Blood Disease Brother         twin brother-blood clots     Deep Vein Thrombosis (DVT) Brother      Respiratory Maternal Grandmother      Lung Cancer Maternal Grandmother      Diabetes Maternal Grandmother         type II     Hypertension Maternal Grandmother      Hyperlipidemia Maternal Grandmother      Depression Maternal Grandmother      Blood Disease Maternal Grandfather         blood clots     Heart Disease Maternal Grandfather         valve replacement     Hyperlipidemia Maternal Grandfather      Cerebrovascular Disease Maternal Grandfather      Prostate Cancer Maternal Grandfather      Other Cancer Maternal Grandfather         Gum/Jaw/Lymphnodes     Colon Cancer Maternal Grandfather      Mental Illness Maternal Grandfather         Alzheimer's     Cancer Paternal Grandmother         non -hodgkins lymphoma     Lung Cancer Paternal Grandmother      Hypertension Paternal Grandmother      Hyperlipidemia Paternal Grandmother      Cerebrovascular Disease Paternal Grandmother      Diabetes Paternal Grandmother      Hyperlipidemia Paternal Grandfather      Prostate Cancer Paternal Grandfather      Hypertension Paternal Grandfather      Colon Cancer Paternal Grandfather      Coronary Artery Disease Paternal Grandfather      Breast Cancer Cousin         Breast Cancer     Other Cancer Cousin         1\" tumor in lung, breast cancer relapse     Breast Cancer Other         2 aunt and 3 of my great aunts     Anesthesia Reaction No family hx of        Review of Systems  The complete review of systems is negative other than noted in the HPI or here.   Anesthesia Evaluation   Pt has had prior anesthetic. Type: Regional, General and MAC.    History of anesthetic complications   patient had dizziness with 2nd spinal for csection .    ROS/MED HX  ENT/Pulmonary:     (+) tobacco " "use, Past use, asthma (history of adolescence exercise induced asthma )     Neurologic:     (+) migraines,  (-) no seizures, no CVA and no TIA   Cardiovascular:     (+) hypertension-----Previous cardiac testing   Echo: Date: Results:    Stress Test: Date: Results:    ECG Reviewed: Date: 3/27/23 Results:  Normal sinus rhythm with sinus arrhythmia   Possible Left atrial enlargement   Septal infarct , age undetermined   Abnormal ECG    Cath: Date: Results:      METS/Exercise Tolerance: 4 - Raking leaves, gardening    Hematologic:  - neg hematologic  ROS     Musculoskeletal:  - neg musculoskeletal ROS     GI/Hepatic: Comment: IBS      Renal/Genitourinary: Comment: History of rectovaginal fistula s/p sphincteroplasty       Endo:  - neg endo ROS     Psychiatric/Substance Use:     (+) psychiatric history other (comment) (ADHD)     Infectious Disease: Comment: Cold sore      Malignancy:  - neg malignancy ROS     Other:  - neg other ROS          /80 (BP Location: Right arm, Patient Position: Sitting, Cuff Size: Adult Regular)   Pulse 99   Temp 98.2  F (36.8  C) (Oral)   Resp 16   Ht 1.664 m (5' 5.5\")   Wt 72.5 kg (159 lb 14.4 oz)   LMP 03/22/2023 (Within Days)   SpO2 100%   Breastfeeding No   BMI 26.20 kg/m      Physical Exam   Constitutional: Awake, alert, cooperative, no apparent distress, and appears stated age.  Eyes: Pupils equal, round and reactive to light, extra ocular muscles intact, sclera clear, conjunctiva normal.  HENT: Normocephalic, oral pharynx with moist mucus membranes, good dentition. No goiter appreciated.   Respiratory: Clear to auscultation bilaterally, no crackles or wheezing.  Cardiovascular: Regular rate and rhythm, normal S1 and S2, and no murmur noted.  Carotids +2, no bruits. No edema. Palpable pulses to radial  DP and PT arteries.   GI: Normal bowel sounds, soft, non-distended, non-tender, no masses palpated, no hepatosplenomegaly.    Lymph/Hematologic: No cervical lymphadenopathy " and no supraclavicular lymphadenopathy.  Genitourinary:  defer  Skin: Warm and dry.  No rashes at anticipated surgical site.   Musculoskeletal: Full ROM of neck. There is no redness, warmth, or swelling of the joints. Gross motor strength is normal.    Neurologic: Awake, alert, oriented to name, place and time. Cranial nerves II-XII are grossly intact. Gait is normal.   Neuropsychiatric: Calm, cooperative. Normal affect.     Prior Labs/Diagnostic Studies   All labs and imaging personally reviewed     EKG/ stress test - if available please see in ROS above   No results found.      Latest Ref Rng & Units 4/10/2023     9:00 AM   PFT   FVC L 3.84     FEV1 L 3.33     FVC% % 106     FEV1% % 111           The patient's records and results personally reviewed by this provider.     Outside records reviewed from: Care Everywhere    LAB/DIAGNOSTIC STUDIES TODAY:       Latest Reference Range & Units 04/19/23 08:00   Sodium 136 - 145 mmol/L 137   Potassium 3.4 - 5.3 mmol/L 4.0   Chloride 98 - 107 mmol/L 103   Carbon Dioxide (CO2) 22 - 29 mmol/L 25   Urea Nitrogen 6.0 - 20.0 mg/dL 12.2   Creatinine 0.51 - 0.95 mg/dL 0.80   GFR Estimate >60 mL/min/1.73m2 >90   Calcium 8.6 - 10.0 mg/dL 9.5   Anion Gap 7 - 15 mmol/L 9   Glucose 70 - 99 mg/dL 109 (H)   WBC 4.0 - 11.0 10e3/uL 9.1   Hemoglobin 11.7 - 15.7 g/dL 13.9   Hematocrit 35.0 - 47.0 % 39.9   Platelet Count 150 - 450 10e3/uL 377   RBC Count 3.80 - 5.20 10e6/uL 4.54   MCV 78 - 100 fL 88   MCH 26.5 - 33.0 pg 30.6   MCHC 31.5 - 36.5 g/dL 34.8   RDW 10.0 - 15.0 % 12.3       Assessment      Ángela Zurita is a 38 year old female seen as a PAC referral for risk assessment and optimization for anesthesia.    Plan/Recommendations  Pt will be optimized for the proposed procedure.  See below for details on the assessment, risk, and preoperative recommendations    NEUROLOGY  - Migraines - the patient does get an aura with vision changes and nausea. She hasn't had one for 6 months.  "  -Post Op delirium risk factors:  No risk identified    ENT  - No current airway concerns.  Will need to be reassessed day of surgery.  Mallampati: I  TM: > 3    CARDIAC  - history of pre-eclampsia   - METS (Metabolic Equivalents)  Patient performs 4 or more METS exercise without symptoms            Total Score: 0      RCRI-Low risk: Class 2 0.9% complication rate            Total Score: 1    RCRI: High Risk Surgery        PULMONARY  - Obstructive Sleep Apnea  No current risk of obstructive sleep apnea   KAREN Low Risk            Total Score: 0      - history of adolescence exercise induced asthma - no current concerns   ~ The patient does have a dry cough. She denies URI symptoms.   - Tobacco History      History   Smoking Status     Former     Packs/day: 0.50     Years: 15.00     Types: Cigarettes     Start date: 12/1/2014     Quit date: 12/2022   Smokeless Tobacco     Never       GI  - continue omeprazole for reactive gastropathy   ~ IBS/ hepatic steatosis seen on CT scan   ~ Dysphagia - the patient is doing protein shakes 3 times a day.   PONV High Risk  Total Score: 3           1 AN PONV: Pt is Female    1 AN PONV: Patient is not a current smoker    1 AN PONV: Intended Post Op Opioids        /RENAL  - Baseline Creatinine  0.82    ENDOCRINE    - BMI: Estimated body mass index is 26.2 kg/m  as calculated from the following:    Height as of this encounter: 1.664 m (5' 5.5\").    Weight as of this encounter: 72.5 kg (159 lb 14.4 oz).  Overweight (BMI 25.0-29.9)  - No history of Diabetes Mellitus    HEME  VTE Low Risk 0.5%            Total Score: 4    VTE: Family Hx of VTE      - No history of abnormal bleeding or antiplatelet use.  ~ the patient's last labs show mild leukocytosis and thrombocytosis. Will check CBC today.   ~ Type and screen has been ordered for the patient.     MSK  Patient is NOT Frail            Total Score: 0        PSYCH  - ADHD - hold adderall DOS.     ID  ~ Cold sores - no current " concerns    Different anesthesia methods/types have been discussed with the patient, but they are aware that the final plan will be decided by the assigned anesthesia provider on the date of service.    The patient is optimized for their procedure. AVS with information on surgery time/arrival time, meds and NPO status given by nursing staff. No further diagnostic testing indicated.      On the day of service:     Prep time: 11 minutes  Visit time: 14 minutes  Documentation time: 11 minutes  ------------------------------------------  Total time: 36 minutes      Mercy Weiss PA-C  Preoperative Assessment Center  [facility]  Phone: [phone]  Fax: [phone]

## 2023-04-19 NOTE — H&P
Pre-Operative H & P     CC:  Preoperative exam to assess for increased cardiopulmonary risk while undergoing surgery and anesthesia.    Date of Encounter: 4/19/2023  Primary Care Physician:  Randy Graf     Reason for visit:   Encounter Diagnoses   Name Primary?     Pre-op examination Yes     Mediastinal cyst        HPI  Ángela Zurita is a 38 year old female who presents for pre-operative H & P in preparation for  Procedure Information     Case: [id number] Date/Time: 04/26/23 1245    Procedure: Robot-assisted right thoracoscopic resection of mediastinal mass (Right: Chest)    Anesthesia type: General    Diagnosis: Mediastinal cyst [Q34.1]    Pre-op diagnosis: Mediastinal cyst [Q34.1]    Location:  OR [facility] /  OR    Providers: Pa Moore MD          The patient is a 38-year-old woman with past medical history significant for hypertension, tobacco use, exercise-induced asthma in adolescence, migraines, IBS, cold sores and ADHD.  The patient went to the ER on 3/27/2023 for ongoing symptoms of cough, congestion, sore throat and upper chest pain.  She underwent an EKG that was unremarkable as well as labs and chest x-ray.  In the ER they discussed possible diagnosis of esophagitis and she discharged home.  She underwent an upper endoscopy on 4/4/2023 as well as completed a chest x-ray.  Her chest x-ray showed cystic appearing subcarinal mass and she was referred to Dr. Moore.  She was seen on 4/11/2023 and counseled for the procedure as above.    History is obtained from the patient and chart review    Hx of abnormal bleeding or anti-platelet use: none    Menstrual history: Patient's last menstrual period was 03/22/2023 (within days).:      Past Medical History  Past Medical History:   Diagnosis Date     ADHD      Chickenpox      Cold sore      Exercise-induced asthma     adolescent     History of pre-eclampsia      IBS (irritable bowel syndrome)      Mediastinal cyst      Migraine      Severe  dysplasia of cervix ([place] III)     s/p LEEP; see problem list     Tonsillitis 2014       Past Surgical History  Past Surgical History:   Procedure Laterality Date     ANUS SURGERY N/A 2018    Procedure: SPHINCTEROPLASTY;  Surgeon: Jorge Dennis MD;  Location: [facility];  Service:      BIOPSY  Various    Had a few Leeps and numerous Colposcopys     BIOPSY CERVICAL, LOCAL EXCISION, SINGLE/MULTIPLE        SECTION N/A 2019    Procedure:  SECTION;  Surgeon: Jesenia Jefferson MD;  Location: [facility] L[facility]     COLONOSCOPY      Normal     DILATION AND CURETTAGE, HYSTEROSCOPY DIAGNOSTIC, COMBINED N/A 2018    Procedure: COMBINED DILATION AND CURETTAGE, HYSTEROSCOPY DIAGNOSTIC;  Diagnostic Hysteroscopy with Dilation and Curettage,Laparoscopy with Left Ovarian Cystectomy, Right Uteral Sacral Biopsy;  Surgeon: Sherin Frost MD;  Location: [facility] OR     EXC SWEAT GLAND LESN AXILL,SIMPL Right      LAPAROSCOPY DIAGNOSTIC (GYN) N/A 2018    Procedure: LAPAROSCOPY DIAGNOSTIC (GYN);;  Surgeon: Sherin Frost MD;  Location: [facility] OR     LEEP TX, CERVICAL  2006     MOUTH SURGERY      wisdom teeth     RECTOVAGINAL FISTULA CLOSURE  2015     SOFT TISSUE SURGERY  Various    infected sweat-gland- cyst removed arm,armpit,face     SPHINCTEROPLASTY RECTUM  2018    with pernineal rebuild     SURGICAL HISTORY OF -       infected sweat gland under her arm     TONSILLECTOMY         Prior to Admission Medications  Current Outpatient Medications   Medication Sig Dispense Refill     [START ON 2023] amphetamine-dextroamphetamine (ADDERALL) 20 MG tablet Take 1 tablet (20 mg) by mouth 3 times daily for 30 days 90 tablet 0     [START ON 2023] amphetamine-dextroamphetamine (ADDERALL) 20 MG tablet Take 1 tablet (20 mg) by mouth 3 times daily for 30 days 90 tablet 0     [START ON 2023] amphetamine-dextroamphetamine (ADDERALL) 20 MG tablet Take  1 tablet (20 mg) by mouth 3 times daily for 30 days 90 tablet 0     amphetamine-dextroamphetamine (ADDERALL) 20 MG tablet Take 1 tablet (20 mg) by mouth 3 times daily for 30 days 90 tablet 0     omeprazole (PRILOSEC) 20 MG DR capsule Take 20 mg by mouth every morning       SUMAtriptan (IMITREX) 50 MG tablet Take 1 tablet (50 mg) by mouth at onset of headache for migraine May repeat in 2 hours. Max 4 tablets/24 hours. 9 tablet 4       Allergies  Allergies   Allergen Reactions     Bee Pollen      In the past, has not had issues lately     Doxycycline Rash       Social History  Social History     Socioeconomic History     Marital status: Patient Declined     Spouse name: Not on file     Number of children: Not on file     Years of education: Not on file     Highest education level: Not on file   Occupational History     Employer: [employer]     Comment: accounting   Tobacco Use     Smoking status: Former     Packs/day: 0.50     Years: 15.00     Pack years: 7.50     Types: Cigarettes     Start date: 2014     Quit date: 2022     Years since quittin.3     Smokeless tobacco: Never     Tobacco comments:     Quit with each pregnancy   Vaping Use     Vaping status: Never Used     Passive vaping exposure: Yes   Substance and Sexual Activity     Alcohol use: Yes     Comment: rarely     Drug use: No     Sexual activity: Not Currently     Partners: Male     Birth control/protection: Abstinence, None   Other Topics Concern     Parent/sibling w/ CABG, MI or angioplasty before 65F 55M? No   Social History Narrative     Not on file     Social Determinants of Health     Financial Resource Strain: Not on file   Food Insecurity: Not on file   Transportation Needs: Not on file   Physical Activity: Not on file   Stress: Not on file   Social Connections: Not on file   Intimate Partner Violence: Not on file   Housing Stability: Not on file       Family History  Family History   Problem Relation Age of Onset     Breast  "Cancer Mother         Stage 0, small duct carcinoma     Hypertension Father      Hyperlipidemia Father      Liver Disease Father      Hepatitis Father      Blood Disease Brother         twin brother-blood clots     Deep Vein Thrombosis (DVT) Brother      Respiratory Maternal Grandmother      Lung Cancer Maternal Grandmother      Diabetes Maternal Grandmother         type II     Hypertension Maternal Grandmother      Hyperlipidemia Maternal Grandmother      Depression Maternal Grandmother      Blood Disease Maternal Grandfather         blood clots     Heart Disease Maternal Grandfather         valve replacement     Hyperlipidemia Maternal Grandfather      Cerebrovascular Disease Maternal Grandfather      Prostate Cancer Maternal Grandfather      Other Cancer Maternal Grandfather         Gum/Jaw/Lymphnodes     Colon Cancer Maternal Grandfather      Mental Illness Maternal Grandfather         Alzheimer's     Cancer Paternal Grandmother         non -hodgkins lymphoma     Lung Cancer Paternal Grandmother      Hypertension Paternal Grandmother      Hyperlipidemia Paternal Grandmother      Cerebrovascular Disease Paternal Grandmother      Diabetes Paternal Grandmother      Hyperlipidemia Paternal Grandfather      Prostate Cancer Paternal Grandfather      Hypertension Paternal Grandfather      Colon Cancer Paternal Grandfather      Coronary Artery Disease Paternal Grandfather      Breast Cancer Cousin         Breast Cancer     Other Cancer Cousin         1\" tumor in lung, breast cancer relapse     Breast Cancer Other         2 aunt and 3 of my great aunts     Anesthesia Reaction No family hx of        Review of Systems  The complete review of systems is negative other than noted in the HPI or here.   Anesthesia Evaluation   Pt has had prior anesthetic. Type: Regional, General and MAC.    History of anesthetic complications   patient had dizziness with 2nd spinal for csection .    ROS/MED HX  ENT/Pulmonary:     (+) tobacco " "use, Past use, asthma (history of adolescence exercise induced asthma )     Neurologic:     (+) migraines,  (-) no seizures, no CVA and no TIA   Cardiovascular:     (+) hypertension-----Previous cardiac testing   Echo: Date: Results:    Stress Test: Date: Results:    ECG Reviewed: Date: 3/27/23 Results:  Normal sinus rhythm with sinus arrhythmia   Possible Left atrial enlargement   Septal infarct , age undetermined   Abnormal ECG    Cath: Date: Results:      METS/Exercise Tolerance: 4 - Raking leaves, gardening    Hematologic:  - neg hematologic  ROS     Musculoskeletal:  - neg musculoskeletal ROS     GI/Hepatic: Comment: IBS      Renal/Genitourinary: Comment: History of rectovaginal fistula s/p sphincteroplasty       Endo:  - neg endo ROS     Psychiatric/Substance Use:     (+) psychiatric history other (comment) (ADHD)     Infectious Disease: Comment: Cold sore      Malignancy:  - neg malignancy ROS     Other:  - neg other ROS          /80 (BP Location: Right arm, Patient Position: Sitting, Cuff Size: Adult Regular)   Pulse 99   Temp 98.2  F (36.8  C) (Oral)   Resp 16   Ht 1.664 m (5' 5.5\")   Wt 72.5 kg (159 lb 14.4 oz)   LMP 03/22/2023 (Within Days)   SpO2 100%   Breastfeeding No   BMI 26.20 kg/m      Physical Exam   Constitutional: Awake, alert, cooperative, no apparent distress, and appears stated age.  Eyes: Pupils equal, round and reactive to light, extra ocular muscles intact, sclera clear, conjunctiva normal.  HENT: Normocephalic, oral pharynx with moist mucus membranes, good dentition. No goiter appreciated.   Respiratory: Clear to auscultation bilaterally, no crackles or wheezing.  Cardiovascular: Regular rate and rhythm, normal S1 and S2, and no murmur noted.  Carotids +2, no bruits. No edema. Palpable pulses to radial  DP and PT arteries.   GI: Normal bowel sounds, soft, non-distended, non-tender, no masses palpated, no hepatosplenomegaly.    Lymph/Hematologic: No cervical lymphadenopathy " and no supraclavicular lymphadenopathy.  Genitourinary:  defer  Skin: Warm and dry.  No rashes at anticipated surgical site.   Musculoskeletal: Full ROM of neck. There is no redness, warmth, or swelling of the joints. Gross motor strength is normal.    Neurologic: Awake, alert, oriented to name, place and time. Cranial nerves II-XII are grossly intact. Gait is normal.   Neuropsychiatric: Calm, cooperative. Normal affect.     Prior Labs/Diagnostic Studies   All labs and imaging personally reviewed     EKG/ stress test - if available please see in ROS above   No results found.      Latest Ref Rng & Units 4/10/2023     9:00 AM   PFT   FVC L 3.84     FEV1 L 3.33     FVC% % 106     FEV1% % 111           The patient's records and results personally reviewed by this provider.     Outside records reviewed from: Care Everywhere    LAB/DIAGNOSTIC STUDIES TODAY:       Latest Reference Range & Units 04/19/23 08:00   Sodium 136 - 145 mmol/L 137   Potassium 3.4 - 5.3 mmol/L 4.0   Chloride 98 - 107 mmol/L 103   Carbon Dioxide (CO2) 22 - 29 mmol/L 25   Urea Nitrogen 6.0 - 20.0 mg/dL 12.2   Creatinine 0.51 - 0.95 mg/dL 0.80   GFR Estimate >60 mL/min/1.73m2 >90   Calcium 8.6 - 10.0 mg/dL 9.5   Anion Gap 7 - 15 mmol/L 9   Glucose 70 - 99 mg/dL 109 (H)   WBC 4.0 - 11.0 10e3/uL 9.1   Hemoglobin 11.7 - 15.7 g/dL 13.9   Hematocrit 35.0 - 47.0 % 39.9   Platelet Count 150 - 450 10e3/uL 377   RBC Count 3.80 - 5.20 10e6/uL 4.54   MCV 78 - 100 fL 88   MCH 26.5 - 33.0 pg 30.6   MCHC 31.5 - 36.5 g/dL 34.8   RDW 10.0 - 15.0 % 12.3       Assessment      Ángela Zurita is a 38 year old female seen as a PAC referral for risk assessment and optimization for anesthesia.    Plan/Recommendations  Pt will be optimized for the proposed procedure.  See below for details on the assessment, risk, and preoperative recommendations    NEUROLOGY  - Migraines - the patient does get an aura with vision changes and nausea. She hasn't had one for 6 months.  "  -Post Op delirium risk factors:  No risk identified    ENT  - No current airway concerns.  Will need to be reassessed day of surgery.  Mallampati: I  TM: > 3    CARDIAC  - history of pre-eclampsia   - METS (Metabolic Equivalents)  Patient performs 4 or more METS exercise without symptoms            Total Score: 0      RCRI-Low risk: Class 2 0.9% complication rate            Total Score: 1    RCRI: High Risk Surgery        PULMONARY  - Obstructive Sleep Apnea  No current risk of obstructive sleep apnea   KAREN Low Risk            Total Score: 0      - history of adolescence exercise induced asthma - no current concerns   ~ The patient does have a dry cough. She denies URI symptoms.   - Tobacco History      History   Smoking Status     Former     Packs/day: 0.50     Years: 15.00     Types: Cigarettes     Start date: 12/1/2014     Quit date: 12/2022   Smokeless Tobacco     Never       GI  - continue omeprazole for reactive gastropathy   ~ IBS/ hepatic steatosis seen on CT scan   ~ Dysphagia - the patient is doing protein shakes 3 times a day.   PONV High Risk  Total Score: 3           1 AN PONV: Pt is Female    1 AN PONV: Patient is not a current smoker    1 AN PONV: Intended Post Op Opioids        /RENAL  - Baseline Creatinine  0.82    ENDOCRINE    - BMI: Estimated body mass index is 26.2 kg/m  as calculated from the following:    Height as of this encounter: 1.664 m (5' 5.5\").    Weight as of this encounter: 72.5 kg (159 lb 14.4 oz).  Overweight (BMI 25.0-29.9)  - No history of Diabetes Mellitus    HEME  VTE Low Risk 0.5%            Total Score: 4    VTE: Family Hx of VTE      - No history of abnormal bleeding or antiplatelet use.  ~ the patient's last labs show mild leukocytosis and thrombocytosis. Will check CBC today.   ~ Type and screen has been ordered for the patient.     MSK  Patient is NOT Frail            Total Score: 0        PSYCH  - ADHD - hold adderall DOS.     ID  ~ Cold sores - no current " concerns    Different anesthesia methods/types have been discussed with the patient, but they are aware that the final plan will be decided by the assigned anesthesia provider on the date of service.    The patient is optimized for their procedure. AVS with information on surgery time/arrival time, meds and NPO status given by nursing staff. No further diagnostic testing indicated.      On the day of service:     Prep time: 11 minutes  Visit time: 14 minutes  Documentation time: 11 minutes  ------------------------------------------  Total time: 36 minutes      Mercy Weiss PA-C  Preoperative Assessment Center  [facility]  Clinic and Surgery Center  Phone: [phone]  Fax: [phone]

## 2023-04-19 NOTE — OR NURSING
Was asked by Sandrine MCKEON to go over medication instructions with Ángela Zurita for the upcoming surgery at [facility] on  4/26/23. Verbally given medication instructions and written instructions. Ángela has no questions at this time.

## 2023-04-19 NOTE — PATIENT INSTRUCTIONS
Name:  Ángela Zurita   MRN:  [id number]   :  1985   Today's Date:  2023         You were seen today for a pre-operative assessment in the:    Pre-operative Anesthesia Assessment Center(PAC)  [facility]  [address] [place] [id number]  phone [phone]      You will be receiving a call with location, date, arrival time and diet instructions from Preadmission Nursing at your surgical site:    -[facility]: [phone]   -[facility]: [phone]  -[facility]: [phone]  -[facility]: [phone]  -[facility]: [phone]  -[facility]: [phone]    Anesthesia recommendations for medications:  Hold Aspirin for 7 days before procedure.  Hold Multivitamins for 7 days before procedure.   Hold Herbal medications and Supplements for 7 days before procedure.  Hold Ibuprofen for 1 day before procedure- unless directed differently by Surgeon..   Hold Naproxen for 4 days before procedure.   Acetaminophen (Tylenol) is okay to take if needed.      Please DO NOT take the following medications the day of procedure:  Amphetamine-Dextroamphetamine (Adderall)  Sumatriptan (Imitrex)    Please take these medications the day of procedure:  Omeprazole (Prilosec)  Acetaminophen (Tylenol) if needed      For questions or appointments, call:    For further questions regarding your surgery please call your surgeon's office.

## 2023-04-24 RX ORDER — VALACYCLOVIR HYDROCHLORIDE 1 G/1
1000 TABLET, FILM COATED ORAL DAILY PRN
COMMUNITY
End: 2023-07-12

## 2023-04-25 NOTE — PROGRESS NOTES
PTA medications updated by Medication Scribe prior to surgery via phone call with patient (last doses completed by Nurse)     Medication history sources: Patient, Surescripts and H&P  In the past week, patient estimated taking medication this percent of the time: Greater than 90%      Significant changes made to the medication list:  None      Additional medication history information:   None    Medication reconciliation completed by provider prior to medication history? No    Time spent in this activity: 30 minutes    The information provided in this note is only as accurate as the sources available at the time of update(s)      Prior to Admission medications    Medication Sig Last Dose Taking? Auth Provider Long Term End Date   amphetamine-dextroamphetamine (ADDERALL) 20 MG tablet Take 1 tablet (20 mg) by mouth 3 times daily for 30 days  at PM Yes Randy Graf MD  7/22/23   omeprazole (PRILOSEC) 20 MG DR capsule Take 20 mg by mouth every morning  at AM Yes Reported, Patient     SUMAtriptan (IMITREX) 50 MG tablet Take 1 tablet (50 mg) by mouth at onset of headache for migraine May repeat in 2 hours. Max 4 tablets/24 hours.  at PRN Yes Randy Graf MD     valACYclovir (VALTREX) 1000 mg tablet Take 1,000 mg by mouth daily as needed (COLD SORES)  at PRN Yes Reported, Patient No    amphetamine-dextroamphetamine (ADDERALL) 20 MG tablet Take 1 tablet (20 mg) by mouth 3 times daily for 30 days   Randy Graf MD  5/23/23   amphetamine-dextroamphetamine (ADDERALL) 20 MG tablet Take 1 tablet (20 mg) by mouth 3 times daily for 30 days   Randy Graf MD  6/22/23   amphetamine-dextroamphetamine (ADDERALL) 20 MG tablet Take 1 tablet (20 mg) by mouth 3 times daily for 30 days   Randy Graf MD  4/27/23

## 2023-04-26 ENCOUNTER — ANESTHESIA (OUTPATIENT)
Dept: SURGERY | Facility: CLINIC | Age: 38
DRG: 358 | End: 2023-04-26
Payer: COMMERCIAL

## 2023-04-26 ENCOUNTER — HOSPITAL ENCOUNTER (INPATIENT)
Facility: CLINIC | Age: 38
LOS: 1 days | Discharge: HOME | DRG: 358 | End: 2023-04-27
Attending: THORACIC SURGERY (CARDIOTHORACIC VASCULAR SURGERY) | Admitting: THORACIC SURGERY (CARDIOTHORACIC VASCULAR SURGERY)
Payer: COMMERCIAL

## 2023-04-26 ENCOUNTER — APPOINTMENT (OUTPATIENT)
Dept: GENERAL RADIOLOGY | Facility: CLINIC | Age: 38
DRG: 358 | End: 2023-04-26
Attending: THORACIC SURGERY (CARDIOTHORACIC VASCULAR SURGERY)
Payer: COMMERCIAL

## 2023-04-26 DIAGNOSIS — J98.59 MEDIASTINAL MASS: Primary | ICD-10-CM

## 2023-04-26 PROBLEM — Q34.1 MEDIASTINAL CYST: Status: ACTIVE | Noted: 2023-04-26

## 2023-04-26 LAB
ABO/RH(D): NORMAL
ANTIBODY SCREEN: NEGATIVE
GLUCOSE SERPL-MCNC: 91 MG/DL (ref 70–99)
HCG UR QL: NEGATIVE
SPECIMEN EXPIRATION DATE: NORMAL

## 2023-04-26 PROCEDURE — 360N000080 HC SURGERY LEVEL 7, PER MIN: Performed by: THORACIC SURGERY (CARDIOTHORACIC VASCULAR SURGERY)

## 2023-04-26 PROCEDURE — 999N000141 HC STATISTIC PRE-PROCEDURE NURSING ASSESSMENT: Performed by: THORACIC SURGERY (CARDIOTHORACIC VASCULAR SURGERY)

## 2023-04-26 PROCEDURE — 272N000001 HC OR GENERAL SUPPLY STERILE: Performed by: THORACIC SURGERY (CARDIOTHORACIC VASCULAR SURGERY)

## 2023-04-26 PROCEDURE — 86850 RBC ANTIBODY SCREEN: CPT | Performed by: THORACIC SURGERY (CARDIOTHORACIC VASCULAR SURGERY)

## 2023-04-26 PROCEDURE — 370N000017 HC ANESTHESIA TECHNICAL FEE, PER MIN: Performed by: THORACIC SURGERY (CARDIOTHORACIC VASCULAR SURGERY)

## 2023-04-26 PROCEDURE — 258N000003 HC RX IP 258 OP 636: Performed by: NURSE ANESTHETIST, CERTIFIED REGISTERED

## 2023-04-26 PROCEDURE — 250N000011 HC RX IP 250 OP 636: Performed by: THORACIC SURGERY (CARDIOTHORACIC VASCULAR SURGERY)

## 2023-04-26 PROCEDURE — 88304 TISSUE EXAM BY PATHOLOGIST: CPT | Mod: TC | Performed by: THORACIC SURGERY (CARDIOTHORACIC VASCULAR SURGERY)

## 2023-04-26 PROCEDURE — 07T74ZZ RESECTION OF THORAX LYMPHATIC, PERCUTANEOUS ENDOSCOPIC APPROACH: ICD-10-PCS | Performed by: THORACIC SURGERY (CARDIOTHORACIC VASCULAR SURGERY)

## 2023-04-26 PROCEDURE — 32662 THORACOSCOPY W/MEDIAST EXC: CPT | Mod: GC | Performed by: THORACIC SURGERY (CARDIOTHORACIC VASCULAR SURGERY)

## 2023-04-26 PROCEDURE — 82947 ASSAY GLUCOSE BLOOD QUANT: CPT | Performed by: ANESTHESIOLOGY

## 2023-04-26 PROCEDURE — 999N000063 XR CHEST PORT 1 VIEW

## 2023-04-26 PROCEDURE — 88305 TISSUE EXAM BY PATHOLOGIST: CPT | Mod: 26 | Performed by: PATHOLOGY

## 2023-04-26 PROCEDURE — 710N000009 HC RECOVERY PHASE 1, LEVEL 1, PER MIN: Performed by: THORACIC SURGERY (CARDIOTHORACIC VASCULAR SURGERY)

## 2023-04-26 PROCEDURE — 32674 THORACOSCOPY LYMPH NODE EXC: CPT | Mod: GC | Performed by: THORACIC SURGERY (CARDIOTHORACIC VASCULAR SURGERY)

## 2023-04-26 PROCEDURE — 36415 COLL VENOUS BLD VENIPUNCTURE: CPT | Performed by: THORACIC SURGERY (CARDIOTHORACIC VASCULAR SURGERY)

## 2023-04-26 PROCEDURE — 0WBC4ZZ EXCISION OF MEDIASTINUM, PERCUTANEOUS ENDOSCOPIC APPROACH: ICD-10-PCS | Performed by: THORACIC SURGERY (CARDIOTHORACIC VASCULAR SURGERY)

## 2023-04-26 PROCEDURE — 0BJ08ZZ INSPECTION OF TRACHEOBRONCHIAL TREE, VIA NATURAL OR ARTIFICIAL OPENING ENDOSCOPIC: ICD-10-PCS | Performed by: THORACIC SURGERY (CARDIOTHORACIC VASCULAR SURGERY)

## 2023-04-26 PROCEDURE — 250N000025 HC SEVOFLURANE, PER MIN: Performed by: THORACIC SURGERY (CARDIOTHORACIC VASCULAR SURGERY)

## 2023-04-26 PROCEDURE — 250N000011 HC RX IP 250 OP 636: Performed by: ANESTHESIOLOGY

## 2023-04-26 PROCEDURE — 120N000001 HC R&B MED SURG/OB

## 2023-04-26 PROCEDURE — 250N000011 HC RX IP 250 OP 636: Performed by: NURSE ANESTHETIST, CERTIFIED REGISTERED

## 2023-04-26 PROCEDURE — 258N000003 HC RX IP 258 OP 636: Performed by: ANESTHESIOLOGY

## 2023-04-26 PROCEDURE — 250N000013 HC RX MED GY IP 250 OP 250 PS 637: Performed by: THORACIC SURGERY (CARDIOTHORACIC VASCULAR SURGERY)

## 2023-04-26 PROCEDURE — 81025 URINE PREGNANCY TEST: CPT | Performed by: ANESTHESIOLOGY

## 2023-04-26 PROCEDURE — 8E0W4CZ ROBOTIC ASSISTED PROCEDURE OF TRUNK REGION, PERCUTANEOUS ENDOSCOPIC APPROACH: ICD-10-PCS | Performed by: THORACIC SURGERY (CARDIOTHORACIC VASCULAR SURGERY)

## 2023-04-26 PROCEDURE — 250N000013 HC RX MED GY IP 250 OP 250 PS 637: Performed by: PHYSICIAN ASSISTANT

## 2023-04-26 PROCEDURE — 250N000009 HC RX 250: Performed by: THORACIC SURGERY (CARDIOTHORACIC VASCULAR SURGERY)

## 2023-04-26 PROCEDURE — 250N000009 HC RX 250: Performed by: NURSE ANESTHETIST, CERTIFIED REGISTERED

## 2023-04-26 PROCEDURE — C9113 INJ PANTOPRAZOLE SODIUM, VIA: HCPCS | Performed by: THORACIC SURGERY (CARDIOTHORACIC VASCULAR SURGERY)

## 2023-04-26 RX ORDER — SODIUM CHLORIDE, SODIUM LACTATE, POTASSIUM CHLORIDE, CALCIUM CHLORIDE 600; 310; 30; 20 MG/100ML; MG/100ML; MG/100ML; MG/100ML
INJECTION, SOLUTION INTRAVENOUS CONTINUOUS
Status: DISCONTINUED | OUTPATIENT
Start: 2023-04-26 | End: 2023-04-26 | Stop reason: HOSPADM

## 2023-04-26 RX ORDER — LIDOCAINE HYDROCHLORIDE 20 MG/ML
INJECTION, SOLUTION INFILTRATION; PERINEURAL PRN
Status: DISCONTINUED | OUTPATIENT
Start: 2023-04-26 | End: 2023-04-26

## 2023-04-26 RX ORDER — BUPIVACAINE HYDROCHLORIDE 2.5 MG/ML
INJECTION, SOLUTION INFILTRATION; PERINEURAL PRN
Status: DISCONTINUED | OUTPATIENT
Start: 2023-04-26 | End: 2023-04-26 | Stop reason: HOSPADM

## 2023-04-26 RX ORDER — ONDANSETRON 2 MG/ML
INJECTION INTRAMUSCULAR; INTRAVENOUS PRN
Status: DISCONTINUED | OUTPATIENT
Start: 2023-04-26 | End: 2023-04-26

## 2023-04-26 RX ORDER — DEXAMETHASONE SODIUM PHOSPHATE 10 MG/ML
INJECTION INTRAMUSCULAR; INTRAVENOUS PRN
Status: DISCONTINUED | OUTPATIENT
Start: 2023-04-26 | End: 2023-04-26 | Stop reason: HOSPADM

## 2023-04-26 RX ORDER — DEXMEDETOMIDINE HYDROCHLORIDE 4 UG/ML
INJECTION, SOLUTION INTRAVENOUS PRN
Status: DISCONTINUED | OUTPATIENT
Start: 2023-04-26 | End: 2023-04-26 | Stop reason: HOSPADM

## 2023-04-26 RX ORDER — NALOXONE HYDROCHLORIDE 0.4 MG/ML
0.2 INJECTION, SOLUTION INTRAMUSCULAR; INTRAVENOUS; SUBCUTANEOUS
Status: DISCONTINUED | OUTPATIENT
Start: 2023-04-26 | End: 2023-04-27 | Stop reason: HOSPADM

## 2023-04-26 RX ORDER — CHLORHEXIDINE GLUCONATE ORAL RINSE 1.2 MG/ML
15 SOLUTION DENTAL ONCE
Status: COMPLETED | OUTPATIENT
Start: 2023-04-26 | End: 2023-04-26

## 2023-04-26 RX ORDER — DIPHENHYDRAMINE HYDROCHLORIDE 50 MG/ML
25 INJECTION INTRAMUSCULAR; INTRAVENOUS EVERY 6 HOURS PRN
Status: DISCONTINUED | OUTPATIENT
Start: 2023-04-26 | End: 2023-04-27 | Stop reason: HOSPADM

## 2023-04-26 RX ORDER — BISACODYL 10 MG
10 SUPPOSITORY, RECTAL RECTAL DAILY PRN
Status: DISCONTINUED | OUTPATIENT
Start: 2023-04-26 | End: 2023-04-27 | Stop reason: HOSPADM

## 2023-04-26 RX ORDER — PANTOPRAZOLE SODIUM 40 MG/1
40 TABLET, DELAYED RELEASE ORAL DAILY
Status: DISCONTINUED | OUTPATIENT
Start: 2023-04-26 | End: 2023-04-27 | Stop reason: HOSPADM

## 2023-04-26 RX ORDER — POLYETHYLENE GLYCOL 3350 17 G/17G
1 POWDER, FOR SOLUTION ORAL DAILY
Qty: 7 PACKET | Refills: 0 | Status: SHIPPED | OUTPATIENT
Start: 2023-04-26 | End: 2023-05-22

## 2023-04-26 RX ORDER — KETOROLAC TROMETHAMINE 15 MG/ML
15 INJECTION, SOLUTION INTRAMUSCULAR; INTRAVENOUS EVERY 6 HOURS
Status: DISCONTINUED | OUTPATIENT
Start: 2023-04-26 | End: 2023-04-26

## 2023-04-26 RX ORDER — ONDANSETRON 2 MG/ML
4 INJECTION INTRAMUSCULAR; INTRAVENOUS EVERY 30 MIN PRN
Status: DISCONTINUED | OUTPATIENT
Start: 2023-04-26 | End: 2023-04-26 | Stop reason: HOSPADM

## 2023-04-26 RX ORDER — AMOXICILLIN 250 MG
1 CAPSULE ORAL 2 TIMES DAILY
Qty: 14 TABLET | Refills: 0 | Status: SHIPPED | OUTPATIENT
Start: 2023-04-26 | End: 2023-05-22

## 2023-04-26 RX ORDER — HYDROMORPHONE HCL IN WATER/PF 6 MG/30 ML
0.2 PATIENT CONTROLLED ANALGESIA SYRINGE INTRAVENOUS EVERY 5 MIN PRN
Status: DISCONTINUED | OUTPATIENT
Start: 2023-04-26 | End: 2023-04-26 | Stop reason: HOSPADM

## 2023-04-26 RX ORDER — METHOCARBAMOL 750 MG/1
750 TABLET, FILM COATED ORAL EVERY 6 HOURS PRN
Status: DISCONTINUED | OUTPATIENT
Start: 2023-04-26 | End: 2023-04-27 | Stop reason: HOSPADM

## 2023-04-26 RX ORDER — HYDROMORPHONE HCL IN WATER/PF 6 MG/30 ML
0.4 PATIENT CONTROLLED ANALGESIA SYRINGE INTRAVENOUS EVERY 5 MIN PRN
Status: DISCONTINUED | OUTPATIENT
Start: 2023-04-26 | End: 2023-04-26 | Stop reason: HOSPADM

## 2023-04-26 RX ORDER — PROCHLORPERAZINE MALEATE 10 MG
10 TABLET ORAL EVERY 6 HOURS PRN
Status: DISCONTINUED | OUTPATIENT
Start: 2023-04-26 | End: 2023-04-27 | Stop reason: HOSPADM

## 2023-04-26 RX ORDER — POLYETHYLENE GLYCOL 3350 17 G/17G
17 POWDER, FOR SOLUTION ORAL DAILY
Status: DISCONTINUED | OUTPATIENT
Start: 2023-04-27 | End: 2023-04-27 | Stop reason: HOSPADM

## 2023-04-26 RX ORDER — ALBUTEROL SULFATE 90 UG/1
4 AEROSOL, METERED RESPIRATORY (INHALATION)
Status: DISCONTINUED | OUTPATIENT
Start: 2023-04-26 | End: 2023-04-27 | Stop reason: HOSPADM

## 2023-04-26 RX ORDER — ONDANSETRON 2 MG/ML
4 INJECTION INTRAMUSCULAR; INTRAVENOUS EVERY 6 HOURS PRN
Status: DISCONTINUED | OUTPATIENT
Start: 2023-04-26 | End: 2023-04-27 | Stop reason: HOSPADM

## 2023-04-26 RX ORDER — HYDROMORPHONE HCL IN WATER/PF 6 MG/30 ML
0.2 PATIENT CONTROLLED ANALGESIA SYRINGE INTRAVENOUS
Status: DISCONTINUED | OUTPATIENT
Start: 2023-04-26 | End: 2023-04-27 | Stop reason: HOSPADM

## 2023-04-26 RX ORDER — ACETAMINOPHEN 325 MG/1
975 TABLET ORAL ONCE
Status: COMPLETED | OUTPATIENT
Start: 2023-04-26 | End: 2023-04-26

## 2023-04-26 RX ORDER — KETOROLAC TROMETHAMINE 15 MG/ML
15 INJECTION, SOLUTION INTRAMUSCULAR; INTRAVENOUS EVERY 6 HOURS
Status: DISCONTINUED | OUTPATIENT
Start: 2023-04-26 | End: 2023-04-27 | Stop reason: HOSPADM

## 2023-04-26 RX ORDER — FENTANYL CITRATE 0.05 MG/ML
25 INJECTION, SOLUTION INTRAMUSCULAR; INTRAVENOUS EVERY 5 MIN PRN
Status: DISCONTINUED | OUTPATIENT
Start: 2023-04-26 | End: 2023-04-26 | Stop reason: HOSPADM

## 2023-04-26 RX ORDER — SODIUM CHLORIDE, SODIUM LACTATE, POTASSIUM CHLORIDE, CALCIUM CHLORIDE 600; 310; 30; 20 MG/100ML; MG/100ML; MG/100ML; MG/100ML
INJECTION, SOLUTION INTRAVENOUS CONTINUOUS
Status: DISCONTINUED | OUTPATIENT
Start: 2023-04-26 | End: 2023-04-27 | Stop reason: HOSPADM

## 2023-04-26 RX ORDER — OXYCODONE HYDROCHLORIDE 5 MG/1
10 TABLET ORAL EVERY 4 HOURS PRN
Status: DISCONTINUED | OUTPATIENT
Start: 2023-04-26 | End: 2023-04-27 | Stop reason: HOSPADM

## 2023-04-26 RX ORDER — DEXAMETHASONE SODIUM PHOSPHATE 4 MG/ML
INJECTION, SOLUTION INTRA-ARTICULAR; INTRALESIONAL; INTRAMUSCULAR; INTRAVENOUS; SOFT TISSUE PRN
Status: DISCONTINUED | OUTPATIENT
Start: 2023-04-26 | End: 2023-04-26

## 2023-04-26 RX ORDER — ONDANSETRON 4 MG/1
4 TABLET, ORALLY DISINTEGRATING ORAL EVERY 30 MIN PRN
Status: DISCONTINUED | OUTPATIENT
Start: 2023-04-26 | End: 2023-04-26 | Stop reason: HOSPADM

## 2023-04-26 RX ORDER — HYDROMORPHONE HCL IN WATER/PF 6 MG/30 ML
0.4 PATIENT CONTROLLED ANALGESIA SYRINGE INTRAVENOUS
Status: DISCONTINUED | OUTPATIENT
Start: 2023-04-26 | End: 2023-04-27 | Stop reason: HOSPADM

## 2023-04-26 RX ORDER — OXYCODONE HYDROCHLORIDE 5 MG/1
5 TABLET ORAL EVERY 4 HOURS PRN
Qty: 40 TABLET | Refills: 0 | Status: SHIPPED | OUTPATIENT
Start: 2023-04-26 | End: 2023-05-02

## 2023-04-26 RX ORDER — CEFAZOLIN SODIUM/WATER 2 G/20 ML
2 SYRINGE (ML) INTRAVENOUS SEE ADMIN INSTRUCTIONS
Status: DISCONTINUED | OUTPATIENT
Start: 2023-04-26 | End: 2023-04-26 | Stop reason: HOSPADM

## 2023-04-26 RX ORDER — METHOCARBAMOL 500 MG/1
500 TABLET, FILM COATED ORAL 3 TIMES DAILY PRN
Qty: 20 TABLET | Refills: 0 | Status: SHIPPED | OUTPATIENT
Start: 2023-04-26 | End: 2023-05-10

## 2023-04-26 RX ORDER — ACETAMINOPHEN 325 MG/1
650 TABLET ORAL EVERY 4 HOURS PRN
Status: DISCONTINUED | OUTPATIENT
Start: 2023-04-29 | End: 2023-04-27 | Stop reason: HOSPADM

## 2023-04-26 RX ORDER — OXYCODONE HYDROCHLORIDE 5 MG/1
5 TABLET ORAL EVERY 4 HOURS PRN
Status: DISCONTINUED | OUTPATIENT
Start: 2023-04-26 | End: 2023-04-27 | Stop reason: HOSPADM

## 2023-04-26 RX ORDER — NALOXONE HYDROCHLORIDE 0.4 MG/ML
0.4 INJECTION, SOLUTION INTRAMUSCULAR; INTRAVENOUS; SUBCUTANEOUS
Status: DISCONTINUED | OUTPATIENT
Start: 2023-04-26 | End: 2023-04-27 | Stop reason: HOSPADM

## 2023-04-26 RX ORDER — PROPOFOL 10 MG/ML
INJECTION, EMULSION INTRAVENOUS PRN
Status: DISCONTINUED | OUTPATIENT
Start: 2023-04-26 | End: 2023-04-26

## 2023-04-26 RX ORDER — GABAPENTIN 300 MG/1
300 CAPSULE ORAL
Status: COMPLETED | OUTPATIENT
Start: 2023-04-26 | End: 2023-04-26

## 2023-04-26 RX ORDER — ACETAMINOPHEN 500 MG
1000 TABLET ORAL EVERY 6 HOURS
COMMUNITY
Start: 2023-04-26 | End: 2023-12-27

## 2023-04-26 RX ORDER — FENTANYL CITRATE 50 UG/ML
INJECTION, SOLUTION INTRAMUSCULAR; INTRAVENOUS PRN
Status: DISCONTINUED | OUTPATIENT
Start: 2023-04-26 | End: 2023-04-26

## 2023-04-26 RX ORDER — CEFAZOLIN SODIUM/WATER 2 G/20 ML
2 SYRINGE (ML) INTRAVENOUS
Status: COMPLETED | OUTPATIENT
Start: 2023-04-26 | End: 2023-04-26

## 2023-04-26 RX ORDER — CELECOXIB 200 MG/1
200 CAPSULE ORAL ONCE
Status: COMPLETED | OUTPATIENT
Start: 2023-04-26 | End: 2023-04-26

## 2023-04-26 RX ORDER — DEXMEDETOMIDINE HYDROCHLORIDE 4 UG/ML
INJECTION, SOLUTION INTRAVENOUS PRN
Status: DISCONTINUED | OUTPATIENT
Start: 2023-04-26 | End: 2023-04-26

## 2023-04-26 RX ORDER — AMOXICILLIN 250 MG
1 CAPSULE ORAL 2 TIMES DAILY
Status: DISCONTINUED | OUTPATIENT
Start: 2023-04-26 | End: 2023-04-27 | Stop reason: HOSPADM

## 2023-04-26 RX ORDER — ENOXAPARIN SODIUM 100 MG/ML
40 INJECTION SUBCUTANEOUS EVERY 24 HOURS
Status: DISCONTINUED | OUTPATIENT
Start: 2023-04-27 | End: 2023-04-27 | Stop reason: HOSPADM

## 2023-04-26 RX ORDER — PROPOFOL 10 MG/ML
INJECTION, EMULSION INTRAVENOUS CONTINUOUS PRN
Status: DISCONTINUED | OUTPATIENT
Start: 2023-04-26 | End: 2023-04-26

## 2023-04-26 RX ORDER — DIPHENHYDRAMINE HCL 25 MG
25 CAPSULE ORAL EVERY 6 HOURS PRN
Status: DISCONTINUED | OUTPATIENT
Start: 2023-04-26 | End: 2023-04-27 | Stop reason: HOSPADM

## 2023-04-26 RX ORDER — ACETAMINOPHEN 325 MG/1
975 TABLET ORAL EVERY 8 HOURS
Status: DISCONTINUED | OUTPATIENT
Start: 2023-04-26 | End: 2023-04-27 | Stop reason: HOSPADM

## 2023-04-26 RX ORDER — HEPARIN SODIUM 5000 [USP'U]/.5ML
5000 INJECTION, SOLUTION INTRAVENOUS; SUBCUTANEOUS
Status: COMPLETED | OUTPATIENT
Start: 2023-04-26 | End: 2023-04-26

## 2023-04-26 RX ORDER — ONDANSETRON 4 MG/1
4 TABLET, ORALLY DISINTEGRATING ORAL EVERY 6 HOURS PRN
Status: DISCONTINUED | OUTPATIENT
Start: 2023-04-26 | End: 2023-04-27 | Stop reason: HOSPADM

## 2023-04-26 RX ORDER — FENTANYL CITRATE 0.05 MG/ML
50 INJECTION, SOLUTION INTRAMUSCULAR; INTRAVENOUS EVERY 5 MIN PRN
Status: DISCONTINUED | OUTPATIENT
Start: 2023-04-26 | End: 2023-04-26 | Stop reason: HOSPADM

## 2023-04-26 RX ADMIN — SENNOSIDES AND DOCUSATE SODIUM 1 TABLET: 50; 8.6 TABLET ORAL at 20:05

## 2023-04-26 RX ADMIN — OXYCODONE HYDROCHLORIDE 5 MG: 5 TABLET ORAL at 23:01

## 2023-04-26 RX ADMIN — PROPOFOL 30 MCG/KG/MIN: 10 INJECTION, EMULSION INTRAVENOUS at 12:10

## 2023-04-26 RX ADMIN — FENTANYL CITRATE 50 MCG: 50 INJECTION, SOLUTION INTRAMUSCULAR; INTRAVENOUS at 15:05

## 2023-04-26 RX ADMIN — PANTOPRAZOLE SODIUM 40 MG: 40 INJECTION, POWDER, FOR SOLUTION INTRAVENOUS at 18:33

## 2023-04-26 RX ADMIN — ROCURONIUM BROMIDE 20 MG: 50 INJECTION, SOLUTION INTRAVENOUS at 12:57

## 2023-04-26 RX ADMIN — GABAPENTIN 300 MG: 300 CAPSULE ORAL at 11:20

## 2023-04-26 RX ADMIN — DEXAMETHASONE SODIUM PHOSPHATE 10 MG: 4 INJECTION, SOLUTION INTRA-ARTICULAR; INTRALESIONAL; INTRAMUSCULAR; INTRAVENOUS; SOFT TISSUE at 12:08

## 2023-04-26 RX ADMIN — ONDANSETRON 4 MG: 2 INJECTION INTRAMUSCULAR; INTRAVENOUS at 13:54

## 2023-04-26 RX ADMIN — SUGAMMADEX 200 MG: 100 INJECTION, SOLUTION INTRAVENOUS at 14:22

## 2023-04-26 RX ADMIN — SODIUM CHLORIDE, POTASSIUM CHLORIDE, SODIUM LACTATE AND CALCIUM CHLORIDE: 600; 310; 30; 20 INJECTION, SOLUTION INTRAVENOUS at 11:53

## 2023-04-26 RX ADMIN — ROCURONIUM BROMIDE 50 MG: 50 INJECTION, SOLUTION INTRAVENOUS at 11:57

## 2023-04-26 RX ADMIN — DEXMEDETOMIDINE HYDROCHLORIDE 12 MCG: 200 INJECTION INTRAVENOUS at 13:11

## 2023-04-26 RX ADMIN — HYDROMORPHONE HYDROCHLORIDE 0.2 MG: 0.2 INJECTION, SOLUTION INTRAMUSCULAR; INTRAVENOUS; SUBCUTANEOUS at 17:28

## 2023-04-26 RX ADMIN — HYDROMORPHONE HYDROCHLORIDE 0.5 MG: 1 INJECTION, SOLUTION INTRAMUSCULAR; INTRAVENOUS; SUBCUTANEOUS at 12:29

## 2023-04-26 RX ADMIN — LIDOCAINE HYDROCHLORIDE 80 MG: 20 INJECTION, SOLUTION INFILTRATION; PERINEURAL at 12:01

## 2023-04-26 RX ADMIN — ACETAMINOPHEN 975 MG: 325 TABLET ORAL at 11:20

## 2023-04-26 RX ADMIN — CHLORHEXIDINE GLUCONATE 15 ML: 1.2 SOLUTION ORAL at 11:23

## 2023-04-26 RX ADMIN — HYDROMORPHONE HYDROCHLORIDE 0.2 MG: 0.2 INJECTION, SOLUTION INTRAMUSCULAR; INTRAVENOUS; SUBCUTANEOUS at 16:54

## 2023-04-26 RX ADMIN — HEPARIN SODIUM 5000 UNITS: 5000 INJECTION, SOLUTION INTRAVENOUS; SUBCUTANEOUS at 11:22

## 2023-04-26 RX ADMIN — PHENYLEPHRINE HYDROCHLORIDE 100 MCG: 10 INJECTION INTRAVENOUS at 13:35

## 2023-04-26 RX ADMIN — METHOCARBAMOL 750 MG: 750 TABLET ORAL at 23:02

## 2023-04-26 RX ADMIN — FENTANYL CITRATE 100 MCG: 50 INJECTION, SOLUTION INTRAMUSCULAR; INTRAVENOUS at 11:57

## 2023-04-26 RX ADMIN — Medication 2 G: at 11:57

## 2023-04-26 RX ADMIN — ACETAMINOPHEN 975 MG: 325 TABLET ORAL at 20:05

## 2023-04-26 RX ADMIN — OXYCODONE HYDROCHLORIDE 10 MG: 5 TABLET ORAL at 18:59

## 2023-04-26 RX ADMIN — METHOCARBAMOL 750 MG: 750 TABLET ORAL at 17:24

## 2023-04-26 RX ADMIN — CELECOXIB 200 MG: 200 CAPSULE ORAL at 11:21

## 2023-04-26 RX ADMIN — PROPOFOL 200 MG: 10 INJECTION, EMULSION INTRAVENOUS at 11:57

## 2023-04-26 RX ADMIN — MIDAZOLAM 2 MG: 1 INJECTION INTRAMUSCULAR; INTRAVENOUS at 11:57

## 2023-04-26 ASSESSMENT — ACTIVITIES OF DAILY LIVING (ADL)
ADLS_ACUITY_SCORE: 24
ADLS_ACUITY_SCORE: 24
ADLS_ACUITY_SCORE: 22
ADLS_ACUITY_SCORE: 24
ADLS_ACUITY_SCORE: 37

## 2023-04-26 ASSESSMENT — LIFESTYLE VARIABLES: TOBACCO_USE: 1

## 2023-04-26 NOTE — BRIEF OP NOTE
[facility]    Brief Operative Note    Pre-operative diagnosis: Mediastinal cyst [Q34.1]  Post-operative diagnosis Same    Procedure: Procedure(s):  ROBOTIC ASSISTED RIGHT THORASCOPIC RESECTION OF MEDIASTINAL MASS  Surgeon: Surgeon(s) and Role:     * Pa Moore MD - Primary     * Nasir Mendenhall PA-C - Assisting   *Maryam Sommers MD - Fellow - Assisting  Anesthesia: General   Estimated Blood Loss: 10 mls    Drains: 28 Fr Straight CT  Specimens:   ID Type Source Tests Collected by Time Destination   1 : LEVEL 8 LYMPH NODE Tissue Lymph Node(s) SURGICAL PATHOLOGY EXAM Pa Moore MD 4/26/2023  1:04 PM    2 : MEDIASTINAL CYST  Tissue Chest SURGICAL PATHOLOGY EXAM Pa Moore MD 4/26/2023  1:46 PM    3 : LEVEL 7 LYMPH NODE Tissue Lymph Node(s) SURGICAL PATHOLOGY EXAM Pa Moore MD 4/26/2023  1:50 PM      Findings:  Posterior subcarinal mediastinal cystic mass  Complications: None.  Implants: None

## 2023-04-26 NOTE — INTERVAL H&P NOTE
"I have reviewed the surgical (or preoperative) H&P that is linked to this encounter, and examined the patient. There are no significant changes    Clinical Conditions Present on Arrival:  Clinically Significant Risk Factors Present on Admission                  # Overweight: Estimated body mass index is 25.07 kg/m  as calculated from the following:    Height as of this encounter: 1.664 m (5' 5.5\").    Weight as of this encounter: 69.4 kg (153 lb).       "

## 2023-04-26 NOTE — ANESTHESIA CARE TRANSFER NOTE
Patient: Ángela Zurita    Procedure: Procedure(s):  ROBOTIC ASSISTED RIGHT THORASCOPIC RESECTION OF MEDIASTINAL MASS       Diagnosis: Mediastinal cyst [Q34.1]  Diagnosis Additional Information: No value filed.    Anesthesia Type:   General     Note:    Oropharynx: spontaneously breathing  Level of Consciousness: awake and drowsy  Oxygen Supplementation: face mask  Level of Supplemental Oxygen (L/min / FiO2): 6  Independent Airway: airway patency satisfactory and stable  Dentition: dentition unchanged  Vital Signs Stable: post-procedure vital signs reviewed and stable  Report to RN Given: handoff report given  Patient transferred to: PACU    Handoff Report: Identifed the Patient, Identified the Reponsible Provider, Reviewed the pertinent medical history, Discussed the surgical course, Reviewed Intra-OP anesthesia mangement and issues during anesthesia, Set expectations for post-procedure period and Allowed opportunity for questions and acknowledgement of understanding      Vitals:  Vitals Value Taken Time   BP     Temp     Pulse 64 04/26/23 1437   Resp 18 04/26/23 1437   SpO2 99 % 04/26/23 1437   Vitals shown include unvalidated device data.    Electronically Signed By: KATHLEEN Callaway CRNA  April 26, 2023  2:38 PM

## 2023-04-26 NOTE — ANESTHESIA POSTPROCEDURE EVALUATION
Patient: Ángela Zurita    Procedure: Procedure(s):  ROBOTIC ASSISTED RIGHT THORASCOPIC RESECTION OF MEDIASTINAL MASS       Anesthesia Type:  General    Note:  Disposition: Admission   Postop Pain Control: Uneventful            Sign Out: Well controlled pain   PONV: No   Neuro/Psych: Uneventful            Sign Out: Acceptable/Baseline neuro status   Airway/Respiratory: Uneventful            Sign Out: Acceptable/Baseline resp. status   CV/Hemodynamics: Uneventful            Sign Out: Acceptable CV status   Other NRE:    DID A NON-ROUTINE EVENT OCCUR? No           Last vitals:  Vitals Value Taken Time   /82 04/26/23 1530   Temp 36.6  C (97.8  F) 04/26/23 1520   Pulse 73 04/26/23 1532   Resp 7 04/26/23 1532   SpO2 98 % 04/26/23 1532   Vitals shown include unvalidated device data.    Electronically Signed By: Rosy Chua  April 26, 2023  3:33 PM

## 2023-04-26 NOTE — PLAN OF CARE
Goal Outcome Evaluation:     Pt to floor from pacu, alert and oriented, oriented to room and call lights, chest tube in place, no air leak noted, to water seal per MD orders, pt complains of pain, IV pain medications given as ordered,  up to bathroom with stand by assist, good urine output, lungs clear but diminished on right, O2 sats mid 90's on room air

## 2023-04-26 NOTE — OP NOTE
Preoperative diagnosis:                        Mediastinal cyst  Postoperative diagnosis:                      Esophageal duplication cyst     Procedure:   1. Right robot-assisted thoracoscopic resection of mediastinal cyst     Anesthesia: General   Surgeon: Pa Moore   Assistants:  Tommy Sommers MD; Nasir Mendenhall PA-C  EBL: Less than 10 mL     Complications: none immediate     Description of procedure  The patient was brought to the room and placed supine upon the table.  After confirming the patient's identity and verifying the consent, appropriate monitoring devices were placed, as well as SCD boots. General anesthesia was administered.  The patient was intubated with a double-lumen endotracheal tube.  Proper position was confirmed by fiberoptic bronchoscopy.  Intravenous antibiotic was administered within 1 hour prior to the incision. The patient was turned to the left lateral decubitus position and all pressure points were padded. The bed was flexed, and the patient was secured to the table and the right arm was placed on an airplane board.  The right chest was prepped with chlorhexidine and  draped in the standard surgical fashion.       An 8 mm incision was made in the eighth intercostal space in line with the anterior superior iliac spine.  This was carried down to the pleural cavity.  An 8 mm port was placed and an 8 mm, 30-degree thoracoscope was inserted into the pleural cavity.  There were no adhesions. Three other 8 mm ports were placed.  Finally, a 12 mm assistant port was made in the low anterior chest.  The robot was docked without difficulty. The mediastinal cyst was easily seen in the subcarinal position. The cyst was carefully dissected from the surrounding structures, with a 56 Fr bougie in the esophagus. The cyst appeared to be originating from the longitudinal muscle layer and was passed off for permanent pathologic analysis. The esophagus was intact. Level 7 and 8 lymph nodes were  dissected free and passed off for analysis as well. A multiple level intercostal nerve block was performed and Progel was applied to the raw surfaces of the lung. A 28 [place] chest tube was then placed going posteriorly to the apex  through the initial camera port site.  This was secured to the skin using 0 silk suture.  The lung was observed to inflate appropriately.  The utility incision was closed in layers, irrigating each layer prior to closure.  The smaller incisions were closed in layers. The areas were cleaned and dried and benzoin and steri-strips were applied.      At  the end of the case, sponge, needle, and instrument counts were correct. There were no qualified residents available, so TAMARA Mendenhall acted as my bedside assistant.

## 2023-04-26 NOTE — ANESTHESIA PROCEDURE NOTES
Airway       Patient location during procedure: OR       Procedure Start/Stop Times: 4/26/2023 12:05 PM  Staff -        Anesthesiologist:  Rosy Chua       CRNA: Linda Oviedo APRN CRNA       Performed By: CRNA  Consent for Airway        Urgency: elective  Indications and Patient Condition       Indications for airway management: rebeka-procedural       Induction type:intravenous       Mask difficulty assessment: 1 - vent by mask    Final Airway Details       Final airway type: endotracheal airway       Successful airway: ETT - double lumen left  Endotracheal Airway Details        Cuffed: yes       Successful intubation technique: direct laryngoscopy and flexible bronchoscopy       DL Blade Type: Alejandre 2       Grade View of Cords: 1       Adjucts: stylet       Position: Right       Measured from: lips       Secured at (cm): 24       Bite block used: None       ETT Double lumen (fr): 35 (Placed 32F first,  unable to pass bronch scope.  placed 35F and able to pass bronch wihtout difficulty)    Post intubation assessment        Placement verified by: capnometry, equal breath sounds and chest rise        Number of attempts at approach: 1       Number of other approaches attempted: 0       Secured with: pink tape       Ease of procedure: easy       Dentition: Intact and Unchanged    Medication(s) Administered   Medication Administration Time: 4/26/2023 12:05 PM

## 2023-04-26 NOTE — ANESTHESIA PREPROCEDURE EVALUATION
Anesthesia Pre-Procedure Evaluation    Patient: Ángela Zurita   MRN: [id number] : 1985        Procedure : Procedure(s):  Robot-assisted right thoracoscopic resection of mediastinal mass          Past Medical History:   Diagnosis Date     ADHD      Chickenpox      Cold sore      Exercise-induced asthma     adolescent     History of pre-eclampsia      IBS (irritable bowel syndrome)      Mediastinal cyst      Migraine      PONV (postoperative nausea and vomiting)      Severe dysplasia of cervix ([place] III)     s/p LEEP; see problem list     Tonsillitis 2014      Past Surgical History:   Procedure Laterality Date     ANUS SURGERY N/A 2018    Procedure: SPHINCTEROPLASTY;  Surgeon: Jorge Dennis MD;  Location: [facility] [place];  Service:      BIOPSY  Various    Had a few Leeps and numerous Colposcopys     BIOPSY CERVICAL, LOCAL EXCISION, SINGLE/MULTIPLE        SECTION N/A 2019    Procedure:  SECTION;  Surgeon: Jesenia Jefferson MD;  Location: [facility] L+D     COLONOSCOPY      Normal     DILATION AND CURETTAGE, HYSTEROSCOPY DIAGNOSTIC, COMBINED N/A 2018    Procedure: COMBINED DILATION AND CURETTAGE, HYSTEROSCOPY DIAGNOSTIC;  Diagnostic Hysteroscopy with Dilation and Curettage,Laparoscopy with Left Ovarian Cystectomy, Right Uteral Sacral Biopsy;  Surgeon: Sherin Frost MD;  Location: [facility] OR     EXC SWEAT GLAND LESN AXILL,SIMPL Right      LAPAROSCOPY DIAGNOSTIC (GYN) N/A 2018    Procedure: LAPAROSCOPY DIAGNOSTIC (GYN);;  Surgeon: Sherin Frost MD;  Location: [facility] OR     LEEP TX, CERVICAL       MOUTH SURGERY      wisdom teeth     RECTOVAGINAL FISTULA CLOSURE  2015     SOFT TISSUE SURGERY  Various    infected sweat-gland- cyst removed arm,armpit,face     SPHINCTEROPLASTY RECTUM  2018    with pernineal rebuild     SURGICAL HISTORY OF -       infected sweat gland under her arm     TONSILLECTOMY        Allergies    Allergen Reactions     Bee Pollen      In the past, has not had issues lately     Doxycycline Rash      Social History     Tobacco Use     Smoking status: Former     Packs/day: 0.50     Years: 15.00     Pack years: 7.50     Types: Cigarettes     Start date: 2014     Quit date: 2022     Years since quittin.4     Smokeless tobacco: Never     Tobacco comments:     Quit with each pregnancy   Vaping Use     Vaping status: Never Used     Passive vaping exposure: Yes   Substance Use Topics     Alcohol use: Yes     Comment: rarely      Wt Readings from Last 1 Encounters:   23 69.4 kg (153 lb)        Anesthesia Evaluation            ROS/MED HX  ENT/Pulmonary: Comment: Cyst by the dorothy     (+) tobacco use, Past use, asthma     Neurologic:       Cardiovascular:     (+) hypertension-----    METS/Exercise Tolerance:     Hematologic:       Musculoskeletal:       GI/Hepatic: Comment: IBS    (+) GERD,     Renal/Genitourinary:       Endo:     (+) Obesity,     Psychiatric/Substance Use: Comment: ADHD      Infectious Disease:       Malignancy:       Other:            Physical Exam    Airway        Mallampati: II   TM distance: > 3 FB   Neck ROM: full     Respiratory Devices and Support         Dental     Comment: retainer        Cardiovascular   cardiovascular exam normal          Pulmonary   pulmonary exam normal                OUTSIDE LABS:  CBC:   Lab Results   Component Value Date    WBC 9.1 2023    WBC 8.8 01/10/2020    HGB 13.9 2023    HGB 13.8 01/10/2020    HCT 39.9 2023    HCT 39.7 01/10/2020     2023     01/10/2020     BMP:   Lab Results   Component Value Date     2023     01/10/2020    POTASSIUM 4.0 2023    POTASSIUM 3.9 01/10/2020    CHLORIDE 103 2023    CHLORIDE 107 01/10/2020    CO2 25 2023    CO2 27 01/10/2020    BUN 12.2 2023    BUN 13 01/10/2020    CR 0.80 2023    CR 0.68 01/10/2020     (H) 2023     GLC 93 01/10/2020     COAGS:   Lab Results   Component Value Date    PTT 29 01/10/2020    INR 1.03 01/10/2020     POC:   Lab Results   Component Value Date    BGM 77 10/20/2018    HCG Negative 04/26/2023    HCGS Negative 09/22/2017     HEPATIC:   Lab Results   Component Value Date    ALBUMIN 3.8 01/10/2020    PROTTOTAL 6.9 01/10/2020    ALT 23 01/10/2020    AST 15 01/10/2020    ALKPHOS 87 01/10/2020    BILITOTAL 0.4 01/10/2020     OTHER:   Lab Results   Component Value Date    A1C 5.2 04/23/2020    ADRI 9.5 04/19/2023    MAG 6.0 (H) 03/23/2019    LIPASE 222 08/10/2015    AMYLASE 66 07/17/2014    TSH 0.78 04/23/2020    T4 0.84 05/29/2014    CRP <2.9 09/23/2017    SED 6 09/23/2017       Anesthesia Plan    ASA Status:  2      Anesthesia Type: General.     - Airway: ETT              Consents    Anesthesia Plan(s) and associated risks, benefits, and realistic alternatives discussed. Questions answered and patient/representative(s) expressed understanding.    - Discussed:     - Discussed with:  Patient         Postoperative Care       PONV prophylaxis: Ondansetron (or other 5HT-3), Dexamethasone or Solumedrol, Background Propofol Infusion     Comments:                Rosy Chua

## 2023-04-26 NOTE — PROGRESS NOTES
Dr. Chua approved pt to Tx to 2201.  A&Ox4, RA--lungs clear, Tele: NSR--no ectopy, incisions x4 c/d/i, chest tube x1--CXR reviewed by fellow and CT to remain to water seal, pt NPO for now but can have sips of clears this evening per fellow.  Pt's mother updated.  50mcg fentanyl for pain control.

## 2023-04-27 ENCOUNTER — APPOINTMENT (OUTPATIENT)
Dept: GENERAL RADIOLOGY | Facility: CLINIC | Age: 38
DRG: 358 | End: 2023-04-27
Attending: THORACIC SURGERY (CARDIOTHORACIC VASCULAR SURGERY)
Payer: COMMERCIAL

## 2023-04-27 VITALS
DIASTOLIC BLOOD PRESSURE: 66 MMHG | WEIGHT: 152.1 LBS | OXYGEN SATURATION: 100 % | HEART RATE: 78 BPM | SYSTOLIC BLOOD PRESSURE: 105 MMHG | TEMPERATURE: 97.9 F | HEIGHT: 66 IN | RESPIRATION RATE: 18 BRPM | BODY MASS INDEX: 24.44 KG/M2

## 2023-04-27 LAB
ANION GAP SERPL CALCULATED.3IONS-SCNC: 9 MMOL/L (ref 7–15)
BUN SERPL-MCNC: 7.7 MG/DL (ref 6–20)
CALCIUM SERPL-MCNC: 8.9 MG/DL (ref 8.6–10)
CHLORIDE SERPL-SCNC: 106 MMOL/L (ref 98–107)
CREAT SERPL-MCNC: 0.84 MG/DL (ref 0.51–0.95)
DEPRECATED HCO3 PLAS-SCNC: 25 MMOL/L (ref 22–29)
ERYTHROCYTE [DISTWIDTH] IN BLOOD BY AUTOMATED COUNT: 12 % (ref 10–15)
GFR SERPL CREATININE-BSD FRML MDRD: 90 ML/MIN/1.73M2
GLUCOSE SERPL-MCNC: 170 MG/DL (ref 70–99)
HCT VFR BLD AUTO: 35.1 % (ref 35–47)
HGB BLD-MCNC: 12.3 G/DL (ref 11.7–15.7)
MCH RBC QN AUTO: 30.9 PG (ref 26.5–33)
MCHC RBC AUTO-ENTMCNC: 35 G/DL (ref 31.5–36.5)
MCV RBC AUTO: 88 FL (ref 78–100)
PATH REPORT.COMMENTS IMP SPEC: NORMAL
PATH REPORT.COMMENTS IMP SPEC: NORMAL
PATH REPORT.FINAL DX SPEC: NORMAL
PATH REPORT.GROSS SPEC: NORMAL
PATH REPORT.MICROSCOPIC SPEC OTHER STN: NORMAL
PATH REPORT.RELEVANT HX SPEC: NORMAL
PHOTO IMAGE: NORMAL
PLATELET # BLD AUTO: 340 10E3/UL (ref 150–450)
POTASSIUM SERPL-SCNC: 4.5 MMOL/L (ref 3.4–5.3)
RBC # BLD AUTO: 3.98 10E6/UL (ref 3.8–5.2)
SODIUM SERPL-SCNC: 140 MMOL/L (ref 136–145)
WBC # BLD AUTO: 8 10E3/UL (ref 4–11)

## 2023-04-27 PROCEDURE — 250N000011 HC RX IP 250 OP 636: Performed by: THORACIC SURGERY (CARDIOTHORACIC VASCULAR SURGERY)

## 2023-04-27 PROCEDURE — 71045 X-RAY EXAM CHEST 1 VIEW: CPT | Mod: 76

## 2023-04-27 PROCEDURE — 250N000013 HC RX MED GY IP 250 OP 250 PS 637: Performed by: THORACIC SURGERY (CARDIOTHORACIC VASCULAR SURGERY)

## 2023-04-27 PROCEDURE — 71045 X-RAY EXAM CHEST 1 VIEW: CPT

## 2023-04-27 PROCEDURE — 82374 ASSAY BLOOD CARBON DIOXIDE: CPT | Performed by: THORACIC SURGERY (CARDIOTHORACIC VASCULAR SURGERY)

## 2023-04-27 PROCEDURE — 36415 COLL VENOUS BLD VENIPUNCTURE: CPT | Performed by: THORACIC SURGERY (CARDIOTHORACIC VASCULAR SURGERY)

## 2023-04-27 PROCEDURE — 85027 COMPLETE CBC AUTOMATED: CPT | Performed by: THORACIC SURGERY (CARDIOTHORACIC VASCULAR SURGERY)

## 2023-04-27 PROCEDURE — 82310 ASSAY OF CALCIUM: CPT | Performed by: THORACIC SURGERY (CARDIOTHORACIC VASCULAR SURGERY)

## 2023-04-27 RX ADMIN — SENNOSIDES AND DOCUSATE SODIUM 1 TABLET: 50; 8.6 TABLET ORAL at 08:10

## 2023-04-27 RX ADMIN — POLYETHYLENE GLYCOL 3350 17 G: 17 POWDER, FOR SOLUTION ORAL at 08:10

## 2023-04-27 RX ADMIN — ACETAMINOPHEN 975 MG: 325 TABLET ORAL at 03:10

## 2023-04-27 RX ADMIN — KETOROLAC TROMETHAMINE 15 MG: 15 INJECTION, SOLUTION INTRAMUSCULAR; INTRAVENOUS at 12:03

## 2023-04-27 RX ADMIN — METHOCARBAMOL 750 MG: 750 TABLET ORAL at 05:20

## 2023-04-27 RX ADMIN — OXYCODONE HYDROCHLORIDE 5 MG: 5 TABLET ORAL at 03:10

## 2023-04-27 RX ADMIN — ACETAMINOPHEN 975 MG: 325 TABLET ORAL at 10:54

## 2023-04-27 RX ADMIN — OXYCODONE HYDROCHLORIDE 10 MG: 5 TABLET ORAL at 07:02

## 2023-04-27 RX ADMIN — KETOROLAC TROMETHAMINE 15 MG: 15 INJECTION, SOLUTION INTRAMUSCULAR; INTRAVENOUS at 06:31

## 2023-04-27 RX ADMIN — KETOROLAC TROMETHAMINE 15 MG: 15 INJECTION, SOLUTION INTRAMUSCULAR; INTRAVENOUS at 00:32

## 2023-04-27 RX ADMIN — OXYCODONE HYDROCHLORIDE 10 MG: 5 TABLET ORAL at 10:55

## 2023-04-27 RX ADMIN — PANTOPRAZOLE SODIUM 40 MG: 40 TABLET, DELAYED RELEASE ORAL at 08:09

## 2023-04-27 RX ADMIN — METHOCARBAMOL 750 MG: 750 TABLET ORAL at 13:32

## 2023-04-27 RX ADMIN — ENOXAPARIN SODIUM 40 MG: 40 INJECTION SUBCUTANEOUS at 10:58

## 2023-04-27 ASSESSMENT — ACTIVITIES OF DAILY LIVING (ADL)
ADLS_ACUITY_SCORE: 24
ADLS_ACUITY_SCORE: 22
ADLS_ACUITY_SCORE: 24
ADLS_ACUITY_SCORE: 22
ADLS_ACUITY_SCORE: 24

## 2023-04-27 NOTE — PROGRESS NOTES
THORACIC & FOREGUT SURGERY    Seen and examined.  Doing well. Pain moderately controlled overnight.  Better now    Vitals:    04/26/23 2259 04/27/23 0310 04/27/23 0315 04/27/23 0728   BP: 99/60 100/62  105/66   BP Location:    Left arm   Cuff Size:       Pulse: 64   78   Resp: 16 16  18   Temp: 97.3  F (36.3  C)   97.9  F (36.6  C)   TempSrc: Oral   Oral   SpO2: 98%   100%   Weight:   69 kg (152 lb 1.6 oz)    Height:          I/O last 3 completed shifts:  In: 920 [P.O.:120; I.V.:800]  Out: 665 [Urine:625; Chest Tube:40]     CT no air leak    Most recent labs and imaging reviewed   CXR clear      A/P: 38 year old female post operative day #1 status post right mediastinal cyst excision  Chest tube removed  Post pull CXR pending  Plan discharge to home  Follow up in clinic 1 month  Pain control  Bowel regimen  Incentive spirometry  Ambulation        Pa Moore MD

## 2023-04-27 NOTE — PLAN OF CARE
Goal Outcome Evaluation:      Plan of Care Reviewed With: patient    Overall Patient Progress: improvingOverall Patient Progress: improving    A&OX4, VSS, pain controlled with po pain meds.  Up amb IND.  Lap sites CD&I.  CT to water seal, discontinued by surgeon.  Dressing CD&I. Tolerating diet without nausea.  Voiding well.  Positive BS, passing flatus.  Discharge instructions reviewed and well received by pt.  Discharge to home.

## 2023-04-27 NOTE — DISCHARGE SUMMARY
NAME: Ángela Zurita   MRN: [id number]   : 1985     DATE OF ADMISSION: 2023     PRE/POSTOPERATIVE DIAGNOSES:   Esophageal duplication cyst    PROCEDURES PERFORMED:   Right robot-assisted thoracoscopic resection of mediastinal cyst    PATHOLOGY RESULTS: None     CULTURE RESULTS: None     INTRAOPERATIVE COMPLICATIONS: None     POSTOPERATIVE MEDICAL ISSUES: None     DRAINS/TUBES PRESENT AT DISCHARGE: dressing changes    DATE OF DISCHARGE:  2023    HOSPITAL COURSE: Ángela Zurita is a 38 year old female who on 2023 underwent the above-named procedures. She tolerated the operation well and postoperatively was transferred to the general post-surgical unit.  The remainder of her course was essentially uncomplicated.  Prior to discharge, her pain was controlled well, she was able to perform ADLs and ambulate independently without difficulty, and had full return of bladder function.  On , she was discharged home in stable condition.    DISCHARGE EXAM:   A&O, NAD  Resp non-labored  Distal extremities warm    Incisions CDI     DISCHARGE INSTRUCTIONS:  Discharge Procedure Orders   XR Chest 2 Views   Standing Status: Future Standing Exp. Date: 24   Order Comments: For Thoracic Surgery follow up appointment only     Order Specific Question Answer Comments   Priority Routine    Clinical Info for the Interpreting Provider s/p mediastinal mass excision      Reason for your hospital stay   Order Comments: Surgery     Activity   Order Comments: Your activity upon discharge: activity as tolerated     Order Specific Question Answer Comments   Is discharge order? Yes      Follow-up and recommended labs and tests    Order Comments: 1.) Follow up with primary care physician, Randy Graf, in 1-2 weeks.  2.) Follow up with Dr. Pa Moore in Thoracic Surgery clinic in 1 month, prior to which a CXR should be performed (CXR should be on the same day as clinic appointment).     Discharge  "Instructions   Order Comments: THORACIC SURGERY DISCHARGE INSTRUCTIONS    DIET: Regular diet - as prior to admission     If your plans upon discharge include prolonged periods of sitting (i.e a lengthy car or plane ride), it is highly beneficial to get up and walk at least once per hour to help prevent swelling and blood clots.     You may remove chest tube dressing 48 hours after tube removal and bandage the site at your own discretion thereafter.  Small amounts of leakage are normal for 2-3 days after removal.  Feel free to call with questions.    You may get incision wet 2 days after operation. Do not submerge, soak, or scrub incision or swim until seen in follow-up.    Take incentive spirometer home for continued frequent use    Activity as tolerated, no strenous activity until seen in follow-up, no lifting greater than 20 pounds for the next 2 weeks.    Stay hydrated. Take over the counter fiber (metamucil or benefiber) and stool softeners (Miralax, docusate or senna) if becoming constipated.     Call for fever greater than 101.5, chills, increased size of incision, red skin around incision, vision changes, muscle strength changes, sensation changes, shortness of breath, or other concerns.    No driving while taking narcotic pain medication.    Transition to ibuprofen or tylenol/acetaminophen for pain control. Do not take tylenol/acetaminophen and acetaminophen containing narcotic (e.g., percocet or vicodin) at the same time. If you have known ulcer problems, or kidney trouble (elevated creatinine) do not take the ibuprofen.    In emergencies, call 911    For other Questions or Concerns;   A.) During weekday working hours (Monday through Friday 8am to 4:30pm)   call [phone] ([phone] and ask to speak to a thoracic surgery nurse (RN or LPN).     B.) At nights (after 4:30pm), on weekends, or if urgent call [phone] and   tell the  \"I would like to page job code [phone], the thoracic surgery   fellow on " "call, please.\"     Diet   Order Comments: Follow this diet upon discharge: Regular     Order Specific Question Answer Comments   Is discharge order? Yes        DISCHARGE MEDICATIONS:   Current Discharge Medication List      START taking these medications    Details   acetaminophen (TYLENOL) 500 MG tablet Take 2 tablets (1,000 mg) by mouth every 6 hours    Associated Diagnoses: Mediastinal mass      methocarbamol (ROBAXIN) 500 MG tablet Take 1 tablet (500 mg) by mouth 3 times daily as needed for muscle spasms  Qty: 20 tablet, Refills: 0    Associated Diagnoses: Mediastinal mass      oxyCODONE (ROXICODONE) 5 MG tablet Take 1 tablet (5 mg) by mouth every 4 hours as needed for pain  Qty: 40 tablet, Refills: 0    Associated Diagnoses: Mediastinal mass      polyethylene glycol (MIRALAX) 17 g packet Take 17 g by mouth daily  Qty: 7 packet, Refills: 0    Associated Diagnoses: Mediastinal mass      senna-docusate (SENOKOT-S/PERICOLACE) 8.6-50 MG tablet Take 1 tablet by mouth 2 times daily  Qty: 14 tablet, Refills: 0    Associated Diagnoses: Mediastinal mass         CONTINUE these medications which have NOT CHANGED    Details   !! amphetamine-dextroamphetamine (ADDERALL) 20 MG tablet Take 1 tablet (20 mg) by mouth 3 times daily for 30 days  Qty: 90 tablet, Refills: 0    Associated Diagnoses: Attention deficit hyperactivity disorder (ADHD), predominantly inattentive type      !! amphetamine-dextroamphetamine (ADDERALL) 20 MG tablet Take 1 tablet (20 mg) by mouth 3 times daily for 30 days  Qty: 90 tablet, Refills: 0    Associated Diagnoses: Attention deficit hyperactivity disorder (ADHD), predominantly inattentive type      !! amphetamine-dextroamphetamine (ADDERALL) 20 MG tablet Take 1 tablet (20 mg) by mouth 3 times daily for 30 days  Qty: 90 tablet, Refills: 0    Associated Diagnoses: Attention deficit hyperactivity disorder (ADHD), predominantly inattentive type      omeprazole (PRILOSEC) 20 MG DR capsule Take 20 mg by " mouth every morning      SUMAtriptan (IMITREX) 50 MG tablet Take 1 tablet (50 mg) by mouth at onset of headache for migraine May repeat in 2 hours. Max 4 tablets/24 hours.  Qty: 9 tablet, Refills: 4    Associated Diagnoses: Migraine without aura and without status migrainosus, not intractable      valACYclovir (VALTREX) 1000 mg tablet Take 1,000 mg by mouth daily as needed (COLD SORES)      !! amphetamine-dextroamphetamine (ADDERALL) 20 MG tablet Take 1 tablet (20 mg) by mouth 3 times daily for 30 days  Qty: 90 tablet, Refills: 0    Associated Diagnoses: Attention deficit hyperactivity disorder (ADHD), predominantly inattentive type       !! - Potential duplicate medications found. Please discuss with provider.

## 2023-04-27 NOTE — PLAN OF CARE
Goal Outcome Evaluation: 1930-0700    A&O x4. Pleasant. SBA. VSS on RA. Pain was controlled with oxycodone x2, Robaxin x2, Tylenol x2 and ice packs. Chest tube to water seal, sero-sanginuous drainage. Tolerated clear liquids for >12 hours, diet advanced to fulls. Voiding spontaneously with adequate output. PIV infusing LR @ 30 ml/hr. Demonstrating good use of IS.     Had chest x-ray this morning, normal. Lovenox scheduled to start this am.

## 2023-04-28 ENCOUNTER — PATIENT OUTREACH (OUTPATIENT)
Dept: CARE COORDINATION | Facility: CLINIC | Age: 38
End: 2023-04-28
Payer: COMMERCIAL

## 2023-04-28 NOTE — PROGRESS NOTES
[facility]    Background: Transitional Care Management program identified per system criteria and reviewed by [facility] team for possible outreach.    Assessment: Upon chart review, CCR Team member will not proceed with patient outreach related to this episode of Transitional Care Management program due to reason below:    Patient has active communication with a nurse, provider or care team for reason of post-hospital follow up plan.  Outreach call by CCRC team not indicated to minimize duplicative efforts.     Plan: Transitional Care Management episode addressed appropriately per reason noted above.      Misa Jordan MA  [facility] Care Resource [facility]    *Connected Care Resource Team does NOT follow patient ongoing. Referrals are identified based on internal discharge reports and the outreach is to ensure patient has an understanding of their discharge instructions.

## 2023-05-02 ENCOUNTER — MYC REFILL (OUTPATIENT)
Dept: SURGERY | Facility: CLINIC | Age: 38
End: 2023-05-02
Payer: COMMERCIAL

## 2023-05-02 DIAGNOSIS — J98.59 MEDIASTINAL MASS: ICD-10-CM

## 2023-05-02 RX ORDER — OXYCODONE HYDROCHLORIDE 5 MG/1
5 TABLET ORAL EVERY 4 HOURS PRN
Qty: 40 TABLET | Refills: 0 | Status: SHIPPED | OUTPATIENT
Start: 2023-05-02 | End: 2023-05-02

## 2023-05-03 NOTE — TELEPHONE ENCOUNTER
Pt requesting this go to [facility].  It was refilled yesterday.    Routing to  Thoracic Care Coordination and Dr. Moore

## 2023-05-04 RX ORDER — OXYCODONE HYDROCHLORIDE 5 MG/1
5 TABLET ORAL EVERY 4 HOURS PRN
Qty: 40 TABLET | Refills: 0 | Status: SHIPPED | OUTPATIENT
Start: 2023-05-04 | End: 2023-05-10

## 2023-05-10 ENCOUNTER — DOCUMENTATION ONLY (OUTPATIENT)
Dept: SURGERY | Facility: CLINIC | Age: 38
End: 2023-05-10
Payer: COMMERCIAL

## 2023-05-10 NOTE — PROGRESS NOTES
Patient requesting another refill of oxycodone and methocarbamol. Patient had surgery 04/26/2023 and was discharged with 40 tablets of oxycodone and 20 tablets of methocarbamol. She was given a refill by Dr. Moore on 05/04/2023 of the same quantity.     checked. No active alerts for this patient. Narc score is 260. Overdose risk score is 310. Average MME/day is 20 MME/day. I will give her a small refill and will change the dosing of the oxycodone to every 8 hours as needed versus every 4 hours as needed. I will give her another refill of methocarbamol as well.    Our nurses will instruct her to wean off the oxycodone and to alternate the oxycodone with Tylenol and ibuprofen.

## 2023-05-11 ENCOUNTER — NURSE TRIAGE (OUTPATIENT)
Dept: NURSING | Facility: CLINIC | Age: 38
End: 2023-05-11
Payer: COMMERCIAL

## 2023-05-11 NOTE — TELEPHONE ENCOUNTER
"Nurse Triage SBAR    Is this a 2nd Level Triage? NO    Situation:   Patient calling about new onset of nausea and vomiting.    Background:   Nausea for past two days. Vomiting started two hours ago. Recent surgery two weeks ago.     Assessment:   Vomiting for past two hours. Describes it as \"spit\". No difficulty breathing, fever, or abdominal pain present.    Protocol Recommended Disposition:   Home Care, See More Appropriate Guideline    Recommendation:   Home care advise given. Patient expressed concern she's unable to tell if there is blood in vomit, advised ED is an option if she thinks there might be blood in vomit.         Does the patient meet one of the following criteria for ADS visit consideration? 16+ years old, with an [facility] PCP     TIP  Providers, please consider if this condition is appropriate for management at one of our Acute and Diagnostic Services sites.     If patient is a good candidate, please use dotphrase <dot>triageresponse and select Refer to ADS to document.    Reason for Disposition    Vomiting occurs    MILD or MODERATE vomiting (e.g., 1 - 5 times / day)    Additional Information    Negative: [1] Nausea or vomiting AND [2] pregnancy < 20 weeks    Negative: Difficult to awaken or acting confused (e.g., disoriented, slurred speech)    Negative: Shock suspected (e.g., cold/pale/clammy skin, too weak to stand, low BP, rapid pulse)    Negative: Sounds like a life-threatening emergency to the triager    Negative: [1] Vomiting AND [2] contains red blood or black (\"coffee ground\") material  (Exception: few red streaks in vomit that only happened once)    Negative: Severe pain in one eye    Negative: Recent head injury (within last 3 days)    Negative: Recent abdominal injury (within last 3 days)    Negative: [1] Insulin-dependent diabetes (Type I) AND [2] glucose > 400 mg/dl (22 mmol/l)    Negative: [1] Vomiting AND [2] hernia is more painful or swollen than usual    Negative: [1] SEVERE " vomiting (e.g., 6 or more times/day) AND [2] present > 8 hours (Exception: patient sounds well, is drinking liquids, does not sound dehydrated, and vomiting has lasted less than 24 hours)    Negative: [1] MODERATE vomiting (e.g., 3 - 5 times/day) AND [2] age > 60 years    Negative: Severe headache (e.g., excruciating) (Exception: similar to previous migraines)    Negative: High-risk adult (e.g., diabetes mellitus, brain tumor, V-P shunt, hernia)    Negative: [1] Drinking very little AND [2] dehydration suspected (e.g., no urine > 12 hours, very dry mouth, very lightheaded)    Negative: [1] Vomiting AND [2] abdomen looks much more swollen than usual    Negative: [1] Vomiting AND [2] contains bile (green color)    Negative: [1] Constant abdominal pain AND [2] present > 2 hours    Negative: [1] Fever > 103 F (39.4 C) AND [2] not able to get the fever down using Fever Care Advice    Negative: [1] Fever > 101 F (38.3 C) AND [2] age > 60 years    Negative: [1] Fever > 100.0 F (37.8 C) AND [2] bedridden (e.g., nursing home patient, CVA, chronic illness, recovering from surgery)    Negative: [1] Fever > 100.0 F (37.8 C) AND [2] weak immune system (e.g., HIV positive, cancer chemo, splenectomy, organ transplant, chronic steroids)    Negative: Taking any of the following medications: digoxin (Lanoxin), lithium, theophylline, phenytoin (Dilantin)    Negative: [1] MILD or MODERATE vomiting AND [2] present > 48 hours (2 days) (Exception: mild vomiting with associated diarrhea)    Negative: Fever present > 3 days (72 hours)    Negative: Vomiting a prescription medication    Negative: [1] MILD vomiting with diarrhea AND [2] present > 5 days    Negative: Substance use (drug use) or unhealthy alcohol use, known or suspected    Negative: Patient sounds very sick or weak to the triager    Negative: Vomiting is a chronic symptom (recurrent or ongoing AND present > 4 weeks)    Negative: [1] SEVERE vomiting (e.g., 6 or more times/day,  vomits everything) BUT [2] hydrated    Protocols used: NAUSEA-A-TYLER, VOMITING-A-TYLER Grajeda RN on 5/11/2023 at 6:31 PM

## 2023-05-22 ENCOUNTER — HOSPITAL ENCOUNTER (OUTPATIENT)
Dept: GENERAL RADIOLOGY | Facility: CLINIC | Age: 38
Discharge: HOME | End: 2023-05-22
Attending: PHYSICIAN ASSISTANT | Admitting: PHYSICIAN ASSISTANT
Payer: COMMERCIAL

## 2023-05-22 ENCOUNTER — ONCOLOGY VISIT (OUTPATIENT)
Dept: ONCOLOGY | Facility: CLINIC | Age: 38
End: 2023-05-22
Attending: THORACIC SURGERY (CARDIOTHORACIC VASCULAR SURGERY)
Payer: COMMERCIAL

## 2023-05-22 VITALS
SYSTOLIC BLOOD PRESSURE: 126 MMHG | BODY MASS INDEX: 25.99 KG/M2 | HEART RATE: 85 BPM | OXYGEN SATURATION: 97 % | RESPIRATION RATE: 16 BRPM | WEIGHT: 158.6 LBS | DIASTOLIC BLOOD PRESSURE: 86 MMHG

## 2023-05-22 DIAGNOSIS — Q34.1 MEDIASTINAL CYST: Primary | ICD-10-CM

## 2023-05-22 DIAGNOSIS — J98.59 MEDIASTINAL MASS: ICD-10-CM

## 2023-05-22 PROCEDURE — 99024 POSTOP FOLLOW-UP VISIT: CPT | Performed by: THORACIC SURGERY (CARDIOTHORACIC VASCULAR SURGERY)

## 2023-05-22 PROCEDURE — 71046 X-RAY EXAM CHEST 2 VIEWS: CPT

## 2023-05-22 PROCEDURE — G0463 HOSPITAL OUTPT CLINIC VISIT: HCPCS | Performed by: THORACIC SURGERY (CARDIOTHORACIC VASCULAR SURGERY)

## 2023-05-22 ASSESSMENT — PAIN SCALES - GENERAL: PAINLEVEL: MILD PAIN (2)

## 2023-05-22 NOTE — LETTER
5/22/2023         RE: Ángela Zurita  [address] [place] [id number]        Dear Colleague,    Thank you for referring your patient, Ángela Zurita, to the [facility]. Please see a copy of my visit note below.    THORACIC SURGERY FOLLOW UP VISIT    Dear Dr. Graf,  I saw Ángela Zurita in follow-up today. The clinical summary follows:     PREOP DIAGNOSIS   Mediastinal cyst  PROCEDURE   Right robot-assisted thoracoscopic resection of mediastinal cyst  DATE OF PROCEDURE  4/26/2023    COMPLICATIONS  None    INTERVAL STUDIES  Pathology: A.  Lymph node, level 8, excision-  Benign lymph node    B.  Soft tissue, mediastinum, excision-  Benign foregut cyst compatible with esophageal cyst    C.  Lymph node, level 7, excision-  Benign lymph node    Chest x-ray 5/22/2023:      /86   Pulse 85   Resp 16   Wt 71.9 kg (158 lb 9.6 oz)   LMP 03/22/2023 (Within Days)   SpO2 97%   BMI 25.99 kg/m     Physical Exam  Constitutional:       Appearance: Normal appearance.   Eyes:      Conjunctiva/sclera: Conjunctivae normal.   Cardiovascular:      Rate and Rhythm: Normal rate.   Pulmonary:      Effort: Pulmonary effort is normal.   Skin:     General: Skin is warm and dry.      Comments: Incisions healing well   Neurological:      Mental Status: She is alert and oriented to person, place, and time.   Psychiatric:         Mood and Affect: Mood normal.         Behavior: Behavior normal.         Thought Content: Thought content normal.         Judgment: Judgment normal.          SUBJECTIVE  Ms. Zurita is doing well overall. She has some pain which is largely relieved with Tylenol, but taking pain meds makes her nauseous. She had two 24 hour episodes of nausea and vomiting with no diarrhea, the last 3 days ago. She is walking, but not too much.    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      PLAN  I spent 15 min on the date of the encounter in chart review, patient visit, review of tests, documentation and/or discussion with other providers about the issues documented above. I reviewed the plan as follows:  She may resume normal activities and return to work.     The vomiting episodes seem infectious by the time course, but she will let me know if it happens again and I would plan for further workup.    All questions were answered and the patient and present family were in agreement with the plan.  I appreciate the opportunity to participate in the care of your patient and will keep you updated.  Sincerely,     Pa Moore MD       Again, thank you for allowing me to participate in the care of your patient.        Sincerely,        Pa Moore MD

## 2023-05-22 NOTE — PROGRESS NOTES
THORACIC SURGERY FOLLOW UP VISIT    Dear Dr. Graf,  I saw Ángela Zurita in follow-up today. The clinical summary follows:     PREOP DIAGNOSIS   Mediastinal cyst  PROCEDURE   Right robot-assisted thoracoscopic resection of mediastinal cyst  DATE OF PROCEDURE  4/26/2023    COMPLICATIONS  None    INTERVAL STUDIES  Pathology: A.  Lymph node, level 8, excision-  Benign lymph node    B.  Soft tissue, mediastinum, excision-  Benign foregut cyst compatible with esophageal cyst    C.  Lymph node, level 7, excision-  Benign lymph node    Chest x-ray 5/22/2023:      /86   Pulse 85   Resp 16   Wt 71.9 kg (158 lb 9.6 oz)   LMP 03/22/2023 (Within Days)   SpO2 97%   BMI 25.99 kg/m     Physical Exam  Constitutional:       Appearance: Normal appearance.   Eyes:      Conjunctiva/sclera: Conjunctivae normal.   Cardiovascular:      Rate and Rhythm: Normal rate.   Pulmonary:      Effort: Pulmonary effort is normal.   Skin:     General: Skin is warm and dry.      Comments: Incisions healing well   Neurological:      Mental Status: She is alert and oriented to person, place, and time.   Psychiatric:         Mood and Affect: Mood normal.         Behavior: Behavior normal.         Thought Content: Thought content normal.         Judgment: Judgment normal.          SUBJECTIVE  Ms. Zurita is doing well overall. She has some pain which is largely relieved with Tylenol, but taking pain meds makes her nauseous. She had two 24 hour episodes of nausea and vomiting with no diarrhea, the last 3 days ago. She is walking, but not too much.    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      PLAN  I spent 15 min on the date of the encounter in chart review, patient visit, review of tests, documentation and/or discussion with other providers about the issues documented  above. I reviewed the plan as follows:  She may resume normal activities and return to work.     The vomiting episodes seem infectious by the time course, but she will let me know if it happens again and I would plan for further workup.    All questions were answered and the patient and present family were in agreement with the plan.  I appreciate the opportunity to participate in the care of your patient and will keep you updated.  Sincerely,     Pa Moore MD

## 2023-06-12 ENCOUNTER — MYC MEDICAL ADVICE (OUTPATIENT)
Dept: FAMILY MEDICINE | Facility: CLINIC | Age: 38
End: 2023-06-12
Payer: COMMERCIAL

## 2023-06-12 NOTE — TELEPHONE ENCOUNTER
Routed to out of office provider.    Pt called asking for early refill today of Adderall, 20 mg, TID? Next fill is due 6/22/23 according to the chart.    She is leaving on vacation for a wedding before the pharmacy opens tomorrow morning and if she does not get refilled today, pt will be out of Adderall for 4 days.  Pt will be gone 10 days getting back 6/22/23.    Please call pt when addressed.    Deb Sexton RN

## 2023-06-12 NOTE — TELEPHONE ENCOUNTER
Called pharmacy to give ok to fill early. Insurance will not cover until 6/14 so pt would have to pay out of pocket. Sent mychart explaining this to pt  They will fill for pt today.      Chavo Knight RN

## 2023-06-12 NOTE — TELEPHONE ENCOUNTER
Clinic staff - ok to call pharmacy to fill early, RICARDO Graf already sent rx. Please advise patient that next rx cannot be refilled prior to 7/15/23.  Luly Ramachandran, CNP

## 2023-06-30 ENCOUNTER — TELEPHONE (OUTPATIENT)
Dept: FAMILY MEDICINE | Facility: CLINIC | Age: 38
End: 2023-06-30
Payer: COMMERCIAL

## 2023-06-30 NOTE — TELEPHONE ENCOUNTER
Pt calls & asks for appt's for her 2 children who both have sore throats.   Pt also has sore throat, fatigue-feels run down. No fever.    appts were made for her 2 children for today.  Pt told she could send an evisit or do virtual visit or UC.  Pt will wait to see what results are for her children & go from there.    Zuleima Cade RN

## 2023-07-11 DIAGNOSIS — B00.1 HERPES LABIALIS: Primary | ICD-10-CM

## 2023-07-12 RX ORDER — VALACYCLOVIR HYDROCHLORIDE 1 G/1
2000 TABLET, FILM COATED ORAL 2 TIMES DAILY
Qty: 8 TABLET | Refills: 1 | Status: SHIPPED | OUTPATIENT
Start: 2023-07-12 | End: 2023-10-16

## 2023-07-12 NOTE — TELEPHONE ENCOUNTER
"Routing refill request to provider for review/approval because:  Medication is reported/historical    Pending Prescriptions:                       Disp   Refills    valACYclovir (VALTREX) 1000 mg tablet [Ph*4 tabl*1            Sig: TAKE 2 TABLETS (2,000 MG) BY MOUTH 2 TIMES DAILY           FOR 1 DAY      Requested Prescriptions   Pending Prescriptions Disp Refills    valACYclovir (VALTREX) 1000 mg tablet [Pharmacy Med Name: VALACYCLOVIR HCL 1GM TABS] 4 tablet 1     Sig: TAKE 2 TABLETS (2,000 MG) BY MOUTH 2 TIMES DAILY FOR 1 DAY       Antivirals for Herpes Protocol Passed - 7/11/2023  8:15 PM        Passed - Patient is age 12 or older        Passed - Recent (12 mo) or future (30 days) visit within the authorizing provider's specialty     Patient has had an office visit with the authorizing provider or a provider within the authorizing providers department within the previous 12 mos or has a future within next 30 days. See \"Patient Info\" tab in inbasket, or \"Choose Columns\" in Meds & Orders section of the refill encounter.              Passed - Medication is active on med list        Passed - Normal serum creatinine on file in past 12 months     Recent Labs   Lab Test 04/27/23  0505   CR 0.84       Ok to refill medication if creatinine is low             Mone Cifuentes RN on 7/12/2023 at 1:53 PM    "

## 2023-07-13 ENCOUNTER — E-VISIT (OUTPATIENT)
Dept: FAMILY MEDICINE | Facility: CLINIC | Age: 38
End: 2023-07-13
Payer: COMMERCIAL

## 2023-07-13 DIAGNOSIS — F90.0 ATTENTION DEFICIT HYPERACTIVITY DISORDER (ADHD), PREDOMINANTLY INATTENTIVE TYPE: ICD-10-CM

## 2023-07-13 PROCEDURE — 99207 PR NON-BILLABLE SERV PER CHARTING: CPT | Performed by: NURSE PRACTITIONER

## 2023-07-13 RX ORDER — DEXTROAMPHETAMINE SACCHARATE, AMPHETAMINE ASPARTATE, DEXTROAMPHETAMINE SULFATE AND AMPHETAMINE SULFATE 5; 5; 5; 5 MG/1; MG/1; MG/1; MG/1
20 TABLET ORAL 3 TIMES DAILY
Qty: 90 TABLET | Refills: 0 | Status: SHIPPED | OUTPATIENT
Start: 2023-07-14 | End: 2023-11-01

## 2023-07-13 NOTE — PATIENT INSTRUCTIONS
Thank you for choosing us for your care. I have placed an order for a prescription so that you can start treatment. View your full visit summary for details by clicking on the link below. Your pharmacist will able to address any questions you may have about the medication.     If you're not feeling better within 5-7 days, please schedule an appointment.  You can schedule an appointment right here in [facility], or call [phone]  If the visit is for the same symptoms as your eVisit, we'll refund the cost of your eVisit if seen within seven days.

## 2023-08-05 ENCOUNTER — MYC REFILL (OUTPATIENT)
Dept: FAMILY MEDICINE | Facility: CLINIC | Age: 38
End: 2023-08-05
Payer: COMMERCIAL

## 2023-08-05 DIAGNOSIS — F90.0 ATTENTION DEFICIT HYPERACTIVITY DISORDER (ADHD), PREDOMINANTLY INATTENTIVE TYPE: ICD-10-CM

## 2023-08-06 ENCOUNTER — E-VISIT (OUTPATIENT)
Dept: FAMILY MEDICINE | Facility: CLINIC | Age: 38
End: 2023-08-06
Payer: COMMERCIAL

## 2023-08-06 DIAGNOSIS — F90.0 ATTENTION DEFICIT HYPERACTIVITY DISORDER (ADHD), PREDOMINANTLY INATTENTIVE TYPE: Primary | ICD-10-CM

## 2023-08-06 PROCEDURE — 99207 PR NO CHARGE LOS: CPT | Performed by: FAMILY MEDICINE

## 2023-08-07 RX ORDER — DEXTROAMPHETAMINE SACCHARATE, AMPHETAMINE ASPARTATE, DEXTROAMPHETAMINE SULFATE AND AMPHETAMINE SULFATE 5; 5; 5; 5 MG/1; MG/1; MG/1; MG/1
20 TABLET ORAL 3 TIMES DAILY
Qty: 90 TABLET | Refills: 0 | Status: SHIPPED | OUTPATIENT
Start: 2023-08-07 | End: 2023-09-06

## 2023-08-07 RX ORDER — DEXTROAMPHETAMINE SACCHARATE, AMPHETAMINE ASPARTATE, DEXTROAMPHETAMINE SULFATE AND AMPHETAMINE SULFATE 5; 5; 5; 5 MG/1; MG/1; MG/1; MG/1
20 TABLET ORAL 3 TIMES DAILY
Qty: 90 TABLET | Refills: 0 | Status: SHIPPED | OUTPATIENT
Start: 2023-09-07 | End: 2023-10-07

## 2023-08-07 RX ORDER — DEXTROAMPHETAMINE SACCHARATE, AMPHETAMINE ASPARTATE, DEXTROAMPHETAMINE SULFATE AND AMPHETAMINE SULFATE 5; 5; 5; 5 MG/1; MG/1; MG/1; MG/1
20 TABLET ORAL 3 TIMES DAILY
Qty: 90 TABLET | Refills: 0 | OUTPATIENT
Start: 2023-08-07

## 2023-08-07 RX ORDER — DEXTROAMPHETAMINE SACCHARATE, AMPHETAMINE ASPARTATE, DEXTROAMPHETAMINE SULFATE AND AMPHETAMINE SULFATE 5; 5; 5; 5 MG/1; MG/1; MG/1; MG/1
20 TABLET ORAL 3 TIMES DAILY
Qty: 90 TABLET | Refills: 0 | Status: SHIPPED | OUTPATIENT
Start: 2023-10-08 | End: 2023-11-07

## 2023-08-07 NOTE — PATIENT INSTRUCTIONS
Thank you for choosing us for your care. I have placed an order for a prescriptions for the next 3 months, so that you can continue treatment. View your full visit summary for details by clicking on the link below. Your pharmacist will able to address any questions you may have about the medication.     You are due for your yearly in clinic appt for refills.  Please schedule this now for early November.    If you're not feeling better within 5-7 days, please schedule an appointment.  You can schedule an appointment right here in [facility], or call [phone]  If the visit is for the same symptoms as your eVisit, we'll refund the cost of your eVisit if seen within seven days.      Thank you,  Randy Graf MD

## 2023-08-31 ENCOUNTER — MYC MEDICAL ADVICE (OUTPATIENT)
Dept: FAMILY MEDICINE | Facility: CLINIC | Age: 38
End: 2023-08-31
Payer: COMMERCIAL

## 2023-08-31 NOTE — TELEPHONE ENCOUNTER
Dr Graf  Pt sends [facility] request for early fill on adderall. Needs today for vacation      Chavo Knight RN

## 2023-09-01 NOTE — TELEPHONE ENCOUNTER
Reached pharmacy and gave verbal order to pharmacist Kwesi to fill Adderall Rx today, per PCP's direction below.  MyChart reply sent to patient and closing encounter.     Alba Harris RN  [facility]

## 2023-09-01 NOTE — TELEPHONE ENCOUNTER
Yes, please notify pharmacy. OK to fill early due to out of town travel, today 9/1/23.  Thank you,  Randy Graf MD

## 2023-09-10 ENCOUNTER — HEALTH MAINTENANCE LETTER (OUTPATIENT)
Age: 38
End: 2023-09-10

## 2023-09-20 ENCOUNTER — MYC MEDICAL ADVICE (OUTPATIENT)
Dept: FAMILY MEDICINE | Facility: CLINIC | Age: 38
End: 2023-09-20

## 2023-09-20 ENCOUNTER — E-VISIT (OUTPATIENT)
Dept: URGENT CARE | Facility: CLINIC | Age: 38
End: 2023-09-20
Payer: COMMERCIAL

## 2023-09-20 DIAGNOSIS — F41.1 GAD (GENERALIZED ANXIETY DISORDER): ICD-10-CM

## 2023-09-20 DIAGNOSIS — F32.A DEPRESSION, UNSPECIFIED DEPRESSION TYPE: Primary | ICD-10-CM

## 2023-09-20 DIAGNOSIS — F33.1 MODERATE EPISODE OF RECURRENT MAJOR DEPRESSIVE DISORDER (H): Primary | ICD-10-CM

## 2023-09-20 PROCEDURE — 99207 PR NON-BILLABLE SERV PER CHARTING: CPT | Performed by: NURSE PRACTITIONER

## 2023-09-20 NOTE — PATIENT INSTRUCTIONS
Thank you for choosing us for your care. Based on the information provided, I believe you need to be seen immediately.  Please go the emergency department as soon as possible.     You will not be charged for this eVisit.

## 2023-09-21 NOTE — TELEPHONE ENCOUNTER
Dr. Graf,    Pt experiencing depression, would like to go back on medication. I contacted pt and she was tearful and hung up because unable to come in for an office visit due to work and home responsibilities. Attempted to call pt back to see what medication she was on but no answer, left message. Pt has an appointment in Nov but wanted to start medication sooner. Pt denies SI/HI and says she can live with it but has been difficult. Please mychart message.    Shellie He RN

## 2023-10-04 ENCOUNTER — VIRTUAL VISIT (OUTPATIENT)
Dept: BEHAVIORAL HEALTH | Facility: CLINIC | Age: 38
End: 2023-10-04
Payer: COMMERCIAL

## 2023-10-04 DIAGNOSIS — F41.1 GAD (GENERALIZED ANXIETY DISORDER): ICD-10-CM

## 2023-10-04 DIAGNOSIS — F33.1 MODERATE EPISODE OF RECURRENT MAJOR DEPRESSIVE DISORDER (H): ICD-10-CM

## 2023-10-04 PROCEDURE — 90834 PSYTX W PT 45 MINUTES: CPT | Mod: VID

## 2023-10-04 ASSESSMENT — ANXIETY QUESTIONNAIRES
IF YOU CHECKED OFF ANY PROBLEMS ON THIS QUESTIONNAIRE, HOW DIFFICULT HAVE THESE PROBLEMS MADE IT FOR YOU TO DO YOUR WORK, TAKE CARE OF THINGS AT HOME, OR GET ALONG WITH OTHER PEOPLE: SOMEWHAT DIFFICULT
7. FEELING AFRAID AS IF SOMETHING AWFUL MIGHT HAPPEN: SEVERAL DAYS
3. WORRYING TOO MUCH ABOUT DIFFERENT THINGS: MORE THAN HALF THE DAYS
6. BECOMING EASILY ANNOYED OR IRRITABLE: NOT AT ALL
1. FEELING NERVOUS, ANXIOUS, OR ON EDGE: MORE THAN HALF THE DAYS
GAD7 TOTAL SCORE: 9
4. TROUBLE RELAXING: MORE THAN HALF THE DAYS
5. BEING SO RESTLESS THAT IT IS HARD TO SIT STILL: NOT AT ALL
GAD7 TOTAL SCORE: 9
2. NOT BEING ABLE TO STOP OR CONTROL WORRYING: MORE THAN HALF THE DAYS

## 2023-10-04 ASSESSMENT — PATIENT HEALTH QUESTIONNAIRE - PHQ9
SUM OF ALL RESPONSES TO PHQ QUESTIONS 1-9: 9
SUM OF ALL RESPONSES TO PHQ QUESTIONS 1-9: 9
10. IF YOU CHECKED OFF ANY PROBLEMS, HOW DIFFICULT HAVE THESE PROBLEMS MADE IT FOR YOU TO DO YOUR WORK, TAKE CARE OF THINGS AT HOME, OR GET ALONG WITH OTHER PEOPLE: SOMEWHAT DIFFICULT

## 2023-10-04 NOTE — PROGRESS NOTES
[facility] Primary Care: Integrated Behavioral Health  October 4, 2023    Behavioral Health Clinician Progress Note    Patient Name: Ángela Zurita       Service Type:  Individual      Service Location:   MyChart / Email (patient reached)     Session Start Time: 8:40am  Session End Time: 9:20am      Session Length: 38 - 52      Attendees: Patient     Service Modality:  Video Visit:      Provider verified identity through the following two step process.  Patient provided:  Patient photo    Telemedicine Visit: The patient's condition can be safely assessed and treated via synchronous audio and visual telemedicine encounter.      Reason for Telemedicine Visit: Patient has requested telehealth visit    Originating Site (Patient Location): Patient's home    Distant Site (Provider Location): [facility] MENTAL [facility] ADDICTION [facility]    Consent:  The patient/guardian has verbally consented to: the potential risks and benefits of telemedicine (video visit) versus in person care; bill my insurance or make self-payment for services provided; and responsibility for payment of non-covered services.     Patient would like the video invitation sent by:  My Chart    Mode of Communication:  Video Conference via [facility]    Distant Location (Provider):  Off-site    As the provider I attest to compliance with applicable laws and regulations related to telemedicine.    Visit Activities (Refresh list every visit): NEW, [facility] Only, and Referral - Mental Health    Diagnostic Assessment Date: Will complete in the next few sessions, not completed due to time constraints.    Treatment Plan Review Date: Will complete in the next few sessions, not completed due to time constraints.    See Flowsheets for today's PHQ-9 and NADIA-7 results  Previous PHQ-9:       9/24/2019     2:19 PM 1/7/2021     3:19 PM 10/4/2023     8:27 AM   PHQ-9 SCORE   PHQ-9 Total Score [facility]  8 (Mild depression) 9 (Mild depression)    PHQ-9 Total Score 8 8 9     Previous NADIA-7:       7/9/2019     2:38 PM 10/4/2023     8:28 AM   NADIA-7 SCORE   Total Score  9 (mild anxiety)   Total Score 12 9     DATA  Extended Session (60+ minutes): No  Interactive Complexity: No  Crisis: No  [facility] Patient: No    Treatment Objective(s) Addressed in This Session:  Target Behavior(s): alcohol use and disease management/lifestyle changes divorce, emotional, difficulties managing emotional      Anxiety: will experience a reduction in anxiety, will develop more effective coping skills to manage anxiety symptoms, will develop healthy cognitive patterns and beliefs, and will increase ability to function adaptively    Current Stressors / Issues:  Pt met with writer, virtually due to distance.  Discussed her recent increase in emotions due to being in process of divorce.  Discussed how she was managing her thoughts and difficulties with practicing her coping skills.  Review and provide education on skills and managing her thoughts/emotions, especially when she does not have her children as a distraction.    Progress on Treatment Objective(s) / Homework:  New Objective established this session - CONTEMPLATION (Considering change and yet undecided); Intervened by assessing the negative and positive thinking (ambivalence) about behavior change    Motivational Interviewing    MI Intervention: Expressed Empathy/Understanding, Supported Autonomy, Collaboration, Evocation, Open-ended questions, and Reflections: simple and complex     Change Talk Expressed by the Patient: Desire to change Ability to change    Provider Response to Change Talk: A - Affirmed patient's thoughts, decisions, or attempts at behavior change, R - Reflected patient's change talk, and S - Summarized patient's change talk statements    Assessments completed prior to visit:  The following assessments were completed by patient for this visit:  PHQ9:       10/20/2014    10:00 AM 1/14/2019     2:33 PM 4/30/2019     10:15 AM 7/9/2019     2:38 PM 9/24/2019     2:19 PM 1/7/2021     3:19 PM 10/4/2023     8:27 AM   PHQ-9 SCORE   PHQ-9 Total Score 6         PHQ-9 Total Score MyChart      8 (Mild depression) 9 (Mild depression)   PHQ-9 Total Score  6 16 16 8 8 9     GAD7:       7/9/2019     2:38 PM 10/4/2023     8:28 AM   NADIA-7 SCORE   Total Score  9 (mild anxiety)   Total Score 12 9     CAGE-AID:       10/4/2023     8:39 AM   CAGE-AID Total Score   Total Score 1   Total Score MyChart 1 (A total score of 2 or greater is considered clinically significant)     PROMIS 10-Global Health (only subscores and total score):       10/4/2023     8:39 AM   PROMIS-10 Scores Only   Global Mental Health Score 8   Global Physical Health Score 15   PROMIS TOTAL - SUBSCORES 23     [place] Suicide Severity Rating Scale (Lifetime/Recent)      12/29/2018     2:17 PM 1/29/2019     4:45 PM 3/3/2019     3:48 PM 3/22/2019    10:20 AM   [place] Suicide Severity Rating (Lifetime/Recent)   Q1 Wished to be Dead (Past Month) no no no no   Q2 Suicidal Thoughts (Past Month) no no no no   Q6 Suicide Behavior (Lifetime) no no no no       Care Plan review completed: Yes    Medication Review:  No current psychiatric medications prescribed    Medication Compliance:  NA    Changes in Health Issues:   None reported    Chemical Use Review:   Substance Use: Chemical use reviewed, no active concerns identified      Tobacco Use: No current tobacco use.      Assessment: Current Emotional / Mental Status (status of significant symptoms):  Risk status (Self / Other harm or suicidal ideation)  Patient denies a history of suicidal ideation, suicide attempts, self-injurious behavior, homicidal ideation, homicidal behavior, and and other safety concerns  Patient denies current fears or concerns for personal safety.  Patient denies current or recent suicidal ideation or behaviors.  Patient denies current or recent homicidal ideation or behaviors.  Patient denies current or recent  self injurious behavior or ideation.  Patient denies other safety concerns.  A safety and risk management plan has not been developed at this time, however patient was encouraged to call [place] [phone] should there be a change in any of these risk factors.    Appearance:   Appropriate   Eye Contact:   Fair   Psychomotor Behavior: Normal   Attitude:   Cooperative   Orientation:   All  Speech   Rate / Production: Normal    Volume:  Normal   Mood:    Normal  Affect:    Appropriate   Thought Content:  Clear   Thought Form:  Coherent  Logical   Insight:    Fair     Diagnoses:  1. Moderate episode of recurrent major depressive disorder (H)    2. NADIA (generalized anxiety disorder)        Collateral Reports Completed:  Not Applicable    Plan: (Homework, other):  Patient was given information about behavioral services and encouraged to schedule a follow up appointment with the clinic [facility] in 2 weeks.  She was also given information about mental health symptoms and treatment options .  CD Recommendations: No indications of CD issues.     ASCENCION Murry 10/4/23    Answers submitted by the patient for this visit:  Patient Health Questionnaire (Submitted on 10/4/2023)  If you checked off any problems, how difficult have these problems made it for you to do your work, take care of things at home, or get along with other people?: Somewhat difficult  PHQ9 TOTAL SCORE: 9  NADIA-7 (Submitted on 10/4/2023)  NADIA 7 TOTAL SCORE: 9

## 2023-10-14 DIAGNOSIS — B00.1 HERPES LABIALIS: ICD-10-CM

## 2023-10-16 RX ORDER — VALACYCLOVIR HYDROCHLORIDE 1 G/1
2000 TABLET, FILM COATED ORAL 2 TIMES DAILY
Qty: 8 TABLET | Refills: 1 | Status: SHIPPED | OUTPATIENT
Start: 2023-10-16 | End: 2023-12-29

## 2023-10-18 ENCOUNTER — VIRTUAL VISIT (OUTPATIENT)
Dept: BEHAVIORAL HEALTH | Facility: CLINIC | Age: 38
End: 2023-10-18
Payer: COMMERCIAL

## 2023-10-18 DIAGNOSIS — F33.1 MODERATE EPISODE OF RECURRENT MAJOR DEPRESSIVE DISORDER (H): Primary | ICD-10-CM

## 2023-10-18 DIAGNOSIS — F41.1 GAD (GENERALIZED ANXIETY DISORDER): ICD-10-CM

## 2023-10-18 PROCEDURE — 90791 PSYCH DIAGNOSTIC EVALUATION: CPT | Mod: 95

## 2023-10-18 ASSESSMENT — COLUMBIA-SUICIDE SEVERITY RATING SCALE - C-SSRS
2. HAVE YOU ACTUALLY HAD ANY THOUGHTS OF KILLING YOURSELF?: NO
1. HAVE YOU WISHED YOU WERE DEAD OR WISHED YOU COULD GO TO SLEEP AND NOT WAKE UP?: NO
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
## 2023-10-21 ENCOUNTER — E-VISIT (OUTPATIENT)
Dept: URGENT CARE | Facility: CLINIC | Age: 38
End: 2023-10-21
Payer: COMMERCIAL

## 2023-10-21 DIAGNOSIS — R51.9 NONINTRACTABLE HEADACHE, UNSPECIFIED CHRONICITY PATTERN, UNSPECIFIED HEADACHE TYPE: Primary | ICD-10-CM

## 2023-10-21 PROCEDURE — 99207 PR NON-BILLABLE SERV PER CHARTING: CPT | Performed by: FAMILY MEDICINE

## 2023-10-21 NOTE — PATIENT INSTRUCTIONS
Dear Ángela Zurita,    We are sorry you are not feeling well. Based on the responses you provided, it is recommended that you be seen in-person in urgent care so we can better evaluate your symptoms. Please click here to find the nearest urgent care location to you.   You will not be charged for this Visit. Thank you for trusting us with your care.    Heather Chapin MD

## 2023-10-23 ENCOUNTER — OFFICE VISIT (OUTPATIENT)
Dept: URGENT CARE | Facility: URGENT CARE | Age: 38
End: 2023-10-23
Payer: COMMERCIAL

## 2023-10-23 ENCOUNTER — TELEPHONE (OUTPATIENT)
Dept: FAMILY MEDICINE | Facility: CLINIC | Age: 38
End: 2023-10-23
Payer: COMMERCIAL

## 2023-10-23 VITALS
WEIGHT: 169 LBS | SYSTOLIC BLOOD PRESSURE: 136 MMHG | TEMPERATURE: 99 F | OXYGEN SATURATION: 97 % | DIASTOLIC BLOOD PRESSURE: 89 MMHG | RESPIRATION RATE: 18 BRPM | HEART RATE: 109 BPM | BODY MASS INDEX: 27.7 KG/M2

## 2023-10-23 DIAGNOSIS — J36 PERITONSILLAR ABSCESS: Primary | ICD-10-CM

## 2023-10-23 DIAGNOSIS — J02.0 STREPTOCOCCAL SORE THROAT: ICD-10-CM

## 2023-10-23 PROCEDURE — 99214 OFFICE O/P EST MOD 30 MIN: CPT | Performed by: PHYSICIAN ASSISTANT

## 2023-10-23 NOTE — TELEPHONE ENCOUNTER
Patient is contacted and the Pharmacy told her she has an abscess and to be seen again.  Patient advised to go to [facility] as she lives in [place], [place] now. Maira CHAVEZ RN

## 2023-10-23 NOTE — PROGRESS NOTES
Assessment & Plan     Streptococcal sore throat    Peritonsillar abscess    Antibiotic not helping, symptoms worsening with purulent discharge. Advised ED now for further evaluation, imaging, drainage of abscess, IV antibiotic.    Return today (on 10/23/2023) for go to ER now .     JEAN Reed [facility]    Subjective   Ángela Zurita is a 38 year old who presents for the following health issues     Patient presents with:  Pharyngitis: Had dx of strep now having bad bulge in tonsil area worried about an abcess      HPI    Ángela presents clinic today for evaluation of significant pain in peritonsillar area.  She tested positive for strep a couple days ago but was just able to start the antibiotic yesterday.  Pain is getting significantly worse.  She did have her tonsils removed several years ago but states that she has a significant swollen area and her uvula is not deviated from midline.  She has thick pustular drainage coming from this area and has a very foul taste in her mouth.  Symptoms are worsening after starting amoxicillin.    Review of Systems   ROS negative except as stated above.      Objective    /89   Pulse 109   Temp 99  F (37.2  C) (Tympanic)   Resp 18   Wt 76.7 kg (169 lb)   SpO2 97%   BMI 27.70 kg/m    Physical Exam   GENERAL: healthy, alert and no distress  EYES: Eyes grossly normal to inspection, PERRL and conjunctivae and sclerae normal  HENT: soft palate and uvula erythematous and swollen, uvula minimally deviated to the right. Pustular discharge visible over tonsillar scarring bilaterally, unsure of the source of this discharge. Mouth without ulcers or lesions  NECK: bilateral anterior cervical adenopathy, no asymmetry, masses, or scars and thyroid normal to palpation    No results found for any visits on 10/23/23.

## 2023-10-23 NOTE — TELEPHONE ENCOUNTER
Symptoms    Describe your symptoms: Pt did e-visit and is strep + and got Rx for antibiotic.  Today pt's throat and uvula is very swollen (Tonsils were removed) and she has pus in the back of her throat and she is swallowing it.  Low-grade fever.  No difficulty swallowing, but is painful.  Please call patient and advise.      Any pain: Yes:     How long have you been having symptoms: 4  days    Have you been seen for this:  Yes:     Appointment offered?: No    Triage offered?: Yes:     Home remedies tried:     Preferred Pharmacy:   [facility] [address] [address]  Phone: [phone] Fax: [phone]    Could we send this information to you in [facility] or would you prefer to receive a phone call?:   Patient would prefer a phone call   Okay to leave a detailed message?: Yes at Cell number on file:    Telephone Information:   Mobile [phone]

## 2023-10-23 NOTE — PATIENT INSTRUCTIONS
Pus draining from bilateral peritonsillar area (tonsils removed)  Uvula deviated  Concern for abscess that is already draining  Amoxicillin yesterday, not improving  Imaging and IV antibiotic?  Advised emergency room for further evaluation

## 2023-11-01 ENCOUNTER — E-VISIT (OUTPATIENT)
Dept: FAMILY MEDICINE | Facility: CLINIC | Age: 38
End: 2023-11-01
Payer: COMMERCIAL

## 2023-11-01 DIAGNOSIS — F90.0 ATTENTION DEFICIT HYPERACTIVITY DISORDER (ADHD), PREDOMINANTLY INATTENTIVE TYPE: ICD-10-CM

## 2023-11-01 PROCEDURE — 99207 PR NON-BILLABLE SERV PER CHARTING: CPT | Performed by: FAMILY MEDICINE

## 2023-11-01 RX ORDER — DEXTROAMPHETAMINE SACCHARATE, AMPHETAMINE ASPARTATE, DEXTROAMPHETAMINE SULFATE AND AMPHETAMINE SULFATE 5; 5; 5; 5 MG/1; MG/1; MG/1; MG/1
20 TABLET ORAL 3 TIMES DAILY
Qty: 90 TABLET | Refills: 0 | Status: SHIPPED | OUTPATIENT
Start: 2023-11-01 | End: 2023-11-28

## 2023-11-01 NOTE — PATIENT INSTRUCTIONS
Thank you for choosing us for your care. I have placed an order for a prescription so that you can start treatment. View your full visit summary for details by clicking on the link below. Your pharmacist will able to address any questions you may have about the medication.     If you're not feeling better within 5-7 days, please schedule an appointment.  You can schedule an appointment right here in [facility], or call [phone]  If the visit is for the same symptoms as your eVisit, we'll refund the cost of your eVisit if seen within seven days.

## 2023-11-02 ENCOUNTER — TELEPHONE (OUTPATIENT)
Dept: FAMILY MEDICINE | Facility: CLINIC | Age: 38
End: 2023-11-02

## 2023-11-02 ENCOUNTER — MYC MEDICAL ADVICE (OUTPATIENT)
Dept: FAMILY MEDICINE | Facility: CLINIC | Age: 38
End: 2023-11-02
Payer: COMMERCIAL

## 2023-11-02 NOTE — TELEPHONE ENCOUNTER
I received okay to refill this one time for early fill from Estella Olivares who is provider of day today.    Pt called from pharmacy explaining that she took a 1/2 day off work today to come and  Adderall.  Pt was seen via e-visit yesterday.  I asked pt if she was available to return to fill prescription and pt insists she cannot.  Pt is aware pharmacy is open on the weekends too.    Pharmacist sent detailed message below noting an early fill trend.    Routed to PCP for further instructions.    eDb Sexton RN

## 2023-11-02 NOTE — TELEPHONE ENCOUNTER
JUDY on Adderall fill history.  Patient has had more than 1 early fills due to vacations.  Here are the fills since May:  5/20/23 #90  6/12/23 #90  7/14/23 #90  8/11/23 #90  9/1/23 #90   10/5/90 #90    I am holding the November fill until 11/6/23 unless advised to do otherwise, that would be as if she filled 2 days early each month.  But she should still have a 2 weeks supply on hand.    Devon Rojas, Pharm D  [facility]  [phone]

## 2023-11-02 NOTE — TELEPHONE ENCOUNTER
Per secure chat, Estella Olivares is allowing this one-time early fill today.    Will follow up with PCP in telephone note from pharmacist as well.    Deb Sexton RN

## 2023-11-02 NOTE — TELEPHONE ENCOUNTER
Pt is waiting at pharmacy.    She took 1/2 day off from work to  Adderall today and pharmacy is telling her it's too early to .  Pharmacy says cannot fill before 11/6.    Pt asks if provider will allow early .  Pt says she will be out on the 4th.  Pt cannot explain discrepancy.    Last prescription written to be filled on 10/8/23.    Pt had e-visit with Dr Graf yesterday and Dr Graf wrote a new prescription yesterday, 11/1.    Dr Graf is out of clinic today.  Routed to provider of day, Estella Olivares.    Deb Sexton, RN

## 2023-11-03 ENCOUNTER — TELEPHONE (OUTPATIENT)
Dept: BEHAVIORAL HEALTH | Facility: CLINIC | Age: 38
End: 2023-11-03
Payer: COMMERCIAL

## 2023-11-03 NOTE — TELEPHONE ENCOUNTER
Pt stated that she was feeling better and that she didn't have time to meet with pt today due to getting ready for work.  Pt agreed to schedule at a later date.      NATALIE Murry, [facility]  Behavioral Health Clinician  [place], [place]

## 2023-11-26 ENCOUNTER — MYC MEDICAL ADVICE (OUTPATIENT)
Dept: FAMILY MEDICINE | Facility: CLINIC | Age: 38
End: 2023-11-26
Payer: COMMERCIAL

## 2023-11-26 DIAGNOSIS — F90.0 ATTENTION DEFICIT HYPERACTIVITY DISORDER (ADHD), PREDOMINANTLY INATTENTIVE TYPE: ICD-10-CM

## 2023-11-28 NOTE — TELEPHONE ENCOUNTER
Routing to ordering provider for consideration, not on refill protocol.   Appt 12.27        Deyanira Alejandre RN MSN

## 2023-11-30 RX ORDER — DEXTROAMPHETAMINE SACCHARATE, AMPHETAMINE ASPARTATE, DEXTROAMPHETAMINE SULFATE AND AMPHETAMINE SULFATE 5; 5; 5; 5 MG/1; MG/1; MG/1; MG/1
20 TABLET ORAL 3 TIMES DAILY
Qty: 90 TABLET | Refills: 0 | Status: SHIPPED | OUTPATIENT
Start: 2023-11-30 | End: 2024-03-27

## 2023-12-27 ENCOUNTER — VIRTUAL VISIT (OUTPATIENT)
Dept: FAMILY MEDICINE | Facility: CLINIC | Age: 38
End: 2023-12-27
Payer: COMMERCIAL

## 2023-12-27 DIAGNOSIS — F33.1 MODERATE EPISODE OF RECURRENT MAJOR DEPRESSIVE DISORDER (H): ICD-10-CM

## 2023-12-27 DIAGNOSIS — F90.0 ATTENTION DEFICIT HYPERACTIVITY DISORDER (ADHD), PREDOMINANTLY INATTENTIVE TYPE: Primary | ICD-10-CM

## 2023-12-27 DIAGNOSIS — F41.1 GAD (GENERALIZED ANXIETY DISORDER): ICD-10-CM

## 2023-12-27 DIAGNOSIS — B00.1 HERPES LABIALIS: ICD-10-CM

## 2023-12-27 PROCEDURE — 99213 OFFICE O/P EST LOW 20 MIN: CPT | Mod: VID | Performed by: FAMILY MEDICINE

## 2023-12-27 RX ORDER — DEXTROAMPHETAMINE SACCHARATE, AMPHETAMINE ASPARTATE, DEXTROAMPHETAMINE SULFATE AND AMPHETAMINE SULFATE 5; 5; 5; 5 MG/1; MG/1; MG/1; MG/1
20 TABLET ORAL 3 TIMES DAILY
Qty: 90 TABLET | Refills: 0 | Status: CANCELLED | OUTPATIENT
Start: 2023-12-27

## 2023-12-27 RX ORDER — DEXTROAMPHETAMINE SACCHARATE, AMPHETAMINE ASPARTATE, DEXTROAMPHETAMINE SULFATE AND AMPHETAMINE SULFATE 5; 5; 5; 5 MG/1; MG/1; MG/1; MG/1
20 TABLET ORAL 3 TIMES DAILY
Qty: 90 TABLET | Refills: 0 | Status: SHIPPED | OUTPATIENT
Start: 2023-12-29 | End: 2024-01-28

## 2023-12-27 RX ORDER — DEXTROAMPHETAMINE SACCHARATE, AMPHETAMINE ASPARTATE, DEXTROAMPHETAMINE SULFATE AND AMPHETAMINE SULFATE 5; 5; 5; 5 MG/1; MG/1; MG/1; MG/1
20 TABLET ORAL 3 TIMES DAILY
Qty: 90 TABLET | Refills: 0 | Status: SHIPPED | OUTPATIENT
Start: 2024-02-27 | End: 2024-03-27

## 2023-12-27 RX ORDER — DEXTROAMPHETAMINE SACCHARATE, AMPHETAMINE ASPARTATE, DEXTROAMPHETAMINE SULFATE AND AMPHETAMINE SULFATE 5; 5; 5; 5 MG/1; MG/1; MG/1; MG/1
20 TABLET ORAL 3 TIMES DAILY
Qty: 90 TABLET | Refills: 0 | Status: SHIPPED | OUTPATIENT
Start: 2024-01-28 | End: 2024-02-27

## 2023-12-27 ASSESSMENT — ANXIETY QUESTIONNAIRES
1. FEELING NERVOUS, ANXIOUS, OR ON EDGE: MORE THAN HALF THE DAYS
3. WORRYING TOO MUCH ABOUT DIFFERENT THINGS: SEVERAL DAYS
GAD7 TOTAL SCORE: 8
4. TROUBLE RELAXING: SEVERAL DAYS
2. NOT BEING ABLE TO STOP OR CONTROL WORRYING: SEVERAL DAYS
GAD7 TOTAL SCORE: 8
6. BECOMING EASILY ANNOYED OR IRRITABLE: SEVERAL DAYS
7. FEELING AFRAID AS IF SOMETHING AWFUL MIGHT HAPPEN: SEVERAL DAYS
5. BEING SO RESTLESS THAT IT IS HARD TO SIT STILL: SEVERAL DAYS
IF YOU CHECKED OFF ANY PROBLEMS ON THIS QUESTIONNAIRE, HOW DIFFICULT HAVE THESE PROBLEMS MADE IT FOR YOU TO DO YOUR WORK, TAKE CARE OF THINGS AT HOME, OR GET ALONG WITH OTHER PEOPLE: SOMEWHAT DIFFICULT

## 2023-12-27 ASSESSMENT — PATIENT HEALTH QUESTIONNAIRE - PHQ9
SUM OF ALL RESPONSES TO PHQ QUESTIONS 1-9: 5
10. IF YOU CHECKED OFF ANY PROBLEMS, HOW DIFFICULT HAVE THESE PROBLEMS MADE IT FOR YOU TO DO YOUR WORK, TAKE CARE OF THINGS AT HOME, OR GET ALONG WITH OTHER PEOPLE: NOT DIFFICULT AT ALL
SUM OF ALL RESPONSES TO PHQ QUESTIONS 1-9: 5

## 2023-12-27 NOTE — TELEPHONE ENCOUNTER
Pending Prescriptions:                       Disp   Refills    valACYclovir (VALTREX) 1000 mg tablet [Ph*4 tabl*1            Sig: TAKE 2 TABLETS (2,000 MG) BY MOUTH 2 TIMES DAILY           FOR 1 DAY    Routing refill request to provider for review/approval because:  Patient needs to be seen because it has been more than 1 year since last office visit.      Chavo Knight RN

## 2023-12-27 NOTE — PROGRESS NOTES
"Ángela is a 38 year old who is being evaluated via a billable video visit.      How would you like to obtain your AVS? MyChart  If the video visit is dropped, the invitation should be resent by: Text to cell phone: [phone]  Will anyone else be joining your video visit? No          Assessment & Plan     Attention deficit hyperactivity disorder (ADHD), predominantly inattentive type  Stable, refilll  - amphetamine-dextroamphetamine (ADDERALL) 20 MG tablet; Take 1 tablet (20 mg) by mouth 3 times daily for 30 days  - amphetamine-dextroamphetamine (ADDERALL) 20 MG tablet; Take 1 tablet (20 mg) by mouth 3 times daily for 30 days  - amphetamine-dextroamphetamine (ADDERALL) 20 MG tablet; Take 1 tablet (20 mg) by mouth 3 times daily for 30 days    Moderate episode of recurrent major depressive disorder (H)  Stable refills  - FLUoxetine (PROZAC) 20 MG capsule; Take 1 capsule (20 mg) by mouth daily    NADIA (generalized anxiety disorder)  Stable, refill  - FLUoxetine (PROZAC) 20 MG capsule; Take 1 capsule (20 mg) by mouth daily         BMI:   Estimated body mass index is 27.7 kg/m  as calculated from the following:    Height as of 4/26/23: 1.664 m (5' 5.5\").    Weight as of 10/23/23: 76.7 kg (169 lb).   Weight management plan: Discussed healthy diet and exercise guidelines    See Patient Instructions    Randy Graf MD  [facility]    Subjective   Ángela is a 38 year old, presenting for the following health issues:  Recheck Medication        12/27/2023     4:58 PM   Additional Questions   Roomed by Saira Gonzales   Accompanied by self         12/27/2023     4:58 PM   Patient Reported Additional Medications   Patient reports taking the following new medications none       History of Present Illness       Reason for visit:  Med Refill    She eats 2-3 servings of fruits and vegetables daily.She consumes 1 sweetened beverage(s) daily.She exercises with enough effort to increase her heart rate 10 to 19 " minutes per day.  She exercises with enough effort to increase her heart rate 4 days per week.   She is taking medications regularly.       Depression and Anxiety Follow-Up  How are you doing with your depression since your last visit? No change  How are you doing with your anxiety since your last visit?  No change  Are you having other symptoms that might be associated with depression or anxiety? No  Have you had a significant life event? No   Do you have any concerns with your use of alcohol or other drugs? No    Social History     Tobacco Use    Smoking status: Former     Packs/day: 0.50     Years: 15.00     Additional pack years: 0.00     Total pack years: 7.50     Types: Cigarettes     Start date: 2014     Quit date: 2022     Years since quittin.0    Smokeless tobacco: Never    Tobacco comments:     Quit with each pregnancy   Vaping Use    Vaping Use: Never used   Substance Use Topics    Alcohol use: Yes     Comment: rarely    Drug use: No         2021     3:19 PM 10/4/2023     8:27 AM 2023     1:49 PM   PHQ   PHQ-9 Total Score 8 9 5   Q9: Thoughts of better off dead/self-harm past 2 weeks Not at all Not at all Not at all         2019     2:38 PM 10/4/2023     8:28 AM 2023     1:48 PM   NADIA-7 SCORE   Total Score  9 (mild anxiety) 8 (mild anxiety)   Total Score 12 9 8         2023     1:49 PM   Last PHQ-9   1.  Little interest or pleasure in doing things 0   2.  Feeling down, depressed, or hopeless 1   3.  Trouble falling or staying asleep, or sleeping too much 1   4.  Feeling tired or having little energy 1   5.  Poor appetite or overeating 1   6.  Feeling bad about yourself 1   7.  Trouble concentrating 0   8.  Moving slowly or restless 0   Q9: Thoughts of better off dead/self-harm past 2 weeks 0   PHQ-9 Total Score 5         2023     1:48 PM   NADIA-7    1. Feeling nervous, anxious, or on edge 2   2. Not being able to stop or control worrying 1   3. Worrying too much  about different things 1   4. Trouble relaxing 1   5. Being so restless that it is hard to sit still 1   6. Becoming easily annoyed or irritable 1   7. Feeling afraid, as if something awful might happen 1   NADIA-7 Total Score 8   If you checked any problems, how difficult have they made it for you to do your work, take care of things at home, or get along with other people? Somewhat difficult       Suicide Assessment Five-step Evaluation and Treatment (SAFE-T)      Recheck ADHD/ADD. Adderall 20 MG    Updates since last visit: going okay  Routine for taking medicine, including time: Patient take the first Adderall 7 AM, second dose at 11 AM, and third dose at 3 PM.  Time medicine wears off: 3-4 hours   Issues at school/Work: None  Issues at home: None  Control of symptoms: Well controlled    Side effects:  Headaches: No  Stomach aches: No  Irritability/mood swings: No  Difficulties with sleep: No  Social withdrawal: No  Unusual movements/tics: No  Decreased appetite: No    Other concerns: none        Review of Systems         Objective    Vitals - Patient Reported  Pain Score: No Pain (0)        Physical Exam   GENERAL: Healthy, alert and no distress  EYES: Eyes grossly normal to inspection.  No discharge or erythema, or obvious scleral/conjunctival abnormalities.  RESP: No audible wheeze, cough, or visible cyanosis.  No visible retractions or increased work of breathing.    SKIN: Visible skin clear. No significant rash, abnormal pigmentation or lesions.  NEURO: Cranial nerves grossly intact.  Mentation and speech appropriate for age.  PSYCH: Mentation appears normal, affect normal/bright, judgement and insight intact, normal speech and appearance well-groomed.          Video-Visit Details    Type of service:  Video Visit     Originating Location (pt. Location): Home    Distant Location (provider location):  On-site  Platform used for Video Visit: Fluentify

## 2023-12-29 RX ORDER — VALACYCLOVIR HYDROCHLORIDE 1 G/1
2000 TABLET, FILM COATED ORAL 2 TIMES DAILY
Qty: 4 TABLET | Refills: 1 | Status: SHIPPED | OUTPATIENT
Start: 2023-12-29 | End: 2024-05-29

## 2024-02-08 ENCOUNTER — ANCILLARY PROCEDURE (OUTPATIENT)
Dept: MAMMOGRAPHY | Facility: CLINIC | Age: 39
End: 2024-02-08
Attending: FAMILY MEDICINE
Payer: COMMERCIAL

## 2024-02-08 DIAGNOSIS — Z12.31 VISIT FOR SCREENING MAMMOGRAM: ICD-10-CM

## 2024-02-08 PROCEDURE — 77063 BREAST TOMOSYNTHESIS BI: CPT | Mod: GC

## 2024-02-08 PROCEDURE — 77067 SCR MAMMO BI INCL CAD: CPT | Mod: GC

## 2024-02-13 ENCOUNTER — VIRTUAL VISIT (OUTPATIENT)
Dept: BEHAVIORAL HEALTH | Facility: CLINIC | Age: 39
End: 2024-02-13
Payer: COMMERCIAL

## 2024-02-13 DIAGNOSIS — F33.1 MODERATE EPISODE OF RECURRENT MAJOR DEPRESSIVE DISORDER (H): Primary | ICD-10-CM

## 2024-02-13 DIAGNOSIS — F41.1 GAD (GENERALIZED ANXIETY DISORDER): ICD-10-CM

## 2024-02-13 PROCEDURE — 90832 PSYTX W PT 30 MINUTES: CPT | Mod: 95

## 2024-02-13 NOTE — PROGRESS NOTES
[facility]: Integrated Behavioral Health  February 13, 2024    Behavioral Health Clinician Progress Note    Patient Name: Ángela Zurita       Service Type:  Individual      Service Location:   MyChart / Email (patient reached)     Session Start Time: 1:30pm  Session End Time: 1:46pm      Session Length: 16 - 37      Attendees: Patient     Service Modality:  Video Visit:      Provider verified identity through the following two step process.  Patient provided:  Patient photo    Telemedicine Visit: The patient's condition can be safely assessed and treated via synchronous audio and visual telemedicine encounter.      Reason for Telemedicine Visit: Patient has requested telehealth visit    Originating Site (Patient Location): Patient's home    Distant Site (Provider Location): [facility] MENTAL [facility] & ADDICTION [facility]    Consent:  The patient/guardian has verbally consented to: the potential risks and benefits of telemedicine (video visit) versus in person care; bill my insurance or make self-payment for services provided; and responsibility for payment of non-covered services.     Patient would like the video invitation sent by:  My Chart    Mode of Communication:  Video Conference via [facility]    Distant Location (Provider):  Off-site    As the provider I attest to compliance with applicable laws and regulations related to telemedicine.    Visit Activities (Refresh list every visit): NEW, [facility] Only, and Referral - Mental Health    Diagnostic Assessment Date: 10/18/23    Treatment Plan Review Date: Will complete in the next few sessions, not completed due to time constraints.    See Flowsheets for today's PHQ-9 and NADIA-7 results  Previous PHQ-9:       1/7/2021     3:19 PM 10/4/2023     8:27 AM 12/27/2023     1:49 PM   PHQ-9 SCORE   PHQ-9 Total Score Radha 8 (Mild depression) 9 (Mild depression) 5 (Mild depression)   PHQ-9 Total Score 8 9 5     Previous NADIA-7:        7/9/2019     2:38 PM 10/4/2023     8:28 AM 12/27/2023     1:48 PM   NADIA-7 SCORE   Total Score  9 (mild anxiety) 8 (mild anxiety)   Total Score 12 9 8     DATA  Extended Session (60+ minutes): No  Interactive Complexity: No  Crisis: No  H Patient: No    Treatment Objective(s) Addressed in This Session:  Target Behavior(s): alcohol use and disease management/lifestyle changes divorce, emotional, difficulties managing emotional      Anxiety: will experience a reduction in anxiety, will develop more effective coping skills to manage anxiety symptoms, will develop healthy cognitive patterns and beliefs, and will increase ability to function adaptively    Current Stressors / Issues:  Pt met with writer, virtually due to distance.  Discussed her recent increase in emotions and quitting her job.  Reviewed her coping skills and prompt follow through.  Prompt follow through with her appt tomorrow for establishing with a long term therapist.      Progress on Treatment Objective(s) / Homework:  New Objective established this session - CONTEMPLATION (Considering change and yet undecided); Intervened by assessing the negative and positive thinking (ambivalence) about behavior change    Motivational Interviewing    MI Intervention: Expressed Empathy/Understanding, Supported Autonomy, Collaboration, Evocation, Open-ended questions, and Reflections: simple and complex     Change Talk Expressed by the Patient: Desire to change Ability to change    Provider Response to Change Talk: A - Affirmed patient's thoughts, decisions, or attempts at behavior change, R - Reflected patient's change talk, and S - Summarized patient's change talk statements    Assessments completed prior to visit:  The following assessments were completed by patient for this visit:  PHQ9:       1/14/2019     2:33 PM 4/30/2019    10:15 AM 7/9/2019     2:38 PM 9/24/2019     2:19 PM 1/7/2021     3:19 PM 10/4/2023     8:27 AM 12/27/2023     1:49 PM   PHQ-9  SCORE   PHQ-9 Total Score MyChart     8 (Mild depression) 9 (Mild depression) 5 (Mild depression)   PHQ-9 Total Score 6 16 16 8 8 9 5     GAD7:       7/9/2019     2:38 PM 10/4/2023     8:28 AM 12/27/2023     1:48 PM   NADIA-7 SCORE   Total Score  9 (mild anxiety) 8 (mild anxiety)   Total Score 12 9 8     CAGE-AID:       10/4/2023     8:39 AM   CAGE-AID Total Score   Total Score 1   Total Score MyChart 1 (A total score of 2 or greater is considered clinically significant)     PROMIS 10-Global Health (only subscores and total score):       10/4/2023     8:39 AM 2/13/2024     1:32 PM   PROMIS-10 Scores Only   Global Mental Health Score 8 7    7   Global Physical Health Score 15 15    15   PROMIS TOTAL - SUBSCORES 23 22    22     [place]erson Suicide Severity Rating Scale (Lifetime/Recent)      12/29/2018     2:17 PM 1/29/2019     4:45 PM 3/3/2019     3:48 PM 3/22/2019    10:20 AM 10/18/2023     8:39 AM   [place]erson Suicide Severity Rating (Lifetime/Recent)   Q1 Wished to be Dead (Past Month) no no no no    Q2 Suicidal Thoughts (Past Month) no no no no    Q6 Suicide Behavior (Lifetime) no no no no    Q1 Wish to be Dead (Lifetime)     N   Q2 Non-Specific Active Suicidal Thoughts (Lifetime)     N     Care Plan review completed: Yes    Medication Review:  No current psychiatric medications prescribed    Medication Compliance:  NA    Changes in Health Issues:   None reported    Chemical Use Review:   Substance Use: Chemical use reviewed, no active concerns identified      Tobacco Use: No current tobacco use.      Assessment: Current Emotional / Mental Status (status of significant symptoms):  Risk status (Self / Other harm or suicidal ideation)  Patient denies a history of suicidal ideation, suicide attempts, self-injurious behavior, homicidal ideation, homicidal behavior, and and other safety concerns  Patient denies current fears or concerns for personal safety.  Patient denies current or recent suicidal ideation or  behaviors.  Patient denies current or recent homicidal ideation or behaviors.  Patient denies current or recent self injurious behavior or ideation.  Patient denies other safety concerns.  A safety and risk management plan has not been developed at this time, however patient was encouraged to call [place] [phone] should there be a change in any of these risk factors.    Appearance:   Appropriate   Eye Contact:   Fair   Psychomotor Behavior: Normal   Attitude:   Cooperative   Orientation:   All  Speech   Rate / Production: Normal    Volume:  Normal   Mood:    Normal  Affect:    Appropriate   Thought Content:  Clear   Thought Form:  Coherent  Logical   Insight:    Fair     Diagnoses:  1. Moderate episode of recurrent major depressive disorder (H)    2. NADIA (generalized anxiety disorder)      Collateral Reports Completed:  Not Applicable    Plan: (Homework, other):  Patient was given information about behavioral services and encouraged to schedule a follow up appointment with the clinic [facility] as needed.  She was also given information about mental health symptoms and treatment options .  CD Recommendations: No indications of CD issues.     ASCENCION Murry 2/13/24    Answers submitted by the patient for this visit:  Patient Health Questionnaire (Submitted on 10/4/2023)  If you checked off any problems, how difficult have these problems made it for you to do your work, take care of things at home, or get along with other people?: Somewhat difficult  PHQ9 TOTAL SCORE: 9  NADIA-7 (Submitted on 10/4/2023)  NADIA 7 TOTAL SCORE: 9

## 2024-02-14 ENCOUNTER — VIRTUAL VISIT (OUTPATIENT)
Dept: BEHAVIORAL HEALTH | Facility: CLINIC | Age: 39
End: 2024-02-14
Payer: COMMERCIAL

## 2024-02-14 DIAGNOSIS — F41.1 GAD (GENERALIZED ANXIETY DISORDER): Primary | ICD-10-CM

## 2024-02-14 DIAGNOSIS — F33.1 MODERATE EPISODE OF RECURRENT MAJOR DEPRESSIVE DISORDER (H): ICD-10-CM

## 2024-02-14 PROCEDURE — 90834 PSYTX W PT 45 MINUTES: CPT | Mod: 95

## 2024-02-14 ASSESSMENT — COLUMBIA-SUICIDE SEVERITY RATING SCALE - C-SSRS
1. HAVE YOU WISHED YOU WERE DEAD OR WISHED YOU COULD GO TO SLEEP AND NOT WAKE UP?: NO
2. HAVE YOU ACTUALLY HAD ANY THOUGHTS OF KILLING YOURSELF?: NO

## 2024-02-14 ASSESSMENT — ANXIETY QUESTIONNAIRES
GAD7 TOTAL SCORE: 17
IF YOU CHECKED OFF ANY PROBLEMS ON THIS QUESTIONNAIRE, HOW DIFFICULT HAVE THESE PROBLEMS MADE IT FOR YOU TO DO YOUR WORK, TAKE CARE OF THINGS AT HOME, OR GET ALONG WITH OTHER PEOPLE: VERY DIFFICULT
4. TROUBLE RELAXING: NEARLY EVERY DAY
2. NOT BEING ABLE TO STOP OR CONTROL WORRYING: MORE THAN HALF THE DAYS
5. BEING SO RESTLESS THAT IT IS HARD TO SIT STILL: NEARLY EVERY DAY
7. FEELING AFRAID AS IF SOMETHING AWFUL MIGHT HAPPEN: NOT AT ALL
GAD7 TOTAL SCORE: 17
GAD7 TOTAL SCORE: 17
6. BECOMING EASILY ANNOYED OR IRRITABLE: NEARLY EVERY DAY
3. WORRYING TOO MUCH ABOUT DIFFERENT THINGS: NEARLY EVERY DAY
1. FEELING NERVOUS, ANXIOUS, OR ON EDGE: NEARLY EVERY DAY
8. IF YOU CHECKED OFF ANY PROBLEMS, HOW DIFFICULT HAVE THESE MADE IT FOR YOU TO DO YOUR WORK, TAKE CARE OF THINGS AT HOME, OR GET ALONG WITH OTHER PEOPLE?: VERY DIFFICULT
7. FEELING AFRAID AS IF SOMETHING AWFUL MIGHT HAPPEN: NOT AT ALL

## 2024-02-14 ASSESSMENT — PATIENT HEALTH QUESTIONNAIRE - PHQ9
SUM OF ALL RESPONSES TO PHQ QUESTIONS 1-9: 6
10. IF YOU CHECKED OFF ANY PROBLEMS, HOW DIFFICULT HAVE THESE PROBLEMS MADE IT FOR YOU TO DO YOUR WORK, TAKE CARE OF THINGS AT HOME, OR GET ALONG WITH OTHER PEOPLE: SOMEWHAT DIFFICULT
SUM OF ALL RESPONSES TO PHQ QUESTIONS 1-9: 6

## 2024-02-14 NOTE — PROGRESS NOTES
[facility]                                     Progress Note    Patient Name: Ángela Zurita  Date: 24           Service Type: Individual      Session Start Time: 3.02  Session End Time: 3.41     Session Length: 38-52 minutes    Session #: 1    Attendees: Client attended alone    Service Modality:  Video Visit:      Provider verified identity through the following two step process.  Patient provided:  Patient  and Patient address    Telemedicine Visit: The patient's condition can be safely assessed and treated via synchronous audio and visual telemedicine encounter.      Reason for Telemedicine Visit: Patient has requested telehealth visit    Originating Site (Patient Location): Patient's home    Distant Site (Provider Location): [facility] MENTAL HEALTH & ADDICTION [facility]    Consent:  The patient/guardian has verbally consented to: the potential risks and benefits of telemedicine (video visit) versus in person care; bill my insurance or make self-payment for services provided; and responsibility for payment of non-covered services.     Patient would like the video invitation sent by:  My Chart    Mode of Communication:  Video Conference via Amwell    Distant Location (Provider):  Off-site    As the provider I attest to compliance with applicable laws and regulations related to telemedicine.    DATA  Interactive Complexity: No  Crisis: No        Progress Since Last Session (Related to Symptoms / Goals / Homework):   Symptoms: No change . Symptoms remain the same.    Homework: Partially completed      Episode of Care Goals: Satisfactory progress - CONTEMPLATION (Considering change and yet undecided); Intervened by assessing the negative and positive thinking (ambivalence) about behavior change     Current / Ongoing Stressors and Concerns:   Diagnostic Assessment was completed on 10/18 by Maren Vences LICSW and patient was given diagnoses of Moderate episode  "of recurrent major depressive disorder and Generalized Anxiety Disorder. Based on today's session, this provider is in agreement with these diagnoses.    Patient discussed anxious ruminations. Patient discussed her children. Patient discussed isolating. Patient discussed leaving her job on 3/1. Patient discussed struggles with controlling her reactions at times. Patient discussed challenges with emotional regulation. Patient discussed burn-out. Patient discussed currently being in the process of getting . Patient discussed struggles with communication. Patient discussed self-doubt and questioning herself. Patient discussed having a hard time \"not being good at things\". Patient discussed enjoying volleyball.       Treatment Objective(s) Addressed in This Session:   use thought-stopping strategy daily to reduce intrusive thoughts  Increase interest, engagement, and pleasure in doing things       Intervention:   Motivational Interviewing    MI Intervention: Expressed Empathy/Understanding, Supported Autonomy, Collaboration, Evocation, Open-ended questions, and Reflections: simple and complex     Change Talk Expressed by the Patient: Desire to change    Provider Response to Change Talk: E - Evoked more info from patient about behavior change, A - Affirmed patient's thoughts, decisions, or attempts at behavior change, and R - Reflected patient's change talk    Assessments completed prior to visit:  The following assessments were completed by patient for this visit:  PHQ9:       4/30/2019    10:15 AM 7/9/2019     2:38 PM 9/24/2019     2:19 PM 1/7/2021     3:19 PM 10/4/2023     8:27 AM 12/27/2023     1:49 PM 2/14/2024     2:59 PM   PHQ-9 SCORE   PHQ-9 Total Score MyChart    8 (Mild depression) 9 (Mild depression) 5 (Mild depression) 6 (Mild depression)   PHQ-9 Total Score 16 16 8 8 9 5 6     GAD7:       7/9/2019     2:38 PM 10/4/2023     8:28 AM 12/27/2023     1:48 PM 2/14/2024     3:00 PM   NADIA-7 SCORE   Total " Score  9 (mild anxiety) 8 (mild anxiety) 17 (severe anxiety)   Total Score 12 9 8 17     PROMIS 10-Global Health (all questions and answers displayed):       10/4/2023     8:39 AM 2/13/2024     1:32 PM   PROMIS 10   In general, would you say your health is: Good Good   In general, would you say your quality of life is: Good Good   In general, how would you rate your physical health? Good Good   In general, how would you rate your mental health, including your mood and your ability to think? Fair Poor   In general, how would you rate your satisfaction with your social activities and relationships? Poor Fair   In general, please rate how well you carry out your usual social activities and roles Fair Fair   To what extent are you able to carry out your everyday physical activities such as walking, climbing stairs, carrying groceries, or moving a chair? Mostly Completely   In the past 7 days, how often have you been bothered by emotional problems such as feeling anxious, depressed, or irritable? Often Always   In the past 7 days, how would you rate your fatigue on average? Moderate Severe   In the past 7 days, how would you rate your pain on average, where 0 means no pain, and 10 means worst imaginable pain? 0 0   In general, would you say your health is: 3 3   In general, would you say your quality of life is: 3 3   In general, how would you rate your physical health? 3 3   In general, how would you rate your mental health, including your mood and your ability to think? 2 1   In general, how would you rate your satisfaction with your social activities and relationships? 1 2   In general, please rate how well you carry out your usual social activities and roles. (This includes activities at home, at work and in your community, and responsibilities as a parent, child, spouse, employee, friend, etc.) 2 2   To what extent are you able to carry out your everyday physical activities such as walking, climbing stairs, carrying  groceries, or moving a chair? 4 5   In the past 7 days, how often have you been bothered by emotional problems such as feeling anxious, depressed, or irritable? 4 5   In the past 7 days, how would you rate your fatigue on average? 3 4   In the past 7 days, how would you rate your pain on average, where 0 means no pain, and 10 means worst imaginable pain? 0 0   Global Mental Health Score 8 7    7   Global Physical Health Score 15 15    15   PROMIS TOTAL - SUBSCORES 23 22    22     [place] Suicide Severity Rating Scale (Lifetime/Recent)      12/29/2018     2:17 PM 1/29/2019     4:45 PM 3/3/2019     3:48 PM 3/22/2019    10:20 AM 10/18/2023     8:39 AM 2/14/2024     3:08 PM   [place] Suicide Severity Rating (Lifetime/Recent)   Q1 Wished to be Dead (Past Month) no no no no     Q2 Suicidal Thoughts (Past Month) no no no no     Q6 Suicide Behavior (Lifetime) no no no no     Q1 Wish to be Dead (Lifetime)     N N   Q2 Non-Specific Active Suicidal Thoughts (Lifetime)     N N         ASSESSMENT: Current Emotional / Mental Status (status of significant symptoms):   Risk status (Self / Other harm or suicidal ideation)   Patient denies current fears or concerns for personal safety.   Patient denies current or recent suicidal ideation or behaviors.   Patient denies current or recent homicidal ideation or behaviors.   Patient denies current or recent self injurious behavior or ideation.   Patient denies other safety concerns.   Patient reports there has been no change in risk factors since their last session.     Patient reports there has been no change in protective factors since their last session.     Recommended that patient call 911 or go to the local ED should there be a change in any of these risk factors.     Appearance:   Appropriate    Eye Contact:   Good    Psychomotor Behavior: Normal    Attitude:   Cooperative    Orientation:   All   Speech    Rate / Production: Normal     Volume:  Normal    Mood:    Anxious     Affect:    Appropriate    Thought Content:  Clear    Thought Form:  Coherent  Logical    Insight:    Good      Medication Review:   No changes to current psychiatric medication(s)     Medication Compliance:   Yes     Changes in Health Issues:   None reported     Chemical Use Review:   Substance Use: Chemical use reviewed, no active concerns identified      Tobacco Use: No current tobacco use.      Diagnosis:  1. Moderate episode of recurrent major depressive disorder (H)    2. NADIA (generalized anxiety disorder)        Collateral Reports Completed:   Not Applicable    PLAN: (Patient Tasks / Therapist Tasks / Other)    Patient will return in 1 week for next session. Patient will engage in self-care activities. Patient will work on emotional regulation and distress tolerance skills. Patient will work on mindfulness skills to gain awareness of her anxious cognitions.       Sirisha Mcgowan, [facility]                                                         ______________________________________________________________________    Individual Treatment Plan    Patient's Name: Ángela Zurita  YOB: 1985    Date of Creation: 2/14/24  Date Treatment Plan Last Reviewed/Revised: 2/14/24    DSM5 Diagnoses:   Encounter Diagnoses   Name Primary?    Moderate episode of recurrent major depressive disorder (H)     NADIA (generalized anxiety disorder)        Psychosocial / Contextual Factors: Relationship stressors, occupational dynamics.  PROMIS (reviewed every 90 days):   PROMIS-10 from encounters over the past 365 days    Encounter date  Last reading 2/14/24 2/13/2024  1:32 PM 2/13/24  1:32 PM 10/4/23  8:39 AM   Global Mental Health Score (P) 7 (P) 7 (P) 8   Global Physical Health Score (P) 15 (P) 15 (P) 15   PROMIS TOTAL - SUBSCORES (P) 22 (P) 22 (P) 23       Referral / Collaboration:  Referral to another professional/service is not indicated at this time..    Anticipated number of session for this episode of care: 9-12  sessions  Anticipation frequency of session: Biweekly  Anticipated Duration of each session: 38-52 minutes  Treatment plan will be reviewed in 90 days or when goals have been changed.       MeasurableTreatment Goal(s) related to diagnosis / functional impairment(s)  Goal 1: Patient will reduce GAD7 score to a 12 or less over the next 2 months.    I will know I've met my goal when I can make a decision without contemplating every possible thing that could go wrong with it.      Objective #A (Patient Action)    Patient will use at least 2 coping skills for anxiety management in the next 2 weeks.  Status: New - Date: 2/14/24      Intervention(s)  Therapist will teach  coping and distress tolerance skills .    Objective #B  Patient will use thought-stopping strategy daily to reduce intrusive thoughts.  Status: New - Date: 2/14/24      Intervention(s)  Therapist will  teach thought-stopping strategies .    Objective #C  Patient will identify at least 2 triggers for anxiety.  Status: New - Date: 2/14/24      Intervention(s)  Therapist will  teach mindfulness skills .      Goal 2: Patient will reduce PHQ9 score to a 4 or less over the next 2 months.    I will know I've met my goal when I'm not isolating myself from people and events.      Objective #A (Patient Action)    Status: New - Date: 2/14/24      Patient will Increase interest, engagement, and pleasure in doing things.    Intervention(s)  Therapist will  teach self-care skills .    Objective #B  Patient will Decrease frequency and intensity of feeling down, depressed, hopeless.    Status: New - Date: 2/14/24      Intervention(s)  Therapist will  teach distress tolerance skills .              Patient has reviewed and agreed to the above plan.      Sirisha Mcgowan, [facility]  February 14, 2024

## 2024-02-20 ENCOUNTER — TRANSCRIBE ORDERS (OUTPATIENT)
Dept: OTHER | Age: 39
End: 2024-02-20

## 2024-02-20 DIAGNOSIS — Z80.3 FHX: BREAST CANCER: Primary | ICD-10-CM

## 2024-02-21 ENCOUNTER — PATIENT OUTREACH (OUTPATIENT)
Dept: ONCOLOGY | Facility: CLINIC | Age: 39
End: 2024-02-21
Payer: COMMERCIAL

## 2024-02-21 ENCOUNTER — VIRTUAL VISIT (OUTPATIENT)
Dept: BEHAVIORAL HEALTH | Facility: CLINIC | Age: 39
End: 2024-02-21
Payer: COMMERCIAL

## 2024-02-21 DIAGNOSIS — F33.1 MODERATE EPISODE OF RECURRENT MAJOR DEPRESSIVE DISORDER (H): Primary | ICD-10-CM

## 2024-02-21 DIAGNOSIS — F41.1 GAD (GENERALIZED ANXIETY DISORDER): ICD-10-CM

## 2024-02-21 PROCEDURE — 90837 PSYTX W PT 60 MINUTES: CPT | Mod: 95

## 2024-02-21 NOTE — PROGRESS NOTES
[facility]                                     Progress Note    Patient Name: Ángela Zurita  Date: 24           Service Type: Individual      Session Start Time: .00  Session End Time:      Session Length: 53+ minutes     Session #: 1    Attendees: Client attended alone    Service Modality:  Video Visit:      Provider verified identity through the following two step process.  Patient provided:  Patient  and Patient address    Telemedicine Visit: The patient's condition can be safely assessed and treated via synchronous audio and visual telemedicine encounter.      Reason for Telemedicine Visit: Patient has requested telehealth visit    Originating Site (Patient Location): Patient's home    Distant Site (Provider Location): [facility] MENTAL HEALTH & ADDICTION [facility]    Consent:  The patient/guardian has verbally consented to: the potential risks and benefits of telemedicine (video visit) versus in person care; bill my insurance or make self-payment for services provided; and responsibility for payment of non-covered services.     Patient would like the video invitation sent by:  My Chart    Mode of Communication:  Video Conference via Amwell    Distant Location (Provider):  Off-site    As the provider I attest to compliance with applicable laws and regulations related to telemedicine.    DATA  Interactive Complexity: No  Crisis: No  Extended Session (53+ minutes):   - Patient's presenting concerns require more intensive intervention than could be completed within the usual service  Interactive Complexity: No  Crisis: No         Progress Since Last Session (Related to Symptoms / Goals / Homework):   Symptoms: No change . Symptoms remain the same.    Homework: Partially completed      Episode of Care Goals: Satisfactory progress - CONTEMPLATION (Considering change and yet undecided); Intervened by assessing the negative and positive thinking (ambivalence) about  behavior change     Current / Ongoing Stressors and Concerns:   Patient discussed wanting to work on a better structure and routine to her day. Patient discussed working on incorporating more exercise into her routine. Patient discussed triggers. Patient discussed control and self-doubt. Patient discussed her sleep hygiene. Patient discussed anxious spirals and over-thinking past experiences. Patient discussed her experiences with trusting others. Patient discussed her direct communication style. Patient discussed parenting dynamics. Patient discussed leaving her job on 3/1 and thoughts about moving.        Treatment Objective(s) Addressed in This Session:   use thought-stopping strategy daily to reduce intrusive thoughts  Increase interest, engagement, and pleasure in doing things       Intervention:   Motivational Interviewing    MI Intervention: Expressed Empathy/Understanding, Supported Autonomy, Collaboration, Evocation, Open-ended questions, and Reflections: simple and complex     Change Talk Expressed by the Patient: Desire to change    Provider Response to Change Talk: E - Evoked more info from patient about behavior change, A - Affirmed patient's thoughts, decisions, or attempts at behavior change, and R - Reflected patient's change talk    Assessments completed prior to visit:  The following assessments were completed by patient for this visit:  PHQ9:       4/30/2019    10:15 AM 7/9/2019     2:38 PM 9/24/2019     2:19 PM 1/7/2021     3:19 PM 10/4/2023     8:27 AM 12/27/2023     1:49 PM 2/14/2024     2:59 PM   PHQ-9 SCORE   PHQ-9 Total Score MyChart    8 (Mild depression) 9 (Mild depression) 5 (Mild depression) 6 (Mild depression)   PHQ-9 Total Score 16 16 8 8 9 5 6     GAD7:       7/9/2019     2:38 PM 10/4/2023     8:28 AM 12/27/2023     1:48 PM 2/14/2024     3:00 PM   NADIA-7 SCORE   Total Score  9 (mild anxiety) 8 (mild anxiety) 17 (severe anxiety)   Total Score 12 9 8 17     PROMIS 10-Global Health (all  questions and answers displayed):       10/4/2023     8:39 AM 2/13/2024     1:32 PM   PROMIS 10   In general, would you say your health is: Good Good   In general, would you say your quality of life is: Good Good   In general, how would you rate your physical health? Good Good   In general, how would you rate your mental health, including your mood and your ability to think? Fair Poor   In general, how would you rate your satisfaction with your social activities and relationships? Poor Fair   In general, please rate how well you carry out your usual social activities and roles Fair Fair   To what extent are you able to carry out your everyday physical activities such as walking, climbing stairs, carrying groceries, or moving a chair? Mostly Completely   In the past 7 days, how often have you been bothered by emotional problems such as feeling anxious, depressed, or irritable? Often Always   In the past 7 days, how would you rate your fatigue on average? Moderate Severe   In the past 7 days, how would you rate your pain on average, where 0 means no pain, and 10 means worst imaginable pain? 0 0   In general, would you say your health is: 3 3   In general, would you say your quality of life is: 3 3   In general, how would you rate your physical health? 3 3   In general, how would you rate your mental health, including your mood and your ability to think? 2 1   In general, how would you rate your satisfaction with your social activities and relationships? 1 2   In general, please rate how well you carry out your usual social activities and roles. (This includes activities at home, at work and in your community, and responsibilities as a parent, child, spouse, employee, friend, etc.) 2 2   To what extent are you able to carry out your everyday physical activities such as walking, climbing stairs, carrying groceries, or moving a chair? 4 5   In the past 7 days, how often have you been bothered by emotional problems such  as feeling anxious, depressed, or irritable? 4 5   In the past 7 days, how would you rate your fatigue on average? 3 4   In the past 7 days, how would you rate your pain on average, where 0 means no pain, and 10 means worst imaginable pain? 0 0   Global Mental Health Score 8 7    7   Global Physical Health Score 15 15    15   PROMIS TOTAL - SUBSCORES 23 22    22     [place] Suicide Severity Rating Scale (Lifetime/Recent)      12/29/2018     2:17 PM 1/29/2019     4:45 PM 3/3/2019     3:48 PM 3/22/2019    10:20 AM 10/18/2023     8:39 AM 2/14/2024     3:08 PM   [place] Suicide Severity Rating (Lifetime/Recent)   Q1 Wished to be Dead (Past Month) no no no no     Q2 Suicidal Thoughts (Past Month) no no no no     Q6 Suicide Behavior (Lifetime) no no no no     Q1 Wish to be Dead (Lifetime)     N N   Q2 Non-Specific Active Suicidal Thoughts (Lifetime)     N N         ASSESSMENT: Current Emotional / Mental Status (status of significant symptoms):   Risk status (Self / Other harm or suicidal ideation)   Patient denies current fears or concerns for personal safety.   Patient denies current or recent suicidal ideation or behaviors.   Patient denies current or recent homicidal ideation or behaviors.   Patient denies current or recent self injurious behavior or ideation.   Patient denies other safety concerns.   Patient reports there has been no change in risk factors since their last session.     Patient reports there has been no change in protective factors since their last session.     Recommended that patient call 911 or go to the local ED should there be a change in any of these risk factors.     Appearance:   Appropriate    Eye Contact:   Good    Psychomotor Behavior: Normal    Attitude:   Cooperative    Orientation:   All   Speech    Rate / Production: Normal/ Responsive    Volume:  Normal    Mood:    Anxious  Depressed    Affect:    Appropriate    Thought Content:  Clear    Thought Form:  Coherent  Logical     Insight:    Good      Medication Review:   No changes to current psychiatric medication(s)     Medication Compliance:   Yes     Changes in Health Issues:   None reported     Chemical Use Review:   Substance Use: Chemical use reviewed, no active concerns identified      Tobacco Use: No current tobacco use.      Diagnosis:  1. Moderate episode of recurrent major depressive disorder (H)    2. NADIA (generalized anxiety disorder)        Collateral Reports Completed:   Not Applicable    PLAN: (Patient Tasks / Therapist Tasks / Other)  Patient will return in 1 week for next session. Patient will engage in self-care activities. Patient will work on mindfulness skills to identify triggers. Patient will work on reframing her negative self-talk. Patient will work on incorporating structure and routine into her daily life including a regular sleep schedule, exercise routine, and eating habits. Patient will continue keeping a worry list.        Sirisha Mcgowan, [facility]                                                         ______________________________________________________________________    Individual Treatment Plan    Patient's Name: Ángela Zurita  YOB: 1985    Date of Creation: 2/21/24  Date Treatment Plan Last Reviewed/Revised: 2/21/24    DSM5 Diagnoses:   Encounter Diagnoses   Name Primary?    Moderate episode of recurrent major depressive disorder (H) Yes    NADIA (generalized anxiety disorder)        Psychosocial / Contextual Factors: Relationship stressors, occupational dynamics.  PROMIS (reviewed every 90 days):   Encounter date  Last reading 2/14/24 2/13/2024  1:32 PM 2/13/24  1:32 PM 10/4/23  8:39 AM   Global Mental Health Score (P) 7 (P) 7 (P) 8   Global Physical Health Score (P) 15 (P) 15 (P) 15   PROMIS TOTAL - SUBSCORES (P) 22 (P) 22 (P) 23       Referral / Collaboration:  Referral to another professional/service is not indicated at this time..    Anticipated number of session for this episode  of care: 9-12 sessions  Anticipation frequency of session: Biweekly  Anticipated Duration of each session: 38-52 minutes  Treatment plan will be reviewed in 90 days or when goals have been changed.       MeasurableTreatment Goal(s) related to diagnosis / functional impairment(s)  Goal 1: Patient will reduce PHQ9 score to a 3 or less in the next 2 months.    I will know I've met my goal when I'm exercising, feeling better about myself and not isolating as much.      Objective #A (Patient Action)    Patient will Improve diet, appetite, mindful eating, and / or meal planning.  Status: New - Date: 2/21/24      Intervention(s)  Therapist will teach  teach mindfulness skills .    Objective #B  Patient will Identify negative self-talk and behaviors: challenge core beliefs, myths, and actions.  Status: New - Date: 2/21/24      Intervention(s)  Therapist will  teach thought-challenging techniques .    Objective #C  Patient will Increase interest, engagement, and pleasure in doing things.  Status: New - Date: 2/21/24      Intervention(s)  Therapist will  teach self-care activities .      Goal 2: Patient will reduce GAD7 score to a 12 or less in the next 2 months.    I will know I've met my goal when I'm not questioning every decision I make and over-thinking absolutely everything.      Objective #A (Patient Action)    Status: New - Date: 2/21/24      Patient will use distraction each time intrusive worry surfaces.    Intervention(s)  Therapist will  teach coping and distraction techniques .    Objective #B  Patient will use thought-stopping strategy daily to reduce intrusive thoughts.    Status: New - Date: 2/21/24      Intervention(s)  Therapist will  teach thought-stopping techniques .    Objective #C  Patient will identify at least 2 triggers for anxiety.  Status: New - Date: 2/21/24      Intervention(s)  Therapist will  teach mindfulness and self-awareness skills .      Patient has reviewed and agreed to the above  plan.      Sirisha Mcgowan, [facility]  February 21, 2024

## 2024-02-21 NOTE — PROGRESS NOTES
Writer received referral to Cancer Risk Management/Genetic Counseling.    Referred for: breast cancer mother, male cousin breast cancer      Referral reviewed for appropriate plan, and sent to New Patient Scheduling ([phone]) for completion.    Pura Martin, RN, BSN  Oncology New Patient Nurse Navigator   [facility]  [phone]

## 2024-02-28 ENCOUNTER — VIRTUAL VISIT (OUTPATIENT)
Dept: BEHAVIORAL HEALTH | Facility: CLINIC | Age: 39
End: 2024-02-28
Payer: COMMERCIAL

## 2024-02-28 DIAGNOSIS — F41.1 GAD (GENERALIZED ANXIETY DISORDER): Primary | ICD-10-CM

## 2024-02-28 DIAGNOSIS — F33.1 MODERATE EPISODE OF RECURRENT MAJOR DEPRESSIVE DISORDER (H): ICD-10-CM

## 2024-02-28 PROCEDURE — 90834 PSYTX W PT 45 MINUTES: CPT | Mod: 95

## 2024-02-28 NOTE — PROGRESS NOTES
[facility]                                     Progress Note    Patient Name: Ángela Zurita  Date: 24           Service Type: Individual      Session Start Time: 8.01  Session End Time: .40     Session Length: 38-52 minutes    Session #: 2    Attendees: Client attended alone    Service Modality:  Video Visit:      Provider verified identity through the following two step process.  Patient provided:  Patient  and Patient address    Telemedicine Visit: The patient's condition can be safely assessed and treated via synchronous audio and visual telemedicine encounter.      Reason for Telemedicine Visit: Patient has requested telehealth visit    Originating Site (Patient Location): Patient's home    Distant Site (Provider Location): [facility]    Consent:  The patient/guardian has verbally consented to: the potential risks and benefits of telemedicine (video visit) versus in person care; bill my insurance or make self-payment for services provided; and responsibility for payment of non-covered services.     Patient would like the video invitation sent by:  My Chart    Mode of Communication:  Video Conference via Amwell    Distant Location (Provider):  Off-site    As the provider I attest to compliance with applicable laws and regulations related to telemedicine.    DATA  Interactive Complexity: No  Crisis: No        Progress Since Last Session (Related to Symptoms / Goals / Homework):   Symptoms: Improving . Patient reports better emotional regulation this past week.    Homework: Partially completed      Episode of Care Goals: Satisfactory progress - CONTEMPLATION (Considering change and yet undecided); Intervened by assessing the negative and positive thinking (ambivalence) about behavior change     Current / Ongoing Stressors and Concerns:  Patient discussed tracking habits and self-care skills. Patient discussed working on getting at  least 7 hours of sleep per night. Patient discussed leaving her job on 3/1 and thoughts about moving. Patient discussed a plan to say goodbye to coworkers. Patient discussed applying and interviewing for new jobs. Patient discussed working on creating a better structure and routine to her day. Patient discussed incorporating more exercise and walking into her routine. Patient discussed themes of acceptance. Patient discussed codependency. Patient discussed a lack of close friends. Patient discussed seeking validation from others. Patient discussed wanting more meaning in her life.       Treatment Objective(s) Addressed in This Session:   use thought-stopping strategy daily to reduce intrusive thoughts  Increase interest, engagement, and pleasure in doing things       Intervention:   Motivational Interviewing    MI Intervention: Expressed Empathy/Understanding, Supported Autonomy, Collaboration, Evocation, Open-ended questions, and Reflections: simple and complex     Change Talk Expressed by the Patient: Desire to change    Provider Response to Change Talk: E - Evoked more info from patient about behavior change, A - Affirmed patient's thoughts, decisions, or attempts at behavior change, and R - Reflected patient's change talk    Assessments completed prior to visit:  The following assessments were completed by patient for this visit:  PHQ9:       4/30/2019    10:15 AM 7/9/2019     2:38 PM 9/24/2019     2:19 PM 1/7/2021     3:19 PM 10/4/2023     8:27 AM 12/27/2023     1:49 PM 2/14/2024     2:59 PM   PHQ-9 SCORE   PHQ-9 Total Score MyChart    8 (Mild depression) 9 (Mild depression) 5 (Mild depression) 6 (Mild depression)   PHQ-9 Total Score 16 16 8 8 9 5 6     GAD7:       7/9/2019     2:38 PM 10/4/2023     8:28 AM 12/27/2023     1:48 PM 2/14/2024     3:00 PM   NADIA-7 SCORE   Total Score  9 (mild anxiety) 8 (mild anxiety) 17 (severe anxiety)   Total Score 12 9 8 17     PROMIS 10-Global Health (all questions and answers  displayed):       10/4/2023     8:39 AM 2/13/2024     1:32 PM   PROMIS 10   In general, would you say your health is: Good Good   In general, would you say your quality of life is: Good Good   In general, how would you rate your physical health? Good Good   In general, how would you rate your mental health, including your mood and your ability to think? Fair Poor   In general, how would you rate your satisfaction with your social activities and relationships? Poor Fair   In general, please rate how well you carry out your usual social activities and roles Fair Fair   To what extent are you able to carry out your everyday physical activities such as walking, climbing stairs, carrying groceries, or moving a chair? Mostly Completely   In the past 7 days, how often have you been bothered by emotional problems such as feeling anxious, depressed, or irritable? Often Always   In the past 7 days, how would you rate your fatigue on average? Moderate Severe   In the past 7 days, how would you rate your pain on average, where 0 means no pain, and 10 means worst imaginable pain? 0 0   In general, would you say your health is: 3 3   In general, would you say your quality of life is: 3 3   In general, how would you rate your physical health? 3 3   In general, how would you rate your mental health, including your mood and your ability to think? 2 1   In general, how would you rate your satisfaction with your social activities and relationships? 1 2   In general, please rate how well you carry out your usual social activities and roles. (This includes activities at home, at work and in your community, and responsibilities as a parent, child, spouse, employee, friend, etc.) 2 2   To what extent are you able to carry out your everyday physical activities such as walking, climbing stairs, carrying groceries, or moving a chair? 4 5   In the past 7 days, how often have you been bothered by emotional problems such as feeling anxious,  depressed, or irritable? 4 5   In the past 7 days, how would you rate your fatigue on average? 3 4   In the past 7 days, how would you rate your pain on average, where 0 means no pain, and 10 means worst imaginable pain? 0 0   Global Mental Health Score 8 7    7   Global Physical Health Score 15 15    15   PROMIS TOTAL - SUBSCORES 23 22    22     [place] Suicide Severity Rating Scale (Lifetime/Recent)      12/29/2018     2:17 PM 1/29/2019     4:45 PM 3/3/2019     3:48 PM 3/22/2019    10:20 AM 10/18/2023     8:39 AM 2/14/2024     3:08 PM   [place] Suicide Severity Rating (Lifetime/Recent)   Q1 Wished to be Dead (Past Month) no no no no     Q2 Suicidal Thoughts (Past Month) no no no no     Q6 Suicide Behavior (Lifetime) no no no no     Q1 Wish to be Dead (Lifetime)     N N   Q2 Non-Specific Active Suicidal Thoughts (Lifetime)     N N         ASSESSMENT: Current Emotional / Mental Status (status of significant symptoms):   Risk status (Self / Other harm or suicidal ideation)   Patient denies current fears or concerns for personal safety.   Patient denies current or recent suicidal ideation or behaviors.   Patient denies current or recent homicidal ideation or behaviors.   Patient denies current or recent self injurious behavior or ideation.   Patient denies other safety concerns.   Patient reports there has been no change in risk factors since their last session.     Patient reports there has been no change in protective factors since their last session.     Recommended that patient call 911 or go to the local ED should there be a change in any of these risk factors.     Appearance:   Appropriate    Eye Contact:   Good    Psychomotor Behavior: Normal    Attitude:   Cooperative    Orientation:   All   Speech    Rate / Production: Normal/ Responsive    Volume:  Normal    Mood:    Anxious  Depressed    Affect:    Appropriate    Thought Content:  Clear    Thought Form:  Coherent  Logical    Insight:    Good       Medication Review:   No changes to current psychiatric medication(s)     Medication Compliance:   Yes     Changes in Health Issues:   None reported     Chemical Use Review:   Substance Use: Chemical use reviewed, no active concerns identified      Tobacco Use: No current tobacco use.      Diagnosis:  1. NADIA (generalized anxiety disorder)    2. Moderate episode of recurrent major depressive disorder (H)        Collateral Reports Completed:   Not Applicable    PLAN: (Patient Tasks / Therapist Tasks / Other)  Patient will return in 2 weeks for next session. Patient will engage in self-care activities. Patient will work on mindfulness skills to identify triggers. Patient will work on reframing her negative self-talk. Patient will work on incorporating structure and routine into her daily life including a regular sleep schedule, exercise routine, and eating habits. Patient will continue keeping a worry list. Patient will work on acceptance.       Sirisha Mcgowan, [facility]                                                         ______________________________________________________________________    Individual Treatment Plan    Patient's Name: Ángela Zurita  YOB: 1985    Date of Creation: 2/21/24  Date Treatment Plan Last Reviewed/Revised: 2/21/24    DSM5 Diagnoses:   Encounter Diagnoses   Name Primary?    NADIA (generalized anxiety disorder) Yes    Moderate episode of recurrent major depressive disorder (H)        Psychosocial / Contextual Factors: Relationship stressors, occupational dynamics.  PROMIS (reviewed every 90 days):   Encounter date  Last reading 2/14/24 2/13/2024  1:32 PM 2/13/24  1:32 PM 10/4/23  8:39 AM   Global Mental Health Score (P) 7 (P) 7 (P) 8   Global Physical Health Score (P) 15 (P) 15 (P) 15   PROMIS TOTAL - SUBSCORES (P) 22 (P) 22 (P) 23       Referral / Collaboration:  Referral to another professional/service is not indicated at this time..    Anticipated number of session for  this episode of care: 9-12 sessions  Anticipation frequency of session: Biweekly  Anticipated Duration of each session: 38-52 minutes  Treatment plan will be reviewed in 90 days or when goals have been changed.       MeasurableTreatment Goal(s) related to diagnosis / functional impairment(s)  Goal 1: Patient will reduce PHQ9 score to a 3 or less in the next 2 months.    I will know I've met my goal when I'm exercising, feeling better about myself and not isolating as much.      Objective #A (Patient Action)    Patient will Improve diet, appetite, mindful eating, and / or meal planning.  Status: New - Date: 2/21/24      Intervention(s)  Therapist will teach  teach mindfulness skills .    Objective #B  Patient will Identify negative self-talk and behaviors: challenge core beliefs, myths, and actions.  Status: New - Date: 2/21/24      Intervention(s)  Therapist will  teach thought-challenging techniques .    Objective #C  Patient will Increase interest, engagement, and pleasure in doing things.  Status: New - Date: 2/21/24      Intervention(s)  Therapist will  teach self-care activities .      Goal 2: Patient will reduce GAD7 score to a 12 or less in the next 2 months.    I will know I've met my goal when I'm not questioning every decision I make and over-thinking absolutely everything.      Objective #A (Patient Action)    Status: New - Date: 2/21/24      Patient will use distraction each time intrusive worry surfaces.    Intervention(s)  Therapist will  teach coping and distraction techniques .    Objective #B  Patient will use thought-stopping strategy daily to reduce intrusive thoughts.    Status: New - Date: 2/21/24      Intervention(s)  Therapist will  teach thought-stopping techniques .    Objective #C  Patient will identify at least 2 triggers for anxiety.  Status: New - Date: 2/21/24      Intervention(s)  Therapist will  teach mindfulness and self-awareness skills .      Patient has reviewed and agreed to the  above plan.      Sirisha Mcgowan, [facility]  February 21, 2024

## 2024-03-13 ENCOUNTER — VIRTUAL VISIT (OUTPATIENT)
Dept: BEHAVIORAL HEALTH | Facility: CLINIC | Age: 39
End: 2024-03-13
Payer: COMMERCIAL

## 2024-03-13 DIAGNOSIS — F41.1 GAD (GENERALIZED ANXIETY DISORDER): Primary | ICD-10-CM

## 2024-03-13 DIAGNOSIS — F33.1 MODERATE EPISODE OF RECURRENT MAJOR DEPRESSIVE DISORDER (H): ICD-10-CM

## 2024-03-13 PROCEDURE — 90834 PSYTX W PT 45 MINUTES: CPT | Mod: 95

## 2024-03-13 NOTE — PROGRESS NOTES
[facility]                                     Progress Note    Patient Name: Ángela Zurita  Date: 3/13/24           Service Type: Individual      Session Start Time: 9.00  Session End Time: .38     Session Length: 38-52 minutes    Session #: 3    Attendees: Client attended alone    Service Modality:  Video Visit:      Provider verified identity through the following two step process.  Patient provided:  Patient  and Patient address    Telemedicine Visit: The patient's condition can be safely assessed and treated via synchronous audio and visual telemedicine encounter.      Reason for Telemedicine Visit: Patient has requested telehealth visit    Originating Site (Patient Location): Patient's home    Distant Site (Provider Location): [facility]    Consent:  The patient/guardian has verbally consented to: the potential risks and benefits of telemedicine (video visit) versus in person care; bill my insurance or make self-payment for services provided; and responsibility for payment of non-covered services.     Patient would like the video invitation sent by:  My Chart    Mode of Communication:  Video Conference via Amwell    Distant Location (Provider):  Off-site    As the provider I attest to compliance with applicable laws and regulations related to telemedicine.    DATA  Interactive Complexity: No  Crisis: No        Progress Since Last Session (Related to Symptoms / Goals / Homework):   Symptoms: Improving . Patient reports better emotional regulation this past week.     Homework: Partially completed      Episode of Care Goals: Satisfactory progress - PREPARATION (Decided to change - considering how); Intervened by negotiating a change plan and determining options / strategies for behavior change, identifying triggers, exploring social supports, and working towards setting a date to begin behavior change     Current / Ongoing Stressors  and Concerns:  Patient discussed socializing more and recently attending a [employer]. Patient discussed applying for jobs. Patient discussed triggers to anxiety and depression. Patient discussed incorporating more exercise and walking into her routine. Patient discussed tracking habits and self-care skills. Patient discussed thoughts about moving. Patient discussed self-doubt and second guessing herself. Patient discussed differences in her routine when she has her sons and when they are at their fathers.       Treatment Objective(s) Addressed in This Session:   use thought-stopping strategy daily to reduce intrusive thoughts  Decrease frequency and intensity of feeling down, depressed, hopeless       Intervention:   Motivational Interviewing    MI Intervention: Expressed Empathy/Understanding, Supported Autonomy, Collaboration, Evocation, Open-ended questions, and Reflections: simple and complex     Change Talk Expressed by the Patient: Desire to change    Provider Response to Change Talk: E - Evoked more info from patient about behavior change, A - Affirmed patient's thoughts, decisions, or attempts at behavior change, and R - Reflected patient's change talk    Assessments completed prior to visit:  The following assessments were completed by patient for this visit:  PHQ9:       4/30/2019    10:15 AM 7/9/2019     2:38 PM 9/24/2019     2:19 PM 1/7/2021     3:19 PM 10/4/2023     8:27 AM 12/27/2023     1:49 PM 2/14/2024     2:59 PM   PHQ-9 SCORE   PHQ-9 Total Score MyChart    8 (Mild depression) 9 (Mild depression) 5 (Mild depression) 6 (Mild depression)   PHQ-9 Total Score 16 16 8 8 9 5 6     GAD7:       7/9/2019     2:38 PM 10/4/2023     8:28 AM 12/27/2023     1:48 PM 2/14/2024     3:00 PM   NADIA-7 SCORE   Total Score  9 (mild anxiety) 8 (mild anxiety) 17 (severe anxiety)   Total Score 12 9 8 17     PROMIS 10-Global Health (all questions and answers displayed):       10/4/2023     8:39 AM 2/13/2024     1:32 PM   PROMIS  10   In general, would you say your health is: Good Good   In general, would you say your quality of life is: Good Good   In general, how would you rate your physical health? Good Good   In general, how would you rate your mental health, including your mood and your ability to think? Fair Poor   In general, how would you rate your satisfaction with your social activities and relationships? Poor Fair   In general, please rate how well you carry out your usual social activities and roles Fair Fair   To what extent are you able to carry out your everyday physical activities such as walking, climbing stairs, carrying groceries, or moving a chair? Mostly Completely   In the past 7 days, how often have you been bothered by emotional problems such as feeling anxious, depressed, or irritable? Often Always   In the past 7 days, how would you rate your fatigue on average? Moderate Severe   In the past 7 days, how would you rate your pain on average, where 0 means no pain, and 10 means worst imaginable pain? 0 0   In general, would you say your health is: 3 3   In general, would you say your quality of life is: 3 3   In general, how would you rate your physical health? 3 3   In general, how would you rate your mental health, including your mood and your ability to think? 2 1   In general, how would you rate your satisfaction with your social activities and relationships? 1 2   In general, please rate how well you carry out your usual social activities and roles. (This includes activities at home, at work and in your community, and responsibilities as a parent, child, spouse, employee, friend, etc.) 2 2   To what extent are you able to carry out your everyday physical activities such as walking, climbing stairs, carrying groceries, or moving a chair? 4 5   In the past 7 days, how often have you been bothered by emotional problems such as feeling anxious, depressed, or irritable? 4 5   In the past 7 days, how would you rate your  fatigue on average? 3 4   In the past 7 days, how would you rate your pain on average, where 0 means no pain, and 10 means worst imaginable pain? 0 0   Global Mental Health Score 8 7    7   Global Physical Health Score 15 15    15   PROMIS TOTAL - SUBSCORES 23 22    22     [place] Suicide Severity Rating Scale (Lifetime/Recent)      12/29/2018     2:17 PM 1/29/2019     4:45 PM 3/3/2019     3:48 PM 3/22/2019    10:20 AM 10/18/2023     8:39 AM 2/14/2024     3:08 PM   [place] Suicide Severity Rating (Lifetime/Recent)   Q1 Wished to be Dead (Past Month) no no no no     Q2 Suicidal Thoughts (Past Month) no no no no     Q6 Suicide Behavior (Lifetime) no no no no     Q1 Wish to be Dead (Lifetime)     N N   Q2 Non-Specific Active Suicidal Thoughts (Lifetime)     N N         ASSESSMENT: Current Emotional / Mental Status (status of significant symptoms):   Risk status (Self / Other harm or suicidal ideation)   Patient denies current fears or concerns for personal safety.   Patient denies current or recent suicidal ideation or behaviors.   Patient denies current or recent homicidal ideation or behaviors.   Patient denies current or recent self injurious behavior or ideation.   Patient denies other safety concerns.   Patient reports there has been no change in risk factors since their last session.     Patient reports there has been no change in protective factors since their last session.     Recommended that patient call 911 or go to the local ED should there be a change in any of these risk factors.     Appearance:   Appropriate    Eye Contact:   Good    Psychomotor Behavior: Normal    Attitude:   Cooperative    Orientation:   All   Speech    Rate / Production: Normal/ Responsive    Volume:  Normal    Mood:    Anxious  Depressed    Affect:    Appropriate    Thought Content:  Clear    Thought Form:  Coherent  Logical    Insight:    Good      Medication Review:   No changes to current psychiatric medication(s)     Medication  Compliance:   Yes     Changes in Health Issues:   None reported     Chemical Use Review:   Substance Use: Chemical use reviewed, no active concerns identified      Tobacco Use: No current tobacco use.      Diagnosis:  1. NADIA (generalized anxiety disorder)    2. Moderate episode of recurrent major depressive disorder (H)        Collateral Reports Completed:   Not Applicable    PLAN: (Patient Tasks / Therapist Tasks / Other)  Patient will return in 2 weeks for next session. Patient will work on mindfulness skills to identify triggers. Patient will work on reframing her negative self-talk. Patient will work on incorporating structure and routine into her daily life including a regular sleep schedule, exercise routine, and eating habits. Patient will continue keeping a worry list. Patient will work on acceptance.      Sirisha Mcgowan, [facility]                                                         ______________________________________________________________________    Individual Treatment Plan    Patient's Name: Ángela Zurita  YOB: 1985    Date of Creation: 2/21/24  Date Treatment Plan Last Reviewed/Revised: 2/21/24    DSM5 Diagnoses:   Encounter Diagnoses   Name Primary?    NADIA (generalized anxiety disorder) Yes    Moderate episode of recurrent major depressive disorder (H)        Psychosocial / Contextual Factors: Relationship stressors, occupational dynamics.  PROMIS (reviewed every 90 days):   Encounter date  Last reading 2/14/24 2/13/2024  1:32 PM 2/13/24  1:32 PM 10/4/23  8:39 AM   Global Mental Health Score (P) 7 (P) 7 (P) 8   Global Physical Health Score (P) 15 (P) 15 (P) 15   PROMIS TOTAL - SUBSCORES (P) 22 (P) 22 (P) 23       Referral / Collaboration:  Referral to another professional/service is not indicated at this time..    Anticipated number of session for this episode of care: 9-12 sessions  Anticipation frequency of session: Biweekly  Anticipated Duration of each session: 38-52  minutes  Treatment plan will be reviewed in 90 days or when goals have been changed.       MeasurableTreatment Goal(s) related to diagnosis / functional impairment(s)  Goal 1: Patient will reduce PHQ9 score to a 3 or less in the next 2 months.    I will know I've met my goal when I'm exercising, feeling better about myself and not isolating as much.      Objective #A (Patient Action)    Patient will Improve diet, appetite, mindful eating, and / or meal planning.  Status: New - Date: 2/21/24      Intervention(s)  Therapist will teach  teach mindfulness skills .    Objective #B  Patient will Identify negative self-talk and behaviors: challenge core beliefs, myths, and actions.  Status: New - Date: 2/21/24      Intervention(s)  Therapist will  teach thought-challenging techniques .    Objective #C  Patient will Increase interest, engagement, and pleasure in doing things.  Status: New - Date: 2/21/24      Intervention(s)  Therapist will  teach self-care activities .      Goal 2: Patient will reduce GAD7 score to a 12 or less in the next 2 months.    I will know I've met my goal when I'm not questioning every decision I make and over-thinking absolutely everything.      Objective #A (Patient Action)    Status: New - Date: 2/21/24      Patient will use distraction each time intrusive worry surfaces.    Intervention(s)  Therapist will  teach coping and distraction techniques .    Objective #B  Patient will use thought-stopping strategy daily to reduce intrusive thoughts.    Status: New - Date: 2/21/24      Intervention(s)  Therapist will  teach thought-stopping techniques .    Objective #C  Patient will identify at least 2 triggers for anxiety.  Status: New - Date: 2/21/24      Intervention(s)  Therapist will  teach mindfulness and self-awareness skills .      Patient has reviewed and agreed to the above plan.      Sirisha Mcgowan, [facility]  February 21, 2024

## 2024-03-27 ENCOUNTER — OFFICE VISIT (OUTPATIENT)
Dept: FAMILY MEDICINE | Facility: CLINIC | Age: 39
End: 2024-03-27
Attending: FAMILY MEDICINE
Payer: COMMERCIAL

## 2024-03-27 VITALS
HEART RATE: 67 BPM | RESPIRATION RATE: 20 BRPM | WEIGHT: 172 LBS | HEIGHT: 65 IN | OXYGEN SATURATION: 99 % | TEMPERATURE: 98.5 F | BODY MASS INDEX: 28.66 KG/M2 | SYSTOLIC BLOOD PRESSURE: 118 MMHG | DIASTOLIC BLOOD PRESSURE: 72 MMHG

## 2024-03-27 DIAGNOSIS — Z23 NEED FOR HPV VACCINATION: ICD-10-CM

## 2024-03-27 DIAGNOSIS — Z13.1 DIABETES MELLITUS SCREENING: ICD-10-CM

## 2024-03-27 DIAGNOSIS — Z13.220 SCREENING FOR LIPID DISORDERS: ICD-10-CM

## 2024-03-27 DIAGNOSIS — G43.009 MIGRAINE WITHOUT AURA AND WITHOUT STATUS MIGRAINOSUS, NOT INTRACTABLE: ICD-10-CM

## 2024-03-27 DIAGNOSIS — F90.0 ATTENTION DEFICIT HYPERACTIVITY DISORDER (ADHD), PREDOMINANTLY INATTENTIVE TYPE: ICD-10-CM

## 2024-03-27 DIAGNOSIS — Z00.00 ROUTINE GENERAL MEDICAL EXAMINATION AT A HEALTH CARE FACILITY: Primary | ICD-10-CM

## 2024-03-27 LAB
CHOLEST SERPL-MCNC: 170 MG/DL
FASTING STATUS PATIENT QL REPORTED: NO
FASTING STATUS PATIENT QL REPORTED: NO
GLUCOSE SERPL-MCNC: 94 MG/DL (ref 70–99)
HBA1C MFR BLD: 5.3 % (ref 0–5.6)
HDLC SERPL-MCNC: 66 MG/DL
LDLC SERPL CALC-MCNC: 86 MG/DL
NONHDLC SERPL-MCNC: 104 MG/DL
TRIGL SERPL-MCNC: 89 MG/DL

## 2024-03-27 PROCEDURE — 90471 IMMUNIZATION ADMIN: CPT | Performed by: FAMILY MEDICINE

## 2024-03-27 PROCEDURE — 99214 OFFICE O/P EST MOD 30 MIN: CPT | Mod: 25 | Performed by: FAMILY MEDICINE

## 2024-03-27 PROCEDURE — 90651 9VHPV VACCINE 2/3 DOSE IM: CPT | Performed by: FAMILY MEDICINE

## 2024-03-27 PROCEDURE — 82947 ASSAY GLUCOSE BLOOD QUANT: CPT | Performed by: FAMILY MEDICINE

## 2024-03-27 PROCEDURE — 36415 COLL VENOUS BLD VENIPUNCTURE: CPT | Performed by: FAMILY MEDICINE

## 2024-03-27 PROCEDURE — 80061 LIPID PANEL: CPT | Performed by: FAMILY MEDICINE

## 2024-03-27 PROCEDURE — 83036 HEMOGLOBIN GLYCOSYLATED A1C: CPT | Performed by: FAMILY MEDICINE

## 2024-03-27 PROCEDURE — 99396 PREV VISIT EST AGE 40-64: CPT | Mod: 25 | Performed by: FAMILY MEDICINE

## 2024-03-27 RX ORDER — DEXTROAMPHETAMINE SACCHARATE, AMPHETAMINE ASPARTATE, DEXTROAMPHETAMINE SULFATE AND AMPHETAMINE SULFATE 5; 5; 5; 5 MG/1; MG/1; MG/1; MG/1
20 TABLET ORAL 3 TIMES DAILY
Qty: 90 TABLET | Refills: 0 | Status: SHIPPED | OUTPATIENT
Start: 2024-05-26 | End: 2024-05-21

## 2024-03-27 RX ORDER — DEXTROAMPHETAMINE SACCHARATE, AMPHETAMINE ASPARTATE, DEXTROAMPHETAMINE SULFATE AND AMPHETAMINE SULFATE 5; 5; 5; 5 MG/1; MG/1; MG/1; MG/1
20 TABLET ORAL 3 TIMES DAILY
Qty: 90 TABLET | Refills: 0 | Status: SHIPPED | OUTPATIENT
Start: 2024-04-26 | End: 2024-05-26

## 2024-03-27 RX ORDER — SUMATRIPTAN 50 MG/1
50 TABLET, FILM COATED ORAL
Qty: 9 TABLET | Refills: 4 | Status: SHIPPED | OUTPATIENT
Start: 2024-03-27

## 2024-03-27 RX ORDER — DEXTROAMPHETAMINE SACCHARATE, AMPHETAMINE ASPARTATE, DEXTROAMPHETAMINE SULFATE AND AMPHETAMINE SULFATE 5; 5; 5; 5 MG/1; MG/1; MG/1; MG/1
20 TABLET ORAL 3 TIMES DAILY
Qty: 90 TABLET | Refills: 0 | Status: SHIPPED | OUTPATIENT
Start: 2024-03-27 | End: 2024-04-26

## 2024-03-27 SDOH — HEALTH STABILITY: PHYSICAL HEALTH: ON AVERAGE, HOW MANY MINUTES DO YOU ENGAGE IN EXERCISE AT THIS LEVEL?: 30 MIN

## 2024-03-27 SDOH — HEALTH STABILITY: PHYSICAL HEALTH: ON AVERAGE, HOW MANY DAYS PER WEEK DO YOU ENGAGE IN MODERATE TO STRENUOUS EXERCISE (LIKE A BRISK WALK)?: 4 DAYS

## 2024-03-27 ASSESSMENT — SOCIAL DETERMINANTS OF HEALTH (SDOH): HOW OFTEN DO YOU GET TOGETHER WITH FRIENDS OR RELATIVES?: ONCE A WEEK

## 2024-03-27 ASSESSMENT — ANXIETY QUESTIONNAIRES
3. WORRYING TOO MUCH ABOUT DIFFERENT THINGS: SEVERAL DAYS
IF YOU CHECKED OFF ANY PROBLEMS ON THIS QUESTIONNAIRE, HOW DIFFICULT HAVE THESE PROBLEMS MADE IT FOR YOU TO DO YOUR WORK, TAKE CARE OF THINGS AT HOME, OR GET ALONG WITH OTHER PEOPLE: SOMEWHAT DIFFICULT
1. FEELING NERVOUS, ANXIOUS, OR ON EDGE: SEVERAL DAYS
4. TROUBLE RELAXING: SEVERAL DAYS
7. FEELING AFRAID AS IF SOMETHING AWFUL MIGHT HAPPEN: NOT AT ALL
GAD7 TOTAL SCORE: 5
7. FEELING AFRAID AS IF SOMETHING AWFUL MIGHT HAPPEN: NOT AT ALL
2. NOT BEING ABLE TO STOP OR CONTROL WORRYING: SEVERAL DAYS
6. BECOMING EASILY ANNOYED OR IRRITABLE: NOT AT ALL
5. BEING SO RESTLESS THAT IT IS HARD TO SIT STILL: SEVERAL DAYS
8. IF YOU CHECKED OFF ANY PROBLEMS, HOW DIFFICULT HAVE THESE MADE IT FOR YOU TO DO YOUR WORK, TAKE CARE OF THINGS AT HOME, OR GET ALONG WITH OTHER PEOPLE?: SOMEWHAT DIFFICULT

## 2024-03-27 ASSESSMENT — PAIN SCALES - GENERAL: PAINLEVEL: NO PAIN (0)

## 2024-03-27 NOTE — NURSING NOTE
Prior to immunization administration, verified patients identity using patient s name and date of birth. Please see Immunization Activity for additional information.     Screening Questionnaire for Adult Immunization    Are you sick today?   No   Do you have allergies to medications, food, a vaccine component or latex?   No   Have you ever had a serious reaction after receiving a vaccination?   No   Do you have a long-term health problem with heart, lung, kidney, or metabolic disease (e.g., diabetes), asthma, a blood disorder, no spleen, complement component deficiency, a cochlear implant, or a spinal fluid leak?  Are you on long-term aspirin therapy?   No   Do you have cancer, leukemia, HIV/AIDS, or any other immune system problem?   No   Do you have a parent, brother, or sister with an immune system problem?   No   In the past 3 months, have you taken medications that affect  your immune system, such as prednisone, other steroids, or anticancer drugs; drugs for the treatment of rheumatoid arthritis, Crohn s disease, or psoriasis; or have you had radiation treatments?   No   Have you had a seizure, or a brain or other nervous system problem?   No   During the past year, have you received a transfusion of blood or blood    products, or been given immune (gamma) globulin or antiviral drug?   No   For women: Are you pregnant or is there a chance you could become       pregnant during the next month?   No   Have you received any vaccinations in the past 4 weeks?   No     Immunization questionnaire answers were all negative.      Patient instructed to remain in clinic for 15 minutes afterwards, and to report any adverse reactions.     Screening performed by Saira Gonzales on 3/27/2024 at 5:02 PM.

## 2024-03-27 NOTE — PATIENT INSTRUCTIONS
To lab  After lab ok to go    3 month refill request on [facility]  6 months evisit or virtual  9 month refill request  12 month in clinic again.      This is a preventative visit and any additional concerns or chronic disease management including medication refills addressed today could be charged additionally.    Preventative visits screen for diseases prior to they occur.  They do not cover for any new diagnosis or chronic disease management.     If you have questions regarding your coverage please check with your insurance provider.  At [facility] we need to code correctly to be in compliance with all insurance companies.  Preventive Care Advice   This is general advice given by our system to help you stay healthy. However, your care team may have specific advice just for you. Please talk to your care team about your preventive care needs.  Nutrition  Eat 5 or more servings of fruits and vegetables each day.  Try wheat bread, brown rice and whole grain pasta (instead of white bread, rice, and pasta).  Get enough calcium and vitamin D. Check the label on foods and aim for 100% of the RDA (recommended daily allowance).  Lifestyle  Exercise at least 150 minutes each week   (30 minutes a day, 5 days a week).  Do muscle strengthening activities 2 days a week. These help control your weight and prevent disease.  No smoking.  Wear sunscreen to prevent skin cancer.  Have a dental exam and cleaning every 6 months.  Yearly exams  See your health care team every year to talk about:  Any changes in your health.  Any medicines your care team has prescribed.  Preventive care, family planning, and ways to prevent chronic diseases.  Shots (vaccines)   HPV shots (up to age 26), if you've never had them before.  Hepatitis B shots (up to age 59), if you've never had them before.  COVID-19 shot: Get this shot when it's due.  Flu shot: Get a flu shot every year.  Tetanus shot: Get a tetanus shot every 10 years.  Pneumococcal, hepatitis  A, and RSV shots: Ask your care team if you need these based on your risk.  Shingles shot (for age 50 and up).  General health tests  Diabetes screening:  Starting at age 35, Get screened for diabetes at least every 3 years.  If you are younger than age 35, ask your care team if you should be screened for diabetes.  Cholesterol test: At age 39, start having a cholesterol test every 5 years, or more often if advised.  Bone density scan (DEXA): At age 50, ask your care team if you should have this scan for osteoporosis (brittle bones).  Hepatitis C: Get tested at least once in your life.  STIs (sexually transmitted infections)  Before age 24: Ask your care team if you should be screened for STIs.  After age 24: Get screened for STIs if you're at risk. You are at risk for STIs (including HIV) if:  You are sexually active with more than one person.  You don't use condoms every time.  You or a partner was diagnosed with a sexually transmitted infection.  If you are at risk for HIV, ask about PrEP medicine to prevent HIV.  Get tested for HIV at least once in your life, whether you are at risk for HIV or not.  Cancer screening tests  Cervical cancer screening: If you have a cervix, begin getting regular cervical cancer screening tests at age 21. Most people who have regular screenings with normal results can stop after age 65. Talk about this with your provider.  Breast cancer scan (mammogram): If you've ever had breasts, begin having regular mammograms starting at age 40. This is a scan to check for breast cancer.  Colon cancer screening: It is important to start screening for colon cancer at age 45.  Have a colonoscopy test every 10 years (or more often if you're at risk) Or, ask your provider about stool tests like a FIT test every year or Cologuard test every 3 years.  To learn more about your testing options, visit: [email or web address]/[email or web address].  For help making a decision, visit:  https://bit.netTALK/wi26887.  Prostate cancer screening test: If you have a prostate and are age 55 to 69, ask your provider if you would benefit from a yearly prostate cancer screening test.  Lung cancer screening: If you are a current or former smoker age 50 to 80, ask your care team if ongoing lung cancer screenings are right for you.  For informational purposes only. Not to replace the advice of your health care provider. Copyright   2023 [facility]. All rights reserved. Clinically reviewed by the [facility] Transitions Program. [employer] 218914 - REV 01/24.    Eating Healthy Foods: Care Instructions  With every meal, you can make healthy food choices. Try to eat a variety of fruits, vegetables, whole grains, lean proteins, and low-fat dairy products. This can help you get the right balance of nutrients, including vitamins and minerals. Small changes add up over time. You can start by adding one healthy food to your meals each day.    Try to make half your plate fruits and vegetables, one-fourth whole grains, and one-fourth lean proteins. Try including dairy with your meals.   Eat more fruits and vegetables. Try to have them with most meals and snacks.   Foods for healthy eating    Fruits    These can be fresh, frozen, canned, or dried.  Try to choose whole fruit rather than fruit juice.  Eat a variety of colors.    Vegetables    These can be fresh, frozen, canned, or dried.  Beans, peas, and lentils count too.    Whole grains    Choose whole-grain breads, cereals, and noodles.  Try brown rice.    Lean proteins    These can include lean meat, poultry, fish, and eggs.  You can also have tofu, beans, peas, lentils, nuts, and seeds.    Dairy    Try milk, yogurt, and cheese.  Choose low-fat or fat-free when you can.  If you need to, use lactose-free milk or fortified plant-based milk products, such as soy milk.    Water    Drink water when you're thirsty.  Limit sugar-sweetened drinks, including  "soda, fruit drinks, and sports drinks.  Where can you learn more?  Go to https://www.ConnectAndSell.net/patiented  Enter T756 in the search box to learn more about \"Eating Healthy Foods: Care Instructions.\"  Current as of: September 20, 2023               Content Version: 14.0    5519-9030 [employer].   Care instructions adapted under license by your healthcare professional. If you have questions about a medical condition or this instruction, always ask your healthcare professional. [employer] disclaims any warranty or liability for your use of this information.      Learning About Stress  What is stress?     Stress is your body's response to a hard situation. Your body can have a physical, emotional, or mental response. Stress is a fact of life for most people, and it affects everyone differently. What causes stress for you may not be stressful for someone else.  A lot of things can cause stress. You may feel stress when you go on a job interview, take a test, or run a race. This kind of short-term stress is normal and even useful. It can help you if you need to work hard or react quickly. For example, stress can help you finish an important job on time.  Long-term stress is caused by ongoing stressful situations or events. Examples of long-term stress include long-term health problems, ongoing problems at work, or conflicts in your family. Long-term stress can harm your health.  How does stress affect your health?  When you are stressed, your body responds as though you are in danger. It makes hormones that speed up your heart, make you breathe faster, and give you a burst of energy. This is called the fight-or-flight stress response. If the stress is over quickly, your body goes back to normal and no harm is done.  But if stress happens too often or lasts too long, it can have bad effects. Long-term stress can make you more likely to get sick, and it can make symptoms of some diseases " worse. If you tense up when you are stressed, you may develop neck, shoulder, or low back pain. Stress is linked to high blood pressure and heart disease.  Stress also harms your emotional health. It can make you lyle, tense, or depressed. Your relationships may suffer, and you may not do well at work or school.  What can you do to manage stress?  You can try these things to help manage stress:   Do something active. Exercise or activity can help reduce stress. Walking is a great way to get started. Even everyday activities such as housecleaning or yard work can help.  Try yoga or jasmin chi. These techniques combine exercise and meditation. You may need some training at first to learn them.  Do something you enjoy. For example, listen to music or go to a movie. Practice your hobby or do volunteer work.  Meditate. This can help you relax, because you are not worrying about what happened before or what may happen in the future.  Do guided imagery. Imagine yourself in any setting that helps you feel calm. You can use online videos, books, or a teacher to guide you.  Do breathing exercises. For example:  From a standing position, bend forward from the waist with your knees slightly bent. Let your arms dangle close to the floor.  Breathe in slowly and deeply as you return to a standing position. Roll up slowly and lift your head last.  Hold your breath for just a few seconds in the standing position.  Breathe out slowly and bend forward from the waist.  Let your feelings out. Talk, laugh, cry, and express anger when you need to. Talking with supportive friends or family, a counselor, or a nahid leader about your feelings is a healthy way to relieve stress. Avoid discussing your feelings with people who make you feel worse.  Write. It may help to write about things that are bothering you. This helps you find out how much stress you feel and what is causing it. When you know this, you can find better ways to cope.  What can  "you do to prevent stress?  You might try some of these things to help prevent stress:  Manage your time. This helps you find time to do the things you want and need to do.  Get enough sleep. Your body recovers from the stresses of the day while you are sleeping.  Get support. Your family, friends, and community can make a difference in how you experience stress.  Limit your news feed. Avoid or limit time on social media or news that may make you feel stressed.  Do something active. Exercise or activity can help reduce stress. Walking is a great way to get started.  Where can you learn more?  Go to https://www.Skyepack.net/patiented  Enter N032 in the search box to learn more about \"Learning About Stress.\"  Current as of: October 24, 2023               Content Version: 14.0    7993-1722 [employer].   Care instructions adapted under license by your healthcare professional. If you have questions about a medical condition or this instruction, always ask your healthcare professional. [employer] disclaims any warranty or liability for your use of this information.      Learning About Depression Screening  What is depression screening?  Depression screening is a way to see if you have depression symptoms. It may be done by a doctor or counselor. It's often part of a routine checkup. That's because your mental health is just as important as your physical health.  Depression is a mental health condition that affects how you feel, think, and act. You may:  Have less energy.  Lose interest in your daily activities.  Feel sad and grouchy for a long time.  Depression is very common. It affects people of all ages.  Many things can lead to depression. Some people become depressed after they have a stroke or find out they have a major illness like cancer or heart disease. The death of a loved one or a breakup may lead to depression. It can run in families. Most experts believe that a combination of " "inherited genes and stressful life events can cause it.  What happens during screening?  You may be asked to fill out a form about your depression symptoms. You and the doctor will discuss your answers. The doctor may ask you more questions to learn more about how you think, act, and feel.  What happens after screening?  If you have symptoms of depression, your doctor will talk to you about your options.  Doctors usually treat depression with medicines or counseling. Often, combining the two works best. Many people don't get help because they think that they'll get over the depression on their own. But people with depression may not get better unless they get treatment.  The cause of depression is not well understood. There may be many factors involved. But if you have depression, it's not your fault.  A serious symptom of depression is thinking about death or suicide. If you or someone you care about talks about this or about feeling hopeless, get help right away.  It's important to know that depression can be treated. Medicine, counseling, and self-care may help.  Where can you learn more?  Go to https://www.Proginet.net/patiented  Enter T185 in the search box to learn more about \"Learning About Depression Screening.\"  Current as of: June 24, 2023               Content Version: 14.0    3451-6602 [employer].   Care instructions adapted under license by your healthcare professional. If you have questions about a medical condition or this instruction, always ask your healthcare professional. [employer] disclaims any warranty or liability for your use of this information.      " oral 25.2

## 2024-03-27 NOTE — PROGRESS NOTES
Preventive Care Visit  [facility]  Randy Graf MD, Family Medicine  Mar 27, 2024      Assessment & Plan     Routine general medical examination at a health care facility    Attention deficit hyperactivity disorder (ADHD), predominantly inattentive type  Stable, refill  - amphetamine-dextroamphetamine (ADDERALL) 20 MG tablet; Take 1 tablet (20 mg) by mouth 3 times daily for 30 days  - amphetamine-dextroamphetamine (ADDERALL) 20 MG tablet; Take 1 tablet (20 mg) by mouth 3 times daily for 30 days  - amphetamine-dextroamphetamine (ADDERALL) 20 MG tablet; Take 1 tablet (20 mg) by mouth 3 times daily for 30 days    Migraine without aura and without status migrainosus, not intractable  Stable, refill  - SUMAtriptan (IMITREX) 50 MG tablet; Take 1 tablet (50 mg) by mouth at onset of headache for migraine May repeat in 2 hours. Max 4 tablets/24 hours.    Need for HPV vaccination  discussed  - HPV9 (GARDASIL 9)    Screening for lipid disorders  - Lipid panel reflex to direct LDL Non-fasting; Future    Diabetes mellitus screening  - Glucose; Future  - Hemoglobin A1c; Future    Patient has been advised of split billing requirements and indicates understanding: Yes      Counseling  Appropriate preventive services were discussed with this patient, including applicable screening as appropriate for fall prevention, nutrition, physical activity, Tobacco-use cessation, weight loss and cognition.  Checklist reviewing preventive services available has been given to the patient.  Reviewed patient's diet, addressing concerns and/or questions.   The patient's PHQ-9 score is consistent with mild depression. She was provided with information regarding depression.       See Patient Instructions    Olivia Motta is a 39 year old, presenting for the following:  Physical and Recheck Medication        3/27/2024     4:22 PM   Additional Questions   Roomed by Saira Gonzales   Accompanied by self          3/27/2024     4:22 PM   Patient Reported Additional Medications   Patient reports taking the following new medications none        Health Care Directive  Patient does not have a Health Care Directive or Living Will:     HPI    Recheck ADHD/ADD. Adderall 20 MG    Updates since last visit: Going okay since 2023.  Routine for taking medicine, including time: Patient take the first Adderall 7 AM, second dose at 11 AM, and third dose at 3 PM. Sometimes will cut the last 20 MG tablet in half.  Time medicine wears off: 8-9 PM  Issues at school/Work: None  Issues at home: None  Control of symptoms: Well controlled    Side effects:  Headaches: No  Stomach aches: No  Irritability/mood swings: No  Difficulties with sleep: Yes- sometimes but will cut her last dose in half.  Social withdrawal: No  Unusual movements/tics: No  Decreased appetite: No    Other concerns: None      Depression and Anxiety   - Talking with therapist but not sure that she wants any medications.  How are you doing with your depression since your last visit? Stable. Not taking Fluoxetine. Has been seeing a therapist and is doing good.  How are you doing with your anxiety since your last visit?  Stable.  Are you having other symptoms that might be associated with depression or anxiety? No  Have you had a significant life event? OTHER: left job about a month ago.    Do you have any concerns with your use of alcohol or other drugs? No    Social History     Tobacco Use    Smoking status: Former     Packs/day: 1.00     Years: 23.00     Additional pack years: 0.00     Total pack years: 23.00     Types: Cigarettes     Start date: 2000     Quit date: 2023     Years since quittin.2    Smokeless tobacco: Never    Tobacco comments:     Quit with each pregnancy   Vaping Use    Vaping Use: Never used   Substance Use Topics    Alcohol use: Not Currently     Comment: rarely    Drug use: Never         2023     1:49 PM 2024     2:59 PM 3/27/2024      4:21 PM   PHQ   PHQ-9 Total Score 5 6 8   Q9: Thoughts of better off dead/self-harm past 2 weeks Not at all Not at all Not at all         12/27/2023     1:48 PM 2/14/2024     3:00 PM 3/27/2024     4:21 PM   NADIA-7 SCORE   Total Score 8 (mild anxiety) 17 (severe anxiety) 5 (mild anxiety)   Total Score 8 17 5         3/27/2024     4:21 PM   Last PHQ-9   1.  Little interest or pleasure in doing things 1   2.  Feeling down, depressed, or hopeless 0   3.  Trouble falling or staying asleep, or sleeping too much 2   4.  Feeling tired or having little energy 2   5.  Poor appetite or overeating 1   6.  Feeling bad about yourself 1   7.  Trouble concentrating 1   8.  Moving slowly or restless 0   Q9: Thoughts of better off dead/self-harm past 2 weeks 0   PHQ-9 Total Score 8         3/27/2024     4:21 PM   NADIA-7    1. Feeling nervous, anxious, or on edge 1   2. Not being able to stop or control worrying 1   3. Worrying too much about different things 1   4. Trouble relaxing 1   5. Being so restless that it is hard to sit still 1   6. Becoming easily annoyed or irritable 0   7. Feeling afraid, as if something awful might happen 0   NADIA-7 Total Score 5   If you checked any problems, how difficult have they made it for you to do your work, take care of things at home, or get along with other people? Somewhat difficult       Suicide Assessment Five-step Evaluation and Treatment (SAFE-T)          3/27/2024   General Health   How would you rate your overall physical health? (!) FAIR   Feel stress (tense, anxious, or unable to sleep) To some extent   (!) STRESS CONCERN      3/27/2024   Nutrition   Three or more servings of calcium each day? Yes   Diet: Low fat/cholesterol    Carbohydrate counting   How many servings of fruit and vegetables per day? (!) 2-3   How many sweetened beverages each day? 0-1         3/27/2024   Exercise   Days per week of moderate/strenous exercise 4 days   Average minutes spent exercising at this level  30 min         3/27/2024   Social Factors   Frequency of gathering with friends or relatives Once a week   Worry food won't last until get money to buy more No   Food not last or not have enough money for food? No   Do you have housing?  Yes   Are you worried about losing your housing? No   Lack of transportation? No   Unable to get utilities (heat,electricity)? No         3/27/2024   Dental   Dentist two times every year? Yes         3/27/2024   TB Screening   Were you born outside of the [place]? No       Today's PHQ-9 Score:       3/27/2024     4:21 PM   PHQ-9 SCORE   PHQ-9 Total Score MyChart 8 (Mild depression)   PHQ-9 Total Score 8         3/27/2024   Substance Use   Alcohol more than 3/day or more than 7/wk No   Do you use any other substances recreationally? No     Social History     Tobacco Use    Smoking status: Former     Packs/day: 1.00     Years: 23.00     Additional pack years: 0.00     Total pack years: 23.00     Types: Cigarettes     Start date: 2000     Quit date: 2023     Years since quittin.2    Smokeless tobacco: Never    Tobacco comments:     Quit with each pregnancy   Vaping Use    Vaping Use: Never used   Substance Use Topics    Alcohol use: Not Currently     Comment: rarely    Drug use: Never           2024   LAST FHS-7 RESULTS   1st degree relative breast or ovarian cancer Yes   Any relative bilateral breast cancer No   Any male have breast cancer Yes   Any ONE woman have BOTH breast AND ovarian cancer No   Any woman with breast cancer before 50yrs Yes   2 or more relatives with breast AND/OR ovarian cancer Yes   2 or more relatives with breast AND/OR bowel cancer No        Mammo Decision Support - Begin breast cancer screening at age 25 or 8 years after chest radiation treatment with alternating annual mammography and breast MRI  Mammogram Screening - Annual screen due to greater than 20% lifetime risk as estimated by Breast Cancer Risk Calculator   20.9%        3/27/2024   STI  "Screening   New sexual partner(s) since last STI/HIV test? No     History of abnormal Pap smear: NO - age 30-65 PAP every 5 years with negative HPV co-testing recommended        Latest Ref Rng & Units 1/23/2020     2:24 PM 1/23/2020     1:50 PM 3/14/2016    12:28 PM   PAP / HPV   PAP (Historical)  NIL      HPV 16 DNA NEG^Negative  Negative  Negative    HPV 18 DNA NEG^Negative  Negative  Negative    Other HR HPV NEG^Negative  Negative  Negative      ASCVD Risk   The ASCVD Risk score (Autumn HOUSER, et al., 2019) failed to calculate for the following reasons:    Cannot find a previous HDL lab    Cannot find a previous total cholesterol lab        3/27/2024   Contraception/Family Planning   Questions about contraception or family planning No        Reviewed and updated as needed this visit by Provider                    BP Readings from Last 3 Encounters:   03/27/24 118/72   10/23/23 136/89   05/22/23 126/86    Wt Readings from Last 3 Encounters:   03/27/24 78 kg (172 lb)   10/23/23 76.7 kg (169 lb)   05/22/23 71.9 kg (158 lb 9.6 oz)                      Review of Systems  Constitutional, HEENT, cardiovascular, pulmonary, gi and gu systems are negative, except as otherwise noted.     Objective    Exam  /72 (BP Location: Right arm, Patient Position: Sitting, Cuff Size: Adult Large)   Pulse 67   Temp 98.5  F (36.9  C) (Tympanic)   Resp 20   Ht 1.655 m (5' 5.16\")   Wt 78 kg (172 lb)   LMP 03/18/2024 (Exact Date)   SpO2 99%   BMI 28.48 kg/m     Estimated body mass index is 28.48 kg/m  as calculated from the following:    Height as of this encounter: 1.655 m (5' 5.16\").    Weight as of this encounter: 78 kg (172 lb).    Physical Exam  GENERAL: alert and no distress  EYES: Eyes grossly normal to inspection, PERRL and conjunctivae and sclerae normal  HENT: ear canals and TM's normal, nose and mouth without ulcers or lesions  NECK: no adenopathy, no asymmetry, masses, or scars  RESP: lungs clear to " auscultation - no rales, rhonchi or wheezes  BREAST: normal without masses, tenderness or nipple discharge and no palpable axillary masses or adenopathy  CV: regular rate and rhythm, normal S1 S2, no S3 or S4, no murmur, click or rub, no peripheral edema  ABDOMEN: soft, nontender, no hepatosplenomegaly, no masses and bowel sounds normal  MS: no gross musculoskeletal defects noted, no edema  SKIN: no suspicious lesions or rashes  NEURO: Normal strength and tone, mentation intact and speech normal  PSYCH: mentation appears normal, affect normal/bright        Signed Electronically by: Randy Graf MD

## 2024-04-25 ENCOUNTER — TELEPHONE (OUTPATIENT)
Dept: FAMILY MEDICINE | Facility: CLINIC | Age: 39
End: 2024-04-25
Payer: COMMERCIAL

## 2024-04-25 NOTE — TELEPHONE ENCOUNTER
Dr Graf or POD    Pt calls and is here to  adderall rx. Will be out at end of day today and lives 2 hrs away.   Called pharmacy and verified appropriate to fill. Per pharmacy last  was 3/27 and they state normally pt can  1-2 days early but because provider put earliest fill date of 4/26 she cannot fill yet.       Ok to fill today?      Chavo Knight, RN

## 2024-05-15 ENCOUNTER — MYC MEDICAL ADVICE (OUTPATIENT)
Dept: FAMILY MEDICINE | Facility: CLINIC | Age: 39
End: 2024-05-15
Payer: COMMERCIAL

## 2024-05-15 NOTE — TELEPHONE ENCOUNTER
Dr. Graf:    Please see my chart, patient just had a wellness exam on 3-27-24, how do you advise, E-visit? Virtual appointment?      DAVID Cruz

## 2024-05-17 ENCOUNTER — E-VISIT (OUTPATIENT)
Dept: FAMILY MEDICINE | Facility: CLINIC | Age: 39
End: 2024-05-17

## 2024-05-17 DIAGNOSIS — L70.0 ACNE VULGARIS: Primary | ICD-10-CM

## 2024-05-17 PROCEDURE — 99421 OL DIG E/M SVC 5-10 MIN: CPT | Performed by: FAMILY MEDICINE

## 2024-05-17 RX ORDER — TRETINOIN 1 MG/G
CREAM TOPICAL AT BEDTIME
Qty: 45 G | Refills: 4 | Status: SHIPPED | OUTPATIENT
Start: 2024-05-17

## 2024-05-17 NOTE — PATIENT INSTRUCTIONS
Dear Ángela Zurita    After reviewing your responses, I've been able to diagnose you with acne, which is a common skin condition that causes red bumps or pimples to form on your skin. It occurs when pores become blocked with oil, dirt, or bacteria. Pores are openings in your skin where oil, sweat and hair are produced.     Based on your responses, I have prescribed tretinion to treat this. Please follow the instructions on the medication. If you experience irritation of your skin, new rash, or any other new symptoms, you should stop using this medication and contact your primary care provider.     If this treatment does not work for you or you will run out of refills, please plan to follow- up with your primary care provider to set refills for a longer period of time or to try other options.     Things you can do to help prevent this:     1. Use mild soap (like cetaphil) daily when you bathe (helps control oil) instead of harsh or drying soaps.   2. Apply a moisturizer like cerave  3. Twenty minutes or more later apply the tretinion. Start with every other day or even every 3rd day and then increase over the first 2-4 weeks to once a night.   Acne is going to get worse for 2 months before it starts to get better.     Use oil-free lotion and sunscreen (decreases irritation and keeps your pores from being clogged).     Thanks for choosing us as your health care partner,      Randy Graf MD  Acne: Care Instructions  Overview  Acne is a skin problem. It shows up as blackheads, whiteheads, and pimples. Acne most often affects the face, neck, and upper body. It occurs when oil and dead skin cells clog the skin's pores.  Acne usually starts during the teen years and often lasts into adulthood. Gentle cleansing every day controls most mild acne. If home treatment doesn't work, your doctor may prescribe a cream, an antibiotic, or a stronger medicine called isotretinoin. Sometimes birth control pills help women  Patient contacted she will continue taking the Kionex as prescribed she is offered to schedule the aptp with Dr Gutierrez at this time but declined stating she is unavailable to do so and will call back   "who have monthly acne flare-ups.  Follow-up care is a key part of your treatment and safety. Be sure to make and go to all appointments, and call your doctor if you are having problems. It's also a good idea to know your test results and keep a list of the medicines you take.  How can you care for yourself at home?  Gently wash your face 1 or 2 times a day with warm (not hot) water and a mild soap or cleanser. Always rinse well.  Use an over-the-counter lotion or gel that contains benzoyl peroxide. Start with a small amount of 2.5% benzoyl peroxide and increase the strength as needed. Benzoyl peroxide works well for acne, but you may need to use it for up to 2 months before your acne starts to improve.  Apply acne cream, lotion, or gel to all the places you get pimples, blackheads, or whiteheads, not just where you have them now. Follow the instructions carefully. If your skin gets too dry and scaly or red and sore, reduce the amount. For the best results, apply medicines as directed. Try not to miss doses.  Do not squeeze or pick pimples and blackheads. This can cause infection and scarring.  Use only oil-free makeup, sunscreen, and other skin care products that will not clog your pores.  When should you call for help?  Watch closely for changes in your health, and be sure to contact your doctor if:    You have symptoms of infection, such as:  Increased pain, swelling, warmth, or redness.  Red streaks leading from the area.  Pus draining from the area.  A fever.     Your acne gets worse or does not improve after 3 months with home treatment.     You are taking the prescription medicine isotretinoin and you feel sad or hopeless, lack energy, or have other signs of depression.     You start to have other symptoms, such as facial hair growth in women.   Where can you learn more?  Go to https://www.healthwise.net/patiented  Enter V108 in the search box to learn more about \"Acne: Care Instructions.\"  Current as of: " November 16, 2023               Content Version: 14.0    8329-4738 [employer].   Care instructions adapted under license by your healthcare professional. If you have questions about a medical condition or this instruction, always ask your healthcare professional. [employer] disclaims any warranty or liability for your use of this information.

## 2024-05-19 ENCOUNTER — MYC MEDICAL ADVICE (OUTPATIENT)
Dept: FAMILY MEDICINE | Facility: CLINIC | Age: 39
End: 2024-05-19
Payer: COMMERCIAL

## 2024-05-19 DIAGNOSIS — F90.0 ATTENTION DEFICIT HYPERACTIVITY DISORDER (ADHD), PREDOMINANTLY INATTENTIVE TYPE: ICD-10-CM

## 2024-05-20 NOTE — TELEPHONE ENCOUNTER
Dr. Graf:    Please see my chart, would you approve an early refill on 5-24-24 on the Adderrall? Please advise.      DAVID Cruz

## 2024-05-21 RX ORDER — DEXTROAMPHETAMINE SACCHARATE, AMPHETAMINE ASPARTATE, DEXTROAMPHETAMINE SULFATE AND AMPHETAMINE SULFATE 5; 5; 5; 5 MG/1; MG/1; MG/1; MG/1
20 TABLET ORAL 3 TIMES DAILY
Qty: 90 TABLET | Refills: 0 | Status: SHIPPED | OUTPATIENT
Start: 2024-05-26 | End: 2024-06-25

## 2024-05-29 DIAGNOSIS — B00.1 HERPES LABIALIS: ICD-10-CM

## 2024-05-29 RX ORDER — VALACYCLOVIR HYDROCHLORIDE 1 G/1
TABLET, FILM COATED ORAL
Qty: 4 TABLET | Refills: 1 | Status: SHIPPED | OUTPATIENT
Start: 2024-05-29

## 2024-06-17 ENCOUNTER — TELEPHONE (OUTPATIENT)
Dept: FAMILY MEDICINE | Facility: CLINIC | Age: 39
End: 2024-06-17
Payer: COMMERCIAL

## 2024-06-17 ENCOUNTER — MYC MEDICAL ADVICE (OUTPATIENT)
Dept: FAMILY MEDICINE | Facility: CLINIC | Age: 39
End: 2024-06-17
Payer: COMMERCIAL

## 2024-06-17 NOTE — TELEPHONE ENCOUNTER
Symptoms    Describe your symptoms: Injury 1 1/2 months ago.  Still painful, pan comes back if patient doesn't macy me Aleve in am and pm.  Pt knows she needs to be seen but wonders if she should see family practice or a specialist?  Pt needs advice.    Any pain: Yes    How long have you been having symptoms: 1 1/2 months ago      Have you been seen for this:  No        Preferred Pharmacy:     [facility] [place] [address]  [address] [address]  Phone: [phone] Fax: [phone] Alternate Fax: [phone], [phone]        Could we send this information to you in [place] or would you prefer to receive a phone call?:   Patient would prefer a phone call   Okay to leave a detailed message?: Yes at Cell number on file:    Telephone Information:   Mobile [phone]      Cherelle Henriquez on 6/17/2024 at 9:11 AM

## 2024-06-18 ENCOUNTER — TRANSFERRED RECORDS (OUTPATIENT)
Dept: HEALTH INFORMATION MANAGEMENT | Facility: CLINIC | Age: 39
End: 2024-06-18
Payer: COMMERCIAL

## 2024-06-18 NOTE — TELEPHONE ENCOUNTER
Attempted to call patient this morning and responded to [employer]hart message with same concern. Closing this encounter to avoid duplication in work. Awaiting returned call and or MyChart response from patient.     Prabha Lorenzo RN

## 2024-06-21 ENCOUNTER — E-VISIT (OUTPATIENT)
Dept: FAMILY MEDICINE | Facility: CLINIC | Age: 39
End: 2024-06-21
Payer: COMMERCIAL

## 2024-06-21 DIAGNOSIS — F90.0 ATTENTION DEFICIT HYPERACTIVITY DISORDER (ADHD), PREDOMINANTLY INATTENTIVE TYPE: Primary | ICD-10-CM

## 2024-06-21 DIAGNOSIS — F90.0 ATTENTION-DEFICIT HYPERACTIVITY DISORDER, PREDOMINANTLY INATTENTIVE TYPE: ICD-10-CM

## 2024-06-21 PROCEDURE — 99421 OL DIG E/M SVC 5-10 MIN: CPT | Performed by: NURSE PRACTITIONER

## 2024-06-21 RX ORDER — DEXTROAMPHETAMINE SACCHARATE, AMPHETAMINE ASPARTATE, DEXTROAMPHETAMINE SULFATE AND AMPHETAMINE SULFATE 5; 5; 5; 5 MG/1; MG/1; MG/1; MG/1
20 TABLET ORAL 3 TIMES DAILY
Qty: 90 TABLET | Refills: 0 | Status: SHIPPED | OUTPATIENT
Start: 2024-07-21 | End: 2024-08-20

## 2024-06-21 RX ORDER — DEXTROAMPHETAMINE SACCHARATE, AMPHETAMINE ASPARTATE, DEXTROAMPHETAMINE SULFATE AND AMPHETAMINE SULFATE 5; 5; 5; 5 MG/1; MG/1; MG/1; MG/1
20 TABLET ORAL 3 TIMES DAILY
Qty: 90 TABLET | Refills: 0 | Status: SHIPPED | OUTPATIENT
Start: 2024-08-20 | End: 2024-09-13

## 2024-06-21 RX ORDER — DEXTROAMPHETAMINE SACCHARATE, AMPHETAMINE ASPARTATE, DEXTROAMPHETAMINE SULFATE AND AMPHETAMINE SULFATE 5; 5; 5; 5 MG/1; MG/1; MG/1; MG/1
20 TABLET ORAL 3 TIMES DAILY
Qty: 90 TABLET | Refills: 0 | Status: SHIPPED | OUTPATIENT
Start: 2024-06-21 | End: 2024-07-21

## 2024-06-21 ASSESSMENT — ANXIETY QUESTIONNAIRES
7. FEELING AFRAID AS IF SOMETHING AWFUL MIGHT HAPPEN: NOT AT ALL
8. IF YOU CHECKED OFF ANY PROBLEMS, HOW DIFFICULT HAVE THESE MADE IT FOR YOU TO DO YOUR WORK, TAKE CARE OF THINGS AT HOME, OR GET ALONG WITH OTHER PEOPLE?: NOT DIFFICULT AT ALL
4. TROUBLE RELAXING: NOT AT ALL
7. FEELING AFRAID AS IF SOMETHING AWFUL MIGHT HAPPEN: NOT AT ALL
3. WORRYING TOO MUCH ABOUT DIFFERENT THINGS: SEVERAL DAYS
2. NOT BEING ABLE TO STOP OR CONTROL WORRYING: NOT AT ALL
5. BEING SO RESTLESS THAT IT IS HARD TO SIT STILL: NOT AT ALL
GAD7 TOTAL SCORE: 3
GAD7 TOTAL SCORE: 3
6. BECOMING EASILY ANNOYED OR IRRITABLE: SEVERAL DAYS
1. FEELING NERVOUS, ANXIOUS, OR ON EDGE: SEVERAL DAYS
IF YOU CHECKED OFF ANY PROBLEMS ON THIS QUESTIONNAIRE, HOW DIFFICULT HAVE THESE PROBLEMS MADE IT FOR YOU TO DO YOUR WORK, TAKE CARE OF THINGS AT HOME, OR GET ALONG WITH OTHER PEOPLE: NOT DIFFICULT AT ALL
GAD7 TOTAL SCORE: 3

## 2024-06-21 ASSESSMENT — PATIENT HEALTH QUESTIONNAIRE - PHQ9
SUM OF ALL RESPONSES TO PHQ QUESTIONS 1-9: 2
SUM OF ALL RESPONSES TO PHQ QUESTIONS 1-9: 2
10. IF YOU CHECKED OFF ANY PROBLEMS, HOW DIFFICULT HAVE THESE PROBLEMS MADE IT FOR YOU TO DO YOUR WORK, TAKE CARE OF THINGS AT HOME, OR GET ALONG WITH OTHER PEOPLE: NOT DIFFICULT AT ALL

## 2024-06-21 NOTE — PATIENT INSTRUCTIONS
I am glad you are doing well. I have refilled your medication:  Orders Placed This Encounter   Medications     amphetamine-dextroamphetamine (ADDERALL) 20 MG tablet     Sig: Take 1 tablet (20 mg) by mouth 3 times daily for 30 days     Dispense:  90 tablet     Refill:  0     amphetamine-dextroamphetamine (ADDERALL) 20 MG tablet     Sig: Take 1 tablet (20 mg) by mouth 3 times daily for 30 days     Dispense:  90 tablet     Refill:  0     amphetamine-dextroamphetamine (ADDERALL) 20 MG tablet     Sig: Take 1 tablet (20 mg) by mouth 3 times daily for 30 days     Dispense:  90 tablet     Refill:  0        View your full visit summary for details by clicking on the link below. Your pharmacist will be able to address any questions you may have about the medication.      Thank you for choosing us for your care.

## 2024-06-30 NOTE — TELEPHONE ENCOUNTER
RECORDS STATUS - BREAST    RECORDS REQUESTED FROM: [facility]   NOTES DETAILS STATUS   OFFICE NOTE from referring provider SELF    MEDICATION LIST Epic    IMAGING (NEED IMAGES & REPORT)     MAMMO PACS 02/08/24-04/05/18   ULTRASOUND PACS 04/05/18: US Breast

## 2024-07-29 ENCOUNTER — VIRTUAL VISIT (OUTPATIENT)
Dept: ONCOLOGY | Facility: CLINIC | Age: 39
End: 2024-07-29
Attending: GENETIC COUNSELOR, MS
Payer: COMMERCIAL

## 2024-07-29 ENCOUNTER — PRE VISIT (OUTPATIENT)
Dept: ONCOLOGY | Facility: CLINIC | Age: 39
End: 2024-07-29
Payer: COMMERCIAL

## 2024-07-29 DIAGNOSIS — Z80.3 FAMILY HISTORY OF MALIGNANT NEOPLASM OF BREAST: Primary | ICD-10-CM

## 2024-07-29 PROCEDURE — 96040 HC GENETIC COUNSELING, EACH 30 MINUTES: CPT | Mod: GT,95 | Performed by: GENETIC COUNSELOR, MS

## 2024-07-29 NOTE — PROGRESS NOTES
"7/29/2024    Virtual Visit Details  Type of service:  Video Visit   Originating Location (pt. Location): Home  Distant Location (provider location):  Off-site  Platform used for Video Visit: Miller  Length of video visit: 50 minutes    Referring Provider: self-referred    Presenting Information:   Today Ángela elected for a virtual genetic counseling visit through the Cancer Risk Management Program to discuss her family history of cancer. We reviewed this history, cancer screening recommendations, and available genetic testing options.    Personal History:  Ángela is a 39 year old female. She does not have any personal history of cancer.    She had her first menstrual period in 4th grade, her first child at age 29, and is premenopausal. Ángela has her ovaries, fallopian tubes and uterus in place. She had a transvaginal ultrasound in April 2023 that noted a uterine fibroid and a probable left-sided dermoid cyst. She reports that she has never used hormone replacement therapy.      She has annual mammograms and her most recent mammogram in February 2024 was normal. Her most recent colonoscopy in [place] 2018 to address diarrhea was normal and follow-up was recommended at age 49. She had an upper endoscopy in April 2023, that eventually resulted in an excision of a benign esophageal cyst. She reports that she is \"very cyst-y\" and several have been removed from her skin, but were of a \"undetermined type\". She does not regularly do any other cancer screening at this time.    Family History: (Please see scanned pedigree for detailed family history information)  Ángela's mother is 65 and was diagnosed with breast cancer in her late 50's; treatment included a lumpectomy. Ángela believes her mother did pursue genetic testing, but was unsure of the details.  Ángela has four maternal aunts in their 50/60's that have not had a cancer.  Ángela's oldest maternal aunt's son was diagnosed with breast cancer at age 40 and passed away. Ángela is unsure " if her cousin pursued genetic testing.  Ángela's maternal grandmother may have had lung cancer before passing away in her 80's; she had a history of smoking. Several of her maternal grandmother's sisters reportedly had breast cancer, but details are uncertain. One maternal great aunt's son was diagnosed with leukemia in his 40's.  Ángela's maternal grandfather had skin cancer and an unknown type of jaw cancer in his 80's before passing away at age 88. He did not have a history of tobacco use.  Ángela's father is 66 and has not had a cancer.  Ángela has three paternal aunts and two paternal uncles, in their 50/60's, that have not had a cancer. Her two uncles may have colon polyps, but details are uncertain.  Ángela's paternal grandmother passed away at age 77 and did not have a cancer.  Ángela's paternal grandfather likely had a cancer, possibly prostate or colon, and passed away at age 66.  Of note, Ángela shared that her mother has experienced blood clots, as well as multiple paternal relatives.  We discussed that blood clots are often caused by a combination of genetic and environmental risk factors, but there are rare single gene inherited risks for blood clots. These single gene inherited causes of blood clots, though, do not overlap with the single gene inherited risks for cancer. Regardless, it is important to better understand if an inherited cause of blood clots has already been identified in Ángela's family to better understand her own risks.  I also encouraged Ángela to share this family history and any concerning symptoms with her medical providers. Based on the family history, it may be recommended that she meet with a provider in the [facility]. Ángela verbalized understanding.  Her maternal ethnicity is [place]ian and Upper sorbian. Her paternal ethnicity is Stateless, [place]ian, and [place]. There is no known Ashkenazi Islam ancestry on either side of her family.    Discussion:  We reviewed the  features of sporadic, familial, and hereditary cancers. In looking at Ángela's maternal family history, it is possible that a cancer susceptibility gene is present as her mother, maternal first cousin, and extended maternal aunts have been diagnosed with breast cancer; her cousin was also diagnosed with a rare cancer under age 50. We also discussed that Ángela's paternal family history is currently most consistent with sporadic cancer, given her grandfather's isolated cancer diagnosis over age 50 and Ángela's father has not had multiple colon polyps (like his brothers).  We discussed the natural history and genetics of hereditary breast cancer.   We discussed that there are multiple genes that could cause increased risk for breast and related cancers. If a mutation is identified in one of these genes, it may inform cancer screening (i.e. high risk breast screening, regular colonoscopies, etc.) and/or risk-reducing surgery (i.e. bilateral mastectomy, surgery to remove ovaries/uterus, etc.) recommendations for Ángela and her family.  As many of these genes present with overlapping features in a family and accurate cancer risk cannot always be established based upon the pedigree analysis alone, it is often recommended that individuals consider panel genetic testing to analyze multiple genes at once.  We discussed that genetic testing for cancer susceptibility genes is typically most informative, though, when it is first performed on a family member with a personal history of cancer. In this situation, we discussed that Ángela's mother would be the best person to test first and she may have already undergone genetic testing. If her mother's testing was comprehensive and normal, then her mother could not have passed on an identifiable mutation in those genes to Ángela. If her mother's genetic testing was not comprehensive and/or a mutation was identified, then genetic testing may be indicated for Ángela.  Ángela shared that she was  under the impression she should pursue genetic testing regardless of her mother's genetic testing results. We discussed that testing is available to her, but there would be significant limitations. If Ángela pursues testing at this time and receives a negative result, this does not rule out the possibility of a hereditary cancer syndrome in her and/or her family. Also, if her mother's genetic testing was normal and Ángela's paternal family history is not consistent with hereditary cancer, it is unlikely that Ángela's insurance company would cover genetic testing for Ángela.  Ángela verbalized understanding and agreed to speak to her mother regarding the genetic testing her mother may have already pursued. She will then send me a copy of her mother's test report via natue, at which time we will meet again to discuss the impact of those results on Ángela. I also encouraged Ángela to contact me if her mother declines/declined genetic testing, so we can readdress Ángela's genetic testing options.  A detailed handout regarding hereditary cancer and the information we discussed was provided to Ángela at the end of our appointment today and can be found in the after visit summary. Topics included: inheritance pattern, cancer risks, cancer screening recommendations, and also risks, benefits and limitations of testing.  Cancer screening recommendations based on Ángela's current personal/family history of cancer:  Based on her personal and family history, Ángela has a 22.5% lifetime risk of developing breast cancer based on the HITESH 8 model. As such, Ángela meets current National Comprehensive Cancer Network (NCCN) guidelines for high risk breast screening. This includes annual breast MRI in addition to annual mammogram beginning at age 40. In addition, Ángela should be receiving clinical breast exams by her physician. We discussed that Ángela could participate in our Cancer Risk Management Program in which our nursing specialist provides an  individual screening plan and assists with medical management. Ángela declined a referral at this time, as she would prefer to coordinate this breast screening with her primary care provider.  Ángela's close maternal female relatives also remain at increased risk for breast cancer given their family history. Breast screening options should be discussed with an individual's primary care provider and a Genetic Counselor, to determine at what age to begin screening, what screening is appropriate, and if additional screening (such as breast MRI) is necessary based on personal/family history factors.  Other population cancer screening options, such as those recommended by the American Cancer Society and NCCN, are also appropriate for Ángela and her family. These screening recommendations may change if there are changes to Ángela's personal and/or family history of cancer. These screening recommendations may also change depending on Ángela's genetic testing results (if pursued). Final screening recommendations should be made in consultation with each individual's primary care provider.     Plan:  1) Today Ángela elected to gather more information regarding her mother's genetic testing before pursuing any genetic testing of her own.  2) Ángela will send me a copy of her mother's test report via [employer], at which time we will discuss what (if any) genetic testing is then indicated for Ángela.  3) Cancer screening recommendations were reviewed for Ángela based on her current personal/family history.    Kelly Coker MS, [facility]  Licensed, Certified Genetic Counselor  Office: [phone]  [email or web address]

## 2024-07-29 NOTE — NURSING NOTE
Current patient location:  NA    Is the patient currently in the state of [place]? YES    Visit mode:VIDEO    If the visit is dropped, the patient can be reconnected by: VIDEO VISIT: Text to cell phone:   Telephone Information:   Mobile [phone]       Will anyone else be joining the visit? NO  (If patient encounters technical issues they should call [phone] :[id number])    How would you like to obtain your AVS? MyChart    Are changes needed to the allergy or medication list? N/A    Are refills needed on medications prescribed by this physician? NO    Reason for visit: Consult    Tarsha STANTON

## 2024-07-29 NOTE — LETTER
"7/29/2024      Ángela Zurita  [address]  [place] [address]      Dear Colleague,    Thank you for referring your patient, Ángela Zurita, to the [facility]. Please see a copy of my visit note below.    7/29/2024    Virtual Visit Details  Type of service:  Video Visit   Originating Location (pt. Location): Home  Distant Location (provider location):  Off-site  Platform used for Video Visit: Ideal Binary  Length of video visit: 50 minutes    Referring Provider: self-referred    Presenting Information:   Today Ángela elected for a virtual genetic counseling visit through the Cancer Risk Management Program to discuss her family history of cancer. We reviewed this history, cancer screening recommendations, and available genetic testing options.    Personal History:  Ángela is a 39 year old female. She does not have any personal history of cancer.    She had her first menstrual period in 4th grade, her first child at age 29, and is premenopausal. Ángela has her ovaries, fallopian tubes and uterus in place. She had a transvaginal ultrasound in April 2023 that noted a uterine fibroid and a probable left-sided dermoid cyst. She reports that she has never used hormone replacement therapy.      She has annual mammograms and her most recent mammogram in February 2024 was normal. Her most recent colonoscopy in [place] 2018 to address diarrhea was normal and follow-up was recommended at age 49. She had an upper endoscopy in April 2023, that eventually resulted in an excision of a benign esophageal cyst. She reports that she is \"very cyst-y\" and several have been removed from her skin, but were of a \"undetermined type\". She does not regularly do any other cancer screening at this time.    Family History: (Please see scanned pedigree for detailed family history information)  Ángela's mother is 65 and was diagnosed with breast cancer in her late 50's; treatment included a lumpectomy. Ángela believes " her mother did pursue genetic testing, but was unsure of the details.  Ángela has four maternal aunts in their 50/60's that have not had a cancer.  Ángela's oldest maternal aunt's son was diagnosed with breast cancer at age 40 and passed away. Ángela is unsure if her cousin pursued genetic testing.  Ángela's maternal grandmother may have had lung cancer before passing away in her 80's; she had a history of smoking. Several of her maternal grandmother's sisters reportedly had breast cancer, but details are uncertain. One maternal great aunt's son was diagnosed with leukemia in his 40's.  Ángela's maternal grandfather had skin cancer and an unknown type of jaw cancer in his 80's before passing away at age 88. He did not have a history of tobacco use.  Ángela's father is 66 and has not had a cancer.  Ángela has three paternal aunts and two paternal uncles, in their 50/60's, that have not had a cancer. Her two uncles may have colon polyps, but details are uncertain.  Ángela's paternal grandmother passed away at age 77 and did not have a cancer.  Ángela's paternal grandfather likely had a cancer, possibly prostate or colon, and passed away at age 66.  Of note, Ángela shared that her mother has experienced blood clots, as well as multiple paternal relatives.  We discussed that blood clots are often caused by a combination of genetic and environmental risk factors, but there are rare single gene inherited risks for blood clots. These single gene inherited causes of blood clots, though, do not overlap with the single gene inherited risks for cancer. Regardless, it is important to better understand if an inherited cause of blood clots has already been identified in Ángela's family to better understand her own risks.  I also encouraged Ángela to share this family history and any concerning symptoms with her medical providers. Based on the family history, it may be recommended that she meet with a provider in the [facility]. Ángela verbalized understanding.  Her maternal ethnicity is Swiss and Marshallese. Her paternal ethnicity is Chinese, Swiss, and Turkmen. There is no known Ashkenazi Congregation ancestry on either side of her family.    Discussion:  We reviewed the features of sporadic, familial, and hereditary cancers. In looking at Ángela's maternal family history, it is possible that a cancer susceptibility gene is present as her mother, maternal first cousin, and extended maternal aunts have been diagnosed with breast cancer; her cousin was also diagnosed with a rare cancer under age 50. We also discussed that Ángela's paternal family history is currently most consistent with sporadic cancer, given her grandfather's isolated cancer diagnosis over age 50 and Ángela's father has not had multiple colon polyps (like his brothers).  We discussed the natural history and genetics of hereditary breast cancer.   We discussed that there are multiple genes that could cause increased risk for breast and related cancers. If a mutation is identified in one of these genes, it may inform cancer screening (i.e. high risk breast screening, regular colonoscopies, etc.) and/or risk-reducing surgery (i.e. bilateral mastectomy, surgery to remove ovaries/uterus, etc.) recommendations for Ángela and her family.  As many of these genes present with overlapping features in a family and accurate cancer risk cannot always be established based upon the pedigree analysis alone, it is often recommended that individuals consider panel genetic testing to analyze multiple genes at once.  We discussed that genetic testing for cancer susceptibility genes is typically most informative, though, when it is first performed on a family member with a personal history of cancer. In this situation, we discussed that Ángela's mother would be the best person to test first and she may have already undergone genetic testing. If her mother's testing was comprehensive and normal,  then her mother could not have passed on an identifiable mutation in those genes to Ángela. If her mother's genetic testing was not comprehensive and/or a mutation was identified, then genetic testing may be indicated for Ángela.  Ángela shared that she was under the impression she should pursue genetic testing regardless of her mother's genetic testing results. We discussed that testing is available to her, but there would be significant limitations. If Ángela pursues testing at this time and receives a negative result, this does not rule out the possibility of a hereditary cancer syndrome in her and/or her family. Also, if her mother's genetic testing was normal and Ángela's paternal family history is not consistent with hereditary cancer, it is unlikely that Ángela's insurance company would cover genetic testing for Ángela.  Ángela verbalized understanding and agreed to speak to her mother regarding the genetic testing her mother may have already pursued. She will then send me a copy of her mother's test report via [employer], at which time we will meet again to discuss the impact of those results on Ángela. I also encouraged Ángela to contact me if her mother declines/declined genetic testing, so we can readdress Ángela's genetic testing options.  A detailed handout regarding hereditary cancer and the information we discussed was provided to Ángela at the end of our appointment today and can be found in the after visit summary. Topics included: inheritance pattern, cancer risks, cancer screening recommendations, and also risks, benefits and limitations of testing.  Cancer screening recommendations based on Ángela's current personal/family history of cancer:  Based on her personal and family history, Ángela has a 22.5% lifetime risk of developing breast cancer based on the HITESH 8 model. As such, Ángela meets current National Comprehensive Cancer Network (NCCN) guidelines for high risk breast screening. This includes annual breast MRI in addition  to annual mammogram beginning at age 40. In addition, Ángela should be receiving clinical breast exams by her physician. We discussed that Ángela could participate in our Cancer Risk Management Program in which our nursing specialist provides an individual screening plan and assists with medical management. Ángela declined a referral at this time, as she would prefer to coordinate this breast screening with her primary care provider.  Ángela's close maternal female relatives also remain at increased risk for breast cancer given their family history. Breast screening options should be discussed with an individual's primary care provider and a Genetic Counselor, to determine at what age to begin screening, what screening is appropriate, and if additional screening (such as breast MRI) is necessary based on personal/family history factors.  Other population cancer screening options, such as those recommended by the American Cancer Society and NCCN, are also appropriate for Ángela and her family. These screening recommendations may change if there are changes to Ángela's personal and/or family history of cancer. These screening recommendations may also change depending on Ángela's genetic testing results (if pursued). Final screening recommendations should be made in consultation with each individual's primary care provider.     Plan:  1) Today Ángela elected to gather more information regarding her mother's genetic testing before pursuing any genetic testing of her own.  2) Ángela will send me a copy of her mother's test report via [employer], at which time we will discuss what (if any) genetic testing is then indicated for Ángela.  3) Cancer screening recommendations were reviewed for Ángela based on her current personal/family history.    Kelly Coker MS, [facility] [place]  Licensed, Certified Genetic Counselor  Office: [phone]  [email or web address]      Again, thank you for allowing me to participate in the care of your patient.         Sincerely,        Kelly Coker, GC

## 2024-07-30 NOTE — PATIENT INSTRUCTIONS
Assessing Cancer Risk  Cancer is a common diagnosis which impacts many families. Individuals may develop cancer due to environmental factors (such as exposures and lifestyle), aging, genetic predisposition, or a combination of these factors. The vast majority of cancer diagnoses are considered sporadic, and not primarily due to an inherited risk factor. Approximately 5-10% of cancer diagnoses are thought to be caused by inherited risk factors.       There are several features that are likely to be present in a family that has an inherited alteration in a cancer susceptibility gene. However, this may not be the case for all families that carry a cancer risk gene, such as those with small family size or limited history information.  Cancers diagnosed at a young age (often before age 50)  Individuals with more than one primary cancer  Certain rare tumors/cancers  Multiple generations of the family affected with cancer    Categories of Cancer        Cancers may be:  Sporadic: somatic (acquired) genetic errors, environmental exposures, and lifestyle contribute to cancer as we age.  Familial: clustering of cancer in a family due to a combination of multiple genetic and shared environmental factors.  Hereditary: inherited risk for cancer, typically in a single gene.      Cancer Screening and Management  Cancer risk, as well as screening and management recommendations, are based on a combination of factors. This includes both personal and family history factors. Population cancer screening options, such as those recommended by the American Cancer Society and the National Comprehensive Cancer Network (NCCN), are appropriate for many families at average risk for cancer. However, earlier and/or more frequent screening may be recommended based on personal factors (lifestyle, exposures, medications, screening results), family history of cancer, and sometimes genetic factors. These cancer risk management options should be  discussed in more detail with an individual's medical providers.    Resources  National Society of Genetic Counselors nsgc.org   American Cancer Society cancer.org     Please call us if you have any questions or concerns.   Cancer Risk Management Program (Appointments: [phone])  Aaron Mix, MS, [facility] [place] [facility] [phone]  Landy Negrete, MS, [facility] [phone]  Raysa Fenton, MS, [facility]  [phone]  Sandra Farrell, MS, [facility] [place] [facility]  [phone]  Maura Avila, MS, [facility]  [phone]  Kelly Coker, MS, [facility] [place] [facility] [phone]  Nivia Monterroso, MS, [facility] [place] [facility] [phone]

## 2024-08-21 ENCOUNTER — TRANSFERRED RECORDS (OUTPATIENT)
Dept: HEALTH INFORMATION MANAGEMENT | Facility: CLINIC | Age: 39
End: 2024-08-21
Payer: COMMERCIAL

## 2024-08-26 ENCOUNTER — TRANSFERRED RECORDS (OUTPATIENT)
Dept: HEALTH INFORMATION MANAGEMENT | Facility: CLINIC | Age: 39
End: 2024-08-26
Payer: COMMERCIAL

## 2024-09-08 ENCOUNTER — APPOINTMENT (OUTPATIENT)
Dept: GENERAL RADIOLOGY | Facility: CLINIC | Age: 39
End: 2024-09-08
Attending: ORTHOPAEDIC SURGERY
Payer: COMMERCIAL

## 2024-09-08 ENCOUNTER — HOSPITAL ENCOUNTER (INPATIENT)
Facility: CLINIC | Age: 39
LOS: 2 days | Discharge: HOME | End: 2024-09-10
Attending: EMERGENCY MEDICINE | Admitting: STUDENT IN AN ORGANIZED HEALTH CARE EDUCATION/TRAINING PROGRAM
Payer: COMMERCIAL

## 2024-09-08 DIAGNOSIS — S72.001A HIP FRACTURE, RIGHT, CLOSED, INITIAL ENCOUNTER (H): ICD-10-CM

## 2024-09-08 LAB
ALBUMIN SERPL BCG-MCNC: 3.8 G/DL (ref 3.5–5.2)
ALP SERPL-CCNC: 70 U/L (ref 40–150)
ALT SERPL W P-5'-P-CCNC: 16 U/L (ref 0–50)
ANION GAP SERPL CALCULATED.3IONS-SCNC: 10 MMOL/L (ref 7–15)
AST SERPL W P-5'-P-CCNC: 15 U/L (ref 0–45)
BASOPHILS # BLD AUTO: 0 10E3/UL (ref 0–0.2)
BASOPHILS NFR BLD AUTO: 0 %
BILIRUB SERPL-MCNC: 0.2 MG/DL
BUN SERPL-MCNC: 18.8 MG/DL (ref 6–20)
CA-I BLD-MCNC: 4.8 MG/DL (ref 4.4–5.2)
CALCIUM SERPL-MCNC: 9.1 MG/DL (ref 8.8–10.4)
CHLORIDE SERPL-SCNC: 105 MMOL/L (ref 98–107)
CREAT SERPL-MCNC: 0.68 MG/DL (ref 0.51–0.95)
EGFRCR SERPLBLD CKD-EPI 2021: >90 ML/MIN/1.73M2
EOSINOPHIL # BLD AUTO: 0.2 10E3/UL (ref 0–0.7)
EOSINOPHIL NFR BLD AUTO: 2 %
ERYTHROCYTE [DISTWIDTH] IN BLOOD BY AUTOMATED COUNT: 12.3 % (ref 10–15)
GLUCOSE SERPL-MCNC: 96 MG/DL (ref 70–99)
HCO3 SERPL-SCNC: 24 MMOL/L (ref 22–29)
HCT VFR BLD AUTO: 35.7 % (ref 35–47)
HGB BLD-MCNC: 12.1 G/DL (ref 11.7–15.7)
HOLD SPECIMEN: NORMAL
HOLD SPECIMEN: NORMAL
IMM GRANULOCYTES # BLD: 0 10E3/UL
IMM GRANULOCYTES NFR BLD: 0 %
LYMPHOCYTES # BLD AUTO: 2 10E3/UL (ref 0.8–5.3)
LYMPHOCYTES NFR BLD AUTO: 24 %
MCH RBC QN AUTO: 32.4 PG (ref 26.5–33)
MCHC RBC AUTO-ENTMCNC: 33.9 G/DL (ref 31.5–36.5)
MCV RBC AUTO: 96 FL (ref 78–100)
MONOCYTES # BLD AUTO: 0.5 10E3/UL (ref 0–1.3)
MONOCYTES NFR BLD AUTO: 6 %
NEUTROPHILS # BLD AUTO: 5.7 10E3/UL (ref 1.6–8.3)
NEUTROPHILS NFR BLD AUTO: 67 %
NRBC # BLD AUTO: 0 10E3/UL
NRBC BLD AUTO-RTO: 0 /100
PLATELET # BLD AUTO: 334 10E3/UL (ref 150–450)
POTASSIUM SERPL-SCNC: 4 MMOL/L (ref 3.4–5.3)
PROT SERPL-MCNC: 6.1 G/DL (ref 6.4–8.3)
PTH-INTACT SERPL-MCNC: 36 PG/ML (ref 15–65)
RBC # BLD AUTO: 3.74 10E6/UL (ref 3.8–5.2)
SODIUM SERPL-SCNC: 139 MMOL/L (ref 135–145)
VIT D+METAB SERPL-MCNC: 33 NG/ML (ref 20–50)
WBC # BLD AUTO: 8.5 10E3/UL (ref 4–11)

## 2024-09-08 PROCEDURE — 86900 BLOOD TYPING SEROLOGIC ABO: CPT | Performed by: ORTHOPAEDIC SURGERY

## 2024-09-08 PROCEDURE — 82330 ASSAY OF CALCIUM: CPT | Performed by: STUDENT IN AN ORGANIZED HEALTH CARE EDUCATION/TRAINING PROGRAM

## 2024-09-08 PROCEDURE — 85025 COMPLETE CBC W/AUTO DIFF WBC: CPT | Performed by: EMERGENCY MEDICINE

## 2024-09-08 PROCEDURE — 73502 X-RAY EXAM HIP UNI 2-3 VIEWS: CPT

## 2024-09-08 PROCEDURE — 99285 EMERGENCY DEPT VISIT HI MDM: CPT | Mod: 25

## 2024-09-08 PROCEDURE — 99418 PROLNG IP/OBS E/M EA 15 MIN: CPT | Performed by: STUDENT IN AN ORGANIZED HEALTH CARE EDUCATION/TRAINING PROGRAM

## 2024-09-08 PROCEDURE — 82306 VITAMIN D 25 HYDROXY: CPT | Performed by: STUDENT IN AN ORGANIZED HEALTH CARE EDUCATION/TRAINING PROGRAM

## 2024-09-08 PROCEDURE — 36415 COLL VENOUS BLD VENIPUNCTURE: CPT | Performed by: EMERGENCY MEDICINE

## 2024-09-08 PROCEDURE — 99223 1ST HOSP IP/OBS HIGH 75: CPT | Performed by: STUDENT IN AN ORGANIZED HEALTH CARE EDUCATION/TRAINING PROGRAM

## 2024-09-08 PROCEDURE — 36415 COLL VENOUS BLD VENIPUNCTURE: CPT | Performed by: STUDENT IN AN ORGANIZED HEALTH CARE EDUCATION/TRAINING PROGRAM

## 2024-09-08 PROCEDURE — 82040 ASSAY OF SERUM ALBUMIN: CPT | Performed by: EMERGENCY MEDICINE

## 2024-09-08 PROCEDURE — 120N000001 HC R&B MED SURG/OB

## 2024-09-08 PROCEDURE — 83970 ASSAY OF PARATHORMONE: CPT | Performed by: STUDENT IN AN ORGANIZED HEALTH CARE EDUCATION/TRAINING PROGRAM

## 2024-09-08 RX ORDER — AMOXICILLIN 250 MG
1 CAPSULE ORAL 2 TIMES DAILY PRN
Status: DISCONTINUED | OUTPATIENT
Start: 2024-09-08 | End: 2024-09-10 | Stop reason: HOSPADM

## 2024-09-08 RX ORDER — MELOXICAM 15 MG/1
15 TABLET ORAL DAILY
Status: ON HOLD | COMMUNITY
End: 2024-09-10

## 2024-09-08 RX ORDER — ACETAMINOPHEN 650 MG/1
650 SUPPOSITORY RECTAL EVERY 4 HOURS PRN
Status: DISCONTINUED | OUTPATIENT
Start: 2024-09-08 | End: 2024-09-10 | Stop reason: HOSPADM

## 2024-09-08 RX ORDER — ACETAMINOPHEN 500 MG
500 TABLET ORAL EVERY 6 HOURS PRN
Status: ON HOLD | COMMUNITY
End: 2024-09-10

## 2024-09-08 RX ORDER — AMOXICILLIN 250 MG
2 CAPSULE ORAL 2 TIMES DAILY PRN
Status: DISCONTINUED | OUTPATIENT
Start: 2024-09-08 | End: 2024-09-10 | Stop reason: HOSPADM

## 2024-09-08 RX ORDER — OXYCODONE HYDROCHLORIDE 5 MG/1
5 TABLET ORAL EVERY 4 HOURS PRN
Status: DISCONTINUED | OUTPATIENT
Start: 2024-09-08 | End: 2024-09-09

## 2024-09-08 RX ORDER — ACETAMINOPHEN 325 MG/1
650 TABLET ORAL EVERY 4 HOURS PRN
Status: DISCONTINUED | OUTPATIENT
Start: 2024-09-08 | End: 2024-09-10 | Stop reason: HOSPADM

## 2024-09-08 RX ORDER — CALCIUM CARBONATE 500 MG/1
1000 TABLET, CHEWABLE ORAL 4 TIMES DAILY PRN
Status: DISCONTINUED | OUTPATIENT
Start: 2024-09-08 | End: 2024-09-10 | Stop reason: HOSPADM

## 2024-09-08 RX ORDER — DEXTROAMPHETAMINE SACCHARATE, AMPHETAMINE ASPARTATE, DEXTROAMPHETAMINE SULFATE AND AMPHETAMINE SULFATE 5; 5; 5; 5 MG/1; MG/1; MG/1; MG/1
20 TABLET ORAL 3 TIMES DAILY
Status: DISCONTINUED | OUTPATIENT
Start: 2024-09-09 | End: 2024-09-08

## 2024-09-08 RX ORDER — LIDOCAINE 40 MG/G
CREAM TOPICAL
Status: DISCONTINUED | OUTPATIENT
Start: 2024-09-08 | End: 2024-09-09

## 2024-09-08 RX ORDER — DEXTROAMPHETAMINE SACCHARATE, AMPHETAMINE ASPARTATE, DEXTROAMPHETAMINE SULFATE AND AMPHETAMINE SULFATE 5; 5; 5; 5 MG/1; MG/1; MG/1; MG/1
20 TABLET ORAL 3 TIMES DAILY
Status: DISCONTINUED | OUTPATIENT
Start: 2024-09-09 | End: 2024-09-10 | Stop reason: HOSPADM

## 2024-09-08 ASSESSMENT — ACTIVITIES OF DAILY LIVING (ADL)
ADLS_ACUITY_SCORE: 38
ADLS_ACUITY_SCORE: 37
ADLS_ACUITY_SCORE: 37
ADLS_ACUITY_SCORE: 35
ADLS_ACUITY_SCORE: 38
ADLS_ACUITY_SCORE: 38
ADLS_ACUITY_SCORE: 37
ADLS_ACUITY_SCORE: 38
ADLS_ACUITY_SCORE: 37

## 2024-09-08 ASSESSMENT — COLUMBIA-SUICIDE SEVERITY RATING SCALE - C-SSRS
6. HAVE YOU EVER DONE ANYTHING, STARTED TO DO ANYTHING, OR PREPARED TO DO ANYTHING TO END YOUR LIFE?: NO
1. IN THE PAST MONTH, HAVE YOU WISHED YOU WERE DEAD OR WISHED YOU COULD GO TO SLEEP AND NOT WAKE UP?: NO
2. HAVE YOU ACTUALLY HAD ANY THOUGHTS OF KILLING YOURSELF IN THE PAST MONTH?: NO

## 2024-09-08 NOTE — ED NOTES
"[facility]  ED Nurse Handoff Report    ED Chief complaint: Hip Pain      ED Diagnosis:   Final diagnoses:   Hip fracture, right, closed, initial encounter (H)       Code Status: not addressed at this time    Allergies:   Allergies   Allergen Reactions    Bee Pollen      In the past, has not had issues lately    Doxycycline Rash       Patient Story: Patient presents to the ER after referral from [facility]. Per patient report, patient has had 4 weeks of right hip pain when walking. Patient had an outpatient MRI done on Friday and the read came back and patient states it said it was \"a stress fracture of the femoral neck that is unstable and needs surgery\".   Focused Assessment:  patent c/o hip pain with movement. States has been increasing her activity level and denies any injury, trauma or falls. Denies SOB, numbness and tingling    Treatments and/or interventions provided: IV, labs  Patient's response to treatments and/or interventions: No change    To be done/followed up on inpatient unit:  Continue to monitor    Does this patient have any cognitive concerns?:  A/Ox4    Activity level - Baseline/Home:  Independent  Activity Level - Current:   Unknown    Patient's Preferred language: English   Needed?: No    Isolation: None  Infection: Not Applicable  Patient tested for COVID 19 prior to admission: NO  Bariatric?: No    Vital Signs:   Vitals:    09/08/24 1158   BP: 138/84   Pulse: 95   Resp: 20   Temp: 98.9  F (37.2  C)   TempSrc: Temporal   SpO2: 100%       Cardiac Rhythm:     Was the PSS-3 completed:   Yes  What interventions are required if any?               Family Comments: NA  OBS brochure/video discussed/provided to patient/family: N/A              Name of person given brochure if not patient: NA              Relationship to patient: NA    For the majority of the shift this patient's behavior was Green.   Behavioral interventions performed were None.    ED NURSE PHONE NUMBER: " [phone]

## 2024-09-08 NOTE — ED PROVIDER NOTES
"  Emergency Department Note      History of Present Illness     Chief Complaint   Hip Pain      HPI   Ángela Zurita is a 39 year old female who presents for evaluation of hip pain. Patient reports that for the past 4 weeks she has been having right hip pain as if it was \"popping\" that worsened over the last week, prompting her to go into [facility] who did an xray with no significant findings. She then went to see a chiropractor which offered some minor palliation but as her pain persisted she went back to [facility] for an MRI 2 days ago on  and received the results today showing a stress fracture of the femoral neck that is unstable and needs surgery. Patient notes a family history of osteoporosis. She denies any recent trauma.       Independent Historian   None    Review of External Notes   None     Past Medical History     Medical History and Problem List   ADHD  Chickenpox  Cold sore  Depression  Exercise induced asthma  Preeclampsia  Hypertension  IBS  Mediastinal cyst  Migraine  PONV  Severe dysplasia of cervix  Tonsillitis       Medications   Adderall  Imitrex  Valtrex       Surgical History   Tonsillectomy  Sphincteroplasty  Biopsy cervical, local excsision, single/multiple   section  Colonoscopy  DaVinci lobectomy lung  Dilation and curretage, hysteroscopy diagnostic, combined  Excision sweat gland lesion axillary, simple  Rectovaginal fistula closure  LEEP      Physical Exam     Patient Vitals for the past 24 hrs:   BP Temp Temp src Pulse Resp SpO2   24 1158 138/84 98.9  F (37.2  C) Temporal 95 20 100 %     Physical Exam  Constitutional: Middle age white female supine. No respiratory distress.   HENT: No signs of trauma.   Eyes: EOM are normal. Pupils are equal, round, and reactive to light.   Neck: Normal range of motion. No JVD present. No cervical adenopathy.  Cardiovascular: Regular rhythm.  Exam reveals no gallop and no friction rub.    No murmur heard.  Pulmonary/Chest: Bilateral breath " sounds normal. No wheezes, rhonchi or rales.  Abdominal: Soft. No tenderness. No rebound or guarding.   Musculoskeletal: No edema. Right hip tenderness to palpation posteriorly. No pain with internal or external rotation, no shortening.   Lymphadenopathy: No lymphadenopathy.   Neurological: Alert and oriented to person, place, and time. Normal strength. Coordination normal.   Skin: Skin is warm and dry. No rash noted. No erythema.       Diagnostics     Lab Results   Labs Ordered and Resulted from Time of ED Arrival to Time of ED Departure   COMPREHENSIVE METABOLIC PANEL - Abnormal       Result Value    Sodium 139      Potassium 4.0      Carbon Dioxide (CO2) 24      Anion Gap 10      Urea Nitrogen 18.8      Creatinine 0.68      GFR Estimate >90      Calcium 9.1      Chloride 105      Glucose 96      Alkaline Phosphatase 70      AST 15      ALT 16      Protein Total 6.1 (*)     Albumin 3.8      Bilirubin Total 0.2     CBC WITH PLATELETS AND DIFFERENTIAL - Abnormal    WBC Count 8.5      RBC Count 3.74 (*)     Hemoglobin 12.1      Hematocrit 35.7      MCV 96      MCH 32.4      MCHC 33.9      RDW 12.3      Platelet Count 334      % Neutrophils 67      % Lymphocytes 24      % Monocytes 6      % Eosinophils 2      % Basophils 0      % Immature Granulocytes 0      NRBCs per 100 WBC 0      Absolute Neutrophils 5.7      Absolute Lymphocytes 2.0      Absolute Monocytes 0.5      Absolute Eosinophils 0.2      Absolute Basophils 0.0      Absolute Immature Granulocytes 0.0      Absolute NRBCs 0.0         Imaging   No orders to display     Independent Interpretation   None    ED Course      Medications Administered   Medications - No data to display    Procedures   Procedures     Discussion of Management   Admitting Hospitalist, Dr. Ley    ED Course   ED Course as of 09/08/24 1239   Sun Sep 08, 2024   1210 I obtained patient history and performed a physical exam.        Additional Documentation  None    Medical Decision Making /  Diagnosis     CMS Diagnoses: None    MIPS       None    MDM   Ángela Zurita is a 39 year old female who presents to the ED after she was called and told that her MRI showed a hip fracture and not to bear weight.  She had had hip pain for a long time and saw orthopedics.  Her x-ray was negative but an MRI was ordered she finally got her MRI 2 days ago and the report shows a near complete nondisplaced fracture of the base of the femoral neck.  The hip is not displaced the leg is not shortened and it does not hurt with rotation.  But the risk of having this become a displaced fracture is high.  She will be admitted for surgery tomorrow.  I spoke with orthopedist on-call    Disposition   The patient was admitted to the hospital under the care of Dr. Ley.    Diagnosis     ICD-10-CM    1. Hip fracture, right, closed, initial encounter (H)  S72.001A            Discharge Medications   New Prescriptions    No medications on file         Scribe Disclosure:  I, Deisy Garza, am serving as a scribe at 12:13 PM on 9/8/2024 to document services personally performed by Edison Clark MD based on my observations and the provider's statements to me.        Edison Clark MD  09/08/24 2589

## 2024-09-08 NOTE — PHARMACY-ADMISSION MEDICATION HISTORY
Pharmacy Intern Admission Medication History    Admission medication history is complete. The information provided in this note is only as accurate as the sources available at the time of the update.    Information Source(s): Patient and CareEverywhere/SureScripts via in-person    Pertinent Information:   Patient reports that she picks up all of her medications from [facility] in [place].   Adderall 20 mg prescription verified via careeverywhere note that was done on 6/21/24.  3 Prescriptions were sent to the pharmacy, each for a month    Changes made to PTA medication list:  Added: Lysine 1000 mg, acetaminophen 500 mg, meloxicam 15 mg, EMERGEN-C Immune PO  Deleted: None  Changed: None    Allergies reviewed with patient and updates made in EHR: yes    Medication History Completed By: Melany Mason 9/8/2024 1:18 PM    PTA Med List   Medication Sig Last Dose    acetaminophen (TYLENOL) 500 MG tablet Take 500 mg by mouth every 6 hours as needed for mild pain. 9/8/2024 at 1000    amphetamine-dextroamphetamine (ADDERALL) 20 MG tablet Take 1 tablet (20 mg) by mouth 3 times daily for 30 days 9/8/2024 at 1000    Lysine 1000 MG TABS Take 1 tablet by mouth daily as needed (oncoming coldsore). 9/7/2024 at AM    meloxicam (MOBIC) 15 MG tablet Take 15 mg by mouth daily. Take 1 tablet by mouth as needed. 9/8/2024 at AM    Multiple Vitamins-Minerals (EMERGEN-C IMMUNE PO) Take 1 Dose by mouth as needed (if she is worried about getting sick). 9/7/2024    SUMAtriptan (IMITREX) 50 MG tablet Take 1 tablet (50 mg) by mouth at onset of headache for migraine May repeat in 2 hours. Max 4 tablets/24 hours. More than a month at PRN    tretinoin (RETIN-A) 0.1 % external cream Apply topically at bedtime Past Month    valACYclovir (VALTREX) 1000 mg tablet TAKE TWO TABLETS BY MOUTH TWICE A DAY AS NEEDED FOR FLARES Past Month at PRN

## 2024-09-08 NOTE — H&P
[facility]    History and Physical - Hospitalist Service       Date of Admission:  9/8/2024    Assessment & Plan      Ángela Zurita is a 39 year old female admitted on 9/8/2024 right femoral neck stress fracture.     Non-displaced right femoral neck stress fracture  Patient reports multiple weeks of right hip pain. Seen at [facility] where she had an XR done of hip which was normal. She then had an MRI and was called on 9/8 and told she should present to the ED due to a near complete non-displaced femoral neck stress fracture. ED provider called orthopedic team who will see patient and consider surgery as early as tomorrow (9/9). Patient reports minimal pain in the right hip, only with movement. No history of prolonged steroid use. She has regular menstrual cycles. Endorses a family history of osteoporosis. Has never had a DEXA scan. Hemodynamically stable on admission. Normal CBC, BMP, and LFTs.  - Admit to ortho unit  - Regular diet, NPO at midnight  - Tylenol for moderate pain, oxycodone for severe pain  - Holding PTA mobic   - Non-weight bearing right lower extremity, okay to ambulate with crutches   - Orthopedic surgery consulted, recommendations appreciated  - Check ionized calcium, vitamin D, PTH   - Recommend outpatient DEXA scan     ADD  - Continue PTA Adderall 20 mg TID    History of cold sores  Uses valtrex when has outbreaks. Not needing currently  - hold PTA valtrex    History of migraine headaches  - hold PTA sumatriptan         Diet:  Regular Diet, NPO at midnight   DVT Prophylaxis: Pneumatic Compression Devices  [place] Catheter: Not present  Lines: None     Cardiac Monitoring: None  Code Status:  Full Code     Clinically Significant Risk Factors Present on Admission                  # Hypertension: Noted on problem list                          Disposition Plan     Medically Ready for Discharge: Anticipated in 2-4 Days           Bert Aragon DO  Hospitalist Service    "[facility]  Securely message with Carol (more info)  Text page via [email or web address] Paging/Directory     ______________________________________________________________________    Chief Complaint   Right hip pain    History is obtained from the patient    History of Present Illness   Ángela Zurita is a 39 year old female who presents to ER after MRI of her hip showed an unstable stress of the femoral neck of the right hip.     Patient reports that for the past 4 weeks she has been having right hip pain as if it was \"popping\" that worsened over the last week, prompting her to go into [facility] who did an xray with no significant findings. She then went to see a chiropractor which offered some minor palliation but as her pain persisted she went back to [facility] for an MRI 2 days ago on 9/6 and received the results today showing a stress fracture of the femoral neck that is unstable and needs surgery. Patient notes a family history of osteoporosis. She denies any recent trauma. States she also had a stress fracture in her left knee recently.     Works as , drinks alcohol on occasion at social events, no illicit substance use       Past Medical History    Past Medical History:   Diagnosis Date    ADHD     Chickenpox     Cold sore     Depressive disorder 3/22/19    Postpartum    Exercise-induced asthma     adolescent    History of pre-eclampsia     Hypertension 3/21/2019    Severe Pre-eclampsia    IBS (irritable bowel syndrome)     Mediastinal cyst     Migraine     PONV (postoperative nausea and vomiting)     Severe dysplasia of cervix (W[place] III) 2005    s/p LEEP; see problem list    Tonsillitis 02/13/2014       Past Surgical History   Past Surgical History:   Procedure Laterality Date    ANUS SURGERY N/A 05/18/2018    Procedure: SPHINCTEROPLASTY;  Surgeon: Jorge Dennis MD;  Location: [facility] OR;  Service:     BIOPSY  Various    Had a few Leeps and numerous Colposcopys    BIOPSY CERVICAL, " LOCAL EXCISION, SINGLE/MULTIPLE       SECTION N/A 2019    Procedure:  SECTION;  Surgeon: Jesenia Jefferson MD;  Location: [facility] L+D    COLONOSCOPY      Normal    DAVINCI LOBECTOMY LUNG Right 2023    Procedure: ROBOTIC ASSISTED RIGHT THORASCOPIC RESECTION OF MEDIASTINAL MASS;  Surgeon: Pa Moore MD;  Location: [facility] OR    DILATION AND CURETTAGE, HYSTEROSCOPY DIAGNOSTIC, COMBINED N/A 2018    Procedure: COMBINED DILATION AND CURETTAGE, HYSTEROSCOPY DIAGNOSTIC;  Diagnostic Hysteroscopy with Dilation and Curettage,Laparoscopy with Left Ovarian Cystectomy, Right Uteral Sacral Biopsy;  Surgeon: Sherin Frost MD;  Location: [facility] OR    EXC SWEAT GLAND LESN AXILL,SIMPL Right     LAPAROSCOPY DIAGNOSTIC (GYN) N/A 2018    Procedure: LAPAROSCOPY DIAGNOSTIC (GYN);;  Surgeon: Sherin Frost MD;  Location: [facility] OR    LEEP TX, CERVICAL  2006    MOUTH SURGERY      wisdom teeth    RECTOVAGINAL FISTULA CLOSURE  2015    SOFT TISSUE SURGERY  Various    infected sweat-gland- cyst removed arm,armpit,face    SPHINCTEROPLASTY RECTUM  2018    with pernineal rebuild    SURGICAL HISTORY OF -       infected sweat gland under her arm    TONSILLECTOMY         Prior to Admission Medications   Prior to Admission Medications   Prescriptions Last Dose Informant Patient Reported? Taking?   SUMAtriptan (IMITREX) 50 MG tablet   No No   Sig: Take 1 tablet (50 mg) by mouth at onset of headache for migraine May repeat in 2 hours. Max 4 tablets/24 hours.   amphetamine-dextroamphetamine (ADDERALL) 20 MG tablet   No No   Sig: Take 1 tablet (20 mg) by mouth 3 times daily for 30 days   tretinoin (RETIN-A) 0.1 % external cream   No No   Sig: Apply topically at bedtime   valACYclovir (VALTREX) 1000 mg tablet   No No   Sig: TAKE TWO TABLETS BY MOUTH TWICE A DAY AS NEEDED FOR FLARES      Facility-Administered Medications: None        Review of Systems    The 10 point Review of  Systems is negative other than noted in the HPI or here.      Social History   I have reviewed this patient's social history and updated it with pertinent information if needed.  Social History     Tobacco Use    Smoking status: Former     Current packs/day: 0.00     Average packs/day: 1 pack/day for 23.0 years (23.0 ttl pk-yrs)     Types: Cigarettes     Start date: 2000     Quit date: 2023     Years since quittin.6    Smokeless tobacco: Never    Tobacco comments:     Quit with each pregnancy   Vaping Use    Vaping status: Never Used   Substance Use Topics    Alcohol use: Not Currently     Comment: rarely    Drug use: Never       Family History   I have reviewed this patient's family history and updated it with pertinent information if needed.  Family History   Problem Relation Age of Onset    Breast Cancer Mother         Stage 0, small duct carcinoma    Osteoporosis Mother     Hypertension Father     Hyperlipidemia Father     Liver Disease Father     Hepatitis Father     Blood Disease Brother         twin brother-blood clots    Deep Vein Thrombosis (DVT) Brother     Respiratory Maternal Grandmother     Lung Cancer Maternal Grandmother     Diabetes Maternal Grandmother         type II    Hypertension Maternal Grandmother     Hyperlipidemia Maternal Grandmother     Depression Maternal Grandmother     Osteoporosis Maternal Grandmother     Blood Disease Maternal Grandfather         blood clots    Heart Disease Maternal Grandfather         valve replacement    Hyperlipidemia Maternal Grandfather     Cerebrovascular Disease Maternal Grandfather     Prostate Cancer Maternal Grandfather     Other Cancer Maternal Grandfather         Gum/Jaw/Lymphnodes    Colon Cancer Maternal Grandfather     Mental Illness Maternal Grandfather         Alzheimer's    Cancer Paternal Grandmother         non -hodgkins lymphoma    Lung Cancer Paternal Grandmother     Hypertension Paternal Grandmother     Hyperlipidemia Paternal  Grandmother     Cerebrovascular Disease Paternal Grandmother     Diabetes Paternal Grandmother     Hyperlipidemia Paternal Grandfather     Prostate Cancer Paternal Grandfather     Hypertension Paternal Grandfather     Colon Cancer Paternal Grandfather     Coronary Artery Disease Paternal Grandfather     Breast Cancer Cousin         Breast Cancer    Other Cancer Cousin         Metistatic    Breast Cancer Other         2 aunt and 3 of my great aunts    Osteoporosis Other     Anesthesia Reaction No family hx of          Allergies   Allergies   Allergen Reactions    Bee Pollen      In the past, has not had issues lately    Doxycycline Rash        Physical Exam   Vital Signs: Temp: 98.9  F (37.2  C) Temp src: Temporal BP: 138/84 Pulse: 95   Resp: 20 SpO2: 100 % O2 Device: None (Room air)    Weight: 0 lbs 0 oz    Constitutional: awake, alert, cooperative, no apparent distress, and appears stated age  Eyes: Lids and lashes normal, pupils equal, round and reactive to light  ENT: Normocephalic, without obvious abnormality, atraumatic  Respiratory: No increased work of breathing, good air exchange, clear to auscultation bilaterally, no crackles or wheezing  Cardiovascular: Normal apical impulse, regular rate and rhythm, normal S1 and S2, no S3 or S4, and no murmur noted  GI: No scars, normal bowel sounds, soft, non-distended, non-tender, no masses palpated, no hepatosplenomegally  Skin: no redness, warmth, or swelling  Musculoskeletal: There is no redness, warmth, or swelling of the joints. Did not mobilize right hip.  Neurologic: Awake, alert, oriented to name, place and time.  Cranial nerves II-XII are grossly intact.    Neuropsychiatric: General: normal, calm, and normal eye contact    Medical Decision Making       90 MINUTES SPENT BY ME on the date of service doing chart review, history, exam, documentation & further activities per the note.      Data   ------------------------- PAST 24 HR DATA REVIEWED  -----------------------------------------------    I have personally reviewed the following data over the past 24 hrs:    8.5  \   12.1   / 334     N/A N/A N/A /  N/A   N/A N/A N/A \       Imaging results reviewed over the past 24 hrs:   No results found for this or any previous visit (from the past 24 hour(s)).

## 2024-09-08 NOTE — LETTER
[facility]  [address] [address]  Phone: [phone]  Fax: [phone]    September 10, 2024        Ángela Zurita  [address]   [place] [address]          To whom it may concern:    RE: Ángela Rutledgesergiocyndiant    The patient is unable to work from 9/8/24-9/16/24 due to a right hip injury and subsequent surgery. She may work from home starting 9/17/24 until cleared by her surgeon, Dr. Figueroa Goldstein.    Please contact Dr. Goldstein's team with any questions or concerns at [phone].    Sincerely,      Anna Hanna PA-C    [facility]  [facility] [address] [address]

## 2024-09-08 NOTE — PLAN OF CARE
Orthopedics did receive consult regarding right femoral neck stress fracture.  Prophylactic surgical fixation is recommended with femoral neck system.  This case is being added to the OR schedule for tomorrow.  NPO at midnight.  Hold anticoagulation

## 2024-09-08 NOTE — ED TRIAGE NOTES
"Patient presents to the ER after referral from [facility]. Per patient report, patient has had 4 weeks of right hip pain when walking. Patient had an outpatient MRI done on Friday and the read came back and patient states it said it was \"a stress fracture of the femoral neck that is unstable and needs surgery\".      Triage Assessment (Adult)       Row Name 09/08/24 1155          Triage Assessment    Airway WDL WDL        Respiratory WDL    Respiratory WDL WDL        Skin Circulation/Temperature WDL    Skin Circulation/Temperature WDL WDL        Cardiac WDL    Cardiac WDL WDL        Peripheral/Neurovascular WDL    Peripheral Neurovascular WDL X        Cognitive/Neuro/Behavioral WDL    Cognitive/Neuro/Behavioral WDL WDL                     "

## 2024-09-09 ENCOUNTER — ANESTHESIA EVENT (OUTPATIENT)
Dept: SURGERY | Facility: CLINIC | Age: 39
End: 2024-09-09
Payer: COMMERCIAL

## 2024-09-09 ENCOUNTER — APPOINTMENT (OUTPATIENT)
Dept: GENERAL RADIOLOGY | Facility: CLINIC | Age: 39
End: 2024-09-09
Attending: ORTHOPAEDIC SURGERY
Payer: COMMERCIAL

## 2024-09-09 ENCOUNTER — ANESTHESIA (OUTPATIENT)
Dept: SURGERY | Facility: CLINIC | Age: 39
End: 2024-09-09
Payer: COMMERCIAL

## 2024-09-09 LAB
ABO/RH(D): NORMAL
ANTIBODY SCREEN: NEGATIVE
HCG UR QL: NEGATIVE
HGB BLD-MCNC: 11.5 G/DL (ref 11.7–15.7)
SPECIMEN EXPIRATION DATE: NORMAL

## 2024-09-09 PROCEDURE — 272N000001 HC OR GENERAL SUPPLY STERILE: Performed by: ORTHOPAEDIC SURGERY

## 2024-09-09 PROCEDURE — 85018 HEMOGLOBIN: CPT | Performed by: HOSPITALIST

## 2024-09-09 PROCEDURE — 250N000011 HC RX IP 250 OP 636: Performed by: NURSE ANESTHETIST, CERTIFIED REGISTERED

## 2024-09-09 PROCEDURE — 36415 COLL VENOUS BLD VENIPUNCTURE: CPT | Performed by: HOSPITALIST

## 2024-09-09 PROCEDURE — 250N000011 HC RX IP 250 OP 636: Performed by: ANESTHESIOLOGY

## 2024-09-09 PROCEDURE — 0QS604Z REPOSITION RIGHT UPPER FEMUR WITH INTERNAL FIXATION DEVICE, OPEN APPROACH: ICD-10-PCS | Performed by: ORTHOPAEDIC SURGERY

## 2024-09-09 PROCEDURE — 27236 TREAT THIGH FRACTURE: CPT | Performed by: NURSE ANESTHETIST, CERTIFIED REGISTERED

## 2024-09-09 PROCEDURE — 62325 NJX INTERLAMINAR CRV/THRC: CPT | Mod: 59 | Performed by: ANESTHESIOLOGY

## 2024-09-09 PROCEDURE — 250N000013 HC RX MED GY IP 250 OP 250 PS 637

## 2024-09-09 PROCEDURE — 250N000009 HC RX 250: Performed by: NURSE ANESTHETIST, CERTIFIED REGISTERED

## 2024-09-09 PROCEDURE — 250N000009 HC RX 250: Performed by: ORTHOPAEDIC SURGERY

## 2024-09-09 PROCEDURE — 258N000003 HC RX IP 258 OP 636: Performed by: NURSE ANESTHETIST, CERTIFIED REGISTERED

## 2024-09-09 PROCEDURE — 710N000009 HC RECOVERY PHASE 1, LEVEL 1, PER MIN: Performed by: ORTHOPAEDIC SURGERY

## 2024-09-09 PROCEDURE — 258N000003 HC RX IP 258 OP 636

## 2024-09-09 PROCEDURE — 250N000009 HC RX 250: Performed by: STUDENT IN AN ORGANIZED HEALTH CARE EDUCATION/TRAINING PROGRAM

## 2024-09-09 PROCEDURE — 81025 URINE PREGNANCY TEST: CPT | Performed by: ANESTHESIOLOGY

## 2024-09-09 PROCEDURE — 250N000011 HC RX IP 250 OP 636: Performed by: STUDENT IN AN ORGANIZED HEALTH CARE EDUCATION/TRAINING PROGRAM

## 2024-09-09 PROCEDURE — 64450 NJX AA&/STRD OTHER PN/BRANCH: CPT | Mod: 59 | Performed by: ANESTHESIOLOGY

## 2024-09-09 PROCEDURE — 82565 ASSAY OF CREATININE: CPT

## 2024-09-09 PROCEDURE — 370N000017 HC ANESTHESIA TECHNICAL FEE, PER MIN: Performed by: ORTHOPAEDIC SURGERY

## 2024-09-09 PROCEDURE — 36415 COLL VENOUS BLD VENIPUNCTURE: CPT

## 2024-09-09 PROCEDURE — 250N000009 HC RX 250: Performed by: ANESTHESIOLOGY

## 2024-09-09 PROCEDURE — C1713 ANCHOR/SCREW BN/BN,TIS/BN: HCPCS | Performed by: ORTHOPAEDIC SURGERY

## 2024-09-09 PROCEDURE — 250N000013 HC RX MED GY IP 250 OP 250 PS 637: Performed by: STUDENT IN AN ORGANIZED HEALTH CARE EDUCATION/TRAINING PROGRAM

## 2024-09-09 PROCEDURE — 99232 SBSQ HOSP IP/OBS MODERATE 35: CPT | Performed by: HOSPITALIST

## 2024-09-09 PROCEDURE — C1769 GUIDE WIRE: HCPCS | Performed by: ORTHOPAEDIC SURGERY

## 2024-09-09 PROCEDURE — 999N000141 HC STATISTIC PRE-PROCEDURE NURSING ASSESSMENT: Performed by: ORTHOPAEDIC SURGERY

## 2024-09-09 PROCEDURE — 258N000003 HC RX IP 258 OP 636: Performed by: ANESTHESIOLOGY

## 2024-09-09 PROCEDURE — 27236 TREAT THIGH FRACTURE: CPT | Performed by: ANESTHESIOLOGY

## 2024-09-09 PROCEDURE — 250N000011 HC RX IP 250 OP 636

## 2024-09-09 PROCEDURE — 999N000179 XR SURGERY CARM FLUORO LESS THAN 5 MIN W STILLS

## 2024-09-09 PROCEDURE — 360N000084 HC SURGERY LEVEL 4 W/ FLUORO, PER MIN: Performed by: ORTHOPAEDIC SURGERY

## 2024-09-09 PROCEDURE — 120N000001 HC R&B MED SURG/OB

## 2024-09-09 DEVICE — BOLT FOR FEMORAL NECK SYSTEM 85MM LENGTH-STERILE: Type: IMPLANTABLE DEVICE | Site: HIP | Status: FUNCTIONAL

## 2024-09-09 DEVICE — IMPLANTABLE DEVICE: Type: IMPLANTABLE DEVICE | Site: HIP | Status: FUNCTIONAL

## 2024-09-09 DEVICE — FEMORAL NECK SYSTEM PLATE 2 HOLE - STERILE: Type: IMPLANTABLE DEVICE | Site: HIP | Status: FUNCTIONAL

## 2024-09-09 DEVICE — ANTIROTATION SCREW FOR FEMORAL NECK SYS 85MM LENGTH - STERIL: Type: IMPLANTABLE DEVICE | Site: HIP | Status: FUNCTIONAL

## 2024-09-09 RX ORDER — PROPOFOL 10 MG/ML
INJECTION, EMULSION INTRAVENOUS CONTINUOUS PRN
Status: DISCONTINUED | OUTPATIENT
Start: 2024-09-09 | End: 2024-09-09

## 2024-09-09 RX ORDER — GLYCOPYRROLATE 0.2 MG/ML
INJECTION, SOLUTION INTRAMUSCULAR; INTRAVENOUS PRN
Status: DISCONTINUED | OUTPATIENT
Start: 2024-09-09 | End: 2024-09-09

## 2024-09-09 RX ORDER — ONDANSETRON 4 MG/1
4 TABLET, ORALLY DISINTEGRATING ORAL EVERY 6 HOURS PRN
Status: DISCONTINUED | OUTPATIENT
Start: 2024-09-09 | End: 2024-09-10 | Stop reason: HOSPADM

## 2024-09-09 RX ORDER — NALOXONE HYDROCHLORIDE 0.4 MG/ML
0.1 INJECTION, SOLUTION INTRAMUSCULAR; INTRAVENOUS; SUBCUTANEOUS
Status: DISCONTINUED | OUTPATIENT
Start: 2024-09-09 | End: 2024-09-09 | Stop reason: HOSPADM

## 2024-09-09 RX ORDER — DEXAMETHASONE SODIUM PHOSPHATE 4 MG/ML
INJECTION, SOLUTION INTRA-ARTICULAR; INTRALESIONAL; INTRAMUSCULAR; INTRAVENOUS; SOFT TISSUE PRN
Status: DISCONTINUED | OUTPATIENT
Start: 2024-09-09 | End: 2024-09-09

## 2024-09-09 RX ORDER — FENTANYL CITRATE 50 UG/ML
50 INJECTION, SOLUTION INTRAMUSCULAR; INTRAVENOUS EVERY 5 MIN PRN
Status: DISCONTINUED | OUTPATIENT
Start: 2024-09-09 | End: 2024-09-09 | Stop reason: HOSPADM

## 2024-09-09 RX ORDER — LIDOCAINE 40 MG/G
CREAM TOPICAL
Status: DISCONTINUED | OUTPATIENT
Start: 2024-09-09 | End: 2024-09-10 | Stop reason: HOSPADM

## 2024-09-09 RX ORDER — FENTANYL CITRATE 50 UG/ML
25 INJECTION, SOLUTION INTRAMUSCULAR; INTRAVENOUS EVERY 5 MIN PRN
Status: DISCONTINUED | OUTPATIENT
Start: 2024-09-09 | End: 2024-09-09 | Stop reason: HOSPADM

## 2024-09-09 RX ORDER — OXYCODONE HYDROCHLORIDE 5 MG/1
10 TABLET ORAL EVERY 4 HOURS PRN
Status: DISCONTINUED | OUTPATIENT
Start: 2024-09-09 | End: 2024-09-10 | Stop reason: HOSPADM

## 2024-09-09 RX ORDER — CEFAZOLIN SODIUM 1 G/3ML
1 INJECTION, POWDER, FOR SOLUTION INTRAMUSCULAR; INTRAVENOUS EVERY 8 HOURS
Status: COMPLETED | OUTPATIENT
Start: 2024-09-10 | End: 2024-09-10

## 2024-09-09 RX ORDER — NALOXONE HYDROCHLORIDE 0.4 MG/ML
0.2 INJECTION, SOLUTION INTRAMUSCULAR; INTRAVENOUS; SUBCUTANEOUS
Status: DISCONTINUED | OUTPATIENT
Start: 2024-09-09 | End: 2024-09-10 | Stop reason: HOSPADM

## 2024-09-09 RX ORDER — AMOXICILLIN 250 MG
1 CAPSULE ORAL 2 TIMES DAILY
Status: DISCONTINUED | OUTPATIENT
Start: 2024-09-09 | End: 2024-09-10 | Stop reason: HOSPADM

## 2024-09-09 RX ORDER — HYDROMORPHONE HCL IN WATER/PF 6 MG/30 ML
0.4 PATIENT CONTROLLED ANALGESIA SYRINGE INTRAVENOUS
Status: DISCONTINUED | OUTPATIENT
Start: 2024-09-09 | End: 2024-09-10 | Stop reason: HOSPADM

## 2024-09-09 RX ORDER — TRANEXAMIC ACID 10 MG/ML
1 INJECTION, SOLUTION INTRAVENOUS ONCE
Status: COMPLETED | OUTPATIENT
Start: 2024-09-09 | End: 2024-09-09

## 2024-09-09 RX ORDER — SUMATRIPTAN 50 MG/1
50 TABLET, FILM COATED ORAL
Status: DISCONTINUED | OUTPATIENT
Start: 2024-09-09 | End: 2024-09-10 | Stop reason: HOSPADM

## 2024-09-09 RX ORDER — ACETAMINOPHEN 325 MG/1
650 TABLET ORAL EVERY 4 HOURS PRN
Status: DISCONTINUED | OUTPATIENT
Start: 2024-09-12 | End: 2024-09-09

## 2024-09-09 RX ORDER — SODIUM CHLORIDE, SODIUM LACTATE, POTASSIUM CHLORIDE, CALCIUM CHLORIDE 600; 310; 30; 20 MG/100ML; MG/100ML; MG/100ML; MG/100ML
INJECTION, SOLUTION INTRAVENOUS CONTINUOUS
Status: DISCONTINUED | OUTPATIENT
Start: 2024-09-09 | End: 2024-09-09 | Stop reason: HOSPADM

## 2024-09-09 RX ORDER — FENTANYL CITRATE 0.05 MG/ML
50 INJECTION, SOLUTION INTRAMUSCULAR; INTRAVENOUS
Status: DISCONTINUED | OUTPATIENT
Start: 2024-09-09 | End: 2024-09-09 | Stop reason: HOSPADM

## 2024-09-09 RX ORDER — HYDROMORPHONE HCL IN WATER/PF 6 MG/30 ML
0.2 PATIENT CONTROLLED ANALGESIA SYRINGE INTRAVENOUS EVERY 5 MIN PRN
Status: DISCONTINUED | OUTPATIENT
Start: 2024-09-09 | End: 2024-09-09 | Stop reason: HOSPADM

## 2024-09-09 RX ORDER — DEXAMETHASONE SODIUM PHOSPHATE 4 MG/ML
4 INJECTION, SOLUTION INTRA-ARTICULAR; INTRALESIONAL; INTRAMUSCULAR; INTRAVENOUS; SOFT TISSUE
Status: DISCONTINUED | OUTPATIENT
Start: 2024-09-09 | End: 2024-09-09 | Stop reason: HOSPADM

## 2024-09-09 RX ORDER — NALOXONE HYDROCHLORIDE 0.4 MG/ML
0.4 INJECTION, SOLUTION INTRAMUSCULAR; INTRAVENOUS; SUBCUTANEOUS
Status: DISCONTINUED | OUTPATIENT
Start: 2024-09-09 | End: 2024-09-10 | Stop reason: HOSPADM

## 2024-09-09 RX ORDER — CEFAZOLIN SODIUM/WATER 2 G/20 ML
2 SYRINGE (ML) INTRAVENOUS SEE ADMIN INSTRUCTIONS
Status: DISCONTINUED | OUTPATIENT
Start: 2024-09-09 | End: 2024-09-09 | Stop reason: HOSPADM

## 2024-09-09 RX ORDER — LIDOCAINE HYDROCHLORIDE 20 MG/ML
INJECTION, SOLUTION INFILTRATION; PERINEURAL PRN
Status: DISCONTINUED | OUTPATIENT
Start: 2024-09-09 | End: 2024-09-09

## 2024-09-09 RX ORDER — PROCHLORPERAZINE MALEATE 10 MG
10 TABLET ORAL EVERY 6 HOURS PRN
Status: DISCONTINUED | OUTPATIENT
Start: 2024-09-09 | End: 2024-09-10 | Stop reason: HOSPADM

## 2024-09-09 RX ORDER — CEFAZOLIN SODIUM/WATER 2 G/20 ML
2 SYRINGE (ML) INTRAVENOUS
Status: COMPLETED | OUTPATIENT
Start: 2024-09-09 | End: 2024-09-09

## 2024-09-09 RX ORDER — ONDANSETRON 2 MG/ML
4 INJECTION INTRAMUSCULAR; INTRAVENOUS EVERY 30 MIN PRN
Status: DISCONTINUED | OUTPATIENT
Start: 2024-09-09 | End: 2024-09-09 | Stop reason: HOSPADM

## 2024-09-09 RX ORDER — HYDROMORPHONE HCL IN WATER/PF 6 MG/30 ML
0.4 PATIENT CONTROLLED ANALGESIA SYRINGE INTRAVENOUS EVERY 5 MIN PRN
Status: DISCONTINUED | OUTPATIENT
Start: 2024-09-09 | End: 2024-09-09 | Stop reason: HOSPADM

## 2024-09-09 RX ORDER — ONDANSETRON 2 MG/ML
4 INJECTION INTRAMUSCULAR; INTRAVENOUS EVERY 6 HOURS PRN
Status: DISCONTINUED | OUTPATIENT
Start: 2024-09-09 | End: 2024-09-10 | Stop reason: HOSPADM

## 2024-09-09 RX ORDER — HYDROMORPHONE HCL IN WATER/PF 6 MG/30 ML
0.2 PATIENT CONTROLLED ANALGESIA SYRINGE INTRAVENOUS
Status: DISCONTINUED | OUTPATIENT
Start: 2024-09-09 | End: 2024-09-10 | Stop reason: HOSPADM

## 2024-09-09 RX ORDER — POLYETHYLENE GLYCOL 3350 17 G/17G
17 POWDER, FOR SOLUTION ORAL DAILY
Status: DISCONTINUED | OUTPATIENT
Start: 2024-09-10 | End: 2024-09-10 | Stop reason: HOSPADM

## 2024-09-09 RX ORDER — BISACODYL 10 MG
10 SUPPOSITORY, RECTAL RECTAL DAILY PRN
Status: DISCONTINUED | OUTPATIENT
Start: 2024-09-12 | End: 2024-09-10 | Stop reason: HOSPADM

## 2024-09-09 RX ORDER — ONDANSETRON 2 MG/ML
INJECTION INTRAMUSCULAR; INTRAVENOUS PRN
Status: DISCONTINUED | OUTPATIENT
Start: 2024-09-09 | End: 2024-09-09

## 2024-09-09 RX ORDER — MAGNESIUM HYDROXIDE 1200 MG/15ML
LIQUID ORAL PRN
Status: DISCONTINUED | OUTPATIENT
Start: 2024-09-09 | End: 2024-09-09 | Stop reason: HOSPADM

## 2024-09-09 RX ORDER — ONDANSETRON 4 MG/1
4 TABLET, ORALLY DISINTEGRATING ORAL EVERY 30 MIN PRN
Status: DISCONTINUED | OUTPATIENT
Start: 2024-09-09 | End: 2024-09-09 | Stop reason: HOSPADM

## 2024-09-09 RX ORDER — ENOXAPARIN SODIUM 100 MG/ML
40 INJECTION SUBCUTANEOUS EVERY 24 HOURS
Status: DISCONTINUED | OUTPATIENT
Start: 2024-09-10 | End: 2024-09-10 | Stop reason: HOSPADM

## 2024-09-09 RX ORDER — OXYCODONE HYDROCHLORIDE 5 MG/1
5 TABLET ORAL EVERY 4 HOURS PRN
Status: DISCONTINUED | OUTPATIENT
Start: 2024-09-09 | End: 2024-09-10 | Stop reason: HOSPADM

## 2024-09-09 RX ORDER — ACETAMINOPHEN 325 MG/1
975 TABLET ORAL EVERY 8 HOURS
Status: DISCONTINUED | OUTPATIENT
Start: 2024-09-09 | End: 2024-09-10 | Stop reason: HOSPADM

## 2024-09-09 RX ORDER — PROPOFOL 10 MG/ML
INJECTION, EMULSION INTRAVENOUS PRN
Status: DISCONTINUED | OUTPATIENT
Start: 2024-09-09 | End: 2024-09-09

## 2024-09-09 RX ORDER — SODIUM CHLORIDE, SODIUM LACTATE, POTASSIUM CHLORIDE, CALCIUM CHLORIDE 600; 310; 30; 20 MG/100ML; MG/100ML; MG/100ML; MG/100ML
INJECTION, SOLUTION INTRAVENOUS CONTINUOUS
Status: DISCONTINUED | OUTPATIENT
Start: 2024-09-09 | End: 2024-09-10

## 2024-09-09 RX ORDER — TRANEXAMIC ACID 10 MG/ML
1 INJECTION, SOLUTION INTRAVENOUS ONCE
Status: DISCONTINUED | OUTPATIENT
Start: 2024-09-09 | End: 2024-09-09 | Stop reason: HOSPADM

## 2024-09-09 RX ORDER — BUPIVACAINE HYDROCHLORIDE 7.5 MG/ML
INJECTION, SOLUTION INTRASPINAL
Status: COMPLETED | OUTPATIENT
Start: 2024-09-09 | End: 2024-09-09

## 2024-09-09 RX ORDER — FENTANYL CITRATE 50 UG/ML
INJECTION, SOLUTION INTRAMUSCULAR; INTRAVENOUS PRN
Status: DISCONTINUED | OUTPATIENT
Start: 2024-09-09 | End: 2024-09-09

## 2024-09-09 RX ADMIN — GLYCOPYRROLATE 0.2 MG: 0.2 INJECTION, SOLUTION INTRAMUSCULAR; INTRAVENOUS at 17:47

## 2024-09-09 RX ADMIN — HYDROMORPHONE HYDROCHLORIDE 0.4 MG: 0.2 INJECTION, SOLUTION INTRAMUSCULAR; INTRAVENOUS; SUBCUTANEOUS at 18:53

## 2024-09-09 RX ADMIN — SODIUM CHLORIDE, POTASSIUM CHLORIDE, SODIUM LACTATE AND CALCIUM CHLORIDE: 600; 310; 30; 20 INJECTION, SOLUTION INTRAVENOUS at 16:49

## 2024-09-09 RX ADMIN — ACETAMINOPHEN 650 MG: 325 TABLET ORAL at 09:21

## 2024-09-09 RX ADMIN — LIDOCAINE HYDROCHLORIDE 60 MG: 20 INJECTION, SOLUTION INFILTRATION; PERINEURAL at 17:24

## 2024-09-09 RX ADMIN — PROPOFOL 30 MG: 10 INJECTION, EMULSION INTRAVENOUS at 17:35

## 2024-09-09 RX ADMIN — TRANEXAMIC ACID 1 G: 10 INJECTION, SOLUTION INTRAVENOUS at 17:30

## 2024-09-09 RX ADMIN — MIDAZOLAM 2 MG: 1 INJECTION INTRAMUSCULAR; INTRAVENOUS at 17:19

## 2024-09-09 RX ADMIN — TRANEXAMIC ACID 1 G: 10 INJECTION, SOLUTION INTRAVENOUS at 18:15

## 2024-09-09 RX ADMIN — PROPOFOL 30 MG: 10 INJECTION, EMULSION INTRAVENOUS at 17:26

## 2024-09-09 RX ADMIN — HYDROMORPHONE HYDROCHLORIDE 0.4 MG: 0.2 INJECTION, SOLUTION INTRAMUSCULAR; INTRAVENOUS; SUBCUTANEOUS at 20:46

## 2024-09-09 RX ADMIN — OXYCODONE HYDROCHLORIDE 10 MG: 5 TABLET ORAL at 22:07

## 2024-09-09 RX ADMIN — ACETAMINOPHEN 975 MG: 325 TABLET ORAL at 20:47

## 2024-09-09 RX ADMIN — BUPIVACAINE HYDROCHLORIDE IN DEXTROSE 1.7 ML: 7.5 INJECTION, SOLUTION SUBARACHNOID at 17:33

## 2024-09-09 RX ADMIN — PROPOFOL 150 MCG/KG/MIN: 10 INJECTION, EMULSION INTRAVENOUS at 17:30

## 2024-09-09 RX ADMIN — SENNOSIDES AND DOCUSATE SODIUM 1 TABLET: 50; 8.6 TABLET ORAL at 20:47

## 2024-09-09 RX ADMIN — PHENYLEPHRINE HYDROCHLORIDE 100 MCG: 10 INJECTION INTRAVENOUS at 18:17

## 2024-09-09 RX ADMIN — DEXAMETHASONE SODIUM PHOSPHATE 10 MG: 4 INJECTION, SOLUTION INTRA-ARTICULAR; INTRALESIONAL; INTRAMUSCULAR; INTRAVENOUS; SOFT TISSUE at 17:31

## 2024-09-09 RX ADMIN — Medication 36 ML: at 16:58

## 2024-09-09 RX ADMIN — PROPOFOL 30 MG: 10 INJECTION, EMULSION INTRAVENOUS at 17:25

## 2024-09-09 RX ADMIN — Medication 2 G: at 17:19

## 2024-09-09 RX ADMIN — PHENYLEPHRINE HYDROCHLORIDE 100 MCG: 10 INJECTION INTRAVENOUS at 18:23

## 2024-09-09 RX ADMIN — DEXTROAMPHETAMINE SACCHARATE, AMPHETAMINE ASPARTATE, DEXTROAMPHETAMINE SULFATE AND AMPHETAMINE SULFATE 20 MG: 5; 5; 5; 5 TABLET ORAL at 11:54

## 2024-09-09 RX ADMIN — PHENYLEPHRINE HYDROCHLORIDE 100 MCG: 10 INJECTION INTRAVENOUS at 18:06

## 2024-09-09 RX ADMIN — ACETAMINOPHEN 650 MG: 325 TABLET ORAL at 00:24

## 2024-09-09 RX ADMIN — FENTANYL CITRATE 50 MCG: 50 INJECTION INTRAMUSCULAR; INTRAVENOUS at 17:35

## 2024-09-09 RX ADMIN — DEXTROAMPHETAMINE SACCHARATE, AMPHETAMINE ASPARTATE, DEXTROAMPHETAMINE SULFATE AND AMPHETAMINE SULFATE 20 MG: 5; 5; 5; 5 TABLET ORAL at 16:00

## 2024-09-09 RX ADMIN — SODIUM CHLORIDE, POTASSIUM CHLORIDE, SODIUM LACTATE AND CALCIUM CHLORIDE: 600; 310; 30; 20 INJECTION, SOLUTION INTRAVENOUS at 20:47

## 2024-09-09 RX ADMIN — FENTANYL CITRATE 50 MCG: 50 INJECTION INTRAMUSCULAR; INTRAVENOUS at 17:24

## 2024-09-09 RX ADMIN — ONDANSETRON 4 MG: 2 INJECTION INTRAMUSCULAR; INTRAVENOUS at 17:55

## 2024-09-09 RX ADMIN — MIDAZOLAM 2 MG: 1 INJECTION INTRAMUSCULAR; INTRAVENOUS at 16:45

## 2024-09-09 RX ADMIN — PHENYLEPHRINE HYDROCHLORIDE 100 MCG: 10 INJECTION INTRAVENOUS at 17:45

## 2024-09-09 ASSESSMENT — ACTIVITIES OF DAILY LIVING (ADL)
ADLS_ACUITY_SCORE: 25
DIFFICULTY_COMMUNICATING: NO
ADLS_ACUITY_SCORE: 37
CHANGE_IN_FUNCTIONAL_STATUS_SINCE_ONSET_OF_CURRENT_ILLNESS/INJURY: YES
ADLS_ACUITY_SCORE: 37
ADLS_ACUITY_SCORE: 37
FALL_HISTORY_WITHIN_LAST_SIX_MONTHS: NO
ADLS_ACUITY_SCORE: 26
DOING_ERRANDS_INDEPENDENTLY_DIFFICULTY: NO
ADLS_ACUITY_SCORE: 37
CONCENTRATING,_REMEMBERING_OR_MAKING_DECISIONS_DIFFICULTY: NO
ADLS_ACUITY_SCORE: 26
ADLS_ACUITY_SCORE: 25
DIFFICULTY_EATING/SWALLOWING: NO
ADLS_ACUITY_SCORE: 26
ADLS_ACUITY_SCORE: 37
ADLS_ACUITY_SCORE: 37
HEARING_DIFFICULTY_OR_DEAF: NO
WALKING_OR_CLIMBING_STAIRS_DIFFICULTY: NO
ADLS_ACUITY_SCORE: 26
WEAR_GLASSES_OR_BLIND: YES
ADLS_ACUITY_SCORE: 40
ADLS_ACUITY_SCORE: 40
ADLS_ACUITY_SCORE: 25
ADLS_ACUITY_SCORE: 37
ADLS_ACUITY_SCORE: 37
ADLS_ACUITY_SCORE: 25
ADLS_ACUITY_SCORE: 37
TOILETING_ISSUES: NO
DRESSING/BATHING_DIFFICULTY: NO
ADLS_ACUITY_SCORE: 26
ADLS_ACUITY_SCORE: 26

## 2024-09-09 ASSESSMENT — LIFESTYLE VARIABLES: TOBACCO_USE: 1

## 2024-09-09 NOTE — ANESTHESIA PROCEDURE NOTES
"Intrathecal Procedure Note    Pre-Procedure   Staff -        Anesthesiologist:  Flaco Chu MD       Performed By: anesthesiologist       Location: OR       Pre-Anesthestic Checklist: patient identified, IV checked, risks and benefits discussed, informed consent, monitors and equipment checked and pre-op evaluation  Timeout:       Correct Patient: Yes        Correct Procedure: Yes        Correct Position: Yes   Procedure Documentation  Procedure: intrathecal       Patient Position: sitting       Patient Prep/Sterile Barriers: sterile gloves, mask, patient draped, 3 rounds of betadine.  Waited for it to completely dry prior to procedure       Skin prep: Betadine       Insertion Site: L3-4. (midline approach).       Needle Gauge: 24.        Needle Length (Inches): 3.5        Spinal Needle Type: Sloane-Arnold       Introducer used       # of attempts: 1 and  # of redirects:     Assessment/Narrative         Paresthesias: No.       CSF fluid: clear.    Medication(s) Administered   0.75% Hyperbaric Bupivacaine (Intrathecal) - Intrathecal   1.7 mL - 9/9/2024 5:33:00 PM   Comments:  1% lidocaine to skin  No complications      FOR [facility] ([facility]) ONLY:   Pain Team Contact information: please page the Pain Team Via [employer]. Search \"Pain\". During daytime hours, please page the attending first. At night please page the resident first.      "

## 2024-09-09 NOTE — ANESTHESIA PROCEDURE NOTES
Fascia iliaca Procedure Note    Pre-Procedure   Staff -        Anesthesiologist:  Flaco Chu MD       Performed By: Anesthesiologist       Location: pre-op       Pre-Anesthestic Checklist: patient identified, IV checked, risks and benefits discussed, informed consent, pre-op evaluation, at physician/surgeon's request and post-op pain management  Timeout:       Correct Patient: Yes        Correct Procedure: Yes        Correct Site: Yes        Correct Position: Yes        Correct Laterality: Yes        Site Marked: Yes  Procedure Documentation  Procedure: Fascia iliaca       Laterality: right       Patient Position: supine       Skin prep: Chloraprep       Local skin infiltrated with mL of 1% lidocaine.        Needle Type: short bevel       Needle Gauge: 20.        Needle Length (Inches): 3.13        Ultrasound guided       1. Ultrasound was used to identify targeted nerve, plexus, vascular marker, or fascial plane and place a needle adjacent to it in real-time.       2. Ultrasound was used to visualize the spread of anesthetic in close proximity to the above referenced structure.       3. A permanent image is entered into the patient's record.    Assessment/Narrative         The placement was negative for: blood aspirated, painful injection and site bleeding       Paresthesias: No.       Bolus given via needle..        Secured via.        Insertion/Infusion Method: Single Shot       Complications: none       Injection made incrementally with aspirations every 5 mL.    Medication(s) Administered   Ropivacaine 0.5% w/ 1:400K Epi (Injection) - Injection   36 mL - 9/9/2024 4:58:00 PM   Comments:  Ultrasound Interpretation, peripheral nerve block    1.  Under ultrasound guidance, needle was inserted and placed in close proximity to the nerve.  2. Ultrasound was also used to visualize the spread of the anesthetic in close proximity to the nerve being blocked.  3. The nerve appeared anatomically normal.  4. There  "were no apparent abnormal pathological findings.  5. A permanent ultrasound image was saved n the patient's record.    Space found and dilated with 7cc of NS.  Once found switched to the local and injected in 5cc increments and visualized good spread.      Flaco Chu MD   4:58 PM      FOR [facility] [place] ONLY:   Pain Team Contact information: please page the Pain Team Via [employer]. Search \"Pain\". During daytime hours, please page the attending first. At night please page the resident first.      "

## 2024-09-09 NOTE — PROGRESS NOTES
Admitting Diagnosis: Hip fracture, right, closed, initial encounter (H) [S72.001A]   Vitals stable, on RA.   Pain 6/10. Managed with prn tylenol.   A&Ox4  Voiding spontaneously without difficulty  Mobility Independent, non-weight bearing to R hip  Tele N/A  CMS Intact  Lung Sounds Clear  GI Active bowel sounds, no BM this shift  Dressing N/A     Orders Placed This Encounter      Regular Diet Adult      NPO for Medical/Clinical Reasons Except for: Meds        Plan: NPO at midnight for surgery this am.

## 2024-09-09 NOTE — ANESTHESIA PREPROCEDURE EVALUATION
Anesthesia Pre-Procedure Evaluation    Patient: Ángela Zurita   MRN: [id number] : 1985        Procedure : Procedure(s):  OPEN REDUCTION INTERNAL FIXATION, FRACTURE, FEMUR, PROXIMAL          Past Medical History:   Diagnosis Date    ADHD     Chickenpox     Cold sore     Depressive disorder 3/22/19    Postpartum    Exercise-induced asthma     adolescent    History of pre-eclampsia     Hypertension 3/21/2019    Severe Pre-eclampsia    IBS (irritable bowel syndrome)     Mediastinal cyst     Migraine     PONV (postoperative nausea and vomiting)     Severe dysplasia of cervix ([place] III)     s/p LEEP; see problem list    Tonsillitis 2014      Past Surgical History:   Procedure Laterality Date    ANUS SURGERY N/A 2018    Procedure: SPHINCTEROPLASTY;  Surgeon: Jorge Dennis MD;  Location: [facility];  Service:     BIOPSY  Various    Had a few Leeps and numerous Colposcopys    BIOPSY CERVICAL, LOCAL EXCISION, SINGLE/MULTIPLE       SECTION N/A 2019    Procedure:  SECTION;  Surgeon: Jesenia Jefferson MD;  Location: [facility] L+D    COLONOSCOPY      Normal    DAVINCI LOBECTOMY LUNG Right 2023    Procedure: ROBOTIC ASSISTED RIGHT THORASCOPIC RESECTION OF MEDIASTINAL MASS;  Surgeon: Pa Moore MD;  Location:  OR    DILATION AND CURETTAGE, HYSTEROSCOPY DIAGNOSTIC, COMBINED N/A 2018    Procedure: COMBINED DILATION AND CURETTAGE, HYSTEROSCOPY DIAGNOSTIC;  Diagnostic Hysteroscopy with Dilation and Curettage,Laparoscopy with Left Ovarian Cystectomy, Right Uteral Sacral Biopsy;  Surgeon: Sherin Frost MD;  Location: [facility] OR    EXC SWEAT GLAND LESN AXILL,SIMPL Right     LAPAROSCOPY DIAGNOSTIC (GYN) N/A 2018    Procedure: LAPAROSCOPY DIAGNOSTIC (GYN);;  Surgeon: Sherin Frost MD;  Location: [facility] OR    LEEP TX, CERVICAL  2006    MOUTH SURGERY      wisdom teeth    RECTOVAGINAL FISTULA CLOSURE  2015    SOFT TISSUE  SURGERY  Various    infected sweat-gland- cyst removed arm,armpit,face    SPHINCTEROPLASTY RECTUM  2018    with pernineal rebuild    SURGICAL HISTORY OF -       infected sweat gland under her arm    TONSILLECTOMY        Allergies   Allergen Reactions    Bee Pollen      In the past, has not had issues lately    Doxycycline Rash      Social History     Tobacco Use    Smoking status: Former     Current packs/day: 0.00     Average packs/day: 1 pack/day for 23.0 years (23.0 ttl pk-yrs)     Types: Cigarettes     Start date: 2000     Quit date: 2023     Years since quittin.6    Smokeless tobacco: Never    Tobacco comments:     Quit with each pregnancy   Substance Use Topics    Alcohol use: Not Currently     Comment: rarely      Wt Readings from Last 1 Encounters:   24 78 kg (172 lb)        Anesthesia Evaluation   Pt has had prior anesthetic.     History of anesthetic complications  - PONV.      ROS/MED HX  ENT/Pulmonary: Comment: History of mediastinal mass resection that was benign     Hx of cold sores    (+)                tobacco use, Past use,     asthma (adolescence)                  Neurologic:       Cardiovascular: Comment: Hx of preeclampsia      METS/Exercise Tolerance:     Hematologic:       Musculoskeletal: Comment: Stress fracture right femoral neck  (+)     fracture, Fracture location: RLE,         GI/Hepatic: Comment: IBS   (-) GERD (resolved)   Renal/Genitourinary:       Endo:     (+)               Obesity,       Psychiatric/Substance Use: Comment: ADHD    (+) psychiatric history depression       Infectious Disease:       Malignancy:       Other:            Physical Exam    Airway        Mallampati: II   TM distance: > 3 FB   Neck ROM: full   Mouth opening: > 3 cm    Respiratory Devices and Support         Dental       (+) Minor Abnormalities - some fillings, tiny chips      Cardiovascular   cardiovascular exam normal          Pulmonary   pulmonary exam normal                OUTSIDE  LABS:  CBC:   Lab Results   Component Value Date    WBC 8.5 09/08/2024    WBC 8.0 04/27/2023    HGB 11.5 (L) 09/09/2024    HGB 12.1 09/08/2024    HCT 35.7 09/08/2024    HCT 35.1 04/27/2023     09/08/2024     04/27/2023     BMP:   Lab Results   Component Value Date     09/08/2024     04/27/2023    POTASSIUM 4.0 09/08/2024    POTASSIUM 4.5 04/27/2023    CHLORIDE 105 09/08/2024    CHLORIDE 106 04/27/2023    CO2 24 09/08/2024    CO2 25 04/27/2023    BUN 18.8 09/08/2024    BUN 7.7 04/27/2023    CR 0.68 09/08/2024    CR 0.84 04/27/2023    GLC 96 09/08/2024    GLC 94 03/27/2024     COAGS:   Lab Results   Component Value Date    PTT 29 01/10/2020    INR 1.03 01/10/2020     POC:   Lab Results   Component Value Date    BGM 77 10/20/2018    HCG Negative 09/09/2024    HCGS Negative 09/22/2017     HEPATIC:   Lab Results   Component Value Date    ALBUMIN 3.8 09/08/2024    PROTTOTAL 6.1 (L) 09/08/2024    ALT 16 09/08/2024    AST 15 09/08/2024    ALKPHOS 70 09/08/2024    BILITOTAL 0.2 09/08/2024     OTHER:   Lab Results   Component Value Date    A1C 5.3 03/27/2024    ADRI 9.1 09/08/2024    MAG 6.0 (H) 03/23/2019    LIPASE 222 08/10/2015    AMYLASE 66 07/17/2014    TSH 0.78 04/23/2020    T4 0.84 05/29/2014    CRP <2.9 09/23/2017    SED 6 09/23/2017       Anesthesia Plan    ASA Status:  2    NPO Status:  NPO Appropriate    Anesthesia Type: Spinal.              Consents    Anesthesia Plan(s) and associated risks, benefits, and realistic alternatives discussed. Questions answered and patient/representative(s) expressed understanding.     - Discussed:     - Discussed with:  Patient            Postoperative Care    Pain management: IV analgesics, Multi-modal analgesia, Peripheral nerve block (Single Shot).   PONV prophylaxis: Dexamethasone or Solumedrol     Comments:    Other Comments: Decadron 10mg           Bert Decker MD    I have reviewed the pertinent notes and labs in the chart from the past 30 days  and (re)examined the patient.  Any updates or changes from those notes are reflected in this note.

## 2024-09-09 NOTE — PROGRESS NOTES
[facility]    Medicine Progress Note - Hospitalist Service    Date of Admission:  9/8/2024    Assessment & Plan     This is a 39-year-old female with medical history which includes ADD, history of cold sore, history of migraine headache who presented with multiple weeks of right hip pain and at O underwent further workup including MRI which was concerning for nondisplaced femoral neck fracture and was admitted to the hospital for further workup      Nondisplaced right femoral neck stress fracture  -Has been having pain for the past few weeks and denied any prolonged use of steroids, trauma, no prior DEXA scan but does have family history of osteoporosis  -X-ray as outpatient of the hip was normal  -MRI at outside facility on 9/8 showed near complete nondisplaced femoral neck fracture  -Orthopedic consulted  -Plan for operative intervention later today and her risk is very low   -Currently n.p.o.  -Pain controlled with as needed Tylenol and oxycodone  -Hold PTA Mobic  -Recommend outpatient DEXA scan  -PTH level normal  -Ionized calcium normal  -Vitamin D levels normal  -Beta-hCG negative    ADD  - Continue PTA Adderall 20 mg TID     History of cold sores  Uses valtrex when has outbreaks. Not needing currently  - hold PTA valtrex     History of migraine headaches  -Will order PTA sumatriptan as needed              Diet: NPO for Medical/Clinical Reasons Except for: Meds    DVT Prophylaxis: Pneumatic Compression Devices  [place] Catheter: Not present  Lines: None     Cardiac Monitoring: None  Code Status: Full Code      Clinically Significant Risk Factors Present on Admission                  # Hypertension: Noted on problem list                          Disposition Plan     Medically Ready for Discharge: Anticipated in 2-4 Days, depending on how her postoperative course is maybe earlier             Neel Anaya MD  Hospitalist Service  [facility]  Securely message  with Carol (more info)  Text page via [facility] Paging/Directory     This note was completed in part using dictation via the Dragon voice recognition software. Some word and grammatical errors may occur and must be interpreted in the appropriate clinical context. If there are any questions pertaining to this issue, please contact me for further clarification.    ______________________________________________________________________    Interval History     -Local pain seems to be well-controlled and is passing gas without any bowel movement  -In plan with operative intervention and I did educate her on little bit of the different treatment modalities but encouraged her to talk with orthopedics team  -At baseline patient has no shortness of breath or chest discomfort and     Physical Exam   Vital Signs: Temp: 99  F (37.2  C) Temp src: Oral BP: 121/71 Pulse: 58   Resp: 16 SpO2: 98 % O2 Device: None (Room air)    Weight: 0 lbs 0 oz          General: Patient appears comfortable and in no acute distress.  HEENT: Head is atraumatic, normocephalic.    Neck: Neck is supple  Respiratory: Lungs are clear to auscultation bilaterally with no wheeze or crackles   Cardiovascular: Regular rate , S1 and S2 normal with no murmer or rubs or gallops  Abdomen:   soft , non tender , non distended and bowel sound present   Skin: No skin rashes or lesions to inspection or palpation.  Neurologic: Higher functions are within normal limits. No obvious defects in speech, language and memory. No facial droop  Musculoskeletal: Normal Range of motion over upper and lower extremities bilaterally apart from the right hip which is restricted due to pain  Psychiatric: cooperative      Medical Decision Making       Time spent in care of patient is 45 minutes and I reviewed labs and x-ray and discussed plan of care in detail with the patient and the nursing staff with DAVID Townsend     I have personally reviewed the following data over the past 24  hrs:    8.5  \   12.1   / 334     139 105 18.8 /  96   4.0 24 0.68 \     ALT: 16 AST: 15 AP: 70 TBILI: 0.2   ALB: 3.8 TOT PROTEIN: 6.1 (L) LIPASE: N/A       Imaging results reviewed over the past 24 hrs:   Recent Results (from the past 24 hour(s))   XR Pelvis w Hip Right 1 View    Narrative    EXAM: XR PELVIS AND HIP RIGHT 1 VIEW  LOCATION: [facility]  DATE: 9/8/2024    INDICATION: R femoral neck stress fracture  COMPARISON: None.      Impression    IMPRESSION: Nondisplaced fracture of the medial right femoral neck adjacent to the greater trochanter. Finding is in a typical location for stress related injuries. Normal alignment of the hips. No significant joint space narrowing.

## 2024-09-09 NOTE — CONSULTS
[facility]    Orthopedic Consultation    Ángela Zurita MRN# [id number]   Age: 39 year old YOB: 1985     Date of Admission:  9/8/2024    Reason for consult: Right hip femoral neck stress fracture       Requesting provider: PAVAN Aragon       Level of consult: Consult, follow and place orders           Assessment and Plan:   Assessment:   Right hip femoral neck stress fracture      Plan:   Patient scheduled for a right hip ORIF using FNS later today.   Surgeon: Dr Goldstein  NPO Status effective now   NWB right LE   Calcium, PTH and Vit D: WNL  Hold anticoagulants if taken: none PTA  Pain medication as needed, minimize narcotics as able             Chief Complaint:   Right hip pain          History of Present Illness:   Ángela Zurita is a 39 year old female who presented to the ED after MRI of her hip showed an unstable stress of the femoral neck of the right hip.      Patient reports that for the past 4 weeks she has been having right hip pain that worsened over the last week, prompting her to go into [facility] who did an xray without significant findings. She then went to see a chiropractor which offered some minor palliation but as her pain persisted she went back to [facility] for an MRI 2 days ago on 9/6 and received the results yesterday showing a near complete stress fracture of the femoral neck.  Patient notes a family history of osteoporosis. She denies any recent trauma but has increased walking with light jog recently. States she also had a stress fracture in her left tibia recently.           Past Medical History:     Past Medical History:   Diagnosis Date    ADHD     Chickenpox     Cold sore     Depressive disorder 3/22/19    Postpartum    Exercise-induced asthma     adolescent    History of pre-eclampsia     Hypertension 3/21/2019    Severe Pre-eclampsia    IBS (irritable bowel syndrome)     Mediastinal cyst     Migraine     PONV (postoperative nausea and vomiting)     Severe  dysplasia of cervix ([place] III) 2005    s/p LEEP; see problem list    Tonsillitis 2014             Past Surgical History:     Past Surgical History:   Procedure Laterality Date    ANUS SURGERY N/A 2018    Procedure: SPHINCTEROPLASTY;  Surgeon: Jorge Dennis MD;  Location: [facility];  Service:     BIOPSY  Various    Had a few Leeps and numerous Colposcopys    BIOPSY CERVICAL, LOCAL EXCISION, SINGLE/MULTIPLE       SECTION N/A 2019    Procedure:  SECTION;  Surgeon: Jesenia Jefferson MD;  Location: [facility]    COLONOSCOPY  2009    Normal    DAVINCI LOBECTOMY LUNG Right 2023    Procedure: ROBOTIC ASSISTED RIGHT THORASCOPIC RESECTION OF MEDIASTINAL MASS;  Surgeon: Pa Moore MD;  Location:  OR    DILATION AND CURETTAGE, HYSTEROSCOPY DIAGNOSTIC, COMBINED N/A 2018    Procedure: COMBINED DILATION AND CURETTAGE, HYSTEROSCOPY DIAGNOSTIC;  Diagnostic Hysteroscopy with Dilation and Curettage,Laparoscopy with Left Ovarian Cystectomy, Right Uteral Sacral Biopsy;  Surgeon: Sherin Frost MD;  Location: [facility] OR    EXC SWEAT GLAND LESN AXILL,SIMPL Right     LAPAROSCOPY DIAGNOSTIC (GYN) N/A 2018    Procedure: LAPAROSCOPY DIAGNOSTIC (GYN);;  Surgeon: Sherin Frost MD;  Location: [facility] OR    LEEP TX, CERVICAL  2006    MOUTH SURGERY      wisdom teeth    RECTOVAGINAL FISTULA CLOSURE  2015    SOFT TISSUE SURGERY  Various    infected sweat-gland- cyst removed arm,armpit,face    SPHINCTEROPLASTY RECTUM  2018    with pernineal rebuild    SURGICAL HISTORY OF -       infected sweat gland under her arm    TONSILLECTOMY               Social History:     Social History     Tobacco Use    Smoking status: Former     Current packs/day: 0.00     Average packs/day: 1 pack/day for 23.0 years (23.0 ttl pk-yrs)     Types: Cigarettes     Start date: 2000     Quit date: 2023     Years since quittin.6    Smokeless tobacco: Never     Tobacco comments:     Quit with each pregnancy   Substance Use Topics    Alcohol use: Not Currently     Comment: rarely             Family History:     Family History   Problem Relation Age of Onset    Breast Cancer Mother         Stage 0, small duct carcinoma    Osteoporosis Mother     Hypertension Father     Hyperlipidemia Father     Liver Disease Father     Hepatitis Father     Blood Disease Brother         twin brother-blood clots    Deep Vein Thrombosis (DVT) Brother     Respiratory Maternal Grandmother     Lung Cancer Maternal Grandmother     Diabetes Maternal Grandmother         type II    Hypertension Maternal Grandmother     Hyperlipidemia Maternal Grandmother     Depression Maternal Grandmother     Osteoporosis Maternal Grandmother     Blood Disease Maternal Grandfather         blood clots    Heart Disease Maternal Grandfather         valve replacement    Hyperlipidemia Maternal Grandfather     Cerebrovascular Disease Maternal Grandfather     Prostate Cancer Maternal Grandfather     Other Cancer Maternal Grandfather         Gum/Jaw/Lymphnodes    Colon Cancer Maternal Grandfather     Mental Illness Maternal Grandfather         Alzheimer's    Cancer Paternal Grandmother         non -hodgkins lymphoma    Lung Cancer Paternal Grandmother     Hypertension Paternal Grandmother     Hyperlipidemia Paternal Grandmother     Cerebrovascular Disease Paternal Grandmother     Diabetes Paternal Grandmother     Hyperlipidemia Paternal Grandfather     Prostate Cancer Paternal Grandfather     Hypertension Paternal Grandfather     Colon Cancer Paternal Grandfather     Coronary Artery Disease Paternal Grandfather     Breast Cancer Cousin         Breast Cancer    Other Cancer Cousin         Metistatic    Breast Cancer Other         2 aunt and 3 of my great aunts    Osteoporosis Other     Anesthesia Reaction No family hx of              Immunizations:     VACCINE/DOSE   Diptheria   DPT   DTAP   HBIG   Hepatitis A   Hepatitis B    HIB   Influenza   Measles   Meningococcal   MMR   Mumps   Pneumococcal   Polio   Rubella   Small Pox   TDAP   Varicella   Zoster             Allergies:     Allergies   Allergen Reactions    Bee Pollen      In the past, has not had issues lately    Doxycycline Rash             Medications:     Current Facility-Administered Medications   Medication Dose Route Frequency Provider Last Rate Last Admin    acetaminophen (TYLENOL) tablet 650 mg  650 mg Oral Q4H PRN Bert Aragon DO   650 mg at 09/09/24 0921    Or    acetaminophen (TYLENOL) Suppository 650 mg  650 mg Rectal Q4H PRN Bert Aragon DO        amphetamine-dextroamphetamine (ADDERALL) per tablet 20 mg  20 mg Oral TID Bert Aragon DO   20 mg at 09/09/24 1154    calcium carbonate (TUMS) chewable tablet 1,000 mg  1,000 mg Oral 4x Daily PRN Bert Aragon DO        lidocaine (LMX4) cream   Topical Q1H PRN Bert Aragon DO        lidocaine 1 % 0.1-1 mL  0.1-1 mL Other Q1H PRN Bert Aragon DO        naloxone (NARCAN) injection 0.2 mg  0.2 mg Intravenous Q2 Min PRN Bert Aragon DO        Or    naloxone (NARCAN) injection 0.4 mg  0.4 mg Intravenous Q2 Min PRN Bert Aragon DO        Or    naloxone (NARCAN) injection 0.2 mg  0.2 mg Intramuscular Q2 Min PRN Bert Aragon DO        Or    naloxone (NARCAN) injection 0.4 mg  0.4 mg Intramuscular Q2 Min PRN Bert Aragon DO        oxyCODONE (ROXICODONE) tablet 5 mg  5 mg Oral Q4H PRN Bert Aragon DO        oxyCODONE IR (ROXICODONE) half-tab 2.5 mg  2.5 mg Oral Q4H PRN Bert Aragon DO        senna-docusate (SENOKOT-S/PERICOLACE) 8.6-50 MG per tablet 1 tablet  1 tablet Oral BID PRN Bert Aragon DO        Or    senna-docusate (SENOKOT-S/PERICOLACE) 8.6-50 MG per tablet 2 tablet  2 tablet Oral BID PRN Bert Aragon DO        sodium chloride (PF) 0.9% PF flush 3 mL  3 mL Intracatheter Q8H Bert Aragon DO   3 mL at 09/09/24 1137    sodium chloride (PF)  0.9% PF flush 3 mL  3 mL Intracatheter q1 min prn Bert Aragon,         SUMAtriptan (IMITREX) tablet 50 mg  50 mg Oral at onset of headache Neel Anaya MD                 Review of Systems:   ROS:  10 point ROS neg other than the symptoms noted above in the HPI.            Physical Exam:   All vitals have been reviewed  Patient Vitals for the past 24 hrs:   BP Temp Temp src Pulse Resp SpO2   09/09/24 0821 121/71 99  F (37.2  C) Oral 58 16 98 %   09/09/24 0054 109/68 99.1  F (37.3  C) Oral 74 17 96 %   09/08/24 2343 104/67 98.9  F (37.2  C) Oral 83 17 96 %   09/08/24 1920 127/87 -- -- 82 -- --   09/08/24 1630 119/81 98.9  F (37.2  C) Oral 74 -- 100 %     No intake or output data in the 24 hours ending 09/09/24 1317      Physical Exam   Temp: 99  F (37.2  C) Temp src: Oral BP: 121/71 Pulse: 58   Resp: 16 SpO2: 98 % O2 Device: None (Room air)    Vital Signs with Ranges  Temp:  [98.9  F (37.2  C)-99.1  F (37.3  C)] 99  F (37.2  C)  Pulse:  [58-83] 58  Resp:  [16-17] 16  BP: (104-127)/(67-87) 121/71  SpO2:  [96 %-100 %] 98 %  0 lbs 0 oz    Constitutional: Pleasant, alert, appropriate, following commands.  HEENT: Head atraumatic normocephalic. Pupils equal round and reactive to light.  Respiratory: Unlabored breathing no audible wheeze  Cardiovascular: Regular rate and rhythm per pulses  GI: Abdomen non-distended.  Lymph/Hematologic: No lymphadenopathy in areas examined  Genitourinary:  No jeffers  Skin: No rashes, no cyanosis, no edema.  Musculoskeletal: On physical exam of the right lower extremity, patient's leg is resting in a neutral  position.  Skin is intact, no skin abrasions, erythema or ecchymosis seen.  Patient is able to dorsi and plantarflex both ankles with equal resistance.  Full sensation to light touch on the left versus right lower extremities.  Increased groin pain with passive internal rotation.  Unable to actively flex the right hip due to pain. Distal pulses are intact and equal bilaterally.     Neurologic: normal without focal findings, mental status, speech normal, alert and oriented x iii  Neuropsychiatric: Stable             Data:   All laboratory data reviewed  Results for orders placed or performed during the hospital encounter of 09/08/24   XR Pelvis w Hip Right 1 View     Status: None    Narrative    EXAM: XR PELVIS AND HIP RIGHT 1 VIEW  LOCATION: [facility]  DATE: 9/8/2024    INDICATION: R femoral neck stress fracture  COMPARISON: None.      Impression    IMPRESSION: Nondisplaced fracture of the medial right femoral neck adjacent to the greater trochanter. Finding is in a typical location for stress related injuries. Normal alignment of the hips. No significant joint space narrowing.   Comprehensive metabolic panel     Status: Abnormal   Result Value Ref Range    Sodium 139 135 - 145 mmol/L    Potassium 4.0 3.4 - 5.3 mmol/L    Carbon Dioxide (CO2) 24 22 - 29 mmol/L    Anion Gap 10 7 - 15 mmol/L    Urea Nitrogen 18.8 6.0 - 20.0 mg/dL    Creatinine 0.68 0.51 - 0.95 mg/dL    GFR Estimate >90 >60 mL/min/1.73m2    Calcium 9.1 8.8 - 10.4 mg/dL    Chloride 105 98 - 107 mmol/L    Glucose 96 70 - 99 mg/dL    Alkaline Phosphatase 70 40 - 150 U/L    AST 15 0 - 45 U/L    ALT 16 0 - 50 U/L    Protein Total 6.1 (L) 6.4 - 8.3 g/dL    Albumin 3.8 3.5 - 5.2 g/dL    Bilirubin Total 0.2 <=1.2 mg/dL   Peck Draw     Status: None    Narrative    The following orders were created for panel order Peck Draw.  Procedure                               Abnormality         Status                     ---------                               -----------         ------                     Extra Blue Top Tube[[id number]]                              Final result               Extra Red Top Tube[[id number]]                               Final result                 Please view results for these tests on the individual orders.   Extra Blue Top Tube     Status: None   Result Value Ref Range    Hold  Specimen JIC    Extra Red Top Tube     Status: None   Result Value Ref Range    Hold Specimen JIC    CBC with platelets and differential     Status: Abnormal   Result Value Ref Range    WBC Count 8.5 4.0 - 11.0 10e3/uL    RBC Count 3.74 (L) 3.80 - 5.20 10e6/uL    Hemoglobin 12.1 11.7 - 15.7 g/dL    Hematocrit 35.7 35.0 - 47.0 %    MCV 96 78 - 100 fL    MCH 32.4 26.5 - 33.0 pg    MCHC 33.9 31.5 - 36.5 g/dL    RDW 12.3 10.0 - 15.0 %    Platelet Count 334 150 - 450 10e3/uL    % Neutrophils 67 %    % Lymphocytes 24 %    % Monocytes 6 %    % Eosinophils 2 %    % Basophils 0 %    % Immature Granulocytes 0 %    NRBCs per 100 WBC 0 <1 /100    Absolute Neutrophils 5.7 1.6 - 8.3 10e3/uL    Absolute Lymphocytes 2.0 0.8 - 5.3 10e3/uL    Absolute Monocytes 0.5 0.0 - 1.3 10e3/uL    Absolute Eosinophils 0.2 0.0 - 0.7 10e3/uL    Absolute Basophils 0.0 0.0 - 0.2 10e3/uL    Absolute Immature Granulocytes 0.0 <=0.4 10e3/uL    Absolute NRBCs 0.0 10e3/uL   Ionized Calcium     Status: Normal   Result Value Ref Range    Calcium Ionized Whole Blood 4.8 4.4 - 5.2 mg/dL   Vitamin D Deficiency     Status: Normal   Result Value Ref Range    Vitamin D, Total (25-Hydroxy) 33 20 - 50 ng/mL    Narrative    Season, race, dietary intake, and treatment affect the concentration of 25-hydroxy-Vitamin D. Values may decrease during winter months and increase during summer months.    Vitamin D determination is routinely performed by an immunoassay specific for 25 hydroxyvitamin D3.  If an individual is on vitamin D2(ergocalciferol) supplementation, please specify 25 OH vitamin D2 and D3 level determination by LCMSMS test VITD23.     Parathyroid Hormone Intact     Status: Normal   Result Value Ref Range    Parathyroid Hormone Intact 36 15 - 65 pg/mL    Narrative    This result was obtained with the Roche Elecsys PTH STAT assay.   This reference range differs from PTH assays used in other [facility] laboratories.   HCG qualitative urine - Pre-Op      Status: Normal   Result Value Ref Range    hCG Urine Qualitative Negative Negative   Hemoglobin     Status: Abnormal   Result Value Ref Range    Hemoglobin 11.5 (L) 11.7 - 15.7 g/dL   Adult Type and Screen     Status: None   Result Value Ref Range    ABO/RH(D) A POS     Antibody Screen Negative Negative    SPECIMEN EXPIRATION DATE [id number]    CBC with platelets + differential     Status: Abnormal    Narrative    The following orders were created for panel order CBC with platelets + differential.  Procedure                               Abnormality         Status                     ---------                               -----------         ------                     CBC with platelets and d...[[id number]]  Abnormal            Final result                 Please view results for these tests on the individual orders.   ABO/Rh type and screen     Status: None    Narrative    The following orders were created for panel order ABO/Rh type and screen.  Procedure                               Abnormality         Status                     ---------                               -----------         ------                     Adult Type and Screen[[id number]]                            Final result                 Please view results for these tests on the individual orders.          Attestation:  I have reviewed today's vital signs, notes, medications, labs and imaging with Dr. Goldstein.  Amount of time performed on this consult: 50 minutes.    Rosy Garcia PA-C

## 2024-09-09 NOTE — OP NOTE
[facility]  Orthopedic Operative Note    Open Reduction Internal Fixation Femoral Neck Fracture    Ángela Zurita MRN# [id number]   YOB: 1985  Procedure Date:  9/9/2024  Age: 39 year old     PREOPERATIVE DIAGNOSIS:  Right tension sided femoral neck stress fracture    POSTOPERATIVE DIAGNOSIS:  Same    PROCEDURE PERFORMED:  Open reduction internal fixation of right femoral neck tension sided stress fracture with Synthes femoral neck system    SURGEON:  Figueroa Goldstein MD    FIRST ASSISTANT:  Robby Ruiz PA-C, whose assistance was required for  positioning, prep and drape, exposure, instrumentation, and wound closure.      ANESTHESIA:  General    EBL: 50 mL    COMPLICATIONS: None    DISPOSITION: Post Anesthesia Care Unit     CONDITION: Stable     INDICATIONS:  Ángela Zurita is a 39 year old-year-old female with a right femoral neck stress fracture after fall from standing height.  Discussed both operative and nonoperative management.  Risks of surgery discussed included but not limited to bleeding, infection, damage to surrounding neurovascular structures, nonunion, malunion, avascular necrosis, leg length inequality, need for revision surgery including partial versus total hip arthroplasty, blood clots, pulmonary embolus, and the medical risks of anesthesia including MI, stroke, death.  We discussed the benefits of surgery including improved chance of fracture union and improve function as well as reduction in the medical risks of hip fracture.  We discussed the alternatives including nonoperative management, which I did not recommend.  The patient elected to proceed.  The informed consent was signed and documented.  I met with the patient preoperatively to kit the operative extremity.    IMPLANTS:  Implant Name Type Inv. Item Serial No.  Lot No. LRB No. Used Action   PLATE BN 130D TI NIOBIUM AL 2 HL AU FEM NK S 04.268.000S - [id number] Metallic  Hardware/[place] PLATE BN 130D TI NIOBIUM AL 2 HL AU FEM NK SYS 04.268.000S  [facility]- [id number] Right 1 Implanted   SCREW BN 85MM ANTIROTATE STRL FEM NK SYS - [id number] Metallic Hardware/[place] SCREW BN 85MM ANTIROTATE STRL FEM NK SYS  [facility]- [id number] Right 1 Implanted   BOLT ORTH 85MM TI JUNI FEM NK SYS STRL - [id number] Metallic Hardware/[place] BOLT ORTH 85MM TI JUNI FEM NK SYS STRL  [facility]- [id number] Right 1 Implanted   SCREW BN 36MM 5MM TI ST LCK STRDR T25 STRL - [id number] Metallic Hardware/[place] SCREW BN 36MM 5MM TI ST LCK STRDR T25 STRL  [facility]- [id number] Right 1 Implanted   SCREW BN 38MM 5MM TI ST LCK STRDR T25 STRL - [id number] Metallic Hardware/[place] SCREW BN 38MM 5MM TI ST LCK STRDR T25 STRL  [facility]- [id number] Right 1 Implanted       PROCEDURE: she was brought in the operating room and placed supine on the fracture table.  After induction of general anesthesia her feet were placed in boots and the perineal post was placed.  All bony prominences were well-padded.  Preoperative fluoroscopic imaging demonstrated maintained alignment of the fracture.  The hip was prepped and draped in normal sterile fashion.  A procedural pause was conducted to verify correct patient, correct extremity, presence of the surgeon's initials on the operative extremity, and administration of antibiotics, in this case Ancef.  Following generalized agreement we used fluoroscopic guidance to localize a small skin incision on the lateral aspect of the femur at the hip.  We incised the fascia in line with the incision.  The vastus was elevated as needed.  The guide for the FNS was applied to the lateral femur.  The guidepin for the FNS was advanced into a central position within the femoral head slightly inferior to center.  We confirm guidewire placement on AP and lateral fluoroscopy.  We are satisfied we measured for and selected our bolt.  We drilled over the pin and impacted  the bolt and sideplate into position.  I confirmed bolt and sideplate placement on AP and lateral fluoroscopy and was quite satisfied.  He then drilled for and placed locking screws for locking sideplate tightening these with the hand torque limiting screwdriver.  We then drilled for and placed the femoral head locking screw through the bolt locking this into place with the torque limiting hand .  All instruments were removed.  Final fluoroscopic imaging demonstrated maintained alignment of the femoral neck and appropriately position of all hardware.  The wound was copiously irrigated with sterile saline and closed in layers with #1 Vicryl in the fascia, 2-0 Vicryl in the subcutaneous tissues and staples for skin.  The wound was anesthetized with local anesthetic with epinephrine.  A xeroform, gauze, and Tegaderm dressing was applied patient awakened from general anesthesia and taken to recovery room in stable condition.  There were no complications and the patient tolerated well.    PLAN:  Activity:  TTWB right lower extremity x6 weeks post-op, range of motion as tolerated, PT/OT consults  24 hours IV antibiotics per standard postop protocol  DVT prophylaxis -  SCDs and Lovenox as inpatient.  OK to discharge home on ASA 81 mg PO BID x6 weeks  Case management and social work for discharge planning  [facility]  Begin dressing changes on POD #2, with daily dry gauze dressing changes after the initial change  Follow up with Dr. Goldstein at [facility] in 2 weeks for wound check and repeat AP/lateral hip radiographs  Endocrine follow up as outpatient for multiple recent stress fractures    Figueroa Goldstein MD  [facility]

## 2024-09-09 NOTE — ANESTHESIA CARE TRANSFER NOTE
Patient: Ángela Zurita    Procedure: Procedure(s):  OPEN REDUCTION INTERNAL FIXATION, FRACTURE, FEMUR, PROXIMAL       Diagnosis: Hip fracture, right, closed, initial encounter (H) [S72.001A]  Diagnosis Additional Information: No value filed.    Anesthesia Type:   Spinal     Note:    Oropharynx: oropharynx clear of all foreign objects and spontaneously breathing  Level of Consciousness: awake  Oxygen Supplementation: face mask  Level of Supplemental Oxygen (L/min / FiO2): 6  Independent Airway: airway patency satisfactory and stable  Dentition: dentition unchanged  Vital Signs Stable: post-procedure vital signs reviewed and stable  Report to RN Given: handoff report given  Patient transferred to: PACU    Handoff Report: Identifed the Patient, Identified the Reponsible Provider, Reviewed the pertinent medical history, Discussed the surgical course, Reviewed Intra-OP anesthesia mangement and issues during anesthesia, Set expectations for post-procedure period and Allowed opportunity for questions and acknowledgement of understanding      Vitals:  Vitals Value Taken Time   /63    Temp     Pulse 87    Resp 16    SpO2 96        Electronically Signed By: KATHLEEN Cartagena CRNA  September 9, 2024  6:37 PM

## 2024-09-09 NOTE — PROGRESS NOTES
Admitting Diagnosis: Hip fracture, right, closed, initial encounter (H) [S72.001A]   Vitals VSS  Pain 0/10. Taking N/A PRN. Last dose n/a  A&Ox4  Voiding Yes  Mobility Yes, non-weight bearing to R hip  Tele N/A  CMS Intact  Lung Sounds Clear on RA via N/A  GI Active  Dressing N/A    Orders Placed This Encounter      Regular Diet Adult      NPO for Medical/Clinical Reasons Except for: Meds       Plan: NPO tat midnight for surgery tomorrow.

## 2024-09-10 ENCOUNTER — APPOINTMENT (OUTPATIENT)
Dept: PHYSICAL THERAPY | Facility: CLINIC | Age: 39
End: 2024-09-10
Attending: STUDENT IN AN ORGANIZED HEALTH CARE EDUCATION/TRAINING PROGRAM
Payer: COMMERCIAL

## 2024-09-10 VITALS
DIASTOLIC BLOOD PRESSURE: 67 MMHG | TEMPERATURE: 98.7 F | HEART RATE: 62 BPM | OXYGEN SATURATION: 96 % | BODY MASS INDEX: 25.82 KG/M2 | HEIGHT: 65 IN | WEIGHT: 154.98 LBS | RESPIRATION RATE: 18 BRPM | SYSTOLIC BLOOD PRESSURE: 118 MMHG

## 2024-09-10 LAB
ANION GAP SERPL CALCULATED.3IONS-SCNC: 13 MMOL/L (ref 7–15)
BUN SERPL-MCNC: 15.1 MG/DL (ref 6–20)
CALCIUM SERPL-MCNC: 9.3 MG/DL (ref 8.8–10.4)
CHLORIDE SERPL-SCNC: 104 MMOL/L (ref 98–107)
CREAT SERPL-MCNC: 0.6 MG/DL (ref 0.51–0.95)
CREAT SERPL-MCNC: 0.75 MG/DL (ref 0.51–0.95)
EGFRCR SERPLBLD CKD-EPI 2021: >90 ML/MIN/1.73M2
EGFRCR SERPLBLD CKD-EPI 2021: >90 ML/MIN/1.73M2
ERYTHROCYTE [DISTWIDTH] IN BLOOD BY AUTOMATED COUNT: 11.9 % (ref 10–15)
GLUCOSE BLDC GLUCOMTR-MCNC: 131 MG/DL (ref 70–99)
GLUCOSE SERPL-MCNC: 232 MG/DL (ref 70–99)
HCO3 SERPL-SCNC: 21 MMOL/L (ref 22–29)
HCT VFR BLD AUTO: 35.8 % (ref 35–47)
HGB BLD-MCNC: 12.1 G/DL (ref 11.7–15.7)
MCH RBC QN AUTO: 31.6 PG (ref 26.5–33)
MCHC RBC AUTO-ENTMCNC: 33.8 G/DL (ref 31.5–36.5)
MCV RBC AUTO: 94 FL (ref 78–100)
PLATELET # BLD AUTO: 361 10E3/UL (ref 150–450)
POTASSIUM SERPL-SCNC: 4 MMOL/L (ref 3.4–5.3)
RBC # BLD AUTO: 3.83 10E6/UL (ref 3.8–5.2)
SODIUM SERPL-SCNC: 138 MMOL/L (ref 135–145)
WBC # BLD AUTO: 12.1 10E3/UL (ref 4–11)

## 2024-09-10 PROCEDURE — 250N000013 HC RX MED GY IP 250 OP 250 PS 637

## 2024-09-10 PROCEDURE — 97161 PT EVAL LOW COMPLEX 20 MIN: CPT | Mod: GP | Performed by: PHYSICAL THERAPIST

## 2024-09-10 PROCEDURE — 82374 ASSAY BLOOD CARBON DIOXIDE: CPT | Performed by: HOSPITALIST

## 2024-09-10 PROCEDURE — 82565 ASSAY OF CREATININE: CPT | Performed by: HOSPITALIST

## 2024-09-10 PROCEDURE — 999N000111 HC STATISTIC OT IP EVAL DEFER

## 2024-09-10 PROCEDURE — 99239 HOSP IP/OBS DSCHRG MGMT >30: CPT | Performed by: HOSPITALIST

## 2024-09-10 PROCEDURE — 250N000013 HC RX MED GY IP 250 OP 250 PS 637: Performed by: STUDENT IN AN ORGANIZED HEALTH CARE EDUCATION/TRAINING PROGRAM

## 2024-09-10 PROCEDURE — 85027 COMPLETE CBC AUTOMATED: CPT | Performed by: HOSPITALIST

## 2024-09-10 PROCEDURE — 97116 GAIT TRAINING THERAPY: CPT | Mod: GP | Performed by: PHYSICAL THERAPIST

## 2024-09-10 PROCEDURE — 36415 COLL VENOUS BLD VENIPUNCTURE: CPT | Performed by: HOSPITALIST

## 2024-09-10 PROCEDURE — 99232 SBSQ HOSP IP/OBS MODERATE 35: CPT | Performed by: HOSPITALIST

## 2024-09-10 PROCEDURE — 97530 THERAPEUTIC ACTIVITIES: CPT | Mod: GP | Performed by: PHYSICAL THERAPIST

## 2024-09-10 PROCEDURE — 250N000011 HC RX IP 250 OP 636

## 2024-09-10 RX ORDER — ACETAMINOPHEN 325 MG/1
975 TABLET ORAL EVERY 8 HOURS PRN
Qty: 60 TABLET | Refills: 0 | Status: SHIPPED | OUTPATIENT
Start: 2024-09-10

## 2024-09-10 RX ORDER — METHOCARBAMOL 500 MG/1
500 TABLET, FILM COATED ORAL 4 TIMES DAILY PRN
Qty: 25 TABLET | Refills: 0 | Status: SHIPPED | OUTPATIENT
Start: 2024-09-10

## 2024-09-10 RX ORDER — METHOCARBAMOL 500 MG/1
500 TABLET, FILM COATED ORAL 4 TIMES DAILY PRN
Status: DISCONTINUED | OUTPATIENT
Start: 2024-09-10 | End: 2024-09-10 | Stop reason: HOSPADM

## 2024-09-10 RX ORDER — POLYETHYLENE GLYCOL 3350 17 G/17G
17 POWDER, FOR SOLUTION ORAL DAILY
Qty: 510 G | Refills: 0 | Status: SHIPPED | OUTPATIENT
Start: 2024-09-10

## 2024-09-10 RX ORDER — ASPIRIN 81 MG/1
81 TABLET ORAL DAILY
Qty: 42 TABLET | Refills: 0 | Status: SHIPPED | OUTPATIENT
Start: 2024-09-10 | End: 2024-10-22

## 2024-09-10 RX ORDER — AMOXICILLIN 250 MG
1 CAPSULE ORAL 2 TIMES DAILY PRN
Qty: 14 TABLET | Refills: 0 | Status: SHIPPED | OUTPATIENT
Start: 2024-09-10

## 2024-09-10 RX ORDER — OXYCODONE HYDROCHLORIDE 5 MG/1
5-10 TABLET ORAL EVERY 4 HOURS PRN
Qty: 20 TABLET | Refills: 0 | Status: SHIPPED | OUTPATIENT
Start: 2024-09-10

## 2024-09-10 RX ADMIN — ACETAMINOPHEN 975 MG: 325 TABLET ORAL at 12:29

## 2024-09-10 RX ADMIN — POLYETHYLENE GLYCOL 3350 17 G: 17 POWDER, FOR SOLUTION ORAL at 08:38

## 2024-09-10 RX ADMIN — OXYCODONE HYDROCHLORIDE 10 MG: 5 TABLET ORAL at 02:58

## 2024-09-10 RX ADMIN — HYDROMORPHONE HYDROCHLORIDE 0.2 MG: 0.2 INJECTION, SOLUTION INTRAMUSCULAR; INTRAVENOUS; SUBCUTANEOUS at 01:30

## 2024-09-10 RX ADMIN — CEFAZOLIN 1 G: 1 INJECTION, POWDER, FOR SOLUTION INTRAMUSCULAR; INTRAVENOUS at 09:00

## 2024-09-10 RX ADMIN — SENNOSIDES AND DOCUSATE SODIUM 1 TABLET: 50; 8.6 TABLET ORAL at 08:38

## 2024-09-10 RX ADMIN — ACETAMINOPHEN 650 MG: 325 TABLET ORAL at 08:38

## 2024-09-10 RX ADMIN — ACETAMINOPHEN 975 MG: 325 TABLET ORAL at 03:44

## 2024-09-10 RX ADMIN — DEXTROAMPHETAMINE SACCHARATE, AMPHETAMINE ASPARTATE, DEXTROAMPHETAMINE SULFATE AND AMPHETAMINE SULFATE 20 MG: 5; 5; 5; 5 TABLET ORAL at 06:50

## 2024-09-10 RX ADMIN — DEXTROAMPHETAMINE SACCHARATE, AMPHETAMINE ASPARTATE, DEXTROAMPHETAMINE SULFATE AND AMPHETAMINE SULFATE 20 MG: 5; 5; 5; 5 TABLET ORAL at 11:26

## 2024-09-10 RX ADMIN — CEFAZOLIN 1 G: 1 INJECTION, POWDER, FOR SOLUTION INTRAMUSCULAR; INTRAVENOUS at 00:47

## 2024-09-10 ASSESSMENT — ACTIVITIES OF DAILY LIVING (ADL)
ADLS_ACUITY_SCORE: 32
ADLS_ACUITY_SCORE: 31
ADLS_ACUITY_SCORE: 31
ADLS_ACUITY_SCORE: 27
ADLS_ACUITY_SCORE: 31
ADLS_ACUITY_SCORE: 31
ADLS_ACUITY_SCORE: 32
ADLS_ACUITY_SCORE: 31
ADLS_ACUITY_SCORE: 31
ADLS_ACUITY_SCORE: 32
ADLS_ACUITY_SCORE: 31
ADLS_ACUITY_SCORE: 31
ADLS_ACUITY_SCORE: 32

## 2024-09-10 NOTE — ANESTHESIA POSTPROCEDURE EVALUATION
Patient: Ángela Zurita    Procedure: Procedure(s):  OPEN REDUCTION INTERNAL FIXATION, FRACTURE, FEMUR, PROXIMAL       Anesthesia Type:  Spinal    Note:     Postop Pain Control: Uneventful            Sign Out: Well controlled pain   PONV: No   Neuro/Psych: Uneventful            Sign Out: Acceptable/Baseline neuro status   Airway/Respiratory: Uneventful            Sign Out: Acceptable/Baseline resp. status   CV/Hemodynamics: Uneventful            Sign Out: Acceptable CV status; No obvious hypovolemia; No obvious fluid overload   Other NRE: NONE   DID A NON-ROUTINE EVENT OCCUR? No           Last vitals:  Vitals Value Taken Time   /72 09/09/24 1900   Temp 36.4  C (97.6  F) 09/09/24 1834   Pulse 55 09/09/24 1911   Resp 17 09/09/24 1911   SpO2 99 % 09/09/24 1911   Vitals shown include unfiled device data.    Electronically Signed By: Flaco hCu MD  September 9, 2024  7:12 PM

## 2024-09-10 NOTE — PROGRESS NOTES
Orthopedic Surgery  Ángela Zurita  09/10/2024     Admit Date:  9/8/2024    POD: 1 Day Post-Op   Procedure(s):  OPEN REDUCTION INTERNAL FIXATION, FRACTURE, FEMUR, PROXIMAL, RIGHT    Patient resting comfortably in bed.  Pain controlled and significantly improved to the right hip. She notes pain only with movement.  Patient has been ambulatory with a walker and is managing the TTWB status well per her report.  Denies spasms to the RLE, but the right knee and thigh feel stiff and tight.  Denies numbness and tingling to the RLE.  Tolerating oral intake.    Denies nausea or vomiting.  Denies chest pain or shortness of breath.  No acute events overnight.  She is hopeful for discharge today.    Temp:  [97.6  F (36.4  C)-98.9  F (37.2  C)] 98.7  F (37.1  C)  Pulse:  [50-85] 62  Resp:  [11-20] 18  BP: (100-129)/(62-85) 118/67  SpO2:  [91 %-100 %] 96 %    Alert and oriented. NAD. Non-toxic appearing. Breathing comfortably ORA.  Right hip Aquacel dressing is clean, dry, and intact.   No erythema of the surrounding skin.   Minimal surrounding ecchymosis.  Mild right proximal thigh and hip swelling.  Thigh compartments remain soft and compressible throughout.  Bilateral calves are soft, non-tender.  Right lower extremity is NVI.  Patient able to resist ankle dorsiflexion and plantar flexion bilaterally.  Able to flex and extend toes.  DP pulse palpable.  Sensation intact bilateral lower extremities.    Labs/Imaging:  Recent Labs   Lab Test 09/10/24  0858 09/09/24  1009 09/08/24  1238 04/27/23  0505   WBC 12.1*  --  8.5 8.0   HGB 12.1 11.5* 12.1 12.3     --  334 340     Recent Labs   Lab Test 01/10/20  1417   INR 1.03     A/P    1. S/p right femoral neck stress fracture ORIF using FNS (DOS: 9/9/24)  -Reviewed pain level expectations, anticipated postoperative course, and intraoperative imaging. All questions answered. Work note provided today.  -Continue Lovenox while inpatient for DVT prophylaxis. Plan to discharge on  ASA 81 mg BID x 6 weeks.  -Mobilize with PT/OT.  -TTWB RLE with walker/crutches. ROMAT RLE.    -Continue current pain regimen. PRN methocarbamol added for reports of right thigh tightness.  -Dressings: Keep intact. Okay for RN to change if >60% saturated or peeling/falling off.   -Follow-up: 2 weeks post-op with Dr. Figueroa Goldstein/Robby Harris PA-C    2. Disposition  -Anticipate d/c to home when medically cleared and progressing in PT. Ortho stable. Okay to discharge today from an orthopedic standpoint as long as she does well with physical therapy.    Laura Hanna PA-C  [facility]

## 2024-09-10 NOTE — PROGRESS NOTES
I attempted to contact the patient's listed family members after surgery to update them on the outcome of surgery.  There was either no answer, no functional voicemail, or no identified voicemail at which to leave a message at the provided contact numbers.

## 2024-09-10 NOTE — PROGRESS NOTES
A&OX4, VSS on RA. POD O of ORIF of R hip, dressing on R hip c/d/I. Pain managed with IV dilaudid and Oxy. Has not been OOB yet, still with some numbness and tingling in the R thigh from the block although improving, TTWB on the RLE, finally voided. On regular diet. IVF at 75mL/hr. On IV ancef. PT consult nadiya. Continue to monitor.

## 2024-09-10 NOTE — PLAN OF CARE
OT: Order received, chart reviewed and discussed with care team. Per PT, patient was ambulating well during their session and was reporting having no concerns with completion of any ADL's at this time. OT not indicated due to having no acute care OT needs. Defer discharge recommendations to PT and care team. Will complete OT orders.

## 2024-09-10 NOTE — PROGRESS NOTES
[facility]    Medicine Progress Note - Hospitalist Service    Date of Admission:  9/8/2024    Assessment & Plan     This is a 39-year-old female with medical history which includes ADD, history of cold sore, history of migraine headache who presented with multiple weeks of right hip pain and at O underwent further workup including MRI which was concerning for nondisplaced femoral neck fracture and was admitted to the hospital for further workup      Nondisplaced right femoral neck stress fracture s/p Open reduction internal fixation of right femoral neck tension sided stress fracture with Synthes femoral neck system ON 9/9/24  -Has been having pain for the past few weeks and denied any prolonged use of steroids, trauma, no prior DEXA scan but does have family history of osteoporosis  -X-ray as outpatient of the hip was normal  -MRI at outside facility on 9/8 showed near complete nondisplaced femoral neck fracture  -Vitamin D, PTH and ionized calcium is normal  -Patient underwent operative intervention as above and EBL was 50 mL  -Hemoglobin is stable at 12.1  -Mild leukocytosis likely due to stress response  -Continue with pain control, physical therapy, Occupational Therapy, bowel regimen and DVT prophylaxis per orthopedics protocol and was started on aspirin 81 mg daily for 6 weeks    ADD  - Continue PTA Adderall 20 mg TID     History of cold sores  Uses valtrex when has outbreaks. Not needing currently  - hold PTA valtrex     History of migraine headaches  -Continue PTA sumatriptan as needed              Diet: Advance Diet as Tolerated: Regular Diet Adult    DVT Prophylaxis: Pneumatic Compression Devices  [place] Catheter: Not present  Lines: None     Cardiac Monitoring: None  Code Status: Full Code      Clinically Significant Risk Factors                  # Hypertension: Noted on problem list           # Overweight: Estimated body mass index is 25.79 kg/m  as calculated from the  "following:    Height as of this encounter: 1.651 m (5' 5\").    Weight as of this encounter: 70.3 kg (154 lb 15.7 oz)., PRESENT ON ADMISSION                  Disposition Plan     Medically Ready for Discharge: Anticipated Today, once cleared by orthopedics         Neel Anaya MD  Hospitalist Service  [facility]  Securely message with [employer] (more info)  Text page via [employer] Paging/Directory     This note was completed in part using dictation via the Dragon voice recognition software. Some word and grammatical errors may occur and must be interpreted in the appropriate clinical context. If there are any questions pertaining to this issue, please contact me for further clarification.    ______________________________________________________________________    Interval History     -Tolerated procedure well and denies any fever, chills, nausea or vomiting  -Local pain seems to be currently well-controlled with current pain medications-  -she tolerated working with physical therapy and they gave her walker and patient mentioned that she has help from her mom and not    Physical Exam   Vital Signs: Temp: 98.7  F (37.1  C) Temp src: Oral BP: 118/67 Pulse: 62   Resp: 18 SpO2: 96 % O2 Device: None (Room air)    Weight: 154 lbs 15.73 oz    General: AOX3  Respiratory: CTLA  Cardiovascular: Regular rate , S1 and S2 normal with no murmer or rubs or gallops  Abdomen:   soft , non tender   Neurologic: No focal deficits  Musculoskeletal: Normal Range of motion over upper and lower extremities bilaterally apart from the right hip which is restricted due to pain and has dressing in place over the right hip  Psychiatric: cooperative        Medical Decision Making       Time spent in care of patient is 47 minutes and I reviewed labs including CBC, basic metabolic panel and also reviewed the operative note     Data     I have personally reviewed the following data over the past 24 hrs:    12.1 (H)  \   12.1   / " 361     138 104 15.1 /  232 (H)   4.0 21 (L) 0.75 \       Imaging results reviewed over the past 24 hrs:   Recent Results (from the past 24 hour(s))   XR Surgery MISAEL L/T 5 Min Fluoro w Stills    Narrative    EXAM: XR SURGERY MISAEL FLUORO LESS THAN 5 MIN W STILLS  LOCATION: [facility]  DATE: 9/9/2024    INDICATION: ORIF Femur xracture proximal  fluoro time 1 min  COMPARISON: 9/8/2024    TECHNIQUE: Intraoperative fluoroscopy performed during the patient's procedure.    RADIATION DOSE: Total Air Kerma 10.92 mGy      Impression    IMPRESSION: Status post ORIF across the right femoral neck. Please reference the surgical report for further details.

## 2024-09-10 NOTE — PLAN OF CARE
Goal Outcome Evaluation:      Plan of Care Reviewed With: patient    Patient vital signs are at baseline: Yes.   Patient able to ambulate as they were prior to admission or with assist devices provided by therapies during their stay:  Yes, A1/GB/W.   Patient MUST void prior to discharge:  Yes.   Patient able to tolerate oral intake:  Yes.   Pain has adequate pain control using Oral analgesics:  Yes.   Does patient have an identified :  No.    Has goal D/C date and time been discussed with patient:  No, TBD.     A/Ox4. Aquacel Dressing on R hip:  CDI. Pain managed with prn IV dilaudid, prn oxy and scheduled tylenol. On regular diet. PIV: SL. Numbness and tingling on R leg.   POC.

## 2024-09-10 NOTE — PROGRESS NOTES
09/10/24 1200   Appointment Info   Signing Clinician's Name / Credentials (PT) Whit Johnson DPT   Living Environment   People in Home alone   Current Living Arrangements house   Home Accessibility stairs to enter home;stairs within home   Number of Stairs, Main Entrance 1   Stair Railings, Main Entrance none   Number of Stairs, Within Home, Primary   (Flight to access bedroom)   Stair Railings, Within Home, Primary railing on left side (ascending)   Transportation Anticipated car, drives self;family or friend will provide   Living Environment Comments Pt reports her mom and aunt will be able to assist at discharge   Self-Care   Usual Activity Tolerance excellent   Current Activity Tolerance moderate   Equipment Currently Used at Home none   Fall history within last six months no   Activity/Exercise/Self-Care Comment Pt reports she has been requiring B crutches d/t hip pain prior to current admission.   General Information   Onset of Illness/Injury or Date of Surgery 09/08/24   Referring Physician Bert Aragon DO   Patient/Family Therapy Goals Statement (PT) Return home.   Pertinent History of Current Problem (include personal factors and/or comorbidities that impact the POC) 40 y/o female POD # 1 ORIF of R femoral neck tension stress fracture.- PMH including ADD, cold sore, and migraine headache.   Existing Precautions/Restrictions fall   Weight-Bearing Status - RLE toe touch weight-bearing   General Observations Pt standing EOB with use of FWW upon arrival of therapist.   Cognition   Affect/Mental Status (Cognition) WFL   Orientation Status (Cognition) oriented x 3   Follows Commands (Cognition) WFL   Pain Assessment   Patient Currently in Pain   (Pt reports discomfort in R hip with movement.)   Integumentary/Edema   Integumentary/Edema Comments R hip incision covered with dressing.   Range of Motion (ROM)   ROM Comment Limited R hip ROM d/t pain and stiffness, otherwise B LEs WFL.   Strength  (Manual Muscle Testing)   Strength Comments Pt demonstrates at least 3/5 grossly in B LEs with functional mobility.   Bed Mobility   Comment, (Bed Mobility) NT.   Transfers   Comment, (Transfers) Sit <> stand with FWW and SBA.   Gait/Stairs (Locomotion)   Comment, (Gait/Stairs) Pt amb 5' with FWW and CGA.   Balance   Balance Comments Noted good seated and standing balance at FWW.   Sensory Examination   Sensory Perception Comments Pt denied numbness/tingling in B LEs.   Clinical Impression   Criteria for Skilled Therapeutic Intervention Yes, treatment indicated   PT Diagnosis (PT) Difficulty with functional mobility.   Influenced by the following impairments Pain, Impaired hip ROM, Decreased strength, Decreased activity tolerance.   Functional limitations due to impairments Limited funcitonal mobility requiring AD and assist.   Clinical Presentation (PT Evaluation Complexity) stable   Clinical Presentation Rationale Based on PMH, current presentation, and social support.   Clinical Decision Making (Complexity) low complexity   Planned Therapy Interventions (PT) balance training;bed mobility training;cryotherapy;gait training;strengthening;stair training;transfer training   Risk & Benefits of therapy have been explained patient   PT Total Evaluation Time   PT Eval, Low Complexity Minutes (43166) 10   Physical Therapy Goals   PT Frequency Daily   PT Predicted Duration/Target Date for Goal Attainment 09/13/24   PT Goals Bed Mobility;Transfers;Gait;Stairs   PT: Bed Mobility Modified independent;Supine to/from sit   PT: Transfers Supervision/stand-by assist;Sit to/from stand;Assistive device   PT: Gait Supervision/stand-by assist;Rolling walker;100 feet   PT: Stairs Supervision/stand-by assist;3 stairs;Assistive device   Interventions   Interventions Quick Adds Gait Training;Therapeutic Activity   Therapeutic Activity   Therapeutic Activities: dynamic activities to improve functional performance Minutes (89406) 40    Symptoms Noted During/After Treatment Fatigue;Increased pain   Treatment Detail/Skilled Intervention PT: Pt standing EOB upon arrival of therapist, agreeable to participate in PT. Pt is eager to discharge home. Educated pt on TTWB. Pt performed sit <> stand with FWW and SBA, cues provided for hand placement and upright posture upon standing. Pt completed sit <> stand with crutches and SBA. Pt sitting up in chair at end of session.   Gait Training   Gait Training Minutes (38800) 12   Symptoms Noted During/After Treatment (Gait Training) fatigue;increased pain   Treatment Detail/Skilled Intervention PT: Gait training of 60' with FWW and SBA, cues provided for sequencing to maintain R TTWB restriction. Pt navigated 4 stairs with B crutches and CGA, cues provided for sequencing.   Distance in Feet 60'   PT Discharge Planning   PT Plan disch   PT Discharge Recommendation (DC Rec) home with assist   PT Rationale for DC Rec Pt is below baseline, limited by pain and TTWB restriction. Pt will benefit from use of FWW upon discharge, pt reports her mom and aunt will be able to provide assist at discharge.   PT Brief overview of current status Assist of 1 with FWW. Goals of therapy will be to address safe mobility and make recs for d/c to next level of care. Pt and RN will continue to follow all falls risk precautions as documented by RN staff while hospitalized   PT Equipment Needed at Discharge walker, rolling   Total Session Time   Timed Code Treatment Minutes 22   Total Session Time (sum of timed and untimed services) 32

## 2024-09-10 NOTE — DISCHARGE SUMMARY
"[facility]  Hospitalist Discharge Summary      Date of Admission:  9/8/2024  Date of Discharge:  9/10/2024  Discharging Provider: Neel Anaya MD  Discharge Service: Hospitalist Service    Discharge Diagnoses     Nondisplaced right femoral neck stress fracture s/p Open reduction internal fixation of right femoral neck tension sided stress fracture with Synthes femoral neck system ON 9/9/24     Clinically Significant Risk Factors     # Overweight: Estimated body mass index is 25.79 kg/m  as calculated from the following:    Height as of this encounter: 1.651 m (5' 5\").    Weight as of this encounter: 70.3 kg (154 lb 15.7 oz).       Follow-ups Needed After Discharge   Follow-up Appointments     Follow-up and recommended labs and tests       Follow-up with Dr. Glodstein ([facility]) at 2 weeks   postoperatively for reevaluation, wound check/suture or staple removal,   and possible x-rays. No need for labs or imaging prior to appointment.    Please call Dr. Goldstein's care coordinator, Clair, at [phone] to   schedule.    [facility] [place] After Hours Phone: [phone]    If urgent need with dressing/splint or operative site questions, the [facility]   Orthopedic Urgent Care is available at multiple metro locations from 8-8   daily excluding holidays.        Follow-up and recommended labs and tests       Follow up with primary care provider, Randy Graf, within 7 days   for hospital follow- up.  The following labs/tests are recommended: cbc .          {Additional follow-up instructions/to-do's for PCP        Unresulted Labs Ordered in the Past 30 Days of this Admission       No orders found from 8/9/2024 to 9/9/2024.        These results will be followed up by     Discharge Disposition   Discharged to home  Condition at discharge: Stable    Hospital Course     This is a 39-year-old female with medical history which includes ADD, history of cold sore, history of " migraine headache who presented with multiple weeks of right hip pain and at [facility] underwent further workup including MRI which was concerning for nondisplaced femoral neck fracture and was admitted to the hospital for further workup        Nondisplaced right femoral neck stress fracture s/p Open reduction internal fixation of right femoral neck tension sided stress fracture with Synthes femoral neck system ON 9/9/24  -Has been having pain for the past few weeks and denied any prolonged use of steroids, trauma, no prior DEXA scan but does have family history of osteoporosis  -X-ray as outpatient of the hip was normal  -MRI at outside facility on 9/8 showed near complete nondisplaced femoral neck fracture  -Vitamin D, PTH and ionized calcium is normal  -Patient underwent operative intervention as above and EBL was 50 mL  -Hemoglobin is stable at 12.1  -Mild leukocytosis likely due to stress response  -Continue with pain control, physical therapy, Occupational Therapy, bowel regimen and DVT prophylaxis per orthopedics protocol and was started on aspirin 81 mg for 6 weeks  -Orthopedic did do discharge order for pain medications and anticoagulation  -Recommend checking CBC as outpatient and close follow-up with primary care physician and she understood and verbalized back     ADD  - Continue PTA Adderall 20 mg TID     History of cold sores  -Uses valtrex when has outbreaks.     History of migraine headaches  -Continue PTA sumatriptan as needed            Consultations This Hospital Stay   ORTHOPEDIC SURGERY IP CONSULT  PHYSICAL THERAPY ADULT IP CONSULT  OCCUPATIONAL THERAPY ADULT IP CONSULT    Code Status   Full Code    Time Spent on this Encounter   Neel QUIROZ MD, personally saw the patient today and spent greater than 30 minutes discharging this patient.       Neel Anaya MD  [facility]  [address] [address]  Phone: [phone]  Fax:  [phone]  ______________________________________________________________________    Physical Exam   Vital Signs: Temp: 98.7  F (37.1  C) Temp src: Oral BP: 118/67 Pulse: 62   Resp: 18 SpO2: 96 % O2 Device: None (Room air)    Weight: 154 lbs 15.73 oz    General: AOX3  Respiratory: CTLA  Cardiovascular: Regular rate , S1 and S2 normal with no murmer or rubs or gallops  Abdomen:   soft , non tender   Neurologic: No focal deficits  Musculoskeletal: Normal Range of motion over upper and lower extremities bilaterally apart from the right hip which is restricted due to pain and has dressing in place over the right hip  Psychiatric: cooperative      Primary Care Physician   Randy Graf    Discharge Orders      Reason for your hospital stay    S/p right femoral neck stress fracture ORIF using FNS (DOS: 9/9/24) performed by Dr. Figueroa Goldstein     Follow-up and recommended labs and tests     Follow-up with Dr. Goldstein ([facility]) at 2 weeks postoperatively for reevaluation, wound check/suture or staple removal, and possible x-rays. No need for labs or imaging prior to appointment.    Please call Dr. Goldstein's care coordinator, Clair at [phone] to schedule.    [facility] [place] After Hours Phone: [phone]    If urgent need with dressing/splint or operative site questions, the [facility] Orthopedic Urgent Care is available at multiple metro locations from 8-8 daily excluding holidays.     Activity    Your activity upon discharge: TTWB RLE with walker. ROMDHARMESH.     Wound care and dressings    Instructions to care for your wound at home: Please leave dressing intact for 2 weeks postoperatively. Okay to change if saturated >60% or peeling. If an Aquacel or gauze/tegaderm is in place over your surgical sites, it is okay to shower without covering the dressings. If you have a soft dressing, you must securely cover your dressing while showering or sponge bathe. Absolutely no soaking or submersion.  Please contact your orthopedic surgical team if persistent drainage or redness develops around the surgical site.     Discharge Instructions    Pain after surgery is normal and expected.  You will have some amount of pain and swelling for several weeks after surgery.  These symptoms will improve with time.  There are several things you can do to help reduce your pain including: rest, cold compresses, elevation, and using pain medications as needed. Contact your Surgeon Team if you have pain that persists or worsens after surgery despite rest, ice, elevation, and taking your medication(s) as prescribed. Contact your Surgeon Team if you have new numbness, tingling, or weakness in your operative extremity.    Swelling and/or bruising of the surgical extremity is common and may persist for several months after surgery. In addition to frequent icing and elevation, gentle compressive support with an ACE wrap or tubigrip may help with swelling. Apply compression regularly, removing at least twice daily to perform skin checks. Contact your Surgeon Team if your swelling increases and is NOT associated with an increase in your activity level, or if your swelling increases and is associated with redness and pain.    Ice can be used to control swelling and discomfort after surgery. Place a thin towel over your operative site and apply the ice pack overtop. Leave ice pack in place for 20 minutes, then remove for 20 minutes. Repeat this 20 minutes on/20 minutes off routine as often as tolerated.    Please contact your surgical team with any concerns for infection including increasing redness around your surgical site, pus-like drainage, fever, chills, or flu-like symptoms.     Reason for your hospital stay    Fracture     Follow-up and recommended labs and tests     Follow up with primary care provider, Randy Graf, within 7 days for hospital follow- up.  The following labs/tests are recommended: cbc .     Crutches DME     DME Documentation: Describe the reason for need to support medical necessity: Impaired gait status post hip surgery. I, the undersigned, certify that the above prescribed supplies are medically necessary for this patient and is both reasonable and necessary in reference to accepted standards of medical practice in the treatment of this patient's condition and is not prescribed as a convenience.     Walker DME    DME Documentation: Describe the reason for need to support medical necessity: Impaired gait status post hip surgery. I, the undersigned, certify that the above prescribed supplies are medically necessary for this patient and is both reasonable and necessary in reference to accepted standards of medical practice in the treatment of this patient's condition and is not prescribed as a convenience.     Diet    Follow this diet upon discharge: Current Diet:Orders Placed This Encounter      Advance Diet as Tolerated: Regular Diet Adult       Significant Results and Procedures   Most Recent 3 CBC's:  Recent Labs   Lab Test 09/10/24  0858 09/09/24  1009 09/08/24  1238 04/27/23  0505   WBC 12.1*  --  8.5 8.0   HGB 12.1 11.5* 12.1 12.3   MCV 94  --  96 88     --  334 340     Most Recent 3 BMP's:  Recent Labs   Lab Test 09/10/24  0858 09/10/24  0622 09/09/24  2324 09/08/24  1238 03/27/24  1711 04/27/23  0505     --   --  139  --  140   POTASSIUM 4.0  --   --  4.0  --  4.5   CHLORIDE 104  --   --  105  --  106   CO2 21*  --   --  24  --  25   BUN 15.1  --   --  18.8  --  7.7   CR 0.75  --  0.60 0.68  --  0.84   ANIONGAP 13  --   --  10  --  9   ADRI 9.3  --   --  9.1  --  8.9   * 131*  --  96   < > 170*    < > = values in this interval not displayed.     Most Recent 2 LFT's:  Recent Labs   Lab Test 09/08/24  1238 01/10/20  1417   AST 15 15   ALT 16 23   ALKPHOS 70 87   BILITOTAL 0.2 0.4     Most Recent 3 INR's:  Recent Labs   Lab Test 01/10/20  1417   INR 1.03   ,   Results for orders placed or  performed during the hospital encounter of 09/08/24   XR Pelvis w Hip Right 1 View    Narrative    EXAM: XR PELVIS AND HIP RIGHT 1 VIEW  LOCATION: [facility]  DATE: 9/8/2024    INDICATION: R femoral neck stress fracture  COMPARISON: None.      Impression    IMPRESSION: Nondisplaced fracture of the medial right femoral neck adjacent to the greater trochanter. Finding is in a typical location for stress related injuries. Normal alignment of the hips. No significant joint space narrowing.   XR Surgery MISAEL L/T 5 Min Fluoro w Stills    Narrative    EXAM: XR SURGERY MISAEL FLUORO LESS THAN 5 MIN W STILLS  LOCATION: [facility]  DATE: 9/9/2024    INDICATION: ORIF Femur xracture proximal  fluoro time 1 min  COMPARISON: 9/8/2024    TECHNIQUE: Intraoperative fluoroscopy performed during the patient's procedure.    RADIATION DOSE: Total Air Kerma 10.92 mGy      Impression    IMPRESSION: Status post ORIF across the right femoral neck. Please reference the surgical report for further details.       Discharge Medications   Discharge Medication List as of 9/10/2024 12:42 PM        START taking these medications    Details   aspirin 81 MG EC tablet Take 1 tablet (81 mg) by mouth daily., Disp-42 tablet, R-0, E-Prescribe      methocarbamol (ROBAXIN) 500 MG tablet Take 1 tablet (500 mg) by mouth 4 times daily as needed for muscle spasms., Disp-25 tablet, R-0, E-Prescribe      oxyCODONE (ROXICODONE) 5 MG tablet Take 1-2 tablets (5-10 mg) by mouth every 4 hours as needed for moderate to severe pain., Disp-20 tablet, R-0, E-Prescribe      polyethylene glycol (MIRALAX) 17 GM/Dose powder Take 17 g by mouth daily., Disp-510 g, R-0, E-Prescribe      senna-docusate (SENOKOT-S/PERICOLACE) 8.6-50 MG tablet Take 1 tablet by mouth 2 times daily as needed for constipation., Disp-14 tablet, R-0, E-Prescribe           CONTINUE these medications which have CHANGED    Details   acetaminophen (TYLENOL)  325 MG tablet Take 3 tablets (975 mg) by mouth every 8 hours as needed for mild pain., Disp-60 tablet, R-0, E-Prescribe           CONTINUE these medications which have NOT CHANGED    Details   amphetamine-dextroamphetamine (ADDERALL) 20 MG tablet Take 1 tablet (20 mg) by mouth 3 times daily for 30 days, Disp-90 tablet, R-0, E-Prescribe      Lysine 1000 MG TABS Take 1 tablet by mouth daily as needed (oncoming coldsore)., Historical      Multiple Vitamins-Minerals (EMERGEN-C IMMUNE PO) Take 1 Dose by mouth as needed (if she is worried about getting sick)., Historical      SUMAtriptan (IMITREX) 50 MG tablet Take 1 tablet (50 mg) by mouth at onset of headache for migraine May repeat in 2 hours. Max 4 tablets/24 hours., Disp-9 tablet, R-4, E-Prescribe      tretinoin (RETIN-A) 0.1 % external cream Apply topically at bedtimeDisp-45 g, [id number]      valACYclovir (VALTREX) 1000 mg tablet TAKE TWO TABLETS BY MOUTH TWICE A DAY AS NEEDED FOR FLARES, Disp-4 tablet, R-1, E-Prescribe           STOP taking these medications       meloxicam (MOBIC) 15 MG tablet Comments:   Reason for Stopping:             Allergies   Allergies   Allergen Reactions    Bee Pollen      In the past, has not had issues lately    Doxycycline Rash

## 2024-09-10 NOTE — ANESTHESIA POSTPROCEDURE EVALUATION
Patient: Ángela Zurita    Procedure: Procedure(s):  OPEN REDUCTION INTERNAL FIXATION, FRACTURE, FEMUR, PROXIMAL       Anesthesia Type:  General    Note:     Postop Pain Control: Uneventful            Sign Out: Well controlled pain   PONV: No   Neuro/Psych: Uneventful            Sign Out: Acceptable/Baseline neuro status   Airway/Respiratory: Uneventful            Sign Out: Acceptable/Baseline resp. status   CV/Hemodynamics: Uneventful            Sign Out: Acceptable CV status; No obvious hypovolemia; No obvious fluid overload   Other NRE: NONE   DID A NON-ROUTINE EVENT OCCUR? No           Last vitals:  Vitals Value Taken Time   /72 09/09/24 1900   Temp 36.4  C (97.6  F) 09/09/24 1834   Pulse 69 09/09/24 1909   Resp 29 09/09/24 1909   SpO2 98 % 09/09/24 1909   Vitals shown include unfiled device data.    Electronically Signed By: Flaco Chu MD  September 9, 2024  7:11 PM

## 2024-09-10 NOTE — PROGRESS NOTES
Stoplight tool reviewed with patient. Pain management reviewed.Surgical site care and monitoring for infection reviewed. Toe touch weight bear well understood. All personal belongings accounted for.

## 2024-09-10 NOTE — PLAN OF CARE
Physical Therapy Discharge Summary    Reason for therapy discharge:    Discharged to home.    Progress towards therapy goal(s). See goals on Care Plan in [facility] electronic health record for goal details.  Goals partially met.  Barriers to achieving goals:   discharge from facility.    Therapy recommendation(s):    Continued therapy is recommended.  Rationale/Recommendations:  Recommend pt follow up with OP PT when WB restrictions updated to WBAT to progress strengthening.

## 2024-09-12 ENCOUNTER — PATIENT OUTREACH (OUTPATIENT)
Dept: CARE COORDINATION | Facility: CLINIC | Age: 39
End: 2024-09-12
Payer: COMMERCIAL

## 2024-09-12 NOTE — PROGRESS NOTES
Connected Care Resource Center:   [facility] Care Resource Center Contact  [facility]/Voicemail     Clinical Data: Post-Discharge Outreach     Outreach attempted x 2.  Left message on patient's voicemail, providing [facility]'s central phone number of [phone] ([phone]) for questions/concerns and/or to schedule an appt with an [facility] provider, if they do not have a PCP.      Plan:  [facility] will do no further outreaches at this time.       JUANJO Jovel  Connected Care Resource Center, [facility]    *Connected Care Resource Team does NOT follow patient ongoing. Referrals are identified based on internal discharge reports and the outreach is to ensure patient has an understanding of their discharge instructions.

## 2024-09-13 ENCOUNTER — VIRTUAL VISIT (OUTPATIENT)
Dept: FAMILY MEDICINE | Facility: CLINIC | Age: 39
End: 2024-09-13
Payer: COMMERCIAL

## 2024-09-13 ENCOUNTER — LAB (OUTPATIENT)
Dept: LAB | Facility: CLINIC | Age: 39
End: 2024-09-13
Payer: COMMERCIAL

## 2024-09-13 DIAGNOSIS — S72.001D CLOSED FRACTURE OF RIGHT HIP WITH ROUTINE HEALING, SUBSEQUENT ENCOUNTER: ICD-10-CM

## 2024-09-13 DIAGNOSIS — F90.0 ATTENTION DEFICIT HYPERACTIVITY DISORDER (ADHD), PREDOMINANTLY INATTENTIVE TYPE: ICD-10-CM

## 2024-09-13 DIAGNOSIS — S72.001D CLOSED FRACTURE OF RIGHT HIP WITH ROUTINE HEALING, SUBSEQUENT ENCOUNTER: Primary | ICD-10-CM

## 2024-09-13 LAB
BASOPHILS # BLD AUTO: 0.1 10E3/UL (ref 0–0.2)
BASOPHILS NFR BLD AUTO: 1 %
EOSINOPHIL # BLD AUTO: 0.1 10E3/UL (ref 0–0.7)
EOSINOPHIL NFR BLD AUTO: 1 %
ERYTHROCYTE [DISTWIDTH] IN BLOOD BY AUTOMATED COUNT: 11.8 % (ref 10–15)
HCT VFR BLD AUTO: 39.5 % (ref 35–47)
HGB BLD-MCNC: 13 G/DL (ref 11.7–15.7)
IMM GRANULOCYTES # BLD: 0 10E3/UL
IMM GRANULOCYTES NFR BLD: 0 %
LYMPHOCYTES # BLD AUTO: 2.9 10E3/UL (ref 0.8–5.3)
LYMPHOCYTES NFR BLD AUTO: 26 %
MCH RBC QN AUTO: 31.2 PG (ref 26.5–33)
MCHC RBC AUTO-ENTMCNC: 32.9 G/DL (ref 31.5–36.5)
MCV RBC AUTO: 95 FL (ref 78–100)
MONOCYTES # BLD AUTO: 0.8 10E3/UL (ref 0–1.3)
MONOCYTES NFR BLD AUTO: 7 %
NEUTROPHILS # BLD AUTO: 7 10E3/UL (ref 1.6–8.3)
NEUTROPHILS NFR BLD AUTO: 65 %
PLATELET # BLD AUTO: 400 10E3/UL (ref 150–450)
RBC # BLD AUTO: 4.17 10E6/UL (ref 3.8–5.2)
WBC # BLD AUTO: 10.9 10E3/UL (ref 4–11)

## 2024-09-13 PROCEDURE — 85025 COMPLETE CBC W/AUTO DIFF WBC: CPT

## 2024-09-13 PROCEDURE — G2211 COMPLEX E/M VISIT ADD ON: HCPCS | Mod: 95 | Performed by: NURSE PRACTITIONER

## 2024-09-13 PROCEDURE — 99213 OFFICE O/P EST LOW 20 MIN: CPT | Mod: 95 | Performed by: NURSE PRACTITIONER

## 2024-09-13 PROCEDURE — 36415 COLL VENOUS BLD VENIPUNCTURE: CPT

## 2024-09-13 RX ORDER — DEXTROAMPHETAMINE SACCHARATE, AMPHETAMINE ASPARTATE, DEXTROAMPHETAMINE SULFATE AND AMPHETAMINE SULFATE 5; 5; 5; 5 MG/1; MG/1; MG/1; MG/1
20 TABLET ORAL 3 TIMES DAILY
Qty: 90 TABLET | Refills: 0 | Status: SHIPPED | OUTPATIENT
Start: 2024-09-13

## 2024-09-13 NOTE — PROGRESS NOTES
Ángela is a 39 year old who is being evaluated via a billable video visit.    What phone number would you like to be contacted at? [phone]   How would you like to obtain your AVS? MyChart      Assessment & Plan     Closed fracture of right hip with routine healing, subsequent encounter  Ongoing weeks of right hip pain ultimately found on MRI to have femoral neck fracture. Admitted for surgery. Overall doing well, did have a stable hemoglobin and mildly elevated WBC at discharge thought to be R/T stress response. We will recheck this today. She does have upcoming ortho follow up. Will likely need DEXA when recovered as this fracture was atraumatic, will defer this to PCP.   - CBC with platelets and differential; Future    Attention deficit hyperactivity disorder (ADHD), predominantly inattentive type  Refilled as she needs to go to the pharmacy today and would like to limit trips.  - amphetamine-dextroamphetamine (ADDERALL) 20 MG tablet; Take 1 tablet (20 mg) by mouth 3 times daily.        MED REC REQUIRED  Post Medication Reconciliation Status: discharge medications reconciled, continue medications without change        Subjective   Ángela is a 39 year old, presenting for the following health issues:  Follow Up (Post surgical need cbc)      9/13/2024    10:27 AM   Additional Questions   Roomed by KEVIN Camacho [facility] Follow-up Visit:    Hospital/Nursing Home/ Rehab Facility: [facility]  Date of Admission: 9/8/2024  Date of Discharge: 9/10/2024  Reason(s) for Admission: hip fracture  Was the patient in the ICU or did the patient experience delirium during hospitalization?  No  Do you have any other stressors you would like to discuss with your provider? No    Problems taking medications regularly:  None  Medication changes since discharge: None  Problems adhering to non-medication therapy:  None    Summary of hospitalization:  [facility] discharge summary  reviewed  Diagnostic Tests/Treatments reviewed.  Follow up needed: CBC  Other Healthcare Providers Involved in Patient s Care:         Surgical follow-up appointment - ortho  Update since discharge: stable.         Plan of care communicated with patient                 Review of Systems  Constitutional, HEENT, cardiovascular, pulmonary, gi and gu systems are negative, except as otherwise noted.      Objective           Vitals:  No vitals were obtained today due to virtual visit.    Physical Exam   GENERAL: alert and no distress  PSYCH: Appropriate affect, tone, and pace of words          Video-Visit Details    Type of service:  Video Visit   Originating Location (pt. Location): Home    Distant Location (provider location):  On-site  Platform used for Video Visit: Unable to complete video visit  Telephone: 7 minutes  Signed Electronically by: KATHLEEN Hernandez CNP

## 2024-09-16 ENCOUNTER — MYC MEDICAL ADVICE (OUTPATIENT)
Dept: FAMILY MEDICINE | Facility: CLINIC | Age: 39
End: 2024-09-16
Payer: COMMERCIAL

## 2024-09-17 NOTE — TELEPHONE ENCOUNTER
MyChart reply sent to patient and closing encounter.     Alba Harris RN  [facility]

## 2024-09-18 NOTE — TELEPHONE ENCOUNTER
See ePAR messages, routing to PCP for review.    Alix Magaña RN  [facility]   Patient declined information

## 2024-09-19 ENCOUNTER — TELEPHONE (OUTPATIENT)
Dept: FAMILY MEDICINE | Facility: CLINIC | Age: 39
End: 2024-09-19
Payer: COMMERCIAL

## 2024-09-19 NOTE — TELEPHONE ENCOUNTER
Left message for pt to call and reschedule appt.  Few problems with the appt.   It was listed as an ER/HOSP Follow Up, She can not establish care through a video visit and Dr. Dominguez is not accepting new pts.

## 2024-09-19 NOTE — TELEPHONE ENCOUNTER
Please call patient and tell her that I do not establish care with a patient I have never seen over a virtual visit she will need to schedule a visit with another provider as I do not have any availability for new patient visits at this time. Airam Dominguez M.D.

## 2024-09-30 ENCOUNTER — TELEPHONE (OUTPATIENT)
Dept: OBGYN | Facility: CLINIC | Age: 39
End: 2024-09-30

## 2024-09-30 NOTE — TELEPHONE ENCOUNTER
Attempted to call patient to confirm clinic location as patient has not yet been seen at [facility]. No answer.

## 2024-10-14 ENCOUNTER — E-VISIT (OUTPATIENT)
Dept: FAMILY MEDICINE | Facility: CLINIC | Age: 39
End: 2024-10-14
Payer: COMMERCIAL

## 2024-10-14 DIAGNOSIS — F90.0 ATTENTION DEFICIT HYPERACTIVITY DISORDER (ADHD), PREDOMINANTLY INATTENTIVE TYPE: Primary | ICD-10-CM

## 2024-10-14 PROCEDURE — 99421 OL DIG E/M SVC 5-10 MIN: CPT | Performed by: FAMILY MEDICINE

## 2024-10-14 RX ORDER — DEXTROAMPHETAMINE SACCHARATE, AMPHETAMINE ASPARTATE, DEXTROAMPHETAMINE SULFATE AND AMPHETAMINE SULFATE 5; 5; 5; 5 MG/1; MG/1; MG/1; MG/1
20 TABLET ORAL 3 TIMES DAILY
Qty: 90 TABLET | Refills: 0 | Status: SHIPPED | OUTPATIENT
Start: 2024-12-13 | End: 2025-01-12

## 2024-10-14 RX ORDER — DEXTROAMPHETAMINE SACCHARATE, AMPHETAMINE ASPARTATE, DEXTROAMPHETAMINE SULFATE AND AMPHETAMINE SULFATE 5; 5; 5; 5 MG/1; MG/1; MG/1; MG/1
20 TABLET ORAL 3 TIMES DAILY
Qty: 90 TABLET | Refills: 0 | Status: SHIPPED | OUTPATIENT
Start: 2024-10-14 | End: 2024-11-13

## 2024-10-14 RX ORDER — DEXTROAMPHETAMINE SACCHARATE, AMPHETAMINE ASPARTATE, DEXTROAMPHETAMINE SULFATE AND AMPHETAMINE SULFATE 5; 5; 5; 5 MG/1; MG/1; MG/1; MG/1
20 TABLET ORAL 3 TIMES DAILY
Qty: 90 TABLET | Refills: 0 | Status: SHIPPED | OUTPATIENT
Start: 2024-11-13 | End: 2024-12-13

## 2024-10-14 ASSESSMENT — PATIENT HEALTH QUESTIONNAIRE - PHQ9
SUM OF ALL RESPONSES TO PHQ QUESTIONS 1-9: 1
SUM OF ALL RESPONSES TO PHQ QUESTIONS 1-9: 1
10. IF YOU CHECKED OFF ANY PROBLEMS, HOW DIFFICULT HAVE THESE PROBLEMS MADE IT FOR YOU TO DO YOUR WORK, TAKE CARE OF THINGS AT HOME, OR GET ALONG WITH OTHER PEOPLE: NOT DIFFICULT AT ALL

## 2024-10-14 ASSESSMENT — ANXIETY QUESTIONNAIRES
8. IF YOU CHECKED OFF ANY PROBLEMS, HOW DIFFICULT HAVE THESE MADE IT FOR YOU TO DO YOUR WORK, TAKE CARE OF THINGS AT HOME, OR GET ALONG WITH OTHER PEOPLE?: NOT DIFFICULT AT ALL
7. FEELING AFRAID AS IF SOMETHING AWFUL MIGHT HAPPEN: NOT AT ALL
4. TROUBLE RELAXING: NOT AT ALL
6. BECOMING EASILY ANNOYED OR IRRITABLE: SEVERAL DAYS
7. FEELING AFRAID AS IF SOMETHING AWFUL MIGHT HAPPEN: NOT AT ALL
GAD7 TOTAL SCORE: 1
GAD7 TOTAL SCORE: 1
5. BEING SO RESTLESS THAT IT IS HARD TO SIT STILL: NOT AT ALL
1. FEELING NERVOUS, ANXIOUS, OR ON EDGE: NOT AT ALL
2. NOT BEING ABLE TO STOP OR CONTROL WORRYING: NOT AT ALL
3. WORRYING TOO MUCH ABOUT DIFFERENT THINGS: NOT AT ALL
GAD7 TOTAL SCORE: 1
IF YOU CHECKED OFF ANY PROBLEMS ON THIS QUESTIONNAIRE, HOW DIFFICULT HAVE THESE PROBLEMS MADE IT FOR YOU TO DO YOUR WORK, TAKE CARE OF THINGS AT HOME, OR GET ALONG WITH OTHER PEOPLE: NOT DIFFICULT AT ALL

## 2024-10-14 NOTE — PATIENT INSTRUCTIONS
I am glad you are doing well. I have refilled your medication:  No orders of the defined types were placed in this encounter.       View your full visit summary for details by clicking on the link below. Your pharmacist will be able to address any questions you may have about the medication.      Thank you for choosing us for your care.

## 2024-10-17 ENCOUNTER — E-VISIT (OUTPATIENT)
Dept: URGENT CARE | Facility: CLINIC | Age: 39
End: 2024-10-17
Payer: COMMERCIAL

## 2024-10-17 DIAGNOSIS — N39.0 ACUTE UTI (URINARY TRACT INFECTION): Primary | ICD-10-CM

## 2024-10-17 PROCEDURE — 99207 PR NO BILLABLE SERVICE THIS VISIT: CPT | Performed by: NURSE PRACTITIONER

## 2024-10-17 RX ORDER — NITROFURANTOIN 25; 75 MG/1; MG/1
100 CAPSULE ORAL 2 TIMES DAILY
Qty: 10 CAPSULE | Refills: 0 | Status: SHIPPED | OUTPATIENT
Start: 2024-10-17 | End: 2024-10-22

## 2024-10-17 NOTE — PATIENT INSTRUCTIONS
Dear Ángela Zurita    After reviewing your responses, I've been able to diagnose you with a urinary tract infection, which is a common infection of the bladder with bacteria.  This is not a sexually transmitted infection, though urinating immediately after intercourse can help prevent infections.  Drinking lots of fluids is also helpful to clear your current infection and prevent the next one.      I have sent a prescription for antibiotics to your pharmacy to treat this infection.    It is important that you take all of your prescribed medication even if your symptoms are improving after a few doses.  Taking all of your medicine helps prevent the symptoms from returning.     If your symptoms worsen, you develop pain in your back or stomach, develop fevers, or are not improving in 5 days, please contact your primary care provider for an appointment or visit any of our convenient Walk-in or Urgent Care Centers to be seen, which can be found on our website here.    Thanks again for choosing us as your health care partner,    KATHLEEN BUNCH CNP  Urinary Tract Infection (UTI) in Women: Care Instructions  Overview     A urinary tract infection (UTI) is an infection caused by bacteria. It can happen anywhere in the urinary tract. A UTI can happen in the:  Kidneys.  Ureters, the tubes that connect the kidneys to the bladder.  Bladder.  Urethra, where the urine comes out.  Most UTIs are bladder infections. They often cause pain or burning when you urinate.  Most UTIs can be cured with antibiotics. If you are prescribed antibiotics, be sure to complete your treatment so that the infection does not get worse.  Follow-up care is a key part of your treatment and safety. Be sure to make and go to all appointments, and call your doctor if you are having problems. It's also a good idea to know your test results and keep a list of the medicines you take.  How can you care for yourself at home?  Take your antibiotics  "as directed. Do not stop taking them just because you feel better. You need to take the full course of antibiotics.  Drink extra water and other fluids for the next day or two. This will help make the urine less concentrated and help wash out the bacteria that are causing the infection. (If you have kidney, heart, or liver disease and have to limit fluids, talk with your doctor before you increase the amount of fluids you drink.)  Avoid drinks that are carbonated or have caffeine. They can irritate the bladder.  Urinate often. Try to empty your bladder each time.  To relieve pain, take a hot bath or lay a heating pad set on low over your lower belly or genital area. Never go to sleep with a heating pad in place.  To prevent UTIs  Drink plenty of water each day. This helps you urinate often, which clears bacteria from your system. (If you have kidney, heart, or liver disease and have to limit fluids, talk with your doctor before you increase the amount of fluids you drink.)  Urinate when you need to.  If you are sexually active, urinate right after you have sex.  Change sanitary pads often.  Avoid douches, bubble baths, feminine hygiene sprays, and other feminine hygiene products that have deodorants.  After going to the bathroom, wipe from front to back.  When should you call for help?   Call your doctor now or seek immediate medical care if:    You have new or worse fever, chills, nausea, or vomiting.     You have new pain in your back just below your rib cage. This is called flank pain.     There is new blood or pus in your urine.     You have any problems with your antibiotic medicine.   Watch closely for changes in your health, and be sure to contact your doctor if:    You are not getting better after taking an antibiotic for 2 days.     Your symptoms go away but then come back.   Where can you learn more?  Go to https://www.healthwise.net/patiented  Enter K848 in the search box to learn more about \"Urinary " "Tract Infection (UTI) in Women: Care Instructions.\"  Current as of: November 15, 2023  Content Version: 14.2 2024 [facility].   Care instructions adapted under license by your healthcare professional. If you have questions about a medical condition or this instruction, always ask your healthcare professional. [employer] disclaims any warranty or liability for your use of this information.    "

## 2024-10-25 ENCOUNTER — OFFICE VISIT (OUTPATIENT)
Dept: OBGYN | Facility: CLINIC | Age: 39
End: 2024-10-25
Payer: COMMERCIAL

## 2024-10-25 VITALS
BODY MASS INDEX: 24.83 KG/M2 | TEMPERATURE: 98.3 F | WEIGHT: 149 LBS | DIASTOLIC BLOOD PRESSURE: 77 MMHG | SYSTOLIC BLOOD PRESSURE: 133 MMHG | HEIGHT: 65 IN | HEART RATE: 90 BPM

## 2024-10-25 DIAGNOSIS — Z11.3 SCREENING EXAMINATION FOR STI: ICD-10-CM

## 2024-10-25 DIAGNOSIS — Z30.013 INITIATION OF DEPO PROVERA: Primary | ICD-10-CM

## 2024-10-25 DIAGNOSIS — Z11.3 SCREENING FOR STD (SEXUALLY TRANSMITTED DISEASE): ICD-10-CM

## 2024-10-25 LAB
HCG UR QL: NEGATIVE
INTERNAL QC OK POCT: NORMAL
POCT KIT EXPIRATION DATE: NORMAL
POCT KIT LOT NUMBER: NORMAL

## 2024-10-25 PROCEDURE — 99203 OFFICE O/P NEW LOW 30 MIN: CPT | Mod: 25 | Performed by: ADVANCED PRACTICE MIDWIFE

## 2024-10-25 PROCEDURE — 99459 PELVIC EXAMINATION: CPT | Performed by: ADVANCED PRACTICE MIDWIFE

## 2024-10-25 PROCEDURE — 87491 CHLMYD TRACH DNA AMP PROBE: CPT | Performed by: ADVANCED PRACTICE MIDWIFE

## 2024-10-25 PROCEDURE — 96372 THER/PROPH/DIAG INJ SC/IM: CPT | Performed by: ADVANCED PRACTICE MIDWIFE

## 2024-10-25 PROCEDURE — 81025 URINE PREGNANCY TEST: CPT | Performed by: ADVANCED PRACTICE MIDWIFE

## 2024-10-25 PROCEDURE — 87591 N.GONORRHOEAE DNA AMP PROB: CPT | Performed by: ADVANCED PRACTICE MIDWIFE

## 2024-10-25 RX ORDER — MEDROXYPROGESTERONE ACETATE 150 MG/ML
150 INJECTION, SUSPENSION INTRAMUSCULAR
Status: ACTIVE | OUTPATIENT
Start: 2024-10-25 | End: 2026-01-18

## 2024-10-25 RX ADMIN — MEDROXYPROGESTERONE ACETATE 150 MG: 150 INJECTION, SUSPENSION INTRAMUSCULAR at 15:35

## 2024-10-25 NOTE — NURSING NOTE
"Initial /77 (BP Location: Right arm, Patient Position: Chair, Cuff Size: Adult Regular)   Pulse 90   Temp 98.3  F (36.8  C) (Tympanic)   Ht 1.651 m (5' 5\")   Wt 67.6 kg (149 lb)   LMP 10/05/2024   BMI 24.79 kg/m   Estimated body mass index is 24.79 kg/m  as calculated from the following:    Height as of this encounter: 1.651 m (5' 5\").    Weight as of this encounter: 67.6 kg (149 lb). .    "

## 2024-10-25 NOTE — PROGRESS NOTES

## 2024-10-25 NOTE — PROGRESS NOTES
S:  Ángela is here for birth control.  She would like something that she does not have to remember.  She would also like STI testing. She declines blood testing at this time.    O:  Appears well  Pelvic Exam:  Vulva: No external lesions, normal hair distribution, no adenopathy  Vagina: Moist, pink, no abnormal discharge, well rugated, no lesions  UPT:    GC/CT test collected   A/P  (Z30.013) Initiation of Depo Provera  (primary encounter diagnosis)  Plan: We reviewed birth control options.  She is interested in the depo provera.   Discussed risk and benefits, common side effects and bleeding profile.  She would like to proceed. Injection and next due date given.      (Z11.3) Screening examination for STI  Plan: GC/CT collected.  Blood testing offered and declines. If gonorrhea or chlamydia is positive, then blood testing should be done also.  Discussed condom use, both partners being tested and safer sex.    She verbalized understanding and agreement with plan.

## 2024-10-26 LAB
C TRACH DNA SPEC QL PROBE+SIG AMP: NEGATIVE
N GONORRHOEA DNA SPEC QL NAA+PROBE: NEGATIVE

## 2024-11-05 ENCOUNTER — PATIENT OUTREACH (OUTPATIENT)
Dept: CARE COORDINATION | Facility: CLINIC | Age: 39
End: 2024-11-05
Payer: COMMERCIAL

## 2024-11-07 ENCOUNTER — HOSPITAL ENCOUNTER (OUTPATIENT)
Dept: BONE DENSITY | Facility: CLINIC | Age: 39
Discharge: HOME | End: 2024-11-07
Attending: ORTHOPAEDIC SURGERY | Admitting: ORTHOPAEDIC SURGERY
Payer: COMMERCIAL

## 2024-11-07 DIAGNOSIS — M25.559 HIP PAIN: ICD-10-CM

## 2024-11-07 PROCEDURE — 77080 DXA BONE DENSITY AXIAL: CPT

## 2025-02-02 ENCOUNTER — E-VISIT (OUTPATIENT)
Dept: URGENT CARE | Facility: CLINIC | Age: 40
End: 2025-02-02

## 2025-02-02 DIAGNOSIS — R35.0 URINARY FREQUENCY: Primary | ICD-10-CM

## 2025-02-02 RX ORDER — NITROFURANTOIN 25; 75 MG/1; MG/1
100 CAPSULE ORAL 2 TIMES DAILY
Qty: 10 CAPSULE | Refills: 0 | Status: SHIPPED | OUTPATIENT
Start: 2025-02-02 | End: 2025-02-07

## 2025-02-03 NOTE — PATIENT INSTRUCTIONS
Dear Ángela Zurita    After reviewing your responses, I've been able to diagnose you with a urinary tract infection, which is a common infection of the bladder with bacteria.  This is not a sexually transmitted infection, though urinating immediately after intercourse can help prevent infections.  Drinking lots of fluids is also helpful to clear your current infection and prevent the next one.      I have sent a prescription for antibiotics to your pharmacy to treat this infection.    It is important that you take all of your prescribed medication even if your symptoms are improving after a few doses.  Taking all of your medicine helps prevent the symptoms from returning.     If your symptoms worsen, you develop pain in your back or stomach, develop fevers, or are not improving in 5 days, please contact your primary care provider for an appointment or visit any of our convenient Walk-in or Urgent Care Centers to be seen, which can be found on our website here.    Thanks again for choosing us as your health care partner,    KATHLEEN Grey CNP

## 2025-02-25 ENCOUNTER — PATIENT OUTREACH (OUTPATIENT)
Dept: CARE COORDINATION | Facility: CLINIC | Age: 40
End: 2025-02-25

## 2025-03-11 ENCOUNTER — PATIENT OUTREACH (OUTPATIENT)
Dept: CARE COORDINATION | Facility: CLINIC | Age: 40
End: 2025-03-11

## 2025-03-19 ENCOUNTER — TELEPHONE (OUTPATIENT)
Dept: FAMILY MEDICINE | Facility: CLINIC | Age: 40
End: 2025-03-19

## 2025-03-19 NOTE — TELEPHONE ENCOUNTER
Patient Quality Outreach    Patient is due for the following:   Cervical Cancer Screening - PAP Needed  Physical Preventive Adult Physical    Action(s) Taken:   Schedule a Adult Preventative    Type of outreach:    Sent [employer] message.    Questions for provider review:    None         Saira Gonzales  Chart routed to None.

## 2025-03-31 ENCOUNTER — E-VISIT (OUTPATIENT)
Dept: URGENT CARE | Facility: CLINIC | Age: 40
End: 2025-03-31

## 2025-03-31 DIAGNOSIS — N39.0 ACUTE UTI (URINARY TRACT INFECTION): Primary | ICD-10-CM

## 2025-03-31 RX ORDER — NITROFURANTOIN 25; 75 MG/1; MG/1
100 CAPSULE ORAL 2 TIMES DAILY
Qty: 10 CAPSULE | Refills: 0 | Status: SHIPPED | OUTPATIENT
Start: 2025-03-31 | End: 2025-04-05

## 2025-03-31 NOTE — PATIENT INSTRUCTIONS
Dear Ángela Zurita    After reviewing your responses, I've been able to diagnose you with a urinary tract infection, which is a common infection of the bladder with bacteria.  This is not a sexually transmitted infection, though urinating immediately after intercourse can help prevent infections.  Drinking lots of fluids is also helpful to clear your current infection and prevent the next one.      I have sent a prescription for antibiotics to your pharmacy to treat this infection.    It is important that you take all of your prescribed medication even if your symptoms are improving after a few doses.  Taking all of your medicine helps prevent the symptoms from returning.     If your symptoms worsen, you develop pain in your back or stomach, develop fevers, or are not improving in 5 days, please contact your primary care provider for an appointment or visit any of our convenient Walk-in or Urgent Care Centers to be seen, which can be found on our website here.    Thanks again for choosing us as your health care partner,    Rayna Hayes CNP  Urinary Tract Infection (UTI) in Women: Care Instructions  Overview     A urinary tract infection (UTI) is an infection caused by bacteria. It can happen anywhere in the urinary tract. A UTI can happen in the:  Kidneys.  Ureters, the tubes that connect the kidneys to the bladder.  Bladder.  Urethra, where the urine comes out.  Most UTIs are bladder infections. They often cause pain or burning when you urinate.  Most UTIs can be cured with antibiotics. If you are prescribed antibiotics, be sure to complete your treatment so that the infection does not get worse.  Follow-up care is a key part of your treatment and safety. Be sure to make and go to all appointments, and call your doctor if you are having problems. It's also a good idea to know your test results and keep a list of the medicines you take.  How can you care for yourself at home?  Take your antibiotics as  "directed. Do not stop taking them just because you feel better. You need to take the full course of antibiotics.  Drink extra water and other fluids for the next day or two. This will help make the urine less concentrated and help wash out the bacteria that are causing the infection. (If you have kidney, heart, or liver disease and have to limit fluids, talk with your doctor before you increase the amount of fluids you drink.)  Avoid drinks that are carbonated or have caffeine. They can irritate the bladder.  Urinate often. Try to empty your bladder each time.  To relieve pain, take a hot bath or lay a heating pad set on low over your lower belly or genital area. Never go to sleep with a heating pad in place.  To prevent UTIs  Drink plenty of water each day. This helps you urinate often, which clears bacteria from your system. (If you have kidney, heart, or liver disease and have to limit fluids, talk with your doctor before you increase the amount of fluids you drink.)  Urinate when you need to.  If you are sexually active, urinate right after you have sex.  Change sanitary pads often.  Avoid douches, bubble baths, feminine hygiene sprays, and other feminine hygiene products that have deodorants.  After going to the bathroom, wipe from front to back.  When should you call for help?   Call your doctor now or seek immediate medical care if:    You have new or worse fever, chills, nausea, or vomiting.     You have new pain in your back just below your rib cage. This is called flank pain.     There is new blood or pus in your urine.     You have any problems with your antibiotic medicine.   Watch closely for changes in your health, and be sure to contact your doctor if:    You are not getting better after taking an antibiotic for 2 days.     Your symptoms go away but then come back.   Where can you learn more?  Go to https://www.healthwise.net/patiented  Enter K848 in the search box to learn more about \"Urinary Tract " "Infection (UTI) in Women: Care Instructions.\"  Current as of: April 30, 2024  Content Version: 14.4    [id number] [employer].   Care instructions adapted under license by your healthcare professional. If you have questions about a medical condition or this instruction, always ask your healthcare professional. [employer] disclaims any warranty or liability for your use of this information.    "

## 2025-04-01 ENCOUNTER — E-VISIT (OUTPATIENT)
Dept: FAMILY MEDICINE | Facility: CLINIC | Age: 40
End: 2025-04-01

## 2025-04-01 DIAGNOSIS — F90.0 ATTENTION DEFICIT HYPERACTIVITY DISORDER (ADHD), PREDOMINANTLY INATTENTIVE TYPE: Primary | ICD-10-CM

## 2025-04-01 RX ORDER — DEXTROAMPHETAMINE SACCHARATE, AMPHETAMINE ASPARTATE, DEXTROAMPHETAMINE SULFATE AND AMPHETAMINE SULFATE 2.5; 2.5; 2.5; 2.5 MG/1; MG/1; MG/1; MG/1
10 TABLET ORAL 4 TIMES DAILY
Qty: 120 TABLET | Refills: 0 | Status: SHIPPED | OUTPATIENT
Start: 2025-05-01 | End: 2025-05-31

## 2025-04-01 RX ORDER — DEXTROAMPHETAMINE SACCHARATE, AMPHETAMINE ASPARTATE, DEXTROAMPHETAMINE SULFATE AND AMPHETAMINE SULFATE 2.5; 2.5; 2.5; 2.5 MG/1; MG/1; MG/1; MG/1
10 TABLET ORAL 4 TIMES DAILY
Qty: 120 TABLET | Refills: 0 | Status: SHIPPED | OUTPATIENT
Start: 2025-05-31 | End: 2025-06-30

## 2025-04-01 RX ORDER — DEXTROAMPHETAMINE SACCHARATE, AMPHETAMINE ASPARTATE, DEXTROAMPHETAMINE SULFATE AND AMPHETAMINE SULFATE 2.5; 2.5; 2.5; 2.5 MG/1; MG/1; MG/1; MG/1
10 TABLET ORAL 4 TIMES DAILY
Qty: 120 TABLET | Refills: 0 | Status: SHIPPED | OUTPATIENT
Start: 2025-04-01 | End: 2025-05-01

## 2025-04-01 ASSESSMENT — ANXIETY QUESTIONNAIRES
7. FEELING AFRAID AS IF SOMETHING AWFUL MIGHT HAPPEN: NOT AT ALL
GAD7 TOTAL SCORE: 0
6. BECOMING EASILY ANNOYED OR IRRITABLE: NOT AT ALL
7. FEELING AFRAID AS IF SOMETHING AWFUL MIGHT HAPPEN: NOT AT ALL
8. IF YOU CHECKED OFF ANY PROBLEMS, HOW DIFFICULT HAVE THESE MADE IT FOR YOU TO DO YOUR WORK, TAKE CARE OF THINGS AT HOME, OR GET ALONG WITH OTHER PEOPLE?: NOT DIFFICULT AT ALL
1. FEELING NERVOUS, ANXIOUS, OR ON EDGE: NOT AT ALL
3. WORRYING TOO MUCH ABOUT DIFFERENT THINGS: NOT AT ALL
5. BEING SO RESTLESS THAT IT IS HARD TO SIT STILL: NOT AT ALL
4. TROUBLE RELAXING: NOT AT ALL
GAD7 TOTAL SCORE: 0
2. NOT BEING ABLE TO STOP OR CONTROL WORRYING: NOT AT ALL
IF YOU CHECKED OFF ANY PROBLEMS ON THIS QUESTIONNAIRE, HOW DIFFICULT HAVE THESE PROBLEMS MADE IT FOR YOU TO DO YOUR WORK, TAKE CARE OF THINGS AT HOME, OR GET ALONG WITH OTHER PEOPLE: NOT DIFFICULT AT ALL
GAD7 TOTAL SCORE: 0

## 2025-04-01 NOTE — PATIENT INSTRUCTIONS
I am glad you are doing well. I have refilled your medication:  Orders Placed This Encounter   Medications    amphetamine-dextroamphetamine (ADDERALL) 10 MG tablet     Sig: Take 1 tablet (10 mg) by mouth 4 times daily.     Dispense:  120 tablet     Refill:  0    amphetamine-dextroamphetamine (ADDERALL) 10 MG tablet     Sig: Take 1 tablet (10 mg) by mouth 4 times daily.     Dispense:  120 tablet     Refill:  0    amphetamine-dextroamphetamine (ADDERALL) 10 MG tablet     Sig: Take 1 tablet (10 mg) by mouth 4 times daily.     Dispense:  120 tablet     Refill:  0      I'm hopeful your insurance will cover 10mg 4 times a day, but they may not, we'll see.    View your full visit summary for details by clicking on the link below. Your pharmacist will be able to address any questions you may have about the medication.      Thank you for choosing us for your care.    Please schedule an in clinic physical in August (please schedule it now as my schedule is booking up.    Thank you,  Randy Graf MD

## 2025-04-28 ENCOUNTER — MYC MEDICAL ADVICE (OUTPATIENT)
Dept: FAMILY MEDICINE | Facility: CLINIC | Age: 40
End: 2025-04-28

## 2025-04-28 DIAGNOSIS — F90.0 ATTENTION DEFICIT HYPERACTIVITY DISORDER (ADHD), PREDOMINANTLY INATTENTIVE TYPE: ICD-10-CM

## 2025-04-28 RX ORDER — DEXTROAMPHETAMINE SACCHARATE, AMPHETAMINE ASPARTATE, DEXTROAMPHETAMINE SULFATE AND AMPHETAMINE SULFATE 2.5; 2.5; 2.5; 2.5 MG/1; MG/1; MG/1; MG/1
10 TABLET ORAL 4 TIMES DAILY
Qty: 120 TABLET | Refills: 0 | Status: SHIPPED | OUTPATIENT
Start: 2025-05-01

## 2025-04-28 NOTE — TELEPHONE ENCOUNTER
Dr. Graf,    Please advise on approval for early fill for Adderall.    Thank you  James BROWNE RN  [facility]

## 2025-05-10 ENCOUNTER — HEALTH MAINTENANCE LETTER (OUTPATIENT)
Age: 40
End: 2025-05-10

## 2025-05-21 ENCOUNTER — MYC MEDICAL ADVICE (OUTPATIENT)
Dept: FAMILY MEDICINE | Facility: CLINIC | Age: 40
End: 2025-05-21

## 2025-05-22 NOTE — TELEPHONE ENCOUNTER
Writer notified Hakeem/pharmacist of provider's note below, he says he'll get the medication ready for  tonight. Reply sent to pt on MyChart.    Alix Magaña RN  [facility]

## 2025-05-22 NOTE — TELEPHONE ENCOUNTER
See Minust message; pt requesting early refill for Adderall d/t lost prescription. Start date for next order is 5/31/25. OK for early refill?    Alix Magaña RN  [facility]

## 2025-06-13 ENCOUNTER — E-VISIT (OUTPATIENT)
Dept: FAMILY MEDICINE | Facility: CLINIC | Age: 40
End: 2025-06-13

## 2025-06-13 DIAGNOSIS — F90.0 ATTENTION DEFICIT HYPERACTIVITY DISORDER (ADHD), PREDOMINANTLY INATTENTIVE TYPE: Primary | ICD-10-CM

## 2025-06-13 ASSESSMENT — ANXIETY QUESTIONNAIRES
4. TROUBLE RELAXING: NOT AT ALL
8. IF YOU CHECKED OFF ANY PROBLEMS, HOW DIFFICULT HAVE THESE MADE IT FOR YOU TO DO YOUR WORK, TAKE CARE OF THINGS AT HOME, OR GET ALONG WITH OTHER PEOPLE?: NOT DIFFICULT AT ALL
1. FEELING NERVOUS, ANXIOUS, OR ON EDGE: SEVERAL DAYS
IF YOU CHECKED OFF ANY PROBLEMS ON THIS QUESTIONNAIRE, HOW DIFFICULT HAVE THESE PROBLEMS MADE IT FOR YOU TO DO YOUR WORK, TAKE CARE OF THINGS AT HOME, OR GET ALONG WITH OTHER PEOPLE: NOT DIFFICULT AT ALL
GAD7 TOTAL SCORE: 2
2. NOT BEING ABLE TO STOP OR CONTROL WORRYING: NOT AT ALL
GAD7 TOTAL SCORE: 2
3. WORRYING TOO MUCH ABOUT DIFFERENT THINGS: NOT AT ALL
7. FEELING AFRAID AS IF SOMETHING AWFUL MIGHT HAPPEN: NOT AT ALL
7. FEELING AFRAID AS IF SOMETHING AWFUL MIGHT HAPPEN: NOT AT ALL
5. BEING SO RESTLESS THAT IT IS HARD TO SIT STILL: NOT AT ALL
GAD7 TOTAL SCORE: 2
6. BECOMING EASILY ANNOYED OR IRRITABLE: SEVERAL DAYS

## 2025-06-16 RX ORDER — DEXTROAMPHETAMINE SACCHARATE, AMPHETAMINE ASPARTATE, DEXTROAMPHETAMINE SULFATE AND AMPHETAMINE SULFATE 2.5; 2.5; 2.5; 2.5 MG/1; MG/1; MG/1; MG/1
10 TABLET ORAL 4 TIMES DAILY
Qty: 120 TABLET | Refills: 0 | Status: SHIPPED | OUTPATIENT
Start: 2025-07-16 | End: 2025-08-15

## 2025-06-16 RX ORDER — DEXTROAMPHETAMINE SACCHARATE, AMPHETAMINE ASPARTATE, DEXTROAMPHETAMINE SULFATE AND AMPHETAMINE SULFATE 2.5; 2.5; 2.5; 2.5 MG/1; MG/1; MG/1; MG/1
10 TABLET ORAL 4 TIMES DAILY
Qty: 120 TABLET | Refills: 0 | Status: SHIPPED | OUTPATIENT
Start: 2025-06-16 | End: 2025-06-17

## 2025-06-16 RX ORDER — DEXTROAMPHETAMINE SACCHARATE, AMPHETAMINE ASPARTATE, DEXTROAMPHETAMINE SULFATE AND AMPHETAMINE SULFATE 2.5; 2.5; 2.5; 2.5 MG/1; MG/1; MG/1; MG/1
10 TABLET ORAL 4 TIMES DAILY
Qty: 120 TABLET | Refills: 0 | Status: SHIPPED | OUTPATIENT
Start: 2025-08-15 | End: 2025-09-14

## 2025-06-16 NOTE — PATIENT INSTRUCTIONS
I am glad you are doing well. I have refilled your medication:  Orders Placed This Encounter   Medications     amphetamine-dextroamphetamine (ADDERALL) 10 MG tablet     Sig: Take 1 tablet (10 mg) by mouth 4 times daily.     Dispense:  120 tablet     Refill:  0     amphetamine-dextroamphetamine (ADDERALL) 10 MG tablet     Sig: Take 1 tablet (10 mg) by mouth 4 times daily.     Dispense:  120 tablet     Refill:  0     amphetamine-dextroamphetamine (ADDERALL) 10 MG tablet     Sig: Take 1 tablet (10 mg) by mouth 4 times daily.     Dispense:  120 tablet     Refill:  0        View your full visit summary for details by clicking on the link below. Your pharmacist will be able to address any questions you may have about the medication.      Thank you for choosing us for your care.

## 2025-08-16 DIAGNOSIS — B00.1 HERPES LABIALIS: ICD-10-CM

## 2025-08-18 RX ORDER — VALACYCLOVIR HYDROCHLORIDE 1 G/1
TABLET, FILM COATED ORAL
Qty: 4 TABLET | Refills: 1 | Status: SHIPPED | OUTPATIENT
Start: 2025-08-18

## (undated) DEVICE — DRSG AQUACEL AG HYDROFIBER 3.5X6" 422604

## (undated) DEVICE — SU MONOCRYL 4-0 PS-2 18" UND Y496G

## (undated) DEVICE — ESU GROUND PAD UNIVERSAL W/O CORD

## (undated) DEVICE — LIGHT HANDLE X2

## (undated) DEVICE — UTERINE MANIPULATOR HUMI KRONER 6003

## (undated) DEVICE — SU VICRYL 0 CT-1 36" J346H

## (undated) DEVICE — LINEN TOWEL PACK X5 5464

## (undated) DEVICE — ENDO TROCAR CONMED AIRSEAL BLADELESS 12X120MM IAS12-120LP

## (undated) DEVICE — TUBING CYSTO/BLADDER IRRIG SET 80" 06544-01

## (undated) DEVICE — SUCTION TIP YANKAUER STR K87

## (undated) DEVICE — SOL WATER IRRIG 1000ML BOTTLE 07139-09

## (undated) DEVICE — SUCTION IRR STRYKERFLOW II W/TIP 250-070-520

## (undated) DEVICE — DRSG GAUZE 2X2" 8042

## (undated) DEVICE — LUBRICATING JELLY 4.25OZ

## (undated) DEVICE — ENDO SHEARS RENEW LAP ENDOCUT SCISSOR TIP 16.5MM 3142

## (undated) DEVICE — SU VICRYL 0 CT-1 27" J340H

## (undated) DEVICE — SYR 50ML LL W/O NDL 309653

## (undated) DEVICE — SUCTION DRY CHEST DRAIN OASIS 3600-100

## (undated) DEVICE — ANTIFOG SOLUTION W/FOAM PAD 31142527

## (undated) DEVICE — Device

## (undated) DEVICE — BIT DRL 413MM 4.3MM

## (undated) DEVICE — DRSG TEGADERM 6X8" 1628

## (undated) DEVICE — BARRIER INTERCEED 3X4" 4350

## (undated) DEVICE — SURGICEL ABSORBABLE HEMOSTAT SNOW 2"X4" 2082

## (undated) DEVICE — BASIN SET MAJOR

## (undated) DEVICE — PACK LAPAROSCOPY/PELVISCOPY STD

## (undated) DEVICE — SU VICRYL 0 UR-6 27" J603H

## (undated) DEVICE — TUBING INSUFFLATION W/FILTER CPC TO LUER 620-030-301

## (undated) DEVICE — CATH TRAY FOLEY 16FR SILICONE 907416

## (undated) DEVICE — ESU ELEC BLADE 2.75" COATED/INSULATED E1455

## (undated) DEVICE — ESU HOLSTER PLASTIC DISP E2400

## (undated) DEVICE — LUBRICANT INST ELECTROLUBE EL101

## (undated) DEVICE — SU VICRYL 2-0 SH 27" J317H

## (undated) DEVICE — DRAPE SHEET REV FOLD 3/4 9349

## (undated) DEVICE — GLOVE PROTEXIS BLUE W/NEU-THERA 6.5  2D73EB65

## (undated) DEVICE — DAVINCI XI RETR ENDOWRIST SMALL GRAPTOR 470318

## (undated) DEVICE — DECANTER BAG 2002S

## (undated) DEVICE — DRSG XEROFORM 1X8"

## (undated) DEVICE — WIRE GUIDE 3.2X400MM  357.399

## (undated) DEVICE — TUBING CONMED AIRSEAL SMOKE EVAC INSUFFLATION ASM-EVAC

## (undated) DEVICE — ESU PENCIL W/HOLSTER E2350H

## (undated) DEVICE — KIT PATIENT CARE HANA TABLE PROFX SUPINE 6855

## (undated) DEVICE — SU MONOCRYL 0 CT-1 36" Y346H

## (undated) DEVICE — POUCH TISSUE RETRIEVAL LAP 10MM 2.21" INTRO TRS100SB2

## (undated) DEVICE — DECANTER VIAL 2006S

## (undated) DEVICE — GOWN XLG DISP 9545

## (undated) DEVICE — SOL NACL 0.9% IRRIG 1000ML BOTTLE 07138-09

## (undated) DEVICE — PACK C-SECTION LF PL15OTA83B

## (undated) DEVICE — DRSG STERI STRIP 1/2X4" R1547

## (undated) DEVICE — PREP CHLORAPREP 26ML TINTED HI-LITE ORANGE 930815

## (undated) DEVICE — DRSG STERI STRIP 1/4X3" R1541

## (undated) DEVICE — PAD PERI INDIV WRAP 11" 2022

## (undated) DEVICE — PAD FLOOR SURGISAFE

## (undated) DEVICE — PACK MINOR SBA15MIFSE

## (undated) DEVICE — STPL SKIN 35W ROTATING HEAD PRW35

## (undated) DEVICE — DRAIN CHEST TUBE 28FR STR 8028

## (undated) DEVICE — SEALANT PLEURAL AIRLEAK PROGEL 4ML PGPS002

## (undated) DEVICE — TROCAR AIRSEAL BLADELESS 12X120MM IAS12-120

## (undated) DEVICE — STOCKING SLEEVE COMPRESSION CALF LG

## (undated) DEVICE — STRAP KNEE/BODY 31143004

## (undated) DEVICE — DRAPE POUCH INSTRUMENT 3 POCKET 1018L

## (undated) DEVICE — SU VICRYL 2-0 CT-2 27" UND J269H

## (undated) DEVICE — SOL NACL 0.9% IRRIG 1000ML BOTTLE 2F7124

## (undated) DEVICE — DRAPE BREAST/CHEST 29420

## (undated) DEVICE — TUBING SUCTION 12"X1/4" N612

## (undated) DEVICE — DAVINCI XI SEAL UNIVERSAL 5-8MM 470361

## (undated) DEVICE — SOL WATER IRRIG 1000ML BOTTLE 2F7114

## (undated) DEVICE — GOWN LG DISP 9515

## (undated) DEVICE — SU VICRYL 4-0 PS-2 18" UND J496G

## (undated) DEVICE — DRSG TELFA 3X8" 1238

## (undated) DEVICE — GOWN IMPERVIOUS SPECIALTY XL/XLONG 39049

## (undated) DEVICE — DRAPE C-ARM 60X42" 1013

## (undated) DEVICE — DAVINCI XI OBTURATOR BLADELESS 8MM 470359

## (undated) DEVICE — DAVINCI SEAL CANNULA AND STPL 12MM 470380

## (undated) DEVICE — SU SILK 0 SH 30" K834H

## (undated) DEVICE — SOL ADH LIQUID BENZOIN SWAB 0.6ML C1544

## (undated) DEVICE — PACK EXTREMITY SOP15EXFSD

## (undated) DEVICE — GLOVE PROTEXIS W/NEU-THERA 6.5  2D73TE65

## (undated) DEVICE — DAVINCI XI DRAPE COLUMN 470341

## (undated) DEVICE — ADH LIQUID MASTISOL TOPICAL VIAL 2-3ML 0523-48

## (undated) DEVICE — DAVINCI XI GRASPER ENDOWRIST BIPOLAR LONG 470400

## (undated) DEVICE — DAVINCI XI DRAPE ARM 470015

## (undated) DEVICE — ESU LIGASURE MARYLAND JAW OPEN SEALER/DVDR 5MMX37CM LF1737

## (undated) DEVICE — PACK HIP NAILING SOP15HNSB

## (undated) DEVICE — BIT DRL CMPLT OPN RMR ASMB

## (undated) DEVICE — PREP CHLORAPREP 26ML TINTED ORANGE  260815

## (undated) DEVICE — TAPE DURAPORE 3" SILK 1538-3

## (undated) DEVICE — SUCTION MANIFOLD NEPTUNE SGL

## (undated) DEVICE — APPLICATOR SPRAY TIP PROGEL PGEN005-1

## (undated) DEVICE — DRAPE IOBAN INCISE 23X17" 6650EZ

## (undated) DEVICE — SUCTION CANISTER MEDIVAC LINER 1500ML W/LID 65651-515

## (undated) DEVICE — ENDO CANNULA FIXATION OPTICAL 05MM VERSAPORT PLUS ONBFCA5ST

## (undated) DEVICE — NDL SPINAL 22GA 3.5" QUINCKE 405181

## (undated) DEVICE — DRSG TEGADERM 2 1/2X 2 3/4"

## (undated) DEVICE — GLOVE ESTEEM POWDER FREE SMT 6.5  2D72PT65

## (undated) DEVICE — MANIFOLD NEPTUNE 4 PORT 700-20

## (undated) RX ORDER — PHENYLEPHRINE HCL IN 0.9% NACL 1 MG/10 ML
SYRINGE (ML) INTRAVENOUS
Status: DISPENSED
Start: 2019-03-22

## (undated) RX ORDER — FENTANYL CITRATE 50 UG/ML
INJECTION, SOLUTION INTRAMUSCULAR; INTRAVENOUS
Status: DISPENSED
Start: 2023-04-26

## (undated) RX ORDER — FENTANYL CITRATE 50 UG/ML
INJECTION, SOLUTION INTRAMUSCULAR; INTRAVENOUS
Status: DISPENSED
Start: 2018-04-03

## (undated) RX ORDER — OXYTOCIN/0.9 % SODIUM CHLORIDE 30/500 ML
PLASTIC BAG, INJECTION (ML) INTRAVENOUS
Status: DISPENSED
Start: 2019-03-22

## (undated) RX ORDER — CEFAZOLIN SODIUM 1 G/3ML
INJECTION, POWDER, FOR SOLUTION INTRAMUSCULAR; INTRAVENOUS
Status: DISPENSED
Start: 2023-04-26

## (undated) RX ORDER — FENTANYL CITRATE 0.05 MG/ML
INJECTION, SOLUTION INTRAMUSCULAR; INTRAVENOUS
Status: DISPENSED
Start: 2023-04-26

## (undated) RX ORDER — GLYCOPYRROLATE 0.2 MG/ML
INJECTION, SOLUTION INTRAMUSCULAR; INTRAVENOUS
Status: DISPENSED
Start: 2024-09-09

## (undated) RX ORDER — FENTANYL CITRATE 50 UG/ML
INJECTION, SOLUTION INTRAMUSCULAR; INTRAVENOUS
Status: DISPENSED
Start: 2019-03-22

## (undated) RX ORDER — OXYCODONE HCL 10 MG/1
TABLET, FILM COATED, EXTENDED RELEASE ORAL
Status: DISPENSED
Start: 2018-04-03

## (undated) RX ORDER — PROPOFOL 10 MG/ML
INJECTION, EMULSION INTRAVENOUS
Status: DISPENSED
Start: 2024-09-09

## (undated) RX ORDER — BUPIVACAINE HYDROCHLORIDE AND EPINEPHRINE 5; 5 MG/ML; UG/ML
INJECTION, SOLUTION EPIDURAL; INTRACAUDAL; PERINEURAL
Status: DISPENSED
Start: 2018-04-03

## (undated) RX ORDER — HYDROMORPHONE HYDROCHLORIDE 1 MG/ML
INJECTION, SOLUTION INTRAMUSCULAR; INTRAVENOUS; SUBCUTANEOUS
Status: DISPENSED
Start: 2018-04-03

## (undated) RX ORDER — ONDANSETRON 2 MG/ML
INJECTION INTRAMUSCULAR; INTRAVENOUS
Status: DISPENSED
Start: 2018-04-03

## (undated) RX ORDER — HYDROMORPHONE HCL IN WATER/PF 6 MG/30 ML
PATIENT CONTROLLED ANALGESIA SYRINGE INTRAVENOUS
Status: DISPENSED
Start: 2024-09-09

## (undated) RX ORDER — HEPARIN SODIUM 5000 [USP'U]/.5ML
INJECTION, SOLUTION INTRAVENOUS; SUBCUTANEOUS
Status: DISPENSED
Start: 2023-04-26

## (undated) RX ORDER — HYDROXYZINE HYDROCHLORIDE 25 MG/1
TABLET, FILM COATED ORAL
Status: DISPENSED
Start: 2018-04-03

## (undated) RX ORDER — CEFAZOLIN SODIUM/WATER 2 G/20 ML
SYRINGE (ML) INTRAVENOUS
Status: DISPENSED
Start: 2024-09-09

## (undated) RX ORDER — DEXAMETHASONE SODIUM PHOSPHATE 4 MG/ML
INJECTION, SOLUTION INTRA-ARTICULAR; INTRALESIONAL; INTRAMUSCULAR; INTRAVENOUS; SOFT TISSUE
Status: DISPENSED
Start: 2018-04-03

## (undated) RX ORDER — DEXAMETHASONE SODIUM PHOSPHATE 4 MG/ML
INJECTION, SOLUTION INTRA-ARTICULAR; INTRALESIONAL; INTRAMUSCULAR; INTRAVENOUS; SOFT TISSUE
Status: DISPENSED
Start: 2024-09-09

## (undated) RX ORDER — ACETAMINOPHEN 325 MG/1
TABLET ORAL
Status: DISPENSED
Start: 2023-04-26

## (undated) RX ORDER — CEFAZOLIN SODIUM 1 G/3ML
INJECTION, POWDER, FOR SOLUTION INTRAMUSCULAR; INTRAVENOUS
Status: DISPENSED
Start: 2024-09-09

## (undated) RX ORDER — LIDOCAINE HYDROCHLORIDE AND EPINEPHRINE 10; 10 MG/ML; UG/ML
INJECTION, SOLUTION INFILTRATION; PERINEURAL
Status: DISPENSED
Start: 2018-04-03

## (undated) RX ORDER — LIDOCAINE HYDROCHLORIDE 10 MG/ML
INJECTION, SOLUTION EPIDURAL; INFILTRATION; INTRACAUDAL; PERINEURAL
Status: DISPENSED
Start: 2018-04-03

## (undated) RX ORDER — CELECOXIB 200 MG/1
CAPSULE ORAL
Status: DISPENSED
Start: 2023-04-26

## (undated) RX ORDER — ONDANSETRON 2 MG/ML
INJECTION INTRAMUSCULAR; INTRAVENOUS
Status: DISPENSED
Start: 2019-03-22

## (undated) RX ORDER — BUPIVACAINE HYDROCHLORIDE AND EPINEPHRINE 2.5; 5 MG/ML; UG/ML
INJECTION, SOLUTION EPIDURAL; INFILTRATION; INTRACAUDAL; PERINEURAL
Status: DISPENSED
Start: 2024-09-09

## (undated) RX ORDER — HYDROMORPHONE HYDROCHLORIDE 1 MG/ML
INJECTION, SOLUTION INTRAMUSCULAR; INTRAVENOUS; SUBCUTANEOUS
Status: DISPENSED
Start: 2023-04-26

## (undated) RX ORDER — ONDANSETRON 2 MG/ML
INJECTION INTRAMUSCULAR; INTRAVENOUS
Status: DISPENSED
Start: 2023-04-26

## (undated) RX ORDER — VANCOMYCIN HYDROCHLORIDE 1 G/20ML
INJECTION, POWDER, LYOPHILIZED, FOR SOLUTION INTRAVENOUS
Status: DISPENSED
Start: 2024-09-09

## (undated) RX ORDER — BUPIVACAINE HYDROCHLORIDE 2.5 MG/ML
INJECTION, SOLUTION EPIDURAL; INFILTRATION; INTRACAUDAL
Status: DISPENSED
Start: 2023-04-26

## (undated) RX ORDER — KETOROLAC TROMETHAMINE 30 MG/ML
INJECTION, SOLUTION INTRAMUSCULAR; INTRAVENOUS
Status: DISPENSED
Start: 2018-04-03

## (undated) RX ORDER — FENTANYL CITRATE 50 UG/ML
INJECTION, SOLUTION INTRAMUSCULAR; INTRAVENOUS
Status: DISPENSED
Start: 2024-09-09

## (undated) RX ORDER — PROPOFOL 10 MG/ML
INJECTION, EMULSION INTRAVENOUS
Status: DISPENSED
Start: 2023-04-26

## (undated) RX ORDER — DEXAMETHASONE SODIUM PHOSPHATE 4 MG/ML
INJECTION, SOLUTION INTRA-ARTICULAR; INTRALESIONAL; INTRAMUSCULAR; INTRAVENOUS; SOFT TISSUE
Status: DISPENSED
Start: 2023-04-26

## (undated) RX ORDER — GABAPENTIN 300 MG/1
CAPSULE ORAL
Status: DISPENSED
Start: 2023-04-26

## (undated) RX ORDER — TOBRAMYCIN 1.2 G/30ML
INJECTION, POWDER, LYOPHILIZED, FOR SOLUTION INTRAVENOUS
Status: DISPENSED
Start: 2024-09-09

## (undated) RX ORDER — PROPOFOL 10 MG/ML
INJECTION, EMULSION INTRAVENOUS
Status: DISPENSED
Start: 2018-04-03

## (undated) RX ORDER — CHLORHEXIDINE GLUCONATE ORAL RINSE 1.2 MG/ML
SOLUTION DENTAL
Status: DISPENSED
Start: 2023-04-26